# Patient Record
Sex: FEMALE | Race: WHITE | NOT HISPANIC OR LATINO | Employment: OTHER | URBAN - METROPOLITAN AREA
[De-identification: names, ages, dates, MRNs, and addresses within clinical notes are randomized per-mention and may not be internally consistent; named-entity substitution may affect disease eponyms.]

---

## 2017-01-04 ENCOUNTER — OFFICE VISIT (OUTPATIENT)
Dept: ALLERGY | Facility: OTHER | Age: 67
End: 2017-01-04
Payer: COMMERCIAL

## 2017-01-04 VITALS
OXYGEN SATURATION: 98 % | SYSTOLIC BLOOD PRESSURE: 110 MMHG | WEIGHT: 140 LBS | DIASTOLIC BLOOD PRESSURE: 66 MMHG | BODY MASS INDEX: 24.8 KG/M2 | HEART RATE: 75 BPM | HEIGHT: 63 IN

## 2017-01-04 DIAGNOSIS — J31.0 CHRONIC RHINITIS: ICD-10-CM

## 2017-01-04 DIAGNOSIS — J32.9 CHRONIC SINUSITIS, UNSPECIFIED LOCATION: ICD-10-CM

## 2017-01-04 DIAGNOSIS — J45.30 MILD PERSISTENT ASTHMA WITHOUT COMPLICATION: Primary | ICD-10-CM

## 2017-01-04 LAB
FEF 25/75: NORMAL
FEV-1: NORMAL
FEV1/FVC: NORMAL
FVC: NORMAL

## 2017-01-04 PROCEDURE — 86003 ALLG SPEC IGE CRUDE XTRC EA: CPT | Mod: 90 | Performed by: ALLERGY & IMMUNOLOGY

## 2017-01-04 PROCEDURE — 99000 SPECIMEN HANDLING OFFICE-LAB: CPT | Performed by: ALLERGY & IMMUNOLOGY

## 2017-01-04 PROCEDURE — 99214 OFFICE O/P EST MOD 30 MIN: CPT | Mod: 25 | Performed by: ALLERGY & IMMUNOLOGY

## 2017-01-04 PROCEDURE — 94010 BREATHING CAPACITY TEST: CPT | Performed by: ALLERGY & IMMUNOLOGY

## 2017-01-04 PROCEDURE — 36415 COLL VENOUS BLD VENIPUNCTURE: CPT | Performed by: ALLERGY & IMMUNOLOGY

## 2017-01-04 PROCEDURE — 95004 PERQ TESTS W/ALRGNC XTRCS: CPT | Performed by: ALLERGY & IMMUNOLOGY

## 2017-01-04 RX ORDER — MONTELUKAST SODIUM 10 MG/1
10 TABLET ORAL AT BEDTIME
Qty: 90 TABLET | Refills: 3 | Status: SHIPPED | OUTPATIENT
Start: 2017-01-04 | End: 2017-03-03

## 2017-01-04 NOTE — PROGRESS NOTES
Amee Crews is a 66 year old White female with previous medical history significant for chronic sinusitis with nasal polyposis, allergic rhinitis and asthma. Amee Crews is being seen today for evaluation of asthma and seasonal allergies.     The patient reports having allergy symptoms since 21 years of age. At some point during the 1970s she underwent skin testing which she recalls being positive for trees, grasses, weeds, cats, dust mite. She was on allergen immunotherapy for 2 years. She felt like this was helpful for her symptoms. Her symptoms include nasal itching, nasal congestion, postnasal drip, poor sense of smell, sinus headaches and decreased taste. She has perennial symptoms and her symptoms get better in the summer months. Increased symptoms around cutting grass and raking leaves. Symptoms are present day and night. Symptoms are present in door and outdoor. Symptoms are made worse by cats. She has tried Flonase 2 sprays per nostril daily. She thinks this is moderately effective. Cetirizine has been tried and it is somewhat helpful. She has tried pseudoephedrine and this has been helpful.  Patient has a history of chronic sinusitis with nasal polyposis. She has had sinus surgery twice once in 1997 and again in 1999. She follows with ENT Dr. Hinkle. As mentioned above she is on Flonase 2 sprays per nostril daily. Her sense of smell will wax and wane. Typically if she is treated with oral corticosteroid she will have improvement in nasal symptoms, but only for a few days.    Patient reports that she was diagnosed with asthma in her 40s. Asthma triggers include cold weather, Spring and fall. No problems with upper respiratory tract infections. She will have wheezing, chest tightness, coughing and shortness of breath approximately 15 days per month. She will use her albuterol for the symptoms and this is helpful. She denies nocturnal symptoms. Her asthma symptoms are worse when she lies flat at  night. Patient has previously been prescribed Flovent 110  g. She will use this 2 puffs daily intermittently. She reports over the last month she has used it 6 days. Over the last month she has used her up purulent inhaler 15 times. She has acid reflux symptoms 2-3 times per month. This is the same when she lays flat or if she is standing up. No increased chest symptoms around dogs or cats.    The patient has no history of asthma, eczema, food allergies, medications allergies or hives.     ENVIRONMENTAL HISTORY: [place] The home is heated with a forced air. They does have central air conditioning. The patient's bedroom is furnished with carpeting in bedroom and fabric window coverings.  Pets inside the house include 0 . There is history of cockroach or mice infestation. There is/are 0 smokers in the house.  The house does have a damp basement.     ACT Total Scores 1/4/2017   ACT TOTAL SCORE -   ASTHMA ER VISITS -   ASTHMA HOSPITALIZATIONS -   ACT TOTAL SCORE (Goal Greater than or Equal to 20) 19   In the past 12 months, how many times did you visit the emergency room for your asthma without being admitted to the hospital? 0   In the past 12 months, how many times were you hospitalized overnight because of your asthma? 0     Past Medical History   Diagnosis Date     Ocular migraine      Allergic rhinitis      HA (headache)      muscle contraction      asthma      mild intermittent     Hyperlipidemia      Hot flashes      Major depressive disorder, single episode, moderate (H)      Insomnia      Chronic back pain      neck and low back     Osteoarthritis of hand      Allergies      Intermittent asthma 9/16/2011     Restless leg 9/16/2011     History of blood transfusion      In childhood     Family History   Problem Relation Age of Onset     DIABETES Mother      Hypertension Mother      CANCER Father      stomach      CANCER Maternal Grandfather      lung      HEART  DISEASE Other      Past Surgical History   Procedure Laterality Date     Appendectomy       age 9      Laparoscopy procedure unlisted       polyps found     Biopsy       took tissue from my uterus at least 15 years ago     Colonoscopy  14 years     Sinus surgery       x2     Ent surgery       Tonsillectomy     Colonoscopy with co2 insufflation N/A 4/30/2015     Procedure: COLONOSCOPY WITH CO2 INSUFFLATION;  Surgeon: Doni Carey MD;  Location: [facility] OR     Colonoscopy N/A 4/30/2015     Procedure: COMBINED COLONOSCOPY, SINGLE OR MULTIPLE BIOPSY/POLYPECTOMY BY BIOPSY;  Surgeon: Doni Carey MD;  Location: [facility] OR     Mouth surgery  2016       REVIEW OF SYSTEMS:  General: negative for weight gain. negative for weight loss. positive  for changes in sleep.   Ears: negative for fullness. negative for hearing loss. negative for dizziness.   Nose: positive  for snoring.positive  for changes in smell. positive  for drainage.   Eyes: positive  for eye watering. negative for eye itching. negative for vision changes. negative for eye redness.  Throat: negative for hoarseness. negative for sore throat. negative for trouble swallowing.   Lungs: positive  for shortness of breath.positive  for wheezing. positive  for sputum production.   Cardiovascular: negative for chest pain. positive  for swelling of ankles. positive  for fast or irregular heartbeat.   Gastrointestinal: negative for nausea. positive  for heartburn. positive  for acid reflux.   Musculoskeletal: positive  for joint pain. positive  for joint stiffness. negative for joint swelling.   Neurologic: negative for seizures. negative for fainting. negative for weakness.   Psychiatric: negative for changes in mood. negative for anxiety.   Endocrine: negative for cold intolerance. negative for heat intolerance. negative for tremors.   Lymphatic: positive  for lower extremity swelling. negative for lymph node swelling.   Hematologic: positive  for easy  bruising. negative for easy bleeding.  Integumentary: negative for rash. negative for scaling. negative for nail changes.       Current outpatient prescriptions:      Fluticasone Furoate (ARNUITY ELLIPTA) 100 MCG/ACT AEPB, Inhale 1 puff into the lungs daily, Disp: 1 each, Rfl: 11     montelukast (SINGULAIR) 10 MG tablet, Take 1 tablet (10 mg) by mouth At Bedtime, Disp: 90 tablet, Rfl: 3     sertraline (ZOLOFT) 100 MG tablet, Take 0.5 tablets (50 mg) by mouth daily, Disp: 30 tablet, Rfl: 3     traZODone (DESYREL) 100 MG tablet, Take 1 tablet (100 mg) by mouth At Bedtime, Disp: 90 tablet, Rfl: 4     fluticasone (FLONASE) 50 MCG/ACT nasal spray, Spray 2 sprays into both nostrils daily, Disp: 1 Package, Rfl: 11     SUMAtriptan (IMITREX) 50 MG tablet, Take 1 tablet (50 mg) by mouth at onset of headache for migraine May repeat dose in 2 hours.  Do not exceed 200 mg in 24 hours, Disp: 18 tablet, Rfl: 4     albuterol (PROAIR HFA, PROVENTIL HFA, VENTOLIN HFA) 108 (90 BASE) MCG/ACT inhaler, Inhale 2 puffs into the lungs every 4 hours as needed for shortness of breath / dyspnea, Disp: 1 Inhaler, Rfl: 2     fluticasone (FLOVENT HFA) 110 MCG/ACT inhaler, Inhale 2 puffs into the lungs 2 times daily 2 puffs, Disp: 1 Inhaler, Rfl: 3     rOPINIRole (REQUIP) 0.25 MG tablet, Take 1 tablet (0.25 mg) by mouth At Bedtime, Disp: 90 tablet, Rfl: 4     fish oil-omega-3 fatty acids (FISH OIL) 1000 MG capsule, Take 2 g by mouth daily, Disp: , Rfl:      cetirizine HCl (ZYRTEC ALLERGY) 10 MG CAPS, Take 10 mg by mouth daily., Disp: , Rfl:      ALPRAZolam (XANAX) 0.25 MG tablet, Take 1 tablet (0.25 mg) by mouth 2 times daily as needed for anxiety, Disp: 30 tablet, Rfl: 0     CALCIUM /VITAMIN D TABS   OR, 1 TABLET DAILY, Disp: , Rfl:   Immunization History   Administered Date(s) Administered     Influenza (IIV3) 11/04/2009, 11/07/2013     Pneumococcal 23 valent 11/04/2009     TD (ADULT, 7+) 01/01/1995     TDAP (ADACEL AGES 11-64) 03/31/2006      Allergies   Allergen Reactions     Atorvastatin Cramps     Muscle pain     Morphine Sulfate          EXAM:   Constitutional:  Appears well-developed and well-nourished. No distress.   HEENT:   Head: Normocephalic.   Right Ear: External ear normal. TM normal  Left Ear: External ear normal. TM normal  Mouth/Throat: No oropharyngeal exudate present.   No cobblestoning of posterior oropharynx.   Nasal tissue erythematous. Green mucus noted on examination.   Eyes: Conjunctivae are non-erythematous   No maxillary or frontal sinus tenderness to palpation.   Cardiovascular: Normal rate, regular rhythm and normal heart sounds. Exam reveals no gallop and no friction rub.   No murmur heard.  Respiratory: Effort normal and breath sounds normal. No respiratory distress. No wheezes. No rales.   Musculoskeletal: Normal range of motion.   Lymphadenopathy:   No cervical adenopathy.   No lower extremity edema.   Neuro: Oriented to person, place, and time.  Skin: Skin is warm and dry. No rash noted.   Psychiatric: Normal mood and affect.     Nursing note and vitals reviewed.      WORKUP:   Skin testing  Negative for aeroallergens. Cat and dog borderline, but negative.     Spirometry-Pre  FVC % pred:114  FEV1 % pred:112  FEV1/FVC % act:75  FEF 25-75% pred:99  She had normal spirometry. However, she was actively complaining of chest tightness and therefore albuterol 2 puffs with a spacer was given.     Spirometry-Post  FVC % pred:109  FEV1 % pred:113  FEV1/FVC % act:80  FEF 25-75% pred:135  She continues to have normal spirometry. Her sensation of chest tightness resolved. She had significant improvement in [id number] post bronchodilator.     ASSESSMENT/PLAN:  Problem List Items Addressed This Visit        Respiratory    Mild persistent asthma without complication - Primary     Chest symptoms including wheezing, chest tightness, coughing and shortness of breath. Symptoms typically flare with cold weather, in the spring and in the  fall. Over the last few months she has had increased chest symptoms. In last 1 month she has used albuterol 15 times. No nocturnal chest symptoms. Albuterol when used as helpful. She previously was prescribed Flovent 110  g. She is using this 2 puffs daily. She has uses 6 days and lasts 30. Asthma symptoms are worse when she lays flat.    Spirometry done, reviewed and was normal. However, she complained of chest tightness. She was given 2 puffs of an albuterol inhaler with spacer device. She had resolution of chest tightness. She had no significant improvement in her FEV1. However, she did have significant improvement in her FEF 25-75.  ACT 19    - An asthma action plan was provided and discussed with patient and family.   - Albuterol 2-4 puffs inhaled (use a spacer unless using a Proair Respiclick device) every 4 hours as needed for chest tightness, wheezing, shortness of breath and/or coughing.   - She has Flovent 110mcg at home. She is going to finish current Flovent and then switch to Arnuity as noted below. In the interim will treat with Flovent 110mcg 2 puffs inhaled twice daily.   - Arnuity 100mcg 1 puff inhaled daily. Instructed how to use.   - Avoid asthma triggers.  - Concern for reflux exacerbating chest symptoms. Trial of OTC PPI for the next 1 month.   - Return to clinic in 3 months.              Relevant Medications    Fluticasone Furoate (ARNUITY ELLIPTA) 100 MCG/ACT AEPB    montelukast (SINGULAIR) 10 MG tablet    Other Relevant Orders    Spirometry, Breathing Capacity (Completed)    BRONCHODILATION RESPONSE, PRE/POST ADMIN    ALLERGY SKIN TESTS,ALLERGENS (Completed)    Allergen cat epithellium IgE (Completed)    Allergen dog epithelium IgE (Completed)    Allergen tabitha IgE (Completed)    Allergen D pteronyssinus IgE (Completed)    Allergen D farinae IgE (Completed)    Allergen alternaria alternata IgE (Completed)    Allergen aspergillus fumigatus IgE (Completed)    Allergen cladosporium herbarum  IgE (Completed)    Allergen Epicoccum purpurascens IgE (Completed)    Allergen penicillium notatum IgE (Completed)    Allergen oak white IgE (Completed)    Allergen Red [place] IgE (Completed)    Allergen silver  birch IgE (Completed)    Allergen [place] Tree (Completed)    Allergen white pine IgE (Completed)    Allergen ryan white IgE (Completed)    Allergen [place] IgE (Completed)    Allergen cottonwood IgE (Completed)    Allergen elm IgE (Completed)    Allergen maple box elder IgE (Completed)    Allergen [place] White (Completed)    Allergen English plantain IgE (Completed)    Allergen giant ragweed IgE (Completed)    Allergen lamb's quarter IgE (Completed)    Allergen Mugwort IgE (Completed)    Allergen ragweed short IgE (Completed)    Allergen Sagebrush Wormwood IgE (Completed)    Allergen Sheep Sorrel IgE (Completed)    Allergen thistle Russian IgE (Completed)    Allergen Weed Nettle IgE (Completed)    Allergen, Kochia/Firebush (Completed)    Chronic sinusitis, unspecified location     Nasal itching, congestion, postnasal drip, decreased sense of smell, sinus headaches and decreased taste. History of chronic sinusitis with nasal polyposis. She has had some green nasal discharge. Likely she continues to have chronic sinusitis and possibly has recurrent polyposis. I did not visualize any nasal polyps on examination today. Oral corticosteroids have been helpful in the past. She is on nasal steroid daily.    - Continue Flonase 2 sprays per nostril daily.  - Follow up with ENT. She follows with Dr. Hinkle.          Relevant Medications    Fluticasone Furoate (ARNUITY ELLIPTA) 100 MCG/ACT AEPB    montelukast (SINGULAIR) 10 MG tablet    Other Relevant Orders    ALLERGY SKIN TESTS,ALLERGENS (Completed)    Allergen cat epithellium IgE (Completed)    Allergen dog epithelium IgE (Completed)    Allergen tabitha IgE (Completed)    Allergen D pteronyssinus IgE (Completed)    Allergen D farinae IgE (Completed)    Allergen  alternaria alternata IgE (Completed)    Allergen aspergillus fumigatus IgE (Completed)    Allergen cladosporium herbarum IgE (Completed)    Allergen Epicoccum purpurascens IgE (Completed)    Allergen penicillium notatum IgE (Completed)    Allergen oak white IgE (Completed)    Allergen Red [place] IgE (Completed)    Allergen silver  birch IgE (Completed)    Allergen [place] Tree (Completed)    Allergen white pine IgE (Completed)    Allergen ryan white IgE (Completed)    Allergen [place] IgE (Completed)    Allergen cottonwood IgE (Completed)    Allergen elm IgE (Completed)    Allergen maple box elder IgE (Completed)    Allergen [place] White (Completed)    Allergen English plantain IgE (Completed)    Allergen giant ragweed IgE (Completed)    Allergen lamb's quarter IgE (Completed)    Allergen Mugwort IgE (Completed)    Allergen ragweed short IgE (Completed)    Allergen Sagebrush Wormwood IgE (Completed)    Allergen Sheep Sorrel IgE (Completed)    Allergen thistle Russian IgE (Completed)    Allergen Weed Nettle IgE (Completed)    Allergen, Kochia/Firebush (Completed)    Chronic rhinitis     Nasal itching, congestion, postnasal drip, decreased sense of smell and decreased taste. Likely symptoms are due to chronic sinusitis with nasal polyposis. History of allergic rhinoconjunctivitis. Flonase has been used and is somewhat helpful. Cetirizine is somewhat helpful. Sudafed has been helpful. Allergy skin testing today was negative. She had a very small histamine. She had borderline dog and cat testing.    - Serum specific IgE for environmental allergens.  - Continue Flonase 2 sprays per nostril daily.  - Starting montelukast 10 mg by mouth daily.  - Oral antihistamine as needed.  - ENT evaluation for chronic sinusitis as noted.         Relevant Medications    Fluticasone Furoate (ARNUITY ELLIPTA) 100 MCG/ACT AEPB    montelukast (SINGULAIR) 10 MG tablet          Chart documentation with Dragon Voice recognition Software.  Although reviewed after completion, some words and grammatical errors may remain.    Marcelino Woodward DO   Allergy/Immunology  [facility], [place] and [place], [place]

## 2017-01-04 NOTE — Clinical Note
My Asthma Action Plan  Name: Amee Crews   YOB: 1950  Date: 1/4/2017   My doctor: Patrica Kelly   My clinic: [facility]      My Control Medicine:   Arnuity 100mcg 1 puff inhaled daily    My Rescue Medicine:  Albuterol 2-4 puffs inhaled (use a spacer unless using a Proair Respiclick device) every 4 hours as needed for chest tightness, wheezing, shortness of breath and/or coughing.      My Asthma Severity: mild persistent  Avoid your asthma triggers: pollens and cold air        GREEN ZONE   Good Control    I feel good    No cough or wheeze    Can work, sleep and play without asthma symptoms       Take your asthma control medicine every day.     1. If exercise triggers your asthma, take your rescue medication    15 minutes before exercise or sports, and    During exercise if you have asthma symptoms  2. Spacer to use with inhaler: If you have a spacer, make sure to use it with your inhaler             YELLOW ZONE Getting Worse  I have ANY of these:    I do not feel good    Cough or wheeze    Chest feels tight    Wake up at night   1. Keep taking your Green Zone medications  2. Start taking your rescue medicine:    every 20 minutes for up to 1 hour. Then every 4 hours for 24-48 hours.  3. If you stay in the Yellow Zone for more than 12-24 hours, contact your doctor.  4. If you do not return to the Green Zone in 12-24 hours or you get worse, start taking your oral steroid medicine if prescribed by your provider.           RED ZONE Medical Alert - Get Help  I have ANY of these:    I feel awful    Medicine is not helping    Breathing getting harder    Trouble walking or talking    Nose opens wide to breathe       1. Take your rescue medicine NOW  2. If your provider has prescribed an oral steroid medicine, start taking it NOW  3. Call your doctor NOW  4. If you are still in the Red Zone after 20 minutes and you have not reached your doctor:    Take your rescue medicine again and    Call  911 or go to the emergency room right away    See your regular doctor within 2 weeks of an Emergency Room or Urgent Care visit for follow-up treatment.        The above medication may be given at school or day care?: N/A (Adult Patient)  Child can carry and use inhaler(s) at school with approval of school nurse?: N/A (Adult Patient)    Electronically signed by: Marcelino Woodward, January 4, 2017    Annual Reminders:  Meet with Asthma Educator,  Flu Shot in the Fall, consider Pneumonia Vaccination for patients with asthma (aged 19 and older).    Pharmacy: [id number] [facility] [address] [facility]                    Asthma Triggers  How To Control Things That Make Your Asthma Worse    Triggers are things that make your asthma worse.  Look at the list below to help you find your triggers and what you can do about them.  You can help prevent asthma flare-ups by staying away from your triggers.      Trigger                                                          What you can do   Cigarette Smoke  Tobacco smoke can make asthma worse. Do not allow smoking in your home, car or around you.  Be sure no one smokes at a child s day care or school.  If you smoke, ask your health care provider for ways to help you quit.  Ask family members to quit too.  Ask your health care provider for a referral to Quit Plan to help you quit smoking, or call 5-316-586-PLAN.     Colds, Flu, Bronchitis  These are common triggers of asthma. Wash your hands often.  Don t touch your eyes, nose or mouth.  Get a flu shot every year.     Dust Mites  These are tiny bugs that live in cloth or carpet. They are too small to see. Wash sheets and blankets in hot water every week.   Encase pillows and mattress in dust mite proof covers.  Avoid having carpet if you can. If you have carpet, vacuum weekly.   Use a dust mask and HEPA vacuum.   Pollen and Outdoor Mold  Some people are allergic to trees, grass, or weed pollen, or molds. Try to keep your  windows closed.  Limit time out doors when pollen count is high.   Ask you health care provider about taking medicine during allergy season.     Animal Dander  Some people are allergic to skin flakes, urine or saliva from pets with fur or feathers. Keep pets with fur or feathers out of your home.    If you can t keep the pet outdoors, then keep the pet out of your bedroom.  Keep the bedroom door closed.  Keep pets off cloth furniture and away from stuffed toys.     Mice, Rats, and Cockroaches  Some people are allergic to the waste from these pests.   Cover food and garbage.  Clean up spills and food crumbs.  Store grease in the refrigerator.   Keep food out of the bedroom.   Indoor Mold  This can be a trigger if your home has high moisture. Fix leaking faucets, pipes, or other sources of water.   Clean moldy surfaces.  Dehumidify basement if it is damp and smelly.   Smoke, Strong Odors, and Sprays  These can reduce air quality. Stay away from strong odors and sprays, such as perfume, powder, hair spray, paints, smoke incense, paint, cleaning products, candles and new carpet.   Exercise or Sports  Some people with asthma have this trigger. Be active!  Ask your doctor about taking medicine before sports or exercise to prevent symptoms.    Warm up for 5-10 minutes before and after sports or exercise.     Other Triggers of Asthma  Cold air:  Cover your nose and mouth with a scarf.  Sometimes laughing or crying can be a trigger.  Some medicines and food can trigger asthma.

## 2017-01-04 NOTE — NURSING NOTE
Per provider verbal order, RN placed Adult Environmental scratch testing panels.  Consent was obtained prior to procedure.  Once panels were placed, patient was monitored for 15 minutes in clinic.  RN read test after 15 minutes and provider was notified of results.  Pt tolerated procedure well.  All questions and concerns were addressed at office visit.     Writer demonstrated how to use an HFA inhaler with a spacer for patient.  Patient instructed to shake the inhaler, empty lungs, put the inhaler spacer in mouth, push down on the inhaler and breathe in at the same time and then hold breath for 10 seconds.  RN informed patient that if an audible whistle/hum is heard from spacer, they are to slow their breathing.  Patient advised to wait 30 seconds-1 minute between puffs.  Patient instructed on how to clean the MDI inhaler, take the medication canister out, wash it in warm soapy water, rinse it and then let it dry over night.  RN advised pt to refer to handout with spacer for cleaning instructions.  Patient informed the inhaler needs to be washed once a week or when he notices a powder buildup.  Patient verbalized understanding.     Gwen Boo RN

## 2017-01-04 NOTE — ASSESSMENT & PLAN NOTE
Nasal itching, congestion, postnasal drip, decreased sense of smell and decreased taste. Likely symptoms are due to chronic sinusitis with nasal polyposis. History of allergic rhinoconjunctivitis. Flonase has been used and is somewhat helpful. Cetirizine is somewhat helpful. Sudafed has been helpful. Allergy skin testing today was negative. She had a very small histamine. She had borderline dog and cat testing.    - Serum specific IgE for environmental allergens.  - Continue Flonase 2 sprays per nostril daily.  - Starting montelukast 10 mg by mouth daily.  - Oral antihistamine as needed.  - ENT evaluation for chronic sinusitis as noted.

## 2017-01-04 NOTE — PATIENT INSTRUCTIONS
If you have any questions regarding your allergies, asthma, or what we discussed during your visit today please call the allergy clinic or contact us via [email or web address].    [facility] ([place], [place]): [phone]  [facility]: [phone]  [facility]: [phone]  [facility]: [phone]  [facility] All[facility]: [phone]  [facility]: [phone]    1. See asthma action plan.   2. Continue Flonase 2 sprays/nostril daily.   3. Continue antihistamine as needed.   4. Follow up with ENT.   5. Start Singulair 10mg by mouth daily.   6. Allergy blood testing today.

## 2017-01-04 NOTE — MR AVS SNAPSHOT
After Visit Summary   1/4/2017    Amee Crews    MRN: [id number]           Patient Information     Date Of Birth          1950        Visit Information        Provider Department      1/4/2017 3:40 PM Marcelino Woodward,  [facility]        Today's Diagnoses     Mild persistent asthma without complication    -  1     Chronic sinusitis, unspecified location           Care Instructions    If you have any questions regarding your allergies, asthma, or what we discussed during your visit today please call the allergy clinic or contact us via [employer].    [facility] ([place], [place]): [phone]  [facility]: [phone]  [facility]: [phone]  [facility]: [phone]  [facility] All[facility]: [phone]  [facility]: [phone]    1. See asthma action plan.   2. Continue Flonase 2 sprays/nostril daily.   3. Continue antihistamine as needed.   4. Follow up with ENT.   5. Start Singulair 10mg by mouth daily.   6. Allergy blood testing today.         Follow-ups after your visit        Follow-up notes from your care team     Return in about 3 months (around 4/4/2017).      Future tests that were ordered for you today     Open Future Orders        Priority Expected Expires Ordered    BRONCHODILATION RESPONSE, PRE/POST ADMIN Routine  2/18/2017 1/4/2017            Who to contact     If you have questions or need follow up information about today's clinic visit or your schedule please contact [facility] directly at [phone].  Normal or non-critical lab and imaging results will be communicated to you by MyChart, letter or phone within 4 business days after the clinic has received the results. If you do not hear from us within 7 days, please contact the clinic through MyChart or phone. If you have a critical or abnormal lab result, we will notify you by phone as soon as possible.  Submit refill  "requests through "[employer] or call your pharmacy and they will forward the refill request to us. Please allow 3 business days for your refill to be completed.          Additional Information About Your Visit        [employer] Information     "[employer] gives you secure access to your electronic health record. If you see a primary care provider, you can also send messages to your care team and make appointments. If you have questions, please call your primary care clinic.  If you do not have a primary care provider, please call [phone] and they will assist you.        Care EveryWhere ID     This is your Care EveryWhere ID. This could be used by other organizations to access your [facility] medical records  [id number]        Your Vitals Were     Pulse Height BMI (Body Mass Index) Pulse Oximetry          75 5' 2.76\" (1.594 m) 24.99 kg/m2 98%         Blood Pressure from Last 3 Encounters:   01/04/17 110/66   04/30/15 108/61   03/31/15 141/83    Weight from Last 3 Encounters:   01/04/17 140 lb (63.504 kg)   04/24/15 143 lb (64.864 kg)   03/31/15 143 lb (64.864 kg)              We Performed the Following     Allergen alternaria alternata IgE     Allergen ryan white IgE     Allergen aspergillus fumigatus IgE     Allergen cat epithellium IgE     Allergen Cedar IgE     Allergen cladosporium herbarum IgE     Allergen cottonwood IgE     Allergen D farinae IgE     Allergen D pteronyssinus IgE     Allergen dog epithelium IgE     Allergen elm IgE     Allergen English plantain IgE     Allergen Epicoccum purpurascens IgE     Allergen giant ragweed IgE     Allergen lamb's quarter IgE     Allergen maple box elder IgE     Allergen Mugwort IgE     Allergen Gerry White     Allergen oak white IgE     Allergen penicillium notatum IgE     Allergen ragweed short IgE     Allergen Red Gerry IgE     Allergen Sagebrush Wormwood IgE     Allergen Sheep Sorrel IgE     Allergen silver  birch IgE     Allergen thistle Russian IgE     Allergen " tabitha IgE     Allergen Bedford Tree     Allergen Weed Nettle IgE     Allergen white pine IgE     Allergen, Kochia/Firebush     ALLERGY SKIN TESTS,ALLERGENS     Spirometry, Breathing Capacity          Today's Medication Changes          These changes are accurate as of: 1/4/17  5:09 PM.  If you have any questions, ask your nurse or doctor.               Start taking these medicines.        Dose/Directions    Fluticasone Furoate 100 MCG/ACT Aepb   Commonly known as:  ARNUITY ELLIPTA   Used for:  Mild persistent asthma without complication   Started by:  Marcelino Woodward DO        Dose:  1 puff   Inhale 1 puff into the lungs daily   Quantity:  1 each   Refills:  11       montelukast 10 MG tablet   Commonly known as:  SINGULAIR   Used for:  Chronic sinusitis, unspecified location, Mild persistent asthma without complication   Started by:  Marcelino Woodward DO        Dose:  10 mg   Take 1 tablet (10 mg) by mouth At Bedtime   Quantity:  90 tablet   Refills:  3            Where to get your medicines      These medications were sent to [id number] - [place] - [address]  [address] [address]     Phone:  [phone]    - Fluticasone Furoate 100 MCG/ACT Aepb  - montelukast 10 MG tablet             Primary Care Provider Office Phone # Fax #    Patrica Kelly [phone]       [address] [address]        Thank you!     Thank you for choosing [facility]  for your care. Our goal is always to provide you with excellent care. Hearing back from our patients is one way we can continue to improve our services. Please take a few minutes to complete the written survey that you may receive in the mail after your visit with us. Thank you!             Your Updated Medication List - Protect others around you: Learn how to safely use, store and throw away your medicines at www.disposemymeds.org.          This list is  accurate as of: 1/4/17  5:09 PM.  Always use your most recent med list.                   Brand Name Dispense Instructions for use    albuterol 108 (90 BASE) MCG/ACT Inhaler    PROAIR HFA/PROVENTIL HFA/VENTOLIN HFA    1 Inhaler    Inhale 2 puffs into the lungs every 4 hours as needed for shortness of breath / dyspnea       ALPRAZolam 0.25 MG tablet    XANAX    30 tablet    Take 1 tablet (0.25 mg) by mouth 2 times daily as needed for anxiety       CALCIUM /VITAMIN D TABS   OR      1 TABLET DAILY       fish oil-omega-3 fatty acids 1000 MG capsule      Take 2 g by mouth daily       fluticasone 110 MCG/ACT Inhaler    FLOVENT HFA    1 Inhaler    Inhale 2 puffs into the lungs 2 times daily 2 puffs       fluticasone 50 MCG/ACT spray    FLONASE    1 Package    Spray 2 sprays into both nostrils daily       Fluticasone Furoate 100 MCG/ACT Aepb    ARNUITY ELLIPTA    1 each    Inhale 1 puff into the lungs daily       montelukast 10 MG tablet    SINGULAIR    90 tablet    Take 1 tablet (10 mg) by mouth At Bedtime       rOPINIRole 0.25 MG tablet    REQUIP    90 tablet    Take 1 tablet (0.25 mg) by mouth At Bedtime       sertraline 100 MG tablet    ZOLOFT    30 tablet    Take 0.5 tablets (50 mg) by mouth daily       SUMAtriptan 50 MG tablet    IMITREX    18 tablet    Take 1 tablet (50 mg) by mouth at onset of headache for migraine May repeat dose in 2 hours.  Do not exceed 200 mg in 24 hours       traZODone 100 MG tablet    DESYREL    90 tablet    Take 1 tablet (100 mg) by mouth At Bedtime       ZYRTEC ALLERGY 10 MG Caps   Generic drug:  cetirizine HCl      Take 10 mg by mouth daily.

## 2017-01-04 NOTE — ASSESSMENT & PLAN NOTE
Nasal itching, congestion, postnasal drip, decreased sense of smell, sinus headaches and decreased taste. History of chronic sinusitis with nasal polyposis. She has had some green nasal discharge. Likely she continues to have chronic sinusitis and possibly has recurrent polyposis. I did not visualize any nasal polyps on examination today. Oral corticosteroids have been helpful in the past. She is on nasal steroid daily.    - Continue Flonase 2 sprays per nostril daily.  - Follow up with ENT. She follows with Dr. Hinkle.

## 2017-01-04 NOTE — NURSING NOTE
"Chief Complaint   Patient presents with     Consult       Initial /66 mmHg  Pulse 75  Ht 5' 2.76\" (1.594 m)  Wt 140 lb (63.504 kg)  BMI 24.99 kg/m2  SpO2 98% Estimated body mass index is 24.99 kg/(m^2) as calculated from the following:    Height as of this encounter: 5' 2.76\" (1.594 m).    Weight as of this encounter: 140 lb (63.504 kg).  BP completed using cuff size: roger Rodrigez MA      "

## 2017-01-04 NOTE — ASSESSMENT & PLAN NOTE
Chest symptoms including wheezing, chest tightness, coughing and shortness of breath. Symptoms typically flare with cold weather, in the spring and in the fall. Over the last few months she has had increased chest symptoms. In last 1 month she has used albuterol 15 times. No nocturnal chest symptoms. Albuterol when used as helpful. She previously was prescribed Flovent 110  g. She is using this 2 puffs daily. She has uses 6 days and lasts 30. Asthma symptoms are worse when she lays flat.    Spirometry done, reviewed and was normal. However, she complained of chest tightness. She was given 2 puffs of an albuterol inhaler with spacer device. She had resolution of chest tightness. She had no significant improvement in her FEV1. However, she did have significant improvement in her FEF 25-75.  ACT 19    - An asthma action plan was provided and discussed with patient and family.   - Albuterol 2-4 puffs inhaled (use a spacer unless using a Proair Respiclick device) every 4 hours as needed for chest tightness, wheezing, shortness of breath and/or coughing.   - She has Flovent 110mcg at home. She is going to finish current Flovent and then switch to Arnuity as noted below. In the interim will treat with Flovent 110mcg 2 puffs inhaled twice daily.   - Arnuity 100mcg 1 puff inhaled daily. Instructed how to use.   - Avoid asthma triggers.  - Concern for reflux exacerbating chest symptoms. Trial of OTC PPI for the next 1 month.   - Return to clinic in 3 months.

## 2017-01-05 ENCOUNTER — TELEPHONE (OUTPATIENT)
Dept: ALLERGY | Facility: OTHER | Age: 67
End: 2017-01-05

## 2017-01-05 ASSESSMENT — ASTHMA QUESTIONNAIRES: ACT_TOTALSCORE: 19

## 2017-01-05 NOTE — TELEPHONE ENCOUNTER
I do not recall giving her an inhaler. The only thing I would have given her would have been nasal sinus rinse bottle. If that was the case yes she can stop by and get but we are not in [place] today. You can tell her she can come to [place] on Friday to  from us when we are there or go to the pharmacy and buy. If she is referencing a coupon i may have given her she can go online and google arnuity coupon and print that off and take that with her to pharmacy. Otherwise she can  next Tuesday or Wednesday when we are in [place] again. Please discuss with her. Thanks.

## 2017-01-05 NOTE — TELEPHONE ENCOUNTER
Reason for call:  Pt had called and was seen in the clinic yesterday and  had given her an inhaler and she had left it here in the office. She was wondering if her  Alphonso can  today. Please give a call when its found and at the front dest. Thank you.,

## 2017-01-06 NOTE — TELEPHONE ENCOUNTER
Patient was thinking that the placebo inhaler used to demonstrate technique was the inhaler that she was supposed to take home. RN explained that her inhaler prescription was sent to [facility]'s pharmacy, and could be picked up there. Patient verbalized understanding. No further questions or concerns. Closing encounter.    Gwen Boo RN

## 2017-01-07 LAB
CALIF WALNUT IGE QN: NORMAL
DEPRECATED MISC ALLERGEN IGE RAST QL: NORMAL
WHITE MULBERRY IGE QN: NORMAL

## 2017-01-08 LAB
A ALTERNATA IGE QN: NORMAL KU(A)/L
A FUMIGATUS IGE QN: NORMAL KU(A)/L
C HERBARUM IGE QN: NORMAL KU(A)/L
CAT DANDER IGG QN: 2.23 KU(A)/L
CEDAR IGE QN: NORMAL KU(A)/L
COMMON RAGWEED IGE QN: NORMAL KU(A)/L
COTTONWOOD IGE QN: NORMAL KU(A)/L
D FARINAE IGE QN: NORMAL KU(A)/L
D PTERONYSS IGE QN: NORMAL KU(A)/L
DOG DANDER+EPITH IGE QN: 0.26 KU(A)/L
E PURPURASCENS IGE QN: NORMAL KU(A)/L
EAST WHITE PINE IGE QN: NORMAL KU(A)/L
ENGL PLANTAIN IGE QN: NORMAL KU(A)/L
FIREBUSH IGE QN: NORMAL KU(A)/L
GIANT RAGWEED IGE QN: NORMAL KU(A)/L
GOOSEFOOT IGE QN: NORMAL KU(A)/L
MAPLE IGE QN: NORMAL KU(A)/L
MUGWORT IGE QN: NORMAL KU(A)/L
NETTLE IGE QN: NORMAL KU(A)/L
P NOTATUM IGE QN: NORMAL KU(A)/L
RED MULBERRY IGE QN: NORMAL KU(A)/L
SALTWORT IGE QN: NORMAL KU(A)/L
SHEEP SORREL IGE QN: NORMAL KU(A)/L
SILVER BIRCH IGE QN: NORMAL KU(A)/L
TIMOTHY IGE QN: NORMAL KU(A)/L
WHITE ASH IGE QN: NORMAL KU(A)/L
WHITE ELM IGE QN: NORMAL KU(A)/L
WHITE OAK IGE QN: NORMAL KU(A)/L
WORMWOOD IGE QN: NORMAL KU(A)/L

## 2017-01-09 ENCOUNTER — MYC MEDICAL ADVICE (OUTPATIENT)
Dept: ALLERGY | Facility: OTHER | Age: 67
End: 2017-01-09

## 2017-01-25 ENCOUNTER — OFFICE VISIT (OUTPATIENT)
Dept: OTOLARYNGOLOGY | Facility: OTHER | Age: 67
End: 2017-01-25
Payer: COMMERCIAL

## 2017-01-25 VITALS — WEIGHT: 141 LBS | HEIGHT: 63 IN | TEMPERATURE: 98.2 F | BODY MASS INDEX: 24.98 KG/M2

## 2017-01-25 DIAGNOSIS — J33.9 NASAL POLYPS: Primary | ICD-10-CM

## 2017-01-25 PROCEDURE — 99213 OFFICE O/P EST LOW 20 MIN: CPT | Mod: 25 | Performed by: OTOLARYNGOLOGY

## 2017-01-25 PROCEDURE — 31231 NASAL ENDOSCOPY DX: CPT | Performed by: OTOLARYNGOLOGY

## 2017-01-25 RX ORDER — METHYLPREDNISOLONE 4 MG
TABLET, DOSE PACK ORAL
Qty: 21 TABLET | Refills: 0 | Status: SHIPPED | OUTPATIENT
Start: 2017-01-25 | End: 2017-03-03

## 2017-01-25 NOTE — PROGRESS NOTES
SUBJECTIVE:                                                    Amee Crews is a 66 year old female who presents to clinic today for the following health issues:  Still loss of smell which returned for a short period of time after oral steroids and now gone again.  She has had sinus surgery in the past and polyps but had allergy testing with Dr. Mcnamara recently that was overall negative.  The patient is most concerned about her smell and taste. 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  No other sinus symptoms.       Loss of taste and smell. Sx started about 1-2 years ago.       Problem list and histories reviewed & adjusted, as indicated.  Additional history: as documented    Patient Active Problem List   Diagnosis     Anxiety     Migraine headache     Hot flashes     Hyperlipidemia LDL goal <130     Chondromalacia patellae     Insomnia     Allergic state     Chronic back pain     Mild persistent asthma without complication     Restless leg     Vaginal atrophy     Advanced directives, counseling/discussion     Nasal polyposis     Fatty liver     Chronic sinusitis, unspecified location     Chronic rhinitis     Past Surgical History   Procedure Laterality Date     Appendectomy       age 9      Laparoscopy procedure unlisted       polyps found     Biopsy       took tissue from my uterus at least 15 years ago     Colonoscopy  12 years     Sinus surgery       x2     Ent surgery       Tonsillectomy     Colonoscopy with co2 insufflation N/A 4/30/2015     Procedure: COLONOSCOPY WITH CO2 INSUFFLATION;  Surgeon: Doni Carey MD;  Location: [facility] OR     Colonoscopy N/A 4/30/2015     Procedure: COMBINED COLONOSCOPY, SINGLE OR MULTIPLE BIOPSY/POLYPECTOMY BY BIOPSY;  Surgeon: Doni Carey MD;  Location: [facility] OR     Mouth surgery  2016       Social History   Substance Use Topics     Smoking status: Former Smoker -- 0.00 packs/day      Types: Cigarettes     Quit date: 01/04/1973     Smokeless tobacco: Never Used     Alcohol Use: 0.0 oz/week     0 Standard drinks or equivalent per week      Comment: ocass     Family History   Problem Relation Age of Onset     DIABETES Mother      Hypertension Mother      CANCER Father      stomach      CANCER Maternal Grandfather      lung      HEART DISEASE Other          Current Outpatient Prescriptions   Medication Sig Dispense Refill     methylPREDNISolone (MEDROL DOSEPAK) 4 MG tablet Follow package instructions 21 tablet 0     Fluticasone Furoate (ARNUITY ELLIPTA) 100 MCG/ACT AEPB Inhale 1 puff into the lungs daily 1 each 11     montelukast (SINGULAIR) 10 MG tablet Take 1 tablet (10 mg) by mouth At Bedtime 90 tablet 3     sertraline (ZOLOFT) 100 MG tablet Take 0.5 tablets (50 mg) by mouth daily 30 tablet 3     ALPRAZolam (XANAX) 0.25 MG tablet Take 1 tablet (0.25 mg) by mouth 2 times daily as needed for anxiety 30 tablet 0     traZODone (DESYREL) 100 MG tablet Take 1 tablet (100 mg) by mouth At Bedtime 90 tablet 4     fluticasone (FLONASE) 50 MCG/ACT nasal spray Spray 2 sprays into both nostrils daily 1 Package 11     SUMAtriptan (IMITREX) 50 MG tablet Take 1 tablet (50 mg) by mouth at onset of headache for migraine May repeat dose in 2 hours.  Do not exceed 200 mg in 24 hours 18 tablet 4     albuterol (PROAIR HFA, PROVENTIL HFA, VENTOLIN HFA) 108 (90 BASE) MCG/ACT inhaler Inhale 2 puffs into the lungs every 4 hours as needed for shortness of breath / dyspnea 1 Inhaler 2     fluticasone (FLOVENT HFA) 110 MCG/ACT inhaler Inhale 2 puffs into the lungs 2 times daily 2 puffs 1 Inhaler 3     rOPINIRole (REQUIP) 0.25 MG tablet Take 1 tablet (0.25 mg) by mouth At Bedtime 90 tablet 4     fish oil-omega-3 fatty acids (FISH OIL) 1000 MG capsule Take 2 g by mouth daily       cetirizine HCl (ZYRTEC ALLERGY) 10 MG CAPS Take 10 mg by mouth daily.       CALCIUM /VITAMIN D TABS   OR 1 TABLET DAILY       Allergies   Allergen  "Reactions     Atorvastatin Cramps     Muscle pain     Morphine Sulfate      Problem list, Medication list, Allergies, and Medical/Social/Surgical histories reviewed in [facility] and updated as appropriate.    ROS:  Constitutional, HEENT, cardiovascular, pulmonary, gi and gu systems are negative, except as otherwise noted.    OBJECTIVE:                                                    Temp(Src) 98.2  F (36.8  C) (Temporal)  Ht 1.6 m (5' 3\")  Wt 63.957 kg (141 lb)  BMI 24.98 kg/m2  Body mass index is 24.98 kg/(m^2).  GENERAL: healthy, alert and no distress  EYES: Eyes grossly normal to inspection, PERRL and conjunctivae and sclerae normal  HENT: ear canals and TM's normal, nose and mouth without ulcers or lesions  NECK: no adenopathy, no asymmetry, masses, or scars and thyroid normal to palpation  MS: no gross musculoskeletal defects noted, no edema    Diagnostic Test Results:  Nasal rigid endoscopy - open ant ethmoids right side as well as maxillary antrum with clear mucosa; left open ant ethmoids and maxillary antrum with mucosa more pale allergic.  Few nasal dry polyps noted off middle turbinate.     ASSESSMENT/PLAN:                                                    Discuss using nasal steroid sprays switching from Flonase to Nasacort or Rhinocort.  1 more medrol pack will be prescribed. Patient encouraged to wear mask or other protection from chemicals at work. Return in 6 months.        Freddie Hinkle MD, MD  [facility]    "

## 2017-01-25 NOTE — NURSING NOTE
"Chief Complaint   Patient presents with     Consult     RAMAKRISHNA pt last seen about 7 months ago.        Initial Temp(Src) 98.2  F (36.8  C) (Temporal)  Ht 1.6 m (5' 3\")  Wt 63.957 kg (141 lb)  BMI 24.98 kg/m2 Estimated body mass index is 24.98 kg/(m^2) as calculated from the following:    Height as of this encounter: 1.6 m (5' 3\").    Weight as of this encounter: 63.957 kg (141 lb).  BP completed using cuff size: NA (Not Taken)  "

## 2017-03-01 NOTE — PROGRESS NOTES
SUBJECTIVE:                                                    Amee Crews is a 66 year old female who presents to clinic today for the following health issues:      History of Present Illness   Hyperlipidemia:     Low fat/chol diet rating::  Fair    Taking Statins::  STOPPED    Side effects from hypolipidemia medication::  No muscle aches from Statin    Lipid Medications or Supplements::  Other and None  Diet::  Regular (no restrictions)  Frequency of exercise::  2-3 days/week  Duration of exercise::  15-30 minutes  Taking medications regularly::  Yes  Medication side effects::  None      ABDOMINAL PAIN     Onset: 2 weeks or so     Description:   Character: Fullness/pressure and pain   Location: right above belly button and below breast  Radiation: None    Intensity: moderate    Progression of Symptoms:  improving and same    Accompanying Signs & Symptoms:  Fever/Chills?: no   Gas/Bloating: YES  Nausea: no   Vomitting: no   Diarrhea?: no  But having green stools   Constipation:no   Dysuria or Hematuria: no    History:   Trauma: no   Previous similar pain: no    Previous tests done: none    Precipitating factors:   Does the pain change with:     Food: YES- worse with food      BM: no     Urination: no     Alleviating factors:  At first lemuel ramos helped but now nothing helps     Therapies Tried and outcome: otc heartburn and stomach meds     LMP:  not applicable       Problem list and histories reviewed & adjusted, as indicated.  Additional history: as documented      Patient Active Problem List   Diagnosis     Anxiety     Migraine headache     Hot flashes     Hyperlipidemia LDL goal <130     Chondromalacia patellae     Insomnia     Allergic state     Chronic back pain     Mild persistent asthma without complication     Restless leg     Vaginal atrophy     Advanced directives, counseling/discussion     Nasal polyposis     Fatty liver     Chronic sinusitis, unspecified location     Chronic rhinitis     Past  Surgical History   Procedure Laterality Date     Appendectomy       age 9      Laparoscopy procedure unlisted       polyps found     Biopsy       took tissue from my uterus at least 15 years ago     Colonoscopy  14 years     Sinus surgery       x2     Ent surgery       Tonsillectomy     Colonoscopy with co2 insufflation N/A 4/30/2015     Procedure: COLONOSCOPY WITH CO2 INSUFFLATION;  Surgeon: Doni Carye MD;  Location: [facility] OR     Colonoscopy N/A 4/30/2015     Procedure: COMBINED COLONOSCOPY, SINGLE OR MULTIPLE BIOPSY/POLYPECTOMY BY BIOPSY;  Surgeon: Doni Carey MD;  Location: [facility] OR     Mouth surgery  2016       Social History   Substance Use Topics     Smoking status: Former Smoker     Packs/day: 0.00     Types: Cigarettes     Quit date: 1/4/1973     Smokeless tobacco: Never Used     Alcohol use 0.0 oz/week     0 Standard drinks or equivalent per week      Comment: ocass     Family History   Problem Relation Age of Onset     DIABETES Mother      Hypertension Mother      CANCER Father      stomach      CANCER Maternal Grandfather      lung      HEART DISEASE Other          Current Outpatient Prescriptions   Medication Sig Dispense Refill     Coenzyme Q10 (CO Q 10) 10 MG CAPS        sertraline (ZOLOFT) 100 MG tablet Take 0.5 tablets (50 mg) by mouth daily 90 tablet 1     traZODone (DESYREL) 100 MG tablet Take 1 tablet (100 mg) by mouth At Bedtime 90 tablet 1     rOPINIRole (REQUIP) 0.25 MG tablet Take 1 tablet (0.25 mg) by mouth At Bedtime 90 tablet 1     omeprazole (PRILOSEC) 40 MG capsule Take 1 capsule (40 mg) by mouth daily Take 30-60 minutes before a meal. 30 capsule 2     SUMAtriptan (IMITREX) 50 MG tablet Take 1 tablet (50 mg) by mouth at onset of headache for migraine May repeat dose in 2 hours.  Do not exceed 200 mg in 24 hours 18 tablet 4     albuterol (PROAIR HFA, PROVENTIL HFA, VENTOLIN HFA) 108 (90 BASE) MCG/ACT inhaler Inhale 2 puffs into the lungs every 4 hours as needed for  "shortness of breath / dyspnea 1 Inhaler 2     fluticasone (FLOVENT HFA) 110 MCG/ACT inhaler Inhale 2 puffs into the lungs 2 times daily 2 puffs 1 Inhaler 3     Allergies   Allergen Reactions     Atorvastatin Cramps     Muscle pain     Morphine Sulfate      BP Readings from Last 3 Encounters:   03/03/17 116/64   01/04/17 110/66   04/30/15 108/61    Wt Readings from Last 3 Encounters:   03/03/17 139 lb 9.6 oz (63.3 kg)   01/25/17 141 lb (64 kg)   01/04/17 140 lb (63.5 kg)                  Labs reviewed in EPIC    ROS:  Constitutional, HEENT, cardiovascular, pulmonary, gi and gu systems are negative, except as otherwise noted.    OBJECTIVE:                                                    /64 (BP Location: Right arm, Patient Position: Chair, Cuff Size: Adult Regular)  Pulse 66  Temp 98.5  F (36.9  C) (Oral)  Resp 18  Ht 5' 3\" (1.6 m)  Wt 139 lb 9.6 oz (63.3 kg)  BMI 24.73 kg/m2  Body mass index is 24.73 kg/(m^2).  Physical Exam   Constitutional: She is oriented to person, place, and time. She appears well-developed and well-nourished.   HENT:   Head: Normocephalic and atraumatic.   Cardiovascular: Normal rate and regular rhythm.    Pulmonary/Chest: Effort normal and breath sounds normal.   Neurological: She is alert and oriented to person, place, and time.   Psychiatric: She has a normal mood and affect.     Diagnostic Test Results:  none      ASSESSMENT/PLAN:                                                        1. Restless leg  Stable symptoms   Continue requip at bedtime  Refill meds  - rOPINIRole (REQUIP) 0.25 MG tablet; Take 1 tablet (0.25 mg) by mouth At Bedtime  Dispense: 90 tablet; Refill: 1  - Comprehensive metabolic panel (BMP + Alb, Alk Phos, ALT, AST, Total. Bili, TP)  - Lipase    2. Abdominal pain, generalized  Gastritis vs Gall bladder pathology  Labs and Ultrasound abdomen did not show any acute abnormality  Trial PPI for the next 4-6 wks    follow up in 4 wks if symptoms persist or fail to " resolve  - US Abdomen Complete; Future  - omeprazole (PRILOSEC) 40 MG capsule; Take 1 capsule (40 mg) by mouth daily Take 30-60 minutes before a meal.  Dispense: 30 capsule; Refill: 2    3. Major depression in complete remission (H)  In remission  Reviewed PHq-9   Continue zoloft at 50mg dailty  - sertraline (ZOLOFT) 100 MG tablet; Take 0.5 tablets (50 mg) by mouth daily  Dispense: 90 tablet; Refill: 1    4. Insomnia, unspecified type  Well controlled on trazodone   Refill meds  - traZODone (DESYREL) 100 MG tablet; Take 1 tablet (100 mg) by mouth At Bedtime  Dispense: 90 tablet; Refill: 1      Anna Montanez MD  [facility]

## 2017-03-03 ENCOUNTER — OFFICE VISIT (OUTPATIENT)
Dept: FAMILY MEDICINE | Facility: OTHER | Age: 67
End: 2017-03-03
Payer: COMMERCIAL

## 2017-03-03 VITALS
RESPIRATION RATE: 18 BRPM | DIASTOLIC BLOOD PRESSURE: 64 MMHG | TEMPERATURE: 98.5 F | SYSTOLIC BLOOD PRESSURE: 116 MMHG | BODY MASS INDEX: 24.73 KG/M2 | HEART RATE: 66 BPM | HEIGHT: 63 IN | WEIGHT: 139.6 LBS

## 2017-03-03 DIAGNOSIS — F32.5 MAJOR DEPRESSION IN COMPLETE REMISSION (H): ICD-10-CM

## 2017-03-03 DIAGNOSIS — G47.00 INSOMNIA, UNSPECIFIED TYPE: ICD-10-CM

## 2017-03-03 DIAGNOSIS — R10.84 ABDOMINAL PAIN, GENERALIZED: Primary | ICD-10-CM

## 2017-03-03 DIAGNOSIS — G25.81 RESTLESS LEG: ICD-10-CM

## 2017-03-03 LAB
ALBUMIN SERPL-MCNC: 4 G/DL (ref 3.4–5)
ALP SERPL-CCNC: 95 U/L (ref 40–150)
ALT SERPL W P-5'-P-CCNC: 31 U/L (ref 0–50)
ANION GAP SERPL CALCULATED.3IONS-SCNC: 10 MMOL/L (ref 3–14)
AST SERPL W P-5'-P-CCNC: 17 U/L (ref 0–45)
BILIRUB SERPL-MCNC: 0.7 MG/DL (ref 0.2–1.3)
BUN SERPL-MCNC: 13 MG/DL (ref 7–30)
CALCIUM SERPL-MCNC: 8.6 MG/DL (ref 8.5–10.1)
CHLORIDE SERPL-SCNC: 103 MMOL/L (ref 94–109)
CO2 SERPL-SCNC: 28 MMOL/L (ref 20–32)
CREAT SERPL-MCNC: 0.74 MG/DL (ref 0.52–1.04)
GFR SERPL CREATININE-BSD FRML MDRD: 79 ML/MIN/1.7M2
GLUCOSE SERPL-MCNC: 125 MG/DL (ref 70–99)
LIPASE SERPL-CCNC: 113 U/L (ref 73–393)
POTASSIUM SERPL-SCNC: 4.1 MMOL/L (ref 3.4–5.3)
PROT SERPL-MCNC: 7.2 G/DL (ref 6.8–8.8)
SODIUM SERPL-SCNC: 141 MMOL/L (ref 133–144)

## 2017-03-03 PROCEDURE — 83690 ASSAY OF LIPASE: CPT | Performed by: FAMILY MEDICINE

## 2017-03-03 PROCEDURE — 80053 COMPREHEN METABOLIC PANEL: CPT | Performed by: FAMILY MEDICINE

## 2017-03-03 PROCEDURE — 36415 COLL VENOUS BLD VENIPUNCTURE: CPT | Performed by: FAMILY MEDICINE

## 2017-03-03 PROCEDURE — 99214 OFFICE O/P EST MOD 30 MIN: CPT | Performed by: FAMILY MEDICINE

## 2017-03-03 RX ORDER — ASCORBIC ACID 1000 MG
TABLET ORAL
COMMUNITY
End: 2017-08-17

## 2017-03-03 RX ORDER — OMEPRAZOLE 40 MG/1
40 CAPSULE, DELAYED RELEASE ORAL DAILY
Qty: 30 CAPSULE | Refills: 2 | Status: SHIPPED | OUTPATIENT
Start: 2017-03-03 | End: 2017-08-03

## 2017-03-03 RX ORDER — TRAZODONE HYDROCHLORIDE 100 MG/1
100 TABLET ORAL AT BEDTIME
Qty: 90 TABLET | Refills: 1 | Status: SHIPPED | OUTPATIENT
Start: 2017-03-03 | End: 2017-08-23

## 2017-03-03 RX ORDER — ROPINIROLE 0.25 MG/1
0.25 TABLET, FILM COATED ORAL AT BEDTIME
Qty: 90 TABLET | Refills: 1 | Status: SHIPPED | OUTPATIENT
Start: 2017-03-03 | End: 2017-08-03

## 2017-03-03 RX ORDER — SERTRALINE HYDROCHLORIDE 100 MG/1
50 TABLET, FILM COATED ORAL DAILY
Qty: 90 TABLET | Refills: 1 | Status: SHIPPED | OUTPATIENT
Start: 2017-03-03 | End: 2017-08-04

## 2017-03-03 ASSESSMENT — ANXIETY QUESTIONNAIRES
GAD7 TOTAL SCORE: 0
7. FEELING AFRAID AS IF SOMETHING AWFUL MIGHT HAPPEN: 0 = NOT AT ALL

## 2017-03-03 ASSESSMENT — PAIN SCALES - GENERAL: PAINLEVEL: NO PAIN (0)

## 2017-03-03 NOTE — NURSING NOTE
"Chief Complaint   Patient presents with     Abdominal Pain     possible gallstones     Panel Management     tdap, mammogram, honorning choices, hep c, lipids, phq9, AMADOR, ACT       Initial /64 (BP Location: Right arm, Patient Position: Chair, Cuff Size: Adult Regular)  Pulse 66  Temp 98.5  F (36.9  C) (Oral)  Resp 18  Ht 5' 3\" (1.6 m)  Wt 139 lb 9.6 oz (63.3 kg)  BMI 24.73 kg/m2 Estimated body mass index is 24.73 kg/(m^2) as calculated from the following:    Height as of this encounter: 5' 3\" (1.6 m).    Weight as of this encounter: 139 lb 9.6 oz (63.3 kg).  Medication Reconciliation: complete    "

## 2017-03-03 NOTE — MR AVS SNAPSHOT
After Visit Summary   3/3/2017    Amee Crwes    MRN: [id number]           Patient Information     Date Of Birth          1950        Visit Information        Provider Department      3/3/2017 11:00 AM Anna Montanez MD [facility]        Today's Diagnoses     Abdominal pain, generalized    -  1    Visit for screening mammogram        Need for hepatitis C screening test        Need for prophylactic vaccination with tetanus-diphtheria (TD)        Restless leg        Major depression in complete remission (H)        Insomnia, unspecified type           Follow-ups after your visit        Your next 10 appointments already scheduled     Mar 07, 2017 10:20 AM CST   US ABDOMEN COMPLETE with ERUS1   [facility] ([facility])    [address] [address] [phone]           Please bring a list of your medicines (including vitamins, minerals and over-the-counter drugs). Also, tell your doctor about any allergies you may have. Wear comfortable clothes and leave your valuables at home.  Adults: No eating or drinking for 8 hours before the exam. You may take medicine with a small sip of water.  Children: - Children 6+ years: No food or drink for 6 hours before exam. - Children 1-5 years: No food or drink for 4 hours before exam. - Infants, breast-fed: may have breast milk up to 2 hours before exam. - Infants, formula: may have bottle until 4 hours before exam.  Please call the Imaging Department at your exam site with any questions.              Future tests that were ordered for you today     Open Future Orders        Priority Expected Expires Ordered    US Abdomen Complete Routine 6/1/2017 3/3/2018 3/3/2017            Who to contact     If you have questions or need follow up information about today's clinic visit or your schedule please contact [facility] directly at [phone].  Normal or non-critical lab and imaging  "results will be communicated to you by MyChart, letter or phone within 4 business days after the clinic has received the results. If you do not hear from us within 7 days, please contact the clinic through [employer] or phone. If you have a critical or abnormal lab result, we will notify you by phone as soon as possible.  Submit refill requests through [employer] or call your pharmacy and they will forward the refill request to us. Please allow 3 business days for your refill to be completed.          Additional Information About Your Visit        [employer] Information     [employer] gives you secure access to your electronic health record. If you see a primary care provider, you can also send messages to your care team and make appointments. If you have questions, please call your primary care clinic.  If you do not have a primary care provider, please call [phone] and they will assist you.        Care EveryWhere ID     This is your Care EveryWhere ID. This could be used by other organizations to access your [place] medical records  [id number]        Your Vitals Were     Pulse Temperature Respirations Height BMI (Body Mass Index)       66 98.5  F (36.9  C) (Oral) 18 5' 3\" (1.6 m) 24.73 kg/m2        Blood Pressure from Last 3 Encounters:   03/03/17 116/64   01/04/17 110/66   04/30/15 108/61    Weight from Last 3 Encounters:   03/03/17 139 lb 9.6 oz (63.3 kg)   01/25/17 141 lb (64 kg)   01/04/17 140 lb (63.5 kg)              We Performed the Following     Comprehensive metabolic panel (BMP + Alb, Alk Phos, ALT, AST, Total. Bili, TP)     Lipase          Today's Medication Changes          These changes are accurate as of: 3/3/17 11:55 AM.  If you have any questions, ask your nurse or doctor.               Start taking these medicines.        Dose/Directions    omeprazole 40 MG capsule   Commonly known as:  priLOSEC   Used for:  Abdominal pain, generalized   Started by:  Anna Montanez MD        Dose:  40 mg   Take 1 " capsule (40 mg) by mouth daily Take 30-60 minutes before a meal.   Quantity:  30 capsule   Refills:  2            Where to get your medicines      These medications [place] [facility] [place] [address]  [address] [address]     Phone:  [phone]     omeprazole 40 MG capsule    rOPINIRole 0.25 MG tablet    sertraline 100 MG tablet    traZODone 100 MG tablet                Primary Care Provider Office Phone # Fax #    Patrica Kelly [phone]       [address] [address]        Thank you!     Thank you for choosing [facility]  for your care. Our goal is always to provide you with excellent care. Hearing back from our patients is one way we can continue to improve our services. Please take a few minutes to complete the written survey that you may receive in the mail after your visit with us. Thank you!             Your Updated Medication List - Protect others around you: Learn how to safely use, store and throw away your medicines at www.disposemymeds.org.          This list is accurate as of: 3/3/17 11:55 AM.  Always use your most recent med list.                   Brand Name Dispense Instructions for use    albuterol 108 (90 BASE) MCG/ACT Inhaler    PROAIR HFA/PROVENTIL HFA/VENTOLIN HFA    1 Inhaler    Inhale 2 puffs into the lungs every 4 hours as needed for shortness of breath / dyspnea       Co Q 10 10 MG Caps          fluticasone 110 MCG/ACT Inhaler    FLOVENT HFA    1 Inhaler    Inhale 2 puffs into the lungs 2 times daily 2 puffs       omeprazole 40 MG capsule    priLOSEC    30 capsule    Take 1 capsule (40 mg) by mouth daily Take 30-60 minutes before a meal.       rOPINIRole 0.25 MG tablet    REQUIP    90 tablet    Take 1 tablet (0.25 mg) by mouth At Bedtime       sertraline 100 MG tablet    ZOLOFT    90 tablet    Take 0.5 tablets (50 mg) by mouth daily       SUMAtriptan 50 MG  tablet    IMITREX    18 tablet    Take 1 tablet (50 mg) by mouth at onset of headache for migraine May repeat dose in 2 hours.  Do not exceed 200 mg in 24 hours       traZODone 100 MG tablet    DESYREL    90 tablet    Take 1 tablet (100 mg) by mouth At Bedtime

## 2017-03-04 ASSESSMENT — ASTHMA QUESTIONNAIRES: ACT_TOTALSCORE: 25

## 2017-03-09 ENCOUNTER — RADIANT APPOINTMENT (OUTPATIENT)
Dept: ULTRASOUND IMAGING | Facility: OTHER | Age: 67
End: 2017-03-09
Attending: FAMILY MEDICINE
Payer: COMMERCIAL

## 2017-03-09 DIAGNOSIS — R10.84 ABDOMINAL PAIN, GENERALIZED: ICD-10-CM

## 2017-03-09 PROCEDURE — 76700 US EXAM ABDOM COMPLETE: CPT

## 2017-03-10 ENCOUNTER — TELEPHONE (OUTPATIENT)
Dept: FAMILY MEDICINE | Facility: OTHER | Age: 67
End: 2017-03-10

## 2017-03-10 NOTE — TELEPHONE ENCOUNTER
----- Message from Anna Montanez MD sent at 3/9/2017  7:16 PM CST -----  Inform pt that the ultrasound of the abdomen did not show any abnormality. If she persists to have pain , will consider getting further testing done with endoscopy. Will place order if pt agrees to it

## 2017-03-10 NOTE — TELEPHONE ENCOUNTER
Patient received message and voiced understanding.  She states that she will see how the medication works for her first

## 2017-04-24 ENCOUNTER — TELEPHONE (OUTPATIENT)
Dept: FAMILY MEDICINE | Facility: OTHER | Age: 67
End: 2017-04-24

## 2017-04-24 DIAGNOSIS — E78.5 HYPERLIPIDEMIA LDL GOAL <130: Primary | ICD-10-CM

## 2017-04-24 DIAGNOSIS — Z12.39 BREAST CANCER SCREENING: ICD-10-CM

## 2017-04-24 PROBLEM — F32.5 MAJOR DEPRESSION IN COMPLETE REMISSION (H): Status: ACTIVE | Noted: 2017-04-24

## 2017-04-24 NOTE — TELEPHONE ENCOUNTER
Summary:    Patient is due/failing the following:   LDL and MAMMOGRAM    Action needed:   Patient needs fasting lab only appointment and schedule a mammogram     Type of outreach:    Phone, spoke to patient.  patient will wait to schedule a mammogram until August when her insurance changes  Patient will stop in sometime for a BP check   Questions for provider review:    None                                                                                                                                    Denise Chrisriccardo       Chart routed to Care Team .      Panel Management Review      Patient has the following on her problem list:     Depression / Dysthymia review  PHQ-9 SCORE 9/10/2010 9/16/2011 3/3/2017   Total Score 0 1 -   Total Score MyChart - - 1 (Minimal depression)      Patient is due for: none  Asthma review     ACT Total Scores 3/3/2017   ACT TOTAL SCORE -   ASTHMA ER VISITS -   ASTHMA HOSPITALIZATIONS -   ACT TOTAL SCORE (Goal Greater than or Equal to 20) 25   In the past 12 months, how many times did you visit the emergency room for your asthma without being admitted to the hospital? 0   In the past 12 months, how many times were you hospitalized overnight because of your asthma? 0      1. Is Asthma diagnosis on the Problem List? Yes    2. Is Asthma listed on Health Maintenance? Yes    3. Patient is due for:  none      Composite cancer screening  Chart review shows that this patient is due/due soon for the following Mammogram

## 2017-04-28 DIAGNOSIS — E78.5 HYPERLIPIDEMIA LDL GOAL <130: ICD-10-CM

## 2017-04-28 LAB
CHOLEST SERPL-MCNC: 304 MG/DL
HDLC SERPL-MCNC: 61 MG/DL
LDLC SERPL CALC-MCNC: 216 MG/DL
NONHDLC SERPL-MCNC: 243 MG/DL
TRIGL SERPL-MCNC: 133 MG/DL

## 2017-04-28 PROCEDURE — 36415 COLL VENOUS BLD VENIPUNCTURE: CPT | Performed by: FAMILY MEDICINE

## 2017-04-28 PROCEDURE — 80061 LIPID PANEL: CPT | Performed by: FAMILY MEDICINE

## 2017-05-01 ENCOUNTER — TELEPHONE (OUTPATIENT)
Dept: FAMILY MEDICINE | Facility: OTHER | Age: 67
End: 2017-05-01

## 2017-05-02 NOTE — TELEPHONE ENCOUNTER
There are injections that can be on biweekly basis that could help but very difficult some times for the insurance to approve them. I would advise a visit to discuss risks and benefits of this medication if interested

## 2017-05-02 NOTE — TELEPHONE ENCOUNTER
Spoke to pt, she states she is unable to take a statin due to muscle pain side effect 1 week after being on medication. Mailed handout on heart healthy diet to pt. Pt states that her  is on an prescription for LDL that is not a statin, wondering if there is something she can try that is not a statin.   Routing to RK for review. Please advise.

## 2017-05-02 NOTE — TELEPHONE ENCOUNTER
Spoke with pt, she would like to know what the medication is called so she can research it and talk with her insurance company.  Pt notified she would need an office visit to have this prescribed.  [facility]

## 2017-05-02 NOTE — TELEPHONE ENCOUNTER
----- Message from Anna Montanez MD sent at 5/1/2017 11:05 AM CDT -----  Mail hand out on heart healthy diet. She can go to [employer] to look up low fat diet meal plans but I really think she should be on a statin to see some improvement as most often such high levels of cholesterol could be genetic and not necessarily from dietary habits . I can discuss med at the next visit to the clinic

## 2017-08-03 ENCOUNTER — OFFICE VISIT (OUTPATIENT)
Dept: FAMILY MEDICINE | Facility: OTHER | Age: 67
End: 2017-08-03
Payer: COMMERCIAL

## 2017-08-03 VITALS
WEIGHT: 142 LBS | OXYGEN SATURATION: 99 % | SYSTOLIC BLOOD PRESSURE: 112 MMHG | TEMPERATURE: 98.2 F | HEART RATE: 74 BPM | BODY MASS INDEX: 25.15 KG/M2 | DIASTOLIC BLOOD PRESSURE: 70 MMHG | RESPIRATION RATE: 12 BRPM

## 2017-08-03 DIAGNOSIS — G25.81 RESTLESS LEG: ICD-10-CM

## 2017-08-03 DIAGNOSIS — R10.84 ABDOMINAL PAIN, GENERALIZED: ICD-10-CM

## 2017-08-03 DIAGNOSIS — K21.9 GASTROESOPHAGEAL REFLUX DISEASE, ESOPHAGITIS PRESENCE NOT SPECIFIED: Primary | ICD-10-CM

## 2017-08-03 PROCEDURE — 99214 OFFICE O/P EST MOD 30 MIN: CPT | Performed by: PHYSICIAN ASSISTANT

## 2017-08-03 RX ORDER — SUCRALFATE 1 G/1
1 TABLET ORAL 4 TIMES DAILY
Qty: 40 TABLET | Refills: 1 | Status: SHIPPED | OUTPATIENT
Start: 2017-08-03 | End: 2017-08-17

## 2017-08-03 RX ORDER — OMEPRAZOLE 40 MG/1
40 CAPSULE, DELAYED RELEASE ORAL DAILY
Qty: 30 CAPSULE | Refills: 2 | Status: SHIPPED | OUTPATIENT
Start: 2017-08-03 | End: 2017-08-23

## 2017-08-03 ASSESSMENT — PAIN SCALES - GENERAL: PAINLEVEL: MILD PAIN (2)

## 2017-08-03 NOTE — PROGRESS NOTES
SUBJECTIVE:                                                    Amee Crews is a 67 year old female who presents to clinic today for the following health issues:      HPI    ABDOMINAL   PAIN     Onset: Monday    Description:   Character: Dull ache and Stabbing  Location: upper abdomen  Radiation: None    Intensity: mild, moderate    Progression of Symptoms:  same    Accompanying Signs & Symptoms:  Fever/Chills?: no   Gas/Bloating: YES  Nausea: no   Vomitting: no   Diarrhea?: no   Constipation:no   Dysuria or Hematuria: no    History:   Trauma: no   Previous similar pain: Yes was seen in February for the same thing   Previous tests done: ultra sound    Precipitating factors:   Does the pain change with:     Food: YES     BM: no     Urination: no     Alleviating factors:      Therapies Tried and outcome: prevacid, lemuel seltzer, Pepto, no relief     LMP:  not applicable     Pt states also having burping, and the pain is worse at night when she lays down  - Prevacid (lansoprazole) - none , and Prilosec (Omeprazole) did help for awhile , started again this morning   - Normal US on 3/9/17  - Started back on u\onday   - Hurts more after she eats  - Normal BM and urination     Last visit was 3/3/17 with patient's PCP   - Gastritis vs Gall bladder pathology  Labs and Ultrasound abdomen did not show any acute abnormality  Trial PPI for the next 4-6 wks    follow up in 4 wks if symptoms persist or fail to resolve  - US Abdomen Complete; Future  - omeprazole (PRILOSEC) 40 MG capsule; Take 1 capsule (40 mg) by mouth daily Take 30-60 minutes before a meal.  Dispense: 30 capsule; Refill: 2       Problem list and histories reviewed & adjusted, as indicated.  Additional history: as documented    Labs reviewed in EPIC    ROS:  Constitutional, HEENT, cardiovascular, pulmonary, gi and gu systems are negative, except as otherwise noted.      OBJECTIVE:   /70 (BP Location: Left arm, Patient Position: Chair, Cuff Size: Adult  Regular)  Pulse 74  Temp 98.2  F (36.8  C) (Oral)  Resp 12  Wt 142 lb (64.4 kg)  SpO2 99%  BMI 25.15 kg/m2  Body mass index is 25.15 kg/(m^2).  GENERAL APPEARANCE: healthy, alert and no distress  EYES: Eyes grossly normal to inspection, PERRLA, conjunctivae and sclerae without injection or discharge, EOM intact   ABDOMEN: Soft, nontender, no hepatosplenomegaly, no masses and bowel sounds normal   MS: No musculoskeletal defects are noted and gait is age appropriate without ataxia   SKIN: No suspicious lesions or rashes, hydration status appears adeuqate with normal skin turgor    PSYCH: Alert and oriented x3; speech- coherent , normal rate and volume; able to articulate logical thoughts, able to abstract reason, no tangential thoughts, no hallucinations or delusions, mentation appears normal, Mood is euthymic. Affect is appropriate for this mood state and bright. Thought content is free of suicidal ideation, hallucinations, and delusions. Dress is adequate and upkept. Eye contact is good during conversation.       Diagnostic Test Results:  none     ASSESSMENT/PLAN:       ICD-10-CM    1. Gastroesophageal reflux disease, esophagitis presence not specified K21.9 GASTROENTEROLOGY ADULT REF CONSULT ONLY     sucralfate (CARAFATE) 1 GM tablet   2. Abdominal pain, generalized R10.84 omeprazole (PRILOSEC) 40 MG capsule     GASTROENTEROLOGY ADULT REF CONSULT ONLY     sucralfate (CARAFATE) 1 GM tablet     - Patient with continued GERD symptoms, did 12 weeks of Omeprazole with symptom relief for awhile after stopping, now returned   - No concern for acute/surgical abdominal pathology  - Patient already had normal US abd   - Recommend re-start Omeprazole 40 mg, reviewed use and side effects  - Due to severity, also recommend getting EGD   - Will give Sucralfate for severe pains during current episode, discussed use and side effects   - Also discussed lifestyle changes and hand out given     The patient indicates  understanding of these issues and agrees with the plan.    Follow up: pending EGD and 12 weeks omeprazole         Salma Yusuf PA-C  [facility]

## 2017-08-03 NOTE — NURSING NOTE
"Chief Complaint   Patient presents with     Abdominal Pain     Panel Management       Initial /70 (BP Location: Left arm, Patient Position: Chair, Cuff Size: Adult Regular)  Pulse 74  Temp 98.2  F (36.8  C) (Oral)  Resp 12  Wt 142 lb (64.4 kg)  SpO2 99%  BMI 25.15 kg/m2 Estimated body mass index is 25.15 kg/(m^2) as calculated from the following:    Height as of 3/3/17: 5' 3\" (1.6 m).    Weight as of this encounter: 142 lb (64.4 kg).  Medication Reconciliation: complete  "

## 2017-08-03 NOTE — PATIENT INSTRUCTIONS
- Omeprazole (Prilosec) for max 12 weeks      Could do trial of Ranitidine (Zantac) 75 - 150 mg over the counter  - Sucralfate - as needed     - Schedule EGD and await results                 Heartburn/GERD   What is heartburn?   Heartburn refers to the symptoms you feel when acids in your stomach flow back into the esophagus. (The esophagus is the tube that carries food from your throat to your stomach.) This backward movement of stomach acid is called reflux. The acid can burn and irritate the esophagus, throat, and vocal cords.   Heartburn is a common problem. Despite its name, it has nothing to do with the heart.   When you have heartburn often, you may have a condition called gastroesophageal reflux disease, or GERD.   How does it occur?   At the bottom of the esophagus there is a ring of muscle called a sphincter. It acts like a valve. When you swallow food, the sphincter opens to let the food pass into the stomach. The ring then closes to keep the stomach contents from going back into the esophagus. If the sphincter is weak or too relaxed, stomach acid and food flow backward into the esophagus. Because the esophagus does not have the protective lining that the stomach has, the acid causes pain.   The sphincter muscle sometimes does not work properly if:   You are overweight.   You are pregnant.   You have a hiatal hernia (a condition in which part of the stomach protrudes through the diaphragm into the chest).   You eat too much.   You lie down soon after eating.   You wear tight clothes that push on your stomach.   Foods that may make heartburn worse are:   foods high in fat   sugar   chocolate   peppermint   onions   citrus foods such as orange juice   tomato-based foods   spicy foods   coffee and other drinks with caffeine, such as tea and melva   alcohol.   Heartburn can also be made worse by:   taking certain medicines, such as aspirin   smoking cigarettes.   Anyone can have an attack of heartburn from  overeating or eating foods that are high in acid. Most of the time heartburn is mild and lasts for a short time. There is usually not a problem when heartburn occurs just once in a while. You should see your healthcare provider if:   You have heartburn nearly every day for 2 weeks.   The heartburn comes back when the antacid wears off.   Heartburn wakes you up at night.   What are the symptoms?   The main symptom of heartburn is a burning pain in the lower chest, usually close to the bottom of the breastbone. Other symptoms you may have are:   acid or sour taste in your mouth   belching   a feeling of bloating or fullness in the stomach.   These symptoms tend to happen after very large meals and especially with activity such as bending or lifting after meals. The symptoms may be made worse by lying down or by wearing tight clothing.   Heartburn is very common during the last few months of pregnancy. The weight of the baby pushes on the stomach and can cause the sphincter to relax and let acid to flow back into the esophagus.   How is it diagnosed?   Usually heartburn can be diagnosed from your medical history.   If there is any question about the diagnosis, you may have the following tests to check for ulcers or other problems that might cause your symptoms:   barium swallow X-ray study of the esophagus   complete upper GI (gastrointestinal) barium X-ray study of the esophagus, stomach, and upper intestine   endoscopy, a procedure in which a thin flexible tube with a tiny camera is placed in your mouth and down into your stomach so your provider can see your esophagus and stomach.   How is it treated?   To help reduce the symptoms of heartburn you can:   Try not to put a lot of pressure on the sphincter muscle. Eating light meals and wearing loose clothing will help.   Lose weight if you are overweight.   Take nonprescription antacids (tablets or liquid) after meals and at bedtime.   Raise the head of your bed or  use more than one pillow so your head is higher than your stomach. This may allow gravity to help keep food from backing up.   If you find that certain foods or drinks seem to cause your symptoms or make them worse, avoid those foods.   If the simple measures described above do not relieve the symptoms, your healthcare provider may prescribe medicine. The prescription medicines help reduce stomach acid. They also help stomach emptying. A very few people who are not helped with medicines may need surgery.   Get emergency care if the following symptoms occur with the heartburn and do not go away within 15 minutes of treatment for heartburn: shortness of breath; sweating; light-headedness, weakness; or jaw, arm, back, or chest pain.   How long will the effects last?   Heartburn symptoms are usually relieved by treatment in just a few hours or less. If you are having heartburn every day, starting treatment will usually relieve the symptoms in a few days. However, the symptoms may come back from time to time, especially if you gain weight.   Heartburn can sometimes make asthma worse. If you have asthma, preventing or controlling heartburn may help control your asthma symptoms.   How can I help prevent heartburn?   The best prevention is to:   Keep a healthy weight. Lose weight if you are overweight.   Sleep with your head elevated at least 4 to 6 inches. (It's usually most comfortable to put the head of your bed on blocks.)   It may also help if you:   Wait an hour or longer after eating before you lie down. If you have to lie down after a meal, lie on your left side. Keep your head and shoulders slightly higher than the rest of your body. It's best to not eat for 2 to 3 hours before you go to bed.   Eat smaller, more frequent meals.   Avoid wearing tight clothing or belts.   Don't smoke. Smoking relaxes the sphincter leading to your stomach.   Avoid foods and other things that seem to cause heartburn or make it worse.    Developed by [employer]   Published by [employer]   Last modified: 2009-01-14   Last reviewed: 2008-12-02

## 2017-08-03 NOTE — PROGRESS NOTES
"  SUBJECTIVE:                                                    Amee Crews is a 67 year old female who presents to clinic today for the following health issues:  {Provider please address medication reconciliation discrepancies--rooming staff please delete if no med/rec issues}    HPI    {additional problems for roomer to add, delete if none:386302}    Problem list and histories reviewed & adjusted, as indicated.  Additional history: {NONE - AS DOCUMENTED:911852::\"as documented\"}    {ACUTE Problem SUPERLIST - brief histories:624133}    {HIST REVIEW/ LINKS 2:848848}    {PROVIDER CHARTING PREFERENCE:991093}  "

## 2017-08-03 NOTE — MR AVS SNAPSHOT
After Visit Summary   8/3/2017    Amee Crews    MRN: [id number]           Patient Information     Date Of Birth          1950        Visit Information        Provider Department      8/3/2017 3:00 PM Salma Yusuf PA-C [facility]        Today's Diagnoses     Gastroesophageal reflux disease, esophagitis presence not specified    -  1    Abdominal pain, generalized          Care Instructions    - Omeprazole (Prilosec) for max 12 weeks      Could do trial of Ranitidine (Zantac) 75 - 150 mg over the counter  - Sucralfate - as needed     - Schedule EGD and await results                 Heartburn/GERD   What is heartburn?   Heartburn refers to the symptoms you feel when acids in your stomach flow back into the esophagus. (The esophagus is the tube that carries food from your throat to your stomach.) This backward movement of stomach acid is called reflux. The acid can burn and irritate the esophagus, throat, and vocal cords.   Heartburn is a common problem. Despite its name, it has nothing to do with the heart.   When you have heartburn often, you may have a condition called gastroesophageal reflux disease, or GERD.   How does it occur?   At the bottom of the esophagus there is a ring of muscle called a sphincter. It acts like a valve. When you swallow food, the sphincter opens to let the food pass into the stomach. The ring then closes to keep the stomach contents from going back into the esophagus. If the sphincter is weak or too relaxed, stomach acid and food flow backward into the esophagus. Because the esophagus does not have the protective lining that the stomach has, the acid causes pain.   The sphincter muscle sometimes does not work properly if:   You are overweight.   You are pregnant.   You have a hiatal hernia (a condition in which part of the stomach protrudes through the diaphragm into the chest).   You eat too much.   You lie down soon after eating.    You wear tight clothes that push on your stomach.   Foods that may make heartburn worse are:   foods high in fat   sugar   chocolate   peppermint   onions   citrus foods such as orange juice   tomato-based foods   spicy foods   coffee and other drinks with caffeine, such as tea and melva   alcohol.   Heartburn can also be made worse by:   taking certain medicines, such as aspirin   smoking cigarettes.   Anyone can have an attack of heartburn from overeating or eating foods that are high in acid. Most of the time heartburn is mild and lasts for a short time. There is usually not a problem when heartburn occurs just once in a while. You should see your healthcare provider if:   You have heartburn nearly every day for 2 weeks.   The heartburn comes back when the antacid wears off.   Heartburn wakes you up at night.   What are the symptoms?   The main symptom of heartburn is a burning pain in the lower chest, usually close to the bottom of the breastbone. Other symptoms you may have are:   acid or sour taste in your mouth   belching   a feeling of bloating or fullness in the stomach.   These symptoms tend to happen after very large meals and especially with activity such as bending or lifting after meals. The symptoms may be made worse by lying down or by wearing tight clothing.   Heartburn is very common during the last few months of pregnancy. The weight of the baby pushes on the stomach and can cause the sphincter to relax and let acid to flow back into the esophagus.   How is it diagnosed?   Usually heartburn can be diagnosed from your medical history.   If there is any question about the diagnosis, you may have the following tests to check for ulcers or other problems that might cause your symptoms:   barium swallow X-ray study of the esophagus   complete upper GI (gastrointestinal) barium X-ray study of the esophagus, stomach, and upper intestine   endoscopy, a procedure in which a thin flexible tube with a tiny  camera is placed in your mouth and down into your stomach so your provider can see your esophagus and stomach.   How is it treated?   To help reduce the symptoms of heartburn you can:   Try not to put a lot of pressure on the sphincter muscle. Eating light meals and wearing loose clothing will help.   Lose weight if you are overweight.   Take nonprescription antacids (tablets or liquid) after meals and at bedtime.   Raise the head of your bed or use more than one pillow so your head is higher than your stomach. This may allow gravity to help keep food from backing up.   If you find that certain foods or drinks seem to cause your symptoms or make them worse, avoid those foods.   If the simple measures described above do not relieve the symptoms, your healthcare provider may prescribe medicine. The prescription medicines help reduce stomach acid. They also help stomach emptying. A very few people who are not helped with medicines may need surgery.   Get emergency care if the following symptoms occur with the heartburn and do not go away within 15 minutes of treatment for heartburn: shortness of breath; sweating; light-headedness, weakness; or jaw, arm, back, or chest pain.   How long will the effects last?   Heartburn symptoms are usually relieved by treatment in just a few hours or less. If you are having heartburn every day, starting treatment will usually relieve the symptoms in a few days. However, the symptoms may come back from time to time, especially if you gain weight.   Heartburn can sometimes make asthma worse. If you have asthma, preventing or controlling heartburn may help control your asthma symptoms.   How can I help prevent heartburn?   The best prevention is to:   Keep a healthy weight. Lose weight if you are overweight.   Sleep with your head elevated at least 4 to 6 inches. (It's usually most comfortable to put the head of your bed on blocks.)   It may also help if you:   Wait an hour or longer  after eating before you lie down. If you have to lie down after a meal, lie on your left side. Keep your head and shoulders slightly higher than the rest of your body. It's best to not eat for 2 to 3 hours before you go to bed.   Eat smaller, more frequent meals.   Avoid wearing tight clothing or belts.   Don't smoke. Smoking relaxes the sphincter leading to your stomach.   Avoid foods and other things that seem to cause heartburn or make it worse.   Developed by [employer].   Published by [employer].   Last modified: 2009-01-14   Last reviewed: 2008-12-02             Follow-ups after your visit        Additional Services     GASTROENTEROLOGY ADULT REF CONSULT ONLY       Preferred Location: [facility] [place], [facility]: [phone]      Please be aware that coverage of these services is subject to the terms and limitations of your health insurance plan.  Call member services at your health plan with any benefit or coverage questions.  Any procedures must be performed at a [facility] facility OR coordinated by your clinic's referral office.    Please bring the following with you to your appointment:    (1) Any X-Rays, CTs or MRIs which have been performed.  Contact the facility where they were done to arrange for  prior to your scheduled appointment.    (2) List of current medications   (3) This referral request   (4) Any documents/labs given to you for this referral                  Your next 10 appointments already scheduled     Aug 23, 2017 11:00 AM CDT   Office Visit with Patrica Kelly MD   [facility]    [address] [address] [phone]           Bring a current list of meds and any records pertaining to this visit. For Physicals, please bring immunization records and any forms needing to be filled out. Please arrive 10 minutes early to complete paperwork.              Who to contact     If you have questions or need follow up  information about today's clinic visit or your schedule please contact [facility] [place] directly at [phone].  Normal or non-critical lab and imaging results will be communicated to you by [employer], letter or phone within 4 business days after the clinic has received the results. If you do not hear from us within 7 days, please contact the clinic through [employer] or phone. If you have a critical or abnormal lab result, we will notify you by phone as soon as possible.  Submit refill requests through [employer] or call your pharmacy and they will forward the refill request to us. Please allow 3 business days for your refill to be completed.          Additional Information About Your Visit        [employer] Information     [employer] gives you secure access to your electronic health record. If you see a primary care provider, you can also send messages to your care team and make appointments. If you have questions, please call your primary care clinic.  If you do not have a primary care provider, please call [phone] and they will assist you.        Care EveryWhere ID     This is your Care EveryWhere ID. This could be used by other organizations to access your [facility] medical records  [id number]        Your Vitals Were     Pulse Temperature Respirations Pulse Oximetry BMI (Body Mass Index)       74 98.2  F (36.8  C) (Oral) 12 99% 25.15 kg/m2        Blood Pressure from Last 3 Encounters:   08/03/17 112/70   03/03/17 116/64   01/04/17 110/66    Weight from Last 3 Encounters:   08/03/17 142 lb (64.4 kg)   03/03/17 139 lb 9.6 oz (63.3 kg)   01/25/17 141 lb (64 kg)              We Performed the Following     GASTROENTEROLOGY ADULT REF CONSULT ONLY          Today's Medication Changes          These changes are accurate as of: 8/3/17  3:50 PM.  If you have any questions, ask your nurse or doctor.               Start taking these medicines.        Dose/Directions    sucralfate 1 GM tablet   Commonly known as:   CARAFATE   Used for:  Gastroesophageal reflux disease, esophagitis presence not specified, Abdominal pain, generalized   Started by:  Salma Yusuf PA-C        Dose:  1 g   Take 1 tablet (1 g) by mouth 4 times daily   Quantity:  40 tablet   Refills:  1            Where to get your medicines      These medications were sent to [id number] - [place], [place] - [address]  [address] [place] [address]     Phone:  [phone]     omeprazole 40 MG capsule    sucralfate 1 GM tablet                Primary Care Provider Office Phone # Fax #    Anna Montanez [phone]       [facility] [address]    [facility] [address]        Equal Access to Services     [employer] : Aimee starkso Solindsay, waaxda luqadaha, qaybta kaalmada adeegyada, vicki erickson . [facility] [phone].    ATENCIÓN: Si habla español, tiene a alejo disposición servicios gratuitos de asistencia lingüística. [facility] [phone].    We comply with applicable federal civil rights laws and [place] laws. We do not discriminate on the basis of race, color, national origin, age, disability sex, sexual orientation or gender identity.            Thank you!     Thank you for choosing [facility]  for your care. Our goal is always to provide you with excellent care. Hearing back from our patients is one way we can continue to improve our services. Please take a few minutes to complete the written survey that you may receive in the mail after your visit with us. Thank you!             Your Updated Medication List - Protect others around you: Learn how to safely use, store and throw away your medicines at www.disposemymeds.org.          This list is accurate as of: 8/3/17  3:50 PM.  Always use your most recent med list.                   Brand Name Dispense Instructions for use Diagnosis    Co Q 10 10 MG Caps           omeprazole 40 MG  capsule    priLOSEC    30 capsule    Take 1 capsule (40 mg) by mouth daily Take 30-60 minutes before a meal.    Abdominal pain, generalized       sertraline 100 MG tablet    ZOLOFT    90 tablet    Take 0.5 tablets (50 mg) by mouth daily    Major depression in complete remission (H)       sucralfate 1 GM tablet    CARAFATE    40 tablet    Take 1 tablet (1 g) by mouth 4 times daily    Gastroesophageal reflux disease, esophagitis presence not specified, Abdominal pain, generalized       SUMAtriptan 50 MG tablet    IMITREX    18 tablet    Take 1 tablet (50 mg) by mouth at onset of headache for migraine May repeat dose in 2 hours.  Do not exceed 200 mg in 24 hours    Migraine headache       traZODone 100 MG tablet    DESYREL    90 tablet    Take 1 tablet (100 mg) by mouth At Bedtime    Insomnia, unspecified type

## 2017-08-03 NOTE — TELEPHONE ENCOUNTER
rOPINIRole (REQUIP) 0.25 MG tablet (Discontinued)      Last Written Prescription Date:  3/3/17  Last Fill Quantity: 90,   # refills: 1  Last Office Visit with G, P or [facility] prescribing provider: 8/3/17  Future Office visit:    Next 5 appointments (look out 90 days)     Aug 23, 2017 11:00 AM CDT   Office Visit with Patrica Kelly MD   [facility] [place] ([facility])    [address]  [place] [address] [phone]                   Routing refill request to provider for review/approval because:  Drug not active on patient's medication list

## 2017-08-04 DIAGNOSIS — F32.5 MAJOR DEPRESSION IN COMPLETE REMISSION (H): ICD-10-CM

## 2017-08-04 RX ORDER — SERTRALINE HYDROCHLORIDE 100 MG/1
50 TABLET, FILM COATED ORAL DAILY
Qty: 90 TABLET | Refills: 1 | Status: SHIPPED | OUTPATIENT
Start: 2017-08-04 | End: 2017-08-23

## 2017-08-04 NOTE — TELEPHONE ENCOUNTER
Reason for Call:  Other prescription    Detailed comments: Patient would like a refill of her Sertraline 100 mg as she is out. Patient states she has not seen Dr. Kelly due to insurance complications but does have an appointment on the 23rd. However needs refills beforehand.     Pharmacy: [facility]     Phone Number Patient can be reached at: Home number on file [phone] (home)    Best Time: any    Can we leave a detailed message on this number? YES    Call taken on 8/4/2017 at 10:10 AM by Flip Francis

## 2017-08-07 ENCOUNTER — TELEPHONE (OUTPATIENT)
Dept: FAMILY MEDICINE | Facility: OTHER | Age: 67
End: 2017-08-07

## 2017-08-07 NOTE — TELEPHONE ENCOUNTER
Will route to CDL to review/advise. Do you want patient to come back into the clinic to discuss a different medication?    MA - when responding to patient with CDL response, please give patient info:   [phone] for Linette Lorenz  [phone] for Diane.

## 2017-08-07 NOTE — TELEPHONE ENCOUNTER
Reason for call:  Symptom  Reason for call:  Patient reporting a symptom    Symptom or request: patient thought she was supposed to get a endoscopy scheduled but has not heard anything.   Also, she states the omeprazole and sucralfate is not helping.     Duration (how long have symptoms been present): ongoing    Have you been treated for this before? Yes    Additional comments: n/a    Phone Number patient can be reached at:  Cell number on file:    Telephone Information:   Mobile [phone]       Best Time:  any    Can we leave a detailed message on this number:  YES    Call taken on 8/7/2017 at 12:18 PM by Raquel Golden

## 2017-08-07 NOTE — TELEPHONE ENCOUNTER
She should continue with omeprazole and sucralfate and add in OTC ranitidine (Zantac) 150 mg once a day  EGD order was placed previously, she had said [facility] for scheduling.    Min Yusuf PA-C  [facility]

## 2017-08-08 NOTE — TELEPHONE ENCOUNTER
I spoke with pt and informed her of the message below.  She has scheduled now for her EGD.    Pt is now wondering if it's okay to take a probiotic with her medications as well.  Provider please review and advise.  Geronimo Falcon, CMA

## 2017-08-10 ENCOUNTER — TELEPHONE (OUTPATIENT)
Dept: FAMILY MEDICINE | Facility: OTHER | Age: 67
End: 2017-08-10

## 2017-08-10 DIAGNOSIS — K21.9 ESOPHAGEAL REFLUX: Primary | ICD-10-CM

## 2017-08-10 RX ORDER — ROPINIROLE 0.25 MG/1
0.25 TABLET, FILM COATED ORAL AT BEDTIME
Qty: 90 TABLET | Refills: 1 | Status: SHIPPED | OUTPATIENT
Start: 2017-08-10 | End: 2017-08-23

## 2017-08-10 RX ORDER — OMEPRAZOLE 40 MG/1
40 CAPSULE, DELAYED RELEASE ORAL 2 TIMES DAILY
Qty: 1 CAPSULE | Refills: 0
Start: 2017-08-10 | End: 2017-08-23

## 2017-08-10 RX ORDER — MAGNESIUM 200 MG
200 TABLET ORAL DAILY
Qty: 1 TABLET | Refills: 0 | COMMUNITY
Start: 2017-08-10 | End: 2017-08-23

## 2017-08-10 RX ORDER — OMEGA-3-ACID ETHYL ESTERS 1 G/1
1 CAPSULE, LIQUID FILLED ORAL DAILY
Qty: 1 CAPSULE | Refills: 0 | COMMUNITY
Start: 2017-08-10 | End: 2017-08-23

## 2017-08-10 NOTE — TELEPHONE ENCOUNTER
Please advise an additional plan of care if appropriate.  She has tried measures from last visit 8/2/17.     Patient is experiencing the same constant achy pain in her abdomen. Some nights that she cannot sleep. Watching food she eats. Last few days she has eaten fried eggs, bagels, homemade dumplings, and steak. She tried Carafate tried for 5 days, but caused headache stopped on Tuesday. Has also tried all home remedies noted on after visit summary for heartburn/GERD. She is going to call to see if she can get an EGD sooner. Shana Malin RN

## 2017-08-10 NOTE — TELEPHONE ENCOUNTER
Patient is already taking Ranitidine 150 mg once daily.   She is also taking Prilosec once daily and would like to know if she should or can increase that to twice daily - huddled with CDL, ok to do this.   Med list updated.    Gwen Centeno, RN, BSN

## 2017-08-10 NOTE — TELEPHONE ENCOUNTER
She should add in OTC ranitidine 150 mg once a day     Min Yusuf PA-C  [facility]

## 2017-08-10 NOTE — TELEPHONE ENCOUNTER
Reason for Call:  Other     Detailed comments: [facility] and also wants to discuss that she is unable to get into Endoscopy until August 24th. Pt also states unable to take stomach ulcer medication due to gets severe headaches from medication. Please contact pt in regards    Phone Number Patient can be reached at: Home number on file [phone] (home)    Best Time: ANY    Can we leave a detailed message on this number? YES    Call taken on 8/10/2017 at 3:01 PM by Christine Desir

## 2017-08-18 ENCOUNTER — TELEPHONE (OUTPATIENT)
Dept: FAMILY MEDICINE | Facility: OTHER | Age: 67
End: 2017-08-18

## 2017-08-18 NOTE — LETTER
[facility]  [address]   [place] [address]  Phone: [phone]  August 18, 2017      Amee Crews  [address] [address]      Dear Amee,    We care about your health and have reviewed your health plan including your medical conditions, medications, and lab results.  Based on this review, it is recommended that you follow up regarding the following health topic(s):  -Breast Cancer Screening    We recommend you take the following action(s):  -schedule a MAMMOGRAM which is due. Please disregard this reminder if you have had this exam elsewhere within the last 1-2 years please let us know so we can update your records.     Please call us at the [facility] - [phone] (or use [employer]) to address the above recommendations.     Thank you for trusting [facility] and we appreciate the opportunity to serve you.  We look forward to supporting your healthcare needs in the future.    Healthy Regards,    Your Health Care Team  [facility]

## 2017-08-18 NOTE — TELEPHONE ENCOUNTER
Summary:    Patient is due/failing the following:   MAMMOGRAM    Action needed:   Schedule a mammogram     Type of outreach:    Sent letter.    Questions for provider review:    None                                                                                                                                    Denise Mayo       Chart routed to Care Team .        Panel Management Review      Patient has the following on her problem list:     Depression / Dysthymia review  PHQ-9 SCORE 9/10/2010 9/16/2011 3/3/2017   Total Score 0 1 -   Total Score MyChart - - 1 (Minimal depression)      Patient is due for:  PHQ9    Asthma review     ACT Total Scores 3/3/2017   ACT TOTAL SCORE -   ASTHMA ER VISITS -   ASTHMA HOSPITALIZATIONS -   ACT TOTAL SCORE (Goal Greater than or Equal to 20) 25   In the past 12 months, how many times did you visit the emergency room for your asthma without being admitted to the hospital? 0   In the past 12 months, how many times were you hospitalized overnight because of your asthma? 0      1. Is Asthma diagnosis on the Problem List? Yes    2. Is Asthma listed on Health Maintenance? Yes    3. Patient is due for:  none          Composite cancer screening  Chart review shows that this patient is due/due soon for the following Pap Smear and Mammogram

## 2017-08-22 NOTE — PROGRESS NOTES
"INTERNAL MEDICINE  SUBJECTIVE:   Amee Crews is a 67 year old female who presents to clinic today for the following health issues:                        Problem list and histories reviewed & adjusted, as indicated.  Additional history: as documented    Labs reviewed in EPIC  Reviewed and updated as needed this visit by clinical staff  Reviewed and updated as needed this visit by Provider      ROS:  C: NEGATIVE for fever, chills, change in weight  I: NEGATIVE for worrisome rashes, moles or lesions  E: NEGATIVE for vision changes or irritation  E/M: NEGATIVE for ear, mouth and throat problems  R: NEGATIVE for significant cough or SOB  B: NEGATIVE for masses, tenderness or discharge  CV: NEGATIVE for chest pain, palpitations or peripheral edema  GI: NEGATIVE for nausea, abdominal pain, heartburn, or change in bowel habits  : NEGATIVE for frequency, dysuria, or hematuria  M: NEGATIVE for significant arthralgias or myalgia  N: NEGATIVE for weakness, dizziness or paresthesias  E: NEGATIVE for temperature intolerance, skin/hair changes  H: NEGATIVE for bleeding problems  P: NEGATIVE for changes in mood or affect      OBJECTIVE:     There were no vitals taken for this visit.  There is no height or weight on file to calculate BMI.  {Exam List:758109}    {Diagnostic Test Results:447414::\"Diagnostic Test Results:\",\"none \"}    ASSESSMENT/PLAN:   {Diag Picklist:204478}    {Follow-Up:104588}    I spent *** minutes of time with the patient and >50% of it was in education and counseling regarding ***.      Patrica Kelly MD  [facility]    Start  End  "

## 2017-08-23 ENCOUNTER — OFFICE VISIT (OUTPATIENT)
Dept: INTERNAL MEDICINE | Facility: CLINIC | Age: 67
End: 2017-08-23
Payer: COMMERCIAL

## 2017-08-23 VITALS
SYSTOLIC BLOOD PRESSURE: 128 MMHG | HEIGHT: 63 IN | WEIGHT: 138 LBS | DIASTOLIC BLOOD PRESSURE: 74 MMHG | TEMPERATURE: 97.6 F | HEART RATE: 98 BPM | OXYGEN SATURATION: 93 % | BODY MASS INDEX: 24.45 KG/M2

## 2017-08-23 DIAGNOSIS — M85.89 OSTEOPENIA OF MULTIPLE SITES: ICD-10-CM

## 2017-08-23 DIAGNOSIS — Z00.00 ROUTINE GENERAL MEDICAL EXAMINATION AT A HEALTH CARE FACILITY: Primary | ICD-10-CM

## 2017-08-23 DIAGNOSIS — E78.5 HYPERLIPIDEMIA LDL GOAL <130: ICD-10-CM

## 2017-08-23 DIAGNOSIS — M19.041 PRIMARY OSTEOARTHRITIS OF BOTH HANDS: ICD-10-CM

## 2017-08-23 DIAGNOSIS — Z23 NEED FOR PROPHYLACTIC VACCINATION AGAINST STREPTOCOCCUS PNEUMONIAE (PNEUMOCOCCUS): ICD-10-CM

## 2017-08-23 DIAGNOSIS — Z79.899 HIGH RISK MEDICATION USE: ICD-10-CM

## 2017-08-23 DIAGNOSIS — M19.042 PRIMARY OSTEOARTHRITIS OF BOTH HANDS: ICD-10-CM

## 2017-08-23 DIAGNOSIS — N95.2 ATROPHIC VAGINITIS: ICD-10-CM

## 2017-08-23 DIAGNOSIS — Z78.0 ASYMPTOMATIC POSTMENOPAUSAL STATUS: ICD-10-CM

## 2017-08-23 DIAGNOSIS — F32.5 MAJOR DEPRESSION IN COMPLETE REMISSION (H): ICD-10-CM

## 2017-08-23 DIAGNOSIS — G43.809 OTHER MIGRAINE WITHOUT STATUS MIGRAINOSUS, NOT INTRACTABLE: ICD-10-CM

## 2017-08-23 DIAGNOSIS — Z12.31 ENCOUNTER FOR SCREENING MAMMOGRAM FOR BREAST CANCER: ICD-10-CM

## 2017-08-23 DIAGNOSIS — G47.00 INSOMNIA, UNSPECIFIED TYPE: ICD-10-CM

## 2017-08-23 DIAGNOSIS — R92.30 DENSE BREAST TISSUE: ICD-10-CM

## 2017-08-23 DIAGNOSIS — K64.4 EXTERNAL HEMORRHOIDS: ICD-10-CM

## 2017-08-23 DIAGNOSIS — E53.8 VITAMIN B12 DEFICIENCY: ICD-10-CM

## 2017-08-23 DIAGNOSIS — G25.81 RESTLESS LEG: ICD-10-CM

## 2017-08-23 LAB
ANION GAP SERPL CALCULATED.3IONS-SCNC: 9 MMOL/L (ref 3–14)
BUN SERPL-MCNC: 13 MG/DL (ref 7–30)
CALCIUM SERPL-MCNC: 9.3 MG/DL (ref 8.5–10.1)
CHLORIDE SERPL-SCNC: 104 MMOL/L (ref 94–109)
CHOLEST SERPL-MCNC: 309 MG/DL
CO2 SERPL-SCNC: 26 MMOL/L (ref 20–32)
CREAT SERPL-MCNC: 0.7 MG/DL (ref 0.52–1.04)
GFR SERPL CREATININE-BSD FRML MDRD: 85 ML/MIN/1.7M2
GLUCOSE SERPL-MCNC: 93 MG/DL (ref 70–99)
HDLC SERPL-MCNC: 53 MG/DL
LDLC SERPL CALC-MCNC: 232 MG/DL
NONHDLC SERPL-MCNC: 256 MG/DL
POTASSIUM SERPL-SCNC: 4.5 MMOL/L (ref 3.4–5.3)
SODIUM SERPL-SCNC: 139 MMOL/L (ref 133–144)
TRIGL SERPL-MCNC: 122 MG/DL
VIT B12 SERPL-MCNC: 187 PG/ML (ref 193–986)

## 2017-08-23 PROCEDURE — G0402 INITIAL PREVENTIVE EXAM: HCPCS | Performed by: INTERNAL MEDICINE

## 2017-08-23 PROCEDURE — 82607 VITAMIN B-12: CPT | Performed by: INTERNAL MEDICINE

## 2017-08-23 PROCEDURE — 82306 VITAMIN D 25 HYDROXY: CPT | Performed by: INTERNAL MEDICINE

## 2017-08-23 PROCEDURE — 90670 PCV13 VACCINE IM: CPT | Performed by: INTERNAL MEDICINE

## 2017-08-23 PROCEDURE — G0009 ADMIN PNEUMOCOCCAL VACCINE: HCPCS | Performed by: INTERNAL MEDICINE

## 2017-08-23 PROCEDURE — 80048 BASIC METABOLIC PNL TOTAL CA: CPT | Performed by: INTERNAL MEDICINE

## 2017-08-23 PROCEDURE — 80061 LIPID PANEL: CPT | Performed by: INTERNAL MEDICINE

## 2017-08-23 PROCEDURE — 36415 COLL VENOUS BLD VENIPUNCTURE: CPT | Performed by: INTERNAL MEDICINE

## 2017-08-23 PROCEDURE — 99213 OFFICE O/P EST LOW 20 MIN: CPT | Mod: 25 | Performed by: INTERNAL MEDICINE

## 2017-08-23 RX ORDER — MAGNESIUM 200 MG
200 TABLET ORAL DAILY
Qty: 1 TABLET | Refills: 0 | COMMUNITY
Start: 2017-08-23 | End: 2021-09-20

## 2017-08-23 RX ORDER — SERTRALINE HYDROCHLORIDE 100 MG/1
50 TABLET, FILM COATED ORAL DAILY
Qty: 90 TABLET | Refills: 1 | Status: SHIPPED | OUTPATIENT
Start: 2017-08-23 | End: 2018-06-26

## 2017-08-23 RX ORDER — ROPINIROLE 0.25 MG/1
0.25 TABLET, FILM COATED ORAL AT BEDTIME
Qty: 90 TABLET | Refills: 3 | Status: SHIPPED | OUTPATIENT
Start: 2017-08-23 | End: 2018-11-14

## 2017-08-23 RX ORDER — OMEGA-3-ACID ETHYL ESTERS 1 G/1
2 CAPSULE, LIQUID FILLED ORAL DAILY
Qty: 120 CAPSULE | COMMUNITY
Start: 2017-08-23 | End: 2019-08-07

## 2017-08-23 RX ORDER — TRAZODONE HYDROCHLORIDE 100 MG/1
100 TABLET ORAL AT BEDTIME
Qty: 90 TABLET | Refills: 3 | Status: SHIPPED | OUTPATIENT
Start: 2017-08-23 | End: 2018-10-20

## 2017-08-23 RX ORDER — OMEGA-3-ACID ETHYL ESTERS 1 G/1
1 CAPSULE, LIQUID FILLED ORAL DAILY
Qty: 1 CAPSULE | Refills: 0 | Status: CANCELLED | OUTPATIENT
Start: 2017-08-23

## 2017-08-23 RX ORDER — OMEPRAZOLE 40 MG/1
40 CAPSULE, DELAYED RELEASE ORAL DAILY
Qty: 30 CAPSULE | Refills: 2 | COMMUNITY
Start: 2017-08-23 | End: 2018-11-01

## 2017-08-23 RX ORDER — ESTRADIOL 0.1 MG/G
2 CREAM VAGINAL
Qty: 42.5 G | Refills: 11 | Status: SHIPPED | OUTPATIENT
Start: 2017-08-24 | End: 2017-11-29

## 2017-08-23 RX ORDER — SUMATRIPTAN 50 MG/1
50 TABLET, FILM COATED ORAL
Qty: 18 TABLET | Refills: 4 | Status: SHIPPED | OUTPATIENT
Start: 2017-08-23 | End: 2018-09-28

## 2017-08-23 ASSESSMENT — PATIENT HEALTH QUESTIONNAIRE - PHQ9
SUM OF ALL RESPONSES TO PHQ QUESTIONS 1-9: 2
5. POOR APPETITE OR OVEREATING: NOT AT ALL

## 2017-08-23 ASSESSMENT — ANXIETY QUESTIONNAIRES
7. FEELING AFRAID AS IF SOMETHING AWFUL MIGHT HAPPEN: NOT AT ALL
3. WORRYING TOO MUCH ABOUT DIFFERENT THINGS: NOT AT ALL
GAD7 TOTAL SCORE: 0
5. BEING SO RESTLESS THAT IT IS HARD TO SIT STILL: NOT AT ALL
2. NOT BEING ABLE TO STOP OR CONTROL WORRYING: NOT AT ALL
6. BECOMING EASILY ANNOYED OR IRRITABLE: NOT AT ALL
1. FEELING NERVOUS, ANXIOUS, OR ON EDGE: NOT AT ALL

## 2017-08-23 ASSESSMENT — PAIN SCALES - GENERAL: PAINLEVEL: NO PAIN (0)

## 2017-08-23 NOTE — MR AVS SNAPSHOT
After Visit Summary   8/23/2017    Amee Crews    MRN: [id number]           Patient Information     Date Of Birth          1950        Visit Information        Provider Department      8/23/2017 11:00 AM Patrica Kelly MD [facility]        Today's Diagnoses     Routine general medical examination at a health care facility    -  1    Asymptomatic postmenopausal status        Need for prophylactic vaccination against Streptococcus pneumoniae (pneumococcus)        Need for prophylactic vaccination with tetanus-diphtheria (TD)        Restless leg        Major depression in complete remission (H)        Abdominal pain, generalized        Insomnia, unspecified type        Dense breast tissue        Osteopenia of multiple sites        Other migraine without status migrainosus, not intractable        Encounter for screening mammogram for breast cancer        Hyperlipidemia LDL goal <130        High risk medication use        Atrophic vaginitis        Primary osteoarthritis of both hands          Care Instructions    Schedule a mammogram- a 3D mammogram would be preferred but if medicare won't cover it or it's too expensive you can schedule a regular one.    In a month or so return to the pharmacy and see how much the Shingles Vaccine is with your insurance.     Try meditating at night to help you fall asleep.    Schedule a bone density scan at [phone].     MyChart me your records from the eye doctor. Then I can refer you to [place].    Try applying Voltaren Gel to painful areas of your hands for arthritis relief.     Start applying premarin vaginal cream twice a week at bedtime. It can take up to three months to improve dryness.  Follow up in 3 months for a recheck.      Preventive Health Recommendations    Female Ages 65 +    Yearly exam:     See your health care provider every year in order to  o Review health changes.   o Discuss preventive care.    o Review your medicines if your  doctor has prescribed any.      You no longer need a yearly Pap test unless you've had an abnormal Pap test in the past 10 years. If you have vaginal symptoms, such as bleeding or discharge, be sure to talk with your provider about a Pap test.      Every 1 to 2 years, have a mammogram.  If you are over 69, talk with your health care provider about whether or not you want to continue having screening mammograms.      Every 10 years, have a colonoscopy. Or, have a yearly FIT test (stool test). These exams will check for colon cancer.       Have a cholesterol test every 5 years, or more often if your doctor advises it.       Have a diabetes test (fasting glucose) every three years. If you are at risk for diabetes, you should have this test more often.       At age 65, have a bone density scan (DEXA) to check for osteoporosis (brittle bone disease).    Shots:    Get a flu shot each year.    Get a tetanus shot every 10 years.    Talk to your doctor about your pneumonia vaccines. There are now two you should receive - Pneumovax (PPSV 23) and Prevnar (PCV 13).    Talk to your doctor about the shingles vaccine.    Talk to your doctor about the hepatitis B vaccine.    Nutrition:     Eat at least 5 servings of fruits and vegetables each day.      Eat whole-grain bread, whole-wheat pasta and brown rice instead of white grains and rice.      Talk to your provider about Calcium and Vitamin D.     Lifestyle    Exercise at least 150 minutes a week (30 minutes a day, 5 days a week). This will help you control your weight and prevent disease.      Limit alcohol to one drink per day.      No smoking.       Wear sunscreen to prevent skin cancer.       See your dentist twice a year for an exam and cleaning.      See your eye doctor every 1 to 2 years to screen for conditions such as glaucoma, macular degeneration and cataracts.        [facility]    If you have any questions regarding to your visit please contact your care  team:     Team Pink:   Clinic Hours Telephone Number   Internal Medicine:  Dr. Patrica Kate, NP       7am-7pm  Monday - Thursday   7am-5pm  Fridays  [phone]  (Appointment scheduling available 24/7)    Questions about your visit?  Team Line  [phone]   Urgent Care - [place] - 11am-9pm Monday-Friday Saturday-Sunday- 9am-5pm   [place] - 5pm-9pm Monday-Friday Saturday-Sunday- 9am-5pm  [phone] - [facility]  [phone] - [place]       What options do I have for visits at the clinic other than the traditional office visit?  To expand how we care for you, many of our providers are utilizing electronic visits (e-visits) and telephone visits, when medically appropriate, for interactions with their patients rather than a visit in the clinic.   We also offer nurse visits for many medical concerns. Just like any other service, we will bill your insurance company for this type of visit based on time spent on the phone with your provider. Not all insurance companies cover these visits. Please check with your medical insurance if this type of visit is covered. You will be responsible for any charges that are not paid by your insurance.      E-visits via FreebeeharTuneGO:  generally incur a $35.00 fee.  Telephone visits:  Time spent on the phone: *charged based on time that is spent on the phone in increments of 10 minutes. Estimated cost:   5-10 mins $30.00   11-20 mins. $59.00   21-30 mins. $85.00   Use Acucelat (secure email communication and access to your chart) to send your primary care provider a message or make an appointment. Ask someone on your Team how to sign up for [place].    For a Price Quote for your services, please call our Consumer Price Line at [phone].    As always, Thank you for trusting us with your health care needs!    Jo-Ann RASCON CMA ([facility])            Follow-ups after your visit        Your next 10 appointments already  "scheduled     Aug 24, 2017   Procedure with William Charles Duane, MD   [facility] (--)    [address] [place] [address] [phone]              Future tests that were ordered for you today     Open Future Orders        Priority Expected Expires Ordered    DEXA HIP/PELVIS/SPINE - Future Routine  8/9/2018 8/23/2017    MA Screen Bilateral w/Bk Routine  8/23/2018 8/23/2017            Who to contact     If you have questions or need follow up information about today's clinic visit or your schedule please contact [facility] directly at [phone].  Normal or non-critical lab and imaging results will be communicated to you by MyChart, letter or phone within 4 business days after the clinic has received the results. If you do not hear from us within 7 days, please contact the clinic through Wedivitehart or phone. If you have a critical or abnormal lab result, we will notify you by phone as soon as possible.  Submit refill requests through [employer] or call your pharmacy and they will forward the refill request to us. Please allow 3 business days for your refill to be completed.          Additional Information About Your Visit        MyChart Information     [employer] gives you secure access to your electronic health record. If you see a primary care provider, you can also send messages to your care team and make appointments. If you have questions, please call your primary care clinic.  If you do not have a primary care provider, please call [phone] and they will assist you.        Care EveryWhere ID     This is your Care EveryWhere ID. This could be used by other organizations to access your [facility] medical records  [id number]        Your Vitals Were     Pulse Temperature Height Pulse Oximetry Breastfeeding? BMI (Body Mass Index)    98 97.6  F (36.4  C) (Oral) 5' 3\" (1.6 m) 93% No 24.45 kg/m2       Blood Pressure from Last 3 Encounters:   08/23/17 128/74   08/03/17 " 112/70   03/03/17 116/64    Weight from Last 3 Encounters:   08/23/17 138 lb (62.6 kg)   08/17/17 142 lb (64.4 kg)   08/03/17 142 lb (64.4 kg)              We Performed the Following     Basic metabolic panel     Lipid panel reflex to direct LDL     Pneumococcal vaccine 13 valent PCV13 IM (Prevnar) [25678]     Vitamin B12     Vitamin D Deficiency          Today's Medication Changes          These changes are accurate as of: 8/23/17 11:54 AM.  If you have any questions, ask your nurse or doctor.               Start taking these medicines.        Dose/Directions    diclofenac 1 % Gel topical gel   Commonly known as:  VOLTAREN   Used for:  Primary osteoarthritis of both hands   Started by:  Patrica Kelly MD        Apply 2 grams to hands four times daily using enclosed dosing card.   Quantity:  100 g   Refills:  1       estradiol 0.1 MG/GM cream   Commonly known as:  ESTRACE   Used for:  Atrophic vaginitis   Started by:  Patrica Kelly MD        Dose:  2 g   Start taking on:  8/24/2017   Place 2 g vaginally twice a week   Quantity:  42.5 g   Refills:  11         These medicines have changed or have updated prescriptions.        Dose/Directions    omega-3 acid ethyl esters 1 G capsule   Commonly known as:  Lovaza   This may have changed:  Another medication with the same name was removed. Continue taking this medication, and follow the directions you see here.   Changed by:  Patrica Kelly MD        Dose:  2 g   Take 2 capsules (2 g) by mouth daily   Quantity:  120 capsule   Refills:  0            Where to get your medicines      These medications were sent to [id number] - [place] - [address]  [address] [place] [address]     Phone:  [phone]     diclofenac 1 % Gel topical gel    estradiol 0.1 MG/GM cream    rOPINIRole 0.25 MG tablet    sertraline 100 MG tablet    SUMAtriptan 50 MG tablet    traZODone 100 MG tablet                Primary Care Provider Office Phone # Fax #     Anna Montanez [phone]       [address]  [employer] [address]        Equal Access to Services     [employer] : Hadii aad ku hadryandamir Shaw, ana wilson, janaebambi bolesjennanirudh contrerasyunieranirudh, vicki velasquez adonissarah andersonjulianocaleb rosales. [facility] [phone].    ATENCIÓN: Si habla español, tiene a alejo disposición servicios gratuitos de asistencia lingüística. Llame al [phone].    We comply with applicable federal civil rights laws and [place] laws. We do not discriminate on the basis of race, color, national origin, age, disability sex, sexual orientation or gender identity.            Thank you!     Thank you for choosing [facility]  for your [facility]. Our goal is always to provide you with excellent care. Hearing back from our patients is one way we can continue to improve our services. Please take a few minutes to complete the written survey that you may receive in the mail after your visit with us. Thank you!             Your Updated Medication List - Protect others around you: Learn how to safely use, store and throw away your medicines at www.disposemymeds.org.          This list is accurate as of: 8/23/17 11:54 AM.  Always use your most recent med list.                   Brand Name Dispense Instructions for use Diagnosis    APRODINE PO      Take by mouth as needed        diclofenac 1 % Gel topical gel    VOLTAREN    100 g    Apply 2 grams to hands four times daily using enclosed dosing card.    Primary osteoarthritis of both hands       estradiol 0.1 MG/GM cream   Start taking on:  8/24/2017    ESTRACE    42.5 g    Place 2 g vaginally twice a week    Atrophic vaginitis       magnesium 200 MG Tabs     1 tablet    Take 200 mg by mouth daily        omega-3 acid ethyl esters 1 G capsule    Lovaza    120 capsule    Take 2 capsules (2 g) by mouth daily        omeprazole 40 MG capsule    priLOSEC    30 capsule    Take 1 capsule (40 mg) by mouth daily Take 30-60 minutes  before a meal.    Abdominal pain, generalized       ranitidine 150 MG tablet    ZANTAC    1 tablet    Take 1 tablet (150 mg) by mouth daily        rOPINIRole 0.25 MG tablet    REQUIP    90 tablet    Take 1 tablet (0.25 mg) by mouth At Bedtime    Restless leg       sertraline 100 MG tablet    ZOLOFT    90 tablet    Take 0.5 tablets (50 mg) by mouth daily    Major depression in complete remission (H)       SUMAtriptan 50 MG tablet    IMITREX    18 tablet    Take 1 tablet (50 mg) by mouth at onset of headache for migraine May repeat dose in 2 hours.  Do not exceed 200 mg in 24 hours    Other migraine without status migrainosus, not intractable       traZODone 100 MG tablet    DESYREL    90 tablet    Take 1 tablet (100 mg) by mouth At Bedtime    Insomnia, unspecified type       ZYRTEC ALLERGY 10 MG Caps   Generic drug:  cetirizine HCl      Take by mouth as needed

## 2017-08-23 NOTE — NURSING NOTE
"Chief Complaint   Patient presents with     Back Pain     PHQ-9/ACT/AMADOR     Physical       Initial /74  Pulse 98  Temp 97.6  F (36.4  C) (Oral)  Ht 5' 3\" (1.6 m)  Wt 138 lb (62.6 kg)  SpO2 93%  Breastfeeding? No  BMI 24.45 kg/m2 Estimated body mass index is 24.45 kg/(m^2) as calculated from the following:    Height as of this encounter: 5' 3\" (1.6 m).    Weight as of this encounter: 138 lb (62.6 kg).  Medication Reconciliation: complete   HAYDEE/MA      "

## 2017-08-23 NOTE — PROGRESS NOTES
INTERNAL MEDICINE  SUBJECTIVE:   Amee Crews is a 67 year old female who presents for Preventive Visit.  Are you in the first 12 months of your Medicare Part B coverage?  Yes,      Visual Acuity:  Right Eye: 20/50   Left Eye: 20/100  Both Eyes: 20/40 Not wearing glasses today    Healthy Habits:    Do you get at least three servings of calcium containing foods daily (dairy, green leafy vegetables, etc.)? yes    Amount of exercise or daily activities, outside of work: 0 day(s) per week    Problems taking medications regularly No    Medication side effects: No    Have you had an eye exam in the past two years? yes    Do you see a dentist twice per year? yes    Do you have sleep apnea, excessive snoring or daytime drowsiness?no    COGNITIVE SCREEN  1) Repeat 3 items (Banana, Sunrise, Chair)    2) Clock draw: NORMAL  3) 3 item recall: Recalls 3 objects  Results: NORMAL clock, 1-2 items recalled: COGNITIVE IMPAIRMENT LESS LIKELY  Mini-CogTM Copyright S Eva. Licensed by the author for use in [facility]; reprinted with permission (cb@[facility]). All rights reserved.        Vaginal dryness - She had vaginal dryness and is bleeding with intercourse. Patient has tried over the counter lubricants but everything burns. In the past she had the premarin vaginal ring.    Acid reflux - Back in February she was having troubles with acid reflux, which was cleared with omeprazole.  A couple weeks ago pain and bloating in her epigastrium returned so she is going in for combined esophagoscopy, gastroscopy, and duodenoscopy tomorrow.    Mammogram - No family history of breast cancer. She had one abnormal mammogram and was called back for an ultrasound.    Macular cysts - She has cysts in the macular on both her eyes. The cyst is starting to affect her vision. She saw ophthalmology and they said there was nothing they could do about it. She would like to seek a second opinion at [facility].     Arthritis - The arthritis  in her hands is worsening. Patient has sharp pains in her wrists and pain in her fingers occasionally. 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 Denies numbness or tingling.     Hemorrhoids - Patient has external hemorrhoids that she has learned how to care for. They get inflamed during episodes of diarrhea. Ibuprofen helps relieve the swelling.    Medications  Magnesium works well to control her hot flashes.   She has troubles getting comfortable at night since starting menopause, despite taking Trazodone.   Zoloft is working well to stabilize her mood.    Additional Notes  Patient had a life screening done. She recalls that her bone density was low.   She is worried about her cholesterol since she cannot take statins.   Asthma is controlled.  Migraines are improving.   She has constant lower back pain that she sees a chiropractor for.      Wt Readings from Last 5 Encounters:   08/23/17 62.6 kg (138 lb)   08/17/17 64.4 kg (142 lb)   08/03/17 64.4 kg (142 lb)   03/03/17 63.3 kg (139 lb 9.6 oz)   01/25/17 64 kg (141 lb)     Reviewed and updated as needed this visit by clinical staff  Reviewed and updated as needed this visit by Provider     Social History   Substance Use Topics     Smoking status: Former Smoker     Packs/day: 0.00     Types: Cigarettes     Quit date: 1/4/1973     Smokeless tobacco: Never Used     Alcohol use 0.0 oz/week     0 Standard drinks or equivalent per week      Comment: ocass     The patient does not drink >3 drinks per day nor >7 drinks per week.    Today's PHQ-2 Score:   PHQ-2 ( 1999 Pfizer) 8/23/2017 3/31/2015   Q1: Little interest or pleasure in doing things 0 0   Q2: Feeling down, depressed or hopeless 0 0   PHQ-2 Score 0 0     Do you feel safe in your environment - Yes    Do you have a Health Care Directive?: Yes: Advance Directive has been received and scanned.    Current providers sharing in care for this patient include: Patient Care Team:  Patrica Kelly MD as  PCP - General (Internal Medicine)    Hearing impairment: No    Ability to successfully perform activities of daily living: Yes, no assistance needed     Fall risk:  Fallen 2 or more times in the past year?: No  Any fall with injury in the past year?: No    Home safety:  none identified  HAYDEE/MA    The following health maintenance items are reviewed in Epic and correct as of today:  Health Maintenance   Topic Date Due     PHQ-9 Q1YR  09/16/2012     MAMMO Q1 YR  09/11/2015     ASTHMA ACTION PLAN Q1 YR  02/24/2016     TETANUS IMMUNIZATION (SYSTEM ASSIGNED)  03/31/2016     AMADOR QUESTIONNAIRE 1 YEAR  04/01/2016     ADVANCE DIRECTIVE PLANNING Q5 YRS  11/17/2016     PNEUMOCOCCAL (1 of 2 - PCV13) 08/02/2017     FALL RISK ASSESSMENT  08/02/2017     DEXA SCAN SCREENING (SYSTEM ASSIGNED)  08/02/2017     ASTHMA CONTROL TEST Q6 MOS  09/03/2017     INFLUENZA VACCINE (SYSTEM ASSIGNED)  09/01/2017     LIPID MONITORING Q1 YEAR  04/28/2018     COLONOSCOPY Q5 YR  04/30/2020     MIGRAINE ACTION PLAN  Completed     HEPATITIS C SCREENING  Addressed     Labs reviewed in EPIC    Pneumonia Vaccine:Adults age 65+ who have not received previous Pneumovax (PPSV23) or PCV13 as an adult: Should first be given PCV13 AND then should be given PPSV23 6-12 months after PCV13  Mammogram Screening: Patient over age 50, mutual decision to screen reflected in health maintenance.  History of abnormal Pap smear:   NO - age 65 - see link Cervical Cytology Screening Guidelines Last 3 Pap Results:   PAP (no units)   Date Value   08/27/2014 NIL   09/16/2011 NIL   09/10/2010 NIL     ROS:   ROS: 10 point ROS neg other than the symptoms noted above in the HPI.    This document serves as a record of the services and decisions personally performed and made by Patrica Kelly MD. It was created on his/her behalf by Aziza Lay, trained medical scribe. The creation of this document is based the provider's statements to the medical scribes.    Violeta Ervin  "Alireza 11:21 AM, August 23, 2017  OBJECTIVE:   /74  Pulse 98  Temp 97.6  F (36.4  C) (Oral)  Ht 1.6 m (5' 3\")  Wt 62.6 kg (138 lb)  SpO2 93%  Breastfeeding? No  BMI 24.45 kg/m2 Estimated body mass index is 24.45 kg/(m^2) as calculated from the following:    Height as of this encounter: 1.6 m (5' 3\").    Weight as of this encounter: 62.6 kg (138 lb).  EXAM:   GENERAL APPEARANCE: healthy, alert and no distress  EYES: Eyes grossly normal to inspection, PERRL and conjunctivae and sclerae normal  HENT: ear canals and TM's normal, nose and mouth without ulcers or lesions, oropharynx clear and oral mucous membranes moist  NECK: no adenopathy, no asymmetry, masses, or scars and thyroid normal to palpation  RESP: lungs clear to auscultation - no rales, rhonchi or wheezes  BREAST: normal without masses, tenderness or nipple discharge and no palpable axillary masses or adenopathy  CV: regular rate and rhythm, normal S1 S2, no S3 or S4, no murmur, click or rub, no peripheral edema and peripheral pulses strong  ABDOMEN: soft, nontender, no hepatosplenomegaly, no masses and bowel sounds normal   (female): normal female external genitalia, normal urethral meatus, vaginal mucosal atrophy noted, normal atrophic changes  RECTAL: external hemorrhoids present  MS: no musculoskeletal defects are noted and gait is age appropriate without ataxia. Crepitus in base of bilateral thumbs and negative squeeze hand test bilaterally   SKIN: no suspicious lesions or rashes  NEURO: Normal strength and tone, sensory exam grossly normal, mentation intact and speech normal  PSYCH: mentation appears normal and affect normal/bright  ASSESSMENT / PLAN:   1. Routine general medical examination at a health care facility  She will schedule mammogram and DEXA scan. Health maintenance utd     2. Restless leg  Stable. The current medical regimen is effective;  continue present plan and medications.  - rOPINIRole (REQUIP) 0.25 MG tablet; Take 1 " tablet (0.25 mg) by mouth At Bedtime  Dispense: 90 tablet; Refill: 3    3. Major depression in complete remission (H)  Stable. The current medical regimen is effective;  continue present plan and medications.  - sertraline (ZOLOFT) 100 MG tablet; Take 0.5 tablets (50 mg) by mouth daily  Dispense: 90 tablet; Refill: 1    4. Abdominal pain, generalized  Worsening. She has a combined esophagoscopy, gastroscopy, and duodenoscopy scheduled tomorrow for further evaluation. Continues on omeprazole.   - omeprazole (PRILOSEC) 40 MG capsule; Take 1 capsule (40 mg) by mouth daily Take 30-60 minutes before a meal.  Dispense: 30 capsule; Refill: 2    5. Insomnia, unspecified type  Stable. She has a hard time getting comfortable at night. Recommended she try meditation to calm her before bed. Continues on Trazodone.   - traZODone (DESYREL) 100 MG tablet; Take 1 tablet (100 mg) by mouth At Bedtime  Dispense: 90 tablet; Refill: 3    6. Dense breast tissue    - MA Screen Bilateral w/Bk; Future    7. Osteopenia of multiple sites  Stable.   - Vitamin D Deficiency  - Basic metabolic panel    8. Other migraine without status migrainosus, not intractable  Stable. Migraines have improved post menopause. The current medical regimen is effective;  continue present plan and medications.  - SUMAtriptan (IMITREX) 50 MG tablet; Take 1 tablet (50 mg) by mouth at onset of headache for migraine May repeat dose in 2 hours.  Do not exceed 200 mg in 24 hours  Dispense: 18 tablet; Refill: 4    9. Hyperlipidemia LDL goal <130    - Lipid panel reflex to direct LDL    10. High risk medication use  Pt aware of med risks and benefits.   - Vitamin B12  - Vitamin D Deficiency    11. Atrophic vaginitis  She will start applying estradiol cream twice a week to help relief vaginal dryness.   - estradiol (ESTRACE) 0.1 MG/GM cream; Place 2 g vaginally twice a week  Dispense: 42.5 g; Refill: 11    12. Primary osteoarthritis of both hands  Worsening. She will try  "applying voltaren gel to painful areas of her hands.   - diclofenac (VOLTAREN) 1 % GEL topical gel; Apply 2 grams to hands four times daily using enclosed dosing card.  Dispense: 100 g; Refill: 1    13. Asymptomatic postmenopausal status    - DEXA HIP/PELVIS/SPINE - Future; Future    14. Encounter for screening mammogram for breast cancer    - MA Screen Bilateral w/Bk; Future    15. Need for prophylactic vaccination against Streptococcus pneumoniae (pneumococcus)    - Pneumococcal vaccine 13 valent PCV13 IM (Prevnar) [19001]    External hemorrhoids- consider seeing rectal surgery if continues to bother.    End of Life Planning:  Patient currently has an advanced directive: Yes.  Practitioner is supportive of decision.    COUNSELING:  Reviewed preventive health counseling, as reflected in patient instructions       Regular exercise       Healthy diet/nutrition       Vision screening       Immunizations    Vaccinated for: Pneumococcal         [id number] Prevention/Bone Health       Hepatitis C screening       Advanced Planning   BP Screening:   Last 3 BP Readings:    BP Readings from Last 3 Encounters:   08/23/17 128/74   08/03/17 112/70   03/03/17 116/64   The following was recommended to the patient:  Re-screen BP within a year and recommended lifestyle modifications  Estimated body mass index is 24.45 kg/(m^2) as calculated from the following:    Height as of this encounter: 1.6 m (5' 3\").    Weight as of this encounter: 62.6 kg (138 lb).   reports that she quit smoking about 44 years ago. Her smoking use included Cigarettes. She smoked 0.00 packs per day. She has never used smokeless tobacco.    Appropriate preventive services were discussed with this patient, including applicable screening as appropriate for cardiovascular disease, diabetes, osteopenia/osteoporosis, and glaucoma.  As appropriate for age/gender, discussed screening for colorectal cancer, prostate cancer, breast cancer, and cervical cancer. " Checklist reviewing preventive services available has been given to the patient.    Reviewed patients plan of care and provided an AVS. The Intermediate Care Plan ( asthma action plan, low back pain action plan, and migraine action plan) for Amee meets the Care Plan requirement. This Care Plan has been established and reviewed with the Patient.    Counseling Resources:  ATP IV Guidelines  Pooled Cohorts Equation Calculator  Breast Cancer Risk Calculator  FRAX Risk Assessment  ICSI Preventive Guidelines  Dietary Guidelines for Americans, 2010  USDA's MyPlate  ASA Prophylaxis  Lung CA Screening      Patient Instructions     Schedule a mammogram- a 3D mammogram would be preferred but if medicare won't cover it or it's too expensive you can schedule a regular one.    In a month or so return to the pharmacy and see how much the Shingles Vaccine is with your insurance.     Try meditating at night to help you fall asleep.    Schedule a bone density scan at [phone].     MyChart me your records from the eye doctor. Then I can refer you to [facility].    Try applying Voltaren Gel to painful areas of your hands for arthritis relief.     Start applying premarin vaginal cream twice a week at bedtime. It can take up to three months to improve dryness.  Follow up in 3 months for a recheck.      Preventive Health Recommendations    Female Ages 65 +    Yearly exam:     See your health care provider every year in order to  o Review health changes.   o Discuss preventive care.    o Review your medicines if your doctor has prescribed any.      You no longer need a yearly Pap test unless you've had an abnormal Pap test in the past 10 years. If you have vaginal symptoms, such as bleeding or discharge, be sure to talk with your provider about a Pap test.      Every 1 to 2 years, have a mammogram.  If you are over 69, talk with your health care provider about whether or not you want to continue having screening mammograms.      Every 10  years, have a colonoscopy. Or, have a yearly FIT test (stool test). These exams will check for colon cancer.       Have a cholesterol test every 5 years, or more often if your doctor advises it.       Have a diabetes test (fasting glucose) every three years. If you are at risk for diabetes, you should have this test more often.       At age 65, have a bone density scan (DEXA) to check for osteoporosis (brittle bone disease).    Shots:    Get a flu shot each year.    Get a tetanus shot every 10 years.    Talk to your doctor about your pneumonia vaccines. There are now two you should receive - Pneumovax (PPSV 23) and Prevnar (PCV 13).    Talk to your doctor about the shingles vaccine.    Talk to your doctor about the hepatitis B vaccine.    Nutrition:     Eat at least 5 servings of fruits and vegetables each day.      Eat whole-grain bread, whole-wheat pasta and brown rice instead of white grains and rice.      Talk to your provider about Calcium and Vitamin D.     Lifestyle    Exercise at least 150 minutes a week (30 minutes a day, 5 days a week). This will help you control your weight and prevent disease.      Limit alcohol to one drink per day.      No smoking.       Wear sunscreen to prevent skin cancer.       See your dentist twice a year for an exam and cleaning.      See your eye doctor every 1 to 2 years to screen for conditions such as glaucoma, macular degeneration and cataracts.        [facility]    If you have any questions regarding to your visit please contact your care team:     Team Pink:   Clinic Hours Telephone Number   Internal Medicine:  Dr. Patrica Kate NP       7am-7pm  Monday - Thursday   7am-5pm  Fridays  [phone]  (Appointment scheduling available 24/7)    Questions about your visit?  Team Line  [phone]   Urgent Care - [place] - 11am-9pm Monday-Friday Saturday-Sunday- 9am-5pm   [place] - 5pm-9pm  Monday-Friday Saturday-Sunday- 9am-5pm  [phone] - [place]   [phone] - [place]       What options do I have for visits at the clinic other than the traditional office visit?  To expand how we care for you, many of our providers are utilizing electronic visits (e-visits) and telephone visits, when medically appropriate, for interactions with their patients rather than a visit in the clinic.   We also offer nurse visits for many medical concerns. Just like any other service, we will bill your insurance company for this type of visit based on time spent on the phone with your provider. Not all insurance companies cover these visits. Please check with your medical insurance if this type of visit is covered. You will be responsible for any charges that are not paid by your insurance.      E-visits via [employer]:  generally incur a $35.00 fee.  Telephone visits:  Time spent on the phone: *charged based on time that is spent on the phone in increments of 10 minutes. Estimated cost:   5-10 mins $30.00   11-20 mins. $59.00   21-30 mins. $85.00   Use [employer] (secure email communication and access to your chart) to send your primary care provider a message or make an appointment. Ask someone on your Team how to sign up for [employer].    For a Price Quote for your services, please call our Consumer Price Line at [phone].    As always, Thank you for trusting us with your health care needs!    Jo-Ann RASCON CMA ([facility])        I spent 30 minutes of time with the patient and >50% of it was in education and counseling regarding preventive health.     The information in this document, created by the medical scribe for me, accurately reflects the services I personally performed and the decisions made by me. I have reviewed and approved this document for accuracy prior to leaving the patient care area.  Patrica Kelly MD  11:21 AM, 08/23/17    Patrica Kelly MD  [facility]    Start 11:21 AM  End 11:51 AM

## 2017-08-23 NOTE — PATIENT INSTRUCTIONS
Schedule a mammogram- a 3D mammogram would be preferred but if medicare won't cover it or it's too expensive you can schedule a regular one.    In a month or so return to the pharmacy and see how much the Shingles Vaccine is with your insurance.     Try meditating at night to help you fall asleep.    Schedule a bone density scan at [phone].     MyChart me your records from the eye doctor. Then I can refer you to [place].    Try applying Voltaren Gel to painful areas of your hands for arthritis relief.     Start applying premarin vaginal cream twice a week at bedtime. It can take up to three months to improve dryness.  Follow up in 3 months for a recheck.      Preventive Health Recommendations    Female Ages 65 +    Yearly exam:     See your health care provider every year in order to  o Review health changes.   o Discuss preventive care.    o Review your medicines if your doctor has prescribed any.      You no longer need a yearly Pap test unless you've had an abnormal Pap test in the past 10 years. If you have vaginal symptoms, such as bleeding or discharge, be sure to talk with your provider about a Pap test.      Every 1 to 2 years, have a mammogram.  If you are over 69, talk with your health care provider about whether or not you want to continue having screening mammograms.      Every 10 years, have a colonoscopy. Or, have a yearly FIT test (stool test). These exams will check for colon cancer.       Have a cholesterol test every 5 years, or more often if your doctor advises it.       Have a diabetes test (fasting glucose) every three years. If you are at risk for diabetes, you should have this test more often.       At age 65, have a bone density scan (DEXA) to check for osteoporosis (brittle bone disease).    Shots:    Get a flu shot each year.    Get a tetanus shot every 10 years.    Talk to your doctor about your pneumonia vaccines. There are now two you should receive - Pneumovax (PPSV 23) and Prevnar  (PCV 13).    Talk to your doctor about the shingles vaccine.    Talk to your doctor about the hepatitis B vaccine.    Nutrition:     Eat at least 5 servings of fruits and vegetables each day.      Eat whole-grain bread, whole-wheat pasta and brown rice instead of white grains and rice.      Talk to your provider about Calcium and Vitamin D.     Lifestyle    Exercise at least 150 minutes a week (30 minutes a day, 5 days a week). This will help you control your weight and prevent disease.      Limit alcohol to one drink per day.      No smoking.       Wear sunscreen to prevent skin cancer.       See your dentist twice a year for an exam and cleaning.      See your eye doctor every 1 to 2 years to screen for conditions such as glaucoma, macular degeneration and cataracts.        [facility]    If you have any questions regarding to your visit please contact your care team:     Team Pink:   Clinic Hours Telephone Number   Internal Medicine:  Dr. Patrica Kate, NP       7am-7pm  Monday - Thursday   7am-5pm  Fridays  [phone]  (Appointment scheduling available 24/7)    Questions about your visit?  Team Line  [phone]   Urgent Care - [place] - 11am-9pm Monday-Friday Saturday-Sunday- 9am-5pm   [place] - 5pm-9pm Monday-Friday Saturday-Sunday- 9am-5pm  [phone] - [place]   [phone] - [place]       What options do I have for visits at the clinic other than the traditional office visit?  To expand how we care for you, many of our providers are utilizing electronic visits (e-visits) and telephone visits, when medically appropriate, for interactions with their patients rather than a visit in the clinic.   We also offer nurse visits for many medical concerns. Just like any other service, we will bill your insurance company for this type of visit based on time spent on the phone with your provider. Not all insurance companies  cover these visits. Please check with your medical insurance if this type of visit is covered. You will be responsible for any charges that are not paid by your insurance.      E-visits via [employer]:  generally incur a $35.00 fee.  Telephone visits:  Time spent on the phone: *charged based on time that is spent on the phone in increments of 10 minutes. Estimated cost:   5-10 mins $30.00   11-20 mins. $59.00   21-30 mins. $85.00   Use [email or web address] (secure email communication and access to your chart) to send your primary care provider a message or make an appointment. Ask someone on your Team how to sign up for [email or web address].    For a Price Quote for your services, please call our Consumer Price Line at [phone].    As always, Thank you for trusting us with your health care needs!    Jo-Ann RASCON CMA ([facility])

## 2017-08-24 ENCOUNTER — SURGERY (OUTPATIENT)
Age: 67
End: 2017-08-24

## 2017-08-24 ENCOUNTER — HOSPITAL ENCOUNTER (OUTPATIENT)
Facility: AMBULATORY SURGERY CENTER | Age: 67
Discharge: HOME | End: 2017-08-24
Attending: INTERNAL MEDICINE | Admitting: INTERNAL MEDICINE
Payer: COMMERCIAL

## 2017-08-24 VITALS
TEMPERATURE: 98.8 F | DIASTOLIC BLOOD PRESSURE: 77 MMHG | WEIGHT: 142 LBS | OXYGEN SATURATION: 93 % | SYSTOLIC BLOOD PRESSURE: 120 MMHG | HEIGHT: 63 IN | BODY MASS INDEX: 25.16 KG/M2 | RESPIRATION RATE: 16 BRPM

## 2017-08-24 LAB
DEPRECATED CALCIDIOL+CALCIFEROL SERPL-MC: 47 UG/L (ref 20–75)
UPPER GI ENDOSCOPY: NORMAL

## 2017-08-24 PROCEDURE — 43239 EGD BIOPSY SINGLE/MULTIPLE: CPT

## 2017-08-24 PROCEDURE — G8907 PT DOC NO EVENTS ON DISCHARG: HCPCS

## 2017-08-24 PROCEDURE — G8918 PT W/O PREOP ORDER IV AB PRO: HCPCS

## 2017-08-24 PROCEDURE — 88305 TISSUE EXAM BY PATHOLOGIST: CPT | Performed by: INTERNAL MEDICINE

## 2017-08-24 RX ORDER — ONDANSETRON 2 MG/ML
4 INJECTION INTRAMUSCULAR; INTRAVENOUS
Status: DISCONTINUED | OUTPATIENT
Start: 2017-08-24 | End: 2017-08-25 | Stop reason: HOSPADM

## 2017-08-24 RX ORDER — FENTANYL CITRATE 50 UG/ML
INJECTION, SOLUTION INTRAMUSCULAR; INTRAVENOUS PRN
Status: DISCONTINUED | OUTPATIENT
Start: 2017-08-24 | End: 2017-08-24 | Stop reason: HOSPADM

## 2017-08-24 RX ORDER — LIDOCAINE 40 MG/G
CREAM TOPICAL
Status: DISCONTINUED | OUTPATIENT
Start: 2017-08-24 | End: 2017-08-25 | Stop reason: HOSPADM

## 2017-08-24 RX ADMIN — FENTANYL CITRATE 100 MCG: 50 INJECTION, SOLUTION INTRAMUSCULAR; INTRAVENOUS at 10:47

## 2017-08-24 ASSESSMENT — ASTHMA QUESTIONNAIRES: ACT_TOTALSCORE: 23

## 2017-08-24 ASSESSMENT — ANXIETY QUESTIONNAIRES: GAD7 TOTAL SCORE: 0

## 2017-08-24 NOTE — PROGRESS NOTES
Deamarcio Lubin,    Your recent test results are attached.      Normal Vitamin D.    If you have any questions please feel free to contact [phone] or myself via [employer].    Sincerely,  Gerda Kate, CNP

## 2017-08-26 ENCOUNTER — HEALTH MAINTENANCE LETTER (OUTPATIENT)
Age: 67
End: 2017-08-26

## 2017-08-28 ENCOUNTER — MYC MEDICAL ADVICE (OUTPATIENT)
Dept: INTERNAL MEDICINE | Facility: CLINIC | Age: 67
End: 2017-08-28

## 2017-08-28 DIAGNOSIS — N95.2 ATROPHIC VAGINITIS: Primary | ICD-10-CM

## 2017-08-28 NOTE — TELEPHONE ENCOUNTER
Spoke to pharmacy in regards to Premarin Cream or Vagifem being cheaper. They state they will have to run both prescriptions and they will call patient with cost of each.    Tejal Avilez, CMA

## 2017-08-28 NOTE — TELEPHONE ENCOUNTER
Routing to provider to please advise. See patient's [employer] message below      Medication Detail         Disp Refills Start End DAVID     estradiol (ESTRACE) 0.1 MG/GM cream 42.5 g 11 8/24/2017  --     Sig: Place 2 g vaginally twice a week     Class: E-Prescribe     Route: Vaginal     Order: [id number]     E-Prescribing Status: Receipt confirmed by pharmacy (8/23/2017 11:49 AM CDT)       Kalli Martin RN

## 2017-08-29 ENCOUNTER — TELEPHONE (OUTPATIENT)
Dept: INTERNAL MEDICINE | Facility: CLINIC | Age: 67
End: 2017-08-29

## 2017-08-29 ENCOUNTER — MYC MEDICAL ADVICE (OUTPATIENT)
Dept: FAMILY MEDICINE | Facility: OTHER | Age: 67
End: 2017-08-29

## 2017-08-29 DIAGNOSIS — K21.9 GASTROESOPHAGEAL REFLUX DISEASE WITHOUT ESOPHAGITIS: Primary | ICD-10-CM

## 2017-08-29 LAB — COPATH REPORT: NORMAL

## 2017-08-29 RX ORDER — ESTRADIOL 10 UG/1
10 INSERT VAGINAL
Qty: 24 TABLET | Refills: 3 | Status: SHIPPED | OUTPATIENT
Start: 2017-08-31 | End: 2017-11-29

## 2017-08-29 NOTE — TELEPHONE ENCOUNTER
Reason for Call:  Other call back    Detailed comments: Toni Walsh called to clarify the directions for the prescription Vagifem.  Please call [phone]    Phone Number Patient can be reached at: Home number on file [phone] (home)    Best Time: any    Can we leave a detailed message on this number? NO    Call taken on 8/29/2017 at 8:57 AM by Ramonita Valdovinos

## 2017-08-29 NOTE — TELEPHONE ENCOUNTER
Per pharmacy, Vagifem would be the cheaper alternative. About $12   They will need a prescription   Please send one in    Kalli Martin RN

## 2017-08-30 ENCOUNTER — DOCUMENTATION ONLY (OUTPATIENT)
Dept: INTERNAL MEDICINE | Facility: CLINIC | Age: 67
End: 2017-08-30

## 2017-08-31 PROBLEM — H35.343 MACULAR CYST, HOLE, OR PSEUDOHOLE, BILATERAL: Status: ACTIVE | Noted: 2017-08-31

## 2017-08-31 PROBLEM — H52.13 MYOPIA, BILATERAL: Status: ACTIVE | Noted: 2017-08-31

## 2017-08-31 PROBLEM — K21.9 GASTROESOPHAGEAL REFLUX DISEASE WITHOUT ESOPHAGITIS: Status: ACTIVE | Noted: 2017-08-31

## 2017-08-31 PROBLEM — H35.52: Status: ACTIVE | Noted: 2017-08-31

## 2017-08-31 PROBLEM — H35.30 MACULAR DEGENERATION: Status: ACTIVE | Noted: 2017-08-31

## 2017-08-31 PROBLEM — H11.153 PINGUECULA OF BOTH EYES: Status: ACTIVE | Noted: 2017-08-31

## 2017-08-31 NOTE — TELEPHONE ENCOUNTER
Please notify patient, should continue medications and follow up with GI doctor to discuss.    Min Yusuf PA-C  [facility]

## 2017-09-07 ENCOUNTER — RADIANT APPOINTMENT (OUTPATIENT)
Dept: MAMMOGRAPHY | Facility: CLINIC | Age: 67
End: 2017-09-07
Attending: INTERNAL MEDICINE
Payer: COMMERCIAL

## 2017-09-07 ENCOUNTER — RADIANT APPOINTMENT (OUTPATIENT)
Dept: BONE DENSITY | Facility: CLINIC | Age: 67
End: 2017-09-07
Attending: INTERNAL MEDICINE
Payer: COMMERCIAL

## 2017-09-07 DIAGNOSIS — Z12.31 ENCOUNTER FOR SCREENING MAMMOGRAM FOR BREAST CANCER: ICD-10-CM

## 2017-09-07 DIAGNOSIS — R92.30 DENSE BREAST TISSUE: ICD-10-CM

## 2017-09-07 DIAGNOSIS — Z78.0 ASYMPTOMATIC POSTMENOPAUSAL STATUS: ICD-10-CM

## 2017-09-07 PROCEDURE — 77080 DXA BONE DENSITY AXIAL: CPT | Performed by: RADIOLOGY

## 2017-09-07 PROCEDURE — G0202 SCR MAMMO BI INCL CAD: HCPCS | Performed by: RADIOLOGY

## 2017-09-07 PROCEDURE — 77063 BREAST TOMOSYNTHESIS BI: CPT | Performed by: RADIOLOGY

## 2017-09-07 NOTE — LETTER
[facility]  [address] [place] [address]    September 11, 2017    Amee Crews  [address] [facility] [id number]          Dear Amee,    This bone density shows osteoporosis.  We will need to discuss the results and treatment plans further in the office    Enclosed is a copy of your results.     Results for orders placed or performed in visit on 09/07/17   DEXA HIP/PELVIS/SPINE - Future    Narrative    HISTORY: Asymptomatic menopausal state    COMPARISON:   None    Age: 67  years.  Height: 63 inches  Weight: 138 pounds  Sex: Female  Ethnicity: White    Image quality: Adequate    Lumbar spine T-score in region of L1-L4, excluding L3 = -1.7     HIPS:  Mean total hip T-score: -2.2    Left femoral neck T-score = -2.2  Right femoral neck T-score= -2.9     Radius 33% T-score = NA    FRAX:  10 year probability of major osteoporotic fracture: 12.2%  10 year probability of hip fracture: 2.3%  The 10 year probability of fracture may be lower than reported if the  patient has received treatment. FRAX data should be disregarded in  patient's taking bisphosphonates.    World Health Organization definition of osteoporosis and osteopenia  for  women:   Normal: T-score at or above -1.0  Low Bone Mass (Osteopenia): T-score between -1.0 and -2.5.   Osteoporosis: T-score at or below -2.5   T-scores are reported for postmenopausal women and men over 50 years  of age.      Impression    IMPRESSION:  Osteopenia    MASSIMO CUEVAS MD       If you have any questions or concerns, please call myself or my nurse at [phone].      Sincerely,        Patrica Kelly MD /PADMINI

## 2017-09-08 NOTE — PROGRESS NOTES
This bone density shows osteoporosis.  We will need to discuss the results and treatment plans further in the office.   Dr. Kelly

## 2017-10-20 ENCOUNTER — TRANSFERRED RECORDS (OUTPATIENT)
Dept: HEALTH INFORMATION MANAGEMENT | Facility: CLINIC | Age: 67
End: 2017-10-20

## 2017-10-30 ENCOUNTER — TELEPHONE (OUTPATIENT)
Dept: INTERNAL MEDICINE | Facility: CLINIC | Age: 67
End: 2017-10-30

## 2017-10-30 NOTE — TELEPHONE ENCOUNTER
Received fax from [facility] stating that her Sumatriptan medication can only be covered if it is 18 tabs for 30 days, Dr. Kelly approved, Sending to Provider.     Jo-Ann RASCON CMA ([facility])

## 2017-10-31 NOTE — TELEPHONE ENCOUNTER
Spoke with pharmacy, order is written for 18 tablets, and this is covered.  Pharmacy states they do not need anything further.   Karina Oakley RN

## 2017-11-01 ENCOUNTER — TRANSFERRED RECORDS (OUTPATIENT)
Dept: HEALTH INFORMATION MANAGEMENT | Facility: CLINIC | Age: 67
End: 2017-11-01

## 2017-11-27 NOTE — PROGRESS NOTES
"    SUBJECTIVE:                                                    Amee Crews is a 67 year old female who presents to clinic today for the following health issues:  {Provider please address medication reconciliation discrepancies--rooming staff please delete if no med/rec issues}    {Superlists:676713}    {additional problems for provider to add:464512}    Problem list and histories reviewed & adjusted, as indicated.  Additional history: {NONE - AS DOCUMENTED:656498::\"as documented\"}    {HIST REVIEW/ LINKS 2:795126}    {PROVIDER CHARTING PREFERENCE:451315}      "

## 2017-11-27 NOTE — PROGRESS NOTES
INTERNAL MEDICINE  SUBJECTIVE:   Amee Crews is a 67 year old female who presents to clinic today for the following health issues:    Patient presents to clinic today for three month medication check.      Vision - They are doing genetic testing on her eyes to see what is causing her vision changes. They believe it is a mass of some kind suppressing her vision. All they can do is treat the bleeding with an injection.     Low back pain - Patient had an xray of her lower back at [facility] that showed degenerative changes. She did no like their treatment course and would like to seek other evaluation. Pain is in her right lower back radiating down into her buttocks. Occasionally she has swelling. Foam rolling helps alleviate the pain. Radiation does not extend past her knee. Denies saddle anesthesia.     Osteoporosis - Denies family history of osteoporosis. She is not taking a calcium supplement but eats dairy. Declines medication intervention at this time and would prefer to try supplements and getting adequate calcium in her diet. She is taking a Vitamin B12 supplement.     Left knee pain - The medial aspect of her right knee is tender swollen, and is painful when she walks. [place]. She avoids taking Ibuprofen and stretches when it bothers her.    Additional Notes  She had Lifestyle screening at work.  Estradiol cream is not working for her. She would like to consider other treatment options.      Problem list and histories reviewed & adjusted, as indicated.  Additional history: as documented    Labs reviewed in EPIC  Reviewed and updated as needed this visit by clinical staff  Tobacco  Allergies  Meds  Med Hx  Surg Hx  Fam Hx  Soc Hx      Reviewed and updated as needed this visit by Provider         ROS:  C: NEGATIVE for fever, chills, change in weight  E: NEGATIVE for vision changes or irritation  : NEGATIVE for frequency, dysuria, or  "hematuria  M: NEGATIVE for significant arthralgias or myalgia  N: NEGATIVE for weakness, dizziness or paresthesias  P: NEGATIVE for changes in mood or affect    This document serves as a record of the services and decisions personally performed and made by Patrica Kelly MD. It was created on his/her behalf by Aziza Lay, trained medical scribe. The creation of this document is based the provider's statements to the medical scribes.    Scribe Aziza Lay 11:12 AM, November 29, 2017  OBJECTIVE:     /62  Pulse 71  Temp 97.7  F (36.5  C) (Oral)  Ht 1.6 m (5' 3\")  Wt 65.2 kg (143 lb 12.8 oz)  SpO2 98%  BMI 25.47 kg/m2  Body mass index is 25.47 kg/(m^2).  GENERAL: alert and no distress  RESP: lungs clear to auscultation - no rales, rhonchi or wheezes  CV: regular rate and rhythm, normal S1 S2, no S3 or S4, no murmur, click or rub, no peripheral edema and peripheral pulses strong  LEFT KNEE: lump in medial aspect of the left knee  BACK: no CVA tenderness, no paralumbar tenderness   NEURO: Normal strength and tone, mentation intact and speech normal  PSYCH: mentation appears normal, affect normal/bright  FOOT EXAM: normal DP and PT pulses, no trophic changes or ulcerative lesions and normal sensory exam  No pain to palpation of lumbar spine.  No sacroiliac joint tenderness    Diagnostic Test Results:  Results for orders placed or performed in visit on 09/07/17   MA Screen Bilateral w/Deisy    Narrative    Examination:  MA SCREENING BILATERAL W/ DEISY, COMPUTER AIDED DETECTION 9/7/2017  10:10 AM    Comparison: 9/11/2014, 6/19/2012, 8/31/2010    History: Asymptomatic screening.    Breast Density: Heterogeneously dense    Findings:  No significant change.      Impression    Impression: BI-RADS CATEGORY: 1 -  Negative..    Recommended Followup: Annual Mammography.    Results to be sent to the patient.    I have personally reviewed the examination and initial interpretation  and I agree with the findings.    AN " MD SHALONDA      ASSESSMENT/PLAN:   1. Atrophic vaginitis  She has been using estradiol cream since 8/23/17 without relief. Pt will schedule with OB/GYN to discuss other treatment options.  - OB/GYN REFERRAL    2. Best vitelliform macular dystrophy  Progressively worsening. Followed by Opthalmology.     3. Gastroesophageal reflux disease without esophagitis  Stable. Continues on omeprazole 40 mg daily.     4. DDD (degenerative disc disease), lumbar  Despite seeing the chiropractor she continues to have pain in her right lower back that radiates down into her buttocks. [facility] and treatment.   - [facility] REFERRAL    5. Chronic pain of left knee  After fall this summer. She continues to have pain when walking. Discussed there are likely soft tissues changes.     6. Vitamin B12 deficiency  Stable. Continues on Vitamin B12 1000 mcg daily. Will recheck labs.  - Cyanocobalamin (B-12) 1000 MCG TBCR; Take 1,000 mcg by mouth daily  Dispense: 90 tablet; Refill: 3  - Vitamin B12    7. Osteopenia, unspecified location  Stable. Discussed starting medication and she declines at this time. Pt would like to try OTC supplements, exercise, and ensure she is getting adequate calcium in her diet. Will repeat DEXA in two years.      Patient Instructions     Try printing off a picture of your xray for us to have on file.     Schedule with [facility] in [place] (physical therapy & chiropractic) for your low back pain.     Schedule with Dr. Coyle OB[facility]GYN at the [facility].     There is a new Shingles Vaccine- Shingrex (2 shot series) I would recommend getting in the new year.    Follow up with me in in 6-12 months.       Calcium Supplements  Calcium is a mineral that helps make strong bones and teeth. Most of the calcium in your body is in your bones. It takes almost half of your life (30 to 35 years) for your bones to reach their  maximum size and strength (peak bone mass). When you are younger, taking in enough calcium helps you build strong bones. When you are older, getting enough calcium in your diet helps to limit bone loss. Your body can t make calcium, so you must get it from foods or supplements.    Suggested daily amount  The daily recommended amount of calcium depends on many factors. This includes your age and if you are a man or a woman. Your healthcare provider can help you choose the right amount of calcium for you.     Food sources of calcium    Milk, yogurt, and cheese    Certain green leafy vegetables, such as kale, bok darrel, and paz greens    Fish with bones, such as canned salmon, mackerel, and sardines    Tofu made with calcium carbonate (not the type of tofu called nagiri)    Drinks that have calcium added, such as some orange juice and rice and soy drinks   Date Last Reviewed: 6/1/2017 2000-2017 [employer]. [address] [place] [address]. All rights reserved. This information is not intended as a substitute for professional medical care. Always follow your healthcare professional's instructions.      [facility]    If you have any questions regarding to your visit please contact your care team:     Team Pink:   Clinic Hours Telephone Number   Internal Medicine:  Dr. Patrica Kate NP       7am-7pm  Monday - Thursday   7am-5pm  Fridays  [phone]  (Appointment scheduling available 24/7)    Questions about your visit?  Team Line  [phone]   Urgent Care - [place] - 11am-9pm Monday-Friday Saturday-Sunday- 9am-5pm   [place] - 5pm-9pm Monday-Friday Saturday-Sunday- 9am-5pm  [phone] - [place]   [phone] - [place]       What options do I have for visits at the clinic other than the traditional office visit?  To expand how we care for you, many of our providers are utilizing electronic visits  (e-visits) and telephone visits, when medically appropriate, for interactions with their patients rather than a visit in the clinic.   We also offer nurse visits for many medical concerns. Just like any other service, we will bill your insurance company for this type of visit based on time spent on the phone with your provider. Not all insurance companies cover these visits. Please check with your medical insurance if this type of visit is covered. You will be responsible for any charges that are not paid by your insurance.      E-visits via [employer]:  generally incur a $35.00 fee.  Telephone visits:  Time spent on the phone: *charged based on time that is spent on the phone in increments of 10 minutes. Estimated cost:   5-10 mins $30.00   11-20 mins. $59.00   21-30 mins. $85.00   Use Inforamat (secure email communication and access to your chart) to send your primary care provider a message or make an appointment. Ask someone on your Team how to sign up for Inforamat.    For a Price Quote for your services, please call our RadioRx Line at [phone].    As always, Thank you for trusting us with your health care needs!    Discharged by Jo-Ann RASCON CMA ([facility])      I spent 25 minutes of time with the patient and >50% of it was in education and counseling regarding 3 month med check.    The information in this document, created by the medical scribe for me, accurately reflects the services I personally performed and the decisions made by me. I have reviewed and approved this document for accuracy prior to leaving the patient care area.  Patrica Kelly MD  11:12 AM, 11/29/17    Patrica Kelly MD  [facility]    Start 11:12 AM  End 11:37 AM

## 2017-11-29 ENCOUNTER — TELEPHONE (OUTPATIENT)
Dept: FAMILY MEDICINE | Facility: CLINIC | Age: 67
End: 2017-11-29

## 2017-11-29 ENCOUNTER — OFFICE VISIT (OUTPATIENT)
Dept: INTERNAL MEDICINE | Facility: CLINIC | Age: 67
End: 2017-11-29
Payer: COMMERCIAL

## 2017-11-29 VITALS
DIASTOLIC BLOOD PRESSURE: 62 MMHG | HEART RATE: 71 BPM | OXYGEN SATURATION: 98 % | WEIGHT: 143.8 LBS | SYSTOLIC BLOOD PRESSURE: 104 MMHG | BODY MASS INDEX: 25.48 KG/M2 | TEMPERATURE: 97.7 F | HEIGHT: 63 IN

## 2017-11-29 DIAGNOSIS — E53.8 VITAMIN B12 DEFICIENCY: ICD-10-CM

## 2017-11-29 DIAGNOSIS — M85.80 OSTEOPENIA, UNSPECIFIED LOCATION: ICD-10-CM

## 2017-11-29 DIAGNOSIS — K21.9 GASTROESOPHAGEAL REFLUX DISEASE WITHOUT ESOPHAGITIS: ICD-10-CM

## 2017-11-29 DIAGNOSIS — N95.2 ATROPHIC VAGINITIS: Primary | ICD-10-CM

## 2017-11-29 DIAGNOSIS — M51.369 DDD (DEGENERATIVE DISC DISEASE), LUMBAR: ICD-10-CM

## 2017-11-29 DIAGNOSIS — G89.29 CHRONIC PAIN OF LEFT KNEE: ICD-10-CM

## 2017-11-29 DIAGNOSIS — E53.8 VITAMIN B 12 DEFICIENCY: Primary | ICD-10-CM

## 2017-11-29 DIAGNOSIS — H35.54 BEST VITELLIFORM MACULAR DYSTROPHY: ICD-10-CM

## 2017-11-29 DIAGNOSIS — M25.562 CHRONIC PAIN OF LEFT KNEE: ICD-10-CM

## 2017-11-29 LAB — VIT B12 SERPL-MCNC: 855 PG/ML (ref 193–986)

## 2017-11-29 PROCEDURE — 82607 VITAMIN B-12: CPT | Performed by: INTERNAL MEDICINE

## 2017-11-29 PROCEDURE — 36415 COLL VENOUS BLD VENIPUNCTURE: CPT | Performed by: INTERNAL MEDICINE

## 2017-11-29 PROCEDURE — 99214 OFFICE O/P EST MOD 30 MIN: CPT | Performed by: INTERNAL MEDICINE

## 2017-11-29 RX ORDER — LANOLIN ALCOHOL/MO/W.PET/CERES
1000 CREAM (GRAM) TOPICAL DAILY
Qty: 90 TABLET | Refills: 3 | COMMUNITY
Start: 2017-11-29 | End: 2019-12-27

## 2017-11-29 NOTE — LETTER
My Asthma Action Plan  Name: Amee Crews   YOB: 1950  Date: 11/29/2017   My doctor: Patrica Kelly MD   My clinic: [facility]        My Control Medicine: None  My Rescue Medicine: Albuterol (Proair/Ventolin/Proventil) inhaler     My Asthma Severity: intermittent  Avoid your asthma triggers: Patient is unaware of triggers               GREEN ZONE   Good Control    I feel good    No cough or wheeze    Can work, sleep and play without asthma symptoms       Take your asthma control medicine every day.     1. If exercise triggers your asthma, take your rescue medication    15 minutes before exercise or sports, and    During exercise if you have asthma symptoms  2. Spacer to use with inhaler: If you have a spacer, make sure to use it with your inhaler             YELLOW ZONE Getting Worse  I have ANY of these:    I do not feel good    Cough or wheeze    Chest feels tight    Wake up at night   1. Keep taking your Green Zone medications  2. Start taking your rescue medicine:    every 20 minutes for up to 1 hour. Then every 4 hours for 24-48 hours.  3. If you stay in the Yellow Zone for more than 12-24 hours, contact your doctor.  4. If you do not return to the Green Zone in 12-24 hours or you get worse, start taking your oral steroid medicine if prescribed by your provider.           RED ZONE Medical Alert - Get Help  I have ANY of these:    I feel awful    Medicine is not helping    Breathing getting harder    Trouble walking or talking    Nose opens wide to breathe       1. Take your rescue medicine NOW  2. If your provider has prescribed an oral steroid medicine, start taking it NOW  3. Call your doctor NOW  4. If you are still in the Red Zone after 20 minutes and you have not reached your doctor:    Take your rescue medicine again and    Call 911 or go to the emergency room right away    See your regular doctor within 2 weeks of an Emergency Room or Urgent Care visit for follow-up  treatment.        Electronically signed by: Patrica Kelly, November 29, 2017    Annual Reminders:  Meet with Asthma Educator,  Flu Shot in the Fall, consider Pneumonia Vaccination for patients with asthma (aged 19 and older).    Pharmacy: [id number] [place] - [address]                    Asthma Triggers  How To Control Things That Make Your Asthma Worse    Triggers are things that make your asthma worse.  Look at the list below to help you find your triggers and what you can do about them.  You can help prevent asthma flare-ups by staying away from your triggers.      Trigger                                                          What you can do   Cigarette Smoke  Tobacco smoke can make asthma worse. Do not allow smoking in your home, car or around you.  Be sure no one smokes at a child s day care or school.  If you smoke, ask your health care provider for ways to help you quit.  Ask family members to quit too.  Ask your health care provider for a referral to Quit Plan to help you quit smoking, or call 7-997-212-PLAN.     Colds, Flu, Bronchitis  These are common triggers of asthma. Wash your hands often.  Don t touch your eyes, nose or mouth.  Get a flu shot every year.     Dust Mites  These are tiny bugs that live in cloth or carpet. They are too small to see. Wash sheets and blankets in hot water every week.   Encase pillows and mattress in dust mite proof covers.  Avoid having carpet if you can. If you have carpet, vacuum weekly.   Use a dust mask and HEPA vacuum.   Pollen and Outdoor Mold  Some people are allergic to trees, grass, or weed pollen, or molds. Try to keep your windows closed.  Limit time out doors when pollen count is high.   Ask you health care provider about taking medicine during allergy season.     Animal Dander  Some people are allergic to skin flakes, urine or saliva from pets with fur or feathers. Keep pets with fur or feathers out of your home.    If you can t keep the  pet outdoors, then keep the pet out of your bedroom.  Keep the bedroom door closed.  Keep pets off cloth furniture and away from stuffed toys.     Mice, Rats, and Cockroaches  Some people are allergic to the waste from these pests.   Cover food and garbage.  Clean up spills and food crumbs.  Store grease in the refrigerator.   Keep food out of the bedroom.   Indoor Mold  This can be a trigger if your home has high moisture. Fix leaking faucets, pipes, or other sources of water.   Clean moldy surfaces.  Dehumidify basement if it is damp and smelly.   Smoke, Strong Odors, and Sprays  These can reduce air quality. Stay away from strong odors and sprays, such as perfume, powder, hair spray, paints, smoke incense, paint, cleaning products, candles and new carpet.   Exercise or Sports  Some people with asthma have this trigger. Be active!  Ask your doctor about taking medicine before sports or exercise to prevent symptoms.    Warm up for 5-10 minutes before and after sports or exercise.     Other Triggers of Asthma  Cold air:  Cover your nose and mouth with a scarf.  Sometimes laughing or crying can be a trigger.  Some medicines and food can trigger asthma.

## 2017-11-29 NOTE — TELEPHONE ENCOUNTER
Reason for call:  Med question  Patient called regarding (reason for call): They have been dispensing Vit B 12 1000 mcg tablet not extended release tabs. Make a change? Or continue as was?  Additional comments: Please call to discuss further      Phone number to reach patient: [phone]  Best Time: 8-8    Can we leave a detailed message on this number?  YES

## 2017-11-29 NOTE — TELEPHONE ENCOUNTER
Per Dr. Kelly; ok to keep it the same as they have been dispensing.    Called pharmacy and updated Berenice Garza on this.    Kalli Martin RN

## 2017-11-29 NOTE — MR AVS SNAPSHOT
After Visit Summary   11/29/2017    Amee Crews    MRN: [id number]           Patient Information     Date Of Birth          1950        Visit Information        Provider Department      11/29/2017 11:00 AM Patrica Kelly MD [facility]        Today's Diagnoses     Atrophic vaginitis    -  1    Best vitelliform macular dystrophy        Gastroesophageal reflux disease without esophagitis        DDD (degenerative disc disease), lumbar        Chronic pain of left knee        Vitamin B12 deficiency        Osteopenia, unspecified location          Care Instructions    Try printing off a picture of your xray for us to have on file.     Schedule with the [facility] in [place] (physical therapy & chiropractic) for your low back pain.     Schedule with Dr. Coyle OB[facility]N at the [facility].     There is a new Shingles Vaccine- Shingrex (2 shot series) I would recommend getting in the new year.    Follow up with me in in 6-12 months.       Calcium Supplements  Calcium is a mineral that helps make strong bones and teeth. Most of the calcium in your body is in your bones. It takes almost half of your life (30 to 35 years) for your bones to reach their maximum size and strength (peak bone mass). When you are younger, taking in enough calcium helps you build strong bones. When you are older, getting enough calcium in your diet helps to limit bone loss. Your body can t make calcium, so you must get it from foods or supplements.    Suggested daily amount  The daily recommended amount of calcium depends on many factors. This includes your age and if you are a man or a woman. Your healthcare provider can help you choose the right amount of calcium for you.     Food sources of calcium    Milk, yogurt, and cheese    Certain green leafy vegetables, such as kale, bok darrel, and paz greens    Fish with bones, such as canned salmon, mackerel, and sardines    Tofu made with calcium  carbonate (not the type of tofu called nagiri)    Drinks that have calcium added, such as some orange juice and rice and soy drinks   Date Last Reviewed: 6/1/2017 2000-2017 [employer]. [address] [place] [address], [place] [address]. All rights reserved. This information is not intended as a substitute for professional medical care. Always follow your healthcare professional's instructions.      [facility]    If you have any questions regarding to your visit please contact your care team:     Team Pink:   Clinic Hours Telephone Number   Internal Medicine:  Dr. Patrica Kate, NP       7am-7pm  Monday - Thursday   7am-5pm  Fridays  [phone]  (Appointment scheduling available 24/7)    Questions about your visit?  Team Line  [phone]   Urgent Care - [place] [facility] - 11am-9pm Monday-Friday Saturday-Sunday- 9am-5pm   [place] - 5pm-9pm Monday-Friday Saturday-Sunday- 9am-5pm  [phone] - [facility]  [phone] - [place]       What options do I have for visits at the clinic other than the traditional office visit?  To expand how we care for you, many of our providers are utilizing electronic visits (e-visits) and telephone visits, when medically appropriate, for interactions with their patients rather than a visit in the clinic.   We also offer nurse visits for many medical concerns. Just like any other service, we will bill your insurance company for this type of visit based on time spent on the phone with your provider. Not all insurance companies cover these visits. Please check with your medical insurance if this type of visit is covered. You will be responsible for any charges that are not paid by your insurance.      E-visits via [employer]:  generally incur a $35.00 fee.  Telephone visits:  Time spent on the phone: *charged based on time that is spent on the phone in increments of 10 minutes. Estimated cost:   5-10  mins $30.00   11-20 mins. $59.00   21-30 mins. $85.00   Use [employer] (secure email communication and access to your chart) to send your primary care provider a message or make an appointment. Ask someone on your Team how to sign up for [employer].    For a Price Quote for your services, please call our [employer] Price Line at [phone].    As always, Thank you for trusting us with your health care needs!    Discharged by Jo-Ann RASCON CMA ([facility])            Follow-ups after your visit        Additional Services     [facility] PT, HAND, AND CHIROPRACTIC REFERRAL       **This order will print in the [facility] Scheduling Office**    Physical Therapy, Hand Therapy and Chiropractic Care are available through:    *[facility]  *[facility]  *[facility]    Call one number to schedule at any of the above locations: [phone].    Your provider has referred you to: [facility] - PT and/or Chiropractic Care determined by clinical presentation at [facility] or [facility] Initial Visit    Indication/Reason for Referral: Low Back Pain  Onset of Illness:   Therapy Orders: Evaluate and Treat  Special Programs:   Special Request:     Ketty Hdez      Additional Comments for the Therapist or Chiropractor:     Please be aware that coverage of these services is subject to the terms and limitations of your health insurance plan.  Call member services at your health plan with any benefit or coverage questions.      Please bring the following to your appointment:    *Your personal calendar for scheduling future appointments  *Comfortable clothing            OB/GYN REFERRAL       Your provider has referred you to:  [facility]: [facility] [phone]   [email or web address] - Dr. Gerda Coyle    Please be aware that coverage of these services is subject to the terms and limitations of your health insurance plan.  Call member services at your health  "plan with any benefit or coverage questions.      Please bring the following with you to your appointment:    (1) Any X-Rays, CTs or MRIs which have been performed.  Contact the facility where they were done to arrange for  prior to your scheduled appointment.   (2) List of current medications   (3) This referral request   (4) Any documents/labs given to you for this referral                  Who to contact     If you have questions or need follow up information about today's clinic visit or your schedule please contact [facility] directly at [phone].  Normal or non-critical lab and imaging results will be communicated to you by Gig[employer]ro[employer]hart, letter or phone within 4 business days after the clinic has received the results. If you do not hear from us within 7 days, please contact the clinic through [employer] or phone. If you have a critical or abnormal lab result, we will notify you by phone as soon as possible.  Submit refill requests through [employer] or call your pharmacy and they will forward the refill request to us. Please allow 3 business days for your refill to be completed.          Additional Information About Your Visit        [employer] Information     [employer] gives you secure access to your electronic health record. If you see a primary care provider, you can also send messages to your care team and make appointments. If you have questions, please call your primary care clinic.  If you do not have a primary care provider, please call [phone] and they will assist you.        Care EveryWhere ID     This is your Care EveryWhere ID. This could be used by other organizations to access your [facility] medical records  [id number]        Your Vitals Were     Pulse Temperature Height Pulse Oximetry BMI (Body Mass Index)       71 97.7  F (36.5  C) (Oral) 5' 3\" (1.6 m) 98% 25.47 kg/m2        Blood Pressure from Last 3 Encounters:   11/29/17 104/62   08/24/17 120/77   08/23/17 128/74    Weight " from Last 3 Encounters:   11/29/17 143 lb 12.8 oz (65.2 kg)   08/17/17 142 lb (64.4 kg)   08/23/17 138 lb (62.6 kg)              We Performed the Following     [facility] PT, HAND, AND CHIROPRACTIC REFERRAL     OB/GYN REFERRAL     Vitamin B12          Today's Medication Changes          These changes are accurate as of: 11/29/17 11:41 AM.  If you have any questions, ask your nurse or doctor.               Stop taking these medicines if you haven't already. Please contact your care team if you have questions.     estradiol 10 MCG Tabs vaginal tablet   Commonly known as:  VAGIFEM   Stopped by:  Patrica Kelly MD                Where to get your medicines      These medications were sent to [id number] [place] - [address] [facility]  [address] [facility] [address]     Phone:  [phone]     [address] [facility]                Primary Care Provider Office Phone # Fax #    Patrica Kelly [phone] [address] [facility] [address]        Equal Access to Services     [facility]: Hadii aad ku hadasho Soomaali, waaxda luqadaha, qaybta kaalmada adedarianyaanirudh, vicki erickson . So [facility] [phone].    ATENCIÓN: Si habla español, tiene a alejo disposición servicios gratuitos de asistencia lingüística. [facility] [phone].    We comply with applicable federal civil rights laws and [place] laws. We do not discriminate on the basis of race, color, national origin, age, disability, sex, sexual orientation, or gender identity.            Thank you!     Thank you for choosing [facility]  for your care. Our goal is always to provide you with excellent care. Hearing back from our patients is one way we can continue to improve our services. Please take a few minutes to complete the written survey that you may receive in the mail after your visit with us. Thank you!             Your Updated Medication List - Protect others around you: Learn how to  safely use, store and throw away your medicines at www.disposemymeds.org.          This list is accurate as of: 11/29/17 11:41 AM.  Always use your most recent med list.                   Brand Name Dispense Instructions for use Diagnosis    APRODINE PO      Take by mouth as needed        B-12 1000 MCG Tbcr     90 tablet    Take 1,000 mcg by mouth daily    Vitamin B12 deficiency       diclofenac 1 % Gel topical gel    VOLTAREN    100 g    Apply 2 grams to hands four times daily using enclosed dosing card.    Primary osteoarthritis of both hands       magnesium 200 MG Tabs     1 tablet    Take 200 mg by mouth daily        omega-3 acid ethyl esters 1 G capsule    Lovaza    120 capsule    Take 2 capsules (2 g) by mouth daily        omeprazole 40 MG capsule    priLOSEC    30 capsule    Take 1 capsule (40 mg) by mouth daily Take 30-60 minutes before a meal.        ranitidine 150 MG tablet    ZANTAC    1 tablet    Take 1 tablet (150 mg) by mouth daily        rOPINIRole 0.25 MG tablet    REQUIP    90 tablet    Take 1 tablet (0.25 mg) by mouth At Bedtime    Restless leg       sertraline 100 MG tablet    ZOLOFT    90 tablet    Take 0.5 tablets (50 mg) by mouth daily    Major depression in complete remission (H)       SUMAtriptan 50 MG tablet    IMITREX    18 tablet    Take 1 tablet (50 mg) by mouth at onset of headache for migraine May repeat dose in 2 hours.  Do not exceed 200 mg in 24 hours    Other migraine without status migrainosus, not intractable       traZODone 100 MG tablet    DESYREL    90 tablet    Take 1 tablet (100 mg) by mouth At Bedtime    Insomnia, unspecified type       ZYRTEC ALLERGY 10 MG Caps   Generic drug:  cetirizine HCl      Take by mouth as needed

## 2017-11-29 NOTE — NURSING NOTE
"Chief Complaint   Patient presents with     Recheck Medication     recheck vaginal cream, not happy with results, recheck labs B12     Back Pain     lower back pain, [place]iro[place]     Swelling     knee swelling, Lt knee swelling x1 week       Initial /62  Pulse 71  Temp 97.7  F (36.5  C) (Oral)  Ht 5' 3\" (1.6 m)  Wt 143 lb 12.8 oz (65.2 kg)  SpO2 98%  BMI 25.47 kg/m2 Estimated body mass index is 25.47 kg/(m^2) as calculated from the following:    Height as of this encounter: 5' 3\" (1.6 m).    Weight as of this encounter: 143 lb 12.8 oz (65.2 kg).  Medication Reconciliation: complete   Jo-Ann RASCON CMA ([facility])      "

## 2017-11-29 NOTE — PATIENT INSTRUCTIONS
Try printing off a picture of your xray for us to have on file.     Schedule with the [facility] in [place] (physical therapy & chiropractic) for your low back pain.     Schedule with Dr. Coyle [facility] at the [facility].     There is a new Shingles Vaccine- Shingrex (2 shot series) I would recommend getting in the new year.    Follow up with me in in 6-12 months.       Calcium Supplements  Calcium is a mineral that helps make strong bones and teeth. Most of the calcium in your body is in your bones. It takes almost half of your life (30 to 35 years) for your bones to reach their maximum size and strength (peak bone mass). When you are younger, taking in enough calcium helps you build strong bones. When you are older, getting enough calcium in your diet helps to limit bone loss. Your body can t make calcium, so you must get it from foods or supplements.    Suggested daily amount  The daily recommended amount of calcium depends on many factors. This includes your age and if you are a man or a woman. Your healthcare provider can help you choose the right amount of calcium for you.     Food sources of calcium    Milk, yogurt, and cheese    Certain green leafy vegetables, such as kale, bok darrel, and paz greens    Fish with bones, such as canned salmon, mackerel, and sardines    Tofu made with calcium carbonate (not the type of tofu called nagiri)    Drinks that have calcium added, such as some orange juice and rice and soy drinks   Date Last Reviewed: 6/1/2017 2000-2017 [employer]. [address] [place] [address]. All rights reserved. This information is not intended as a substitute for professional medical care. Always follow your healthcare professional's instructions.      [facility]    If you have any questions regarding to your visit please contact your care team:     Team Pink:   Clinic Hours Telephone Number   Internal Medicine:  Dr. Patrica Kelly Dr.  Denis Kate NP       7am-7pm  Monday - Thursday   7am-5pm  Fridays  [phone]  (Appointment scheduling available 24/7)    Questions about your visit?  Team Line  [phone]   Urgent Care - [place] and [facility] - 11am-9pm Monday-Friday Saturday-Sunday- 9am-5pm   [place] - 5pm-9pm Monday-Friday Saturday-Sunday- 9am-5pm  [phone] - [place]   [phone] - [place]       What options do I have for visits at the clinic other than the traditional office visit?  To expand how we care for you, many of our providers are utilizing electronic visits (e-visits) and telephone visits, when medically appropriate, for interactions with their patients rather than a visit in the clinic.   We also offer nurse visits for many medical concerns. Just like any other service, we will bill your insurance company for this type of visit based on time spent on the phone with your provider. Not all insurance companies cover these visits. Please check with your medical insurance if this type of visit is covered. You will be responsible for any charges that are not paid by your insurance.      E-visits via [employer]:  generally incur a $35.00 fee.  Telephone visits:  Time spent on the phone: *charged based on time that is spent on the phone in increments of 10 minutes. Estimated cost:   5-10 mins $30.00   11-20 mins. $59.00   21-30 mins. $85.00   Use [employer] (secure email communication and access to your chart) to send your primary care provider a message or make an appointment. Ask someone on your Team how to sign up for [employer].    For a Price Quote for your services, please call our Consumer Price Line at [phone].    As always, Thank you for trusting us with your health care needs!    Discharged by Jo-Ann RASCON CMA ([facility])

## 2017-11-30 NOTE — PROGRESS NOTES
Deamarcio Lubin,    Your recent test results are attached.      Normal Vitamin B12.    If you have any questions please feel free to contact [phone] or myself via PHARMAJETt.    Sincerely,  Gerda Kate, CNP

## 2017-12-13 ENCOUNTER — THERAPY VISIT (OUTPATIENT)
Dept: CHIROPRACTIC MEDICINE | Facility: CLINIC | Age: 67
End: 2017-12-13
Payer: COMMERCIAL

## 2017-12-13 DIAGNOSIS — M54.50 LUMBAGO: ICD-10-CM

## 2017-12-13 DIAGNOSIS — M99.03 SEGMENTAL DYSFUNCTION OF LUMBAR REGION: ICD-10-CM

## 2017-12-13 DIAGNOSIS — M62.838 SPASM OF MUSCLE: ICD-10-CM

## 2017-12-13 DIAGNOSIS — M99.05 SEGMENTAL DYSFUNCTION OF PELVIC REGION: Primary | ICD-10-CM

## 2017-12-13 PROCEDURE — 97110 THERAPEUTIC EXERCISES: CPT | Mod: GA | Performed by: CHIROPRACTOR

## 2017-12-13 PROCEDURE — 98940 CHIROPRACT MANJ 1-2 REGIONS: CPT | Mod: AT | Performed by: CHIROPRACTOR

## 2017-12-13 PROCEDURE — 99203 OFFICE O/P NEW LOW 30 MIN: CPT | Mod: 25 | Performed by: CHIROPRACTOR

## 2017-12-13 NOTE — PROGRESS NOTES
Chiropractic Clinic Visit    PCP: Patrica Kelly    Amee Crews is a 67 year old female who is seen  in consultation at the request of  Patrica Kelly M.D. presenting with LBP and neck pain . Patient reports that the onset was 5 years ago. When asked, patient denies:, falling, slipping, bending and reaching or sleeping awkwardly. Prior to onset, the patient was able to work without pain. Patient notes that due to symptoms, they can only stand for 1 minute in flexion before painful. Amee Crews notes   Her pain at  4/10 and  prior to this onset it was 0/10.        Injury: Nothing acute    Location of Pain: right LBP and buttock at the following level(s) L2, L3 , L4 , L5  and Sacrum   Duration of Pain: 5 year(s)  Rating of Pain at worst: 8/10  Rating of Pain Currently: 4/10  Symptoms are better with: Extension, massage, foam roll.  Symptoms are worse with: flexion and standing  Additional Features: swelling     Other evaluation and/or treatments so far consists of: Chiro visit this summer.    Health History  as reported by the patient:    How does the patient rate their own health:   Good    Current or past medical history:   Asthma, Depression, Dizziness/Concussions, Menopause, Migraines/headaches, Numbness/tingling, Osteoporosis and Pain at night/rest    Medical allergies  Other: Morphine    Past Traumas/Surgeries  Other:  Sinus, tonsils, apendix, mouth    Family History  The family history includes CANCER in her father and maternal grandfather; DIABETES in her mother; HEART DISEASE in an other family member; Hypertension in her mother.    Medications:  Anti-depressants, Sleep and other:  B12 Magnesium, fish oil, otc sinus    Occupation:  Manicurist    Primary job tasks:   Prolonged sitting and Repetitive tasks    Barriers as home/work:   none    Additional health Issues:       Review of Systems  Musculoskeletal: as above  Remainder of review of systems is negative including constitutional, CV, pulmonary,  GI, Skin and Neurologic except as noted in HPI or medical history.    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     Allergic rhinitis      Allergies      asthma     mild intermittent     Chronic back pain     neck and low back     HA (headache)     muscle contraction      History of blood transfusion     In childhood     Hot flashes      Hyperlipidemia      Insomnia      Intermittent asthma 9/16/2011     Major depressive disorder, single episode, moderate (H)      Ocular migraine      Osteoarthritis of hand      Restless leg 9/16/2011     Past Surgical History:   Procedure Laterality Date     APPENDECTOMY      age 9      BIOPSY      took tissue from my uterus at least 15 years ago     COLONOSCOPY  14 years     COLONOSCOPY N/A 4/30/2015    Procedure: COMBINED COLONOSCOPY, SINGLE OR MULTIPLE BIOPSY/POLYPECTOMY BY BIOPSY;  Surgeon: Doin Carey MD;  Location: [facility] OR     COLONOSCOPY WITH CO2 INSUFFLATION N/A 4/30/2015    Procedure: COLONOSCOPY WITH CO2 INSUFFLATION;  Surgeon: Doni Carey MD;  Location: [facility] OR     COMBINED ESOPHAGOSCOPY, GASTROSCOPY, DUODENOSCOPY (EGD) WITH CO2 INSUFFLATION N/A 8/24/2017    Procedure: COMBINED ESOPHAGOSCOPY, GASTROSCOPY, DUODENOSCOPY (EGD) WITH CO2 INSUFFLATION;  EGD, Gastroesophageal reflux disease, esophagitis presence not specified Abdominal pain, generalized, Ref Dahlheimer-Schroeder, 24.78 BMI;  Surgeon: Duane, William Charles, MD;  Location:  OR     ENT SURGERY      Tonsillectomy     ESOPHAGOSCOPY, GASTROSCOPY, DUODENOSCOPY (EGD), COMBINED N/A 8/24/2017    Procedure: COMBINED ESOPHAGOSCOPY, GASTROSCOPY, DUODENOSCOPY (EGD), BIOPSY SINGLE OR MULTIPLE;;  Surgeon: Duane, William Charles, MD;  Location:  OR     LAPAROSCOPY PROCEDURE UNLISTED      polyps found     MOUTH SURGERY  2016     SINUS SURGERY      x2       Objective  There were no vitals taken for this visit.    GENERAL APPEARANCE: healthy, alert and no  "distress   GAIT: NORMAL  SKIN: no suspicious lesions or rashes  NEURO: Normal strength and tone, mentation intact and speech normal  PSYCH:  mentation appears normal and affect normal/bright      Amee was asked to complete  the Oswestry Low Back Disability Index and Ketty Start Back screening tool, today in the office. The Oswestry Disability score: 20%. Keel Start Total Score:5 Sub Score: 1     Lumbar exam:    Inspection:  \"     no visible deformity in the low back       normal skin\",    ROM:       limited flexion due to pain       limited extension due to pain    Tender:       paraspinal muscles    Non Tender:       remainder of lumbar spine    Strength:       hip flexion 5/5       knee extension 5/5       ankle dorsiflexion 5/5       ankle plantarflexion 5/5       dorsiflexion of the great toe 5/5    Reflexes:       patellar (L3, L4) symmetric normal    Sensation:      grossly intact throughout lower extremities    Special tests:  SLR - Right negative and Left negative, Fabere - Right negative and Left negative, Yeoman's - Right negative and Left negative, Aida - Right negative and Left negative and Ely's - Right negative and Left negative    Segmental spinal dysfunction/restrictions found at:  :  L4 Right rotation restricted  L5 Right rotation restricted  PSIS Right Extension restriction.    The following soft tissue hypotonicities were observed:Piriformis: right, referred pain: no    Trigger points were found in:Piriformis    Muscle spasm found in:Piriformis      Radiology:      Assessment:    1. Segmental dysfunction of pelvic region    2. Lumbago    3. Segmental dysfunction of lumbar region    4. Spasm of muscle        RX ordered/plan of care  Anticipated outcomes  Possible risks and side effects    After discussing the risk and benefits of care, patient consented to treatment    Prognosis: Good      Patient's condition:  Patient had restrictions pre-manipulation    Treatment effectiveness:  Post " manipulation there is better intersegmental movement and Patient claims to feel looser post manipulation      Plan:    Procedures:  Evaluation and Management  80604 Moderate level exam 30 min    CMT:  97143 Chiropractic manipulative treatment 1-2 regions performed   Lumbar: Activator, L4, L5, Prone  Pelvis: Drop Table, PSIS Right , Prone    Modalities:  77466: Heat:   For 5 min to Piriformis  35157: MSTM:  To Piriformis  for 5 min    Therapeutic procedures:  27459: Therapeutic Exercises  Direct one-on-one treatment to develop flexibilty, range of motion, strength and endurance.   The following were demonstrated and practiced:   Stretches -   Crossed leg piriformis stretch seated  Crossed lege piriformis stretch supine  Opposite knee to opposite shoulder     Response to Treatment  No Change in symptoms     Treatment plan and goals:  Goals:  ADAMA: To change ADAMA score from 20 to 5    Frequency of care  Duration of care is estimated to be 8 weeks, from the initial treatment.  It is estimated that the patient will need a total of 8 visits to resolve this episode.  For the initial therapeutic trial of care, the frequency is recommended at 1 X week, once daily.  A reevaluation would be clinically appropriate in 8 visits, to determine progress and further course of care.    In-Office Treatment  Evaluation  Spinal Chiropractic Manipulative Therapy:    Modalities:  Heat and mstm  Therapeutic exercises:  Stretches               Recommendations:    Instructions:ice 20 minutes every other hour as needed and stretch as instructed at visit    Follow-up:  Return to care in one week.       Disclaimer: This note consists of symbols derived from keyboarding, dictation and/or voice recognition software. As a result, there may be errors in the script that have gone undetected. Please consider this when interpreting information found in this chart.

## 2017-12-26 NOTE — PROGRESS NOTES
SUBJECTIVE:                                                    Amee Crews is a 67 year old female who presents to clinic today for the following health issues:    HPI    Acute Illness   Acute illness concerns: sinus/headaches  Onset: 1 month    Fever: no    Chills/Sweats: YES- hot flashes    Headache (location?): YES    Sinus Pressure:no    Conjunctivitis:  no    Ear Pain: no    Rhinorrhea: no    Congestion: no    Sore Throat: YES- last week     Cough: no    Wheeze: no    Decreased Appetite: YES    Nausea: YES    Vomiting: no    Diarrhea:  YES    Dysuria/Freq.: no     Fatigue/Achiness: YES    Sick/Strep Exposure: no      Therapies Tried and outcome: aprindine and Excedrin for headaches    - Seasonal type allergies   - Chronic issue for patient   - Has had surgeries in the past   - 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  Additional history: as documented      ROS:  Constitutional, HEENT, cardiovascular, pulmonary, gi and gu systems are negative, except as otherwise noted.      OBJECTIVE:   /78 (BP Location: Right arm, Patient Position: Chair, Cuff Size: Adult Regular)  Pulse 79  Temp 98.8  F (37.1  C) (Oral)  Resp 12  Wt 144 lb (65.3 kg)  SpO2 99%  BMI 25.51 kg/m2  There is no height or weight on file to calculate BMI.  GENERAL APPEARANCE: mildly ill appearing, alert and no distress  EYES: Eyes grossly normal to inspection, PERRLA, conjunctivae and sclerae without injection or discharge, EOM intact   HENT: Bilateral ear canals without erythema or cerumen, bilateral TM's pearly grey with normal light reflex, no effusion, injection, or bulging, nasal turbinates with severe swelling & erythema, yellow green nasal discharge, mouth without ulcers or lesions, oropharynx clear and oral mucous membranes moist, bilateral maxillary sinus tenderness, no bilateral frontal sinus  tenderness    NECK: Bilateral anterior cervical adenopathy, no adenopathy in posterior cervical or supraclavicular regions  RESP: Lungs clear to auscultation - no rales, rhonchi or wheezes   CV: Regular rates and rhythm, normal S1 S2, no S3 or S4, no murmur, click or rub  MS: No musculoskeletal defects are noted and gait is age appropriate without ataxia   SKIN: No suspicious lesions or rashes, hydration status appears adeuqate with normal skin turgor   PSYCH: Alert and oriented x3; speech- coherent , normal rate and volume; able to articulate logical thoughts, able to abstract reason, no tangential thoughts, no hallucinations or delusions, mentation appears normal, Mood is euthymic. Affect is appropriate for this mood state and bright. Thought content is free of suicidal ideation, hallucinations, and delusions. Dress is adequate and upkept. Eye contact is good during conversation.       Diagnostic Test Results:  none     ASSESSMENT/PLAN:       ICD-10-CM    1. Acute sinusitis with symptoms > 10 days J01.90 amoxicillin-clavulanate (AUGMENTIN) 875-125 MG per tablet     - Discussed antibiotic use, duration, and side effects  - Continue with nasal spray as prescribed   - Hand out given  - Recommend follow up with ent if not resolved     The patient indicates understanding of these issues and agrees with the plan.    Follow up: ROB Herron-DESIREE Schroeder  [facility]

## 2017-12-28 ENCOUNTER — OFFICE VISIT (OUTPATIENT)
Dept: FAMILY MEDICINE | Facility: OTHER | Age: 67
End: 2017-12-28
Payer: COMMERCIAL

## 2017-12-28 VITALS
OXYGEN SATURATION: 99 % | TEMPERATURE: 98.8 F | WEIGHT: 144 LBS | HEART RATE: 79 BPM | DIASTOLIC BLOOD PRESSURE: 78 MMHG | SYSTOLIC BLOOD PRESSURE: 124 MMHG | BODY MASS INDEX: 25.51 KG/M2 | RESPIRATION RATE: 12 BRPM

## 2017-12-28 DIAGNOSIS — J01.90 ACUTE SINUSITIS WITH SYMPTOMS > 10 DAYS: Primary | ICD-10-CM

## 2017-12-28 PROCEDURE — 99213 OFFICE O/P EST LOW 20 MIN: CPT | Performed by: PHYSICIAN ASSISTANT

## 2017-12-28 ASSESSMENT — ANXIETY QUESTIONNAIRES
7. FEELING AFRAID AS IF SOMETHING AWFUL MIGHT HAPPEN: NOT AT ALL
2. NOT BEING ABLE TO STOP OR CONTROL WORRYING: NOT AT ALL
GAD7 TOTAL SCORE: 0
4. TROUBLE RELAXING: NOT AT ALL
5. BEING SO RESTLESS THAT IT IS HARD TO SIT STILL: NOT AT ALL
7. FEELING AFRAID AS IF SOMETHING AWFUL MIGHT HAPPEN: NOT AT ALL
GAD7 TOTAL SCORE: 0
1. FEELING NERVOUS, ANXIOUS, OR ON EDGE: NOT AT ALL
3. WORRYING TOO MUCH ABOUT DIFFERENT THINGS: NOT AT ALL
GAD7 TOTAL SCORE: 0
6. BECOMING EASILY ANNOYED OR IRRITABLE: NOT AT ALL

## 2017-12-28 ASSESSMENT — PATIENT HEALTH QUESTIONNAIRE - PHQ9
SUM OF ALL RESPONSES TO PHQ QUESTIONS 1-9: 7
SUM OF ALL RESPONSES TO PHQ QUESTIONS 1-9: 7
10. IF YOU CHECKED OFF ANY PROBLEMS, HOW DIFFICULT HAVE THESE PROBLEMS MADE IT FOR YOU TO DO YOUR WORK, TAKE CARE OF THINGS AT HOME, OR GET ALONG WITH OTHER PEOPLE: SOMEWHAT DIFFICULT

## 2017-12-28 NOTE — MR AVS SNAPSHOT
After Visit Summary   12/28/2017    Amee Crews    MRN: [id number]           Patient Information     Date Of Birth          1950        Visit Information        Provider Department      12/28/2017 6:30 PM Salma Yusuf PA-C [facility]        Today's Diagnoses     Acute sinusitis with symptoms > 10 days    -  1      Care Instructions                  Sinusitis           What is sinusitis?   Sinusitis is swollen, infected linings of the sinuses. The sinuses are hollow spaces in the bones of your face and skull. They connect with the nose through small openings. Like the nose, their linings make mucus.   How does it occur?   Sinusitis occurs when the sinus linings become infected. The passageways from the sinuses to the nose are very narrow. Swelling and mucus may block the passageways. This leads to pressure changes in the sinuses that can be painful.   A number of things can cause swelling and sinusitis. Most often it's allergens (things that cause allergies, like pollen and mold) and viruses, such as viruses that cause the common cold. Whether the cause is allergies or a virus, the sinus linings can swell. When swelling causes the sinus passageway to swell shut, bacteria, viruses, and even fungus can be trapped in the sinuses and cause a sinus infection.   If your nasal bones have been injured or are deformed, causing partial blockage of the sinus openings, you are more likely to get sinusitis.   What are the symptoms?   Symptoms include:   feeling of fullness or pressure in your head   a headache that is most painful when you first wake up in the morning or when you bend your head down or forward   pain above or below your eyes   aching in the upper jaw and teeth   runny or stuffy nose   cough, especially at night   fluid draining down the back of your throat (postnasal drainage)   sore throat in the morning or evening.   How is it diagnosed?   Your healthcare  provider will ask about your symptoms and will examine you. You may have an X-ray to look for swelling, fluid, or small benign growths (polyps) in the sinuses.   How is it treated?   Decongestants may help. They may be nonprescription or prescription. They are available as liquids, pills, and nose sprays.   Your healthcare provider may prescribe an antibiotic. In some cases you may need to take decongestants and antibiotics for several weeks.   You may need nonprescription medicine for pain, such as acetaminophen or ibuprofen. Check with your healthcare provider before you give any medicine that contains aspirin or salicylates to a child or teen. This includes medicines like baby aspirin, some cold medicines, and Pepto Bismol. Children and teens who take aspirin are at risk for a serious illness called Reye's syndrome. Ibuprofen is an NSAID. Nonsteroidal anti-inflammatory medicines (NSAIDs) may cause stomach bleeding and other problems. These risks increase with age. Read the label and take as directed. Unless recommended by your healthcare provider, do not take NSAIDs for more than 10 days for any reason.   If you have chronic or repeated sinus infections, allergies may be the cause. Your healthcare provider may prescribe antihistamine tablets or prescription nasal sprays (steroids or cromolyn) to treat the allergies.   If you have chronic, severe sinusitis that does not respond to treatment with medicines, surgery may be done. The surgeon can create an extra or enlarged passageway in the wall of the sinus cavity. This allows the sinuses to drain more easily through the nasal passages. This should help them stay free of infection.   How long will the effects last?   Symptoms may get better gradually over 3 to 10 days. Depending on what caused the sinusitis and how severe it is, it may last for days or weeks. The symptoms may come back if you do not finish all of your antibiotic.   How can I take care of myself?    Follow your healthcare provider's instructions.   If you are taking an antibiotic, take all of it as directed by your provider. If you stop taking the medicine when your symptoms are gone but before you have taken all of the medicine, symptoms may come back.   Avoid tobacco smoke.   If you have allergies, take care to avoid the things you are allergic to, such as animal dander.   Add moisture to the air with a humidifier or a vaporizer, unless you have mold allergy (mold may grow in your vaporizer).   Inhale steam from a basin of hot water or shower to help open your sinuses and relieve pain.   Use saline nasal sprays to help wash out nasal passages and clear some mucus from the airways.   Use decongestants as directed on the label or by your provider.   If you are using a nonprescription nasal-spray decongestant, generally you should not use it for more than 3 days. After 3 days it may cause your symptoms to get worse. Ask your healthcare provider if it is OK for you to use a nasal spray decongestant longer than this.   Get plenty of rest.   Drink more fluids to keep the mucus as thin as possible so your sinuses can drain more easily.   Put warm compresses on painful areas.   Take antibiotics as prescribed. Use all of the medicine, even after you feel better. Some sinus infections require 2 to 4 weeks of antibiotic treatment.   See your healthcare provider if the pain lasts for several days or gets worse.   If the sinus areas above or below your eyes are swollen or bulging, see your healthcare provider right away. This symptom may mean that the infection is spreading. A spreading infection can affect other parts of your body--even the brain--and needs to be treated promptly.   How can I help prevent sinusitis?   Treat your colds and allergies promptly. Use decongestants as soon as you start having symptoms.   Do not smoke and stay away from secondhand smoke.   Drink lots of fluids to keep the mucus thin.    Humidify your home if the air is particularly dry.   If you have sinus infections often, consider having allergy tests.   If sinusitis continues to be a problem despite treatment, you might need an exam by an ear, nose, and throat doctor (called an ENT or otolaryngologist). The specialist will check for polyps or a deformed bone that may be blocking your sinuses.     Published by [employer].  This content is reviewed periodically and is subject to change as new health information becomes available. The information is intended to inform and educate and is not a replacement for medical evaluation, advice, diagnosis or treatment by a healthcare professional.   Developed by [employer].   ? 2010 [facility] and/or its affiliates. All Rights Reserved.   Copyright   Clinical Reference Systems 2011  Adult Health Advisor                Follow-ups after your visit        Your next 10 appointments already scheduled     Jan 02, 2018 11:30 AM CST   [facility]ractor with WILLIE [facility] [place] [facility] ([facility] [place])    [address]  [place] [address]   [phone]            Jan 11, 2018 11:00 AM CST   [facility] Chiropractor with Jorge Alberto Bansal DC   [facility] [place] [facility] ([facility] [place])    [address]  [place] [address]   [phone]            Jan 18, 2018 11:00 AM CST   [facility] Chiropractor with [facility] [place] [facility] ([facility] [place])    [address]  [place] [address]   [phone]              Who to contact     If you have questions or need follow up information about today's clinic visit or your schedule please contact [facility] directly at [phone].  Normal or non-critical lab and imaging results will be communicated to you by MyChart, letter or phone within 4 business days after the clinic has received the results. If you do not hear from us within 7 days, please contact the  clinic through [employer] or phone. If you have a critical or abnormal lab result, we will notify you by phone as soon as possible.  Submit refill requests through [employer] or call your pharmacy and they will forward the refill request to us. Please allow 3 business days for your refill to be completed.          Additional Information About Your Visit        [email or web address] Medical Information     [employer] gives you secure access to your electronic health record. If you see a primary care provider, you can also send messages to your care team and make appointments. If you have questions, please call your primary care clinic.  If you do not have a primary care provider, please call [phone] and they will assist you.        Care EveryWhere ID     This is your Care EveryWhere ID. This could be used by other organizations to access your [facility] medical records  [id number]        Your Vitals Were     Pulse Temperature Respirations Pulse Oximetry BMI (Body Mass Index)       79 98.8  F (37.1  C) (Oral) 12 99% 25.51 kg/m2        Blood Pressure from Last 3 Encounters:   12/28/17 124/78   11/29/17 104/62   08/24/17 120/77    Weight from Last 3 Encounters:   12/28/17 144 lb (65.3 kg)   11/29/17 143 lb 12.8 oz (65.2 kg)   08/17/17 142 lb (64.4 kg)              Today, you had the following     No orders found for display         Today's Medication Changes          These changes are accurate as of: 12/28/17  7:15 PM.  If you have any questions, ask your nurse or doctor.               Start taking these medicines.        Dose/Directions    amoxicillin-clavulanate 875-125 MG per tablet   Commonly known as:  AUGMENTIN   Used for:  Acute sinusitis with symptoms > 10 days   Started by:  Salma Yusuf PA-C        Dose:  1 tablet   Take 1 tablet by mouth 2 times daily   Quantity:  20 tablet   Refills:  0            Where to get your medicines      These medications were sent to [id number] [place] - [address]   [address] [place] [address]     Phone:  [phone]     amoxicillin-clavulanate 875-125 MG per tablet                Primary Care Provider Office Phone # Fax #    Patrica Kelly [phone]       [address]  [place] [address]        Equal Access to Services     [facility]: Hadii aad ku hadasho Soomaali, waaxda luqadaha, qaybta kaalmada adeegyada, waxay idiin hayaan adeeg kharash laLaaan . So [facility] [phone].    ATENCIÓN: Si habla español, tiene a alejo disposición servicios gratuitos de asistencia lingüística. Amandeep al [phone].    We comply with applicable federal civil rights laws and [place] laws. We do not discriminate on the basis of race, color, national origin, age, disability, sex, sexual orientation, or gender identity.            Thank you!     Thank you for choosing [facility]  for your care. Our goal is always to provide you with excellent care. Hearing back from our patients is one way we can continue to improve our services. Please take a few minutes to complete the written survey that you may receive in the mail after your visit with us. Thank you!             Your Updated Medication List - Protect others around you: Learn how to safely use, store and throw away your medicines at www.disposemymeds.org.          This list is accurate as of: 12/28/17  7:15 PM.  Always use your most recent med list.                   Brand Name Dispense Instructions for use Diagnosis    amoxicillin-clavulanate 875-125 MG per tablet    AUGMENTIN    20 tablet    Take 1 tablet by mouth 2 times daily    Acute sinusitis with symptoms > 10 days       APRODINE PO      Take by mouth as needed        CALCIUM PO           cyanocobalamin 1000 MCG tablet    vitamin  B-12    90 tablet    Take 1 tablet (1,000 mcg) by mouth daily    Vitamin B 12 deficiency       diclofenac 1 % Gel topical gel    VOLTAREN    100 g    Apply 2 grams to hands four times daily using enclosed  dosing card.    Primary osteoarthritis of both hands       magnesium 200 MG Tabs     1 tablet    Take 200 mg by mouth daily        omega-3 acid ethyl esters 1 G capsule    Lovaza    120 capsule    Take 2 capsules (2 g) by mouth daily        omeprazole 40 MG capsule    priLOSEC    30 capsule    Take 1 capsule (40 mg) by mouth daily Take 30-60 minutes before a meal.        ranitidine 150 MG tablet    ZANTAC    1 tablet    Take 1 tablet (150 mg) by mouth daily        rOPINIRole 0.25 MG tablet    REQUIP    90 tablet    Take 1 tablet (0.25 mg) by mouth At Bedtime    Restless leg       sertraline 100 MG tablet    ZOLOFT    90 tablet    Take 0.5 tablets (50 mg) by mouth daily    Major depression in complete remission (H)       SUMAtriptan 50 MG tablet    IMITREX    18 tablet    Take 1 tablet (50 mg) by mouth at onset of headache for migraine May repeat dose in 2 hours.  Do not exceed 200 mg in 24 hours    Other migraine without status migrainosus, not intractable       traZODone 100 MG tablet    DESYREL    90 tablet    Take 1 tablet (100 mg) by mouth At Bedtime    Insomnia, unspecified type       ZYRTEC ALLERGY 10 MG Caps   Generic drug:  cetirizine HCl      Take by mouth as needed

## 2017-12-29 ASSESSMENT — ANXIETY QUESTIONNAIRES: GAD7 TOTAL SCORE: 0

## 2017-12-29 ASSESSMENT — PATIENT HEALTH QUESTIONNAIRE - PHQ9: SUM OF ALL RESPONSES TO PHQ QUESTIONS 1-9: 7

## 2017-12-29 ASSESSMENT — ASTHMA QUESTIONNAIRES: ACT_TOTALSCORE: 21

## 2017-12-29 NOTE — NURSING NOTE
"Chief Complaint   Patient presents with     Sinus Problem     Health Maintenance     honoring choices, phq9, hector, aap       Initial /78 (BP Location: Right arm, Patient Position: Chair, Cuff Size: Adult Regular)  Pulse 79  Temp 98.8  F (37.1  C) (Oral)  Resp 12  Wt 144 lb (65.3 kg)  SpO2 99%  BMI 25.51 kg/m2 Estimated body mass index is 25.51 kg/(m^2) as calculated from the following:    Height as of 11/29/17: 5' 3\" (1.6 m).    Weight as of this encounter: 144 lb (65.3 kg).  Medication Reconciliation: complete  "

## 2017-12-29 NOTE — PATIENT INSTRUCTIONS
Sinusitis           What is sinusitis?   Sinusitis is swollen, infected linings of the sinuses. The sinuses are hollow spaces in the bones of your face and skull. They connect with the nose through small openings. Like the nose, their linings make mucus.   How does it occur?   Sinusitis occurs when the sinus linings become infected. The passageways from the sinuses to the nose are very narrow. Swelling and mucus may block the passageways. This leads to pressure changes in the sinuses that can be painful.   A number of things can cause swelling and sinusitis. Most often it's allergens (things that cause allergies, like pollen and mold) and viruses, such as viruses that cause the common cold. Whether the cause is allergies or a virus, the sinus linings can swell. When swelling causes the sinus passageway to swell shut, bacteria, viruses, and even fungus can be trapped in the sinuses and cause a sinus infection.   If your nasal bones have been injured or are deformed, causing partial blockage of the sinus openings, you are more likely to get sinusitis.   What are the symptoms?   Symptoms include:   feeling of fullness or pressure in your head   a headache that is most painful when you first wake up in the morning or when you bend your head down or forward   pain above or below your eyes   aching in the upper jaw and teeth   runny or stuffy nose   cough, especially at night   fluid draining down the back of your throat (postnasal drainage)   sore throat in the morning or evening.   How is it diagnosed?   Your healthcare provider will ask about your symptoms and will examine you. You may have an X-ray to look for swelling, fluid, or small benign growths (polyps) in the sinuses.   How is it treated?   Decongestants may help. They may be nonprescription or prescription. They are available as liquids, pills, and nose sprays.   Your healthcare provider may prescribe an antibiotic. In some cases you may need to  take decongestants and antibiotics for several weeks.   You may need nonprescription medicine for pain, such as acetaminophen or ibuprofen. Check with your healthcare provider before you give any medicine that contains aspirin or salicylates to a child or teen. This includes medicines like baby aspirin, some cold medicines, and Pepto Bismol. Children and teens who take aspirin are at risk for a serious illness called Reye's syndrome. Ibuprofen is an NSAID. Nonsteroidal anti-inflammatory medicines (NSAIDs) may cause stomach bleeding and other problems. These risks increase with age. Read the label and take as directed. Unless recommended by your healthcare provider, do not take NSAIDs for more than 10 days for any reason.   If you have chronic or repeated sinus infections, allergies may be the cause. Your healthcare provider may prescribe antihistamine tablets or prescription nasal sprays (steroids or cromolyn) to treat the allergies.   If you have chronic, severe sinusitis that does not respond to treatment with medicines, surgery may be done. The surgeon can create an extra or enlarged passageway in the wall of the sinus cavity. This allows the sinuses to drain more easily through the nasal passages. This should help them stay free of infection.   How long will the effects last?   Symptoms may get better gradually over 3 to 10 days. Depending on what caused the sinusitis and how severe it is, it may last for days or weeks. The symptoms may come back if you do not finish all of your antibiotic.   How can I take care of myself?   Follow your healthcare provider's instructions.   If you are taking an antibiotic, take all of it as directed by your provider. If you stop taking the medicine when your symptoms are gone but before you have taken all of the medicine, symptoms may come back.   Avoid tobacco smoke.   If you have allergies, take care to avoid the things you are allergic to, such as animal dander.   Add  moisture to the air with a humidifier or a vaporizer, unless you have mold allergy (mold may grow in your vaporizer).   Inhale steam from a basin of hot water or shower to help open your sinuses and relieve pain.   Use saline nasal sprays to help wash out nasal passages and clear some mucus from the airways.   Use decongestants as directed on the label or by your provider.   If you are using a nonprescription nasal-spray decongestant, generally you should not use it for more than 3 days. After 3 days it may cause your symptoms to get worse. Ask your healthcare provider if it is OK for you to use a nasal spray decongestant longer than this.   Get plenty of rest.   Drink more fluids to keep the mucus as thin as possible so your sinuses can drain more easily.   Put warm compresses on painful areas.   Take antibiotics as prescribed. Use all of the medicine, even after you feel better. Some sinus infections require 2 to 4 weeks of antibiotic treatment.   See your healthcare provider if the pain lasts for several days or gets worse.   If the sinus areas above or below your eyes are swollen or bulging, see your healthcare provider right away. This symptom may mean that the infection is spreading. A spreading infection can affect other parts of your body--even the brain--and needs to be treated promptly.   How can I help prevent sinusitis?   Treat your colds and allergies promptly. Use decongestants as soon as you start having symptoms.   Do not smoke and stay away from secondhand smoke.   Drink lots of fluids to keep the mucus thin.   Humidify your home if the air is particularly dry.   If you have sinus infections often, consider having allergy tests.   If sinusitis continues to be a problem despite treatment, you might need an exam by an ear, nose, and throat doctor (called an ENT or otolaryngologist). The specialist will check for polyps or a deformed bone that may be blocking your sinuses.     Published by  [employer].  This content is reviewed periodically and is subject to change as new health information becomes available. The information is intended to inform and educate and is not a replacement for medical evaluation, advice, diagnosis or treatment by a healthcare professional.   Developed by [employer].   ? 2010 [facility] and/or its affiliates. All Rights Reserved.   Copyright   Clinical Reference Systems 2011  Adult Health Advisor

## 2018-01-02 ENCOUNTER — THERAPY VISIT (OUTPATIENT)
Dept: CHIROPRACTIC MEDICINE | Facility: CLINIC | Age: 68
End: 2018-01-02
Payer: MEDICARE

## 2018-01-02 DIAGNOSIS — M62.838 SPASM OF MUSCLE: ICD-10-CM

## 2018-01-02 DIAGNOSIS — M99.05 SEGMENTAL DYSFUNCTION OF PELVIC REGION: Primary | ICD-10-CM

## 2018-01-02 DIAGNOSIS — M54.50 LUMBAGO: ICD-10-CM

## 2018-01-02 DIAGNOSIS — M99.03 SEGMENTAL DYSFUNCTION OF LUMBAR REGION: ICD-10-CM

## 2018-01-02 PROCEDURE — 98940 CHIROPRACT MANJ 1-2 REGIONS: CPT | Mod: AT | Performed by: CHIROPRACTOR

## 2018-01-02 NOTE — PROGRESS NOTES
Visit #:  2 of 8, based on treatment plan    Subjective:  Amee Crews is a 67 year old female who is seen in f/u up for:    1. Segmental dysfunction of pelvic region    2. Lumbago    3. Segmental dysfunction of lumbar region    4. Spasm of muscle      Since last visit on 12/13/2017,  Amee Crews reports the following changes: Pain immediately after last treatment: 1/10 and their pain level today 5/10.      Area of chief complaint:  Lumbar :  Symptoms are graded at 5/10. The quality is described as stiff, achey,  Motion has remained about the same, no improvement. Patient feels that they have not improved and feel the same.        Objective:  The following was observed:    P: palpatory tenderness, Piriformis R>>L    A: static palpation demonstrates intersegmental asymmetry , pelvis, lumbar    R: intersegmental range of motion restriction, :  L4 Right rotation restricted  L5 Right rotation restricted  PSIS Right Extension restriction    T: hypertonicity at: Piriformis R>>L      Assessment:    Segmental spinal dysfunction/restrictions found at:  L4  L5  PSIS Right    Diagnoses:      1. Segmental dysfunction of pelvic region    2. Lumbago    3. Segmental dysfunction of lumbar region    4. Spasm of muscle        Patient's condition:  Patient had restrictions pre-manipulation    Treatment effectiveness:  Post manipulation there is better intersegmental movement and Patient claims to feel looser post manipulation      Procedures:  CMT:  67994 Chiropractic manipulative treatment 1-2 regions performed   Lumbar: Activator, L4, L5, Prone  Pelvis: Drop Table, PSIS Right , Prone    Modalities:  79796: MSTM:  To Piriformis  for 5 min    Therapeutic procedures:  None      Prognosis: Good    Progress towards Goals: Patient is making progress towards the goal     Response to Treatment:   Reduction in symptoms as reported by patient      Recommendations:    Instructions:stretch as instructed at visit    Follow-up:  Return to care  in one week

## 2018-01-10 ENCOUNTER — MYC MEDICAL ADVICE (OUTPATIENT)
Dept: FAMILY MEDICINE | Facility: OTHER | Age: 68
End: 2018-01-10

## 2018-01-11 NOTE — PROGRESS NOTES
"  SUBJECTIVE:                                                    Amee Crews is a 67 year old female who presents to clinic today for the following health issues:      HPI    Acute Illness   Acute illness concerns: sinus  Onset: 8 weeks    Fever: no    Chills/Sweats: YES    Headache (location?): YES    Sinus Pressure:no    Conjunctivitis:  no    Ear Pain: no    Rhinorrhea: no    Congestion: no    Sore Throat: YES- dry     Cough: YES-non-productive    Wheeze: no    Decreased Appetite: no    Nausea: no    Vomiting: no    Diarrhea:  no    Dysuria/Freq.: no    Fatigue/Achiness: YES    Sick/Strep Exposure: no     Therapies Tried and outcome: augmentin finished about a week ago, not feeling any better  - Chest tightness, eyelid discoloration  - History of asthma     - Last seen 12/28/17   - Little bit of less drainage when on Augmentin       Problem list and histories reviewed & adjusted, as indicated.  Additional history: as documented    ROS:  Constitutional, HEENT, cardiovascular, pulmonary, gi and gu systems are negative, except as otherwise noted.      OBJECTIVE:   /70 (BP Location: Left arm, Patient Position: Chair, Cuff Size: Adult Regular)  Pulse 70  Temp 98  F (36.7  C) (Oral)  Resp 12  Ht 5' 2.48\" (1.587 m)  Wt 140 lb (63.5 kg)  SpO2 99%  BMI 25.21 kg/m2  Body mass index is 25.21 kg/(m^2).  GENERAL APPEARANCE: healthy, alert and no distress  EYES: Eyes grossly normal to inspection, PERRLA, conjunctivae and sclerae without injection or discharge, EOM intact   HENT: Bilateral ear canals without erythema or cerumen, bilateral TM's pearly grey with normal light reflex, no effusion, injection, or bulging, nasal turbinates with severe swelling & erythema, yellow nasal discharge, mouth without ulcers or lesions, oropharynx clear and oral mucous membranes moist, no sinus tenderness   NECK: Bilateral anterior cervical adenopathy, no adenopathy in posterior cervical or supraclavicular regions  RESP: Lungs " clear to auscultation - no rales, rhonchi or wheezes   CV: Regular rates and rhythm, normal S1 S2, no S3 or S4, no murmur, click or rub  MS: No musculoskeletal defects are noted and gait is age appropriate without ataxia   SKIN: No suspicious lesions or rashes, hydration status appears adeuqate with normal skin turgor   PSYCH: Alert and oriented x3; speech- coherent , normal rate and volume; able to articulate logical thoughts, able to abstract reason, no tangential thoughts, no hallucinations or delusions, mentation appears normal, Mood is euthymic. Affect is appropriate for this mood state and bright. Thought content is free of suicidal ideation, hallucinations, and delusions. Dress is adequate and upkept. Eye contact is good during conversation.       Diagnostic Test Results:  none     ASSESSMENT/PLAN:       ICD-10-CM    1. Mild persistent asthma without complication J45.30    2. Nasal polyposis J33.9 CT Sinus w/o Contrast     doxycycline (VIBRA-TABS) 100 MG tablet   3. Chronic sinusitis, unspecified location J32.9 CT Sinus w/o Contrast     doxycycline (VIBRA-TABS) 100 MG tablet     - Patient with known polyps and chronic sinusitis, now failed on Augmentin   - Recommend switch to Doxycycline and extend course to 14 days   - Discussed antibiotic use, duration, and side effects  - Continue with at home nasal sprays   - Also recommend CT sinus and further evaluation by ENT (patient already has scheduled for ~3 weeks)     The patient indicates understanding of these issues and agrees with the plan.    Follow up: with specialist       Salma Yusuf PA-C  [facility]

## 2018-01-11 NOTE — TELEPHONE ENCOUNTER
She would like to be seen by [facility] on Monday or Tuesday if she can. She finished medication and wants to address this sooner than next Thursday at her next opening.

## 2018-01-12 ENCOUNTER — MYC MEDICAL ADVICE (OUTPATIENT)
Dept: FAMILY MEDICINE | Facility: OTHER | Age: 68
End: 2018-01-12

## 2018-01-16 ENCOUNTER — OFFICE VISIT (OUTPATIENT)
Dept: FAMILY MEDICINE | Facility: OTHER | Age: 68
End: 2018-01-16
Payer: COMMERCIAL

## 2018-01-16 VITALS
HEIGHT: 62 IN | DIASTOLIC BLOOD PRESSURE: 70 MMHG | BODY MASS INDEX: 25.76 KG/M2 | OXYGEN SATURATION: 99 % | RESPIRATION RATE: 12 BRPM | SYSTOLIC BLOOD PRESSURE: 116 MMHG | HEART RATE: 70 BPM | WEIGHT: 140 LBS | TEMPERATURE: 98 F

## 2018-01-16 DIAGNOSIS — J33.9 NASAL POLYPOSIS: ICD-10-CM

## 2018-01-16 DIAGNOSIS — J45.30 MILD PERSISTENT ASTHMA WITHOUT COMPLICATION: Primary | ICD-10-CM

## 2018-01-16 DIAGNOSIS — J32.9 CHRONIC SINUSITIS, UNSPECIFIED LOCATION: ICD-10-CM

## 2018-01-16 PROCEDURE — 99213 OFFICE O/P EST LOW 20 MIN: CPT | Performed by: PHYSICIAN ASSISTANT

## 2018-01-16 RX ORDER — DOXYCYCLINE HYCLATE 100 MG
100 TABLET ORAL 2 TIMES DAILY
Qty: 28 TABLET | Refills: 0 | Status: SHIPPED | OUTPATIENT
Start: 2018-01-16 | End: 2018-01-30

## 2018-01-16 ASSESSMENT — PATIENT HEALTH QUESTIONNAIRE - PHQ9
SUM OF ALL RESPONSES TO PHQ QUESTIONS 1-9: 6
SUM OF ALL RESPONSES TO PHQ QUESTIONS 1-9: 6

## 2018-01-16 ASSESSMENT — ANXIETY QUESTIONNAIRES
4. TROUBLE RELAXING: NOT AT ALL
3. WORRYING TOO MUCH ABOUT DIFFERENT THINGS: NOT AT ALL
5. BEING SO RESTLESS THAT IT IS HARD TO SIT STILL: NOT AT ALL
7. FEELING AFRAID AS IF SOMETHING AWFUL MIGHT HAPPEN: NOT AT ALL
GAD7 TOTAL SCORE: 0
GAD7 TOTAL SCORE: 0
6. BECOMING EASILY ANNOYED OR IRRITABLE: NOT AT ALL
2. NOT BEING ABLE TO STOP OR CONTROL WORRYING: NOT AT ALL
GAD7 TOTAL SCORE: 0
7. FEELING AFRAID AS IF SOMETHING AWFUL MIGHT HAPPEN: NOT AT ALL
1. FEELING NERVOUS, ANXIOUS, OR ON EDGE: NOT AT ALL

## 2018-01-16 ASSESSMENT — PAIN SCALES - GENERAL: PAINLEVEL: NO PAIN (0)

## 2018-01-16 NOTE — MR AVS SNAPSHOT
After Visit Summary   1/16/2018    Amee Crews    MRN: [id number]           Patient Information     Date Of Birth          1950        Visit Information        Provider Department      1/16/2018 3:45 PM Salma Yusuf PA-C [facility]        Today's Diagnoses     Mild persistent asthma without complication    -  1    Nasal polyposis        Chronic sinusitis, unspecified location          Care Instructions    - Schedule CT of sinuses      You may call any office from below to schedule an appointment for radiology.      [place]  [phone]      [place]  [phone]     - Start Doxycycline twice a day for 14 days     - Follow up with Dr. Hinkle as scheduled               Follow-ups after your visit        Your next 10 appointments already scheduled     Feb 07, 2018  1:45 PM CST   Return Visit with Freddie Hinkle MD   [facility]    [address] [place] [address] [address]  [facility] [address]   [phone]              Future tests that were ordered for you today     Open Future Orders        Priority Expected Expires Ordered    CT Sinus w/o Contrast Routine  1/16/2019 1/16/2018            Who to contact     If you have questions or need follow up information about today's clinic visit or your schedule please contact [facility] directly at [phone].  Normal or non-critical lab and imaging results will be communicated to you by MyChart, letter or phone within 4 business days after the clinic has received the results. If you do not hear from us within 7 days, please contact the clinic through MyChart or phone. If you have a critical or abnormal lab result, we will notify you by phone as soon as possible.  Submit refill requests through [employer] or call your pharmacy and they will forward the refill request to us. Please allow 3 business days for your refill to be completed.          Additional  "Information About Your Visit        MyChart Information     [employer] gives you secure access to your electronic health record. If you see a primary care provider, you can also send messages to your care team and make appointments. If you have questions, please call your primary care clinic.  If you do not have a primary care provider, please call [phone] and they will assist you.        Care EveryWhere ID     This is your Care EveryWhere ID. This could be used by other organizations to access your [facility] medical records  [id number]        Your Vitals Were     Pulse Temperature Respirations Height Pulse Oximetry Breastfeeding?    70 98  F (36.7  C) (Oral) 12 5' 2.48\" (1.587 m) 99% No    BMI (Body Mass Index)                   25.21 kg/m2            Blood Pressure from Last 3 Encounters:   01/16/18 116/70   12/28/17 124/78   11/29/17 104/62    Weight from Last 3 Encounters:   01/16/18 140 lb (63.5 kg)   12/28/17 144 lb (65.3 kg)   11/29/17 143 lb 12.8 oz (65.2 kg)                 Today's Medication Changes          These changes are accurate as of: 1/16/18  4:16 PM.  If you have any questions, ask your nurse or doctor.               Start taking these medicines.        Dose/Directions    doxycycline 100 MG tablet   Commonly known as:  VIBRA-TABS   Used for:  Nasal polyposis, Chronic sinusitis, unspecified location   Started by:  Salma Yusuf PA-C        Dose:  100 mg   Take 1 tablet (100 mg) by mouth 2 times daily for 14 days   Quantity:  28 tablet   Refills:  0            Where to get your medicines      These medications were [facility] to [id number] [place] [facility] [address]  [address] [address]     Phone:  [phone]     doxycycline 100 MG tablet                Primary Care Provider Office Phone # Fax #    Patrica Kelly [phone] [address] [place] [address]        Equal Access to Services     [employer]: Hadii aad " samuel Shaw, waalidada luqadaha, qaybta kagudelia vang, vicki gloriain hayaasarah worthingtondarian justinannita laLabryson connie. So [facility] [phone].    ATENCIÓN: Si crescenciola jose, tiene a alejo disposición servicios gratuitos de asistencia lingüística. Amandeep al [phone].    We comply with applicable federal civil rights laws and [place] laws. We do not discriminate on the basis of race, color, national origin, age, disability, sex, sexual orientation, or gender identity.            Thank you!     Thank you for choosing [facility]  for your care. Our goal is always to provide you with excellent care. Hearing back from our patients is one way we can continue to improve our services. Please take a few minutes to complete the written survey that you may receive in the mail after your visit with us. Thank you!             Your Updated Medication List - Protect others around you: Learn how to safely use, store and throw away your medicines at www.disposemymeds.org.          This list is accurate as of: 1/16/18  4:16 PM.  Always use your most recent med list.                   Brand Name Dispense Instructions for use Diagnosis    APRODINE PO      Take by mouth as needed        CALCIUM PO           cyanocobalamin 1000 MCG tablet    vitamin  B-12    90 tablet    Take 1 tablet (1,000 mcg) by mouth daily    Vitamin B 12 deficiency       doxycycline 100 MG tablet    VIBRA-TABS    28 tablet    Take 1 tablet (100 mg) by mouth 2 times daily for 14 days    Nasal polyposis, Chronic sinusitis, unspecified location       magnesium 200 MG Tabs     1 tablet    Take 200 mg by mouth daily        omega-3 acid ethyl esters 1 G capsule    Lovaza    120 capsule    Take 2 capsules (2 g) by mouth daily        omeprazole 40 MG capsule    priLOSEC    30 capsule    Take 1 capsule (40 mg) by mouth daily Take 30-60 minutes before a meal.        ranitidine 150 MG tablet    ZANTAC    1 tablet    Take 1 tablet (150 mg) by mouth daily        rOPINIRole  0.25 MG tablet    REQUIP    90 tablet    Take 1 tablet (0.25 mg) by mouth At Bedtime    Restless leg       sertraline 100 MG tablet    ZOLOFT    90 tablet    Take 0.5 tablets (50 mg) by mouth daily    Major depression in complete remission (H)       SUMAtriptan 50 MG tablet    IMITREX    18 tablet    Take 1 tablet (50 mg) by mouth at onset of headache for migraine May repeat dose in 2 hours.  Do not exceed 200 mg in 24 hours    Other migraine without status migrainosus, not intractable       traZODone 100 MG tablet    DESYREL    90 tablet    Take 1 tablet (100 mg) by mouth At Bedtime    Insomnia, unspecified type       ZYRTEC ALLERGY 10 MG Caps   Generic drug:  cetirizine HCl      Take by mouth as needed

## 2018-01-16 NOTE — PATIENT INSTRUCTIONS
- Schedule CT of sinuses      You may call any office from below to schedule an appointment for radiology.      [place]  [phone]      [place]  [phone]     - Start Doxycycline twice a day for 14 days     - Follow up with Dr. Hinkle as scheduled

## 2018-01-16 NOTE — NURSING NOTE
"Chief Complaint   Patient presents with     Sinus Problem     Panel Management     honoring jose [facility] cisse        Initial /70 (BP Location: Left arm, Patient Position: Chair, Cuff Size: Adult Regular)  Pulse 70  Temp 98  F (36.7  C) (Oral)  Resp 12  Ht 5' 2.48\" (1.587 m)  Wt 140 lb (63.5 kg)  SpO2 99%  BMI 25.21 kg/m2 Estimated body mass index is 25.21 kg/(m^2) as calculated from the following:    Height as of this encounter: 5' 2.48\" (1.587 m).    Weight as of this encounter: 140 lb (63.5 kg).  Medication Reconciliation: complete  "

## 2018-01-17 ASSESSMENT — ANXIETY QUESTIONNAIRES: GAD7 TOTAL SCORE: 0

## 2018-01-17 ASSESSMENT — PATIENT HEALTH QUESTIONNAIRE - PHQ9: SUM OF ALL RESPONSES TO PHQ QUESTIONS 1-9: 6

## 2018-01-17 ASSESSMENT — ASTHMA QUESTIONNAIRES: ACT_TOTALSCORE: 15

## 2018-01-18 ENCOUNTER — RADIANT APPOINTMENT (OUTPATIENT)
Dept: CT IMAGING | Facility: CLINIC | Age: 68
End: 2018-01-18
Attending: PHYSICIAN ASSISTANT
Payer: COMMERCIAL

## 2018-01-18 DIAGNOSIS — J32.9 CHRONIC SINUSITIS, UNSPECIFIED LOCATION: ICD-10-CM

## 2018-01-18 DIAGNOSIS — J33.9 NASAL POLYPOSIS: ICD-10-CM

## 2018-01-18 PROCEDURE — 70486 CT MAXILLOFACIAL W/O DYE: CPT | Performed by: RADIOLOGY

## 2018-01-18 NOTE — PROGRESS NOTES
Ivonne Lubin    Your CT results mostly consistent with chronic sinusitis. Continue with our plan to follow up with ENT.     The results are attached for your review.       Min Yusuf PA-C

## 2018-01-29 ENCOUNTER — TELEPHONE (OUTPATIENT)
Dept: FAMILY MEDICINE | Facility: OTHER | Age: 68
End: 2018-01-29

## 2018-01-29 NOTE — TELEPHONE ENCOUNTER
Reason for Call:  Other appointment    Detailed comments: pt scheduled with Manan on 02/07 in [place]. Pt states wondering if can be worked in sooner in [place]. Please advise     Phone Number Patient can be reached at: Home number on file [phone] (home)    Best Time: ANY    Can we leave a detailed message on this number? YES    Call taken on 1/29/2018 at 11:02 AM by Christine Desir

## 2018-02-05 NOTE — PROGRESS NOTES
History of Present Illness - Amee Crews is a 67 year old female presenting in clinic today for a recheck on her sinus issues. I last saw patient on 01/25/17 and prescribed her a medrol pack. It was also urged that she wear a mask for protection from harsh chemicals at work.    Today patient reports she still feels post nasal drainage, nauseous and fatigued. Her sense of smell has returned to normal. Patient has used nasacort without significant relief of drainage.    Present Symptoms include: post nasal drainage and sore throat and they are   stable .       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     Allergic rhinitis      Allergies      asthma     mild intermittent     Chronic back pain     neck and low back     HA (headache)     muscle contraction      History of blood transfusion     In childhood     Hot flashes      Hyperlipidemia      Insomnia      Intermittent asthma 9/16/2011     Major depressive disorder, single episode, moderate (H)      Ocular migraine      Osteoarthritis of hand      Restless leg 9/16/2011       Current Medications -   Current Outpatient Prescriptions:      CALCIUM PO, , Disp: , Rfl:      cyanocobalamin (VITAMIN  B-12) 1000 MCG tablet, Take 1 tablet (1,000 mcg) by mouth daily, Disp: 90 tablet, Rfl: 3     Triprolidine-Pseudoephedrine (APRODINE PO), Take by mouth as needed, Disp: , Rfl:      cetirizine HCl (ZYRTEC ALLERGY) 10 MG CAPS, Take by mouth as needed, Disp: , Rfl:      rOPINIRole (REQUIP) 0.25 MG tablet, Take 1 tablet (0.25 mg) by mouth At Bedtime, Disp: 90 tablet, Rfl: 3     magnesium 200 MG TABS, Take 200 mg by mouth daily, Disp: 1 tablet, Rfl: 0     ranitidine (ZANTAC) 150 MG tablet, Take 1 tablet (150 mg) by mouth daily, Disp: 1 tablet, Rfl: 0     sertraline (ZOLOFT) 100 MG tablet, Take 0.5 tablets (50 mg) by mouth daily, Disp: 90 tablet, Rfl: 1     omeprazole (PRILOSEC) 40 MG capsule, Take 1 capsule  (40 mg) by mouth daily Take 30-60 minutes before a meal., Disp: 30 capsule, Rfl: 2     traZODone (DESYREL) 100 MG tablet, Take 1 tablet (100 mg) by mouth At Bedtime, Disp: 90 tablet, Rfl: 3     SUMAtriptan (IMITREX) 50 MG tablet, Take 1 tablet (50 mg) by mouth at onset of headache for migraine May repeat dose in 2 hours.  Do not exceed 200 mg in 24 hours, Disp: 18 tablet, Rfl: 4     omega-3 acid ethyl esters (LOVAZA) 1 G capsule, Take 2 capsules (2 g) by mouth daily, Disp: 120 capsule, Rfl:     Allergies -   Allergies   Allergen Reactions     Atorvastatin Cramps     Muscle pain     Morphine Sulfate Itching       Social History -   Social History     Social History     Marital status:      Spouse name: N/A     Number of children: 2     Years of education: N/A     Occupational History      Self     Social History Main Topics     Smoking status: Former Smoker     Packs/day: 0.00     Types: Cigarettes     Quit date: 1/4/1973     Smokeless tobacco: Never Used     Alcohol use 0.0 oz/week     0 Standard drinks or equivalent per week      Comment: ocass     Drug use: No     Sexual activity: Yes     Partners: Male     Other Topics Concern     Parent/Sibling W/ Cabg, Mi Or Angioplasty Before 65f 55m? No     Social History Narrative       Family History -   Family History   Problem Relation Age of Onset     DIABETES Mother      Hypertension Mother      CANCER Father      stomach      CANCER Maternal Grandfather      lung      HEART DISEASE Other        Review of Systems - As per HPI and PMHx, otherwise review of system review of the head and neck negative.    Physical Exam  Pulse 80  Wt 63.5 kg (140 lb)  SpO2 94%  BMI 25.21 kg/m2  BMI: Body mass index is 25.21 kg/(m^2).    General - The patient is well nourished and well developed, and appears to have good nutritional status.  Alert and oriented to person and place, answers questions and cooperates with examination appropriately.    SKIN - No suspicious lesions or  rashes.  Respiration - No respiratory distress.  Head and Face - Normocephalic and atraumatic, with no gross asymmetry noted of the contour of the facial features.  The facial nerve is intact, with strong symmetric movements.    Voice and Breathing - The patient was breathing comfortably without the use of accessory muscles.  The patients voice was clear and strong, and had appropriate pitch and quality.    Ears - Bilateral pinna and EACs with normal appearing overlying skin. Tympanic membrane intact with good mobility on pneumatic otoscopy bilaterally. Bony landmarks of the ossicular chain are normal. The tympanic membranes are normal in appearance. No retraction, perforation, or masses.  No fluid or purulence was seen in the external canal or the middle ear.     Eyes - Extraocular movements intact.  Sclera were not icteric or injected, conjunctiva were pink and moist.    Mouth - Examination of the oral cavity showed pink, healthy oral mucosa. No lesions or ulcerations noted.  The tongue was mobile and midline, and the dentition were in good condition.      Throat - The walls of the oropharynx were smooth, pink, moist, symmetric, and had no lesions or ulcerations.  The tonsillar pillars and soft palate were symmetric. The uvula was midline on elevation.    Neck - Normal midline excursion of the laryngotracheal complex during swallowing.  Full range of motion on passive movement.  Palpation of the occipital, submental, submandibular, internal jugular chain, and supraclavicular nodes did not demonstrate any abnormal lymph nodes or masses.  The carotid pulse was palpable bilaterally.  Palpation of the thyroid was soft and smooth, with no nodules or goiter appreciated.  The trachea was mobile and midline.    Nose - External contour is symmetric, no gross deflection or scars.  Nasal mucosa is pink and moist with no abnormal mucus.  The septum was midline and non-obstructive, turbinates of normal size and position.  No  polyps, masses, or purulence noted on examination.    Neuro - Nonfocal neuro exam is normal, CN 2 through 12 intact, normal gait and muscle tone.      Performed in clinic today:  To further evaluate the nasal cavity, I performed rigid nasal endoscopy.  I first sprayed the nasal cavity bilaterally with a mix of lidocaine and neosynephrine.  I then began on the left side using a 2.7mm, 30 degree rigid nasal endoscope.  The septum was straight and the nasal airway was open.  No abnormal secretions, purulence, or polyps were noted. The left middle turbinate and middle meatus were clearly visualized and normal in appearance.  Looking up, the olfactory cleft was unobstructed.  Going further back, the sphenoethmoid recess was normal in appearance, with healthy appearing mucosa on the face of the sphenoid.  The nasopharynx was unremarkable, and the eustachian tube opening on this side was unobstructed.    I then turned my attention to the right side.  Once again, the septum was straight, and the airway was open.  No abnormal secretions, purulence, polyps were noted.  The right middle turbinate and middle meatus were clearly visualized and normal in appearance.  Looking up, the olfactory cleft was unobstructed.  Going further back the right sphenoethmoid recess was normal in appearance, and eustachian tube opening was unobstructed.   [place] - [id number] [facility]      A/P - Amee Crews is a 67 year old female with nasal drainage. I urged patient to continue to use rhinocort and to use astelin for the next 2 months.    Amee should follow up as needed.      At Amee next appointment they will not need a hearing test.      This document serves as a record of the services and decisions personally performed and made by Dr. Freddie Hinkle MD. It was created on his behalf by Diamond Witt, a trained medical scribe. The creation of this document is based the provider's statements to the medical scribe.  Diamond Witt 2:27 PM  2/7/2018    Provider:   The information in this document, created by the medical scribe for me, accurately reflects the services I personally performed and the decisions made by me. I have reviewed and approved this document for accuracy prior to leaving the patient care area.  Dr. Freddie Hinkle MD 2:27 PM 2/7/2018    Freddie Hinkle MD

## 2018-02-07 ENCOUNTER — OFFICE VISIT (OUTPATIENT)
Dept: OTOLARYNGOLOGY | Facility: OTHER | Age: 68
End: 2018-02-07
Payer: COMMERCIAL

## 2018-02-07 VITALS — WEIGHT: 140 LBS | BODY MASS INDEX: 25.21 KG/M2 | HEART RATE: 80 BPM | OXYGEN SATURATION: 94 %

## 2018-02-07 DIAGNOSIS — J34.89 NASAL DRAINAGE: Primary | ICD-10-CM

## 2018-02-07 DIAGNOSIS — R09.81 NASAL CONGESTION: ICD-10-CM

## 2018-02-07 PROCEDURE — 99213 OFFICE O/P EST LOW 20 MIN: CPT | Mod: 25 | Performed by: OTOLARYNGOLOGY

## 2018-02-07 PROCEDURE — 31231 NASAL ENDOSCOPY DX: CPT | Performed by: OTOLARYNGOLOGY

## 2018-02-07 RX ORDER — AZELASTINE 1 MG/ML
2 SPRAY, METERED NASAL DAILY
Qty: 1 BOTTLE | Refills: 2 | Status: SHIPPED | OUTPATIENT
Start: 2018-02-07 | End: 2018-11-14

## 2018-02-07 NOTE — MR AVS SNAPSHOT
After Visit Summary   2/7/2018    Amee Crews    MRN: [id number]           Patient Information     Date Of Birth          1950        Visit Information        Provider Department      2/7/2018 1:45 PM Freddie Hinkle MD [facility]        Today's Diagnoses     Nasal drainage    -  1    Nasal congestion           Follow-ups after your visit        Who to contact     If you have questions or need follow up information about today's clinic visit or your schedule please contact [facility] directly at [phone].  Normal or non-critical lab and imaging results will be communicated to you by Primitive Make[employer]hart, letter or phone within 4 business days after the clinic has received the results. If you do not hear from us within 7 days, please contact the clinic through [employer] or phone. If you have a critical or abnormal lab result, we will notify you by phone as soon as possible.  Submit refill requests through [email or web address] or call your pharmacy and they will forward the refill request to us. Please allow 3 business days for your refill to be completed.          Additional Information About Your Visit        MyChart Information     [email or web address] gives you secure access to your electronic health record. If you see a primary care provider, you can also send messages to your care team and make appointments. If you have questions, please call your primary care clinic.  If you do not have a primary care provider, please call [phone] and they will assist you.        Care EveryWhere ID     This is your Care EveryWhere ID. This could be used by other organizations to access your [place] medical records  [id number]        Your Vitals Were     Pulse Pulse Oximetry BMI (Body Mass Index)             80 94% 25.21 kg/m2          Blood Pressure from Last 3 Encounters:   01/16/18 116/70   12/28/17 124/78   11/29/17 104/62    Weight from Last 3 Encounters:   02/07/18 63.5 kg (140 lb)   01/16/18 63.5  kg (140 lb)   12/28/17 65.3 kg (144 lb)              Today, you had the following     No orders found for display       Primary Care Provider Office Phone # Fax #    Patrica Kelly [phone] [address]  [place] [address]        Equal Access to Services     [facility] : Hadii aad ku hadasho Soomaali, waaxda luqadaha, qaybta kaalmada adeegyada, waxay idiin hayaan adeeg kharash la'aan . So [facility] [phone].    ATENCIÓN: Si habla español, tiene a alejo disposición servicios gratuitos de asistencia lingüística. Llame al [phone].    We comply with applicable federal civil rights laws and [place] laws. We do not discriminate on the basis of race, color, national origin, age, disability, sex, sexual orientation, or gender identity.            Thank you!     Thank you for choosing [facility]  for your care. Our goal is always to provide you with excellent care. Hearing back from our patients is one way we can continue to improve our services. Please take a few minutes to complete the written survey that you may receive in the mail after your visit with us. Thank you!             Your Updated Medication List - Protect others around you: Learn how to safely use, store and throw away your medicines at www.disposemymeds.org.          This list is accurate as of 2/7/18  2:10 PM.  Always use your most recent med list.                   Brand Name Dispense Instructions for use Diagnosis    APRODINE PO      Take by mouth as needed        CALCIUM PO           cyanocobalamin 1000 MCG tablet    vitamin  B-12    90 tablet    Take 1 tablet (1,000 mcg) by mouth daily    Vitamin B 12 deficiency       magnesium 200 MG Tabs     1 tablet    Take 200 mg by mouth daily        omega-3 acid ethyl esters 1 G capsule    Lovaza    120 capsule    Take 2 capsules (2 g) by mouth daily        omeprazole 40 MG capsule    priLOSEC    30 capsule    Take 1 capsule (40 mg) by mouth daily Take  30-60 minutes before a meal.        ranitidine 150 MG tablet    ZANTAC    1 tablet    Take 1 tablet (150 mg) by mouth daily        rOPINIRole 0.25 MG tablet    REQUIP    90 tablet    Take 1 tablet (0.25 mg) by mouth At Bedtime    Restless leg       sertraline 100 MG tablet    ZOLOFT    90 tablet    Take 0.5 tablets (50 mg) by mouth daily    Major depression in complete remission (H)       SUMAtriptan 50 MG tablet    IMITREX    18 tablet    Take 1 tablet (50 mg) by mouth at onset of headache for migraine May repeat dose in 2 hours.  Do not exceed 200 mg in 24 hours    Other migraine without status migrainosus, not intractable       traZODone 100 MG tablet    DESYREL    90 tablet    Take 1 tablet (100 mg) by mouth At Bedtime    Insomnia, unspecified type       ZYRTEC ALLERGY 10 MG Caps   Generic drug:  cetirizine HCl      Take by mouth as needed

## 2018-02-07 NOTE — LETTER
2/7/2018         RE: Amee Crews  [address] [place] [address]        Dear Colleague,    Thank you for referring your patient, Amee Crews, to the [facility]. Please see a copy of my visit note below.    History of Present Illness - Amee Crews is a 67 year old female presenting in clinic today for a recheck on her sinus issues. I last saw patient on 01/25/17 and prescribed her a medrol pack. It was also urged that she wear a mask for protection from harsh chemicals at work.    Today patient reports she still feels post nasal drainage, nauseous and fatigued. Her sense of smell has returned to normal. Patient has used nasacort without significant relief of drainage.    Polyps on the left side    Present Symptoms include: post nasal drainage and sore throat and they are   stable .       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     Allergic rhinitis      Allergies      asthma     mild intermittent     Chronic back pain     neck and low back     HA (headache)     muscle contraction      History of blood transfusion     In childhood     Hot flashes      Hyperlipidemia      Insomnia      Intermittent asthma 9/16/2011     Major depressive disorder, single episode, moderate (H)      Ocular migraine      Osteoarthritis of hand      Restless leg 9/16/2011       Current Medications -   Current Outpatient Prescriptions:      CALCIUM PO, , Disp: , Rfl:      cyanocobalamin (VITAMIN  B-12) 1000 MCG tablet, Take 1 tablet (1,000 mcg) by mouth daily, Disp: 90 tablet, Rfl: 3     Triprolidine-Pseudoephedrine (APRODINE PO), Take by mouth as needed, Disp: , Rfl:      cetirizine HCl (ZYRTEC ALLERGY) 10 MG CAPS, Take by mouth as needed, Disp: , Rfl:      rOPINIRole (REQUIP) 0.25 MG tablet, Take 1 tablet (0.25 mg) by mouth At Bedtime, Disp: 90 tablet, Rfl: 3     magnesium 200 MG TABS, Take 200 mg by mouth daily, Disp: 1 tablet,  Rfl: 0     ranitidine (ZANTAC) 150 MG tablet, Take 1 tablet (150 mg) by mouth daily, Disp: 1 tablet, Rfl: 0     sertraline (ZOLOFT) 100 MG tablet, Take 0.5 tablets (50 mg) by mouth daily, Disp: 90 tablet, Rfl: 1     omeprazole (PRILOSEC) 40 MG capsule, Take 1 capsule (40 mg) by mouth daily Take 30-60 minutes before a meal., Disp: 30 capsule, Rfl: 2     traZODone (DESYREL) 100 MG tablet, Take 1 tablet (100 mg) by mouth At Bedtime, Disp: 90 tablet, Rfl: 3     SUMAtriptan (IMITREX) 50 MG tablet, Take 1 tablet (50 mg) by mouth at onset of headache for migraine May repeat dose in 2 hours.  Do not exceed 200 mg in 24 hours, Disp: 18 tablet, Rfl: 4     omega-3 acid ethyl esters (LOVAZA) 1 G capsule, Take 2 capsules (2 g) by mouth daily, Disp: 120 capsule, Rfl:     Allergies -   Allergies   Allergen Reactions     Atorvastatin Cramps     Muscle pain     Morphine Sulfate Itching       Social History -   Social History     Social History     Marital status:      Spouse name: N/A     Number of children: 2     Years of education: N/A     Occupational History      Self     Social History Main Topics     Smoking status: Former Smoker     Packs/day: 0.00     Types: Cigarettes     Quit date: 1/4/1973     Smokeless tobacco: Never Used     Alcohol use 0.0 oz/week     0 Standard drinks or equivalent per week      Comment: ocass     Drug use: No     Sexual activity: Yes     Partners: Male     Other Topics Concern     Parent/Sibling W/ Cabg, Mi Or Angioplasty Before 65f 55m? No     Social History Narrative       Family History -   Family History   Problem Relation Age of Onset     DIABETES Mother      Hypertension Mother      CANCER Father      stomach      CANCER Maternal Grandfather      lung      HEART DISEASE Other        Review of Systems - As per HPI and PMHx, otherwise review of system review of the head and neck negative.    Physical Exam  Pulse 80  Wt 63.5 kg (140 lb)  SpO2 94%  BMI 25.21 kg/m2  BMI: Body mass index is  25.21 kg/(m^2).    General - The patient is well nourished and well developed, and appears to have good nutritional status.  Alert and oriented to person and place, answers questions and cooperates with examination appropriately.    SKIN - No suspicious lesions or rashes.  Respiration - No respiratory distress.  Head and Face - Normocephalic and atraumatic, with no gross asymmetry noted of the contour of the facial features.  The facial nerve is intact, with strong symmetric movements.    Voice and Breathing - The patient was breathing comfortably without the use of accessory muscles.  The patients voice was clear and strong, and had appropriate pitch and quality.    Ears - Bilateral pinna and EACs with normal appearing overlying skin. Tympanic membrane intact with good mobility on pneumatic otoscopy bilaterally. Bony landmarks of the ossicular chain are normal. The tympanic membranes are normal in appearance. No retraction, perforation, or masses.  No fluid or purulence was seen in the external canal or the middle ear.     Eyes - Extraocular movements intact.  Sclera were not icteric or injected, conjunctiva were pink and moist.    Mouth - Examination of the oral cavity showed pink, healthy oral mucosa. No lesions or ulcerations noted.  The tongue was mobile and midline, and the dentition were in good condition.      Throat - The walls of the oropharynx were smooth, pink, moist, symmetric, and had no lesions or ulcerations.  The tonsillar pillars and soft palate were symmetric. The uvula was midline on elevation.    Neck - Normal midline excursion of the laryngotracheal complex during swallowing.  Full range of motion on passive movement.  Palpation of the occipital, submental, submandibular, internal jugular chain, and supraclavicular nodes did not demonstrate any abnormal lymph nodes or masses.  The carotid pulse was palpable bilaterally.  Palpation of the thyroid was soft and smooth, with no nodules or goiter  appreciated.  The trachea was mobile and midline.    Nose - External contour is symmetric, no gross deflection or scars.  Nasal mucosa is pink and moist with no abnormal mucus.  The septum was midline and non-obstructive, turbinates of normal size and position.  No polyps, masses, or purulence noted on examination.    Neuro - Nonfocal neuro exam is normal, CN 2 through 12 intact, normal gait and muscle tone.      Performed in clinic today:  To further evaluate the nasal cavity, I performed rigid nasal endoscopy.  I first sprayed the nasal cavity bilaterally with a mix of lidocaine and neosynephrine.  I then began on the left side using a 2.7mm, 30 degree rigid nasal endoscope.  The septum was straight and the nasal airway was open.  No abnormal secretions, purulence, or polyps were noted. The left middle turbinate and middle meatus were clearly visualized and normal in appearance.  Looking up, the olfactory cleft was unobstructed.  Going further back, the sphenoethmoid recess was normal in appearance, with healthy appearing mucosa on the face of the sphenoid.  The nasopharynx was unremarkable, and the eustachian tube opening on this side was unobstructed.    I then turned my attention to the right side.  Once again, the septum was straight, and the airway was open.  No abnormal secretions, purulence, polyps were noted.  The right middle turbinate and middle meatus were clearly visualized and normal in appearance.  Looking up, the olfactory cleft was unobstructed.  Going further back the right sphenoethmoid recess was normal in appearance, and eustachian tube opening was unobstructed.   [place] [facility] [id number] [facility]      A/P - Amee JOSHUA Crews is a 67 year old female with nasal drainage. I urged patient to continue to use rhinocort and to use astelin for the next 2 months.    Amee should follow up as needed.      At Amee next appointment they will not need a hearing test.      This document serves as a record of the  services and decisions personally performed and made by Dr. Freddie Hinkle MD. It was created on his behalf by Diamond Witt, a trained medical scribe. The creation of this document is based the provider's statements to the medical scribe.  Diamond Witt 2:27 PM 2/7/2018    Provider:   The information in this document, created by the medical scribe for me, accurately reflects the services I personally performed and the decisions made by me. I have reviewed and approved this document for accuracy prior to leaving the patient care area.  Dr. Freddie Hinkle MD 2:27 PM 2/7/2018    Freddie Hinkle MD        Again, thank you for allowing me to participate in the care of your patient.        Sincerely,        Freddie Hinkle MD, MD

## 2018-02-07 NOTE — NURSING NOTE
"Chief Complaint   Patient presents with     RECHECK     Sinus Problem       Initial Pulse 80  Wt 63.5 kg (140 lb)  SpO2 94%  BMI 25.21 kg/m2 Estimated body mass index is 25.21 kg/(m^2) as calculated from the following:    Height as of 1/16/18: 1.587 m (5' 2.48\").    Weight as of this encounter: 63.5 kg (140 lb).  Medication Reconciliation: complete  "

## 2018-03-23 ENCOUNTER — OFFICE VISIT (OUTPATIENT)
Dept: FAMILY MEDICINE | Facility: OTHER | Age: 68
End: 2018-03-23
Payer: COMMERCIAL

## 2018-03-23 ENCOUNTER — TELEPHONE (OUTPATIENT)
Dept: OTOLARYNGOLOGY | Facility: OTHER | Age: 68
End: 2018-03-23

## 2018-03-23 VITALS
OXYGEN SATURATION: 97 % | DIASTOLIC BLOOD PRESSURE: 52 MMHG | SYSTOLIC BLOOD PRESSURE: 100 MMHG | HEART RATE: 62 BPM | BODY MASS INDEX: 25.09 KG/M2 | RESPIRATION RATE: 14 BRPM | TEMPERATURE: 97.9 F | HEIGHT: 63 IN | WEIGHT: 141.6 LBS

## 2018-03-23 DIAGNOSIS — J33.8 NASAL SINUS POLYP: Primary | ICD-10-CM

## 2018-03-23 DIAGNOSIS — K13.79 MASS OF ORAL CAVITY: Primary | ICD-10-CM

## 2018-03-23 PROCEDURE — 99213 OFFICE O/P EST LOW 20 MIN: CPT | Performed by: NURSE PRACTITIONER

## 2018-03-23 NOTE — PATIENT INSTRUCTIONS
- Follow up with ENT  - If you notice any increase in size, shape or tenderness please be evaluated sooner.    ROBYN Bojorquez CNP

## 2018-03-23 NOTE — NURSING NOTE
"Chief Complaint   Patient presents with     Mass       Initial /52 (BP Location: Left arm, Patient Position: Chair, Cuff Size: Adult Regular)  Pulse 62  Temp 97.9  F (36.6  C) (Temporal)  Resp 14  Ht 5' 2.5\" (1.588 m)  Wt 141 lb 9.6 oz (64.2 kg)  SpO2 97%  BMI 25.49 kg/m2 Estimated body mass index is 25.49 kg/(m^2) as calculated from the following:    Height as of this encounter: 5' 2.5\" (1.588 m).    Weight as of this encounter: 141 lb 9.6 oz (64.2 kg).  Medication Reconciliation: complete   Breonna Mario CMA      "

## 2018-03-23 NOTE — MR AVS SNAPSHOT
After Visit Summary   3/23/2018    Amee Crews    MRN: [id number]           Patient Information     Date Of Birth          1950        Visit Information        Provider Department      3/23/2018 10:40 AM Ary Adams APRN CNP [facility]        Today's Diagnoses     Mass of oral cavity    -  1      Care Instructions    - Follow up with ENT  - If you notice any increase in size, shape or tenderness please be evaluated sooner.    ROBYN Bojorquez CNP            Follow-ups after your visit        Additional Services     OTOLARYNGOLOGY REFERRAL       Your provider has referred you to: FM[facility]: [facility] [phone]   [email or web address]    Please be aware that coverage of these services is subject to the terms and limitations of your health insurance plan.  Call member services at your health plan with any benefit or coverage questions.      Please bring the following with you to your appointment:    (1) Any X-Rays, CTs or MRIs which have been performed.  Contact the facility where they were done to arrange for  prior to your scheduled appointment.   (2) List of current medications  (3) This referral request   (4) Any documents/labs given to you for this referral                  Follow-up notes from your care team     Return if symptoms worsen or fail to improve.      Who to contact     If you have questions or need follow up information about today's clinic visit or your schedule please contact [facility] directly at [phone].  Normal or non-critical lab and imaging results will be communicated to you by MyChart, letter or phone within 4 business days after the clinic has received the results. If you do not hear from us within 7 days, please contact the clinic through MyChart or phone. If you have a critical or abnormal lab result, we will notify you by phone as soon as possible.  Submit refill  "requests through Apps[employer]ep or call your pharmacy and they will forward the refill request to us. Please allow 3 business days for your refill to be completed.          Additional Information About Your Visit        [employer] Healinghart Information     App[employer]ep gives you secure access to your electronic health record. If you see a primary care provider, you can also send messages to your care team and make appointments. If you have questions, please call your primary care clinic.  If you do not have a primary care provider, please call [phone] and they will assist you.        Care EveryWhere ID     This is your Care EveryWhere ID. This could be used by other organizations to access your [place] medical records  [id number]        Your Vitals Were     Pulse Temperature Respirations Height Pulse Oximetry BMI (Body Mass Index)    62 97.9  F (36.6  C) (Temporal) 14 5' 2.5\" (1.588 m) 97% 25.49 kg/m2       Blood Pressure from Last 3 Encounters:   03/23/18 100/52   01/16/18 116/70   12/28/17 124/78    Weight from Last 3 Encounters:   03/23/18 141 lb 9.6 oz (64.2 kg)   02/07/18 140 lb (63.5 kg)   01/16/18 140 lb (63.5 kg)              We Performed the Following     OTOLARYNGOLOGY REFERRAL        Primary Care Provider Office Phone # Fax #    Patrica Kelly [phone] [address] [facility] [address]        Equal Access to Services     [facility] AH: Hadii aad ku hadasho Soomaali, waaxda luqadaha, qaybta kaalmada adeegyada, vicki sotelo adedarian erickson . So [facility] [phone].    ATENCIÓN: Si habla español, tiene a alejo disposición servicios gratuitos de asistencia lingüística. Llame al [phone].    We comply with applicable federal civil rights laws and [place] laws. We do not discriminate on the basis of race, color, national origin, age, disability, sex, sexual orientation, or gender identity.            Thank you!     Thank you for choosing [facility]  for your care. " Our goal is always to provide you with excellent care. Hearing back from our patients is one way we can continue to improve our services. Please take a few minutes to complete the written survey that you may receive in the mail after your visit with us. Thank you!             Your Updated Medication List - Protect others around you: Learn how to safely use, store and throw away your medicines at www.disposemymeds.org.          This list is accurate as of 3/23/18 11:11 AM.  Always use your most recent med list.                   Brand Name Dispense Instructions for use Diagnosis    APRODINE PO      Take by mouth as needed        azelastine 0.1 % spray    ASTELIN    1 Bottle    Spray 2 sprays into both nostrils daily    Nasal drainage       CALCIUM PO           cyanocobalamin 1000 MCG tablet    vitamin  B-12    90 tablet    Take 1 tablet (1,000 mcg) by mouth daily    Vitamin B 12 deficiency       magnesium 200 MG Tabs     1 tablet    Take 200 mg by mouth daily        omega-3 acid ethyl esters 1 G capsule    Lovaza    120 capsule    Take 2 capsules (2 g) by mouth daily        omeprazole 40 MG capsule    priLOSEC    30 capsule    Take 1 capsule (40 mg) by mouth daily Take 30-60 minutes before a meal.        ranitidine 150 MG tablet    ZANTAC    1 tablet    Take 1 tablet (150 mg) by mouth daily        rOPINIRole 0.25 MG tablet    REQUIP    90 tablet    Take 1 tablet (0.25 mg) by mouth At Bedtime    Restless leg       sertraline 100 MG tablet    ZOLOFT    90 tablet    Take 0.5 tablets (50 mg) by mouth daily    Major depression in complete remission (H)       SUMAtriptan 50 MG tablet    IMITREX    18 tablet    Take 1 tablet (50 mg) by mouth at onset of headache for migraine May repeat dose in 2 hours.  Do not exceed 200 mg in 24 hours    Other migraine without status migrainosus, not intractable       traZODone 100 MG tablet    DESYREL    90 tablet    Take 1 tablet (100 mg) by mouth At Bedtime    Insomnia, unspecified type        ZYRTEC ALLERGY 10 MG Caps   Generic drug:  cetirizine HCl      Take by mouth as needed

## 2018-03-23 NOTE — PROGRESS NOTES
SUBJECTIVE:   Amee Crews is a 67 year old female who presents to clinic today for the following health issues:      HPI  Concern - lump in lip  Onset: 2 weeks    Description:   Lump inside upper lip    Intensity: moderate    Progression of Symptoms:  waxing and waning    Accompanying Signs & Symptoms:  tenderness    Previous history of similar problem:   none    Precipitating factors:   Worsened by: none    Alleviating factors:  Improved by: none  Denies smoking history or chewing tobacco  Reports she had a couple pre-cancerous skin lesions removed otherwise no history of cancer  No recent dental work or injury to area.   Tenderness to touch.     Therapies Tried and outcome: none  Problem list and histories reviewed & adjusted, as indicated.  Additional history: as documented        Current Outpatient Prescriptions   Medication Sig Dispense Refill     azelastine (ASTELIN) 0.1 % spray Spray 2 sprays into both nostrils daily 1 Bottle 2     CALCIUM PO        cyanocobalamin (VITAMIN  B-12) 1000 MCG tablet Take 1 tablet (1,000 mcg) by mouth daily 90 tablet 3     Triprolidine-Pseudoephedrine (APRODINE PO) Take by mouth as needed       cetirizine HCl (ZYRTEC ALLERGY) 10 MG CAPS Take by mouth as needed       rOPINIRole (REQUIP) 0.25 MG tablet Take 1 tablet (0.25 mg) by mouth At Bedtime 90 tablet 3     magnesium 200 MG TABS Take 200 mg by mouth daily 1 tablet 0     ranitidine (ZANTAC) 150 MG tablet Take 1 tablet (150 mg) by mouth daily 1 tablet 0     sertraline (ZOLOFT) 100 MG tablet Take 0.5 tablets (50 mg) by mouth daily 90 tablet 1     traZODone (DESYREL) 100 MG tablet Take 1 tablet (100 mg) by mouth At Bedtime 90 tablet 3     SUMAtriptan (IMITREX) 50 MG tablet Take 1 tablet (50 mg) by mouth at onset of headache for migraine May repeat dose in 2 hours.  Do not exceed 200 mg in 24 hours 18 tablet 4     omega-3 acid ethyl esters (LOVAZA) 1 G capsule Take 2 capsules (2 g) by mouth daily 120 capsule      omeprazole  "(PRILOSEC) 40 MG capsule Take 1 capsule (40 mg) by mouth daily Take 30-60 minutes before a meal. 30 capsule 2     BP Readings from Last 3 Encounters:   03/23/18 100/52   01/16/18 116/70   12/28/17 124/78    Wt Readings from Last 3 Encounters:   03/23/18 141 lb 9.6 oz (64.2 kg)   02/07/18 140 lb (63.5 kg)   01/16/18 140 lb (63.5 kg)                    ROS:  CONSTITUTIONAL: NEGATIVE for fever, chills, change in weight  CV: NEGATIVE for chest pain, palpitations or peripheral edema    OBJECTIVE:     /52 (BP Location: Left arm, Patient Position: Chair, Cuff Size: Adult Regular)  Pulse 62  Temp 97.9  F (36.6  C) (Temporal)  Resp 14  Ht 5' 2.5\" (1.588 m)  Wt 141 lb 9.6 oz (64.2 kg)  SpO2 97%  BMI 25.49 kg/m2  Body mass index is 25.49 kg/(m^2).  GENERAL: healthy, alert and no distress  HENT: normal cephalic/atraumatic, nose and mouth without ulcers or lesions, oropharynx clear, oral mucous membranes moist, small pea sized lump along the upper right lip area felt with palpitation.   NECK: no adenopathy, no asymmetry, masses, or scars and thyroid normal to palpation  SKIN: See ENT for mouth lesion.   NEURO: Normal strength and tone, mentation intact and speech normal  PSYCH: mentation appears normal, affect normal/bright    Diagnostic Test Results:  none     ASSESSMENT/PLAN:         ICD-10-CM    1. Mass of oral cavity K13.70      1. Recommend follow up with ENT for further evaluation at this time. At this time I do not feel there is any need for infection work up or antibiotics.   2. Patient agrees with plan and will follow up.     The patient indicates understanding of these issues and agrees with the plan.    There are no Patient Instructions on file for this visit.    ROBYN Bojorquez [facility]  "

## 2018-03-25 NOTE — TELEPHONE ENCOUNTER
We can try Prednisone for 5 days. If she wants I can send it in. Otherwise she can visit with me and we can discuss this further.  Freddie Hinkle MD

## 2018-03-26 RX ORDER — PREDNISONE 20 MG/1
20 TABLET ORAL 2 TIMES DAILY
Qty: 10 TABLET | Refills: 0 | Status: SHIPPED | OUTPATIENT
Start: 2018-03-26 | End: 2018-06-26

## 2018-03-26 NOTE — TELEPHONE ENCOUNTER
Rx sent, patient informed.  Appt scheduled 4/12 for lip lesion  Rhinocort not covered by insurance per MD.

## 2018-04-12 ENCOUNTER — OFFICE VISIT (OUTPATIENT)
Dept: OTOLARYNGOLOGY | Facility: CLINIC | Age: 68
End: 2018-04-12
Payer: COMMERCIAL

## 2018-04-12 VITALS
HEART RATE: 89 BPM | RESPIRATION RATE: 12 BRPM | SYSTOLIC BLOOD PRESSURE: 147 MMHG | OXYGEN SATURATION: 99 % | DIASTOLIC BLOOD PRESSURE: 77 MMHG

## 2018-04-12 DIAGNOSIS — J32.9 CHRONIC SINUSITIS, UNSPECIFIED LOCATION: Primary | ICD-10-CM

## 2018-04-12 DIAGNOSIS — K13.79 OTHER LESIONS OF ORAL MUCOSA: ICD-10-CM

## 2018-04-12 PROCEDURE — 99213 OFFICE O/P EST LOW 20 MIN: CPT | Performed by: OTOLARYNGOLOGY

## 2018-04-12 RX ORDER — FLUTICASONE PROPIONATE 50 MCG
1-2 SPRAY, SUSPENSION (ML) NASAL DAILY
Qty: 1 BOTTLE | Refills: 3 | Status: SHIPPED | OUTPATIENT
Start: 2018-04-12 | End: 2018-11-15

## 2018-04-12 NOTE — PROGRESS NOTES
ENT Consultation    Amee Crews is a 67 year old female who is seen in consultation at the request of Ary Adams APRN CNP.      History of Present Illness - Amee Crews is a 67 year old female first noted lumps in the lips ~2 months ago. She has not had any drainage from the lips.     Patient had sinus surgery 18 years ago. She has recurring sinus problems currently. Has had recurring polyps in the nose.  Is taking Rhinocort, but wishes to switch to Flonase.     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     Allergic rhinitis      Allergies      asthma     mild intermittent     Chronic back pain     neck and low back     HA (headache)     muscle contraction      History of blood transfusion     In childhood     Hot flashes      Hyperlipidemia      Insomnia      Intermittent asthma 9/16/2011     Major depressive disorder, single episode, moderate (H)      Ocular migraine      Osteoarthritis of hand      Restless leg 9/16/2011       Current Medications -   Current Outpatient Prescriptions:      predniSONE (DELTASONE) 20 MG tablet, Take 1 tablet (20 mg) by mouth 2 times daily, Disp: 10 tablet, Rfl: 0     azelastine (ASTELIN) 0.1 % spray, Spray 2 sprays into both nostrils daily, Disp: 1 Bottle, Rfl: 2     CALCIUM PO, , Disp: , Rfl:      cyanocobalamin (VITAMIN  B-12) 1000 MCG tablet, Take 1 tablet (1,000 mcg) by mouth daily, Disp: 90 tablet, Rfl: 3     Triprolidine-Pseudoephedrine (APRODINE PO), Take by mouth as needed, Disp: , Rfl:      cetirizine HCl (ZYRTEC ALLERGY) 10 MG CAPS, Take by mouth as needed, Disp: , Rfl:      rOPINIRole (REQUIP) 0.25 MG tablet, Take 1 tablet (0.25 mg) by mouth At Bedtime, Disp: 90 tablet, Rfl: 3     magnesium 200 MG TABS, Take 200 mg by mouth daily, Disp: 1 tablet, Rfl: 0     ranitidine (ZANTAC) 150 MG tablet, Take 1 tablet (150 mg) by mouth daily, Disp: 1 tablet, Rfl: 0     sertraline (ZOLOFT) 100 MG  tablet, Take 0.5 tablets (50 mg) by mouth daily, Disp: 90 tablet, Rfl: 1     omeprazole (PRILOSEC) 40 MG capsule, Take 1 capsule (40 mg) by mouth daily Take 30-60 minutes before a meal., Disp: 30 capsule, Rfl: 2     traZODone (DESYREL) 100 MG tablet, Take 1 tablet (100 mg) by mouth At Bedtime, Disp: 90 tablet, Rfl: 3     SUMAtriptan (IMITREX) 50 MG tablet, Take 1 tablet (50 mg) by mouth at onset of headache for migraine May repeat dose in 2 hours.  Do not exceed 200 mg in 24 hours, Disp: 18 tablet, Rfl: 4     omega-3 acid ethyl esters (LOVAZA) 1 G capsule, Take 2 capsules (2 g) by mouth daily, Disp: 120 capsule, Rfl:     Allergies -   Allergies   Allergen Reactions     Atorvastatin Cramps     Muscle pain     Morphine Sulfate Itching       Social History -   Social History     Social History     Marital status:      Spouse name: N/A     Number of children: 2     Years of education: N/A     Occupational History      Self     Social History Main Topics     Smoking status: Former Smoker     Packs/day: 0.00     Types: Cigarettes     Quit date: 1/4/1973     Smokeless tobacco: Never Used     Alcohol use 0.0 oz/week     0 Standard drinks or equivalent per week      Comment: ocass     Drug use: No     Sexual activity: Yes     Partners: Male     Other Topics Concern     Parent/Sibling W/ Cabg, Mi Or Angioplasty Before 65f 55m? No     Social History Narrative       Family History -   Family History   Problem Relation Age of Onset     DIABETES Mother      Hypertension Mother      CANCER Father      stomach      CANCER Maternal Grandfather      lung      HEART DISEASE Other        Review of Systems - As per HPI and PMHx, otherwise review of system review of the head and neck negative.    This document serves as a record of the services and decisions personally performed and made by Freddie Hinkle MD. It was created on his behalf by Dong Dooley, a trained medical scribe. The creation of this document is based the  provider's statements to the medical scribe.  Dong Miah April 12, 2018 2:49 PM     Physical Exam  /77  Pulse 89  Resp 12  SpO2 99%  BMI: There is no height or weight on file to calculate BMI.    General - The patient is well nourished and well developed, and appears to have good nutritional status.  Alert and oriented to person and place, answers questions and cooperates with examination appropriately.    SKIN - No suspicious lesions or rashes.  Respiration - No respiratory distress.  Head and Face - Normocephalic and atraumatic, with no gross asymmetry noted of the contour of the facial features.  The facial nerve is intact, with strong symmetric movements.    Voice and Breathing - The patient was breathing comfortably without the use of accessory muscles. There was no wheezing, stridor, or stertor.  The patients voice was clear and strong, and had appropriate pitch and quality.    Ears - Bilateral pinna and EACs with normal appearing overlying skin. Tympanic membrane intact with good mobility on pneumatic otoscopy bilaterally. Bony landmarks of the ossicular chain are normal. The tympanic membranes are normal in appearance. No retraction, perforation, or masses.  No fluid or purulence was seen in the external canal or the middle ear.     Eyes - Extraocular movements intact.  Sclera were not icteric or injected, conjunctiva were pink and moist.    Mouth - Examination of the oral cavity showed pink, healthy oral mucosa. No lesions or ulcerations noted.  The tongue was mobile and midline, and the dentition were in good condition.  Buccal mucosa, small area lichen planus bilaterally, palpable. Palpable submucosal lumps. Multiple small areas of submucosal irregulaties in the left upper inner lip.     Throat - The walls of the oropharynx were smooth, pink, moist, symmetric, and had no lesions or ulcerations.  The tonsillar pillars and soft palate were symmetric.  The uvula was midline on  elevation.    Neck - Normal midline excursion of the laryngotracheal complex during swallowing.  Full range of motion on passive movement.  Palpation of the occipital, submental, submandibular, internal jugular chain, and supraclavicular nodes did not demonstrate any abnormal lymph nodes or masses.  The carotid pulse was palpable bilaterally.  Palpation of the thyroid was soft and smooth, with no nodules or goiter appreciated.  The trachea was mobile and midline.    Nose - External contour is symmetric, no gross deflection or scars.  Nasal mucosa is pink and moist with no abnormal mucus.  The septum was midline and non-obstructive, turbinates of normal size and position.  No polyps, masses, or purulence noted on examination. Evidence of sinus surgery.       Neuro - Nonfocal neuro exam is normal, CN 2 through 12 intact, normal gait and muscle tone.    Performed in clinic today:  No procedures preformed in clinic today          A/P - Amee Crews is a 67 year old female prominent minor salivary glands and chronic rhinitis/sinusitis.   Discussed increasing water intake.  Advised using lemon drops to increase flow of saliva.   Prescribed Flonase; stop Rhinocort.     Follow up 3 months, recheck nose and oral cavity problems.      Freddie Hinkle MD.    The information in this document, created by the medical scribe for me, accurately reflects the services I personally performed and the decisions made by me. I have reviewed and approved this document for accuracy prior to leaving the patient care area.  Freddie Hinkle MD  2:49 PM, 04/12/18

## 2018-04-12 NOTE — LETTER
4/12/2018         RE: Amee Crews  [id number] [address] [facility] [id number]        Dear Colleague,    Thank you for referring your patient, Amee Crews, to the [facility]. Please see a copy of my visit note below.    ENT Consultation    Amee Crews is a 67 year old female who is seen in consultation at the request of Ary Adams, ROBYN LOZANO.      History of Present Illness - Amee Crews is a 67 year old female first noted lumps in the lips ~2 months ago. She has not had any drainage from the lips.     Patient had sinus surgery 18 years ago. She has recurring sinus problems currently. Has had recurring polyps in the nose.  Is taking Rhinocort, but wishes to switch to Flonase.     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     Allergic rhinitis      Allergies      asthma     mild intermittent     Chronic back pain     neck and low back     HA (headache)     muscle contraction      History of blood transfusion     In childhood     Hot flashes      Hyperlipidemia      Insomnia      Intermittent asthma 9/16/2011     Major depressive disorder, single episode, moderate (H)      Ocular migraine      Osteoarthritis of hand      Restless leg 9/16/2011       Current Medications -   Current Outpatient Prescriptions:      predniSONE (DELTASONE) 20 MG tablet, Take 1 tablet (20 mg) by mouth 2 times daily, Disp: 10 tablet, Rfl: 0     azelastine (ASTELIN) 0.1 % spray, Spray 2 sprays into both nostrils daily, Disp: 1 Bottle, Rfl: 2     CALCIUM PO, , Disp: , Rfl:      cyanocobalamin (VITAMIN  B-12) 1000 MCG tablet, Take 1 tablet (1,000 mcg) by mouth daily, Disp: 90 tablet, Rfl: 3     Triprolidine-Pseudoephedrine (APRODINE PO), Take by mouth as needed, Disp: , Rfl:      cetirizine HCl (ZYRTEC ALLERGY) 10 MG CAPS, Take by mouth as needed, Disp: , Rfl:      rOPINIRole (REQUIP) 0.25 MG tablet, Take 1 tablet (0.25 mg) by mouth At  Bedtime, Disp: 90 tablet, Rfl: 3     magnesium 200 MG TABS, Take 200 mg by mouth daily, Disp: 1 tablet, Rfl: 0     ranitidine (ZANTAC) 150 MG tablet, Take 1 tablet (150 mg) by mouth daily, Disp: 1 tablet, Rfl: 0     sertraline (ZOLOFT) 100 MG tablet, Take 0.5 tablets (50 mg) by mouth daily, Disp: 90 tablet, Rfl: 1     omeprazole (PRILOSEC) 40 MG capsule, Take 1 capsule (40 mg) by mouth daily Take 30-60 minutes before a meal., Disp: 30 capsule, Rfl: 2     traZODone (DESYREL) 100 MG tablet, Take 1 tablet (100 mg) by mouth At Bedtime, Disp: 90 tablet, Rfl: 3     SUMAtriptan (IMITREX) 50 MG tablet, Take 1 tablet (50 mg) by mouth at onset of headache for migraine May repeat dose in 2 hours.  Do not exceed 200 mg in 24 hours, Disp: 18 tablet, Rfl: 4     omega-3 acid ethyl esters (LOVAZA) 1 G capsule, Take 2 capsules (2 g) by mouth daily, Disp: 120 capsule, Rfl:     Allergies -   Allergies   Allergen Reactions     Atorvastatin Cramps     Muscle pain     Morphine Sulfate Itching       Social History -   Social History     Social History     Marital status:      Spouse name: N/A     Number of children: 2     Years of education: N/A     Occupational History      Self     Social History Main Topics     Smoking status: Former Smoker     Packs/day: 0.00     Types: Cigarettes     Quit date: 1/4/1973     Smokeless tobacco: Never Used     Alcohol use 0.0 oz/week     0 Standard drinks or equivalent per week      Comment: ocass     Drug use: No     Sexual activity: Yes     Partners: Male     Other Topics Concern     Parent/Sibling W/ Cabg, Mi Or Angioplasty Before 65f 55m? No     Social History Narrative       Family History -   Family History   Problem Relation Age of Onset     DIABETES Mother      Hypertension Mother      CANCER Father      stomach      CANCER Maternal Grandfather      lung      HEART DISEASE Other        Review of Systems - As per HPI and PMHx, otherwise review of system review of the head and neck  negative.    This document serves as a record of the services and decisions personally performed and made by Freddie Hinkle MD. It was created on his behalf by Dong Dooley, a trained medical scribe. The creation of this document is based the provider's statements to the medical scribe.  Dong Dooley April 12, 2018 2:49 PM     Physical Exam  /77  Pulse 89  Resp 12  SpO2 99%  BMI: There is no height or weight on file to calculate BMI.    General - The patient is well nourished and well developed, and appears to have good nutritional status.  Alert and oriented to person and place, answers questions and cooperates with examination appropriately.    SKIN - No suspicious lesions or rashes.  Respiration - No respiratory distress.  Head and Face - Normocephalic and atraumatic, with no gross asymmetry noted of the contour of the facial features.  The facial nerve is intact, with strong symmetric movements.    Voice and Breathing - The patient was breathing comfortably without the use of accessory muscles. There was no wheezing, stridor, or stertor.  The patients voice was clear and strong, and had appropriate pitch and quality.    Ears - Bilateral pinna and EACs with normal appearing overlying skin. Tympanic membrane intact with good mobility on pneumatic otoscopy bilaterally. Bony landmarks of the ossicular chain are normal. The tympanic membranes are normal in appearance. No retraction, perforation, or masses.  No fluid or purulence was seen in the external canal or the middle ear.     Eyes - Extraocular movements intact.  Sclera were not icteric or injected, conjunctiva were pink and moist.    Mouth - Examination of the oral cavity showed pink, healthy oral mucosa. No lesions or ulcerations noted.  The tongue was mobile and midline, and the dentition were in good condition.  Buccal mucosa, small area lichen planus bilaterally, palpable. Palpable submucosal lumps. Multiple small areas of submucosal  irregulaties in the left upper inner lip.     Throat - The walls of the oropharynx were smooth, pink, moist, symmetric, and had no lesions or ulcerations.  The tonsillar pillars and soft palate were symmetric.  The uvula was midline on elevation.    Neck - Normal midline excursion of the laryngotracheal complex during swallowing.  Full range of motion on passive movement.  Palpation of the occipital, submental, submandibular, internal jugular chain, and supraclavicular nodes did not demonstrate any abnormal lymph nodes or masses.  The carotid pulse was palpable bilaterally.  Palpation of the thyroid was soft and smooth, with no nodules or goiter appreciated.  The trachea was mobile and midline.    Nose - External contour is symmetric, no gross deflection or scars.  Nasal mucosa is pink and moist with no abnormal mucus.  The septum was midline and non-obstructive, turbinates of normal size and position.  No polyps, masses, or purulence noted on examination. Evidence of sinus surgery.       Neuro - Nonfocal neuro exam is normal, CN 2 through 12 intact, normal gait and muscle tone.    Performed in clinic today:  No procedures preformed in clinic today          A/P - Amee Crews is a 67 year old female prominent minor salivary glands and chronic rhinitis/sinusitis.   Discussed increasing water intake.  Advised using lemon drops to increase flow of saliva.   Prescribed Flonase; stop Rhinocort.     Follow up 3 months, recheck nose and oral cavity problems.      Freddie Hinkle MD.    The information in this document, created by the medical scribe for me, accurately reflects the services I personally performed and the decisions made by me. I have reviewed and approved this document for accuracy prior to leaving the patient care area.  Freddie Hinkle MD  2:49 PM, 04/12/18      Again, thank you for allowing me to participate in the care of your patient.        Sincerely,        Freddie Hinkle MD, MD

## 2018-04-12 NOTE — MR AVS SNAPSHOT
After Visit Summary   4/12/2018    Amee Crews    MRN: [id number]           Patient Information     Date Of Birth          1950        Visit Information        Provider Department      4/12/2018 2:30 PM Freddie [facility]inkle MD [facility]        Today's Diagnoses     Chronic sinusitis, unspecified location    -  1    Other lesions of oral mucosa          Care Instructions    General Scheduling Information  To schedule your CT/MRI scan, please contact [facility] at [phone] OR [facility] at [phone]    To schedule your Surgery, please contact our Specialty Schedulers at [phone]      ENT Clinic Locations Clinic Hours Telephone Number     [place]  [address]  [place] [address]     2nd & 4th Thursday:           8:00am - 12:00pm   To schedule/reschedule an appointment with   Dr. Hinkle,   please contact our   Specialty Scheduling Department at:     [phone]       [facility]  [address] [place] [address] [place] [address]   Monday:             8:00am -- 4:30 pm      1st, 3rd & 5th Thursday:           8:00am - 12:00pm      [address] [address]   Wednesday:       9:00 -- 4:30 pm                    Follow-ups after your visit        Who to contact     If you have questions or need follow up information about today's clinic visit or your schedule please contact [facility] directly at [phone].  Normal or non-critical lab and imaging results will be communicated to you by MyChart, letter or phone within 4 business days after the clinic has received the results. If you do not hear from us within 7 days, please contact the clinic through MyChart or phone. If you have a critical or abnormal lab result, we will notify you by phone as soon as possible.  Submit refill requests through [employer] or call your pharmacy and they will forward the refill request to us. Please allow 3 business days for  your refill to be completed.          Additional Information About Your Visit        MyChart Information     [employer] gives you secure access to your electronic health record. If you see a primary care provider, you can also send messages to your care team and make appointments. If you have questions, please call your primary care clinic.  If you do not have a primary care provider, please call [phone] and they will assist you.        Care EveryWhere ID     This is your Care EveryWhere ID. This could be used by other organizations to access your [facility] medical records  [id number]        Your Vitals Were     Pulse Respirations Pulse Oximetry             89 12 99%          Blood Pressure from Last 3 Encounters:   04/12/18 147/77   03/23/18 100/52   01/16/18 116/70    Weight from Last 3 Encounters:   03/23/18 64.2 kg (141 lb 9.6 oz)   02/07/18 63.5 kg (140 lb)   01/16/18 63.5 kg (140 lb)              Today, you had the following     No orders found for display         Today's Medication Changes          These changes are accurate as of 4/12/18  5:40 PM.  If you have any questions, ask your nurse or doctor.               Start taking these medicines.        Dose/Directions    fluticasone 50 MCG/ACT spray   Commonly known as:  FLONASE   Used for:  Chronic sinusitis, unspecified location   Started by:  Freddie Hinkle MD        Dose:  1-2 spray   Spray 1-2 sprays into both nostrils daily   Quantity:  1 Bottle   Refills:  3            Where to get your medicines      These medications were sent to [id number] - [place] - [address]  [address] [address]     Phone:  [phone]     fluticasone 50 MCG/ACT spray                Primary Care Provider Office Phone # Fax #    Patrica Kelly [phone] [address] [facility] [place] [address]        Equal Access to Services     [employer]: Aimee Shaw, ana wilson, qarod rodriguez  vicki vangdarian herbertaan ah. So [facility] [phone].    ATENCIÓN: Si crescenciola jose, tiene a alejo disposición servicios gratuitos de asistencia lingüística. Amandeep al [phone].    We comply with applicable federal civil rights laws and [place] laws. We do not discriminate on the basis of race, color, national origin, age, disability, sex, sexual orientation, or gender identity.            Thank you!     Thank you for choosing [facility]  for your care. Our goal is always to provide you with excellent care. Hearing back from our patients is one way we can continue to improve our services. Please take a few minutes to complete the written survey that you may receive in the mail after your visit with us. Thank you!             Your Updated Medication List - Protect others around you: Learn how to safely use, store and throw away your medicines at www.disposemymeds.org.          This list is accurate as of 4/12/18  5:40 PM.  Always use your most recent med list.                   Brand Name Dispense Instructions for use Diagnosis    APRODINE PO      Take by mouth as needed        azelastine 0.1 % spray    ASTELIN    1 Bottle    Spray 2 sprays into both nostrils daily    Nasal drainage       CALCIUM PO           cyanocobalamin 1000 MCG tablet    vitamin  B-12    90 tablet    Take 1 tablet (1,000 mcg) by mouth daily    Vitamin B 12 deficiency       fluticasone 50 MCG/ACT spray    FLONASE    1 Bottle    Spray 1-2 sprays into both nostrils daily    Chronic sinusitis, unspecified location       magnesium 200 MG Tabs     1 tablet    Take 200 mg by mouth daily        omega-3 acid ethyl esters 1 g capsule    Lovaza    120 capsule    Take 2 capsules (2 g) by mouth daily        omeprazole 40 MG capsule    priLOSEC    30 capsule    Take 1 capsule (40 mg) by mouth daily Take 30-60 minutes before a meal.        predniSONE 20 MG tablet    DELTASONE    10 tablet    Take 1 tablet (20 mg) by mouth  2 times daily    Nasal sinus polyp       ranitidine 150 MG tablet    ZANTAC    1 tablet    Take 1 tablet (150 mg) by mouth daily        rOPINIRole 0.25 MG tablet    REQUIP    90 tablet    Take 1 tablet (0.25 mg) by mouth At Bedtime    Restless leg       sertraline 100 MG tablet    ZOLOFT    90 tablet    Take 0.5 tablets (50 mg) by mouth daily    Major depression in complete remission (H)       SUMAtriptan 50 MG tablet    IMITREX    18 tablet    Take 1 tablet (50 mg) by mouth at onset of headache for migraine May repeat dose in 2 hours.  Do not exceed 200 mg in 24 hours    Other migraine without status migrainosus, not intractable       traZODone 100 MG tablet    DESYREL    90 tablet    Take 1 tablet (100 mg) by mouth At Bedtime    Insomnia, unspecified type       ZYRTEC ALLERGY 10 MG Caps   Generic drug:  cetirizine HCl      Take by mouth as needed

## 2018-04-12 NOTE — PATIENT INSTRUCTIONS
General Scheduling Information  To schedule your CT/MRI scan, please contact [facility] at [phone] OR [facility] at [phone]    To schedule your Surgery, please contact our Specialty Schedulers at [phone]      ENT Clinic Locations Clinic Hours Telephone Number     [place] [place]  [address] [facility]  [place] [address]     2nd & 4th Thursday:           8:00am - 12:00pm   To schedule/reschedule an appointment with   Dr. Hinkle,   please contact our   Specialty Scheduling Department at:     [phone]       [facility] [place]  [address] [place] [address] [place] [address]   Monday:             8:00am -- 4:30 pm      1st, 3rd & 5th Thursday:           8:00am - 12:00pm      [place] [address] [address]   Wednesday:       9:00 -- 4:30 pm

## 2018-06-25 NOTE — PROGRESS NOTES
SUBJECTIVE:   Amee Crews is a 67 year old female who presents to clinic today for the following health issues:      Medication Followup of Zoloft    Taking Medication as prescribed: yes    Side Effects:  None    Medication Helping Symptoms:  yes       Depression Followup    Status since last visit: Stable     See PHQ-9 for current symptoms.  Other associated symptoms: None    Complicating factors:   Significant life event:  No   Current substance abuse:  None  Anxiety or Panic symptoms:  No    PHQ-9 8/23/2017 12/28/2017 1/16/2018   Total Score 2 7 6   Q9: Suicide Ideation Not at all Not at all Not at all     In the past two weeks have you had thoughts of suicide or self-harm?  No.    Do you have concerns about your personal safety or the safety of others?   No  PHQ-9  English  PHQ-9   Any Language  Suicide Assessment Five-step Evaluation and Treatment (SAFE-T)  Asthma Follow-Up    Was ACT completed today?    Yes    ACT Total Scores 1/16/2018   ACT TOTAL SCORE -   ASTHMA ER VISITS -   ASTHMA HOSPITALIZATIONS -   ACT TOTAL SCORE (Goal Greater than or Equal to 20) 15   In the past 12 months, how many times did you visit the emergency room for your asthma without being admitted to the hospital? 0   In the past 12 months, how many times were you hospitalized overnight because of your asthma? 0       Recent asthma triggers that patient is dealing with: pollens    Patient notes that she has been using her flovent recently due to increased asthma symptoms with heavy pollen counts.  She feels symptoms are currently stable.  She uses her rescue inhaler rarely.  Patient notes that she only uses flovent during allergy season.    Patient wonders about a lesion to her right upper arm.  She denies scaling or bleeding.    Problem list and histories reviewed & adjusted, as indicated.  Additional history: as documented    Patient Active Problem List   Diagnosis     Anxiety     Migraine headache     Hot flashes     Hyperlipidemia  LDL goal <130     Chondromalacia patellae     Insomnia     Allergic state     Chronic back pain     Mild persistent asthma without complication     Restless leg     Vaginal atrophy     Nasal polyposis     Fatty liver     Chronic sinusitis, unspecified location     Chronic rhinitis     Major depression in complete remission (H)     Dense breast tissue     Osteopenia of multiple sites     Atrophic vaginitis     Primary osteoarthritis of both hands     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, moderatly severe,slowly worsening     Pinguecula of both eyes     Nuclear cataract of both eyes     Myopia, bilateral     Macular cyst, hole, or pseudohole, bilateral, moderatly severe     Adult vitelliform macular degeneration     Gastroesophageal reflux disease without esophagitis     Best vitelliform macular dystrophy     DDD (degenerative disc disease), lumbar     Chronic pain of left knee     Vitamin B12 deficiency     Osteopenia, unspecified location     Past Surgical History:   Procedure Laterality Date     APPENDECTOMY      age 9      BIOPSY      took tissue from my uterus at least 15 years ago     COLONOSCOPY  14 years     COLONOSCOPY N/A 4/30/2015    Procedure: COMBINED COLONOSCOPY, SINGLE OR MULTIPLE BIOPSY/POLYPECTOMY BY BIOPSY;  Surgeon: Doni Carey MD;  Location: [facility] OR     COLONOSCOPY WITH CO2 INSUFFLATION N/A 4/30/2015    Procedure: COLONOSCOPY WITH CO2 INSUFFLATION;  Surgeon: Doni Carey MD;  Location: [facility] OR     COMBINED ESOPHAGOSCOPY, GASTROSCOPY, DUODENOSCOPY (EGD) WITH CO2 INSUFFLATION N/A 8/24/2017    Procedure: COMBINED ESOPHAGOSCOPY, GASTROSCOPY, DUODENOSCOPY (EGD) WITH CO2 INSUFFLATION;  EGD, Gastroesophageal reflux disease, esophagitis presence not specified Abdominal pain, generalized, Ref Germaine-Schroeder, 24.78 BMI;  Surgeon: Duane, William Charles, MD;  Location: [facility] OR     ENT SURGERY      Tonsillectomy     ESOPHAGOSCOPY, GASTROSCOPY, DUODENOSCOPY (EGD),  COMBINED N/A 8/24/2017    Procedure: COMBINED ESOPHAGOSCOPY, GASTROSCOPY, DUODENOSCOPY (EGD), BIOPSY SINGLE OR MULTIPLE;;  Surgeon: Duane, William Charles, MD;  Location: [facility] OR     LAPAROSCOPY PROCEDURE UNLISTED      polyps found     MOUTH SURGERY  2016     SINUS SURGERY      x2       Social History   Substance Use Topics     Smoking status: Former Smoker     Packs/day: 0.00     Types: Cigarettes     Quit date: 1/4/1973     Smokeless tobacco: Never Used     Alcohol use 0.0 oz/week     0 Standard drinks or equivalent per week      Comment: ocass     Family History   Problem Relation Age of Onset     Diabetes Mother      Hypertension Mother      Cancer Father      stomach      Cancer Maternal Grandfather      lung      HEART DISEASE Other          Current Outpatient Prescriptions   Medication Sig Dispense Refill     albuterol (PROAIR HFA/PROVENTIL HFA/VENTOLIN HFA) 108 (90 Base) MCG/ACT Inhaler Inhale 2 puffs into the lungs every 4 hours as needed for shortness of breath / dyspnea 1 Inhaler 2     azelastine (ASTELIN) 0.1 % spray Spray 2 sprays into both nostrils daily 1 Bottle 2     cetirizine HCl (ZYRTEC ALLERGY) 10 MG CAPS Take 10 mg by mouth as needed 90 capsule 3     cyanocobalamin (VITAMIN  B-12) 1000 MCG tablet Take 1 tablet (1,000 mcg) by mouth daily 90 tablet 3     diclofenac (VOLTAREN) 1 % GEL topical gel Place 2 g onto the skin 4 times daily       fluticasone (FLONASE) 50 MCG/ACT spray Spray 1-2 sprays into both nostrils daily 1 Bottle 3     fluticasone (FLOVENT HFA) 220 MCG/ACT Inhaler 1 puff twice daily, rinse mouth out after Medication 10.6 g 1     magnesium 200 MG TABS Take 200 mg by mouth daily 1 tablet 0     omega-3 acid ethyl esters (LOVAZA) 1 G capsule Take 2 capsules (2 g) by mouth daily 120 capsule      omeprazole (PRILOSEC) 40 MG capsule Take 1 capsule (40 mg) by mouth daily Take 30-60 minutes before a meal. 30 capsule 2     ranitidine (ZANTAC) 150 MG tablet Take 1 tablet (150 mg) by mouth daily 1  "tablet 0     rOPINIRole (REQUIP) 0.25 MG tablet Take 1 tablet (0.25 mg) by mouth At Bedtime 90 tablet 3     sertraline (ZOLOFT) 100 MG tablet Take 0.5 tablets (50 mg) by mouth daily 90 tablet 1     SUMAtriptan (IMITREX) 50 MG tablet Take 1 tablet (50 mg) by mouth at onset of headache for migraine May repeat dose in 2 hours.  Do not exceed 200 mg in 24 hours 18 tablet 4     traZODone (DESYREL) 100 MG tablet Take 1 tablet (100 mg) by mouth At Bedtime 90 tablet 3     Triprolidine-Pseudoephedrine (APRODINE PO) Take by mouth as needed       CALCIUM PO        sertraline (ZOLOFT) 100 MG tablet TAKE ONE-HALF TABLET BY MOUTH DAILY **NEEDS DR. VALDES (Patient not taking: Reported on 6/26/2018) 15 tablet 0     [DISCONTINUED] sertraline (ZOLOFT) 100 MG tablet Take 0.5 tablets (50 mg) by mouth daily 90 tablet 1     Allergies   Allergen Reactions     Atorvastatin Cramps     Muscle pain     Morphine Sulfate Itching     BP Readings from Last 3 Encounters:   06/26/18 120/72   04/12/18 147/77   03/23/18 100/52    Wt Readings from Last 3 Encounters:   06/26/18 141 lb 9.6 oz (64.2 kg)   03/23/18 141 lb 9.6 oz (64.2 kg)   02/07/18 140 lb (63.5 kg)                  Labs reviewed in EPIC    Reviewed and updated as needed this visit by clinical staff  Tobacco  Allergies  Meds  Problems  Med Hx  Surg Hx  Fam Hx  Soc Hx        Reviewed and updated as needed this visit by Provider  Allergies  Meds  Problems         ROS:  Constitutional, HEENT, cardiovascular, pulmonary, gi and gu systems are negative, except as otherwise noted.    OBJECTIVE:     /72  Pulse 76  Temp 98.2  F (36.8  C) (Oral)  Resp 16  Ht 5' 2.5\" (1.588 m)  Wt 141 lb 9.6 oz (64.2 kg)  SpO2 99%  BMI 25.49 kg/m2  Body mass index is 25.49 kg/(m^2).  GENERAL: healthy, alert and no distress  RESP: lungs clear to auscultation - no rales, rhonchi or wheezes  CV: regular rate and rhythm, normal S1 S2, no S3 or S4, no murmur, click or rub, no peripheral edema and " peripheral pulses strong  MS: no gross musculoskeletal defects noted, no edema  Skin: solar lentigo noted to right upper arm.  PSYCH: mentation appears normal, affect normal/bright    Diagnostic Test Results:  none     ASSESSMENT/PLAN:     1. Major depression in complete remission (H)  Stable.  Continue current treatment plan and medications.    - sertraline (ZOLOFT) 100 MG tablet; Take 0.5 tablets (50 mg) by mouth daily  Dispense: 90 tablet; Refill: 1    2. Mild intermittent asthma without complication  Stable.  Continue current treatment plan and medications.    - albuterol (PROAIR HFA/PROVENTIL HFA/VENTOLIN HFA) 108 (90 Base) MCG/ACT Inhaler; Inhale 2 puffs into the lungs every 4 hours as needed for shortness of breath / dyspnea  Dispense: 1 Inhaler; Refill: 2    3. Chronic seasonal allergic rhinitis due to pollen  Stable.  Continue current treatment plan and medications.    - fluticasone (FLOVENT HFA) 220 MCG/ACT Inhaler; 1 puff twice daily, rinse mouth out after Medication  Dispense: 10.6 g; Refill: 1  - cetirizine HCl (ZYRTEC ALLERGY) 10 MG CAPS; Take 10 mg by mouth as needed  Dispense: 90 capsule; Refill: 3    FUTURE APPOINTMENTS:       - Follow-up for annual visit or as needed    ROBYN Maloney [facility]

## 2018-06-26 ENCOUNTER — OFFICE VISIT (OUTPATIENT)
Dept: FAMILY MEDICINE | Facility: CLINIC | Age: 68
End: 2018-06-26
Payer: COMMERCIAL

## 2018-06-26 VITALS
OXYGEN SATURATION: 99 % | HEIGHT: 63 IN | TEMPERATURE: 98.2 F | HEART RATE: 76 BPM | BODY MASS INDEX: 25.09 KG/M2 | DIASTOLIC BLOOD PRESSURE: 72 MMHG | WEIGHT: 141.6 LBS | SYSTOLIC BLOOD PRESSURE: 120 MMHG | RESPIRATION RATE: 16 BRPM

## 2018-06-26 DIAGNOSIS — J45.20 MILD INTERMITTENT ASTHMA WITHOUT COMPLICATION: ICD-10-CM

## 2018-06-26 DIAGNOSIS — J30.1 CHRONIC SEASONAL ALLERGIC RHINITIS DUE TO POLLEN: ICD-10-CM

## 2018-06-26 DIAGNOSIS — F32.5 MAJOR DEPRESSION IN COMPLETE REMISSION (H): Primary | ICD-10-CM

## 2018-06-26 PROCEDURE — 99214 OFFICE O/P EST MOD 30 MIN: CPT | Performed by: NURSE PRACTITIONER

## 2018-06-26 RX ORDER — FLUTICASONE PROPIONATE 220 UG/1
AEROSOL, METERED RESPIRATORY (INHALATION)
Qty: 10.6 G | Refills: 1 | Status: SHIPPED | OUTPATIENT
Start: 2018-06-26 | End: 2018-11-14

## 2018-06-26 RX ORDER — SERTRALINE HYDROCHLORIDE 100 MG/1
50 TABLET, FILM COATED ORAL DAILY
Qty: 90 TABLET | Refills: 1 | Status: SHIPPED | OUTPATIENT
Start: 2018-06-26 | End: 2018-11-14

## 2018-06-26 RX ORDER — ALBUTEROL SULFATE 90 UG/1
2 AEROSOL, METERED RESPIRATORY (INHALATION) EVERY 4 HOURS PRN
Qty: 1 INHALER | Refills: 2 | Status: SHIPPED | OUTPATIENT
Start: 2018-06-26 | End: 2018-11-14

## 2018-06-26 ASSESSMENT — ANXIETY QUESTIONNAIRES
2. NOT BEING ABLE TO STOP OR CONTROL WORRYING: NOT AT ALL
1. FEELING NERVOUS, ANXIOUS, OR ON EDGE: NOT AT ALL
GAD7 TOTAL SCORE: 0
3. WORRYING TOO MUCH ABOUT DIFFERENT THINGS: NOT AT ALL
IF YOU CHECKED OFF ANY PROBLEMS ON THIS QUESTIONNAIRE, HOW DIFFICULT HAVE THESE PROBLEMS MADE IT FOR YOU TO DO YOUR WORK, TAKE CARE OF THINGS AT HOME, OR GET ALONG WITH OTHER PEOPLE: SOMEWHAT DIFFICULT
6. BECOMING EASILY ANNOYED OR IRRITABLE: NOT AT ALL
7. FEELING AFRAID AS IF SOMETHING AWFUL MIGHT HAPPEN: NOT AT ALL
5. BEING SO RESTLESS THAT IT IS HARD TO SIT STILL: NOT AT ALL

## 2018-06-26 ASSESSMENT — PAIN SCALES - GENERAL: PAINLEVEL: NO PAIN (0)

## 2018-06-26 ASSESSMENT — PATIENT HEALTH QUESTIONNAIRE - PHQ9: 5. POOR APPETITE OR OVEREATING: NOT AT ALL

## 2018-06-26 NOTE — MR AVS SNAPSHOT
After Visit Summary   6/26/2018    Amee Crews    MRN: [id number]           Patient Information     Date Of Birth          1950        Visit Information        Provider Department      6/26/2018 11:00 AM Gerda Kate APRN [facility]        Today's Diagnoses     Major depression in complete remission (H)    -  1    Mild intermittent asthma without complication        Chronic seasonal allergic rhinitis due to pollen          Care Instructions    Schedule Ob/Gyn follow-up.  [place] [phone]   [email or web address] - Dr. Gerda Coyle    [facility]    If you have any questions regarding to your visit please contact your care team:     Team Pink:   Clinic Hours Telephone Number   Internal Medicine:  Dr. Patrica Kate, NP       7am-7pm  Monday - Thursday   7am-5pm  Fridays  [phone]  (Appointment scheduling available 24/7)    Questions about your recent visit?  Team Line  [phone]   Urgent Care - [place] - 11am-9pm Monday-Friday Saturday-Sunday- 9am-5pm   [place] - 5pm-9pm Monday-Friday Saturday-Sunday- 9am-5pm  [phone] - Makenna Fernandez  [phone] - [place]       What options do I have for a visit other than an office visit? We offer electronic visits (e-visits) and telephone visits, when medically appropriate.  Please check with your medical insurance to see if these types of visits are covered, as you will be responsible for any charges that are not paid by your insurance.      You can use Drywave (secure electronic communication) to access to your chart, send your primary care provider a message, or make an appointment. Ask a team member how to get started.     For a price quote for your services, please call our Consumer Price Line at [phone] or our Imaging Cost estimation line at [phone] (for imaging tests).  Esperanza Burr  "CMA             Follow-ups after your visit        Who to contact     If you have questions or need follow up information about today's clinic visit or your schedule please contact [facility] directly at [phone].  Normal or non-critical lab and imaging results will be communicated to you by MyChart, letter or phone within 4 business days after the clinic has received the results. If you do not hear from us within 7 days, please contact the clinic through Sebaciahart or phone. If you have a critical or abnormal lab result, we will notify you by phone as soon as possible.  Submit refill requests through Avidia or call your pharmacy and they will forward the refill request to us. Please allow 3 business days for your refill to be completed.          Additional Information About Your Visit        Sebaciahart Information     Avidia gives you secure access to your electronic health record. If you see a primary care provider, you can also send messages to your care team and make appointments. If you have questions, please call your primary care clinic.  If you do not have a primary care provider, please call [phone] and they will assist you.        Care EveryWhere ID     This is your Care EveryWhere ID. This could be used by other organizations to access your [place] medical records  [id number]        Your Vitals Were     Pulse Temperature Respirations Height Pulse Oximetry BMI (Body Mass Index)    76 98.2  F (36.8  C) (Oral) 16 5' 2.5\" (1.588 m) 99% 25.49 kg/m2       Blood Pressure from Last 3 Encounters:   06/26/18 120/72   04/12/18 147/77   03/23/18 100/52    Weight from Last 3 Encounters:   06/26/18 141 lb 9.6 oz (64.2 kg)   03/23/18 141 lb 9.6 oz (64.2 kg)   02/07/18 140 lb (63.5 kg)              Today, you had the following     No orders found for display         Today's Medication Changes          These changes are accurate as of 6/26/18 11:54 AM.  If you have any questions, ask your nurse or " doctor.               Start taking these medicines.        Dose/Directions    albuterol 108 (90 Base) MCG/ACT Inhaler   Commonly known as:  PROAIR HFA/PROVENTIL HFA/VENTOLIN HFA   Used for:  Mild intermittent asthma without complication   Started by:  Gerda Kate APRN CNP        Dose:  2 puff   Inhale 2 puffs into the lungs every 4 hours as needed for shortness of breath / dyspnea   Quantity:  1 Inhaler   Refills:  2       fluticasone 220 MCG/ACT Inhaler   Commonly known as:  FLOVENT HFA   Used for:  Chronic seasonal allergic rhinitis due to pollen   Started by:  Gerda Kate APRN CNP        1 puff twice daily, rinse mouth out after Medication   Quantity:  10.6 g   Refills:  1         These medicines have changed or have updated prescriptions.        Dose/Directions    cetirizine HCl 10 MG Caps   Commonly known as:  ZYRTEC ALLERGY   This may have changed:  how much to take   Used for:  Chronic seasonal allergic rhinitis due to pollen   Changed by:  Gerda Kate APRN CNP        Dose:  10 mg   Take 10 mg by mouth as needed   Quantity:  90 capsule   Refills:  3            Where to get your medicines      These medications were sent to [id number] - [place] - [address]  [address] [facility] [address]     Phone:  [phone]     albuterol 108 (90 Base) MCG/ACT Inhaler    cetirizine HCl 10 MG Caps    fluticasone 220 MCG/ACT Inhaler    sertraline 100 MG tablet                Primary Care Provider Office Phone # Fax #    Patrica Kelly [phone] [address] [address]        Equal Access to Services     [facility]: Hadii aad ku hadasho Soomaali, waaxda luqadaha, qaybta kaalmada adeegyaanirudh, vicki rosales. So [facility] [phone].    ATENCIÓN: Si habla español, tiene a alejo disposición servicios gratuitos de asistencia lingüística. Llame al [phone].    We comply with applicable federal  civil rights laws and [place] laws. We do not discriminate on the basis of race, color, national origin, age, disability, sex, sexual orientation, or gender identity.            Thank you!     Thank you for choosing [facility]  for your care. Our goal is always to provide you with excellent care. Hearing back from our patients is one way we can continue to improve our services. Please take a few minutes to complete the written survey that you may receive in the mail after your visit with us. Thank you!             Your Updated Medication List - Protect others around you: Learn how to safely use, store and throw away your medicines at www.disposemymeds.org.          This list is accurate as of 6/26/18 11:54 AM.  Always use your most recent med list.                   Brand Name Dispense Instructions for use Diagnosis    albuterol 108 (90 Base) MCG/ACT Inhaler    PROAIR HFA/PROVENTIL HFA/VENTOLIN HFA    1 Inhaler    Inhale 2 puffs into the lungs every 4 hours as needed for shortness of breath / dyspnea    Mild intermittent asthma without complication       APRODINE PO      Take by mouth as needed        azelastine 0.1 % spray    ASTELIN    1 Bottle    Spray 2 sprays into both nostrils daily    Nasal drainage       CALCIUM PO           cetirizine HCl 10 MG Caps    ZYRTEC ALLERGY    90 capsule    Take 10 mg by mouth as needed    Chronic seasonal allergic rhinitis due to pollen       cyanocobalamin 1000 MCG tablet    vitamin  B-12    90 tablet    Take 1 tablet (1,000 mcg) by mouth daily    Vitamin B 12 deficiency       diclofenac 1 % Gel topical gel    VOLTAREN     Place 2 g onto the skin 4 times daily        fluticasone 220 MCG/ACT Inhaler    FLOVENT HFA    10.6 g    1 puff twice daily, rinse mouth out after Medication    Chronic seasonal allergic rhinitis due to pollen       fluticasone 50 MCG/ACT spray    FLONASE    1 Bottle    Spray 1-2 sprays into both nostrils daily    Chronic sinusitis, unspecified  location       magnesium 200 MG Tabs     1 tablet    Take 200 mg by mouth daily        omega-3 acid ethyl esters 1 g capsule    Lovaza    120 capsule    Take 2 capsules (2 g) by mouth daily        omeprazole 40 MG capsule    priLOSEC    30 capsule    Take 1 capsule (40 mg) by mouth daily Take 30-60 minutes before a meal.        ranitidine 150 MG tablet    ZANTAC    1 tablet    Take 1 tablet (150 mg) by mouth daily        rOPINIRole 0.25 MG tablet    REQUIP    90 tablet    Take 1 tablet (0.25 mg) by mouth At Bedtime    Restless leg       * sertraline 100 MG tablet    ZOLOFT    15 tablet    TAKE ONE-HALF TABLET BY MOUTH DAILY **NEEDS DR. DRAKE.    Major depression in complete remission (H)       * sertraline 100 MG tablet    ZOLOFT    90 tablet    Take 0.5 tablets (50 mg) by mouth daily    Major depression in complete remission (H)       SUMAtriptan 50 MG tablet    IMITREX    18 tablet    Take 1 tablet (50 mg) by mouth at onset of headache for migraine May repeat dose in 2 hours.  Do not exceed 200 mg in 24 hours    Other migraine without status migrainosus, not intractable       traZODone 100 MG tablet    DESYREL    90 tablet    Take 1 tablet (100 mg) by mouth At Bedtime    Insomnia, unspecified type       * Notice:  This list has 2 medication(s) that are the same as other medications prescribed for you. Read the directions carefully, and ask your doctor or other care provider to review them with you.

## 2018-06-26 NOTE — PATIENT INSTRUCTIONS
Schedule Ob/Gyn follow-up.  [place] [phone]   [email or web address] - Dr. Gerda Coyle    [facility]    If you have any questions regarding to your visit please contact your care team:     Team Pink:   Clinic Hours Telephone Number   Internal Medicine:  Dr. Patrica Kate, NP       7am-7pm  Monday - Thursday   7am-5pm  Fridays  [phone]  (Appointment scheduling available 24/7)    Questions about your recent visit?  Team Line  [phone]   Urgent Care - [place] [facility] - 11am-9pm Monday-Friday Saturday-Sunday- 9am-5pm   [place] - 5pm-9pm Monday-Friday Saturday-Sunday- 9am-5pm  [phone] - [place]  [phone] - [place]       What options do I have for a visit other than an office visit? We offer electronic visits (e-visits) and telephone visits, when medically appropriate.  Please check with your medical insurance to see if these types of visits are covered, as you will be responsible for any charges that are not paid by your insurance.      You can use Effdon (secure electronic communication) to access to your chart, send your primary care provider a message, or make an appointment. Ask a team member how to get started.     For a price quote for your services, please call our Consumer Price Line at [phone] or our Imaging Cost estimation line at [phone] (for imaging tests).  Esperanza Burr CMA

## 2018-06-26 NOTE — LETTER
My Asthma Action Plan  Name: Amee Crews   YOB: 1950  Date: 6/26/2018   My doctor: ROBYN Maloney CNP   My clinic: [facility]        My Control Medicine: Fluticasone propionate (Flovent) -   mcg 1 puff bid  My Rescue Medicine: Albuterol (Proair/Ventolin/Proventil) inhaler 2 puffs every 4 hours as needed   My Asthma Severity: mild persistent  Avoid your asthma triggers: pollens               GREEN ZONE   Good Control    I feel good    No cough or wheeze    Can work, sleep and play without asthma symptoms       Take your asthma control medicine every day.     1. If exercise triggers your asthma, take your rescue medication    15 minutes before exercise or sports, and    During exercise if you have asthma symptoms  2. Spacer to use with inhaler: If you have a spacer, make sure to use it with your inhaler             YELLOW ZONE Getting Worse  I have ANY of these:    I do not feel good    Cough or wheeze    Chest feels tight    Wake up at night   1. Keep taking your Green Zone medications  2. Start taking your rescue medicine:    every 20 minutes for up to 1 hour. Then every 4 hours for 24-48 hours.  3. If you stay in the Yellow Zone for more than 12-24 hours, contact your doctor.  4. If you do not return to the Green Zone in 12-24 hours or you get worse, start taking your oral steroid medicine if prescribed by your provider.           RED ZONE Medical Alert - Get Help  I have ANY of these:    I feel awful    Medicine is not helping    Breathing getting harder    Trouble walking or talking    Nose opens wide to breathe       1. Take your rescue medicine NOW  2. If your provider has prescribed an oral steroid medicine, start taking it NOW  3. Call your doctor NOW  4. If you are still in the Red Zone after 20 minutes and you have not reached your doctor:    Take your rescue medicine again and    Call 911 or go to the emergency room right away    See your regular doctor within  2 weeks of an Emergency Room or Urgent Care visit for follow-up treatment.          Annual Reminders:  Meet with Asthma Educator,  Flu Shot in the Fall, consider Pneumonia Vaccination for patients with asthma (aged 19 and older).    Pharmacy: [id number] [place] - [address]                      Asthma Triggers  How To Control Things That Make Your Asthma Worse    Triggers are things that make your asthma worse.  Look at the list below to help you find your triggers and what you can do about them.  You can help prevent asthma flare-ups by staying away from your triggers.      Trigger                                                          What you can do   Cigarette Smoke  Tobacco smoke can make asthma worse. Do not allow smoking in your home, car or around you.  Be sure no one smokes at a child s day care or school.  If you smoke, ask your health care provider for ways to help you quit.  Ask family members to quit too.  Ask your health care provider for a referral to Quit Plan to help you quit smoking, or call 2-259-118-PLAN.     Colds, Flu, Bronchitis  These are common triggers of asthma. Wash your hands often.  Don t touch your eyes, nose or mouth.  Get a flu shot every year.     Dust Mites  These are tiny bugs that live in cloth or carpet. They are too small to see. Wash sheets and blankets in hot water every week.   Encase pillows and mattress in dust mite proof covers.  Avoid having carpet if you can. If you have carpet, vacuum weekly.   Use a dust mask and HEPA vacuum.   Pollen and Outdoor Mold  Some people are allergic to trees, grass, or weed pollen, or molds. Try to keep your windows closed.  Limit time out doors when pollen count is high.   Ask you health care provider about taking medicine during allergy season.     Animal Dander  Some people are allergic to skin flakes, urine or saliva from pets with fur or feathers. Keep pets with fur or feathers out of your home.    If you can t  keep the pet outdoors, then keep the pet out of your bedroom.  Keep the bedroom door closed.  Keep pets off cloth furniture and away from stuffed toys.     Mice, Rats, and Cockroaches  Some people are allergic to the waste from these pests.   Cover food and garbage.  Clean up spills and food crumbs.  Store grease in the refrigerator.   Keep food out of the bedroom.   Indoor Mold  This can be a trigger if your home has high moisture. Fix leaking faucets, pipes, or other sources of water.   Clean moldy surfaces.  Dehumidify basement if it is damp and smelly.   Smoke, Strong Odors, and Sprays  These can reduce air quality. Stay away from strong odors and sprays, such as perfume, powder, hair spray, paints, smoke incense, paint, cleaning products, candles and new carpet.   Exercise or Sports  Some people with asthma have this trigger. Be active!  Ask your doctor about taking medicine before sports or exercise to prevent symptoms.    Warm up for 5-10 minutes before and after sports or exercise.     Other Triggers of Asthma  Cold air:  Cover your nose and mouth with a scarf.  Sometimes laughing or crying can be a trigger.  Some medicines and food can trigger asthma.

## 2018-06-26 NOTE — LETTER
My Depression Action Plan  Name: Amee Crews   Date of Birth 1950  Date: 6/26/2018    My doctor: Patrica Kelly   My clinic: [address]  [place] [address]  [phone]          GREEN    ZONE   Good Control    What it looks like:     Things are going generally well. You have normal up s and down s. You may even feel depressed from time to time, but bad moods usually last less than a day.   What you need to do:  1. Continue to care for yourself (see self care plan)  2. Check your depression survival kit and update it as needed  3. Follow your physician s recommendations including any medication.  4. Do not stop taking medication unless you consult with your physician first.           YELLOW         ZONE Getting Worse    What it looks like:     Depression is starting to interfere with your life.     It may be hard to get out of bed; you may be starting to isolate yourself from others.    Symptoms of depression are starting to last most all day and this has happened for several days.     You may have suicidal thoughts but they are not constant.   What you need to do:     1. Call your care team, your response to treatment will improve if you keep your care team informed of your progress. Yellow periods are signs an adjustment may need to be made.     2. Continue your self-care, even if you have to fake it!    3. Talk to someone in your support network    4. Open up your depression survival kit           RED    ZONE Medical Alert - Get Help    What it looks like:     Depression is seriously interfering with your life.     You may experience these or other symptoms: You can t get out of bed most days, can t work or engage in other necessary activities, you have trouble taking care of basic hygiene, or basic responsibilities, thoughts of suicide or death that will not go away, self-injurious behavior.     What you need to do:  1. Call your care team and request  a same-day appointment. If they are not available (weekends or after hours) call your local crisis line, emergency room or 911.            Depression Self Care Plan / Survival Kit    Self-Care for Depression  Here s the deal. Your body and mind are really not as separate as most people think.  What you do and think affects how you feel and how you feel influences what you do and think. This means if you do things that people who feel good do, it will help you feel better.  Sometimes this is all it takes.  There is also a place for medication and therapy depending on how severe your depression is, so be sure to consult with your medical provider and/ or Behavioral Health Consultant if your symptoms are worsening or not improving.     In order to better manage my stress, I will:    Exercise  Get some form of exercise, every day. This will help reduce pain and release endorphins, the  feel good  chemicals in your brain. This is almost as good as taking antidepressants!  This is not the same as joining a gym and then never going! (they count on that by the way ) It can be as simple as just going for a walk or doing some gardening, anything that will get you moving.      Hygiene   Maintain good hygiene (Get out of bed in the morning, Make your bed, Brush your teeth, Take a shower, and Get dressed like you were going to work, even if you are unemployed).  If your clothes don't fit try to get ones that do.    Diet  I will strive to eat foods that are good for me, drink plenty of water, and avoid excessive sugar, caffeine, alcohol, and other mood-altering substances.  Some foods that are helpful in depression are: complex carbohydrates, B vitamins, flaxseed, fish or fish oil, fresh fruits and vegetables.    Psychotherapy  I agree to participate in Individual Therapy (if recommended).    Medication  If prescribed medications, I agree to take them.  Missing doses can result in serious side effects.  I understand that drinking  alcohol, or other illicit drug use, may cause potential side effects.  I will not stop my medication abruptly without first discussing it with my provider.    Staying Connected With Others  I will stay in touch with my friends, family members, and my primary care provider/team.    Use your imagination  Be creative.  We all have a creative side; it doesn t matter if it s oil painting, sand castles, or mud pies! This will also kick up the endorphins.    Witness Beauty  (AKA stop and smell the roses) Take a look outside, even in mid-winter. Notice colors, textures. Watch the squirrels and birds.     Service to others  Be of service to others.  There is always someone else in need.  By helping others we can  get out of ourselves  and remember the really important things.  This also provides opportunities for practicing all the other parts of the program.    Humor  Laugh and be silly!  Adjust your TV habits for less news and crime-drama and more comedy.    Control your stress  Try breathing deep, massage therapy, biofeedback, and meditation. Find time to relax each day.     My support system    Clinic Contact:  Phone number:    Contact 1:  Phone number:    Contact 2:  Phone number:    [facility]/:  Phone number:    Therapist:  Phone number:    Local crisis center:    Phone number:    Other community support:  Phone number:

## 2018-06-27 ASSESSMENT — PATIENT HEALTH QUESTIONNAIRE - PHQ9: SUM OF ALL RESPONSES TO PHQ QUESTIONS 1-9: 1

## 2018-06-27 ASSESSMENT — ASTHMA QUESTIONNAIRES: ACT_TOTALSCORE: 22

## 2018-06-27 ASSESSMENT — ANXIETY QUESTIONNAIRES: GAD7 TOTAL SCORE: 0

## 2018-07-20 NOTE — PROGRESS NOTES
History of Present Illness - Amee Crews is a 67 year old female presenting in clinic today for a recheck on Patient presents with:  RECHECK: 3 month follow up    Patient reports that the drainage from her nose is still constant. She has tried Rhinocort and Astelin without relief of symptoms.     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     Allergic rhinitis      Allergies      asthma     mild intermittent     Chronic back pain     neck and low back     HA (headache)     muscle contraction      History of blood transfusion     In childhood     Hot flashes      Hyperlipidemia      Insomnia      Intermittent asthma 9/16/2011     Major depressive disorder, single episode, moderate (H)      Ocular migraine      Osteoarthritis of hand      Restless leg 9/16/2011       Current Medications -   Current Outpatient Prescriptions:      albuterol (PROAIR HFA/PROVENTIL HFA/VENTOLIN HFA) 108 (90 Base) MCG/ACT Inhaler, Inhale 2 puffs into the lungs every 4 hours as needed for shortness of breath / dyspnea, Disp: 1 Inhaler, Rfl: 2     azelastine (ASTELIN) 0.1 % spray, Spray 2 sprays into both nostrils daily, Disp: 1 Bottle, Rfl: 2     CALCIUM PO, , Disp: , Rfl:      cetirizine HCl (ZYRTEC ALLERGY) 10 MG CAPS, Take 10 mg by mouth as needed, Disp: 90 capsule, Rfl: 3     cyanocobalamin (VITAMIN  B-12) 1000 MCG tablet, Take 1 tablet (1,000 mcg) by mouth daily, Disp: 90 tablet, Rfl: 3     diclofenac (VOLTAREN) 1 % GEL topical gel, Place 2 g onto the skin 4 times daily, Disp: , Rfl:      fluticasone (FLONASE) 50 MCG/ACT spray, Spray 1-2 sprays into both nostrils daily, Disp: 1 Bottle, Rfl: 3     fluticasone (FLOVENT HFA) 220 MCG/ACT Inhaler, 1 puff twice daily, rinse mouth out after Medication, Disp: 10.6 g, Rfl: 1     magnesium 200 MG TABS, Take 200 mg by mouth daily, Disp: 1 tablet, Rfl: 0     omega-3 acid ethyl esters (LOVAZA) 1 G capsule, Take 2 capsules (2  g) by mouth daily, Disp: 120 capsule, Rfl:      omeprazole (PRILOSEC) 40 MG capsule, Take 1 capsule (40 mg) by mouth daily Take 30-60 minutes before a meal., Disp: 30 capsule, Rfl: 2     ranitidine (ZANTAC) 150 MG tablet, Take 1 tablet (150 mg) by mouth daily, Disp: 1 tablet, Rfl: 0     rOPINIRole (REQUIP) 0.25 MG tablet, Take 1 tablet (0.25 mg) by mouth At Bedtime, Disp: 90 tablet, Rfl: 3     sertraline (ZOLOFT) 100 MG tablet, Take 0.5 tablets (50 mg) by mouth daily, Disp: 90 tablet, Rfl: 1     sertraline (ZOLOFT) 100 MG tablet, TAKE ONE-HALF TABLET BY MOUTH DAILY **NEEDS DR. VALDES, Disp: 15 tablet, Rfl: 0     SUMAtriptan (IMITREX) 50 MG tablet, Take 1 tablet (50 mg) by mouth at onset of headache for migraine May repeat dose in 2 hours.  Do not exceed 200 mg in 24 hours, Disp: 18 tablet, Rfl: 4     traZODone (DESYREL) 100 MG tablet, Take 1 tablet (100 mg) by mouth At Bedtime, Disp: 90 tablet, Rfl: 3     Triprolidine-Pseudoephedrine (APRODINE PO), Take by mouth as needed, Disp: , Rfl:     Allergies -   Allergies   Allergen Reactions     Atorvastatin Cramps     Muscle pain     Morphine Sulfate Itching       Social History -   Social History     Social History     Marital status:      Spouse name: N/A     Number of children: 2     Years of education: N/A     Occupational History      Self     Social History Main Topics     Smoking status: Former Smoker     Packs/day: 0.00     Types: Cigarettes     Quit date: 1/4/1973     Smokeless tobacco: Never Used     Alcohol use 0.0 oz/week     0 Standard drinks or equivalent per week      Comment: ocass     Drug use: No     Sexual activity: Yes     Partners: Male     Other Topics Concern     Parent/Sibling W/ Cabg, Mi Or Angioplasty Before 65f 55m? No     Social History Narrative       Family History -   Family History   Problem Relation Age of Onset     Diabetes Mother      Hypertension Mother      Cancer Father      stomach      Cancer Maternal Grandfather      lung       HEART DISEASE Other        Review of Systems - As per HPI and PMHx, otherwise review of system review of the head and neck negative.    Physical Exam  /78  Pulse 78  Wt 64 kg (141 lb)  SpO2 98%  BMI 25.38 kg/m2  BMI: Body mass index is 25.38 kg/(m^2).    General - The patient is well nourished and well developed, and appears to have good nutritional status.  Alert and oriented to person and place, answers questions and cooperates with examination appropriately.    SKIN - No suspicious lesions or rashes.  Respiration - No respiratory distress.  Head and Face - Normocephalic and atraumatic, with no gross asymmetry noted of the contour of the facial features.  The facial nerve is intact, with strong symmetric movements.    Voice and Breathing - The patient was breathing comfortably without the use of accessory muscles. The patients voice was clear and strong, and had appropriate pitch and quality.    Ears - Bilateral pinna and EACs with normal appearing overlying skin. Tympanic membrane intact with good mobility on pneumatic otoscopy bilaterally. Bony landmarks of the ossicular chain are normal. The tympanic membranes are normal in appearance. No retraction, perforation, or masses.  No fluid or purulence was seen in the external canal or the middle ear.     Eyes - Extraocular movements intact.  Sclera were not icteric or injected, conjunctiva were pink and moist.    Mouth - Examination of the oral cavity showed pink, healthy oral mucosa. No lesions or ulcerations noted.  The tongue was mobile and midline, and the dentition were in good condition.      Throat - The walls of the oropharynx were smooth, pink, moist, symmetric, and had no lesions or ulcerations.  The tonsillar pillars and soft palate were symmetric. Tonsils are 1+. The uvula was midline on elevation.    Neck - Normal midline excursion of the laryngotracheal complex during swallowing.  Full range of motion on passive movement.  Palpation of the  occipital, submental, submandibular, internal jugular chain, and supraclavicular nodes did not demonstrate any abnormal lymph nodes or masses.  The carotid pulse was palpable bilaterally.  Palpation of the thyroid was soft and smooth, with no nodules or goiter appreciated.  The trachea was mobile and midline.    Nose - External contour is symmetric, no gross deflection or scars.  Nasal mucosa is pink and moist with no abnormal mucus.  The septum was midline and non-obstructive, turbinates of normal size and position.  No polyps, masses, or purulence noted on examination.    Neuro - Nonfocal neuro exam is normal, CN 2 through 12 intact, normal gait and muscle tone.      Performed in clinic today:  No procedures preformed in clinic today      A/P - Amee Crews is a 67 year old female Patient presents with:  RECHECK: 3 month follow up    Discussed small possibility of sinusitis, so patient should obtain a CT of her sinuses. If CT is negative, patient should begin using Atrovent. If she does not respond to the nasal spray in the next couple weeks, cryoablation of the nasal nerves in the office setting was discussed. Patient will reach out in the next few weeks if she is not responding to the nasal spray treatment to schedule cryoablation with clarifix device.  I discussed the nature of the procedure for chronic/vasomotor rhinitis risks and benefits patient will contact us if she is not improved with the Atrovent spray.      This document serves as a record of the services and decisions personally performed and made by Dr. Freddie Hinkle MD. It was created on his behalf by Diamond Witt, a trained medical scribe. The creation of this document is based the provider's statements to the medical scribe.  Diamond Witt 7/23/2018    Provider:   The information in this document, created by the medical scribe for me, accurately reflects the services I personally performed and the decisions made by me. I have reviewed and  approved this document for accuracy prior to leaving the patient care area.  Dr. Ferddie Hinkle MD 7/23/2018    Freddie Hinkle MD

## 2018-07-23 ENCOUNTER — OFFICE VISIT (OUTPATIENT)
Dept: OTOLARYNGOLOGY | Facility: CLINIC | Age: 68
End: 2018-07-23
Payer: COMMERCIAL

## 2018-07-23 VITALS
BODY MASS INDEX: 25.38 KG/M2 | OXYGEN SATURATION: 98 % | HEART RATE: 78 BPM | SYSTOLIC BLOOD PRESSURE: 120 MMHG | DIASTOLIC BLOOD PRESSURE: 78 MMHG | WEIGHT: 141 LBS

## 2018-07-23 DIAGNOSIS — J30.0 CHRONIC VASOMOTOR RHINITIS: Primary | ICD-10-CM

## 2018-07-23 DIAGNOSIS — J32.9 CHRONIC SINUSITIS, UNSPECIFIED LOCATION: ICD-10-CM

## 2018-07-23 PROCEDURE — 99214 OFFICE O/P EST MOD 30 MIN: CPT | Performed by: OTOLARYNGOLOGY

## 2018-07-23 RX ORDER — IPRATROPIUM BROMIDE 42 UG/1
2 SPRAY, METERED NASAL 4 TIMES DAILY PRN
Qty: 1 BOX | Refills: 1 | Status: SHIPPED | OUTPATIENT
Start: 2018-07-23 | End: 2018-11-14

## 2018-07-23 NOTE — MR AVS SNAPSHOT
After Visit Summary   7/23/2018    Amee Crews    MRN: [id number]           Patient Information     Date Of Birth          1950        Visit Information        Provider Department      7/23/2018 9:15 AM Freddie Hinkle MD [facility]        Today's Diagnoses     Chronic vasomotor rhinitis    -  1    Chronic sinusitis, unspecified location           Follow-ups after your visit        Future tests that were ordered for you today     Open Future Orders        Priority Expected Expires Ordered    CT Sinus w/o Contrast Routine  7/23/2019 7/23/2018            Who to contact     If you have questions or need follow up information about today's clinic visit or your schedule please contact [facility] directly at [phone].  Normal or non-critical lab and imaging results will be communicated to you by [employer], letter or phone within 4 business days after the clinic has received the results. If you do not hear from us within 7 days, please contact the clinic through [employer] or phone. If you have a critical or abnormal lab result, we will notify you by phone as soon as possible.  Submit refill requests through [employer] or call your pharmacy and they will forward the refill request to us. Please allow 3 business days for your refill to be completed.          Additional Information About Your Visit        MyChart Information     [employer] gives you secure access to your electronic health record. If you see a primary care provider, you can also send messages to your care team and make appointments. If you have questions, please call your primary care clinic.  If you do not have a primary care provider, please call [phone] and they will assist you.        Care EveryWhere ID     This is your Care EveryWhere ID. This could be used by other organizations to access your [facility] medical records  [id number]        Your Vitals Were     Pulse Pulse Oximetry BMI (Body Mass Index)              78 98% 25.38 kg/m2          Blood Pressure from Last 3 Encounters:   07/23/18 120/78   06/26/18 120/72   04/12/18 147/77    Weight from Last 3 Encounters:   07/23/18 64 kg (141 lb)   06/26/18 64.2 kg (141 lb 9.6 oz)   03/23/18 64.2 kg (141 lb 9.6 oz)                 Today's Medication Changes          These changes are accurate as of 7/23/18 10:33 AM.  If you have any questions, ask your nurse or doctor.               Start taking these medicines.        Dose/Directions    ipratropium 0.06 % spray   Commonly known as:  ATROVENT   Used for:  Chronic vasomotor rhinitis   Started by:  Freddie Hinkle MD        Dose:  2 spray   Spray 2 sprays into both nostrils 4 times daily as needed for rhinitis   Quantity:  1 Box   Refills:  1            Where to get your medicines      These medications were sent to [id number] - [place], [place] - [address]  [address] [facility] [address]     Phone:  [phone]     ipratropium 0.06 % spray                Primary Care Provider Office Phone # Fax #    Patrica Sharon Kelly [phone] [address] [facility] [address]        Equal Access to Services     [employer]: Hadii aad ku hadasho Soomaali, waaxda luqadaha, qaybta kaalmada adeegyada, waxay idiin hayaan ben khannita erickson . So [facility] [phone].    ATENCIÓN: Si habla español, tiene a alejo disposición servicios gratuitos de asistencia lingüística. [facility] [phone].    We comply with applicable federal civil rights laws and [place] laws. We do not discriminate on the basis of race, color, national origin, age, disability, sex, sexual orientation, or gender identity.            Thank you!     Thank you for choosing [facility]  for your care. Our goal is always to provide you with excellent care. Hearing back from our patients is one way we can continue to improve our services. Please take a few minutes to complete the written survey that you may  receive in the mail after your visit with us. Thank you!             Your Updated Medication List - Protect others around you: Learn how to safely use, store and throw away your medicines at www.disposemymeds.org.          This list is accurate as of 7/23/18 10:33 AM.  Always use your most recent med list.                   Brand Name Dispense Instructions for use Diagnosis    albuterol 108 (90 Base) MCG/ACT Inhaler    PROAIR HFA/PROVENTIL HFA/VENTOLIN HFA    1 Inhaler    Inhale 2 puffs into the lungs every 4 hours as needed for shortness of breath / dyspnea    Mild intermittent asthma without complication       APRODINE PO      Take by mouth as needed        azelastine 0.1 % spray    ASTELIN    1 Bottle    Spray 2 sprays into both nostrils daily    Nasal drainage       CALCIUM PO           cetirizine HCl 10 MG Caps    ZYRTEC ALLERGY    90 capsule    Take 10 mg by mouth as needed    Chronic seasonal allergic rhinitis due to pollen       cyanocobalamin 1000 MCG tablet    vitamin  B-12    90 tablet    Take 1 tablet (1,000 mcg) by mouth daily    Vitamin B 12 deficiency       diclofenac 1 % Gel topical gel    VOLTAREN     Place 2 g onto the skin 4 times daily        fluticasone 220 MCG/ACT Inhaler    FLOVENT HFA    10.6 g    1 puff twice daily, rinse mouth out after Medication    Chronic seasonal allergic rhinitis due to pollen       fluticasone 50 MCG/ACT spray    FLONASE    1 Bottle    Spray 1-2 sprays into both nostrils daily    Chronic sinusitis, unspecified location       ipratropium 0.06 % spray    ATROVENT    1 Box    Spray 2 sprays into both nostrils 4 times daily as needed for rhinitis    Chronic vasomotor rhinitis       magnesium 200 MG Tabs     1 tablet    Take 200 mg by mouth daily        omega-3 acid ethyl esters 1 g capsule    Lovaza    120 capsule    Take 2 capsules (2 g) by mouth daily        omeprazole 40 MG capsule    priLOSEC    30 capsule    Take 1 capsule (40 mg) by mouth daily Take 30-60 minutes  before a meal.        ranitidine 150 MG tablet    ZANTAC    1 tablet    Take 1 tablet (150 mg) by mouth daily        rOPINIRole 0.25 MG tablet    REQUIP    90 tablet    Take 1 tablet (0.25 mg) by mouth At Bedtime    Restless leg       * sertraline 100 MG tablet    ZOLOFT    15 tablet    TAKE ONE-HALF TABLET BY MOUTH DAILY **NEEDS DR. DRAKE.    Major depression in complete remission (H)       * sertraline 100 MG tablet    ZOLOFT    90 tablet    Take 0.5 tablets (50 mg) by mouth daily    Major depression in complete remission (H)       SUMAtriptan 50 MG tablet    IMITREX    18 tablet    Take 1 tablet (50 mg) by mouth at onset of headache for migraine May repeat dose in 2 hours.  Do not exceed 200 mg in 24 hours    Other migraine without status migrainosus, not intractable       traZODone 100 MG tablet    DESYREL    90 tablet    Take 1 tablet (100 mg) by mouth At Bedtime    Insomnia, unspecified type       * Notice:  This list has 2 medication(s) that are the same as other medications prescribed for you. Read the directions carefully, and ask your doctor or other care provider to review them with you.

## 2018-07-23 NOTE — LETTER
7/23/2018         RE: Amee Crews  [address] [address]        Dear Colleague,    Thank you for referring your patient, Amee Crews, to the [facility]. Please see a copy of my visit note below.    History of Present Illness - Amee Crews is a 67 year old female presenting in clinic today for a recheck on Patient presents with:  RECHECK: 3 month follow up    Patient reports that the drainage from her nose is still constant. She has tried Rhinocort and Astelin without relief of symptoms.     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     Allergic rhinitis      Allergies      asthma     mild intermittent     Chronic back pain     neck and low back     HA (headache)     muscle contraction      History of blood transfusion     In childhood     Hot flashes      Hyperlipidemia      Insomnia      Intermittent asthma 9/16/2011     Major depressive disorder, single episode, moderate (H)      Ocular migraine      Osteoarthritis of hand      Restless leg 9/16/2011       Current Medications -   Current Outpatient Prescriptions:      albuterol (PROAIR HFA/PROVENTIL HFA/VENTOLIN HFA) 108 (90 Base) MCG/ACT Inhaler, Inhale 2 puffs into the lungs every 4 hours as needed for shortness of breath / dyspnea, Disp: 1 Inhaler, Rfl: 2     azelastine (ASTELIN) 0.1 % spray, Spray 2 sprays into both nostrils daily, Disp: 1 Bottle, Rfl: 2     CALCIUM PO, , Disp: , Rfl:      cetirizine HCl (ZYRTEC ALLERGY) 10 MG CAPS, Take 10 mg by mouth as needed, Disp: 90 capsule, Rfl: 3     cyanocobalamin (VITAMIN  B-12) 1000 MCG tablet, Take 1 tablet (1,000 mcg) by mouth daily, Disp: 90 tablet, Rfl: 3     diclofenac (VOLTAREN) 1 % GEL topical gel, Place 2 g onto the skin 4 times daily, Disp: , Rfl:      fluticasone (FLONASE) 50 MCG/ACT spray, Spray 1-2 sprays into both nostrils daily, Disp: 1 Bottle, Rfl: 3     fluticasone (FLOVENT HFA) 220  MCG/ACT Inhaler, 1 puff twice daily, rinse mouth out after Medication, Disp: 10.6 g, Rfl: 1     magnesium 200 MG TABS, Take 200 mg by mouth daily, Disp: 1 tablet, Rfl: 0     omega-3 acid ethyl esters (LOVAZA) 1 G capsule, Take 2 capsules (2 g) by mouth daily, Disp: 120 capsule, Rfl:      omeprazole (PRILOSEC) 40 MG capsule, Take 1 capsule (40 mg) by mouth daily Take 30-60 minutes before a meal., Disp: 30 capsule, Rfl: 2     ranitidine (ZANTAC) 150 MG tablet, Take 1 tablet (150 mg) by mouth daily, Disp: 1 tablet, Rfl: 0     rOPINIRole (REQUIP) 0.25 MG tablet, Take 1 tablet (0.25 mg) by mouth At Bedtime, Disp: 90 tablet, Rfl: 3     sertraline (ZOLOFT) 100 MG tablet, Take 0.5 tablets (50 mg) by mouth daily, Disp: 90 tablet, Rfl: 1     sertraline (ZOLOFT) 100 MG tablet, TAKE ONE-HALF TABLET BY MOUTH DAILY **NEEDS DR. VALDES, Disp: 15 tablet, Rfl: 0     SUMAtriptan (IMITREX) 50 MG tablet, Take 1 tablet (50 mg) by mouth at onset of headache for migraine May repeat dose in 2 hours.  Do not exceed 200 mg in 24 hours, Disp: 18 tablet, Rfl: 4     traZODone (DESYREL) 100 MG tablet, Take 1 tablet (100 mg) by mouth At Bedtime, Disp: 90 tablet, Rfl: 3     Triprolidine-Pseudoephedrine (APRODINE PO), Take by mouth as needed, Disp: , Rfl:     Allergies -   Allergies   Allergen Reactions     Atorvastatin Cramps     Muscle pain     Morphine Sulfate Itching       Social History -   Social History     Social History     Marital status:      Spouse name: N/A     Number of children: 2     Years of education: N/A     Occupational History      Self     Social History Main Topics     Smoking status: Former Smoker     Packs/day: 0.00     Types: Cigarettes     Quit date: 1/4/1973     Smokeless tobacco: Never Used     Alcohol use 0.0 oz/week     0 Standard drinks or equivalent per week      Comment: ocass     Drug use: No     Sexual activity: Yes     Partners: Male     Other Topics Concern     Parent/Sibling W/ Cabg, Mi Or Angioplasty  Before 65f 55m? No     Social History Narrative       Family History -   Family History   Problem Relation Age of Onset     Diabetes Mother      Hypertension Mother      Cancer Father      stomach      Cancer Maternal Grandfather      lung      HEART DISEASE Other        Review of Systems - As per HPI and PMHx, otherwise review of system review of the head and neck negative.    Physical Exam  /78  Pulse 78  Wt 64 kg (141 lb)  SpO2 98%  BMI 25.38 kg/m2  BMI: Body mass index is 25.38 kg/(m^2).    General - The patient is well nourished and well developed, and appears to have good nutritional status.  Alert and oriented to person and place, answers questions and cooperates with examination appropriately.    SKIN - No suspicious lesions or rashes.  Respiration - No respiratory distress.  Head and Face - Normocephalic and atraumatic, with no gross asymmetry noted of the contour of the facial features.  The facial nerve is intact, with strong symmetric movements.    Voice and Breathing - The patient was breathing comfortably without the use of accessory muscles. The patients voice was clear and strong, and had appropriate pitch and quality.    Ears - Bilateral pinna and EACs with normal appearing overlying skin. Tympanic membrane intact with good mobility on pneumatic otoscopy bilaterally. Bony landmarks of the ossicular chain are normal. The tympanic membranes are normal in appearance. No retraction, perforation, or masses.  No fluid or purulence was seen in the external canal or the middle ear.     Eyes - Extraocular movements intact.  Sclera were not icteric or injected, conjunctiva were pink and moist.    Mouth - Examination of the oral cavity showed pink, healthy oral mucosa. No lesions or ulcerations noted.  The tongue was mobile and midline, and the dentition were in good condition.      Throat - The walls of the oropharynx were smooth, pink, moist, symmetric, and had no lesions or ulcerations.  The  tonsillar pillars and soft palate were symmetric. Tonsils are 1+. The uvula was midline on elevation.    Neck - Normal midline excursion of the laryngotracheal complex during swallowing.  Full range of motion on passive movement.  Palpation of the occipital, submental, submandibular, internal jugular chain, and supraclavicular nodes did not demonstrate any abnormal lymph nodes or masses.  The carotid pulse was palpable bilaterally.  Palpation of the thyroid was soft and smooth, with no nodules or goiter appreciated.  The trachea was mobile and midline.    Nose - External contour is symmetric, no gross deflection or scars.  Nasal mucosa is pink and moist with no abnormal mucus.  The septum was midline and non-obstructive, turbinates of normal size and position.  No polyps, masses, or purulence noted on examination.    Neuro - Nonfocal neuro exam is normal, CN 2 through 12 intact, normal gait and muscle tone.      Performed in clinic today:  No procedures preformed in clinic today      A/P - Amee Crews is a 67 year old female Patient presents with:  RECHECK: 3 month follow up    Discussed small possibility of sinusitis, so patient should obtain a CT of her sinuses. If CT is negative, patient should begin using Atrovent. If she does not respond to the nasal spray in the next couple weeks, cryoablation of the nasal nerves in the office setting was discussed. Patient will reach out in the next few weeks if she is not responding to the nasal spray treatment to schedule cryoablation with clarifix device.  I discussed the nature of the procedure for chronic/vasomotor rhinitis risks and benefits patient will contact us if she is not improved with the Atrovent spray.      This document serves as a record of the services and decisions personally performed and made by Dr. Freddie Hinkle MD. It was created on his behalf by Diamond Witt, a trained medical scribe. The creation of this document is based the provider's  statements to the medical scribe.  Diamond Witt 7/23/2018    Provider:   The information in this document, created by the medical scribe for me, accurately reflects the services I personally performed and the decisions made by me. I have reviewed and approved this document for accuracy prior to leaving the patient care area.  Dr. Freddie Hinkle MD 7/23/2018    Freddie Hinkle MD          Again, thank you for allowing me to participate in the care of your patient.        Sincerely,        Freddie Hinkle MD, MD

## 2018-07-24 ENCOUNTER — RADIANT APPOINTMENT (OUTPATIENT)
Dept: CT IMAGING | Facility: CLINIC | Age: 68
End: 2018-07-24
Attending: OTOLARYNGOLOGY
Payer: COMMERCIAL

## 2018-07-24 DIAGNOSIS — J32.9 CHRONIC SINUSITIS, UNSPECIFIED LOCATION: ICD-10-CM

## 2018-07-24 PROCEDURE — 70486 CT MAXILLOFACIAL W/O DYE: CPT | Performed by: RADIOLOGY

## 2018-08-13 ENCOUNTER — TELEPHONE (OUTPATIENT)
Dept: OTOLARYNGOLOGY | Facility: OTHER | Age: 68
End: 2018-08-13

## 2018-08-13 NOTE — TELEPHONE ENCOUNTER
Per Dr. Hinkle, patients CT was normal, and unsure he can address her loss of taste.   We are unable to work in this week. He will discuss next step at next week appt.    Left message for return call to inform of above message.

## 2018-08-13 NOTE — TELEPHONE ENCOUNTER
Reason for Call:  Other call back    Detailed comments: pt was started on nasal spray and was told to call back after 3 weeks to inform Dr. North of the changes. She still has no taste, very little smell, and her left nostril still drains a lot. She made an appointment for next Monday in [place] but is tired of not tasting and was wondering if she could be worked in on Wednesday in [place]. Please advise.     Phone Number Patient can be reached at: Home number on file [phone] (home)    Best Time: any    Can we leave a detailed message on this number? YES    Call taken on 8/13/2018 at 8:50 AM by Amber Murphy

## 2018-08-20 ENCOUNTER — OFFICE VISIT (OUTPATIENT)
Dept: OTOLARYNGOLOGY | Facility: CLINIC | Age: 68
End: 2018-08-20
Payer: COMMERCIAL

## 2018-08-20 VITALS
BODY MASS INDEX: 25.38 KG/M2 | WEIGHT: 141 LBS | SYSTOLIC BLOOD PRESSURE: 120 MMHG | OXYGEN SATURATION: 95 % | DIASTOLIC BLOOD PRESSURE: 78 MMHG | HEART RATE: 93 BPM

## 2018-08-20 DIAGNOSIS — J30.0 CHRONIC VASOMOTOR RHINITIS: Primary | ICD-10-CM

## 2018-08-20 PROCEDURE — 99213 OFFICE O/P EST LOW 20 MIN: CPT | Performed by: OTOLARYNGOLOGY

## 2018-08-20 NOTE — LETTER
"    8/20/2018         RE: Amee Crews  [id number] [facility] [id number]        Dear Colleague,    Thank you for referring your patient, Amee Crews, to the [facility]. Please see a copy of my visit note below.    History of Present Illness - Amee Crews is a 68 year old female presenting in clinic today for a recheck on sinuses.    Patient reports that Atrovent nasal spray does help with her symptoms, but as soon as she stops using it, her symptoms come back. She explains that her sense of smell and taste goes \"in and out\" depending on congestion.     Present Symptoms include: runny nose, post nasal drainage and nasal obstructions and they are   stable .  Amee denies otolgia, facial pain/pressure and sore throat.    Her updated CT scan shows that her sinuses are open with minimal thickening in the maxillary and sphenoid sinuses. No signs of active infection or sinus disease.      Body mass index is 25.38 kg/(m^2).    BP Readings from Last 1 Encounters:   08/20/18 120/78     BP noted to be well controlled today in office.     Amee IS NOT a smoker/uses chewing tobacco.       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     Allergic rhinitis      Allergies      asthma     mild intermittent     Chronic back pain     neck and low back     HA (headache)     muscle contraction      History of blood transfusion     In childhood     Hot flashes      Hyperlipidemia      Insomnia      Intermittent asthma 9/16/2011     Major depressive disorder, single episode, moderate (H)      Ocular migraine      Osteoarthritis of hand      Restless leg 9/16/2011       Current Medications -   Current Outpatient Prescriptions:      albuterol (PROAIR HFA/PROVENTIL HFA/VENTOLIN HFA) 108 (90 Base) MCG/ACT Inhaler, Inhale 2 puffs into the lungs every 4 hours as needed for shortness of breath / dyspnea, Disp: 1 Inhaler, Rfl: 2     azelastine " (ASTELIN) 0.1 % spray, Spray 2 sprays into both nostrils daily, Disp: 1 Bottle, Rfl: 2     CALCIUM PO, , Disp: , Rfl:      cetirizine HCl (ZYRTEC ALLERGY) 10 MG CAPS, Take 10 mg by mouth as needed, Disp: 90 capsule, Rfl: 3     cyanocobalamin (VITAMIN  B-12) 1000 MCG tablet, Take 1 tablet (1,000 mcg) by mouth daily, Disp: 90 tablet, Rfl: 3     diclofenac (VOLTAREN) 1 % GEL topical gel, Place 2 g onto the skin 4 times daily, Disp: , Rfl:      fluticasone (FLONASE) 50 MCG/ACT spray, Spray 1-2 sprays into both nostrils daily, Disp: 1 Bottle, Rfl: 3     fluticasone (FLOVENT HFA) 220 MCG/ACT Inhaler, 1 puff twice daily, rinse mouth out after Medication, Disp: 10.6 g, Rfl: 1     ipratropium (ATROVENT) 0.06 % spray, Spray 2 sprays into both nostrils 4 times daily as needed for rhinitis, Disp: 1 Box, Rfl: 1     magnesium 200 MG TABS, Take 200 mg by mouth daily, Disp: 1 tablet, Rfl: 0     omega-3 acid ethyl esters (LOVAZA) 1 G capsule, Take 2 capsules (2 g) by mouth daily, Disp: 120 capsule, Rfl:      omeprazole (PRILOSEC) 40 MG capsule, Take 1 capsule (40 mg) by mouth daily Take 30-60 minutes before a meal., Disp: 30 capsule, Rfl: 2     ranitidine (ZANTAC) 150 MG tablet, Take 1 tablet (150 mg) by mouth daily, Disp: 1 tablet, Rfl: 0     rOPINIRole (REQUIP) 0.25 MG tablet, Take 1 tablet (0.25 mg) by mouth At Bedtime, Disp: 90 tablet, Rfl: 3     sertraline (ZOLOFT) 100 MG tablet, Take 0.5 tablets (50 mg) by mouth daily, Disp: 90 tablet, Rfl: 1     sertraline (ZOLOFT) 100 MG tablet, TAKE ONE-HALF TABLET BY MOUTH DAILY **NEEDS DR. VALDES, Disp: 15 tablet, Rfl: 0     SUMAtriptan (IMITREX) 50 MG tablet, Take 1 tablet (50 mg) by mouth at onset of headache for migraine May repeat dose in 2 hours.  Do not exceed 200 mg in 24 hours, Disp: 18 tablet, Rfl: 4     traZODone (DESYREL) 100 MG tablet, Take 1 tablet (100 mg) by mouth At Bedtime, Disp: 90 tablet, Rfl: 3     Triprolidine-Pseudoephedrine (APRODINE PO), Take by mouth as needed,  Disp: , Rfl:     Allergies -   Allergies   Allergen Reactions     Atorvastatin Cramps     Muscle pain     Morphine Sulfate Itching       Social History -   Social History     Social History     Marital status:      Spouse name: N/A     Number of children: 2     Years of education: N/A     Occupational History      Self     Social History Main Topics     Smoking status: Former Smoker     Packs/day: 0.00     Types: Cigarettes     Quit date: 1/4/1973     Smokeless tobacco: Never Used     Alcohol use 0.0 oz/week     0 Standard drinks or equivalent per week      Comment: ocass     Drug use: No     Sexual activity: Yes     Partners: Male     Other Topics Concern     Parent/Sibling W/ Cabg, Mi Or Angioplasty Before 65f 55m? No     Social History Narrative       Family History -   Family History   Problem Relation Age of Onset     Diabetes Mother      Hypertension Mother      Cancer Father      stomach      Cancer Maternal Grandfather      lung      HEART DISEASE Other        Review of Systems - As per HPI and PMHx, otherwise review of system review of the head and neck negative.    Physical Exam  /78  Pulse 93  Wt 64 kg (141 lb)  SpO2 95%  BMI 25.38 kg/m2  BMI: Body mass index is 25.38 kg/(m^2).    General - The patient is well nourished and well developed, and appears to have good nutritional status.  Alert and oriented to person and place, answers questions and cooperates with examination appropriately.    SKIN - No suspicious lesions or rashes.  Respiration - No respiratory distress.  Head and Face - Normocephalic and atraumatic, with no gross asymmetry noted of the contour of the facial features.  The facial nerve is intact, with strong symmetric movements.    Voice and Breathing - The patient was breathing comfortably without the use of accessory muscles. The patients voice was clear and strong, and had appropriate pitch and quality.    Ears - Bilateral pinna and EACs with normal appearing overlying  skin. Tympanic membrane intact with good mobility on pneumatic otoscopy bilaterally. Bony landmarks of the ossicular chain are normal. The tympanic membranes are normal in appearance. No retraction, perforation, or masses.  No fluid or purulence was seen in the external canal or the middle ear.     Eyes - Extraocular movements intact.  Sclera were not icteric or injected, conjunctiva were pink and moist.    Mouth - Examination of the oral cavity showed pink, healthy oral mucosa. No lesions or ulcerations noted.  The tongue was mobile and midline, and the dentition were in good condition.      Throat - The walls of the oropharynx were smooth, pink, moist, symmetric, and had no lesions or ulcerations.  The tonsillar pillars and soft palate were symmetric. The uvula was midline on elevation.    Neck - Normal midline excursion of the laryngotracheal complex during swallowing.  Full range of motion on passive movement.  Palpation of the occipital, submental, submandibular, internal jugular chain, and supraclavicular nodes did not demonstrate any abnormal lymph nodes or masses.  The carotid pulse was palpable bilaterally.  Palpation of the thyroid was soft and smooth, with no nodules or goiter appreciated.  The trachea was mobile and midline.    Nose - External contour is symmetric, no gross deflection or scars.  Nasal mucosa is pink and moist with no abnormal mucus.  The septum was midline and non-obstructive, turbinates of normal size and position.  No polyps, masses, or purulence noted on examination.    Neuro - Nonfocal neuro exam is normal, CN 2 through 12 intact, normal gait and muscle tone.      Performed in clinic today:  No procedures preformed in clinic today      A/P - Amee Crews is a 68 year old female Patient presents with:  Sinus Problem  Unfortunately patient responds in the limited fashion to Atrovent nasal spray that holds her symptomatology only as long as she constantly uses.  In the event so it does  not address all of it.  Therefore patient is interested in other forms of therapy.  Cryoablation was discussed with the patient. She expresses that she is very interested in this procedure, will continue to update patient on the approval from the [employer] system when we will start doing this procedure. I will continue to prescribe patient Atrovent nasal spray.      Amee should follow up as needed.      At Amee next appointment they will not need a hearing test.      This document serves as a record of the services and decisions personally performed and made by Dr. Freddie Hinkle MD. It was created on his behalf by Diamond Witt, a trained medical scribe. The creation of this document is based the provider's statements to the medical scribe.  Diamond Witt 8/20/2018    Provider:   The information in this document, created by the medical scribe for me, accurately reflects the services I personally performed and the decisions made by me. I have reviewed and approved this document for accuracy prior to leaving the patient care area.  Dr. Freddie Hinkle MD 8/20/2018    Freddie Hinkle MD.          Again, thank you for allowing me to participate in the care of your patient.        Sincerely,        Freddie Hinkle MD, MD

## 2018-08-20 NOTE — PROGRESS NOTES
"History of Present Illness - Amee Crews is a 68 year old female presenting in clinic today for a recheck on sinuses.    Patient reports that Atrovent nasal spray does help with her symptoms, but as soon as she stops using it, her symptoms come back. She explains that her sense of smell and taste goes \"in and out\" depending on congestion.     Present Symptoms include: runny nose, post nasal drainage and nasal obstructions and they are   stable .  Amee denies otolgia, facial pain/pressure and sore throat.    Her updated CT scan shows that her sinuses are open with minimal thickening in the maxillary and sphenoid sinuses. No signs of active infection or sinus disease.      Body mass index is 25.38 kg/(m^2).    BP Readings from Last 1 Encounters:   08/20/18 120/78     BP noted to be well controlled today in office.     Amee IS NOT a smoker/uses chewing tobacco.       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     Allergic rhinitis      Allergies      asthma     mild intermittent     Chronic back pain     neck and low back     HA (headache)     muscle contraction      History of blood transfusion     In childhood     Hot flashes      Hyperlipidemia      Insomnia      Intermittent asthma 9/16/2011     Major depressive disorder, single episode, moderate (H)      Ocular migraine      Osteoarthritis of hand      Restless leg 9/16/2011       Current Medications -   Current Outpatient Prescriptions:      albuterol (PROAIR HFA/PROVENTIL HFA/VENTOLIN HFA) 108 (90 Base) MCG/ACT Inhaler, Inhale 2 puffs into the lungs every 4 hours as needed for shortness of breath / dyspnea, Disp: 1 Inhaler, Rfl: 2     azelastine (ASTELIN) 0.1 % spray, Spray 2 sprays into both nostrils daily, Disp: 1 Bottle, Rfl: 2     CALCIUM PO, , Disp: , Rfl:      cetirizine HCl (ZYRTEC ALLERGY) 10 MG CAPS, Take 10 mg by mouth as needed, Disp: 90 capsule, Rfl: 3     " cyanocobalamin (VITAMIN  B-12) 1000 MCG tablet, Take 1 tablet (1,000 mcg) by mouth daily, Disp: 90 tablet, Rfl: 3     diclofenac (VOLTAREN) 1 % GEL topical gel, Place 2 g onto the skin 4 times daily, Disp: , Rfl:      fluticasone (FLONASE) 50 MCG/ACT spray, Spray 1-2 sprays into both nostrils daily, Disp: 1 Bottle, Rfl: 3     fluticasone (FLOVENT HFA) 220 MCG/ACT Inhaler, 1 puff twice daily, rinse mouth out after Medication, Disp: 10.6 g, Rfl: 1     ipratropium (ATROVENT) 0.06 % spray, Spray 2 sprays into both nostrils 4 times daily as needed for rhinitis, Disp: 1 Box, Rfl: 1     magnesium 200 MG TABS, Take 200 mg by mouth daily, Disp: 1 tablet, Rfl: 0     omega-3 acid ethyl esters (LOVAZA) 1 G capsule, Take 2 capsules (2 g) by mouth daily, Disp: 120 capsule, Rfl:      omeprazole (PRILOSEC) 40 MG capsule, Take 1 capsule (40 mg) by mouth daily Take 30-60 minutes before a meal., Disp: 30 capsule, Rfl: 2     ranitidine (ZANTAC) 150 MG tablet, Take 1 tablet (150 mg) by mouth daily, Disp: 1 tablet, Rfl: 0     rOPINIRole (REQUIP) 0.25 MG tablet, Take 1 tablet (0.25 mg) by mouth At Bedtime, Disp: 90 tablet, Rfl: 3     sertraline (ZOLOFT) 100 MG tablet, Take 0.5 tablets (50 mg) by mouth daily, Disp: 90 tablet, Rfl: 1     sertraline (ZOLOFT) 100 MG tablet, TAKE ONE-HALF TABLET BY MOUTH DAILY **NEEDS DR. VALDES, Disp: 15 tablet, Rfl: 0     SUMAtriptan (IMITREX) 50 MG tablet, Take 1 tablet (50 mg) by mouth at onset of headache for migraine May repeat dose in 2 hours.  Do not exceed 200 mg in 24 hours, Disp: 18 tablet, Rfl: 4     traZODone (DESYREL) 100 MG tablet, Take 1 tablet (100 mg) by mouth At Bedtime, Disp: 90 tablet, Rfl: 3     Triprolidine-Pseudoephedrine (APRODINE PO), Take by mouth as needed, Disp: , Rfl:     Allergies -   Allergies   Allergen Reactions     Atorvastatin Cramps     Muscle pain     Morphine Sulfate Itching       Social History -   Social History     Social History     Marital status:      Spouse  name: N/A     Number of children: 2     Years of education: N/A     Occupational History      Self     Social History Main Topics     Smoking status: Former Smoker     Packs/day: 0.00     Types: Cigarettes     Quit date: 1/4/1973     Smokeless tobacco: Never Used     Alcohol use 0.0 oz/week     0 Standard drinks or equivalent per week      Comment: ocass     Drug use: No     Sexual activity: Yes     Partners: Male     Other Topics Concern     Parent/Sibling W/ Cabg, Mi Or Angioplasty Before 65f 55m? No     Social History Narrative       Family History -   Family History   Problem Relation Age of Onset     Diabetes Mother      Hypertension Mother      Cancer Father      stomach      Cancer Maternal Grandfather      lung      HEART DISEASE Other        Review of Systems - As per HPI and PMHx, otherwise review of system review of the head and neck negative.    Physical Exam  /78  Pulse 93  Wt 64 kg (141 lb)  SpO2 95%  BMI 25.38 kg/m2  BMI: Body mass index is 25.38 kg/(m^2).    General - The patient is well nourished and well developed, and appears to have good nutritional status.  Alert and oriented to person and place, answers questions and cooperates with examination appropriately.    SKIN - No suspicious lesions or rashes.  Respiration - No respiratory distress.  Head and Face - Normocephalic and atraumatic, with no gross asymmetry noted of the contour of the facial features.  The facial nerve is intact, with strong symmetric movements.    Voice and Breathing - The patient was breathing comfortably without the use of accessory muscles. The patients voice was clear and strong, and had appropriate pitch and quality.    Ears - Bilateral pinna and EACs with normal appearing overlying skin. Tympanic membrane intact with good mobility on pneumatic otoscopy bilaterally. Bony landmarks of the ossicular chain are normal. The tympanic membranes are normal in appearance. No retraction, perforation, or masses.  No  fluid or purulence was seen in the external canal or the middle ear.     Eyes - Extraocular movements intact.  Sclera were not icteric or injected, conjunctiva were pink and moist.    Mouth - Examination of the oral cavity showed pink, healthy oral mucosa. No lesions or ulcerations noted.  The tongue was mobile and midline, and the dentition were in good condition.      Throat - The walls of the oropharynx were smooth, pink, moist, symmetric, and had no lesions or ulcerations.  The tonsillar pillars and soft palate were symmetric. The uvula was midline on elevation.    Neck - Normal midline excursion of the laryngotracheal complex during swallowing.  Full range of motion on passive movement.  Palpation of the occipital, submental, submandibular, internal jugular chain, and supraclavicular nodes did not demonstrate any abnormal lymph nodes or masses.  The carotid pulse was palpable bilaterally.  Palpation of the thyroid was soft and smooth, with no nodules or goiter appreciated.  The trachea was mobile and midline.    Nose - External contour is symmetric, no gross deflection or scars.  Nasal mucosa is pink and moist with no abnormal mucus.  The septum was midline and non-obstructive, turbinates of normal size and position.  No polyps, masses, or purulence noted on examination.    Neuro - Nonfocal neuro exam is normal, CN 2 through 12 intact, normal gait and muscle tone.      Performed in clinic today:  No procedures preformed in clinic today      A/P - Amee Crews is a 68 year old female Patient presents with:  Sinus Problem  Unfortunately patient responds in the limited fashion to Atrovent nasal spray that holds her symptomatology only as long as she constantly uses.  In the event so it does not address all of it.  Therefore patient is interested in other forms of therapy.  Cryoablation was discussed with the patient. She expresses that she is very interested in this procedure, will continue to update patient on  the approval from the [employer] system when we will start doing this procedure. I will continue to prescribe patient Atrovent nasal spray.      Amee should follow up as needed.      At Amee next appointment they will not need a hearing test.      This document serves as a record of the services and decisions personally performed and made by Dr. Freddie Hinkle MD. It was created on his behalf by Diamond Witt, a trained medical scribe. The creation of this document is based the provider's statements to the medical scribe.  Diamond Witt 8/20/2018    Provider:   The information in this document, created by the medical scribe for me, accurately reflects the services I personally performed and the decisions made by me. I have reviewed and approved this document for accuracy prior to leaving the patient care area.  Dr. Freddie Hinkle MD 8/20/2018    Freddie Hinkle MD.

## 2018-08-20 NOTE — MR AVS SNAPSHOT
After Visit Summary   8/20/2018    Amee Crews    MRN: [id number]           Patient Information     Date Of Birth          1950        Visit Information        Provider Department      8/20/2018 8:00 AM Freddie Hinkle MD [facility]        Today's Diagnoses     Chronic vasomotor rhinitis    -  1       Follow-ups after your visit        Your next 10 appointments already scheduled     Aug 23, 2018 10:45 AM CDT   [facility]    [address] [place] [id number] [phone]              Who to contact     If you have questions or need follow up information about today's clinic visit or your schedule please contact [facility] directly at [phone].  Normal or non-critical lab and imaging results will be communicated to you by MyChart, letter or phone within 4 business days after the clinic has received the results. If you do not hear from us within 7 days, please contact the clinic through MyChart or phone. If you have a critical or abnormal lab result, we will notify you by phone as soon as possible.  Submit refill requests through Digiting or call your pharmacy and they will forward the refill request to us. Please allow 3 business days for your refill to be completed.          Additional Information About Your Visit        MyChart Information     Digiting gives you secure access to your electronic health record. If you see a primary care provider, you can also send messages to your care team and make appointments. If you have questions, please call your primary care clinic.  If you do not have a primary care provider, please call [phone] and they will assist you.        Care EveryWhere ID     This is your Care EveryWhere ID. This could be used by other organizations to access your [place] medical records  [id number]        Your Vitals Were     Pulse  Pulse Oximetry BMI (Body Mass Index)             93 95% 25.38 kg/m2          Blood Pressure from Last 3 Encounters:   08/20/18 120/78   07/23/18 120/78   06/26/18 120/72    Weight from Last 3 Encounters:   08/20/18 64 kg (141 lb)   07/23/18 64 kg (141 lb)   06/26/18 64.2 kg (141 lb 9.6 oz)              Today, you had the following     No orders found for display       Primary Care Provider Office Phone # Fax #    Patrica Kelly [phone] [address] [facility]  [place] [address]        Equal Access to Services     [facility]: Hadii siri baker hadramon Shaw, waaxda lujessicaadaha, qaybta kaalmada ciera, vicki erickson . So [facility] [phone].    ATENCIÓN: Si habla español, tiene a alejo disposición servicios gratuitos de asistencia lingüística. Llame al [phone].    We comply with applicable federal civil rights laws and [place] laws. We do not discriminate on the basis of race, color, national origin, age, disability, sex, sexual orientation, or gender identity.            Thank you!     Thank you for choosing [facility]  for your care. Our goal is always to provide you with excellent care. Hearing back from our patients is one way we can continue to improve our services. Please take a few minutes to complete the written survey that you may receive in the mail after your visit with us. Thank you!             Your Updated Medication List - Protect others around you: Learn how to safely use, store and throw away your medicines at www.disposemymeds.org.          This list is accurate as of 8/20/18  9:57 AM.  Always use your most recent med list.                   Brand Name Dispense Instructions for use Diagnosis    albuterol 108 (90 Base) MCG/ACT inhaler    PROAIR HFA/PROVENTIL HFA/VENTOLIN HFA    1 Inhaler    Inhale 2 puffs into the lungs every 4 hours as needed for shortness of breath / dyspnea    Mild intermittent asthma without complication        APRODINE PO      Take by mouth as needed        azelastine 0.1 % nasal spray    ASTELIN    1 Bottle    Spray 2 sprays into both nostrils daily    Nasal drainage       CALCIUM PO           cetirizine HCl 10 MG Caps    ZYRTEC ALLERGY    90 capsule    Take 10 mg by mouth as needed    Chronic seasonal allergic rhinitis due to pollen       cyanocobalamin 1000 MCG tablet    vitamin  B-12    90 tablet    Take 1 tablet (1,000 mcg) by mouth daily    Vitamin B 12 deficiency       diclofenac 1 % Gel topical gel    VOLTAREN     Place 2 g onto the skin 4 times daily        fluticasone 220 MCG/ACT Inhaler    FLOVENT HFA    10.6 g    1 puff twice daily, rinse mouth out after Medication    Chronic seasonal allergic rhinitis due to pollen       fluticasone 50 MCG/ACT spray    FLONASE    1 Bottle    Spray 1-2 sprays into both nostrils daily    Chronic sinusitis, unspecified location       ipratropium 0.06 % spray    ATROVENT    1 Box    Spray 2 sprays into both nostrils 4 times daily as needed for rhinitis    Chronic vasomotor rhinitis       magnesium 200 MG Tabs     1 tablet    Take 200 mg by mouth daily        omega-3 acid ethyl esters 1 g capsule    Lovaza    120 capsule    Take 2 capsules (2 g) by mouth daily        omeprazole 40 MG capsule    priLOSEC    30 capsule    Take 1 capsule (40 mg) by mouth daily Take 30-60 minutes before a meal.        ranitidine 150 MG tablet    ZANTAC    1 tablet    Take 1 tablet (150 mg) by mouth daily        rOPINIRole 0.25 MG tablet    REQUIP    90 tablet    Take 1 tablet (0.25 mg) by mouth At Bedtime    Restless leg       * sertraline 100 MG tablet    ZOLOFT    15 tablet    TAKE ONE-HALF TABLET BY MOUTH DAILY **NEEDS  APPT.    Major depression in complete remission (H)       * sertraline 100 MG tablet    ZOLOFT    90 tablet    Take 0.5 tablets (50 mg) by mouth daily    Major depression in complete remission (H)       SUMAtriptan 50 MG tablet    IMITREX    18 tablet    Take 1 tablet (50 mg)  by mouth at onset of headache for migraine May repeat dose in 2 hours.  Do not exceed 200 mg in 24 hours    Other migraine without status migrainosus, not intractable       traZODone 100 MG tablet    DESYREL    90 tablet    Take 1 tablet (100 mg) by mouth At Bedtime    Insomnia, unspecified type       * Notice:  This list has 2 medication(s) that are the same as other medications prescribed for you. Read the directions carefully, and ask your doctor or other care provider to review them with you.

## 2018-09-05 ENCOUNTER — OFFICE VISIT (OUTPATIENT)
Dept: OBGYN | Facility: CLINIC | Age: 68
End: 2018-09-05
Payer: COMMERCIAL

## 2018-09-05 VITALS
DIASTOLIC BLOOD PRESSURE: 76 MMHG | SYSTOLIC BLOOD PRESSURE: 126 MMHG | BODY MASS INDEX: 25.38 KG/M2 | WEIGHT: 141 LBS | TEMPERATURE: 97.9 F | HEART RATE: 94 BPM

## 2018-09-05 DIAGNOSIS — N95.2 VAGINAL ATROPHY: Primary | ICD-10-CM

## 2018-09-05 PROCEDURE — 99203 OFFICE O/P NEW LOW 30 MIN: CPT | Performed by: OBSTETRICS & GYNECOLOGY

## 2018-09-05 NOTE — MR AVS SNAPSHOT
After Visit Summary   9/5/2018    Amee Crews    MRN: [id number]           Patient Information     Date Of Birth          1950        Visit Information        Provider Department      9/5/2018 11:45 Gerda Swartz MD [facility]        Today's Diagnoses     Vaginal atrophy    -  1       Follow-ups after your visit        Who to contact     If you have questions or need follow up information about today's clinic visit or your schedule please contact [facility] directly at [phone].  Normal or non-critical lab and imaging results will be communicated to you by LaunchCytehart, letter or phone within 4 business days after the clinic has received the results. If you do not hear from us within 7 days, please contact the clinic through Sunfun Infot or phone. If you have a critical or abnormal lab result, we will notify you by phone as soon as possible.  Submit refill requests through SemiLev or call your pharmacy and they will forward the refill request to us. Please allow 3 business days for your refill to be completed.          Additional Information About Your Visit        MyChart Information     SemiLev gives you secure access to your electronic health record. If you see a primary care provider, you can also send messages to your care team and make appointments. If you have questions, please call your primary care clinic.  If you do not have a primary care provider, please call [phone] and they will assist you.        Care EveryWhere ID     This is your Care EveryWhere ID. This could be used by other organizations to access your [place] medical records  [id number]        Your Vitals Were     Pulse Temperature BMI (Body Mass Index)             94 97.9  F (36.6  C) (Oral) 25.38 kg/m2          Blood Pressure from Last 3 Encounters:   09/05/18 126/76   08/20/18 120/78   07/23/18 120/78    Weight from Last 3 Encounters:   09/05/18 141 lb (64 kg)    08/20/18 141 lb (64 kg)   07/23/18 141 lb (64 kg)              Today, you had the following     No orders found for display         Today's Medication Changes          These changes are accurate as of 9/5/18 11:59 PM.  If you have any questions, ask your nurse or doctor.               Start taking these medicines.        Dose/Directions    estradiol 2 MG vaginal ring   Commonly known as:  ESTRING   Used for:  Vaginal atrophy   Started by:  Gerda Coyle MD        Dose:  1 each   Place 1 each vaginally every 3 months   Quantity:  1 each   Refills:  0            Where to get your medicines      These medications were sent to [id number] - [place], [place] - [address]  [address] [place] [address]     Phone:  [phone]     estradiol 2 MG vaginal ring                Primary Care Provider Office Phone # Fax #    Patrica Sharon Kelly [phone] [address] [facility]  [facility] [address]        Equal Access to Services     [facility]: Hadii aad ku hadasho Soomaali, waaxda luqadaha, qaybta kaalmada adeegyada, waxay idiin hayaan elineg kharash dre . So [facility] [phone].    ATENCIÓN: Si habla español, tiene a alejo disposición servicios gratuitos de asistencia lingüística. Llame al [phone].    We comply with applicable federal civil rights laws and [place] laws. We do not discriminate on the basis of race, color, national origin, age, disability, sex, sexual orientation, or gender identity.            Thank you!     Thank you for choosing [facility]  for your care. Our goal is always to provide you with excellent care. Hearing back from our patients is one way we can continue to improve our services. Please take a few minutes to complete the written survey that you may receive in the mail after your visit with us. Thank you!             Your Updated Medication List - Protect others around you: Learn how to safely use, store and throw away your  medicines at www.disposemymeds.org.          This list is accurate as of 9/5/18 11:59 PM.  Always use your most recent med list.                   Brand Name Dispense Instructions for use Diagnosis    albuterol 108 (90 Base) MCG/ACT inhaler    PROAIR HFA/PROVENTIL HFA/VENTOLIN HFA    1 Inhaler    Inhale 2 puffs into the lungs every 4 hours as needed for shortness of breath / dyspnea    Mild intermittent asthma without complication       APRODINE PO      Take by mouth as needed        azelastine 0.1 % nasal spray    ASTELIN    1 Bottle    Spray 2 sprays into both nostrils daily    Nasal drainage       CALCIUM PO           cetirizine HCl 10 MG Caps    ZYRTEC ALLERGY    90 capsule    Take 10 mg by mouth as needed    Chronic seasonal allergic rhinitis due to pollen       cyanocobalamin 1000 MCG tablet    vitamin  B-12    90 tablet    Take 1 tablet (1,000 mcg) by mouth daily    Vitamin B 12 deficiency       diclofenac 1 % Gel topical gel    VOLTAREN     Place 2 g onto the skin 4 times daily        estradiol 2 MG vaginal ring    ESTRING    1 each    Place 1 each vaginally every 3 months    Vaginal atrophy       fluticasone 220 MCG/ACT Inhaler    FLOVENT HFA    10.6 g    1 puff twice daily, rinse mouth out after Medication    Chronic seasonal allergic rhinitis due to pollen       fluticasone 50 MCG/ACT spray    FLONASE    1 Bottle    Spray 1-2 sprays into both nostrils daily    Chronic sinusitis, unspecified location       ipratropium 0.06 % spray    ATROVENT    1 Box    Spray 2 sprays into both nostrils 4 times daily as needed for rhinitis    Chronic vasomotor rhinitis       magnesium 200 MG Tabs     1 tablet    Take 200 mg by mouth daily        omega-3 acid ethyl esters 1 g capsule    Lovaza    120 capsule    Take 2 capsules (2 g) by mouth daily        omeprazole 40 MG capsule    priLOSEC    30 capsule    Take 1 capsule (40 mg) by mouth daily Take 30-60 minutes before a meal.        ranitidine 150 MG tablet    ZANTAC     1 tablet    Take 1 tablet (150 mg) by mouth daily        rOPINIRole 0.25 MG tablet    REQUIP    90 tablet    Take 1 tablet (0.25 mg) by mouth At Bedtime    Restless leg       * sertraline 100 MG tablet    ZOLOFT    15 tablet    TAKE ONE-HALF TABLET BY MOUTH DAILY **NEEDS DR. DRAKE.    Major depression in complete remission (H)       * sertraline 100 MG tablet    ZOLOFT    90 tablet    Take 0.5 tablets (50 mg) by mouth daily    Major depression in complete remission (H)       SUMAtriptan 50 MG tablet    IMITREX    18 tablet    Take 1 tablet (50 mg) by mouth at onset of headache for migraine May repeat dose in 2 hours.  Do not exceed 200 mg in 24 hours    Other migraine without status migrainosus, not intractable       traZODone 100 MG tablet    DESYREL    90 tablet    Take 1 tablet (100 mg) by mouth At Bedtime    Insomnia, unspecified type       * Notice:  This list has 2 medication(s) that are the same as other medications prescribed for you. Read the directions carefully, and ask your doctor or other care provider to review them with you.

## 2018-09-05 NOTE — NURSING NOTE
"Chief Complaint   Patient presents with     Vaginal Problem       Initial /76  Pulse 94  Temp 97.9  F (36.6  C) (Oral)  Wt 141 lb (64 kg)  BMI 25.38 kg/m2 Estimated body mass index is 25.38 kg/(m^2) as calculated from the following:    Height as of 18: 5' 2.5\" (1.588 m).    Weight as of this encounter: 141 lb (64 kg).  BP completed using cuff size: regular        The following HM Due: NONE      The following patient reported/Care Every where data was sent to:  P ABSTRACT QUALITY INITIATIVES [16101]  na      n/a              "

## 2018-09-05 NOTE — Clinical Note
Facundo Burciaga, Thanks for sending Amee to talk about vaginal atrophy.  We restarted her on the ring.  She can continue this as long as she wants to, it doesn't give systemic HRT.  Gerda

## 2018-09-09 NOTE — PROGRESS NOTES
CC/HPI:  68 year old  presents for discussion of pain with intercourse and vaginal dryness.  Has used estrace cream in the past for atrophy, but didn't like how messy it was.   Used a vaginal ring for 3 months, which worked well, but was hesitant to continue it.   Does not have significant vasomotor symptoms of menopause, does not want systemic estrogen replacement.   Menopausal x several years.   She is wondering about laser procedures.     Past Medical History:   Diagnosis Date     Adult vitelliform macular degeneration 2017     Allergic rhinitis      Allergies      asthma     mild intermittent     Chronic back pain     neck and low back     HA (headache)     muscle contraction      History of blood transfusion     In childhood     Hot flashes      Hyperlipidemia      Insomnia      Intermittent asthma 2011     Major depressive disorder, single episode, moderate (H)      Ocular migraine      Osteoarthritis of hand      Restless leg 2011       Past Surgical History:   Procedure Laterality Date     APPENDECTOMY      age 9      BIOPSY      took tissue from my uterus at least 15 years ago     COLONOSCOPY  14 years     COLONOSCOPY N/A 2015    Procedure: COMBINED COLONOSCOPY, SINGLE OR MULTIPLE BIOPSY/POLYPECTOMY BY BIOPSY;  Surgeon: Doni Carey MD;  Location: [facility] OR     COLONOSCOPY WITH CO2 INSUFFLATION N/A 2015    Procedure: COLONOSCOPY WITH CO2 INSUFFLATION;  Surgeon: Doni Carey MD;  Location: [facility] OR     COMBINED ESOPHAGOSCOPY, GASTROSCOPY, DUODENOSCOPY (EGD) WITH CO2 INSUFFLATION N/A 2017    Procedure: COMBINED ESOPHAGOSCOPY, GASTROSCOPY, DUODENOSCOPY (EGD) WITH CO2 INSUFFLATION;  EGD, Gastroesophageal reflux disease, esophagitis presence not specified Abdominal pain, generalized, Ref Dahlheimer-Schroeder, 24.78 BMI;  Surgeon: Duane, William Charles, MD;  Location: [facility] OR     ENT SURGERY      Tonsillectomy     ESOPHAGOSCOPY, GASTROSCOPY, DUODENOSCOPY (EGD),  COMBINED N/A 8/24/2017    Procedure: COMBINED ESOPHAGOSCOPY, GASTROSCOPY, DUODENOSCOPY (EGD), BIOPSY SINGLE OR MULTIPLE;;  Surgeon: Duane, William Charles, MD;  Location: [facility] OR     LAPAROSCOPY PROCEDURE UNLISTED      polyps found     MOUTH SURGERY  2016     SINUS SURGERY      x2       Family History   Problem Relation Age of Onset     Diabetes Mother      Hypertension Mother      Cancer Father      stomach      Cancer Maternal Grandfather      lung      HEART DISEASE Other        Social History     Social History     Marital status:      Spouse name: N/A     Number of children: 2     Years of education: N/A     Occupational History      Self     Social History Main Topics     Smoking status: Former Smoker     Packs/day: 0.00     Types: Cigarettes     Quit date: 1/4/1973     Smokeless tobacco: Never Used     Alcohol use 0.0 oz/week     0 Standard drinks or equivalent per week      Comment: ocass     Drug use: No     Sexual activity: Yes     Partners: Male     Other Topics Concern     Parent/Sibling W/ Cabg, Mi Or Angioplasty Before 65f 55m? No     Social History Narrative         Current Outpatient Prescriptions:      albuterol (PROAIR HFA/PROVENTIL HFA/VENTOLIN HFA) 108 (90 Base) MCG/ACT Inhaler, Inhale 2 puffs into the lungs every 4 hours as needed for shortness of breath / dyspnea, Disp: 1 Inhaler, Rfl: 2     azelastine (ASTELIN) 0.1 % spray, Spray 2 sprays into both nostrils daily, Disp: 1 Bottle, Rfl: 2     CALCIUM PO, , Disp: , Rfl:      cetirizine HCl (ZYRTEC ALLERGY) 10 MG CAPS, Take 10 mg by mouth as needed, Disp: 90 capsule, Rfl: 3     cyanocobalamin (VITAMIN  B-12) 1000 MCG tablet, Take 1 tablet (1,000 mcg) by mouth daily, Disp: 90 tablet, Rfl: 3     diclofenac (VOLTAREN) 1 % GEL topical gel, Place 2 g onto the skin 4 times daily, Disp: , Rfl:      estradiol (ESTRING) 2 MG vaginal ring, Place 1 each vaginally every 3 months, Disp: 1 each, Rfl: 0     fluticasone (FLONASE) 50 MCG/ACT spray, Spray 1-2  sprays into both nostrils daily, Disp: 1 Bottle, Rfl: 3     fluticasone (FLOVENT HFA) 220 MCG/ACT Inhaler, 1 puff twice daily, rinse mouth out after Medication, Disp: 10.6 g, Rfl: 1     ipratropium (ATROVENT) 0.06 % spray, Spray 2 sprays into both nostrils 4 times daily as needed for rhinitis, Disp: 1 Box, Rfl: 1     magnesium 200 MG TABS, Take 200 mg by mouth daily, Disp: 1 tablet, Rfl: 0     omega-3 acid ethyl esters (LOVAZA) 1 G capsule, Take 2 capsules (2 g) by mouth daily, Disp: 120 capsule, Rfl:      omeprazole (PRILOSEC) 40 MG capsule, Take 1 capsule (40 mg) by mouth daily Take 30-60 minutes before a meal., Disp: 30 capsule, Rfl: 2     ranitidine (ZANTAC) 150 MG tablet, Take 1 tablet (150 mg) by mouth daily, Disp: 1 tablet, Rfl: 0     rOPINIRole (REQUIP) 0.25 MG tablet, Take 1 tablet (0.25 mg) by mouth At Bedtime, Disp: 90 tablet, Rfl: 3     sertraline (ZOLOFT) 100 MG tablet, Take 0.5 tablets (50 mg) by mouth daily, Disp: 90 tablet, Rfl: 1     sertraline (ZOLOFT) 100 MG tablet, TAKE ONE-HALF TABLET BY MOUTH DAILY **NEEDS DR. VALDES, Disp: 15 tablet, Rfl: 0     SUMAtriptan (IMITREX) 50 MG tablet, Take 1 tablet (50 mg) by mouth at onset of headache for migraine May repeat dose in 2 hours.  Do not exceed 200 mg in 24 hours, Disp: 18 tablet, Rfl: 4     traZODone (DESYREL) 100 MG tablet, Take 1 tablet (100 mg) by mouth At Bedtime, Disp: 90 tablet, Rfl: 3     Triprolidine-Pseudoephedrine (APRODINE PO), Take by mouth as needed, Disp: , Rfl:     Allergies   Allergen Reactions     Atorvastatin Cramps     Muscle pain     Morphine Sulfate Itching       Exam:  Vitals:    09/05/18 1146   BP: 126/76   Pulse: 94   Temp: 97.9  F (36.6  C)   TempSrc: Oral   Weight: 141 lb (64 kg)     Body mass index is 25.38 kg/(m^2).     Exam:  Constitutional: healthy, alert and no distress  : Normal external genitalia without lesions. Introitus is narrowed slightly. No urethral caruncle. Vestibule is pale, tissue is delicate.   Skin: no  suspicious lesions or rashes  Psychiatric: mentation appears normal and affect normal/bright    A/P:  68 year old  with vulvovaginal atrophy from menopause  - Discussed topical estrogen replacement.  - Discussed at intended doses there is minimal change in serum estrogen levels and endometrial protection with progestins is not necessary.   - Discussed risks of systemic HRT are recognized to be increased with the addition of progestins.  - Discussed options for topical estrogen replacement: creams, tablets, ring. She would like to try the ring.  - Recommend RTC in 3 months. If doing well she may just return to PCP for annual exam. Ring (estring) can be continued indefinitely. Femring provides systemic estrogen and requires concomitant progestin.    Greater than 50% of this 30 minute appointment was spent in counseling on vaginal atrophy.

## 2018-09-11 ENCOUNTER — MYC MEDICAL ADVICE (OUTPATIENT)
Dept: OTOLARYNGOLOGY | Facility: CLINIC | Age: 68
End: 2018-09-11

## 2018-09-11 DIAGNOSIS — R43.0 LOSS OF SMELL: ICD-10-CM

## 2018-09-11 DIAGNOSIS — G44.201 INTRACTABLE TENSION-TYPE HEADACHE, UNSPECIFIED CHRONICITY PATTERN: Primary | ICD-10-CM

## 2018-09-12 NOTE — TELEPHONE ENCOUNTER
Just to be thorough I ordered MRI of the brain for this patient to make sure there is no meningioma or any other pathology causing both headaches as well as loss of smell of the sinuses were clear.  Freddie Hinkle MD  Patient is to schedule her MRI since he is already in the orders and I should talk to her afterwards.

## 2018-09-12 NOTE — TELEPHONE ENCOUNTER
I spoke with pt. She will call to schedule MRI then will call to get appt with MD. ESTEBAN Snyder

## 2018-09-13 ENCOUNTER — HOSPITAL ENCOUNTER (OUTPATIENT)
Dept: MRI IMAGING | Facility: CLINIC | Age: 68
Discharge: HOME | End: 2018-09-13
Attending: OTOLARYNGOLOGY | Admitting: OTOLARYNGOLOGY
Payer: MEDICARE

## 2018-09-13 DIAGNOSIS — G44.201 INTRACTABLE TENSION-TYPE HEADACHE, UNSPECIFIED CHRONICITY PATTERN: ICD-10-CM

## 2018-09-13 DIAGNOSIS — R43.0 LOSS OF SMELL: ICD-10-CM

## 2018-09-13 PROCEDURE — 25500064 ZZH RX 255 OP 636: Performed by: RADIOLOGY

## 2018-09-13 PROCEDURE — 70553 MRI BRAIN STEM W/O & W/DYE: CPT

## 2018-09-13 PROCEDURE — A9585 GADOBUTROL INJECTION: HCPCS | Performed by: RADIOLOGY

## 2018-09-13 RX ORDER — GADOBUTROL 604.72 MG/ML
7.5 INJECTION INTRAVENOUS ONCE
Status: COMPLETED | OUTPATIENT
Start: 2018-09-13 | End: 2018-09-13

## 2018-09-13 RX ADMIN — GADOBUTROL 6.5 ML: 604.72 INJECTION INTRAVENOUS at 13:01

## 2018-09-14 ENCOUNTER — TELEPHONE (OUTPATIENT)
Dept: OBGYN | Facility: CLINIC | Age: 68
End: 2018-09-14

## 2018-09-14 DIAGNOSIS — N95.2 VAGINAL ATROPHY: Primary | ICD-10-CM

## 2018-09-14 RX ORDER — ESTRADIOL 10 UG/1
INSERT VAGINAL
Qty: 18 TABLET | Refills: 0 | Status: SHIPPED | OUTPATIENT
Start: 2018-09-14 | End: 2018-11-14

## 2018-09-14 NOTE — TELEPHONE ENCOUNTER
Estring is not on patient's formulary.  It looks like possibly estrace or estradiol cream may be covered as a 4th tier medication without the need for a PA.  Do you want to try either of those instead?  Estradiol tablets are a 4th tier med but will need a PA.  Mami Elkins RN

## 2018-09-14 NOTE — TELEPHONE ENCOUNTER
Discussed with pt. She is aware that she needs to call back to let us know how it's going so new ongoing rx can be sent in.  Mami Elkins RN

## 2018-09-14 NOTE — TELEPHONE ENCOUNTER
Sent vagifem in. She previously didn't like the cream.  Ask patient if that's her preference.   She should let me know when the one month prescription is almost done so I can send the ongoing rx (it's different).

## 2018-09-17 ENCOUNTER — TELEPHONE (OUTPATIENT)
Dept: OTOLARYNGOLOGY | Facility: OTHER | Age: 68
End: 2018-09-17

## 2018-09-17 NOTE — TELEPHONE ENCOUNTER
Pt informed of the MRI results.   Pt is asking if there is anything else she can do to help the sinus pressure?  She will check with her insurance on doing the cryoablation in the OR and let us know. ESTEBAN Snyder

## 2018-09-17 NOTE — TELEPHONE ENCOUNTER
Reason for call:  Pt is calling to see if she can get in this week with  and it doesn't matter which location. She had an MRI that was order done on Thursday and she would like to go over results. Please advise.

## 2018-09-18 ENCOUNTER — TELEPHONE (OUTPATIENT)
Dept: OTOLARYNGOLOGY | Facility: CLINIC | Age: 68
End: 2018-09-18

## 2018-09-18 NOTE — TELEPHONE ENCOUNTER
Reason for Call:  Fyi    Detailed comments: Patient states that her insurance will cover cryo ablation and to get in touch with providers services with her insurance, call if any questions    Phone Number Patient can be reached at: Home number on file [phone] (home)    Best Time: any    Can we leave a detailed message on this number? YES    Call taken on 9/18/2018 at 3:19 PM by Sophy Leon

## 2018-09-28 DIAGNOSIS — G43.809 OTHER MIGRAINE WITHOUT STATUS MIGRAINOSUS, NOT INTRACTABLE: ICD-10-CM

## 2018-09-28 NOTE — TELEPHONE ENCOUNTER
Reason for Call:  Medication or medication refill:    Do you use a [facility]?  Name of the pharmacy and phone number for the current request:    [id number] - [place] - [address]  [address] [facility] [address] [facility] [address]  Phone: [phone] Fax: [phone]      Name of the medication requested: SUMAtriptan (IMITREX) 50 MG tablet       Other request: none     Can we leave a detailed message on this number? YES    Phone number patient can be reached at: Home number on file [phone] (home)    Best Time: any     Call taken on 9/28/2018 at 4:13 PM by Khai Tucker

## 2018-10-01 RX ORDER — SUMATRIPTAN 50 MG/1
TABLET, FILM COATED ORAL
Qty: 18 TABLET | Refills: 4 | Status: SHIPPED | OUTPATIENT
Start: 2018-10-01 | End: 2019-10-16

## 2018-10-20 DIAGNOSIS — G47.00 INSOMNIA, UNSPECIFIED TYPE: ICD-10-CM

## 2018-10-22 RX ORDER — TRAZODONE HYDROCHLORIDE 100 MG/1
TABLET ORAL
Qty: 90 TABLET | Refills: 2 | Status: SHIPPED | OUTPATIENT
Start: 2018-10-22 | End: 2019-08-07

## 2018-10-23 ENCOUNTER — OFFICE VISIT (OUTPATIENT)
Dept: FAMILY MEDICINE | Facility: OTHER | Age: 68
End: 2018-10-23
Payer: COMMERCIAL

## 2018-10-23 VITALS
BODY MASS INDEX: 25.2 KG/M2 | WEIGHT: 140 LBS | DIASTOLIC BLOOD PRESSURE: 68 MMHG | OXYGEN SATURATION: 100 % | TEMPERATURE: 98.6 F | RESPIRATION RATE: 16 BRPM | HEART RATE: 72 BPM | SYSTOLIC BLOOD PRESSURE: 112 MMHG

## 2018-10-23 DIAGNOSIS — J01.90 ACUTE SINUSITIS WITH SYMPTOMS > 10 DAYS: Primary | ICD-10-CM

## 2018-10-23 PROCEDURE — 99213 OFFICE O/P EST LOW 20 MIN: CPT | Performed by: PHYSICIAN ASSISTANT

## 2018-10-23 RX ORDER — DOXYCYCLINE HYCLATE 100 MG
100 TABLET ORAL 2 TIMES DAILY
Qty: 20 TABLET | Refills: 0 | Status: SHIPPED | OUTPATIENT
Start: 2018-10-23 | End: 2018-11-14

## 2018-10-23 ASSESSMENT — PAIN SCALES - GENERAL: PAINLEVEL: MILD PAIN (3)

## 2018-10-23 NOTE — PROGRESS NOTES
SUBJECTIVE:   Amee Crews is a 68 year old female who presents to clinic today for the following health issues:    HPI  Acute Illness   Acute illness concerns: URI  Onset: x 8 days    Fever: YES    Chills/Sweats: YES    Headache (location?): YES    Sinus Pressure:YES    Conjunctivitis:  YES: left- Itchy    Ear Pain: no    Rhinorrhea: no    Congestion: YES    Sore Throat: YES     Cough: YES-productive of clear sputum, with shortness of breath    Wheeze: no    Decreased Appetite: YES    Nausea: no    Vomiting: no    Diarrhea:  no    Dysuria/Freq.: no    Fatigue/Achiness: YES    Sick/Strep Exposure: no     Therapies Tried and outcome: Dayquil, Tylenol- help with HA, antihistamine- help with congestion    -Symptoms started with a sore throat 8 days ago  -Worsening since onset  -Does report history of asthma in which she uses Flovent and Albuterol  -Also has history of seasonal allergies  -History of sinus infections  -Plans on having sinus surgery in the future to stop drainage     Problem list and histories reviewed & adjusted, as indicated.  Additional history: as documented    ROS:  Constitutional, HEENT, cardiovascular, pulmonary, gi and gu systems are negative, except as otherwise noted.    OBJECTIVE:   /68 (BP Location: Right arm, Patient Position: Chair, Cuff Size: Adult Regular)  Pulse 72  Temp 98.6  F (37  C) (Temporal)  Resp 16  Wt 140 lb (63.5 kg)  SpO2 100%  BMI 25.2 kg/m2  Body mass index is 25.2 kg/(m^2).  GENERAL APPEARANCE: ill appearing, alert and no distress  EYES: Eyes grossly normal to inspection, PERRLA, conjunctivae and sclerae without injection or discharge, EOM intact   HENT: Bilateral ear canals with erythema and no cerumen or discharge, bilateral TM's pearly grey with normal light reflex, no effusion, injection, or bulging, nasal turbinates reveal mild swelling, erythema and discharge, mouth without ulcers or lesions, oropharynx reveals post-nasal drainage and irritation,oral  mucous membranes moist, no sinus tenderness   NECK: Anterior cervical adenopathy, no adenopathy in posterior cervical cervical or supraclavicular regions  RESP: Lungs clear to auscultation - no rales, rhonchi or wheezes   CV: Regular rates and rhythm, normal S1 S2, no S3 or S4, no murmur, click or rub  MS: No musculoskeletal defects are noted and gait is age appropriate without ataxia   SKIN: No suspicious lesions or rashes, hydration status appears adeuqate with normal skin turgor   PSYCH: Alert and oriented x3; speech- coherent , normal rate and volume; able to articulate logical thoughts, able to abstract reason, no tangential thoughts, no hallucinations or delusions, mentation appears normal, Mood is euthymic. Affect is appropriate for this mood state and bright. Thought content is free of suicidal ideation, hallucinations, and delusions. Dress is adequate and upkept. Eye contact is good during conversation.       Diagnostic Test Results:  none     ASSESSMENT/PLAN:       ICD-10-CM    1. Acute sinusitis with symptoms > 10 days J01.90 doxycycline (VIBRA-TABS) 100 MG tablet     -Amee's clinical presentation consistent with acute sinusitis based on physical examination, longevity of symptoms and history of recurrent sinus infections  -Recommend antibiotic treatment  -Doxycycline has worked well for treating her sinus infections     As such, doxycycline prescription sent to her pharmacy today    1 tablet twice per day for 10 days    Medication use and side effects discussed  -Rest and fluids also recommended  -Sudafed may be taken for congestion  -Follow-up if symptoms worsen or do not improve  - Hand out given     The patient indicates understanding of these issues and agrees with the plan.       Follow up: PRN     The patient was seen with student BLAIR Albarran.         Salma Herron-DESIREE Schroeder  [facility]

## 2018-10-23 NOTE — PATIENT INSTRUCTIONS
Sinusitis           What is sinusitis?   Sinusitis is swollen, infected linings of the sinuses. The sinuses are hollow spaces in the bones of your face and skull. They connect with the nose through small openings. Like the nose, their linings make mucus.   How does it occur?   Sinusitis occurs when the sinus linings become infected. The passageways from the sinuses to the nose are very narrow. Swelling and mucus may block the passageways. This leads to pressure changes in the sinuses that can be painful.   A number of things can cause swelling and sinusitis. Most often it's allergens (things that cause allergies, like pollen and mold) and viruses, such as viruses that cause the common cold. Whether the cause is allergies or a virus, the sinus linings can swell. When swelling causes the sinus passageway to swell shut, bacteria, viruses, and even fungus can be trapped in the sinuses and cause a sinus infection.   If your nasal bones have been injured or are deformed, causing partial blockage of the sinus openings, you are more likely to get sinusitis.   What are the symptoms?   Symptoms include:   feeling of fullness or pressure in your head   a headache that is most painful when you first wake up in the morning or when you bend your head down or forward   pain above or below your eyes   aching in the upper jaw and teeth   runny or stuffy nose   cough, especially at night   fluid draining down the back of your throat (postnasal drainage)   sore throat in the morning or evening.   How is it diagnosed?   Your healthcare provider will ask about your symptoms and will examine you. You may have an X-ray to look for swelling, fluid, or small benign growths (polyps) in the sinuses.   How is it treated?   Decongestants may help. They may be nonprescription or prescription. They are available as liquids, pills, and nose sprays.   Your healthcare provider may prescribe an antibiotic. In some cases you may need to  take decongestants and antibiotics for several weeks.   You may need nonprescription medicine for pain, such as acetaminophen or ibuprofen. Check with your healthcare provider before you give any medicine that contains aspirin or salicylates to a child or teen. This includes medicines like baby aspirin, some cold medicines, and Pepto Bismol. Children and teens who take aspirin are at risk for a serious illness called Reye's syndrome. Ibuprofen is an NSAID. Nonsteroidal anti-inflammatory medicines (NSAIDs) may cause stomach bleeding and other problems. These risks increase with age. Read the label and take as directed. Unless recommended by your healthcare provider, do not take NSAIDs for more than 10 days for any reason.   If you have chronic or repeated sinus infections, allergies may be the cause. Your healthcare provider may prescribe antihistamine tablets or prescription nasal sprays (steroids or cromolyn) to treat the allergies.   If you have chronic, severe sinusitis that does not respond to treatment with medicines, surgery may be done. The surgeon can create an extra or enlarged passageway in the wall of the sinus cavity. This allows the sinuses to drain more easily through the nasal passages. This should help them stay free of infection.   How long will the effects last?   Symptoms may get better gradually over 3 to 10 days. Depending on what caused the sinusitis and how severe it is, it may last for days or weeks. The symptoms may come back if you do not finish all of your antibiotic.   How can I take care of myself?   Follow your healthcare provider's instructions.   If you are taking an antibiotic, take all of it as directed by your provider. If you stop taking the medicine when your symptoms are gone but before you have taken all of the medicine, symptoms may come back.   Avoid tobacco smoke.   If you have allergies, take care to avoid the things you are allergic to, such as animal dander.   Add  moisture to the air with a humidifier or a vaporizer, unless you have mold allergy (mold may grow in your vaporizer).   Inhale steam from a basin of hot water or shower to help open your sinuses and relieve pain.   Use saline nasal sprays to help wash out nasal passages and clear some mucus from the airways.   Use decongestants as directed on the label or by your provider.   If you are using a nonprescription nasal-spray decongestant, generally you should not use it for more than 3 days. After 3 days it may cause your symptoms to get worse. Ask your healthcare provider if it is OK for you to use a nasal spray decongestant longer than this.   Get plenty of rest.   Drink more fluids to keep the mucus as thin as possible so your sinuses can drain more easily.   Put warm compresses on painful areas.   Take antibiotics as prescribed. Use all of the medicine, even after you feel better. Some sinus infections require 2 to 4 weeks of antibiotic treatment.   See your healthcare provider if the pain lasts for several days or gets worse.   If the sinus areas above or below your eyes are swollen or bulging, see your healthcare provider right away. This symptom may mean that the infection is spreading. A spreading infection can affect other parts of your body--even the brain--and needs to be treated promptly.   How can I help prevent sinusitis?   Treat your colds and allergies promptly. Use decongestants as soon as you start having symptoms.   Do not smoke and stay away from secondhand smoke.   Drink lots of fluids to keep the mucus thin.   Humidify your home if the air is particularly dry.   If you have sinus infections often, consider having allergy tests.   If sinusitis continues to be a problem despite treatment, you might need an exam by an ear, nose, and throat doctor (called an ENT or otolaryngologist). The specialist will check for polyps or a deformed bone that may be blocking your sinuses.     Published by  [employer].  This content is reviewed periodically and is subject to change as new health information becomes available. The information is intended to inform and educate and is not a replacement for medical evaluation, advice, diagnosis or treatment by a healthcare professional.   Developed by [employer].   ? 2010 [facility] and/or its affiliates. All Rights Reserved.   Copyright   [employer] Reference Systems 2011  Adult Health Advisor

## 2018-10-23 NOTE — MR AVS SNAPSHOT
After Visit Summary   10/23/2018    Amee Crews    MRN: [id number]           Patient Information     Date Of Birth          1950        Visit Information        Provider Department      10/23/2018 11:30 AM Salma Yusuf PA-C [facility]        Today's Diagnoses     Acute sinusitis with symptoms > 10 days    -  1      Care Instructions                  Sinusitis           What is sinusitis?   Sinusitis is swollen, infected linings of the sinuses. The sinuses are hollow spaces in the bones of your face and skull. They connect with the nose through small openings. Like the nose, their linings make mucus.   How does it occur?   Sinusitis occurs when the sinus linings become infected. The passageways from the sinuses to the nose are very narrow. Swelling and mucus may block the passageways. This leads to pressure changes in the sinuses that can be painful.   A number of things can cause swelling and sinusitis. Most often it's allergens (things that cause allergies, like pollen and mold) and viruses, such as viruses that cause the common cold. Whether the cause is allergies or a virus, the sinus linings can swell. When swelling causes the sinus passageway to swell shut, bacteria, viruses, and even fungus can be trapped in the sinuses and cause a sinus infection.   If your nasal bones have been injured or are deformed, causing partial blockage of the sinus openings, you are more likely to get sinusitis.   What are the symptoms?   Symptoms include:   feeling of fullness or pressure in your head   a headache that is most painful when you first wake up in the morning or when you bend your head down or forward   pain above or below your eyes   aching in the upper jaw and teeth   runny or stuffy nose   cough, especially at night   fluid draining down the back of your throat (postnasal drainage)   sore throat in the morning or evening.   How is it diagnosed?   Your healthcare  provider will ask about your symptoms and will examine you. You may have an X-ray to look for swelling, fluid, or small benign growths (polyps) in the sinuses.   How is it treated?   Decongestants may help. They may be nonprescription or prescription. They are available as liquids, pills, and nose sprays.   Your healthcare provider may prescribe an antibiotic. In some cases you may need to take decongestants and antibiotics for several weeks.   You may need nonprescription medicine for pain, such as acetaminophen or ibuprofen. Check with your healthcare provider before you give any medicine that contains aspirin or salicylates to a child or teen. This includes medicines like baby aspirin, some cold medicines, and Pepto Bismol. Children and teens who take aspirin are at risk for a serious illness called Reye's syndrome. Ibuprofen is an NSAID. Nonsteroidal anti-inflammatory medicines (NSAIDs) may cause stomach bleeding and other problems. These risks increase with age. Read the label and take as directed. Unless recommended by your healthcare provider, do not take NSAIDs for more than 10 days for any reason.   If you have chronic or repeated sinus infections, allergies may be the cause. Your healthcare provider may prescribe antihistamine tablets or prescription nasal sprays (steroids or cromolyn) to treat the allergies.   If you have chronic, severe sinusitis that does not respond to treatment with medicines, surgery may be done. The surgeon can create an extra or enlarged passageway in the wall of the sinus cavity. This allows the sinuses to drain more easily through the nasal passages. This should help them stay free of infection.   How long will the effects last?   Symptoms may get better gradually over 3 to 10 days. Depending on what caused the sinusitis and how severe it is, it may last for days or weeks. The symptoms may come back if you do not finish all of your antibiotic.   How can I take care of myself?    Follow your healthcare provider's instructions.   If you are taking an antibiotic, take all of it as directed by your provider. If you stop taking the medicine when your symptoms are gone but before you have taken all of the medicine, symptoms may come back.   Avoid tobacco smoke.   If you have allergies, take care to avoid the things you are allergic to, such as animal dander.   Add moisture to the air with a humidifier or a vaporizer, unless you have mold allergy (mold may grow in your vaporizer).   Inhale steam from a basin of hot water or shower to help open your sinuses and relieve pain.   Use saline nasal sprays to help wash out nasal passages and clear some mucus from the airways.   Use decongestants as directed on the label or by your provider.   If you are using a nonprescription nasal-spray decongestant, generally you should not use it for more than 3 days. After 3 days it may cause your symptoms to get worse. Ask your healthcare provider if it is OK for you to use a nasal spray decongestant longer than this.   Get plenty of rest.   Drink more fluids to keep the mucus as thin as possible so your sinuses can drain more easily.   Put warm compresses on painful areas.   Take antibiotics as prescribed. Use all of the medicine, even after you feel better. Some sinus infections require 2 to 4 weeks of antibiotic treatment.   See your healthcare provider if the pain lasts for several days or gets worse.   If the sinus areas above or below your eyes are swollen or bulging, see your healthcare provider right away. This symptom may mean that the infection is spreading. A spreading infection can affect other parts of your body--even the brain--and needs to be treated promptly.   How can I help prevent sinusitis?   Treat your colds and allergies promptly. Use decongestants as soon as you start having symptoms.   Do not smoke and stay away from secondhand smoke.   Drink lots of fluids to keep the mucus thin.    Humidify your home if the air is particularly dry.   If you have sinus infections often, consider having allergy tests.   If sinusitis continues to be a problem despite treatment, you might need an exam by an ear, nose, and throat doctor (called an ENT or otolaryngologist). The specialist will check for polyps or a deformed bone that may be blocking your sinuses.     Published by [employer].  This content is reviewed periodically and is subject to change as new health information becomes available. The information is intended to inform and educate and is not a replacement for medical evaluation, advice, diagnosis or treatment by a healthcare professional.   Developed by [employer].   ? 2010 [employer] and/or its affiliates. All Rights Reserved.   Copyright   Clinical Reference Systems 2011  Adult Health Advisor                Follow-ups after your visit        Who to contact     If you have questions or need follow up information about today's clinic visit or your schedule please contact [facility] directly at [phone].  Normal or non-critical lab and imaging results will be communicated to you by [employer], letter or phone within 4 business days after the clinic has received the results. If you do not hear from us within 7 days, please contact the clinic through [employer] or phone. If you have a critical or abnormal lab result, we will notify you by phone as soon as possible.  Submit refill requests through [employer] or call your pharmacy and they will forward the refill request to us. Please allow 3 business days for your refill to be completed.          Additional Information About Your Visit        [employer] Information     [employer] gives you secure access to your electronic health record. If you see a primary care provider, you can also send messages to your care team and make appointments. If you have questions, please call your primary care clinic.  If you do not have a primary care provider,  please call [phone] and they will assist you.        Care EveryWhere ID     This is your Care EveryWhere ID. This could be used by other organizations to access your [place] medical records  [id number]        Your Vitals Were     Pulse Temperature Respirations Pulse Oximetry BMI (Body Mass Index)       72 98.6  F (37  C) (Temporal) 16 100% 25.2 kg/m2        Blood Pressure from Last 3 Encounters:   10/23/18 112/68   09/05/18 126/76   08/20/18 120/78    Weight from Last 3 Encounters:   10/23/18 140 lb (63.5 kg)   09/05/18 141 lb (64 kg)   08/20/18 141 lb (64 kg)              Today, you had the following     No orders found for display         Today's Medication Changes          These changes are accurate as of 10/23/18 12:02 PM.  If you have any questions, ask your nurse or doctor.               Start taking these medicines.        Dose/Directions    doxycycline 100 MG tablet   Commonly known as:  VIBRA-TABS   Used for:  Acute sinusitis with symptoms > 10 days   Started by:  Salma Yusuf PA-C        Dose:  100 mg   Take 1 tablet (100 mg) by mouth 2 times daily   Quantity:  20 tablet   Refills:  0            Where to get your medicines      These medications were sent to [id number] - [place] - [address]  [address] [address]     Phone:  [phone]     doxycycline 100 MG tablet                Primary Care Provider Office Phone # Fax #    Patrica Kelly [phone]       [address] [address]        Equal Access to Services     [facility] AH: Hadii siri baker hadasho Soomaali, waaxda luqadaha, qaybta kaalmavicki barkley. So [facility] [phone].    ATENCIÓN: Si habla español, tiene a alejo disposición servicios gratuitos de asistencia lingüística. Llame al [phone].    We comply with applicable federal civil rights laws and [place] laws. We do not discriminate on the basis of race,  color, national origin, age, disability, sex, sexual orientation, or gender identity.            Thank you!     Thank you for choosing [facility]  for your care. Our goal is always to provide you with excellent care. Hearing back from our patients is one way we can continue to improve our services. Please take a few minutes to complete the written survey that you may receive in the mail after your visit with us. Thank you!             Your Updated Medication List - Protect others around you: Learn how to safely use, store and throw away your medicines at www.disposemymeds.org.          This list is accurate as of 10/23/18 12:02 PM.  Always use your most recent med list.                   Brand Name Dispense Instructions for use Diagnosis    albuterol 108 (90 Base) MCG/ACT inhaler    PROAIR HFA/PROVENTIL HFA/VENTOLIN HFA    1 Inhaler    Inhale 2 puffs into the lungs every 4 hours as needed for shortness of breath / dyspnea    Mild intermittent asthma without complication       APRODINE PO      Take by mouth as needed        azelastine 0.1 % nasal spray    ASTELIN    1 Bottle    Spray 2 sprays into both nostrils daily    Nasal drainage       CALCIUM PO           cetirizine HCl 10 MG Caps    ZYRTEC ALLERGY    90 capsule    Take 10 mg by mouth as needed    Chronic seasonal allergic rhinitis due to pollen       cyanocobalamin 1000 MCG tablet    vitamin  B-12    90 tablet    Take 1 tablet (1,000 mcg) by mouth daily    Vitamin B 12 deficiency       diclofenac 1 % Gel topical gel    VOLTAREN     Place 2 g onto the skin 4 times daily        doxycycline 100 MG tablet    VIBRA-TABS    20 tablet    Take 1 tablet (100 mg) by mouth 2 times daily    Acute sinusitis with symptoms > 10 days       * estradiol 2 MG vaginal ring    ESTRING    1 each    Place 1 each vaginally every 3 months    Vaginal atrophy       * estradiol 10 MCG Tabs vaginal tablet    VAGIFEM    18 tablet    Place tablet vaginally for two weeks then  twice weekly    Vaginal atrophy       fluticasone 220 MCG/ACT Inhaler    FLOVENT HFA    10.6 g    1 puff twice daily, rinse mouth out after Medication    Chronic seasonal allergic rhinitis due to pollen       fluticasone 50 MCG/ACT spray    FLONASE    1 Bottle    Spray 1-2 sprays into both nostrils daily    Chronic sinusitis, unspecified location       ipratropium 0.06 % spray    ATROVENT    1 Box    Spray 2 sprays into both nostrils 4 times daily as needed for rhinitis    Chronic vasomotor rhinitis       magnesium 200 MG Tabs     1 tablet    Take 200 mg by mouth daily        omega-3 acid ethyl esters 1 g capsule    Lovaza    120 capsule    Take 2 capsules (2 g) by mouth daily        omeprazole 40 MG capsule    priLOSEC    30 capsule    Take 1 capsule (40 mg) by mouth daily Take 30-60 minutes before a meal.        ranitidine 150 MG tablet    ZANTAC    1 tablet    Take 1 tablet (150 mg) by mouth daily        rOPINIRole 0.25 MG tablet    REQUIP    90 tablet    Take 1 tablet (0.25 mg) by mouth At Bedtime    Restless leg       * sertraline 100 MG tablet    ZOLOFT    15 tablet    TAKE ONE-HALF TABLET BY MOUTH DAILY **NEEDS DR. DRAKE.    Major depression in complete remission (H)       * sertraline 100 MG tablet    ZOLOFT    90 tablet    Take 0.5 tablets (50 mg) by mouth daily    Major depression in complete remission (H)       SUMAtriptan 50 MG tablet    IMITREX    18 tablet    TAKE ONE TABLET AT ONSET OF HEADACHE. MAY REPEAT AFTER 2 HOURS. DO NOT EXCEED 200 MG IN 24 HOURS    Other migraine without status migrainosus, not intractable       traZODone 100 MG tablet    DESYREL    90 tablet    TAKE ONE TABLET BY MOUTH AT BEDTIME    Insomnia, unspecified type       * Notice:  This list has 4 medication(s) that are the same as other medications prescribed for you. Read the directions carefully, and ask your doctor or other care provider to review them with you.

## 2018-10-24 ENCOUNTER — TELEPHONE (OUTPATIENT)
Dept: OTOLARYNGOLOGY | Facility: CLINIC | Age: 68
End: 2018-10-24

## 2018-10-24 DIAGNOSIS — F41.9 ANXIETY: Primary | ICD-10-CM

## 2018-10-26 ENCOUNTER — MYC MEDICAL ADVICE (OUTPATIENT)
Dept: FAMILY MEDICINE | Facility: OTHER | Age: 68
End: 2018-10-26

## 2018-11-01 ENCOUNTER — MYC MEDICAL ADVICE (OUTPATIENT)
Dept: INTERNAL MEDICINE | Facility: CLINIC | Age: 68
End: 2018-11-01

## 2018-11-01 ENCOUNTER — MYC MEDICAL ADVICE (OUTPATIENT)
Dept: OTOLARYNGOLOGY | Facility: CLINIC | Age: 68
End: 2018-11-01

## 2018-11-01 DIAGNOSIS — E53.8 VITAMIN B12 DEFICIENCY: ICD-10-CM

## 2018-11-01 DIAGNOSIS — Z79.899 HIGH RISK MEDICATION USE: ICD-10-CM

## 2018-11-01 DIAGNOSIS — E78.5 HYPERLIPIDEMIA LDL GOAL <130: Primary | ICD-10-CM

## 2018-11-01 DIAGNOSIS — G43.809 OTHER MIGRAINE WITHOUT STATUS MIGRAINOSUS, NOT INTRACTABLE: Primary | ICD-10-CM

## 2018-11-01 NOTE — TELEPHONE ENCOUNTER
I think that the patient would benefit from seeing neurology regarding possible migraines me.  Dr. Herron can put in the orders and she is to start treating patient for headaches and nausea.  He does not appear to be sinus related.  Freddie Hinkle MD

## 2018-11-01 NOTE — TELEPHONE ENCOUNTER
Called and spoke with patient regarding message below.  Patient verbalized understanding.  Jaycee San CMA ([facility])

## 2018-11-01 NOTE — TELEPHONE ENCOUNTER
Agree with Dr. Hinkle. Doesn't seem to be sinus related. We should seek neurology consult    Referral placed  Consult at PREFERRED PROVIDERS:  [facility]: [facility] [phone]   [email or web address]     Min Herron-DESIREE Schroeder  [facility]

## 2018-11-01 NOTE — TELEPHONE ENCOUNTER
Please send Rx for Ativan to [facility] and notify pt when done. Is aware she needs a . ESTEBAN Snyder

## 2018-11-01 NOTE — TELEPHONE ENCOUNTER
I called pt. I gave her the message from Dr. Hinkle. I said I would forward message to her primary.   Pt is not feeling that she is well. She still has pressure in her head. ESTEBAN Snyder

## 2018-11-07 RX ORDER — LORAZEPAM 0.5 MG/1
0.5 TABLET ORAL EVERY 8 HOURS PRN
Qty: 2 TABLET | Refills: 0 | Status: SHIPPED | OUTPATIENT
Start: 2018-11-07 | End: 2018-11-14

## 2018-11-12 ENCOUNTER — HEALTH MAINTENANCE LETTER (OUTPATIENT)
Age: 68
End: 2018-11-12

## 2018-11-12 NOTE — PATIENT INSTRUCTIONS
Use over the counter hydrocortisone cream for occasional itching in the vaginal area.  Wear the night splints on your hands.  If the tingling and numbness continues then you will need an EMG.      Preventive Health Recommendations    See your health care provider every year to    Review health changes.     Discuss preventive care.      Review your medicines if your doctor has prescribed any.      You no longer need a yearly Pap test unless you've had an abnormal Pap test in the past 10 years. If you have vaginal symptoms, such as bleeding or discharge, be sure to talk with your provider about a Pap test.      Every 1 to 2 years, have a mammogram.  If you are over 69, talk with your health care provider about whether or not you want to continue having screening mammograms.      Every 10 years, have a colonoscopy. Or, have a yearly FIT test (stool test). These exams will check for colon cancer.       Have a cholesterol test every 5 years, or more often if your doctor advises it.       Have a diabetes test (fasting glucose) every three years. If you are at risk for diabetes, you should have this test more often.       At age 65, have a bone density scan (DEXA) to check for osteoporosis (brittle bone disease).    Shots:    Get a flu shot each year.    Get a tetanus shot every 10 years.    Talk to your doctor about your pneumonia vaccines. There are now two you should receive - Pneumovax (PPSV 23) and Prevnar (PCV 13).    Talk to your pharmacist about the shingles vaccine.    Talk to your doctor about the hepatitis B vaccine.    Nutrition:     Eat at least 5 servings of fruits and vegetables each day.      Eat whole-grain bread, whole-wheat pasta and brown rice instead of white grains and rice.      Get adequate Calcium and Vitamin D.     Lifestyle    Exercise at least 150 minutes a week (30 minutes a day, 5 days a week). This will help you control your weight and prevent disease.      Limit alcohol to one drink per  day.      No smoking.       Wear sunscreen to prevent skin cancer.       See your dentist twice a year for an exam and cleaning.      See your eye doctor every 1 to 2 years to screen for conditions such as glaucoma, macular degeneration and cataracts.    Personalized Prevention Plan  You are due for the preventive services outlined below.  Your care team is available to assist you in scheduling these services.  If you have already completed any of these items, please share that information with your care team to update in your medical record.  Health Maintenance Due   Topic Date Due     FALL RISK ASSESSMENT  08/23/2018     Cholesterol Lab - yearly  08/23/2018     Pneumococcal Vaccine (2 of 2 - PPSV23) 08/23/2018     Flu Vaccine (1) 09/01/2018     Mammogram - yearly  09/07/2018   [facility]    If you have any questions regarding to your visit please contact your care team:     Team Pink:   Clinic Hours Telephone Number   Internal Medicine:  Dr. Patrica Kate, NP 7am-7pm  Monday - Thursday   7am-5pm  Fridays  [phone]  (Appointment scheduling available 24/7)   Urgent [facility] - [place] - 11am-9pm Monday-Friday Saturday-Sunday- 9am-5pm   [place] - 5pm-9pm Monday-Friday Saturday-Sunday- 9am-5pm  [phone] - [place]  [phone] - [place]       What options do I have for a visit other than an office visit? We offer electronic visits (e-visits) and telephone visits, when medically appropriate.  Please check with your medical insurance to see if these types of visits are covered, as you will be responsible for any charges that are not paid by your insurance.      You can use GoGuide (secure electronic communication) to access to your chart, send your primary care provider a message, or make an appointment. Ask a team member how to get started.     For a price quote for your services, please call our Consumer Price Line at  [phone] or our Imaging Cost estimation line at [phone] (for imaging tests).  Mira Singleton MA

## 2018-11-14 ENCOUNTER — OFFICE VISIT (OUTPATIENT)
Dept: INTERNAL MEDICINE | Facility: CLINIC | Age: 68
End: 2018-11-14
Payer: COMMERCIAL

## 2018-11-14 VITALS
WEIGHT: 139.6 LBS | DIASTOLIC BLOOD PRESSURE: 66 MMHG | RESPIRATION RATE: 16 BRPM | TEMPERATURE: 97.9 F | HEIGHT: 63 IN | HEART RATE: 99 BPM | SYSTOLIC BLOOD PRESSURE: 108 MMHG | OXYGEN SATURATION: 99 % | BODY MASS INDEX: 24.73 KG/M2

## 2018-11-14 DIAGNOSIS — Z23 NEED FOR PROPHYLACTIC VACCINATION AGAINST STREPTOCOCCUS PNEUMONIAE (PNEUMOCOCCUS): ICD-10-CM

## 2018-11-14 DIAGNOSIS — E53.8 VITAMIN B12 DEFICIENCY: ICD-10-CM

## 2018-11-14 DIAGNOSIS — J30.1 CHRONIC SEASONAL ALLERGIC RHINITIS DUE TO POLLEN: ICD-10-CM

## 2018-11-14 DIAGNOSIS — F41.9 ANXIETY: ICD-10-CM

## 2018-11-14 DIAGNOSIS — J45.20 MILD INTERMITTENT ASTHMA WITHOUT COMPLICATION: ICD-10-CM

## 2018-11-14 DIAGNOSIS — Z00.00 ROUTINE GENERAL MEDICAL EXAMINATION AT A HEALTH CARE FACILITY: Primary | ICD-10-CM

## 2018-11-14 DIAGNOSIS — G25.81 RESTLESS LEG: ICD-10-CM

## 2018-11-14 DIAGNOSIS — M85.80 OSTEOPENIA, UNSPECIFIED LOCATION: ICD-10-CM

## 2018-11-14 DIAGNOSIS — R53.82 CHRONIC FATIGUE: ICD-10-CM

## 2018-11-14 DIAGNOSIS — Z12.31 VISIT FOR SCREENING MAMMOGRAM: ICD-10-CM

## 2018-11-14 DIAGNOSIS — F32.5 MAJOR DEPRESSION IN COMPLETE REMISSION (H): ICD-10-CM

## 2018-11-14 DIAGNOSIS — N95.2 VAGINAL ATROPHY: ICD-10-CM

## 2018-11-14 DIAGNOSIS — G44.209 TENSION HEADACHE: ICD-10-CM

## 2018-11-14 DIAGNOSIS — Z23 NEED FOR PROPHYLACTIC VACCINATION AND INOCULATION AGAINST INFLUENZA: ICD-10-CM

## 2018-11-14 DIAGNOSIS — E78.5 HYPERLIPIDEMIA LDL GOAL <130: ICD-10-CM

## 2018-11-14 DIAGNOSIS — R20.2 PARESTHESIA: ICD-10-CM

## 2018-11-14 LAB
ALBUMIN SERPL-MCNC: 4 G/DL (ref 3.4–5)
ALP SERPL-CCNC: 79 U/L (ref 40–150)
ALT SERPL W P-5'-P-CCNC: 25 U/L (ref 0–50)
ANION GAP SERPL CALCULATED.3IONS-SCNC: 8 MMOL/L (ref 3–14)
AST SERPL W P-5'-P-CCNC: 20 U/L (ref 0–45)
BASOPHILS # BLD AUTO: 0.1 10E9/L (ref 0–0.2)
BASOPHILS NFR BLD AUTO: 0.8 %
BILIRUB SERPL-MCNC: 0.6 MG/DL (ref 0.2–1.3)
BUN SERPL-MCNC: 15 MG/DL (ref 7–30)
CALCIUM SERPL-MCNC: 8.9 MG/DL (ref 8.5–10.1)
CHLORIDE SERPL-SCNC: 105 MMOL/L (ref 94–109)
CHOLEST SERPL-MCNC: 304 MG/DL
CO2 SERPL-SCNC: 27 MMOL/L (ref 20–32)
CREAT SERPL-MCNC: 0.87 MG/DL (ref 0.52–1.04)
DIFFERENTIAL METHOD BLD: ABNORMAL
EOSINOPHIL # BLD AUTO: 0.3 10E9/L (ref 0–0.7)
EOSINOPHIL NFR BLD AUTO: 4.3 %
ERYTHROCYTE [DISTWIDTH] IN BLOOD BY AUTOMATED COUNT: 12.1 % (ref 10–15)
GFR SERPL CREATININE-BSD FRML MDRD: 65 ML/MIN/1.7M2
GLUCOSE SERPL-MCNC: 95 MG/DL (ref 70–99)
HCT VFR BLD AUTO: 39 % (ref 35–47)
HDLC SERPL-MCNC: 57 MG/DL
HGB BLD-MCNC: 13.4 G/DL (ref 11.7–15.7)
LDLC SERPL CALC-MCNC: 204 MG/DL
LYMPHOCYTES # BLD AUTO: 2 10E9/L (ref 0.8–5.3)
LYMPHOCYTES NFR BLD AUTO: 30.2 %
MCH RBC QN AUTO: 33.3 PG (ref 26.5–33)
MCHC RBC AUTO-ENTMCNC: 34.4 G/DL (ref 31.5–36.5)
MCV RBC AUTO: 97 FL (ref 78–100)
MONOCYTES # BLD AUTO: 0.6 10E9/L (ref 0–1.3)
MONOCYTES NFR BLD AUTO: 9.6 %
NEUTROPHILS # BLD AUTO: 3.6 10E9/L (ref 1.6–8.3)
NEUTROPHILS NFR BLD AUTO: 55.1 %
NONHDLC SERPL-MCNC: 247 MG/DL
PLATELET # BLD AUTO: 273 10E9/L (ref 150–450)
POTASSIUM SERPL-SCNC: 4.4 MMOL/L (ref 3.4–5.3)
PROT SERPL-MCNC: 7.3 G/DL (ref 6.8–8.8)
RBC # BLD AUTO: 4.02 10E12/L (ref 3.8–5.2)
SODIUM SERPL-SCNC: 140 MMOL/L (ref 133–144)
TRIGL SERPL-MCNC: 214 MG/DL
TSH SERPL DL<=0.005 MIU/L-ACNC: 1.27 MU/L (ref 0.4–4)
VIT B12 SERPL-MCNC: 967 PG/ML (ref 193–986)
WBC # BLD AUTO: 6.5 10E9/L (ref 4–11)

## 2018-11-14 PROCEDURE — 36415 COLL VENOUS BLD VENIPUNCTURE: CPT | Performed by: INTERNAL MEDICINE

## 2018-11-14 PROCEDURE — 84443 ASSAY THYROID STIM HORMONE: CPT | Performed by: INTERNAL MEDICINE

## 2018-11-14 PROCEDURE — G0009 ADMIN PNEUMOCOCCAL VACCINE: HCPCS | Performed by: INTERNAL MEDICINE

## 2018-11-14 PROCEDURE — 85025 COMPLETE CBC W/AUTO DIFF WBC: CPT | Performed by: INTERNAL MEDICINE

## 2018-11-14 PROCEDURE — G0438 PPPS, INITIAL VISIT: HCPCS | Performed by: INTERNAL MEDICINE

## 2018-11-14 PROCEDURE — G0008 ADMIN INFLUENZA VIRUS VAC: HCPCS | Performed by: INTERNAL MEDICINE

## 2018-11-14 PROCEDURE — 99213 OFFICE O/P EST LOW 20 MIN: CPT | Mod: 25 | Performed by: INTERNAL MEDICINE

## 2018-11-14 PROCEDURE — 80061 LIPID PANEL: CPT | Performed by: INTERNAL MEDICINE

## 2018-11-14 PROCEDURE — 90732 PPSV23 VACC 2 YRS+ SUBQ/IM: CPT | Performed by: INTERNAL MEDICINE

## 2018-11-14 PROCEDURE — 82607 VITAMIN B-12: CPT | Performed by: INTERNAL MEDICINE

## 2018-11-14 PROCEDURE — 90662 IIV NO PRSV INCREASED AG IM: CPT | Performed by: INTERNAL MEDICINE

## 2018-11-14 PROCEDURE — 80053 COMPREHEN METABOLIC PANEL: CPT | Performed by: INTERNAL MEDICINE

## 2018-11-14 RX ORDER — ESTRADIOL 10 UG/1
INSERT VAGINAL
Qty: 18 TABLET | Refills: 11 | Status: SHIPPED | OUTPATIENT
Start: 2018-11-14 | End: 2019-04-19

## 2018-11-14 RX ORDER — FLUTICASONE PROPIONATE 220 UG/1
AEROSOL, METERED RESPIRATORY (INHALATION)
Qty: 10.6 G | Refills: 1 | Status: SHIPPED | OUTPATIENT
Start: 2018-11-14 | End: 2019-04-30

## 2018-11-14 RX ORDER — ROPINIROLE 0.25 MG/1
0.25 TABLET, FILM COATED ORAL AT BEDTIME
Qty: 90 TABLET | Refills: 3 | Status: SHIPPED | OUTPATIENT
Start: 2018-11-14 | End: 2019-12-27

## 2018-11-14 RX ORDER — ALBUTEROL SULFATE 90 UG/1
2 AEROSOL, METERED RESPIRATORY (INHALATION) EVERY 4 HOURS PRN
Qty: 1 INHALER | Refills: 2 | Status: SHIPPED | OUTPATIENT
Start: 2018-11-14 | End: 2020-01-21

## 2018-11-14 NOTE — PROGRESS NOTES
SUBJECTIVE:   Amee Crews is a 68 year old female who presents for Preventive Visit.    Are you in the first 12 months of your Medicare Part B coverage?  No    She has sinus pressure and fatigue.  She has had sinus infections and ws treated just 3 weeks ago.  She feels fatigue and short of breath.  She will take her inhaler but doesn't seem to help.  Doesn't feel like herself.  Is going to do sinus surgery in December. No vision changes recently and sees [facility] for this.  No change.     Occasional vaginal itch.  No discharge.  No new partners.    Hands are falling asleep bilateral while at night.  Off and on for the last ange but worse over the last few months.  She is not wearing braces.      At night she will have a feeling of water sloshing in her ankles.  No swelling other than a dent in her sock.    Dizzy when she bends over lately.  Blood pressure at home is between 157-90 systolic blood pressure and diastolic is in the 60-70's.  Pulses are not checked.     She is going to see a neurologist next week.  Headache is worse when she sits up.  She has had imaging of her sinuses and brain.  She has chronic microvascular ischemic disease.  She had scattered mucosal thickening as well.    Physical Health:    In general, how would you rate your overall physical health? good    Outside of work, how many days during the week do you exercise? none    Outside of work, approximately how many minutes a day do you exercise?not applicable  If you drink alcohol do you typically have >3 drinks per day or >7 drinks per week? Yes - AUDIT SCORE:  7  AUDIT - Alcohol Use Disorders Identification Test - Reproduced from the World Health Organization Audit 2001 (Second Edition) 11/14/2018   1.  How often do you have a drink containing alcohol? 2 to 3 times a week   2.  How many drinks containing alcohol do you have on a typical day when you are drinking? 1 or 2   3.  How often do you have five or more drinks on one occasion?  "Monthly   4.  How often during the last year have you found that you were not able to stop drinking once you had started? Never   5.  How often during the last year have you failed to do what was normally expected of you because of drinking? Never   6.  How often during the last year have you needed a first drink in the morning to get yourself going after a heavy drinking session? Never   7.  How often during the last year have you had a feeling of guilt or remorse after drinking? Monthly   8.  How often during the last year have you been unable to remember what happened the night before because of your drinking? Never   9.  Have you or someone else been injured because of your drinking? No   10. Has a relative, friend, doctor or other health care worker been concerned about your drinking or suggested you cut down? No   TOTAL SCORE 7       Do you usually eat at least 4 servings of fruit and vegetables a day, include whole grains & fiber and avoid regularly eating high fat or \"junk\" foods? Yes    Do you have any problems taking medications regularly?  No    Do you have any side effects from medications? none    Needs assistance for the following daily activities: no assistance needed    Which of the following safety concerns are present in your home?:  none identified     Hearing impairment: No    In the past 6 months, have you been bothered by leaking of urine? no    Mental Health:    In general, how would you rate your overall mental or emotional health? good  PHQ-2 Score:      Additional concerns to address?  YES    Fall risk:      Fallen 2 or more times in the past year?: No  Any fall with injury in the past year?: No    COGNITIVE SCREEN  1) Repeat 3 items (Leader, Season, Table)    2) Clock draw: NORMAL  3) 3 item recall: Recalls 3 objects  Results: 3 items recalled: COGNITIVE IMPAIRMENT LESS LIKELY    Mini-CogTM Copyright S Eva. Licensed by the author for use in [facility]; reprinted with " permission (cb@[facility]). All rights reserved.                Reviewed and updated as needed this visit by clinical staff         Reviewed and updated as needed this visit by Provider        Social History   Substance Use Topics     Smoking status: Former Smoker     Packs/day: 0.00     Types: Cigarettes     Quit date: 1/4/1973     Smokeless tobacco: Never Used     Alcohol use 0.0 oz/week     0 Standard drinks or equivalent per week      Comment: ocass                             Do you feel safe in your environment - Yes    Do you have a Health Care Directive?: Yes: Advance Directive has been received and scanned.    Current providers sharing in care for this patient include:   Patient Care Team:  Patrica Kelly MD as PCP - General (Internal Medicine)    The following health maintenance items are reviewed in Epic and correct as of today:  Health Maintenance   Topic Date Due     FALL RISK ASSESSMENT  08/23/2018     LIPID MONITORING Q1 YEAR  08/23/2018     PNEUMOCOCCAL (2 of 2 - PPSV23) 08/23/2018     INFLUENZA VACCINE (1) 09/01/2018     MAMMO Q1 YR  09/07/2018     ASTHMA CONTROL TEST Q6 MOS  12/26/2018     PHQ-9 Q6 MONTHS  12/26/2018     ASTHMA ACTION PLAN Q1 YR  06/26/2019     AMADOR QUESTIONNAIRE 1 YEAR  06/26/2019     DEPRESSION ACTION PLAN Q1 YR  06/26/2019     COLONOSCOPY Q5 YR  04/30/2020     ADVANCE DIRECTIVE PLANNING Q5 YRS  01/16/2023     TETANUS IMMUNIZATION (SYSTEM ASSIGNED)  05/24/2026     MIGRAINE ACTION PLAN  Completed     DEXA SCAN SCREENING (SYSTEM ASSIGNED)  Completed     HEPATITIS C SCREENING  Addressed     Labs reviewed in EPIC  BP Readings from Last 3 Encounters:   11/14/18 108/66   10/23/18 112/68   09/05/18 126/76    Wt Readings from Last 3 Encounters:   11/14/18 139 lb 9.6 oz (63.3 kg)   10/23/18 140 lb (63.5 kg)   09/05/18 141 lb (64 kg)                  Patient Active Problem List   Diagnosis     Anxiety     Migraine headache     Hot flashes     Hyperlipidemia LDL goal <130     Chondromalacia  patellae     Insomnia     Allergic state     Chronic back pain     Mild persistent asthma without complication     Restless leg     Vaginal atrophy     Nasal polyposis     Fatty liver     Chronic sinusitis, unspecified location     Chronic rhinitis     Major depression in complete remission (H)     Dense breast tissue     Osteopenia of multiple sites     Atrophic vaginitis     Primary osteoarthritis of both hands     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, moderatly severe,slowly worsening     Pinguecula of both eyes     Nuclear cataract of both eyes     Myopia, bilateral     Macular cyst, hole, or pseudohole, bilateral, moderatly severe     Adult vitelliform macular degeneration     Gastroesophageal reflux disease without esophagitis     Best vitelliform macular dystrophy     DDD (degenerative disc disease), lumbar     Chronic pain of left knee     Vitamin B12 deficiency     Osteopenia, unspecified location     Past Surgical History:   Procedure Laterality Date     APPENDECTOMY      age 9      BIOPSY      took tissue from my uterus at least 15 years ago     COLONOSCOPY  14 years     COLONOSCOPY N/A 4/30/2015    Procedure: COMBINED COLONOSCOPY, SINGLE OR MULTIPLE BIOPSY/POLYPECTOMY BY BIOPSY;  Surgeon: Doni Carey MD;  Location: [facility] OR     COLONOSCOPY WITH CO2 INSUFFLATION N/A 4/30/2015    Procedure: COLONOSCOPY WITH CO2 INSUFFLATION;  Surgeon: Doni Carey MD;  Location: [facility] OR     COMBINED ESOPHAGOSCOPY, GASTROSCOPY, DUODENOSCOPY (EGD) WITH CO2 INSUFFLATION N/A 8/24/2017    Procedure: COMBINED ESOPHAGOSCOPY, GASTROSCOPY, DUODENOSCOPY (EGD) WITH CO2 INSUFFLATION;  EGD, Gastroesophageal reflux disease, esophagitis presence not specified Abdominal pain, generalized, Ref Germaine-Alexandre, 24.78 BMI;  Surgeon: Duane, William Charles, MD;  Location: [facility] OR     ENT SURGERY      Tonsillectomy     ESOPHAGOSCOPY, GASTROSCOPY, DUODENOSCOPY (EGD), COMBINED N/A 8/24/2017     Procedure: COMBINED ESOPHAGOSCOPY, GASTROSCOPY, DUODENOSCOPY (EGD), BIOPSY SINGLE OR MULTIPLE;;  Surgeon: Duane, William Charles, MD;  Location: [facility] OR     LAPAROSCOPY PROCEDURE UNLISTED      polyps found     MOUTH SURGERY  2016     SINUS SURGERY      x2       Social History   Substance Use Topics     Smoking status: Former Smoker     Packs/day: 0.00     Types: Cigarettes     Quit date: 1/4/1973     Smokeless tobacco: Never Used     Alcohol use 0.0 oz/week     0 Standard drinks or equivalent per week      Comment: ocass     Family History   Problem Relation Age of Onset     Diabetes Mother      Hypertension Mother      Cancer Father      stomach      Cancer Maternal Grandfather      lung      HEART DISEASE Other          Current Outpatient Prescriptions   Medication Sig Dispense Refill     albuterol (PROAIR HFA/PROVENTIL HFA/VENTOLIN HFA) 108 (90 Base) MCG/ACT inhaler Inhale 2 puffs into the lungs every 4 hours as needed for shortness of breath / dyspnea 1 Inhaler 2     CALCIUM PO        cetirizine HCl (ZYRTEC ALLERGY) 10 MG CAPS Take 10 mg by mouth as needed 90 capsule 3     cyanocobalamin (VITAMIN  B-12) 1000 MCG tablet Take 1 tablet (1,000 mcg) by mouth daily 90 tablet 3     estradiol (ESTRING) 2 MG vaginal ring Place 1 each vaginally every 3 months 1 each 11     estradiol (VAGIFEM) 10 MCG TABS vaginal tablet Place tablet vaginally for two weeks then twice weekly 18 tablet 11     fluticasone (FLOVENT HFA) 220 MCG/ACT Inhaler 1 puff twice daily, rinse mouth out after Medication 10.6 g 1     magnesium 200 MG TABS Take 200 mg by mouth daily 1 tablet 0     omega-3 acid ethyl esters (LOVAZA) 1 G capsule Take 2 capsules (2 g) by mouth daily 120 capsule      omeprazole (PRILOSEC) 40 MG capsule Take 1 capsule (40 mg) by mouth daily as needed Take 30-60 minutes before a meal.       Pseudoephedrine HCl (SUDAFED PO)        rOPINIRole (REQUIP) 0.25 MG tablet Take 1 tablet (0.25 mg) by mouth At Bedtime 90 tablet 3      "sertraline (ZOLOFT) 50 MG tablet Take 1 tablet (50 mg) by mouth daily 90 tablet 3     SUMAtriptan (IMITREX) 50 MG tablet TAKE ONE TABLET AT ONSET OF HEADACHE. MAY REPEAT AFTER 2 HOURS. DO NOT EXCEED 200 MG IN 24 HOURS 18 tablet 4     traZODone (DESYREL) 100 MG tablet TAKE ONE TABLET BY MOUTH AT BEDTIME 90 tablet 2     Triprolidine-Pseudoephedrine (APRODINE PO) Take by mouth as needed       fluticasone (FLONASE) 50 MCG/ACT spray Spray 1-2 sprays into both nostrils daily 1 Bottle 3     Allergies   Allergen Reactions     Atorvastatin Cramps     Muscle pain     Morphine Sulfate Itching     Recent Labs   Lab Test  11/14/18   1252  08/23/17   1202  08/23/17   1201  04/28/17   0948  03/03/17   1201  01/09/15   0959   08/27/14   1246   LDL  204*   --   232*  216*   --   152*   --   207*   HDL  57   --   53  61   --   67   --   74   TRIG  214*   --   122  133   --   65   --   169*   ALT  25   --    --    --   31  32   --   17   CR  0.87  0.70   --    --   0.74   --    < >   --    GFRESTIMATED  65  85   --    --   79   --    < >   --    GFRESTBLACK  79  >90   --    --   >90   GFR Calc     --    < >   --    POTASSIUM  4.4  4.5   --    --   4.1   --    < >   --    TSH  1.27   --    --    --    --    --    --   0.97    < > = values in this interval not displayed.            ROS:   ROS: 10 point ROS neg other than the symptoms noted above in the HPI.     OBJECTIVE:   There were no vitals taken for this visit. Estimated body mass index is 25.2 kg/(m^2) as calculated from the following:    Height as of 6/26/18: 5' 2.5\" (1.588 m).    Weight as of 10/23/18: 140 lb (63.5 kg).  EXAM:   GENERAL APPEARANCE: healthy, alert and no distress  EYES: Eyes grossly normal to inspection, PERRL and conjunctivae and sclerae normal  HENT: ear canals and TM's normal and nose and mouth without ulcers or lesions  NECK: no adenopathy, no asymmetry, masses, or scars and thyroid normal to palpation  RESP: lungs clear to auscultation - no " rales, rhonchi or wheezes  BREAST: normal without masses, tenderness or nipple discharge and no palpable axillary masses or adenopathy  CV: regular rates and rhythm and normal S1 S2, no S3 or S4  LYMPHATICS: normal ant/post cervical and supraclavicular nodes  ABDOMEN: soft, nontender, without hepatosplenomegaly or masses and bowel sounds normal   (female): atrophic vulva without lesions  MS: extremities normal- no gross deformities noted  SKIN: no suspicious lesions or rashes  NEURO: Normal strength and tone, mentation intact and speech normal  PSYCH: mentation appears normal and affect normal/bright  No edema   1+ posterior tibial pulses bilateral          Diagnostic Test Results:  Results for orders placed or performed in visit on 11/14/18   Lipid panel reflex to direct LDL Non-fasting   Result Value Ref Range    Cholesterol 304 (H) <200 mg/dL    Triglycerides 214 (H) <150 mg/dL    HDL Cholesterol 57 >49 mg/dL    LDL Cholesterol Calculated 204 (H) <100 mg/dL    Non HDL Cholesterol 247 (H) <130 mg/dL   Comprehensive metabolic panel   Result Value Ref Range    Sodium 140 133 - 144 mmol/L    Potassium 4.4 3.4 - 5.3 mmol/L    Chloride 105 94 - 109 mmol/L    Carbon Dioxide 27 20 - 32 mmol/L    Anion Gap 8 3 - 14 mmol/L    Glucose 95 70 - 99 mg/dL    Urea Nitrogen 15 7 - 30 mg/dL    Creatinine 0.87 0.52 - 1.04 mg/dL    GFR Estimate 65 >60 mL/min/1.7m2    GFR Estimate If Black 79 >60 mL/min/1.7m2    Calcium 8.9 8.5 - 10.1 mg/dL    Bilirubin Total 0.6 0.2 - 1.3 mg/dL    Albumin 4.0 3.4 - 5.0 g/dL    Protein Total 7.3 6.8 - 8.8 g/dL    Alkaline Phosphatase 79 40 - 150 U/L    ALT 25 0 - 50 U/L    AST 20 0 - 45 U/L   CBC with platelets differential   Result Value Ref Range    WBC 6.5 4.0 - 11.0 10e9/L    RBC Count 4.02 3.8 - 5.2 10e12/L    Hemoglobin 13.4 11.7 - 15.7 g/dL    Hematocrit 39.0 35.0 - 47.0 %    MCV 97 78 - 100 fl    MCH 33.3 (H) 26.5 - 33.0 pg    MCHC 34.4 31.5 - 36.5 g/dL    RDW 12.1 10.0 - 15.0 %    Platelet  Count 273 150 - 450 10e9/L    Diff Method Automated Method     % Neutrophils 55.1 %    % Lymphocytes 30.2 %    % Monocytes 9.6 %    % Eosinophils 4.3 %    % Basophils 0.8 %    Absolute Neutrophil 3.6 1.6 - 8.3 10e9/L    Absolute Lymphocytes 2.0 0.8 - 5.3 10e9/L    Absolute Monocytes 0.6 0.0 - 1.3 10e9/L    Absolute Eosinophils 0.3 0.0 - 0.7 10e9/L    Absolute Basophils 0.1 0.0 - 0.2 10e9/L   Vitamin B12   Result Value Ref Range    Vitamin B12 967 193 - 986 pg/mL   TSH with free T4 reflex   Result Value Ref Range    TSH 1.27 0.40 - 4.00 mU/L       ASSESSMENT / PLAN:   1. Routine general medical examination at a health care facility      2. Major depression in complete remission (H)    - sertraline (ZOLOFT) 50 MG tablet; Take 1 tablet (50 mg) by mouth daily  Dispense: 90 tablet; Refill: 3    3. Anxiety  Well controlled with medications without side effects.       5. Hyperlipidemia LDL goal <130    - Lipid panel reflex to direct LDL Non-fasting  - TSH with free T4 reflex    6. Vitamin B12 deficiency    - CBC with platelets differential  - Vitamin B12  - TSH with free T4 reflex    7. Osteopenia, unspecified location    - Comprehensive metabolic panel  - CBC with platelets differential  - TSH with free T4 reflex    8. Tension headache  Per patient instructions. Unclear etiology   - Comprehensive metabolic panel  - CBC with platelets differential  - TSH with free T4 reflex    9. Chronic fatigue  Unclear etiology   - Comprehensive metabolic panel  - CBC with platelets differential  - TSH with free T4 reflex    10. Chronic seasonal allergic rhinitis due to pollen    - fluticasone (FLOVENT HFA) 220 MCG/ACT Inhaler; 1 puff twice daily, rinse mouth out after Medication  Dispense: 10.6 g; Refill: 1    11. Mild intermittent asthma without complication    - albuterol (PROAIR HFA/PROVENTIL HFA/VENTOLIN HFA) 108 (90 Base) MCG/ACT inhaler; Inhale 2 puffs into the lungs every 4 hours as needed for shortness of breath / dyspnea   "Dispense: 1 Inhaler; Refill: 2    12. Need for prophylactic vaccination and inoculation against influenza    - FLU VACCINE, INCREASED ANTIGEN, PRESV FREE, AGE 65+ [88472]    13. Restless leg    - rOPINIRole (REQUIP) 0.25 MG tablet; Take 1 tablet (0.25 mg) by mouth At Bedtime  Dispense: 90 tablet; Refill: 3    14. Paresthesia  Likely CTS.  Per patient instructions.     15. Vaginal atrophy    - estradiol (ESTRING) 2 MG vaginal ring; Place 1 each vaginally every 3 months  Dispense: 1 each; Refill: 11  - estradiol (VAGIFEM) 10 MCG TABS vaginal tablet; Place tablet vaginally for two weeks then twice weekly  Dispense: 18 tablet; Refill: 11    16. Need for prophylactic vaccination against Streptococcus pneumoniae (pneumococcus)    - Pneumococcal vaccine 23 valent PPSV23  (Pneumovax) [78719]      End of Life Planning:  Patient currently has an advanced directive: Yes.  Practitioner is supportive of decision.    COUNSELING:  Reviewed preventive health counseling, as reflected in patient instructions       Regular exercise       Healthy diet/nutrition    BP Readings from Last 1 Encounters:   10/23/18 112/68     Estimated body mass index is 25.2 kg/(m^2) as calculated from the following:    Height as of 6/26/18: 5' 2.5\" (1.588 m).    Weight as of 10/23/18: 140 lb (63.5 kg).           reports that she quit smoking about 45 years ago. Her smoking use included Cigarettes. She smoked 0.00 packs per day. She has never used smokeless tobacco.      Appropriate preventive services were discussed with this patient, including applicable screening as appropriate for cardiovascular disease, diabetes, osteopenia/osteoporosis, and glaucoma.  As appropriate for age/gender, discussed screening for colorectal cancer, prostate cancer, breast cancer, and cervical cancer. Checklist reviewing preventive services available has been given to the patient.    Reviewed patients plan of care and provided an AVS. The Basic Care Plan (routine screening as " documented in Health Maintenance) for Amee meets the Care Plan requirement. This Care Plan has been established and reviewed with the Patient.    Counseling Resources:  ATP IV Guidelines  Pooled Cohorts Equation Calculator  Breast Cancer Risk Calculator  FRAX Risk Assessment  ICSI Preventive Guidelines  Dietary Guidelines for Americans, 2010  USDA's MyPlate  ASA Prophylaxis  Lung CA Screening    Patrica Kelly MD  [facility] [place]    Patient Instructions     Use over the counter hydrocortisone cream for occasional itching in the vaginal area.  Wear the night splints on your hands.  If the tingling and numbness continues then you will need an EMG.      Preventive Health Recommendations    See your health care provider every year to    Review health changes.     Discuss preventive care.      Review your medicines if your doctor has prescribed any.      You no longer need a yearly Pap test unless you've had an abnormal Pap test in the past 10 years. If you have vaginal symptoms, such as bleeding or discharge, be sure to talk with your provider about a Pap test.      Every 1 to 2 years, have a mammogram.  If you are over 69, talk with your health care provider about whether or not you want to continue having screening mammograms.      Every 10 years, have a colonoscopy. Or, have a yearly FIT test (stool test). These exams will check for colon cancer.       Have a cholesterol test every 5 years, or more often if your doctor advises it.       Have a diabetes test (fasting glucose) every three years. If you are at risk for diabetes, you should have this test more often.       At age 65, have a bone density scan (DEXA) to check for osteoporosis (brittle bone disease).    Shots:    Get a flu shot each year.    Get a tetanus shot every 10 years.    Talk to your doctor about your pneumonia vaccines. There are now two you should receive - Pneumovax (PPSV 23) and Prevnar (PCV 13).    Talk to your pharmacist about the  shingles vaccine.    Talk to your doctor about the hepatitis B vaccine.    Nutrition:     Eat at least 5 servings of fruits and vegetables each day.      Eat whole-grain bread, whole-wheat pasta and brown rice instead of white grains and rice.      Get adequate Calcium and Vitamin D.     Lifestyle    Exercise at least 150 minutes a week (30 minutes a day, 5 days a week). This will help you control your weight and prevent disease.      Limit alcohol to one drink per day.      No smoking.       Wear sunscreen to prevent skin cancer.       See your dentist twice a year for an exam and cleaning.      See your eye doctor every 1 to 2 years to screen for conditions such as glaucoma, macular degeneration and cataracts.    Personalized Prevention Plan  You are due for the preventive services outlined below.  Your care team is available to assist you in scheduling these services.  If you have already completed any of these items, please share that information with your care team to update in your medical record.  Health Maintenance Due   Topic Date Due     FALL RISK ASSESSMENT  08/23/2018     Cholesterol Lab - yearly  08/23/2018     Pneumococcal Vaccine (2 of 2 - PPSV23) 08/23/2018     Flu Vaccine (1) 09/01/2018     Mammogram - yearly  09/07/2018   [facility]    If you have any questions regarding to your visit please contact your care team:     Team Pink:   Clinic Hours Telephone Number   Internal Medicine:  Dr. Patrica Kate, NP 7am-7pm  Monday - Thursday   7am-5pm  Fridays  [phone]  (Appointment scheduling available 24/7)   Urgent [facility] - [place] - 11am-9pm Monday-Friday Saturday-Sunday- 9am-5pm   [place] - 5pm-9pm Monday-Friday Saturday-Sunday- 9am-5pm  [phone] - Makenna Fernandez  [phone] - [place]       What options do I have for a visit other than an office visit? We offer electronic visits (e-visits) and telephone visits,  when medically appropriate.  Please check with your medical insurance to see if these types of visits are covered, as you will be responsible for any charges that are not paid by your insurance.      You can use [employer] (secure electronic communication) to access to your chart, send your primary care provider a message, or make an appointment. Ask a team member how to get started.     For a price quote for your services, please call our Consumer Price Line at [phone] or our Imaging Cost estimation line at [phone] (for imaging tests).  Mira Singleton MA

## 2018-11-14 NOTE — PROGRESS NOTES

## 2018-11-14 NOTE — MR AVS SNAPSHOT
After Visit Summary   11/14/2018    Amee Crews    MRN: [id number]           Patient Information     Date Of Birth          1950        Visit Information        Provider Department      11/14/2018 12:00 PM Patrica Kelly MD [facility]        Today's Diagnoses     Routine general medical examination at a health care facility    -  1    Visit for screening mammogram        Need for prophylactic vaccination and inoculation against influenza        Need for prophylactic vaccination against Streptococcus pneumoniae (pneumococcus)        Anxiety        Hot flashes        Hyperlipidemia LDL goal <130        Vitamin B12 deficiency        Osteopenia, unspecified location        Major depression in complete remission (H)        Tension headache        Chronic fatigue        Chronic seasonal allergic rhinitis due to pollen        Mild intermittent asthma without complication        Restless leg        Paresthesia        Vaginal atrophy          Care Instructions    Use over the counter hydrocortisone cream for occasional itching in the vaginal area.  Wear the night splints on your hands.  If the tingling and numbness continues then you will need an EMG.      Preventive Health Recommendations    See your health care provider every year to    Review health changes.     Discuss preventive care.      Review your medicines if your doctor has prescribed any.      You no longer need a yearly Pap test unless you've had an abnormal Pap test in the past 10 years. If you have vaginal symptoms, such as bleeding or discharge, be sure to talk with your provider about a Pap test.      Every 1 to 2 years, have a mammogram.  If you are over 69, talk with your health care provider about whether or not you want to continue having screening mammograms.      Every 10 years, have a colonoscopy. Or, have a yearly FIT test (stool test). These exams will check for colon cancer.       Have a cholesterol test every 5  years, or more often if your doctor advises it.       Have a diabetes test (fasting glucose) every three years. If you are at risk for diabetes, you should have this test more often.       At age 65, have a bone density scan (DEXA) to check for osteoporosis (brittle bone disease).    Shots:    Get a flu shot each year.    Get a tetanus shot every 10 years.    Talk to your doctor about your pneumonia vaccines. There are now two you should receive - Pneumovax (PPSV 23) and Prevnar (PCV 13).    Talk to your pharmacist about the shingles vaccine.    Talk to your doctor about the hepatitis B vaccine.    Nutrition:     Eat at least 5 servings of fruits and vegetables each day.      Eat whole-grain bread, whole-wheat pasta and brown rice instead of white grains and rice.      Get adequate Calcium and Vitamin D.     Lifestyle    Exercise at least 150 minutes a week (30 minutes a day, 5 days a week). This will help you control your weight and prevent disease.      Limit alcohol to one drink per day.      No smoking.       Wear sunscreen to prevent skin cancer.       See your dentist twice a year for an exam and cleaning.      See your eye doctor every 1 to 2 years to screen for conditions such as glaucoma, macular degeneration and cataracts.    Personalized Prevention Plan  You are due for the preventive services outlined below.  Your care team is available to assist you in scheduling these services.  If you have already completed any of these items, please share that information with your care team to update in your medical record.  Health Maintenance Due   Topic Date Due     FALL RISK ASSESSMENT  08/23/2018     Cholesterol Lab - yearly  08/23/2018     Pneumococcal Vaccine (2 of 2 - PPSV23) 08/23/2018     Flu Vaccine (1) 09/01/2018     Mammogram - yearly  09/07/2018   [facility]    If you have any questions regarding to your visit please contact your care team:     Team Pink:   Clinic Hours Telephone Number    Internal Medicine:  Dr. Patrica Kate NP 7am-7pm  Monday - Thursday   7am-5pm  Fridays  [phone]  (Appointment scheduling available 24/7)   Urgent Care - [place] - 11am-9pm Monday-Friday Saturday-Sunday- 9am-5pm   [place] - 5pm-9pm Monday-Friday Saturday-Sunday- 9am-5pm  [phone] - Makenna Fernandez  [phone] - [place]       What options do I have for a visit other than an office visit? We offer electronic visits (e-visits) and telephone visits, when medically appropriate.  Please check with your medical insurance to see if these types of visits are covered, as you will be responsible for any charges that are not paid by your insurance.      You can use [employer] (secure electronic communication) to access to your chart, send your primary care provider a message, or make an appointment. Ask a team member how to get started.     For a price quote for your services, please call our Consumer Price Line at [phone] or our Imaging Cost estimation line at [phone] (for imaging tests).  Mira Singleton MA            Follow-ups after your visit        Follow-up notes from your care team     Return in about 1 year (around 11/14/2019) for Annual Wellness Visit.      Your next 10 appointments already scheduled     Dec 03, 2018  1:45 PM CST   [facility]inkle MD   [facility]    [address] [place] [id number] [phone]            Dec 03, 2018  1:45 PM CST   PROCEDURE with NL PROC ROOM SPECIALTY [facility] ([address] [address] [phone]              Who to contact     If you have questions or need follow up information about today's clinic visit or your schedule please contact [facility] directly at [phone].  Normal or non-critical lab and imaging results will be  "communicated to you by [email or web address], letter or phone within 4 business days after the clinic has received the results. If you do not hear from us within 7 days, please contact the clinic through [employer] or phone. If you have a critical or abnormal lab result, we will notify you by phone as soon as possible.  Submit refill requests through [employer] or call your pharmacy and they will forward the refill request to us. Please allow 3 business days for your refill to be completed.          Additional Information About Your Visit        [employer] Information     [employer] gives you secure access to your electronic health record. If you see a primary care provider, you can also send messages to your care team and make appointments. If you have questions, please call your primary care clinic.  If you do not have a primary care provider, please call [phone] and they will assist you.        Care EveryWhere ID     This is your Care EveryWhere ID. This could be used by other organizations to access your [facility] medical records  [id number]        Your Vitals Were     Pulse Temperature Respirations Height Pulse Oximetry BMI (Body Mass Index)    99 97.9  F (36.6  C) (Oral) 16 5' 2.8\" (1.595 m) 99% 24.89 kg/m2       Blood Pressure from Last 3 Encounters:   11/14/18 108/66   10/23/18 112/68   09/05/18 126/76    Weight from Last 3 Encounters:   11/14/18 139 lb 9.6 oz (63.3 kg)   10/23/18 140 lb (63.5 kg)   09/05/18 141 lb (64 kg)              We Performed the Following     CBC with platelets differential     Comprehensive metabolic panel     Lipid panel reflex to direct LDL Non-fasting     Pneumococcal vaccine 23 valent PPSV23  (Pneumovax) [31951]     TSH with free T4 reflex     Vitamin B12          Today's Medication Changes          These changes are accurate as of 11/14/18 12:42 PM.  If you have any questions, ask your nurse or doctor.               These medicines have changed or have updated prescriptions.        Dose/Directions "    sertraline 50 MG tablet   Commonly known as:  ZOLOFT   This may have changed:  medication strength   Used for:  Major depression in complete remission (H)   Changed by:  Patrica Kelly MD        Dose:  50 mg   Take 1 tablet (50 mg) by mouth daily   Quantity:  90 tablet   Refills:  3         Stop taking these medicines if you haven't already. Please contact your care team if you have questions.     diclofenac 1 % Gel topical gel   Commonly known as:  VOLTAREN   Stopped by:  Patrica Kelly MD                Where to get your medicines      These medications were sent to [id number] - [place] - [address] [facility]  [address] [facility] [address]     Phone:  [phone]     albuterol 108 (90 Base) MCG/ACT inhaler    fluticasone 220 MCG/ACT Inhaler    rOPINIRole 0.25 MG tablet    sertraline 50 MG tablet         Some of these will need a paper prescription and others can be bought over the counter.  Ask your nurse if you have questions.     Bring a paper prescription for each of these medications     estradiol 10 MCG Tabs vaginal tablet    estradiol 2 MG vaginal ring                Primary Care Provider Office Phone # Fax #    Patrica Kelly [phone] [address] [facility] [address]        Equal Access to Services     [employer]: Hadii siri ku hadasho Soomaali, waaxda luqadaha, qaybta kaalmada adeegyada, waxay ron rosales. So [facility] [place] [phone].    ATENCIÓN: Si habla español, tiene a alejo disposición servicios gratuitos de asistencia lingüística. Llame al [phone].    We comply with applicable federal civil rights laws and [place] laws. We do not discriminate on the basis of race, color, national origin, age, disability, sex, sexual orientation, or gender identity.            Thank you!     Thank you for choosing [facility]  for your care. Our goal is always to provide you with excellent care. Hearing back from our  patients is one way we can continue to improve our services. Please take a few minutes to complete the written survey that you may receive in the mail after your visit with us. Thank you!             Your Updated Medication List - Protect others around you: Learn how to safely use, store and throw away your medicines at www.disposemymeds.org.          This list is accurate as of 11/14/18 12:42 PM.  Always use your most recent med list.                   Brand Name Dispense Instructions for use Diagnosis    albuterol 108 (90 Base) MCG/ACT inhaler    PROAIR HFA/PROVENTIL HFA/VENTOLIN HFA    1 Inhaler    Inhale 2 puffs into the lungs every 4 hours as needed for shortness of breath / dyspnea    Mild intermittent asthma without complication       APRODINE PO      Take by mouth as needed        CALCIUM PO           cetirizine HCl 10 MG Caps    ZYRTEC ALLERGY    90 capsule    Take 10 mg by mouth as needed    Chronic seasonal allergic rhinitis due to pollen       cyanocobalamin 1000 MCG tablet    vitamin  B-12    90 tablet    Take 1 tablet (1,000 mcg) by mouth daily    Vitamin B 12 deficiency       * estradiol 2 MG vaginal ring    ESTRING    1 each    Place 1 each vaginally every 3 months    Vaginal atrophy       * estradiol 10 MCG Tabs vaginal tablet    VAGIFEM    18 tablet    Place tablet vaginally for two weeks then twice weekly    Vaginal atrophy       fluticasone 220 MCG/ACT Inhaler    FLOVENT HFA    10.6 g    1 puff twice daily, rinse mouth out after Medication    Chronic seasonal allergic rhinitis due to pollen       fluticasone 50 MCG/ACT spray    FLONASE    1 Bottle    Spray 1-2 sprays into both nostrils daily    Chronic sinusitis, unspecified location       magnesium 200 MG Tabs     1 tablet    Take 200 mg by mouth daily        omega-3 acid ethyl esters 1 g capsule    Lovaza    120 capsule    Take 2 capsules (2 g) by mouth daily        omeprazole 40 MG capsule    priLOSEC     Take 1 capsule (40 mg) by mouth daily  as needed Take 30-60 minutes before a meal.        rOPINIRole 0.25 MG tablet    REQUIP    90 tablet    Take 1 tablet (0.25 mg) by mouth At Bedtime    Restless leg       sertraline 50 MG tablet    ZOLOFT    90 tablet    Take 1 tablet (50 mg) by mouth daily    Major depression in complete remission (H)       SUDAFED PO           SUMAtriptan 50 MG tablet    IMITREX    18 tablet    TAKE ONE TABLET AT ONSET OF HEADACHE. MAY REPEAT AFTER 2 HOURS. DO NOT EXCEED 200 MG IN 24 HOURS    Other migraine without status migrainosus, not intractable       traZODone 100 MG tablet    DESYREL    90 tablet    TAKE ONE TABLET BY MOUTH AT BEDTIME    Insomnia, unspecified type       * Notice:  This list has 2 medication(s) that are the same as other medications prescribed for you. Read the directions carefully, and ask your doctor or other care provider to review them with you.

## 2018-11-15 NOTE — PROGRESS NOTES
Normal vitamin B12 level. Cholesterol remains very elevated.  Normal electrolytes. Normal kidney function. Normal liver blood test. Normal thyroid.

## 2018-11-16 ENCOUNTER — TRANSFERRED RECORDS (OUTPATIENT)
Dept: HEALTH INFORMATION MANAGEMENT | Facility: CLINIC | Age: 68
End: 2018-11-16

## 2018-12-03 ENCOUNTER — OFFICE VISIT (OUTPATIENT)
Dept: OTOLARYNGOLOGY | Facility: CLINIC | Age: 68
End: 2018-12-03
Payer: COMMERCIAL

## 2018-12-03 ENCOUNTER — APPOINTMENT (OUTPATIENT)
Dept: FAMILY MEDICINE | Facility: CLINIC | Age: 68
End: 2018-12-03
Payer: COMMERCIAL

## 2018-12-03 VITALS — HEIGHT: 63 IN | BODY MASS INDEX: 24.63 KG/M2 | OXYGEN SATURATION: 99 % | HEART RATE: 88 BPM | WEIGHT: 139 LBS

## 2018-12-03 DIAGNOSIS — G89.18 POSTOPERATIVE PAIN: Primary | ICD-10-CM

## 2018-12-03 DIAGNOSIS — J30.0 VASOMOTOR RHINITIS: ICD-10-CM

## 2018-12-03 PROCEDURE — 31231 NASAL ENDOSCOPY DX: CPT | Performed by: OTOLARYNGOLOGY

## 2018-12-03 PROCEDURE — 99207 ZZC DROP WITH A PROCEDURE: CPT | Performed by: OTOLARYNGOLOGY

## 2018-12-03 PROCEDURE — 30999 UNLISTED PROCEDURE NOSE: CPT | Mod: 50 | Performed by: OTOLARYNGOLOGY

## 2018-12-03 RX ORDER — KETOROLAC TROMETHAMINE 10 MG/1
10 TABLET, FILM COATED ORAL EVERY 6 HOURS PRN
Qty: 12 TABLET | Refills: 0 | Status: SHIPPED | OUTPATIENT
Start: 2018-12-03 | End: 2019-04-30

## 2018-12-03 NOTE — PROGRESS NOTES
History of Present Illness - Amee Crews is a 68 year old female presenting in clinic today for cryoablation for resistant vasomotor rhinitis.      Performed in clinic today:  DESCRIPTION  OF  OPERATION: After  obtaining  informed  consent  from  the  patient, she was  brought  to  the  procedure  room  and  seated  on  the  procedure  chair  in  the semi-reclining position.  Two  methods  were  used  to  verify  patient  identity.  A  time-out  was  taken  as  per  protocol.  Topical  decongestant  was  applied  bilaterally  Intranasally. A 0 degree 4 mm  rigid  endoscope  was  used  to  examine  the  left  and  right  nasal  cavities.  The  nasal  valve  areas  were  examined  for  abnormalities.  The  inferior  and  middle  turbinates  were  evaluated.  The  middle  and  superior  meati  and  the  sphenoethmoid  recesses  were examined. Starting  with  the right side,  I placed topical tetracaine that was left  in  for 15 minutes  and  then  removed  without  difficulty  and  disposed  Appropriately. Then 1:100,000 epinephrine and 1% lidocaine was used to continue numbing the area. The right middle  turbinate  was  medialized and  the  treatment  area  at  the  point  of  attachment  of  the  middle  turbinate  to  the  lateral  wall  was  visualized.  Under  nasal  endoscopy  the  cryoablation  device  previously  prepped  was  advanced  and  placed  on  the  mucosal  surface  overlying  the  Sphenopalatine  Foramen  located  in  the  posterior  aspect  of  the  middle  meatus,  on  the  lateral  nasal  wall.  The  device  was  activated  for  30  seconds  to  apply  cryotherapy  for  destruction  of  the  nasal  nerves.  Once  the  freezing  stopped  the  patient  was  instructed  to  breathe  through her both sides of the nose  to  facilitate  thawing  of  the  frozen  tissue.  Once  the  device  was  determined  to  not  be  adherent  to  the  mucosal  surface,  the  probe  was  removed.  After  removing   the  cryoprobe  thetreated  area  was  endoscopically  inspected  and  a  ~20  mm  diameter  blanched  lesion  indicating  the  destruction  of  the  tissue  was observed. The process was then completed on the left side.       A/P - Amee Crews is a 68 year old female No chief complaint on file.    Patient tolerated procedure very well.  It was somewhat challenging because of previous sinus surgery and a very narrow spot to place cryoablation.    Amee should follow up in 4 weeks.      At Amee next appointment they will not need a hearing test.      Freddie Hinkle MD

## 2018-12-03 NOTE — LETTER
12/3/2018         RE: Amee Crews  [address] [facility] [address]        Dear Colleague,    Thank you for referring your patient, Amee Crews, to the [facility]. Please see a copy of my visit note below.    History of Present Illness - Amee Crews is a 68 year old female presenting in clinic today for cryoablation for resistant vasomotor rhinitis.      Performed in clinic today:  DESCRIPTION  OF  OPERATION: After  obtaining  informed  consent  from  the  patient, she was  brought  to  the  procedure  room  and  seated  on  the  procedure  chair  in  the semi-reclining position.  Two  methods  were  used  to  verify  patient  identity.  A  time-out  was  taken  as  per  protocol.  Topical  decongestant  was  applied  bilaterally  Intranasally. A 0 degree 4 mm  rigid  endoscope  was  used  to  examine  the  left  and  right  nasal  cavities.  The  nasal  valve  areas  were  examined  for  abnormalities.  The  inferior  and  middle  turbinates  were  evaluated.  The  middle  and  superior  meati  and  the  sphenoethmoid  recesses  were examined. Starting  with  the right side,  I placed topical tetracaine that was left  in  for 15 minutes  and  then  removed  without  difficulty  and  disposed  Appropriately. Then 1:100,000 epinephrine and 1% lidocaine was used to continue numbing the area. The right middle  turbinate  was  medialized and  the  treatment  area  at  the  point  of  attachment  of  the  middle  turbinate  to  the  lateral  wall  was  visualized.  Under  nasal  endoscopy  the  cryoablation  device  previously  prepped  was  advanced  and  placed  on  the  mucosal  surface  overlying  the  Sphenopalatine  Foramen  located  in  the  posterior  aspect  of  the  middle  meatus,  on  the  lateral  nasal  wall.  The  device  was  activated  for  30  seconds  to  apply  cryotherapy  for  destruction  of  the  nasal  nerves.  Once  the  freezing  stopped  the  patient  was   instructed  to  breathe  through her both sides of the nose  to  facilitate  thawing  of  the  frozen  tissue.  Once  the  device  was  determined  to  not  be  adherent  to  the  mucosal  surface,  the  probe  was  removed.  After  removing  the  cryoprobe  thetreated  area  was  endoscopically  inspected  and  a  ~20  mm  diameter  blanched  lesion  indicating  the  destruction  of  the  tissue  was observed. The process was then completed on the left side.       A/P - Amee Crews is a 68 year old female No chief complaint on file.    Patient tolerated procedure very well.  It was somewhat challenging because of previous sinus surgery and a very narrow spot to place cryoablation.    Amee should follow up in 4 weeks.      At Amee next appointment they will not need a hearing test.      Freddie Hinkle MD          Again, thank you for allowing me to participate in the care of your patient.        Sincerely,        Freddie Hinkle MD, MD

## 2018-12-03 NOTE — MR AVS SNAPSHOT
"              After Visit Summary   12/3/2018    Amee Crews    MRN: [id number]           Patient Information     Date Of Birth          1950        Visit Information        Provider Department      12/3/2018 1:45 PM Freddie Hinkle MD [facility]         Follow-ups after your visit        Who to contact     If you have questions or need follow up information about today's clinic visit or your schedule please contact [facility] directly at [phone].  Normal or non-critical lab and imaging results will be communicated to you by MyChart, letter or phone within 4 business days after the clinic has received the results. If you do not hear from us within 7 days, please contact the clinic through Multiplyhart or phone. If you have a critical or abnormal lab result, we will notify you by phone as soon as possible.  Submit refill requests through VirtualQube or call your pharmacy and they will forward the refill request to us. Please allow 3 business days for your refill to be completed.          Additional Information About Your Visit        MyChart Information     VirtualQube gives you secure access to your electronic health record. If you see a primary care provider, you can also send messages to your care team and make appointments. If you have questions, please call your primary care clinic.  If you do not have a primary care provider, please call [phone] and they will assist you.        Care EveryWhere ID     This is your Care EveryWhere ID. This could be used by other organizations to access your [place] medical records  [id number]        Your Vitals Were     Pulse Height Pulse Oximetry BMI (Body Mass Index)          88 1.595 m (5' 2.8\") 99% 24.78 kg/m2         Blood Pressure from Last 3 Encounters:   11/14/18 108/66   10/23/18 112/68   09/05/18 126/76    Weight from Last 3 Encounters:   12/03/18 63 kg (139 lb)   11/14/18 63.3 kg (139 lb 9.6 oz)   10/23/18 63.5 kg (140 lb)    "           Today, you had the following     No orders found for display       Primary Care Provider Office Phone # Fax #    Patrica Kelly [phone]       [address]  [place] [address]        Equal Access to Services     MANUELANAM HERMILA : Hadii aad ku hadryano Soomaali, waaxda luqadaha, qaybta kaalmada adeegyada, vicki idiin haywallacen adedarian pérez lalysarah . So [facility] [phone].    ATENCIÓN: Si habla español, tiene a alejo disposición servicios gratuitos de asistencia lingüística. Llame al [phone].    We comply with applicable federal civil rights laws and [place] laws. We do not discriminate on the basis of race, color, national origin, age, disability, sex, sexual orientation, or gender identity.            Thank you!     Thank you for choosing [facility]  for your care. Our goal is always to provide you with excellent care. Hearing back from our patients is one way we can continue to improve our services. Please take a few minutes to complete the written survey that you may receive in the mail after your visit with us. Thank you!             Your Updated Medication List - Protect others around you: Learn how to safely use, store and throw away your medicines at www.disposemymeds.org.          This list is accurate as of 12/3/18  3:09 PM.  Always use your most recent med list.                   Brand Name Dispense Instructions for use Diagnosis    albuterol 108 (90 Base) MCG/ACT inhaler    PROAIR HFA/PROVENTIL HFA/VENTOLIN HFA    1 Inhaler    Inhale 2 puffs into the lungs every 4 hours as needed for shortness of breath / dyspnea    Mild intermittent asthma without complication       APRODINE PO      Take by mouth as needed        CALCIUM PO           cetirizine HCl 10 MG Caps    ZYRTEC ALLERGY    90 capsule    Take 10 mg by mouth as needed    Chronic seasonal allergic rhinitis due to pollen       * estradiol 2 MG vaginal ring    ESTRING    1 each    Place 1 each  vaginally every 3 months    Vaginal atrophy       * estradiol 10 MCG Tabs vaginal tablet    VAGIFEM    18 tablet    Place tablet vaginally for two weeks then twice weekly    Vaginal atrophy       fluticasone 220 MCG/ACT inhaler    FLOVENT HFA    10.6 g    1 puff twice daily, rinse mouth out after Medication    Chronic seasonal allergic rhinitis due to pollen       fluticasone 50 MCG/ACT nasal spray    FLONASE    1 Bottle    Spray 1-2 sprays into both nostrils daily    Chronic sinusitis, unspecified location       magnesium 200 MG Tabs     1 tablet    Take 200 mg by mouth daily        omega-3 acid ethyl esters 1 g capsule    LOVAZA    120 capsule    Take 2 capsules (2 g) by mouth daily        omeprazole 40 MG DR capsule    priLOSEC     Take 1 capsule (40 mg) by mouth daily as needed Take 30-60 minutes before a meal.        rOPINIRole 0.25 MG tablet    REQUIP    90 tablet    Take 1 tablet (0.25 mg) by mouth At Bedtime    Restless leg       sertraline 50 MG tablet    ZOLOFT    90 tablet    Take 1 tablet (50 mg) by mouth daily    Major depression in complete remission (H)       SUDAFED PO           SUMAtriptan 50 MG tablet    IMITREX    18 tablet    TAKE ONE TABLET AT ONSET OF HEADACHE. MAY REPEAT AFTER 2 HOURS. DO NOT EXCEED 200 MG IN 24 HOURS    Other migraine without status migrainosus, not intractable       traZODone 100 MG tablet    DESYREL    90 tablet    TAKE ONE TABLET BY MOUTH AT BEDTIME    Insomnia, unspecified type       vitamin B-12 1000 MCG tablet    CYANOCOBALAMIN    90 tablet    Take 1 tablet (1,000 mcg) by mouth daily    Vitamin B 12 deficiency       * Notice:  This list has 2 medication(s) that are the same as other medications prescribed for you. Read the directions carefully, and ask your doctor or other care provider to review them with you.

## 2018-12-04 ENCOUNTER — TELEPHONE (OUTPATIENT)
Dept: OTOLARYNGOLOGY | Facility: CLINIC | Age: 68
End: 2018-12-04

## 2018-12-04 NOTE — TELEPHONE ENCOUNTER
These are her specific questions.     Can she use nasal irrigation more then 2 times a day?  Can she use Flonase?  Sinus meds ok?

## 2018-12-04 NOTE — TELEPHONE ENCOUNTER
Reason for Call:  Other call back    Detailed comments: Post procedure yesterday, patient has questions on medications and what she can and cannot do.  Please call    Phone Number Patient can be reached at: Home number on file [phone] (home)    Best Time: any    Can we leave a detailed message on this number? YES    Call taken on 12/4/2018 at 9:59 AM by Ruchi Connors

## 2018-12-04 NOTE — TELEPHONE ENCOUNTER
"Yes the more saline the better. I would not use flonase for the next 2 weeks. Other \"sinus\" meds OK.  Freddie Hinkle MD    "

## 2018-12-04 NOTE — TELEPHONE ENCOUNTER
She has no restrictions no changes except using some saline nasal irrigations for 2 weeks.  Freddie Hinkle MD

## 2018-12-14 ENCOUNTER — TELEPHONE (OUTPATIENT)
Dept: OTOLARYNGOLOGY | Facility: CLINIC | Age: 68
End: 2018-12-14

## 2018-12-14 NOTE — TELEPHONE ENCOUNTER
Left message for patient letting her know that we can reschedule appointment for 12/17 as follow up is recommended 4-6 weeks out after procedure to fully evaluate effectiveness of procedure. Please dennis in 2-4 weeks unless pt would like to be seen 12/17.     Larissa West MA-ENT

## 2018-12-17 ENCOUNTER — OFFICE VISIT (OUTPATIENT)
Dept: OTOLARYNGOLOGY | Facility: CLINIC | Age: 68
End: 2018-12-17
Payer: COMMERCIAL

## 2018-12-17 VITALS — WEIGHT: 138 LBS | OXYGEN SATURATION: 98 % | BODY MASS INDEX: 24.6 KG/M2 | HEART RATE: 86 BPM

## 2018-12-17 DIAGNOSIS — Z98.890 POSTOPERATIVE STATE: ICD-10-CM

## 2018-12-17 DIAGNOSIS — R11.15 EMESIS, PERSISTENT: Primary | ICD-10-CM

## 2018-12-17 PROCEDURE — 99024 POSTOP FOLLOW-UP VISIT: CPT | Performed by: OTOLARYNGOLOGY

## 2018-12-17 RX ORDER — ONDANSETRON 8 MG/1
4 TABLET, FILM COATED ORAL EVERY 8 HOURS PRN
Qty: 12 TABLET | Refills: 0 | Status: SHIPPED | OUTPATIENT
Start: 2018-12-17 | End: 2019-04-30

## 2018-12-17 NOTE — LETTER
12/17/2018         RE: Amee Crews  [address] [address]        Dear Colleague,    Thank you for referring your patient, Amee Crews, to the [facility]. Please see a copy of my visit note below.    [facility] Otolaryngology Post-Op / Progress Note    Body mass index is 24.6 kg/m .    BP Readings from Last 1 Encounters:   11/14/18 108/66     Patient declined BP in clinic today    Amee IS NOT a smoker/uses chewing tobacco.           Assessment and Plan:    Assessment:   Post-operative day #14  Cryoblasion of the nasal tissue     Doing well.      Plan:   She is to continue nasal saline irrigations and see us back in 3-4 weeks.           Interval History:   Doing well.  Continues to improve.  Pain is well-controlled.  No fevers.            Significant Problems:      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, moderatly severe,slowly worsening     Pinguecula of both eyes     Nuclear cataract of both eyes     Myopia, bilateral     Macular cyst, hole, or pseudohole, bilateral, moderatly severe     Adult vitelliform macular degeneration     Gastroesophageal reflux disease without esophagitis     Best vitelliform macular dystrophy     DDD (degenerative disc disease), lumbar     Chronic pain of left knee     Vitamin B12 deficiency     Osteopenia, unspecified location             Review of Systems:     The patient denies any chest pain, shortness of breath, excessive pain, fever, chills, purulent drainage from the wound, nausea or vomiting.          Medications:     Current Outpatient Medications   Medication Sig     albuterol (PROAIR HFA/PROVENTIL HFA/VENTOLIN HFA) 108 (90 Base) MCG/ACT inhaler Inhale 2 puffs into the lungs every 4 hours as needed for shortness of breath / dyspnea     CALCIUM PO      cetirizine HCl (ZYRTEC ALLERGY) 10 MG CAPS Take 10 mg by mouth as needed     cyanocobalamin (VITAMIN  B-12) 1000 MCG tablet Take 1 tablet (1,000 mcg) by mouth daily     estradiol (ESTRING) 2 MG vaginal ring Place 1 each vaginally every 3 months     estradiol (VAGIFEM) 10 MCG TABS vaginal tablet Place tablet vaginally for two weeks then twice weekly     fluticasone (FLONASE) 50 MCG/ACT spray Spray 1-2 sprays into both nostrils daily     fluticasone (FLOVENT HFA) 220 MCG/ACT Inhaler 1 puff twice daily, rinse mouth out after Medication     magnesium 200 MG TABS Take 200 mg by mouth daily     omega-3 acid ethyl esters (LOVAZA) 1 G capsule Take 2 capsules (2 g) by mouth daily     omeprazole (PRILOSEC) 40 MG capsule Take 1 capsule (40 mg) by mouth daily as needed Take 30-60 minutes before a meal.     Pseudoephedrine HCl (SUDAFED PO)      rOPINIRole (REQUIP) 0.25 MG tablet Take 1 tablet (0.25 mg) by mouth At Bedtime     sertraline (ZOLOFT) 50 MG tablet Take 1 tablet (50 mg) by mouth daily     SUMAtriptan (IMITREX) 50 MG tablet TAKE ONE TABLET AT ONSET OF HEADACHE. MAY REPEAT AFTER 2 HOURS. DO NOT EXCEED 200 MG IN 24 HOURS     traZODone (DESYREL) 100 MG tablet TAKE ONE TABLET BY MOUTH AT BEDTIME     Triprolidine-Pseudoephedrine (APRODINE PO) Take by mouth as needed     No current facility-administered medications for this visit.              Physical Exam:   All vitals have been reviewed  Patient Vitals for the past 24 hrs:   Pulse SpO2 Weight   12/17/18 1419 86 98 % 62.6 kg (138 lb)       Wound is clean with minimal or  no drainage.          Data:   All laboratory data related to this surgery reviewed  Today I performed a nasal endoscopy at which time I appreciated a scab on the left side with careful dissection and removed and patient immediately felt better.  She does complain of frequent nausea emesis as a result of postnasal secretions Zofran will be prescribed just to address that.         This document serves as a record of the services and decisions personally performed and made by Dr. Freddie Hinkle MD. It was created on his behalf by Diamond Witt, a trained medical scribe. The creation of this document is based the provider's statements to the medical scribe.  Diamond Witt 12/17/2018    Provider:   The information in this document, created by the medical scribe for me, accurately reflects the services I personally performed and the decisions made by me. I have reviewed and approved this document for accuracy prior to leaving the patient care area.  Dr. Freddie Hinkle MD 12/17/2018    Freddie Hinkle MD          Again, thank you for allowing me to participate in the care of your patient.        Sincerely,        Freddie Hinkle MD, MD

## 2018-12-17 NOTE — PROGRESS NOTES
[facility] Otolaryngology Post-Op / Progress Note    Body mass index is 24.6 kg/m .    BP Readings from Last 1 Encounters:   11/14/18 108/66     Patient declined BP in clinic today    Amee IS NOT a smoker/uses chewing tobacco.           Assessment and Plan:    Assessment:   Post-operative day #14  Cryoblasion of the nasal tissue     Doing well.      Plan:   She is to continue nasal saline irrigations and see us back in 3-4 weeks.           Interval History:   Doing well.  Continues to improve.  Pain is well-controlled.  No fevers.            Significant Problems:      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, moderatly severe,slowly worsening     Pinguecula of both eyes     Nuclear cataract of both eyes     Myopia, bilateral     Macular cyst, hole, or pseudohole, bilateral, moderatly severe     Adult vitelliform macular degeneration     Gastroesophageal reflux disease without esophagitis     Best vitelliform macular dystrophy     DDD (degenerative disc disease), lumbar     Chronic pain of left knee     Vitamin B12 deficiency     Osteopenia, unspecified location             Review of Systems:    The patient denies any chest pain, shortness of breath, excessive pain, fever, chills, purulent drainage from the wound, nausea or vomiting.          Medications:     Current Outpatient Medications   Medication Sig     albuterol (PROAIR  HFA/PROVENTIL HFA/VENTOLIN HFA) 108 (90 Base) MCG/ACT inhaler Inhale 2 puffs into the lungs every 4 hours as needed for shortness of breath / dyspnea     CALCIUM PO      cetirizine HCl (ZYRTEC ALLERGY) 10 MG CAPS Take 10 mg by mouth as needed     cyanocobalamin (VITAMIN  B-12) 1000 MCG tablet Take 1 tablet (1,000 mcg) by mouth daily     estradiol (ESTRING) 2 MG vaginal ring Place 1 each vaginally every 3 months     estradiol (VAGIFEM) 10 MCG TABS vaginal tablet Place tablet vaginally for two weeks then twice weekly     fluticasone (FLONASE) 50 MCG/ACT spray Spray 1-2 sprays into both nostrils daily     fluticasone (FLOVENT HFA) 220 MCG/ACT Inhaler 1 puff twice daily, rinse mouth out after Medication     magnesium 200 MG TABS Take 200 mg by mouth daily     omega-3 acid ethyl esters (LOVAZA) 1 G capsule Take 2 capsules (2 g) by mouth daily     omeprazole (PRILOSEC) 40 MG capsule Take 1 capsule (40 mg) by mouth daily as needed Take 30-60 minutes before a meal.     Pseudoephedrine HCl (SUDAFED PO)      rOPINIRole (REQUIP) 0.25 MG tablet Take 1 tablet (0.25 mg) by mouth At Bedtime     sertraline (ZOLOFT) 50 MG tablet Take 1 tablet (50 mg) by mouth daily     SUMAtriptan (IMITREX) 50 MG tablet TAKE ONE TABLET AT ONSET OF HEADACHE. MAY REPEAT AFTER 2 HOURS. DO NOT EXCEED 200 MG IN 24 HOURS     traZODone (DESYREL) 100 MG tablet TAKE ONE TABLET BY MOUTH AT BEDTIME     Triprolidine-Pseudoephedrine (APRODINE PO) Take by mouth as needed     No current facility-administered medications for this visit.              Physical Exam:   All vitals have been reviewed  Patient Vitals for the past 24 hrs:   Pulse SpO2 Weight   12/17/18 1419 86 98 % 62.6 kg (138 lb)       Wound is clean with minimal or no drainage.          Data:   All laboratory data related to this surgery reviewed  Today I performed a nasal endoscopy at which time I appreciated a scab on the left side with careful dissection and removed and patient immediately felt  better.  She does complain of frequent nausea emesis as a result of postnasal secretions Zofran will be prescribed just to address that.         This document serves as a record of the services and decisions personally performed and made by Dr. Freddie Hinkle MD. It was created on his behalf by Diamond Witt, a trained medical scribe. The creation of this document is based the provider's statements to the medical scribe.  Diamond Witt 12/17/2018    Provider:   The information in this document, created by the medical scribe for me, accurately reflects the services I personally performed and the decisions made by me. I have reviewed and approved this document for accuracy prior to leaving the patient care area.  Dr. Freddie Hinkle MD 12/17/2018    Freddie Hinkle MD

## 2018-12-20 ENCOUNTER — TELEPHONE (OUTPATIENT)
Dept: ORTHOPEDICS | Facility: CLINIC | Age: 68
End: 2018-12-20

## 2018-12-20 NOTE — TELEPHONE ENCOUNTER
Prior Authorization Retail Medication Request    Medication/Dose: ondansetron  ICD code (if different than what is on RX):  R11.10  Previously Tried and Failed:  none  Rationale:    Post-operative day #14  Cryoblasion of the nasal tissue  She does complain of frequent nausea emesis as a result of postnasal secretions Zofran will be prescribed just to address that.    Insurance Name:    Coverage Information    Coverage information:     Subscriber: [id number] LIANET DURAND     Rel to sub: 01 - Self     Member ID: [id number]     Payor: [employer] Ph: [phone]     Benefit plan: [id number] [facility]     Group number: [id number]     Member effective dates: from 08/01/17          Pharmacy Information (if different than what is on RX)  Name:    Phone:    Yanna Small RN on 12/20/2018 at 10:26 AM

## 2018-12-21 ENCOUNTER — TELEPHONE (OUTPATIENT)
Dept: FAMILY MEDICINE | Facility: CLINIC | Age: 68
End: 2018-12-21

## 2018-12-21 NOTE — TELEPHONE ENCOUNTER
Reason for Call:  Form, our goal is to have forms completed with 72 hours, however, some forms may require a visit or additional information.    Type of letter, form or note:  [employer]    Who is the form from?: same as above  (if other please explain)    Where did the form come from: form was mailed in    What clinic location was the form placed at?: [facility] - [phone]    Where the form was placed: 's Box    What number is listed as a contact on the form?: [phone]       Additional comments: fax to [phone] - faxed over to [phone]    Call taken on 12/21/2018 at 11:25 AM by Amber Murphy

## 2018-12-21 NOTE — TELEPHONE ENCOUNTER
Central Prior Authorization Team   Phone: [phone]    PA Initiation    Medication: ondansetron  Insurance Company: [employer] - Phone [phone] Fax [phone]  Pharmacy Filling the Rx: [id number] - [place] - [address]  Filling Pharmacy Phone: [phone]  Filling Pharmacy Fax:    Start Date: 12/21/2018

## 2018-12-21 NOTE — TELEPHONE ENCOUNTER
PRIOR AUTHORIZATION DENIED    Medication: ondansetron-DENIED    Denial Date: 12/21/2018    Denial Rational: DX IS NOT AN FDA APPROVED INDICATION.        Appeal Information:  IF YOU WOULD LIKE TO APPEAL PLEASE SUPPLY PA TEAM WITH A LETTER OF MEDICAL NECESSITY WITH CLINICAL REASON.

## 2018-12-24 NOTE — TELEPHONE ENCOUNTER
infromed patient of PA denial.     Eating normal foods. As of yesterday nausea not as bad. Drainage is better. No nneed for any other med to treat nausea. Instructed patient to call pharmacy about reimbursement for toradol. No prior auth was submitted.  Yanna Small RN on 12/24/2018 at 12:55 PM

## 2019-01-04 ENCOUNTER — TELEPHONE (OUTPATIENT)
Dept: OTOLARYNGOLOGY | Facility: CLINIC | Age: 69
End: 2019-01-04

## 2019-01-04 NOTE — TELEPHONE ENCOUNTER
Prior Authorization Retail Medication Request    Medication/Dose: ketorolac (TORADOL) 10 MG tablet  ICD code (if different than what is on RX):    Previously Tried and Failed:    Rationale:      Insurance Name:    Insurance ID:        Pharmacy Information (if different than what is on RX)  Name: [facility]  Phone:  [phone]    Slime Haynes CMA

## 2019-01-08 DIAGNOSIS — E53.8 VITAMIN B12 DEFICIENCY: ICD-10-CM

## 2019-01-09 NOTE — TELEPHONE ENCOUNTER
Central Prior Authorization Team   Phone: [phone]      PA Initiation    Medication: ketorolac (TORADOL) 10 MG tablet  Insurance Company: [employer] [place] - Phone [phone] Fax [phone]  Pharmacy Filling the Rx: [id number] - [place] - [address]  Filling Pharmacy Phone: [phone]  Filling Pharmacy Fax:    Start Date: 1/9/2019

## 2019-01-09 NOTE — TELEPHONE ENCOUNTER
PRIOR AUTHORIZATION DENIED    Medication: ketorolac (TORADOL) 10 MG tablet    Denial Date: 1/9/2019    Denial Rational:  Must try/fail covered alternatives     Appeal Information:    If you would like to appeal, please supply P/A team with a letter of medical necessity with clinical reason.

## 2019-01-09 NOTE — TELEPHONE ENCOUNTER
Cryoablation was done 12/3/18. This medication is no longer needed. This medication was intended right after the procedure.    Yanna Small RN on 1/9/2019 at 4:45 PM

## 2019-01-10 RX ORDER — LANOLIN ALCOHOL/MO/W.PET/CERES
CREAM (GRAM) TOPICAL
Qty: 90 TABLET | Refills: 3 | Status: SHIPPED | OUTPATIENT
Start: 2019-01-10 | End: 2019-04-19

## 2019-01-14 ENCOUNTER — OFFICE VISIT (OUTPATIENT)
Dept: OTOLARYNGOLOGY | Facility: CLINIC | Age: 69
End: 2019-01-14
Payer: COMMERCIAL

## 2019-01-14 ENCOUNTER — MEDICAL CORRESPONDENCE (OUTPATIENT)
Dept: HEALTH INFORMATION MANAGEMENT | Facility: CLINIC | Age: 69
End: 2019-01-14

## 2019-01-14 VITALS — BODY MASS INDEX: 24.6 KG/M2 | HEART RATE: 88 BPM | WEIGHT: 138 LBS | OXYGEN SATURATION: 100 %

## 2019-01-14 DIAGNOSIS — J30.0 CHRONIC VASOMOTOR RHINITIS: Primary | ICD-10-CM

## 2019-01-14 PROCEDURE — 99024 POSTOP FOLLOW-UP VISIT: CPT | Performed by: OTOLARYNGOLOGY

## 2019-01-14 PROCEDURE — 31231 NASAL ENDOSCOPY DX: CPT | Performed by: OTOLARYNGOLOGY

## 2019-01-14 RX ORDER — IPRATROPIUM BROMIDE 42 UG/1
2 SPRAY, METERED NASAL 3 TIMES DAILY PRN
Qty: 42 ML | Refills: 3 | Status: SHIPPED | OUTPATIENT
Start: 2019-01-14 | End: 2019-04-30

## 2019-01-14 NOTE — LETTER
1/14/2019         RE: Amee Crews  [address] [facility] [id number]        Dear Colleague,    Thank you for referring your patient, Amee Crews, to the [facility]. Please see a copy of my visit note below.    History of Present Illness - Amee Crews is a 68 year old female presenting in clinic today for a recheck on Patient presents with:  RECHECK: 6 week follow up cryoblasion     The patient is about 6 weeks status post cryoablation bilaterally of sphenopalatine areas for vasomotor rhinitis is presenting back.  She also has element of migraine sinus migraine that may be contributing to a lot of her symptomatology.  Overall she is improved probably about 50% improved according to the patient.  Certainly her SNOT 20 score is much improved.  The been no discharge and no pain.  She still has some postnasal drainage still more excessive than normal but is improving definitely headaches are gone.    Present Symptoms include: nasal obstructions and they are   stable .  Amee denies runny nose, post nasal drainage and facial pain/pressure.      Body mass index is 24.6 kg/m .        BP Readings from Last 1 Encounters:   11/14/18 108/66       Patient declined BP in clinic today    Amee IS NOT a smoker/uses chewing tobacco.         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     Allergic rhinitis      Allergies      asthma     mild intermittent     Chronic back pain     neck and low back     HA (headache)     muscle contraction      History of blood transfusion     In childhood     Hot flashes      Hyperlipidemia      Insomnia      Intermittent asthma 9/16/2011     Major depressive disorder, single episode, moderate (H)      Ocular migraine      Osteoarthritis of hand      Restless leg 9/16/2011       Current Medications -   Current Outpatient Medications:      albuterol (PROAIR HFA/PROVENTIL HFA/VENTOLIN HFA) 108  (90 Base) MCG/ACT inhaler, Inhale 2 puffs into the lungs every 4 hours as needed for shortness of breath / dyspnea, Disp: 1 Inhaler, Rfl: 2     CALCIUM PO, , Disp: , Rfl:      cetirizine HCl (ZYRTEC ALLERGY) 10 MG CAPS, Take 10 mg by mouth as needed, Disp: 90 capsule, Rfl: 3     cyanocobalamin (VITAMIN  B-12) 1000 MCG tablet, Take 1 tablet (1,000 mcg) by mouth daily, Disp: 90 tablet, Rfl: 3     estradiol (ESTRING) 2 MG vaginal ring, Place 1 each vaginally every 3 months, Disp: 1 each, Rfl: 11     estradiol (VAGIFEM) 10 MCG TABS vaginal tablet, Place tablet vaginally for two weeks then twice weekly, Disp: 18 tablet, Rfl: 11     fluticasone (FLONASE) 50 MCG/ACT spray, Spray 1-2 sprays into both nostrils daily, Disp: 1 Bottle, Rfl: 3     fluticasone (FLOVENT HFA) 220 MCG/ACT Inhaler, 1 puff twice daily, rinse mouth out after Medication, Disp: 10.6 g, Rfl: 1     magnesium 200 MG TABS, Take 200 mg by mouth daily, Disp: 1 tablet, Rfl: 0     omega-3 acid ethyl esters (LOVAZA) 1 G capsule, Take 2 capsules (2 g) by mouth daily, Disp: 120 capsule, Rfl:      omeprazole (PRILOSEC) 40 MG capsule, Take 1 capsule (40 mg) by mouth daily as needed Take 30-60 minutes before a meal., Disp: , Rfl:      Pseudoephedrine HCl (SUDAFED PO), , Disp: , Rfl:      rOPINIRole (REQUIP) 0.25 MG tablet, Take 1 tablet (0.25 mg) by mouth At Bedtime, Disp: 90 tablet, Rfl: 3     sertraline (ZOLOFT) 50 MG tablet, Take 1 tablet (50 mg) by mouth daily, Disp: 90 tablet, Rfl: 3     SUMAtriptan (IMITREX) 50 MG tablet, TAKE ONE TABLET AT ONSET OF HEADACHE. MAY REPEAT AFTER 2 HOURS. DO NOT EXCEED 200 MG IN 24 HOURS, Disp: 18 tablet, Rfl: 4     traZODone (DESYREL) 100 MG tablet, TAKE ONE TABLET BY MOUTH AT BEDTIME, Disp: 90 tablet, Rfl: 2     Triprolidine-Pseudoephedrine (APRODINE PO), Take by mouth as needed, Disp: , Rfl:      vitamin B-12 (CYANOCOBALAMIN) 1000 MCG tablet, TAKE ONE TABLET BY MOUTH ONCE DAILY, Disp: 90 tablet, Rfl: 3    Allergies -   Allergies    Allergen Reactions     Atorvastatin Cramps     Muscle pain     Morphine Sulfate Itching       Social History -   Social History     Socioeconomic History     Marital status:      Spouse name: Not on file     Number of children: 2     Years of education: Not on file     Highest education level: Not on file   Social Needs     Financial resource strain: Not on file     Food insecurity - worry: Not on file     Food insecurity - inability: Not on file     Transportation needs - medical: Not on file     Transportation needs - non-medical: Not on file   Occupational History     Employer: SELF   Tobacco Use     Smoking status: Former Smoker     Packs/day: 0.00     Types: Cigarettes     Last attempt to quit: 1973     Years since quittin.0     Smokeless tobacco: Never Used   Substance and Sexual Activity     Alcohol use: Yes     Alcohol/week: 0.0 oz     Comment: ocass     Drug use: No     Sexual activity: Yes     Partners: Male   Other Topics Concern     Parent/sibling w/ CABG, MI or angioplasty before 65F 55M? No   Social History Narrative     Not on file       Family History -   Family History   Problem Relation Age of Onset     Diabetes Mother      Hypertension Mother      Cancer Father         stomach      Cancer Maternal Grandfather         lung      Heart Disease Other        Review of Systems - As per HPI and PMHx, otherwise review of system review of the head and neck negative. Otherwise 10+ review of system is negative    Physical Exam  There were no vitals taken for this visit.  BMI: There is no height or weight on file to calculate BMI.    General - The patient is well nourished and well developed, and appears to have good nutritional status.  Alert and oriented to person and place, answers questions and cooperates with examination appropriately.    SKIN - No suspicious lesions or rashes.  Respiration - No respiratory distress.  Head and Face - Normocephalic and atraumatic, with no gross asymmetry  noted of the contour of the facial features.  The facial nerve is intact, with strong symmetric movements.    Voice and Breathing - The patient was breathing comfortably without the use of accessory muscles. The patients voice was clear and strong, and had appropriate pitch and quality.    Ears - Bilateral pinna and EACs with normal appearing overlying skin. Tympanic membrane intact with good mobility on pneumatic otoscopy bilaterally. Bony landmarks of the ossicular chain are normal. The tympanic membranes are normal in appearance. No retraction, perforation, or masses.  No fluid or purulence was seen in the external canal or the middle ear.     Eyes - Extraocular movements intact.  Sclera were not icteric or injected, conjunctiva were pink and moist.    Mouth - Examination of the oral cavity showed pink, healthy oral mucosa. No lesions or ulcerations noted.  The tongue was mobile and midline, and the dentition were in good condition.      Throat - The walls of the oropharynx were smooth, pink, moist, symmetric, and had no lesions or ulcerations.  The tonsillar pillars and soft palate were symmetric. Tonsils are symmetric. The uvula was midline on elevation.    Neck - Normal midline excursion of the laryngotracheal complex during swallowing.  Full range of motion on passive movement.  Palpation of the occipital, submental, submandibular, internal jugular chain, and supraclavicular nodes did not demonstrate any abnormal lymph nodes or masses.  The carotid pulse was palpable bilaterally.  Palpation of the thyroid was soft and smooth, with no nodules or goiter appreciated.  The trachea was mobile and midline.    Nose - External contour is symmetric, no gross deflection or scars.  Nasal mucosa is pink and moist with no abnormal mucus.  The septum was midline and non-obstructive, turbinates of normal size and position.  No polyps, masses, or purulence noted on examination.    Neuro - Nonfocal neuro exam is normal, CN 2  through 12 intact, normal gait and muscle tone.      Performed in clinic today:  To further evaluate the nasal cavity, I performed rigid nasal endoscopy.  I first sprayed the nasal cavity bilaterally with a mix of lidocaine and neosynephrine.  I then began on the left side using a 2.7mm, 30 degree rigid nasal endoscope.  The septum was straight and the nasal airway was open.  No abnormal secretions, purulence, or polyps were noted. The left middle turbinate and middle meatus were clearly visualized and normal in appearance.  Looking up, the olfactory cleft was unobstructed.  Going further back, the sphenoethmoid recess was normal in appearance, with healthy appearing mucosa on the face of the sphenoid.  The nasopharynx was unremarkable, and the eustachian tube opening on this side was unobstructed.  As middle middle meatus is still open to examine the sphenopalatine area that appears to be very clear no evidence of scabbing anymore insertion of the middle turbinate posteriorly is very very nice and clear and open.    I then turned my attention to the right side.  Once again, the septum was straight, and the airway was open.  No abnormal secretions, purulence, polyps were noted.  The right middle turbinate and middle meatus were clearly visualized and normal in appearance.  Looking up, the olfactory cleft was unobstructed.  Going further back the right sphenoethmoid recess was normal in appearance, and eustachian tube opening was unobstructed.As middle middle meatus is still open to examine the sphenopalatine area that appears to be very clear no evidence of scabbing anymore insertion of the middle turbinate posteriorly is very very nice and clear and open.     Black - [id number] [facility]      A/P - Amee Crews is a 68 year old female Patient presents with:  RECHECK: 6 week follow up cryoblasion     Patient is status post bilateral cryoablation with Johanna fix over a month ago.  Overall is improving.  For the residual  vasomotor rhinitis symptoms Atrovent 0.6% spray will be prescribed.  We will see patient back again in about 3 months.    Amee should follow up in 3 months.      At Amee next appointment they will not need a hearing test.      Freddie Hinkle MD        Again, thank you for allowing me to participate in the care of your patient.        Sincerely,        Freddie Hinkle MD, MD

## 2019-01-14 NOTE — PROGRESS NOTES
History of Present Illness - Amee Crews is a 68 year old female presenting in clinic today for a recheck on Patient presents with:  RECHECK: 6 week follow up cryoblasion     The patient is about 6 weeks status post cryoablation bilaterally of sphenopalatine areas for vasomotor rhinitis is presenting back.  She also has element of migraine sinus migraine that may be contributing to a lot of her symptomatology.  Overall she is improved probably about 50% improved according to the patient.  Certainly her SNOT 20 score is much improved.  The been no discharge and no pain.  She still has some postnasal drainage still more excessive than normal but is improving definitely headaches are gone.    Present Symptoms include: nasal obstructions and they are   stable .  Amee denies runny nose, post nasal drainage and facial pain/pressure.      Body mass index is 24.6 kg/m .        BP Readings from Last 1 Encounters:   11/14/18 108/66       Patient declined BP in clinic today    Amee IS NOT a smoker/uses chewing tobacco.         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     Allergic rhinitis      Allergies      asthma     mild intermittent     Chronic back pain     neck and low back     HA (headache)     muscle contraction      History of blood transfusion     In childhood     Hot flashes      Hyperlipidemia      Insomnia      Intermittent asthma 9/16/2011     Major depressive disorder, single episode, moderate (H)      Ocular migraine      Osteoarthritis of hand      Restless leg 9/16/2011       Current Medications -   Current Outpatient Medications:      albuterol (PROAIR HFA/PROVENTIL HFA/VENTOLIN HFA) 108 (90 Base) MCG/ACT inhaler, Inhale 2 puffs into the lungs every 4 hours as needed for shortness of breath / dyspnea, Disp: 1 Inhaler, Rfl: 2     CALCIUM PO, , Disp: , Rfl:      cetirizine HCl (ZYRTEC ALLERGY) 10 MG CAPS, Take 10 mg by mouth as  needed, Disp: 90 capsule, Rfl: 3     cyanocobalamin (VITAMIN  B-12) 1000 MCG tablet, Take 1 tablet (1,000 mcg) by mouth daily, Disp: 90 tablet, Rfl: 3     estradiol (ESTRING) 2 MG vaginal ring, Place 1 each vaginally every 3 months, Disp: 1 each, Rfl: 11     estradiol (VAGIFEM) 10 MCG TABS vaginal tablet, Place tablet vaginally for two weeks then twice weekly, Disp: 18 tablet, Rfl: 11     fluticasone (FLONASE) 50 MCG/ACT spray, Spray 1-2 sprays into both nostrils daily, Disp: 1 Bottle, Rfl: 3     fluticasone (FLOVENT HFA) 220 MCG/ACT Inhaler, 1 puff twice daily, rinse mouth out after Medication, Disp: 10.6 g, Rfl: 1     magnesium 200 MG TABS, Take 200 mg by mouth daily, Disp: 1 tablet, Rfl: 0     omega-3 acid ethyl esters (LOVAZA) 1 G capsule, Take 2 capsules (2 g) by mouth daily, Disp: 120 capsule, Rfl:      omeprazole (PRILOSEC) 40 MG capsule, Take 1 capsule (40 mg) by mouth daily as needed Take 30-60 minutes before a meal., Disp: , Rfl:      Pseudoephedrine HCl (SUDAFED PO), , Disp: , Rfl:      rOPINIRole (REQUIP) 0.25 MG tablet, Take 1 tablet (0.25 mg) by mouth At Bedtime, Disp: 90 tablet, Rfl: 3     sertraline (ZOLOFT) 50 MG tablet, Take 1 tablet (50 mg) by mouth daily, Disp: 90 tablet, Rfl: 3     SUMAtriptan (IMITREX) 50 MG tablet, TAKE ONE TABLET AT ONSET OF HEADACHE. MAY REPEAT AFTER 2 HOURS. DO NOT EXCEED 200 MG IN 24 HOURS, Disp: 18 tablet, Rfl: 4     traZODone (DESYREL) 100 MG tablet, TAKE ONE TABLET BY MOUTH AT BEDTIME, Disp: 90 tablet, Rfl: 2     Triprolidine-Pseudoephedrine (APRODINE PO), Take by mouth as needed, Disp: , Rfl:      vitamin B-12 (CYANOCOBALAMIN) 1000 MCG tablet, TAKE ONE TABLET BY MOUTH ONCE DAILY, Disp: 90 tablet, Rfl: 3    Allergies -   Allergies   Allergen Reactions     Atorvastatin Cramps     Muscle pain     Morphine Sulfate Itching       Social History -   Social History     Socioeconomic History     Marital status:      Spouse name: Not on file     Number of children: 2      Years of education: Not on file     Highest education level: Not on file   Social Needs     Financial resource strain: Not on file     Food insecurity - worry: Not on file     Food insecurity - inability: Not on file     Transportation needs - medical: Not on file     Transportation needs - non-medical: Not on file   Occupational History     Employer: SELF   Tobacco Use     Smoking status: Former Smoker     Packs/day: 0.00     Types: Cigarettes     Last attempt to quit: 1973     Years since quittin.0     Smokeless tobacco: Never Used   Substance and Sexual Activity     Alcohol use: Yes     Alcohol/week: 0.0 oz     Comment: ocass     Drug use: No     Sexual activity: Yes     Partners: Male   Other Topics Concern     Parent/sibling w/ CABG, MI or angioplasty before 65F 55M? No   Social History Narrative     Not on file       Family History -   Family History   Problem Relation Age of Onset     Diabetes Mother      Hypertension Mother      Cancer Father         stomach      Cancer Maternal Grandfather         lung      Heart Disease Other        Review of Systems - As per HPI and PMHx, otherwise review of system review of the head and neck negative. Otherwise 10+ review of system is negative    Physical Exam  There were no vitals taken for this visit.  BMI: There is no height or weight on file to calculate BMI.    General - The patient is well nourished and well developed, and appears to have good nutritional status.  Alert and oriented to person and place, answers questions and cooperates with examination appropriately.    SKIN - No suspicious lesions or rashes.  Respiration - No respiratory distress.  Head and Face - Normocephalic and atraumatic, with no gross asymmetry noted of the contour of the facial features.  The facial nerve is intact, with strong symmetric movements.    Voice and Breathing - The patient was breathing comfortably without the use of accessory muscles. The patients voice was clear and  strong, and had appropriate pitch and quality.    Ears - Bilateral pinna and EACs with normal appearing overlying skin. Tympanic membrane intact with good mobility on pneumatic otoscopy bilaterally. Bony landmarks of the ossicular chain are normal. The tympanic membranes are normal in appearance. No retraction, perforation, or masses.  No fluid or purulence was seen in the external canal or the middle ear.     Eyes - Extraocular movements intact.  Sclera were not icteric or injected, conjunctiva were pink and moist.    Mouth - Examination of the oral cavity showed pink, healthy oral mucosa. No lesions or ulcerations noted.  The tongue was mobile and midline, and the dentition were in good condition.      Throat - The walls of the oropharynx were smooth, pink, moist, symmetric, and had no lesions or ulcerations.  The tonsillar pillars and soft palate were symmetric. Tonsils are symmetric. The uvula was midline on elevation.    Neck - Normal midline excursion of the laryngotracheal complex during swallowing.  Full range of motion on passive movement.  Palpation of the occipital, submental, submandibular, internal jugular chain, and supraclavicular nodes did not demonstrate any abnormal lymph nodes or masses.  The carotid pulse was palpable bilaterally.  Palpation of the thyroid was soft and smooth, with no nodules or goiter appreciated.  The trachea was mobile and midline.    Nose - External contour is symmetric, no gross deflection or scars.  Nasal mucosa is pink and moist with no abnormal mucus.  The septum was midline and non-obstructive, turbinates of normal size and position.  No polyps, masses, or purulence noted on examination.    Neuro - Nonfocal neuro exam is normal, CN 2 through 12 intact, normal gait and muscle tone.      Performed in clinic today:  To further evaluate the nasal cavity, I performed rigid nasal endoscopy.  I first sprayed the nasal cavity bilaterally with a mix of lidocaine and  neosynephrine.  I then began on the left side using a 2.7mm, 30 degree rigid nasal endoscope.  The septum was straight and the nasal airway was open.  No abnormal secretions, purulence, or polyps were noted. The left middle turbinate and middle meatus were clearly visualized and normal in appearance.  Looking up, the olfactory cleft was unobstructed.  Going further back, the sphenoethmoid recess was normal in appearance, with healthy appearing mucosa on the face of the sphenoid.  The nasopharynx was unremarkable, and the eustachian tube opening on this side was unobstructed.  As middle middle meatus is still open to examine the sphenopalatine area that appears to be very clear no evidence of scabbing anymore insertion of the middle turbinate posteriorly is very very nice and clear and open.    I then turned my attention to the right side.  Once again, the septum was straight, and the airway was open.  No abnormal secretions, purulence, polyps were noted.  The right middle turbinate and middle meatus were clearly visualized and normal in appearance.  Looking up, the olfactory cleft was unobstructed.  Going further back the right sphenoethmoid recess was normal in appearance, and eustachian tube opening was unobstructed.As middle middle meatus is still open to examine the sphenopalatine area that appears to be very clear no evidence of scabbing anymore insertion of the middle turbinate posteriorly is very very nice and clear and open.     Black - [id number] [facility]      A/P - Amee LEWIS Mars is a 68 year old female Patient presents with:  RECHECK: 6 week follow up cryoblasion     Patient is status post bilateral cryoablation with Johanna fix over a month ago.  Overall is improving.  For the residual vasomotor rhinitis symptoms Atrovent 0.6% spray will be prescribed.  We will see patient back again in about 3 months.    Amee should follow up in 3 months.      At Amee next appointment they will not need a hearing  test.      Freddie Hinkle MD

## 2019-01-18 ENCOUNTER — MYC MEDICAL ADVICE (OUTPATIENT)
Dept: OTOLARYNGOLOGY | Facility: OTHER | Age: 69
End: 2019-01-18

## 2019-01-21 NOTE — TELEPHONE ENCOUNTER
I callled patient to gather more information:    Migraines: gone after taking a migraine pill (last week).  Mabel pot cleared sinuses and decreased facial pressure quite a bit. Developed a cold so unsure about nasal drips.Off ipratropium over the weekend. Taste decreased secondary to cold. Overall, feels symptoms are minimal. Patient will try ipratropium again. I encouraged a saline nasal spray to keep sinuses clean while cold takes course. Patient will call or mychart back if further concerns.    Yanna Small RN on 1/21/2019 at 1:00 PM

## 2019-01-24 ENCOUNTER — TELEPHONE (OUTPATIENT)
Dept: FAMILY MEDICINE | Facility: OTHER | Age: 69
End: 2019-01-24

## 2019-01-24 ENCOUNTER — TELEPHONE (OUTPATIENT)
Dept: OTOLARYNGOLOGY | Facility: OTHER | Age: 69
End: 2019-01-24

## 2019-01-24 NOTE — TELEPHONE ENCOUNTER
Spoke to patient.  Offered appointment for tomorrow, however, patient stated that she does not need it.  She was able to go to urgent care at [facility] in [place] and was given an antibiotic. Federica Bartlett,

## 2019-01-24 NOTE — TELEPHONE ENCOUNTER
"Reason for Disposition    [1] Sinus pain (not just congestion) AND [2] fever    Additional Information    Negative: Severe difficulty breathing (e.g., struggling for each breath, speaks in single words)    Negative: Sounds like a life-threatening emergency to the triager    Negative: [1] Sinus infection AND [2] taking an antibiotic AND [3] symptoms continue    Negative: [1] Difficulty breathing AND [2] not from stuffy nose (e.g., not relieved by cleaning out the nose)    Negative: [1] SEVERE headache AND [2] fever    Negative: [1] Redness or swelling on the cheek, forehead or around the eye AND [2] fever    Negative: Fever > 104 F (40 C)    Negative: Patient sounds very sick or weak to the triager    Negative: [1] SEVERE pain AND [2] not improved 2 hours after pain medicine    Negative: [1] Redness or swelling on the cheek, forehead or around the eye AND [2] no fever    Negative: [1] Fever > 101 F (38.3 C) AND [2] age > 60    Negative: [1] Fever > 101 F (38.3 C) AND [2] bedridden (e.g., nursing home patient, CVA, chronic illness, recovering from surgery)    Negative: [1] Fever > 100.5 F (38.1 C) AND [2] diabetes mellitus or weak immune system (e.g., HIV positive, cancer chemo, splenectomy, organ transplant, chronic steroids)    Answer Assessment - Initial Assessment Questions  1. LOCATION: \"Where does it hurt?\"       Forehead and nasal bridge  2. ONSET: \"When did the sinus pain start?\"  (e.g., hours, days)       Couple days now  3. SEVERITY: \"How bad is the pain?\"   (Scale 1-10; mild, moderate or severe)    - MILD (1-3): doesn't interfere with normal activities     - MODERATE (4-7): interferes with normal activities (e.g., work or school) or awakens from sleep    - SEVERE (8-10): excruciating pain and patient unable to do any normal activities         mild  4. RECURRENT SYMPTOM: \"Have you ever had sinus problems before?\" If so, ask: \"When was the last time?\" and \"What happened that time?\"       Yes,  Has had recent " "nasal ablation and hx of sinus infections  5. NASAL CONGESTION: \"Is the nose blocked?\" If so, ask, \"Can you open it or must you breathe through the mouth?\"      Can breath a little  6. NASAL DISCHARGE: \"Do you have discharge from your nose?\" If so ask, \"What color?\"      Clear to green drainage  7. FEVER: \"Do you have a fever?\" If so, ask: \"What is it, how was it measured, and when did it start?\"       No fever  8. OTHER SYMPTOMS: \"Do you have any other symptoms?\" (e.g., sore throat, cough, earache, difficulty breathing)      Sore throat, coughing  9. PREGNANCY: \"Is there any chance you are pregnant?\" \"When was your last menstrual period?\"      NA    Protocols used: SINUS PAIN OR CONGESTION-ADULT-AH  "

## 2019-01-24 NOTE — TELEPHONE ENCOUNTER
Reason for call:  Patient reporting a symptom    Symptom or request: Severe congestion, soreness in throat muscles    Duration (how long have symptoms been present): ongoing    Have you been treated for this before? No    Additional comments: Patient is asking to speak with a nurse on Dr. Hinkle's team about her severe congestion that is causing soreness in her throat muscles from trying to pull up the phlegm. Please call patient.     Phone Number patient can be reached at:  Home number on file [phone] (home)    Best Time:  any    Can we leave a detailed message on this number:  YES    Call taken on 1/24/2019 at 8:24 AM by Dragan Haas

## 2019-01-24 NOTE — TELEPHONE ENCOUNTER
Spoke with patient, reviewed calls from last week.  She feels that her symptoms are worsening and that she needs antibiotics.  She feels very run down and feels that the muscle in the back of her throat/top of her mouth is getting sore and swollen.  She denies any fevers. Feels that she has congestion that is not being relieved by justin pots and OTC medications. She will call the [facility] clinic to see if she can be seen today. (Dr. Hurst is out til Monday) She requested that this message be forwarded to Dr. Kelly in case they are willing to prescribe/work patient in.     Ansley Farooq RN. . .  1/24/2019, 10:50 AM

## 2019-01-25 ENCOUNTER — MYC MEDICAL ADVICE (OUTPATIENT)
Dept: OTOLARYNGOLOGY | Facility: OTHER | Age: 69
End: 2019-01-25

## 2019-01-28 ENCOUNTER — MYC MEDICAL ADVICE (OUTPATIENT)
Dept: OTOLARYNGOLOGY | Facility: OTHER | Age: 69
End: 2019-01-28

## 2019-01-29 NOTE — TELEPHONE ENCOUNTER
Dr. Hinkle, please advise. She called last Thursday and went to  and was given an antibiotic but does not feel that it is doing much.  Her next appt with you is not until April 17th.     Ansley Farooq RN. . .  1/29/2019, 4:10 PM

## 2019-01-30 ENCOUNTER — TELEPHONE (OUTPATIENT)
Dept: OTOLARYNGOLOGY | Facility: CLINIC | Age: 69
End: 2019-01-30

## 2019-01-30 DIAGNOSIS — J31.0 CHRONIC RHINITIS: Primary | ICD-10-CM

## 2019-01-30 DIAGNOSIS — G43.009 MIGRAINE WITHOUT AURA AND WITHOUT STATUS MIGRAINOSUS, NOT INTRACTABLE: Primary | ICD-10-CM

## 2019-01-30 DIAGNOSIS — J31.0 CHRONIC RHINITIS: ICD-10-CM

## 2019-01-30 RX ORDER — NORTRIPTYLINE HCL 10 MG
10 CAPSULE ORAL AT BEDTIME
Qty: 30 CAPSULE | Refills: 3 | Status: SHIPPED | OUTPATIENT
Start: 2019-01-30 | End: 2019-04-19

## 2019-01-30 RX ORDER — FLUTICASONE PROPIONATE 50 MCG
1 SPRAY, SUSPENSION (ML) NASAL DAILY
Qty: 1 BOTTLE | Refills: 3 | Status: SHIPPED | OUTPATIENT
Start: 2019-01-30 | End: 2019-01-31

## 2019-01-30 NOTE — TELEPHONE ENCOUNTER
Patient wondering what the status is of her medications. Please call into pharmacy. Says she's been waiting since Monday.

## 2019-01-30 NOTE — TELEPHONE ENCOUNTER
Drainage is clear, thin, and a little mucousy.    Please send both scripts to [facility] in [place].    Also, patient has 4 days left of amoxicillin. She's wondering if she should finish?    Yanna Small RN on 1/30/2019 at 12:43 PM

## 2019-01-30 NOTE — TELEPHONE ENCOUNTER
If the phlegm is clear would suggest Medrol dose pack and consider Nortriptyline 10 mg at bedtime for Migraine.  Freddie Hinkle MD  Please let me know about phlegm so I can send meds.

## 2019-01-30 NOTE — TELEPHONE ENCOUNTER
Clinic Action Needed:Yes-Please call pharmacy  Reason for Call: [id number] - [place], [place] - [address] [facility] Pharmacist calling with concerns of possible interactions with new Rx nortriptyline and current med trazodone. Pharmacist will call clinic in the morning when clinic open.   Routed to: Freddie Hinkle care team    Diana Asif RN, [facility]

## 2019-01-31 RX ORDER — METHYLPREDNISOLONE 4 MG
TABLET, DOSE PACK ORAL
Qty: 21 TABLET | Refills: 0 | Status: SHIPPED | OUTPATIENT
Start: 2019-01-31 | End: 2019-04-19

## 2019-01-31 NOTE — TELEPHONE ENCOUNTER
Called pharmacy, they state that the possible interaction between nortriptyline and trazadone is a prolonged QT interval.  The pharmacy felt that with the lower doses that she is on it would likely not be an issue but has to have this approved by the MD.  Reviewed patient's cardiac history, last ECG was 1/2011 for heart palpitations which was normal.  No cardiac issues on current problem list.     Please advise.     Ansley Farooq RN. . .  1/31/2019, 8:30 AM

## 2019-01-31 NOTE — TELEPHONE ENCOUNTER
I reviewed with Dr. Hinkle who instructed patient to do the dose pack and follow instructions. Flonase d/c'd.    Patient instructed to  nortripaline, take for 3-4 days, call ENT clinic Monday with update on symptoms, STOP TRAZADONE, take tylenol PM as needed to help with sleep.    Patient verbalizes understanding and will call back if needed.    Yanna Small RN on 1/31/2019 at 4:18 PM

## 2019-01-31 NOTE — TELEPHONE ENCOUNTER
Attempted to call Manan, but goes straight to voicemail. Will try again later.  Yanna Small RN on 1/31/2019 at 1:55 PM

## 2019-02-04 NOTE — TELEPHONE ENCOUNTER
FYI  Patient didn't try the nortriptyline. Headaches have resolved. Feels better, less coughing and congestion with the medrol dose pack.    I instructed her to finish the dose pack as instructed and DO NOT start the nortryptaline since she no longer has headaches. Patient f/u scheduled in early April. Instructed patient to call us with additional updates.    Patient verbalizes understanding and will call back if needed.    Yanna Small RN on 2/4/2019 at 8:42 AM

## 2019-02-12 ENCOUNTER — TELEPHONE (OUTPATIENT)
Dept: OTOLARYNGOLOGY | Facility: OTHER | Age: 69
End: 2019-02-12

## 2019-02-12 RX ORDER — AZELASTINE 1 MG/ML
1 SPRAY, METERED NASAL 2 TIMES DAILY
Qty: 1 BOTTLE | Refills: 2 | Status: SHIPPED | OUTPATIENT
Start: 2019-02-12 | End: 2020-10-22

## 2019-02-12 NOTE — TELEPHONE ENCOUNTER
Patient wanted Dr Hinkle to know that she feels like she has another cold starting.   He recently put her on Medrol. Please advise

## 2019-02-12 NOTE — TELEPHONE ENCOUNTER
"Persistent, productive cough, clear runny nasal drainage, doesn't use spray prescribed because it \"causes build up in her throat\". Denies fevers or other symptoms. Mild stomach ache.     Recently completed Medrol Dose Pack    She is leaving on vacation tomorrow    Uses coborns madeline    Has been using Sudafed and ibuprofen.     Please advise.     Has appt with you in April, Last seen 1/14/19.    Ansley CRUZ RN. . .  2/12/2019, 1:47 PM    "

## 2019-03-14 ENCOUNTER — TELEPHONE (OUTPATIENT)
Dept: OTOLARYNGOLOGY | Facility: OTHER | Age: 69
End: 2019-03-14

## 2019-03-14 NOTE — TELEPHONE ENCOUNTER
Reason for Call:  Same Day Appointment, Requested Provider:  Freddie Hinkle MD     PCP: Patrica Kelly    Reason for visit: 3 mon follow-up sinus    Duration of symptoms:     Have you been treated for this in the past? Yes    Additional comments: is supposed to follow up in march. could she be worked in?     Can we leave a detailed message on this number? YES    Phone number patient can be reached at: Home number on file [phone] (home)    Best Time: any    Call taken on 3/14/2019 at 9:33 AM by Lydia Terry

## 2019-04-04 ENCOUNTER — OFFICE VISIT (OUTPATIENT)
Dept: OTOLARYNGOLOGY | Facility: CLINIC | Age: 69
End: 2019-04-04
Payer: COMMERCIAL

## 2019-04-04 VITALS
TEMPERATURE: 99.1 F | SYSTOLIC BLOOD PRESSURE: 114 MMHG | HEIGHT: 63 IN | WEIGHT: 143.1 LBS | DIASTOLIC BLOOD PRESSURE: 72 MMHG | BODY MASS INDEX: 25.36 KG/M2

## 2019-04-04 DIAGNOSIS — J33.9 SINUSITIS WITH NASAL POLYPS: ICD-10-CM

## 2019-04-04 DIAGNOSIS — R43.2 LOSS OF TASTE: ICD-10-CM

## 2019-04-04 DIAGNOSIS — J30.9 ALLERGIC RHINITIS, UNSPECIFIED SEASONALITY, UNSPECIFIED TRIGGER: ICD-10-CM

## 2019-04-04 DIAGNOSIS — J32.9 SINUSITIS WITH NASAL POLYPS: ICD-10-CM

## 2019-04-04 DIAGNOSIS — R43.0 LOSS OF SMELL: Primary | ICD-10-CM

## 2019-04-04 PROCEDURE — 31231 NASAL ENDOSCOPY DX: CPT | Performed by: OTOLARYNGOLOGY

## 2019-04-04 PROCEDURE — 99213 OFFICE O/P EST LOW 20 MIN: CPT | Mod: 25 | Performed by: OTOLARYNGOLOGY

## 2019-04-04 RX ORDER — PREDNISONE 20 MG/1
20 TABLET ORAL 2 TIMES DAILY
Qty: 10 TABLET | Refills: 0 | Status: SHIPPED | OUTPATIENT
Start: 2019-04-04 | End: 2019-04-30

## 2019-04-04 ASSESSMENT — MIFFLIN-ST. JEOR: SCORE: 1155.05

## 2019-04-04 NOTE — Clinical Note
2019         RE: Amee Crews  [address] [facility] [address]        Dear Colleague,    Thank you for referring your patient, Amee Crews, to the [facility]. Please see a copy of my visit note below.    History of Present Illness - Amee Crews is a 68 year old female presenting in clinic today for a recheck on Patient presents with:  RECHECK  Sinus Problem    ***    Present Symptoms include: {ENT ASSOCIATED SX:604676} and they are   {ENT SEVERITY STANDIN} .  Amee denies {ENT ASSOCIATED SX:289874}.      Body mass index is 25.51 kg/m .        BP Readings from Last 1 Encounters:   19 114/72       BP noted to be well controlled today in office.     Amee IS NOT a smoker/uses chewing tobacco.       Past Medical History -   Past Medical History:   Diagnosis Date     Adult vitelliform macular degeneration 2017     Allergic rhinitis      Allergies      asthma     mild intermittent     Chronic back pain     neck and low back     HA (headache)     muscle contraction      History of blood transfusion     In childhood     Hot flashes      Hyperlipidemia      Insomnia      Intermittent asthma 2011     Major depressive disorder, single episode, moderate (H)      Ocular migraine      Osteoarthritis of hand      Restless leg 2011       Current Medications -   Current Outpatient Medications:      albuterol (PROAIR HFA/PROVENTIL HFA/VENTOLIN HFA) 108 (90 Base) MCG/ACT inhaler, Inhale 2 puffs into the lungs every 4 hours as needed for shortness of breath / dyspnea, Disp: 1 Inhaler, Rfl: 2     azelastine (ASTELIN) 0.1 % nasal spray, Spray 1 spray into both nostrils 2 times daily, Disp: 1 Bottle, Rfl: 2     CALCIUM PO, , Disp: , Rfl:      cetirizine HCl (ZYRTEC ALLERGY) 10 MG CAPS, Take 10 mg by mouth as needed, Disp: 90 capsule, Rfl: 3     cyanocobalamin (VITAMIN  B-12) 1000 MCG tablet, Take 1 tablet (1,000 mcg) by mouth daily, Disp: 90 tablet, Rfl: 3      estradiol (ESTRING) 2 MG vaginal ring, Place 1 each vaginally every 3 months, Disp: 1 each, Rfl: 11     estradiol (VAGIFEM) 10 MCG TABS vaginal tablet, Place tablet vaginally for two weeks then twice weekly, Disp: 18 tablet, Rfl: 11     fluticasone (FLONASE) 50 MCG/ACT spray, Spray 1-2 sprays into both nostrils daily, Disp: 1 Bottle, Rfl: 3     fluticasone (FLOVENT HFA) 220 MCG/ACT Inhaler, 1 puff twice daily, rinse mouth out after Medication, Disp: 10.6 g, Rfl: 1     magnesium 200 MG TABS, Take 200 mg by mouth daily, Disp: 1 tablet, Rfl: 0     methylPREDNISolone (MEDROL DOSEPAK) 4 MG tablet therapy pack, Follow Package Directions, Disp: 21 tablet, Rfl: 0     nortriptyline (PAMELOR) 10 MG capsule, Take 1 capsule (10 mg) by mouth At Bedtime, Disp: 30 capsule, Rfl: 3     omega-3 acid ethyl esters (LOVAZA) 1 G capsule, Take 2 capsules (2 g) by mouth daily, Disp: 120 capsule, Rfl:      omeprazole (PRILOSEC) 40 MG capsule, Take 1 capsule (40 mg) by mouth daily as needed Take 30-60 minutes before a meal., Disp: , Rfl:      Pseudoephedrine HCl (SUDAFED PO), , Disp: , Rfl:      rOPINIRole (REQUIP) 0.25 MG tablet, Take 1 tablet (0.25 mg) by mouth At Bedtime, Disp: 90 tablet, Rfl: 3     sertraline (ZOLOFT) 50 MG tablet, Take 1 tablet (50 mg) by mouth daily (Patient taking differently: Take 100 mg by mouth daily ), Disp: 90 tablet, Rfl: 3     SUMAtriptan (IMITREX) 50 MG tablet, TAKE ONE TABLET AT ONSET OF HEADACHE. MAY REPEAT AFTER 2 HOURS. DO NOT EXCEED 200 MG IN 24 HOURS, Disp: 18 tablet, Rfl: 4     traZODone (DESYREL) 100 MG tablet, TAKE ONE TABLET BY MOUTH AT BEDTIME, Disp: 90 tablet, Rfl: 2     Triprolidine-Pseudoephedrine (APRODINE PO), Take by mouth as needed, Disp: , Rfl:      vitamin B-12 (CYANOCOBALAMIN) 1000 MCG tablet, TAKE ONE TABLET BY MOUTH ONCE DAILY, Disp: 90 tablet, Rfl: 3    Allergies -   Allergies   Allergen Reactions     Atorvastatin Cramps     Muscle pain     Morphine Sulfate Itching       Social History  -   Social History     Socioeconomic History     Marital status:      Spouse name: Not on file     Number of children: 2     Years of education: Not on file     Highest education level: Not on file   Occupational History     Employer: SELF   Social Needs     Financial resource strain: Not on file     Food insecurity:     Worry: Not on file     Inability: Not on file     Transportation needs:     Medical: Not on file     Non-medical: Not on file   Tobacco Use     Smoking status: Former Smoker     Packs/day: 0.00     Types: Cigarettes     Last attempt to quit: 1973     Years since quittin.2     Smokeless tobacco: Never Used   Substance and Sexual Activity     Alcohol use: Yes     Alcohol/week: 0.0 oz     Comment: ocass     Drug use: No     Sexual activity: Yes     Partners: Male   Lifestyle     Physical activity:     Days per week: Not on file     Minutes per session: Not on file     Stress: Not on file   Relationships     Social connections:     Talks on phone: Not on file     Gets together: Not on file     Attends [employer] service: Not on file     Active member of club or organization: Not on file     Attends meetings of clubs or organizations: Not on file     Relationship status: Not on file     Intimate partner violence:     Fear of current or ex partner: Not on file     Emotionally abused: Not on file     Physically abused: Not on file     Forced sexual activity: Not on file   Other Topics Concern     Parent/sibling w/ CABG, MI or angioplasty before 65F 55M? No   Social History Narrative     Not on file       Family History -   Family History   Problem Relation Age of Onset     Diabetes Mother      Hypertension Mother      Cancer Father         stomach      Cancer Maternal Grandfather         lung      Heart Disease Other        Review of Systems - As per HPI and PMHx, otherwise review of system review of the head and neck negative. Otherwise 10+ review of system is negative    Physical Exam  BP  "114/72   Temp 99.1  F (37.3  C) (Temporal)   Ht 1.595 m (5' 2.8\")   Wt 64.9 kg (143 lb 1.6 oz)   BMI 25.51 kg/m     BMI: Body mass index is 25.51 kg/m .    General - The patient is well nourished and well developed, and appears to have good nutritional status.  Alert and oriented to person and place, answers questions and cooperates with examination appropriately.    SKIN - No suspicious lesions or rashes.  Respiration - No respiratory distress.  Head and Face - Normocephalic and atraumatic, with no gross asymmetry noted of the contour of the facial features.  The facial nerve is intact, with strong symmetric movements.    Voice and Breathing - The patient was breathing comfortably without the use of accessory muscles. The patients voice was clear and strong, and had appropriate pitch and quality.    Ears - Bilateral pinna and EACs with normal appearing overlying skin. Tympanic membrane intact with good mobility on pneumatic otoscopy bilaterally. Bony landmarks of the ossicular chain are normal. The tympanic membranes are normal in appearance. No retraction, perforation, or masses.  No fluid or purulence was seen in the external canal or the middle ear.     Eyes - Extraocular movements intact.  Sclera were not icteric or injected, conjunctiva were pink and moist.    Mouth - Examination of the oral cavity showed pink, healthy oral mucosa. No lesions or ulcerations noted.  The tongue was mobile and midline, and the dentition were in good condition.      Throat - The walls of the oropharynx were smooth, pink, moist, symmetric, and had no lesions or ulcerations.  The tonsillar pillars and soft palate were symmetric. Tonsils are {ENT TONSILS:528885}. The uvula was midline on elevation.    Neck - Normal midline excursion of the laryngotracheal complex during swallowing.  Full range of motion on passive movement.  Palpation of the occipital, submental, submandibular, internal jugular chain, and supraclavicular nodes " "did not demonstrate any abnormal lymph nodes or masses.  The carotid pulse was palpable bilaterally.  Palpation of the thyroid was soft and smooth, with no nodules or goiter appreciated.  The trachea was mobile and midline.    Nose - External contour is symmetric, no gross deflection or scars.  Nasal mucosa is pink and moist with no abnormal mucus.  The septum was midline and non-obstructive, turbinates of normal size and position.  No polyps, masses, or purulence noted on examination.    Neuro - Nonfocal neuro exam is normal, CN 2 through 12 intact, normal gait and muscle tone.      Performed in clinic today:  [facility]      A/P - Amee Crews is a 68 year old female Patient presents with:  RECHECK  Sinus Problem    ***    Amee should follow up {FOLLOW UP:050101}.      At Amee next appointment they {WILL:791800::\"will\"} need a hearing test.      Freddie Hinkle MD        Again, thank you for allowing me to participate in the care of your patient.        Sincerely,        rFeddie Hinkle MD, MD  "

## 2019-04-04 NOTE — PROGRESS NOTES
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  The patient had a sinus surgery in the past but now says she still has the drainage sense of smell and taste still diminished.  Even though initial SNOT 20 score greatly improved she feels that some of the symptoms are coming back.      Body mass index is 25.51 kg/m .        BP Readings from Last 1 Encounters:   04/04/19 114/72       BP noted to be well controlled today in office.     Amee IS NOT a smoker/uses chewing tobacco.       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     Allergic rhinitis      Allergies      asthma     mild intermittent     Chronic back pain     neck and low back     HA (headache)     muscle contraction      History of blood transfusion     In childhood     Hot flashes      Hyperlipidemia      Insomnia      Intermittent asthma 9/16/2011     Major depressive disorder, single episode, moderate (H)      Ocular migraine      Osteoarthritis of hand      Restless leg 9/16/2011       Current Medications -   Current Outpatient Medications:      albuterol (PROAIR HFA/PROVENTIL HFA/VENTOLIN HFA) 108 (90 Base) MCG/ACT inhaler, Inhale 2 puffs into the lungs every 4 hours as needed for shortness of breath / dyspnea, Disp: 1 Inhaler, Rfl: 2     azelastine (ASTELIN) 0.1 % nasal spray, Spray 1 spray into both nostrils 2 times daily, Disp: 1 Bottle, Rfl: 2     CALCIUM PO, , Disp: , Rfl:      cetirizine HCl (ZYRTEC ALLERGY) 10 MG CAPS, Take 10 mg by mouth as needed, Disp: 90 capsule, Rfl: 3     cyanocobalamin (VITAMIN  B-12) 1000 MCG tablet, Take 1 tablet (1,000 mcg) by mouth daily, Disp: 90 tablet, Rfl: 3     estradiol (ESTRING) 2 MG vaginal ring, Place 1 each vaginally  every 3 months, Disp: 1 each, Rfl: 11     estradiol (VAGIFEM) 10 MCG TABS vaginal tablet, Place tablet vaginally for two weeks then twice weekly, Disp: 18 tablet, Rfl: 11     fluticasone (FLONASE) 50 MCG/ACT spray, Spray 1-2 sprays into both nostrils daily, Disp: 1 Bottle, Rfl: 3     fluticasone (FLOVENT HFA) 220 MCG/ACT Inhaler, 1 puff twice daily, rinse mouth out after Medication, Disp: 10.6 g, Rfl: 1     magnesium 200 MG TABS, Take 200 mg by mouth daily, Disp: 1 tablet, Rfl: 0     methylPREDNISolone (MEDROL DOSEPAK) 4 MG tablet therapy pack, Follow Package Directions, Disp: 21 tablet, Rfl: 0     nortriptyline (PAMELOR) 10 MG capsule, Take 1 capsule (10 mg) by mouth At Bedtime, Disp: 30 capsule, Rfl: 3     omega-3 acid ethyl esters (LOVAZA) 1 G capsule, Take 2 capsules (2 g) by mouth daily, Disp: 120 capsule, Rfl:      omeprazole (PRILOSEC) 40 MG capsule, Take 1 capsule (40 mg) by mouth daily as needed Take 30-60 minutes before a meal., Disp: , Rfl:      Pseudoephedrine HCl (SUDAFED PO), , Disp: , Rfl:      rOPINIRole (REQUIP) 0.25 MG tablet, Take 1 tablet (0.25 mg) by mouth At Bedtime, Disp: 90 tablet, Rfl: 3     sertraline (ZOLOFT) 50 MG tablet, Take 1 tablet (50 mg) by mouth daily (Patient taking differently: Take 100 mg by mouth daily ), Disp: 90 tablet, Rfl: 3     SUMAtriptan (IMITREX) 50 MG tablet, TAKE ONE TABLET AT ONSET OF HEADACHE. MAY REPEAT AFTER 2 HOURS. DO NOT EXCEED 200 MG IN 24 HOURS, Disp: 18 tablet, Rfl: 4     traZODone (DESYREL) 100 MG tablet, TAKE ONE TABLET BY MOUTH AT BEDTIME, Disp: 90 tablet, Rfl: 2     Triprolidine-Pseudoephedrine (APRODINE PO), Take by mouth as needed, Disp: , Rfl:      vitamin B-12 (CYANOCOBALAMIN) 1000 MCG tablet, TAKE ONE TABLET BY MOUTH ONCE DAILY, Disp: 90 tablet, Rfl: 3    Allergies -   Allergies   Allergen Reactions     Atorvastatin Cramps     Muscle pain     Morphine Sulfate Itching       Social History -   Social History     Socioeconomic History     Marital  status:      Spouse name: Not on file     Number of children: 2     Years of education: Not on file     Highest education level: Not on file   Occupational History     Employer: SELF   Social Needs     Financial resource strain: Not on file     Food insecurity:     Worry: Not on file     Inability: Not on file     Transportation needs:     Medical: Not on file     Non-medical: Not on file   Tobacco Use     Smoking status: Former Smoker     Packs/day: 0.00     Types: Cigarettes     Last attempt to quit: 1973     Years since quittin.2     Smokeless tobacco: Never Used   Substance and Sexual Activity     Alcohol use: Yes     Alcohol/week: 0.0 oz     Comment: ocass     Drug use: No     Sexual activity: Yes     Partners: Male   Lifestyle     Physical activity:     Days per week: Not on file     Minutes per session: Not on file     Stress: Not on file   Relationships     Social connections:     Talks on phone: Not on file     Gets together: Not on file     Attends Confucianist service: Not on file     Active member of club or organization: Not on file     Attends meetings of clubs or organizations: Not on file     Relationship status: Not on file     Intimate partner violence:     Fear of current or ex partner: Not on file     Emotionally abused: Not on file     Physically abused: Not on file     Forced sexual activity: Not on file   Other Topics Concern     Parent/sibling w/ CABG, MI or angioplasty before 65F 55M? No   Social History Narrative     Not on file       Family History -   Family History   Problem Relation Age of Onset     Diabetes Mother      Hypertension Mother      Cancer Father         stomach      Cancer Maternal Grandfather         lung      Heart Disease Other        Review of Systems - As per HPI and PMHx, otherwise review of system review of the head and neck negative. Otherwise 10+ review of system is negative    Physical Exam  /72   Temp 99.1  F (37.3  C) (Temporal)   Ht 1.595 m  "(5' 2.8\")   Wt 64.9 kg (143 lb 1.6 oz)   BMI 25.51 kg/m    BMI: Body mass index is 25.51 kg/m .    General - The patient is well nourished and well developed, and appears to have good nutritional status.  Alert and oriented to person and place, answers questions and cooperates with examination appropriately.    SKIN - No suspicious lesions or rashes.  Respiration - No respiratory distress.  Head and Face - Normocephalic and atraumatic, with no gross asymmetry noted of the contour of the facial features.  The facial nerve is intact, with strong symmetric movements.    Voice and Breathing - The patient was breathing comfortably without the use of accessory muscles. The patients voice was clear and strong, and had appropriate pitch and quality.    Ears - Bilateral pinna and EACs with normal appearing overlying skin. Tympanic membrane intact with good mobility on pneumatic otoscopy bilaterally. Bony landmarks of the ossicular chain are normal. The tympanic membranes are normal in appearance. No retraction, perforation, or masses.  No fluid or purulence was seen in the external canal or the middle ear.     Eyes - Extraocular movements intact.  Sclera were not icteric or injected, conjunctiva were pink and moist.    Mouth - Examination of the oral cavity showed pink, healthy oral mucosa. No lesions or ulcerations noted.  The tongue was mobile and midline, and the dentition were in good condition.      Throat - The walls of the oropharynx were smooth, pink, moist, symmetric, and had no lesions or ulcerations.  The tonsillar pillars and soft palate were symmetric. Tonsils are symmetric. The uvula was midline on elevation.    Neck - Normal midline excursion of the laryngotracheal complex during swallowing.  Full range of motion on passive movement.  Palpation of the occipital, submental, submandibular, internal jugular chain, and supraclavicular nodes did not demonstrate any abnormal lymph nodes or masses.  The carotid " pulse was palpable bilaterally.  Palpation of the thyroid was soft and smooth, with no nodules or goiter appreciated.  The trachea was mobile and midline.    Nose - External contour is symmetric, no gross deflection or scars.  Nasal mucosa is pink and moist with no abnormal mucus.  The septum was midline and non-obstructive, turbinates of normal size and position.  No polyps, masses, or purulence noted on examination.    Neuro - Nonfocal neuro exam is normal, CN 2 through 12 intact, normal gait and muscle tone.      Performed in clinic today:  To further evaluate the nasal cavity, I performed rigid nasal endoscopy.  I first sprayed the nasal cavity bilaterally with a mix of lidocaine and neosynephrine.  I then began on the left side using a 2.7mm, 30 degree rigid nasal endoscope.  The septum was straight and the nasal airway was open.  No abnormal secretions, purulence, or polyps were noted. The left middle turbinate and middle meatus were clearly visualized and normal in appearance.  Looking up, the olfactory cleft was unobstructed.  Going further back, the sphenoethmoid recess was normal in appearance, with healthy appearing mucosa on the face of the sphenoid.  The nasopharynx was unremarkable, and the eustachian tube opening on this side was unobstructed.  Middle meatus completely open did not see any excess secretions ethmoid air cells maxillary ostium is open.    I then turned my attention to the right side.  Once again, the septum was straight, and the airway was open.  No abnormal secretions, purulence, polyps were noted.  The right middle turbinate and middle meatus were clearly visualized and normal in appearance.  Looking up, the olfactory cleft was unobstructed.  Going further back the right sphenoethmoid recess was normal in appearance, and eustachian tube opening was unobstructed.    Blue - [id number] MytamedMiddle meatus completely open did not see any excess secretions ethmoid air cells maxillary ostium  is open.        A/P - Amee JOSHUA Crews is a 68 year old female Patient presents with:  RECHECK  Sinus Problem    Patient with nonspecific continued symptomatology of intermittent discharge and drainage of intermittent obstruction persistent loss of smell and taste.  She does have active migraine symptomatology as well and is currently on sumatriptan.  Certainly feel this could be a major contributing factor to some of his persisting symptoms that she is experiencing in the nose.  From my standpoint she should continue the use of Astelin to dry secretions since Atrovent was not as helpful previously.  Amee should follow up in 3 months.      At Amee next appointment they will not need a hearing test.      Freddie Hinkle MD

## 2019-04-09 ENCOUNTER — TELEPHONE (OUTPATIENT)
Dept: OTOLARYNGOLOGY | Facility: CLINIC | Age: 69
End: 2019-04-09

## 2019-04-09 NOTE — TELEPHONE ENCOUNTER
"Prior Authorization Retail Medication Request    Medication/Dose:   ICD code (if different than what is on RX):    Previously Tried and Failed:   Rationale:      Insurance Name:  [facility] OUT OF STATE MEDICARE REPLACEMENT  Insurance ID:  [id number]      Pharmacy Information (if different than what is on RX)  Name:    Phone:     Jillian PINON please go to [facility] \"[facility]\"       KEY:  [id number]  LAST NAME:  LADARIUS  :  1950       "

## 2019-04-16 ENCOUNTER — MYC MEDICAL ADVICE (OUTPATIENT)
Dept: OTOLARYNGOLOGY | Facility: CLINIC | Age: 69
End: 2019-04-16

## 2019-04-16 NOTE — TELEPHONE ENCOUNTER
Called Jillian and they stated the PA request was from December but patient paid out of pocket for the medication. They did not show a PA request or refill request from the patient.

## 2019-04-17 ENCOUNTER — MYC MEDICAL ADVICE (OUTPATIENT)
Dept: OTOLARYNGOLOGY | Facility: OTHER | Age: 69
End: 2019-04-17

## 2019-04-18 NOTE — PROGRESS NOTES
SUBJECTIVE:   Amee Crews is a 68 year old female who presents to clinic today for the following health issues:    HPI  Acute Illness   Acute illness concerns: sinus pain  Onset: 8 days    Fever: no     Chills/Sweats: YES    Headache (location?): YES    Sinus Pressure:YES    Conjunctivitis:  no    Ear Pain: no    Rhinorrhea: yes    Congestion: no     Sore Throat: no      Cough: YES    Wheeze: no    Decreased Appetite: no     Nausea: no    Vomiting: no    Diarrhea:  no    Dysuria/Freq.: no    Fatigue/Achiness: YES    Sick/Strep Exposure: no     Therapies Tried and outcome:otc products    Patient presents today with 8 days of worsening sinus pain, congestion and pressure. She reports headaches that are pressure like behind her eyes. She has been having yellow/green nasal discharge. Mild cough but no wheezing, chest tightness or shortness of breath. No fevers. No ill contact. She culp shave history of recurrent sinus infections.     Answers for HPI/ROS submitted by the patient on 4/19/2019   If you checked off any problems, how difficult have these problems made it for you to do your work, take care of things at home, or get along with other people?: Not difficult at all  PHQ9 TOTAL SCORE: 1  AMADOR 7 TOTAL SCORE: 0    Additional history: as documented    Reviewed and updated as needed this visit by clinical staff         Reviewed and updated as needed this visit by Provider       Patient Active Problem List   Diagnosis     Anxiety     Migraine headache     Hot flashes     Hyperlipidemia LDL goal <130     Chondromalacia patellae     Insomnia     Allergic state     Chronic back pain     Mild persistent asthma without complication     Restless leg     Vaginal atrophy     Nasal polyposis     Fatty liver     Chronic sinusitis, unspecified location     Chronic rhinitis     Major depression in complete remission (H)     Dense breast tissue     Osteopenia of multiple sites     Atrophic vaginitis     Primary osteoarthritis of  both hands     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, moderatly severe,slowly worsening     Pinguecula of both eyes     Nuclear cataract of both eyes     Myopia, bilateral     Macular cyst, hole, or pseudohole, bilateral, moderatly severe     Adult vitelliform macular degeneration     Gastroesophageal reflux disease without esophagitis     Best vitelliform macular dystrophy     DDD (degenerative disc disease), lumbar     Chronic pain of left knee     Vitamin B12 deficiency     Osteopenia, unspecified location     Past Surgical History:   Procedure Laterality Date     APPENDECTOMY      age 9      BIOPSY      took tissue from my uterus at least 15 years ago     COLONOSCOPY  14 years     COLONOSCOPY N/A 4/30/2015    Procedure: COMBINED COLONOSCOPY, SINGLE OR MULTIPLE BIOPSY/POLYPECTOMY BY BIOPSY;  Surgeon: Doni Carey MD;  Location: [facility] OR     COLONOSCOPY WITH CO2 INSUFFLATION N/A 4/30/2015    Procedure: COLONOSCOPY WITH CO2 INSUFFLATION;  Surgeon: Doni Carey MD;  Location: [facility] OR     COMBINED ESOPHAGOSCOPY, GASTROSCOPY, DUODENOSCOPY (EGD) WITH CO2 INSUFFLATION N/A 8/24/2017    Procedure: COMBINED ESOPHAGOSCOPY, GASTROSCOPY, DUODENOSCOPY (EGD) WITH CO2 INSUFFLATION;  EGD, Gastroesophageal reflux disease, esophagitis presence not specified Abdominal pain, generalized, Ref Dahlheimer-Schroeder, 24.78 BMI;  Surgeon: Duane, William Charles, MD;  Location: [facility] OR     ENT SURGERY      Tonsillectomy     ESOPHAGOSCOPY, GASTROSCOPY, DUODENOSCOPY (EGD), COMBINED N/A 8/24/2017    Procedure: COMBINED ESOPHAGOSCOPY, GASTROSCOPY, DUODENOSCOPY (EGD), BIOPSY SINGLE OR MULTIPLE;;  Surgeon: Duane, William Charles, MD;  Location: [facility] OR     LAPAROSCOPY PROCEDURE UNLISTED      polyps found     MOUTH SURGERY  2016     SINUS SURGERY      x2       Social History     Tobacco Use     Smoking status: Former Smoker     Packs/day: 0.00     Types: Cigarettes     Last attempt to quit: 1/4/1973     Years  since quittin.3     Smokeless tobacco: Never Used   Substance Use Topics     Alcohol use: Yes     Alcohol/week: 0.0 oz     Comment: ocass     Family History   Problem Relation Age of Onset     Diabetes Mother      Hypertension Mother      Cancer Father         stomach      Cancer Maternal Grandfather         lung      Heart Disease Other          Current Outpatient Medications   Medication Sig Dispense Refill     albuterol (PROAIR HFA/PROVENTIL HFA/VENTOLIN HFA) 108 (90 Base) MCG/ACT inhaler Inhale 2 puffs into the lungs every 4 hours as needed for shortness of breath / dyspnea 1 Inhaler 2     azelastine (ASTELIN) 0.1 % nasal spray Spray 1 spray into both nostrils 2 times daily 1 Bottle 2     cefdinir (OMNICEF) 300 MG capsule Take 1 capsule (300 mg) by mouth 2 times daily for 10 days 20 capsule 0     cetirizine HCl (ZYRTEC ALLERGY) 10 MG CAPS Take 10 mg by mouth as needed 90 capsule 3     cyanocobalamin (VITAMIN  B-12) 1000 MCG tablet Take 1 tablet (1,000 mcg) by mouth daily 90 tablet 3     fluticasone (FLONASE) 50 MCG/ACT spray Spray 1-2 sprays into both nostrils daily 1 Bottle 3     fluticasone (FLOVENT HFA) 220 MCG/ACT Inhaler 1 puff twice daily, rinse mouth out after Medication 10.6 g 1     magnesium 200 MG TABS Take 200 mg by mouth daily 1 tablet 0     mometasone (NASONEX) 50 MCG/ACT nasal spray Spray 2 sprays into both nostrils daily       omega-3 acid ethyl esters (LOVAZA) 1 G capsule Take 2 capsules (2 g) by mouth daily 120 capsule      omeprazole (PRILOSEC) 40 MG capsule Take 1 capsule (40 mg) by mouth daily as needed Take 30-60 minutes before a meal.       Pseudoephedrine HCl (SUDAFED PO)        rOPINIRole (REQUIP) 0.25 MG tablet Take 1 tablet (0.25 mg) by mouth At Bedtime 90 tablet 3     sertraline (ZOLOFT) 50 MG tablet Take 1 tablet (50 mg) by mouth daily (Patient taking differently: Take 100 mg by mouth daily ) 90 tablet 3     SUMAtriptan (IMITREX) 50 MG tablet TAKE ONE TABLET AT ONSET OF HEADACHE.  "MAY REPEAT AFTER 2 HOURS. DO NOT EXCEED 200 MG IN 24 HOURS 18 tablet 4     traZODone (DESYREL) 100 MG tablet TAKE ONE TABLET BY MOUTH AT BEDTIME 90 tablet 2     Triprolidine-Pseudoephedrine (APRODINE PO) Take by mouth as needed       CALCIUM PO        estradiol (ESTRING) 2 MG vaginal ring Place 1 each vaginally every 3 months (Patient not taking: Reported on 4/19/2019) 1 each 11     estradiol (VAGIFEM) 10 MCG TABS vaginal tablet Place tablet vaginally for two weeks then twice weekly (Patient not taking: Reported on 4/19/2019) 18 tablet 11     methylPREDNISolone (MEDROL DOSEPAK) 4 MG tablet therapy pack Follow Package Directions (Patient not taking: Reported on 4/19/2019) 21 tablet 0     nortriptyline (PAMELOR) 10 MG capsule Take 1 capsule (10 mg) by mouth At Bedtime (Patient not taking: Reported on 4/19/2019) 30 capsule 3     vitamin B-12 (CYANOCOBALAMIN) 1000 MCG tablet TAKE ONE TABLET BY MOUTH ONCE DAILY (Patient not taking: Reported on 4/19/2019) 90 tablet 3     Allergies   Allergen Reactions     Atorvastatin Cramps     Muscle pain     Morphine Sulfate Itching     BP Readings from Last 3 Encounters:   04/19/19 104/62   04/04/19 114/72   11/14/18 108/66    Wt Readings from Last 3 Encounters:   04/19/19 63 kg (139 lb)   04/04/19 64.9 kg (143 lb 1.6 oz)   01/14/19 62.6 kg (138 lb)           ROS:  Constitutional, HEENT, cardiovascular, pulmonary, gi and gu systems are negative, except as otherwise noted.    OBJECTIVE:     /62   Pulse 85   Temp 99.2  F (37.3  C) (Temporal)   Resp 16   Ht 1.588 m (5' 2.5\")   Wt 63 kg (139 lb)   SpO2 99%   BMI 25.02 kg/m    Body mass index is 25.02 kg/m .  GENERAL: healthy, alert and no distress  EYES: Eyes grossly normal to inspection, PERRL and conjunctivae and sclerae normal  HENT: normal cephalic/atraumatic, ear canals and TM's normal, nose and mouth without ulcers or lesions, oropharynx clear, oral mucous membranes moist and sinuses: maxillary tenderness on both sides, " maxillary swelling on both sides  NECK: no adenopathy, no asymmetry, masses, or scars and thyroid normal to palpation  RESP: lungs clear to auscultation - no rales, rhonchi or wheezes  CV: regular rate and rhythm, normal S1 S2, no S3 or S4, no murmur, click or rub, no peripheral edema and peripheral pulses strong  SKIN: no suspicious lesions or rashes  PSYCH: mentation appears normal, affect normal/bright    Diagnostic Test Results:  none     ASSESSMENT/PLAN:     1. Acute sinusitis with symptoms > 10 days  Antibiotics started as below. Encouraged continuation of supportive cares with rest, fluids and tylenol/ibuprofen as needed. Patient will follow-up in clinic if new symptoms develop or current symptoms fail to improve.  - cefdinir (OMNICEF) 300 MG capsule; Take 1 capsule (300 mg) by mouth 2 times daily for 10 days  Dispense: 20 capsule; Refill: 0    The patient indicates understanding of these issues and agrees with the plan.    Berenice Garcia PA-C  [facility]

## 2019-04-19 ENCOUNTER — OFFICE VISIT (OUTPATIENT)
Dept: FAMILY MEDICINE | Facility: OTHER | Age: 69
End: 2019-04-19
Payer: COMMERCIAL

## 2019-04-19 VITALS
BODY MASS INDEX: 24.63 KG/M2 | TEMPERATURE: 99.2 F | HEART RATE: 85 BPM | HEIGHT: 63 IN | SYSTOLIC BLOOD PRESSURE: 104 MMHG | RESPIRATION RATE: 16 BRPM | WEIGHT: 139 LBS | OXYGEN SATURATION: 99 % | DIASTOLIC BLOOD PRESSURE: 62 MMHG

## 2019-04-19 DIAGNOSIS — J01.90 ACUTE SINUSITIS WITH SYMPTOMS > 10 DAYS: Primary | ICD-10-CM

## 2019-04-19 PROCEDURE — 99213 OFFICE O/P EST LOW 20 MIN: CPT | Performed by: PHYSICIAN ASSISTANT

## 2019-04-19 RX ORDER — CEFDINIR 300 MG/1
300 CAPSULE ORAL 2 TIMES DAILY
Qty: 20 CAPSULE | Refills: 0 | Status: SHIPPED | OUTPATIENT
Start: 2019-04-19 | End: 2019-04-30

## 2019-04-19 RX ORDER — MOMETASONE FUROATE MONOHYDRATE 50 UG/1
2 SPRAY, METERED NASAL DAILY
COMMUNITY
End: 2019-08-07

## 2019-04-19 ASSESSMENT — ANXIETY QUESTIONNAIRES
5. BEING SO RESTLESS THAT IT IS HARD TO SIT STILL: NOT AT ALL
7. FEELING AFRAID AS IF SOMETHING AWFUL MIGHT HAPPEN: NOT AT ALL
GAD7 TOTAL SCORE: 0
3. WORRYING TOO MUCH ABOUT DIFFERENT THINGS: NOT AT ALL
1. FEELING NERVOUS, ANXIOUS, OR ON EDGE: NOT AT ALL
2. NOT BEING ABLE TO STOP OR CONTROL WORRYING: NOT AT ALL
GAD7 TOTAL SCORE: 0
GAD7 TOTAL SCORE: 0
6. BECOMING EASILY ANNOYED OR IRRITABLE: NOT AT ALL
4. TROUBLE RELAXING: NOT AT ALL
7. FEELING AFRAID AS IF SOMETHING AWFUL MIGHT HAPPEN: NOT AT ALL

## 2019-04-19 ASSESSMENT — ASTHMA QUESTIONNAIRES
ACUTE_EXACERBATION_TODAY: NO
QUESTION_1 LAST FOUR WEEKS HOW MUCH OF THE TIME DID YOUR ASTHMA KEEP YOU FROM GETTING AS MUCH DONE AT WORK, SCHOOL OR AT HOME: NONE OF THE TIME
QUESTION_2 LAST FOUR WEEKS HOW OFTEN HAVE YOU HAD SHORTNESS OF BREATH: ONCE OR TWICE A WEEK
QUESTION_4 LAST FOUR WEEKS HOW OFTEN HAVE YOU USED YOUR RESCUE INHALER OR NEBULIZER MEDICATION (SUCH AS ALBUTEROL): ONCE A WEEK OR LESS
ACT_TOTALSCORE: 22
QUESTION_5 LAST FOUR WEEKS HOW WOULD YOU RATE YOUR ASTHMA CONTROL: COMPLETELY CONTROLLED
QUESTION_3 LAST FOUR WEEKS HOW OFTEN DID YOUR ASTHMA SYMPTOMS (WHEEZING, COUGHING, SHORTNESS OF BREATH, CHEST TIGHTNESS OR PAIN) WAKE YOU UP AT NIGHT OR EARLIER THAN USUAL IN THE MORNING: ONCE OR TWICE

## 2019-04-19 ASSESSMENT — MIFFLIN-ST. JEOR: SCORE: 1131.69

## 2019-04-19 ASSESSMENT — PATIENT HEALTH QUESTIONNAIRE - PHQ9
SUM OF ALL RESPONSES TO PHQ QUESTIONS 1-9: 1
SUM OF ALL RESPONSES TO PHQ QUESTIONS 1-9: 1
10. IF YOU CHECKED OFF ANY PROBLEMS, HOW DIFFICULT HAVE THESE PROBLEMS MADE IT FOR YOU TO DO YOUR WORK, TAKE CARE OF THINGS AT HOME, OR GET ALONG WITH OTHER PEOPLE: NOT DIFFICULT AT ALL

## 2019-04-19 ASSESSMENT — PAIN SCALES - GENERAL: PAINLEVEL: MILD PAIN (2)

## 2019-04-19 NOTE — LETTER
My Depression Action Plan  Name: Amee Crews   Date of Birth 1950  Date: 4/18/2019    My doctor: Patrica Kelly   My clinic: [facility]  [id number] [place] [id number] [phone]          GREEN    ZONE   Good Control    What it looks like:     Things are going generally well. You have normal up s and down s. You may even feel depressed from time to time, but bad moods usually last less than a day.   What you need to do:  1. Continue to care for yourself (see self care plan)  2. Check your depression survival kit and update it as needed  3. Follow your physician s recommendations including any medication.  4. Do not stop taking medication unless you consult with your physician first.           YELLOW         ZONE Getting Worse    What it looks like:     Depression is starting to interfere with your life.     It may be hard to get out of bed; you may be starting to isolate yourself from others.    Symptoms of depression are starting to last most all day and this has happened for several days.     You may have suicidal thoughts but they are not constant.   What you need to do:     1. Call your care team, your response to treatment will improve if you keep your care team informed of your progress. Yellow periods are signs an adjustment may need to be made.     2. Continue your self-care, even if you have to fake it!    3. Talk to someone in your support network    4. Open up your depression survival kit           RED    ZONE Medical Alert - Get Help    What it looks like:     Depression is seriously interfering with your life.     You may experience these or other symptoms: You can t get out of bed most days, can t work or engage in other necessary activities, you have trouble taking care of basic hygiene, or basic responsibilities, thoughts of suicide or death that will not go away, self-injurious behavior.     What you need to do:  1. Call your care team and  request a same-day appointment. If they are not available (weekends or after hours) call your local crisis line, emergency room or 911.            Depression Self Care Plan / Survival Kit    Self-Care for Depression  Here s the deal. Your body and mind are really not as separate as most people think.  What you do and think affects how you feel and how you feel influences what you do and think. This means if you do things that people who feel good do, it will help you feel better.  Sometimes this is all it takes.  There is also a place for medication and therapy depending on how severe your depression is, so be sure to consult with your medical provider and/ or Behavioral Health Consultant if your symptoms are worsening or not improving.     In order to better manage my stress, I will:    Exercise  Get some form of exercise, every day. This will help reduce pain and release endorphins, the  feel good  chemicals in your brain. This is almost as good as taking antidepressants!  This is not the same as joining a gym and then never going! (they count on that by the way ) It can be as simple as just going for a walk or doing some gardening, anything that will get you moving.      Hygiene   Maintain good hygiene (Get out of bed in the morning, Make your bed, Brush your teeth, Take a shower, and Get dressed like you were going to work, even if you are unemployed).  If your clothes don't fit try to get ones that do.    Diet  I will strive to eat foods that are good for me, drink plenty of water, and avoid excessive sugar, caffeine, alcohol, and other mood-altering substances.  Some foods that are helpful in depression are: complex carbohydrates, B vitamins, flaxseed, fish or fish oil, fresh fruits and vegetables.    Psychotherapy  I agree to participate in Individual Therapy (if recommended).    Medication  If prescribed medications, I agree to take them.  Missing doses can result in serious side effects.  I understand that  drinking alcohol, or other illicit drug use, may cause potential side effects.  I will not stop my medication abruptly without first discussing it with my provider.    Staying Connected With Others  I will stay in touch with my friends, family members, and my primary care provider/team.    Use your imagination  Be creative.  We all have a creative side; it doesn t matter if it s oil painting, sand castles, or mud pies! This will also kick up the endorphins.    Witness Beauty  (AKA stop and smell the roses) Take a look outside, even in mid-winter. Notice colors, textures. Watch the squirrels and birds.     Service to others  Be of service to others.  There is always someone else in need.  By helping others we can  get out of ourselves  and remember the really important things.  This also provides opportunities for practicing all the other parts of the program.    Humor  Laugh and be silly!  Adjust your TV habits for less news and crime-drama and more comedy.    Control your stress  Try breathing deep, massage therapy, biofeedback, and meditation. Find time to relax each day.     My support system    Clinic Contact:  Phone number:    Contact 1:  Phone number:    Contact 2:  Phone number:    [facility]/:  Phone number:    Therapist:  Phone number:    Local crisis center:    Phone number:    Other community support:  Phone number:

## 2019-04-20 ASSESSMENT — ANXIETY QUESTIONNAIRES: GAD7 TOTAL SCORE: 0

## 2019-04-20 ASSESSMENT — PATIENT HEALTH QUESTIONNAIRE - PHQ9: SUM OF ALL RESPONSES TO PHQ QUESTIONS 1-9: 1

## 2019-04-29 NOTE — PROGRESS NOTES
SUBJECTIVE:   Amee Crews is a 68 year old female who presents to clinic today for the following health issues:      HPI  Acute Illness   Acute illness concerns: fatigue    Onset: 6 weeks    Fever: no     Chills/Sweats: yes     Headache (location?): no     Sinus Pressure:no    Conjunctivitis:  no    Ear Pain: YES- right     Rhinorrhea: YES    Congestion: YES    Sore Throat: no      Cough: no    Wheeze: no     Decreased Appetite: no     Nausea: no     Vomiting: no     Diarrhea:  no     Dysuria/Freq.: no     Fatigue/Achiness: YES    Sick/Strep Exposure: no      Therapies Tried and outcome: nasal spray, claritin    Patient presents today for recheck of sinus symptoms. Patient was initially seen on 4/19/2019. She was treated with Omnicef. She reports symptoms have been improving but still has a lot of pressure in her ears. Right ear worse than left. She also reports nasal congestion and drainage persisting but it is now clear. She reports some throat irritation due to the drainage. No fevers. Still a bit fatigued. She is still using her nasal spray and Claritin. Has recurrent sinus issues.     Patient is eligible for use of low-dose aspirin for primary prevention of heart attack and stroke.  Provider has discussed aspirin with patient and our decision was:     Intentionally Not Prescribe:  Daily low-dose aspirin recommended for primary prevention, patient declines plan.    The 10-year ASCVD risk score (Boise DC Jr., et al., 2013) is: 5.8%    Values used to calculate the score:      Age: 68 years      Sex: Female      Is Non- : No      Diabetic: No      Tobacco smoker: No      Systolic Blood Pressure: 112 mmHg      Is BP treated: No      HDL Cholesterol: 57 mg/dL      Total Cholesterol: 304 mg/dL    Additional history: as documented    Reviewed and updated as needed this visit by clinical staff       Reviewed and updated as needed this visit by Provider       Patient Active Problem List    Diagnosis     Anxiety     Migraine headache     Hot flashes     Hyperlipidemia LDL goal <130     Chondromalacia patellae     Insomnia     Allergic state     Chronic back pain     Mild persistent asthma without complication     Restless leg     Vaginal atrophy     Nasal polyposis     Fatty liver     Chronic sinusitis, unspecified location     Chronic rhinitis     Major depression in complete remission (H)     Dense breast tissue     Osteopenia of multiple sites     Atrophic vaginitis     Primary osteoarthritis of both hands     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, moderatly severe,slowly worsening     Pinguecula of both eyes     Nuclear cataract of both eyes     Myopia, bilateral     Macular cyst, hole, or pseudohole, bilateral, moderatly severe     Adult vitelliform macular degeneration     Gastroesophageal reflux disease without esophagitis     Best vitelliform macular dystrophy     DDD (degenerative disc disease), lumbar     Chronic pain of left knee     Vitamin B12 deficiency     Osteopenia, unspecified location     Past Surgical History:   Procedure Laterality Date     APPENDECTOMY      age 9      BIOPSY      took tissue from my uterus at least 15 years ago     COLONOSCOPY  14 years     COLONOSCOPY N/A 4/30/2015    Procedure: COMBINED COLONOSCOPY, SINGLE OR MULTIPLE BIOPSY/POLYPECTOMY BY BIOPSY;  Surgeon: Doni Carey MD;  Location: [facility] OR     COLONOSCOPY WITH CO2 INSUFFLATION N/A 4/30/2015    Procedure: COLONOSCOPY WITH CO2 INSUFFLATION;  Surgeon: Doni Carey MD;  Location: [facility] OR     COMBINED ESOPHAGOSCOPY, GASTROSCOPY, DUODENOSCOPY (EGD) WITH CO2 INSUFFLATION N/A 8/24/2017    Procedure: COMBINED ESOPHAGOSCOPY, GASTROSCOPY, DUODENOSCOPY (EGD) WITH CO2 INSUFFLATION;  EGD, Gastroesophageal reflux disease, esophagitis presence not specified Abdominal pain, generalized, Ref Damark-Schroeder, 24.78 BMI;  Surgeon: Duane, William Charles, MD;  Location: [facility] OR      ENT SURGERY      Tonsillectomy     ESOPHAGOSCOPY, GASTROSCOPY, DUODENOSCOPY (EGD), COMBINED N/A 2017    Procedure: COMBINED ESOPHAGOSCOPY, GASTROSCOPY, DUODENOSCOPY (EGD), BIOPSY SINGLE OR MULTIPLE;;  Surgeon: Duane, William Charles, MD;  Location: [facility] OR     LAPAROSCOPY PROCEDURE UNLISTED      polyps found     MOUTH SURGERY  2016     SINUS SURGERY      x2       Social History     Tobacco Use     Smoking status: Former Smoker     Packs/day: 0.00     Types: Cigarettes     Last attempt to quit: 1973     Years since quittin.3     Smokeless tobacco: Never Used   Substance Use Topics     Alcohol use: Yes     Alcohol/week: 0.0 oz     Comment: ocass     Family History   Problem Relation Age of Onset     Diabetes Mother      Hypertension Mother      Cancer Father         stomach      Cancer Maternal Grandfather         lung      Heart Disease Other          Current Outpatient Medications   Medication Sig Dispense Refill     albuterol (PROAIR HFA/PROVENTIL HFA/VENTOLIN HFA) 108 (90 Base) MCG/ACT inhaler Inhale 2 puffs into the lungs every 4 hours as needed for shortness of breath / dyspnea 1 Inhaler 2     azelastine (ASTELIN) 0.1 % nasal spray Spray 1 spray into both nostrils 2 times daily 1 Bottle 2     CALCIUM PO        cyanocobalamin (VITAMIN  B-12) 1000 MCG tablet Take 1 tablet (1,000 mcg) by mouth daily 90 tablet 3     fluticasone (FLONASE) 50 MCG/ACT spray Spray 1-2 sprays into both nostrils daily 1 Bottle 3     loratadine (CLARITIN) 10 MG tablet Take 10 mg by mouth daily       magnesium 200 MG TABS Take 200 mg by mouth daily 1 tablet 0     mometasone (NASONEX) 50 MCG/ACT nasal spray Spray 2 sprays into both nostrils daily       omega-3 acid ethyl esters (LOVAZA) 1 G capsule Take 2 capsules (2 g) by mouth daily 120 capsule      omeprazole (PRILOSEC) 40 MG capsule Take 1 capsule (40 mg) by mouth daily as needed Take 30-60 minutes before a meal.       predniSONE (DELTASONE) 20 MG tablet Take 40 mg by mouth  "daily for 5 days. 10 tablet 0     rOPINIRole (REQUIP) 0.25 MG tablet Take 1 tablet (0.25 mg) by mouth At Bedtime 90 tablet 3     SUMAtriptan (IMITREX) 50 MG tablet TAKE ONE TABLET AT ONSET OF HEADACHE. MAY REPEAT AFTER 2 HOURS. DO NOT EXCEED 200 MG IN 24 HOURS 18 tablet 4     traZODone (DESYREL) 100 MG tablet TAKE ONE TABLET BY MOUTH AT BEDTIME 90 tablet 2     Triprolidine-Pseudoephedrine (APRODINE PO) Take by mouth as needed       Allergies   Allergen Reactions     Atorvastatin Cramps     Muscle pain     Morphine Sulfate Itching     BP Readings from Last 3 Encounters:   04/30/19 112/68   04/19/19 104/62   04/04/19 114/72    Wt Readings from Last 3 Encounters:   04/30/19 64.4 kg (142 lb)   04/19/19 63 kg (139 lb)   04/04/19 64.9 kg (143 lb 1.6 oz)        ROS:  Constitutional, HEENT, cardiovascular, pulmonary, gi and gu systems are negative, except as otherwise noted.    OBJECTIVE:     /68   Pulse 80   Temp 98.2  F (36.8  C) (Temporal)   Resp 16   Ht 1.588 m (5' 2.5\")   Wt 64.4 kg (142 lb)   SpO2 99%   BMI 25.56 kg/m    Body mass index is 25.56 kg/m .  GENERAL: healthy, alert and no distress  EYES: Eyes grossly normal to inspection, PERRL and conjunctivae and sclerae normal  HENT: normal cephalic/atraumatic, both ears: clear effusion, nose and mouth without ulcers or lesions, oropharynx clear and oral mucous membranes moist  NECK: no adenopathy, no asymmetry, masses, or scars and thyroid normal to palpation  RESP: lungs clear to auscultation - no rales, rhonchi or wheezes  CV: regular rate and rhythm, normal S1 S2, no S3 or S4, no murmur, click or rub, no peripheral edema and peripheral pulses strong  SKIN: no suspicious lesions or rashes  PSYCH: mentation appears normal, affect normal/bright    Diagnostic Test Results:  none     ASSESSMENT/PLAN:     1. Dysfunction of both eustachian tubes  Patient will start a short course of Prednisone. Encouraged she continue daily use of nasal spray and allergy " medication. Patient will call if symptoms fail to improve over the next week or so. She will also consider seeing ENT again if symptoms persisting longer.   - predniSONE (DELTASONE) 20 MG tablet; Take 40 mg by mouth daily for 5 days.  Dispense: 10 tablet; Refill: 0    2. Major depression in complete remission (H)  Stable - no medication needed.     3. Encounter for screening mammogram for breast cancer  - *MA Screening Digital Bilateral; Future    The patient indicates understanding of these issues and agrees with the plan.    Berenice Garcia PA-C  [facility]

## 2019-04-30 ENCOUNTER — OFFICE VISIT (OUTPATIENT)
Dept: FAMILY MEDICINE | Facility: OTHER | Age: 69
End: 2019-04-30
Payer: COMMERCIAL

## 2019-04-30 VITALS
OXYGEN SATURATION: 99 % | SYSTOLIC BLOOD PRESSURE: 112 MMHG | RESPIRATION RATE: 16 BRPM | TEMPERATURE: 98.2 F | WEIGHT: 142 LBS | DIASTOLIC BLOOD PRESSURE: 68 MMHG | HEART RATE: 80 BPM | HEIGHT: 63 IN | BODY MASS INDEX: 25.16 KG/M2

## 2019-04-30 DIAGNOSIS — F32.5 MAJOR DEPRESSION IN COMPLETE REMISSION (H): ICD-10-CM

## 2019-04-30 DIAGNOSIS — H69.93 DYSFUNCTION OF BOTH EUSTACHIAN TUBES: Primary | ICD-10-CM

## 2019-04-30 DIAGNOSIS — Z12.31 ENCOUNTER FOR SCREENING MAMMOGRAM FOR BREAST CANCER: ICD-10-CM

## 2019-04-30 PROCEDURE — 99213 OFFICE O/P EST LOW 20 MIN: CPT | Performed by: PHYSICIAN ASSISTANT

## 2019-04-30 RX ORDER — LORATADINE 10 MG/1
10 TABLET ORAL DAILY
COMMUNITY
End: 2020-10-22

## 2019-04-30 RX ORDER — PREDNISONE 20 MG/1
40 TABLET ORAL DAILY
Qty: 10 TABLET | Refills: 0 | Status: SHIPPED | OUTPATIENT
Start: 2019-04-30 | End: 2019-05-05

## 2019-04-30 ASSESSMENT — ASTHMA QUESTIONNAIRES
QUESTION_1 LAST FOUR WEEKS HOW MUCH OF THE TIME DID YOUR ASTHMA KEEP YOU FROM GETTING AS MUCH DONE AT WORK, SCHOOL OR AT HOME: NONE OF THE TIME
QUESTION_3 LAST FOUR WEEKS HOW OFTEN DID YOUR ASTHMA SYMPTOMS (WHEEZING, COUGHING, SHORTNESS OF BREATH, CHEST TIGHTNESS OR PAIN) WAKE YOU UP AT NIGHT OR EARLIER THAN USUAL IN THE MORNING: NOT AT ALL
ACT_TOTALSCORE: 23
QUESTION_4 LAST FOUR WEEKS HOW OFTEN HAVE YOU USED YOUR RESCUE INHALER OR NEBULIZER MEDICATION (SUCH AS ALBUTEROL): ONCE A WEEK OR LESS
QUESTION_5 LAST FOUR WEEKS HOW WOULD YOU RATE YOUR ASTHMA CONTROL: COMPLETELY CONTROLLED
QUESTION_2 LAST FOUR WEEKS HOW OFTEN HAVE YOU HAD SHORTNESS OF BREATH: ONCE OR TWICE A WEEK

## 2019-04-30 ASSESSMENT — MIFFLIN-ST. JEOR: SCORE: 1145.3

## 2019-04-30 ASSESSMENT — PAIN SCALES - GENERAL: PAINLEVEL: NO PAIN (0)

## 2019-05-01 ASSESSMENT — ASTHMA QUESTIONNAIRES: ACT_TOTALSCORE: 23

## 2019-05-13 ENCOUNTER — MYC MEDICAL ADVICE (OUTPATIENT)
Dept: OTOLARYNGOLOGY | Facility: OTHER | Age: 69
End: 2019-05-13

## 2019-05-16 ENCOUNTER — OFFICE VISIT (OUTPATIENT)
Dept: AUDIOLOGY | Facility: CLINIC | Age: 69
End: 2019-05-16
Payer: COMMERCIAL

## 2019-05-16 ENCOUNTER — OFFICE VISIT (OUTPATIENT)
Dept: OTOLARYNGOLOGY | Facility: CLINIC | Age: 69
End: 2019-05-16
Payer: COMMERCIAL

## 2019-05-16 VITALS — WEIGHT: 142 LBS | BODY MASS INDEX: 25.16 KG/M2 | HEIGHT: 63 IN

## 2019-05-16 DIAGNOSIS — H90.3 SENSORINEURAL HEARING LOSS, BILATERAL: Primary | ICD-10-CM

## 2019-05-16 DIAGNOSIS — H93.13 TINNITUS, BILATERAL: Primary | ICD-10-CM

## 2019-05-16 DIAGNOSIS — H93.13 TINNITUS, BILATERAL: ICD-10-CM

## 2019-05-16 PROCEDURE — 92557 COMPREHENSIVE HEARING TEST: CPT | Performed by: AUDIOLOGIST

## 2019-05-16 PROCEDURE — 99213 OFFICE O/P EST LOW 20 MIN: CPT | Performed by: OTOLARYNGOLOGY

## 2019-05-16 PROCEDURE — 92550 TYMPANOMETRY & REFLEX THRESH: CPT | Performed by: AUDIOLOGIST

## 2019-05-16 PROCEDURE — 99207 ZZC NO CHARGE LOS: CPT | Performed by: AUDIOLOGIST

## 2019-05-16 ASSESSMENT — MIFFLIN-ST. JEOR: SCORE: 1145.3

## 2019-05-16 NOTE — PATIENT INSTRUCTIONS
Scheduling Information  To schedule your CT/MRI scan, please contact [facility] at [phone] OR [facility] at [phone]    To schedule your Surgery, please contact our Specialty Schedulers at [phone]    ** If a CT scan or biopsy were ordered/done, Dr. Goodwin will need to see you back in clinic to go over your biopsy results or CT/MRI scan. He will go over the images from your scan with you and discuss treatment based on your results.     ENT Clinic Locations Clinic Hours Telephone Number     [facility]  [address]  [place] [address]   E/O Thursday:      7:30am -- 4:00pm   To schedule/reschedule an appointment with   Dr. Goodwin,   please contact our   Specialty Scheduling Department at:     [phone]       [facility]  [address] [place] [address]     Monday:              12:00pm -- 4:00pm    Tuesday:               8:30am -- 4:30pm    Wednesday:        12:00pm -- 4:00pm    E/O Thursday:        8:00am - 4:30pm           Urgent Care Locations Clinic Hours Telephone Numbers     [facility]  [address]  [place] [address]     Monday-Friday:     11:00am - 9:00pm    Saturday-Sunday:  9:00am - 5:00pm   [phone]     [facility]  [address] [place] [address]     Monday-Friday:      5:00pm - 9:00pm     Saturday-Sunday:  9:00am - 5:00pm   [phone]

## 2019-05-16 NOTE — LETTER
5/16/2019         RE: Amee Crews  [address] [facility] [address]        Dear Colleague,    Thank you for referring your patient, Amee Crews, to the [facility]. Please see a copy of my visit note below.        History of Present Illness - Amee Crews is a 68 year old female who presents with bilateral nonpulsatile buzzing in both ears that began with a URI about 3 weeks ago. She denies ear pain. She denies associated hearing changes. She had no vertigo. She feels she has recovered from the URI at this point. The buzzing is worse in quiet environments.    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, moderatly severe,slowly worsening     Pinguecula of both eyes     Nuclear cataract of both eyes     Myopia, bilateral     Macular cyst, hole, or pseudohole, bilateral, moderatly severe     Adult vitelliform macular degeneration     Gastroesophageal reflux disease without esophagitis     Best vitelliform macular dystrophy     DDD (degenerative disc disease), lumbar     Chronic pain of left knee     Vitamin B12 deficiency     Osteopenia, unspecified location       Current Medications -   Current Outpatient Medications:      albuterol (PROAIR HFA/PROVENTIL HFA/VENTOLIN HFA) 108 (90 Base) MCG/ACT inhaler, Inhale 2 puffs into the lungs every 4 hours as needed for shortness of breath / dyspnea,  Disp: 1 Inhaler, Rfl: 2     azelastine (ASTELIN) 0.1 % nasal spray, Spray 1 spray into both nostrils 2 times daily, Disp: 1 Bottle, Rfl: 2     CALCIUM PO, , Disp: , Rfl:      cyanocobalamin (VITAMIN  B-12) 1000 MCG tablet, Take 1 tablet (1,000 mcg) by mouth daily, Disp: 90 tablet, Rfl: 3     fluticasone (FLONASE) 50 MCG/ACT spray, Spray 1-2 sprays into both nostrils daily, Disp: 1 Bottle, Rfl: 3     loratadine (CLARITIN) 10 MG tablet, Take 10 mg by mouth daily, Disp: , Rfl:      magnesium 200 MG TABS, Take 200 mg by mouth daily, Disp: 1 tablet, Rfl: 0     mometasone (NASONEX) 50 MCG/ACT nasal spray, Spray 2 sprays into both nostrils daily, Disp: , Rfl:      omega-3 acid ethyl esters (LOVAZA) 1 G capsule, Take 2 capsules (2 g) by mouth daily, Disp: 120 capsule, Rfl:      omeprazole (PRILOSEC) 40 MG capsule, Take 1 capsule (40 mg) by mouth daily as needed Take 30-60 minutes before a meal., Disp: , Rfl:      rOPINIRole (REQUIP) 0.25 MG tablet, Take 1 tablet (0.25 mg) by mouth At Bedtime, Disp: 90 tablet, Rfl: 3     SUMAtriptan (IMITREX) 50 MG tablet, TAKE ONE TABLET AT ONSET OF HEADACHE. MAY REPEAT AFTER 2 HOURS. DO NOT EXCEED 200 MG IN 24 HOURS, Disp: 18 tablet, Rfl: 4     traZODone (DESYREL) 100 MG tablet, TAKE ONE TABLET BY MOUTH AT BEDTIME, Disp: 90 tablet, Rfl: 2     Triprolidine-Pseudoephedrine (APRODINE PO), Take by mouth as needed, Disp: , Rfl:     Allergies -   Allergies   Allergen Reactions     Atorvastatin Cramps     Muscle pain     Morphine Sulfate Itching       Social History -   Social History     Socioeconomic History     Marital status:      Spouse name: None     Number of children: 2     Years of education: None     Highest education level: None   Occupational History     Employer: SELF   Social Needs     Financial resource strain: None     Food insecurity:     Worry: None     Inability: None     Transportation needs:     Medical: None     Non-medical: None   Tobacco Use     Smoking status:  "Former Smoker     Packs/day: 0.00     Types: Cigarettes     Last attempt to quit: 1973     Years since quittin.3     Smokeless tobacco: Never Used   Substance and Sexual Activity     Alcohol use: Yes     Alcohol/week: 0.0 oz     Comment: ocass     Drug use: No     Sexual activity: Yes     Partners: Male   Lifestyle     Physical activity:     Days per week: None     Minutes per session: None     Stress: None   Relationships     Social connections:     Talks on phone: None     Gets together: None     Attends Mandaeism service: None     Active member of club or organization: None     Attends meetings of clubs or organizations: None     Relationship status: None     Intimate partner violence:     Fear of current or ex partner: None     Emotionally abused: None     Physically abused: None     Forced sexual activity: None   Other Topics Concern     Parent/sibling w/ CABG, MI or angioplasty before 65F 55M? No   Social History Narrative     None       Family History -   Family History   Problem Relation Age of Onset     Diabetes Mother      Hypertension Mother      Cancer Father         stomach      Cancer Maternal Grandfather         lung      Heart Disease Other        Review of Systems - As per HPI and PMHx, otherwise 7 system review of the head and neck negative. 10+ system review negative.    Physical Exam  Ht 1.588 m (5' 2.5\")   Wt 64.4 kg (142 lb)   BMI 25.56 kg/m     General - The patient is well nourished and well developed, and appears to have good nutritional status.  Alert and oriented to person and place, answers questions and cooperates with examination appropriately.   Head and Face - Normocephalic and atraumatic, with no gross asymmetry noted of the contour of the facial features.  The facial nerve is intact, with strong symmetric movements.  Voice and Breathing - The patient was breathing comfortably without the use of accessory muscles. There was no wheezing, stridor, or stertor.  The patients " voice was clear and strong, and had appropriate pitch and quality.  Ears - Bilateral pinna and EACs with normal appearing overlying skin. Tympanic membrane intact with good mobility on pneumatic otoscopy bilaterally. Bony landmarks of the ossicular chain are normal. The tympanic membranes are normal in appearance. No retraction, perforation, or masses.  No fluid or purulence was seen in the external canal or the middle ear.   Eyes - Extraocular movements intact.  Sclera were not icteric or injected, conjunctiva were pink and moist.  Mouth - Examination of the oral cavity showed pink, healthy oral mucosa. No lesions or ulcerations noted.  The tongue was mobile and midline, and the dentition were in good condition.    Throat - The walls of the oropharynx were smooth, pink, moist, symmetric, and had no lesions or ulcerations.  The tonsillar pillars and soft palate were symmetric.  The uvula was midline on elevation.  Neck - Normal midline excursion of the laryngotracheal complex during swallowing.  Full range of motion on passive movement.  Palpation of the occipital, submental, submandibular, internal jugular chain, and supraclavicular nodes did not demonstrate any abnormal lymph nodes or masses.  The carotid pulse was palpable bilaterally.  Palpation of the thyroid was soft and smooth, with no nodules or goiter appreciated.  The trachea was mobile and midline.  Nose - External contour is symmetric, no gross deflection or scars.  Nasal mucosa is pink and moist with no abnormal mucus.  The septum was midline and non-obstructive, turbinates of normal size and position.  No polyps, masses, or purulence noted on examination.    Audiogram 05/16/19 demonstrates mild high frequency SNHL, symmetric. Type A tympanograms.        Assessment - Amee Crews is a 68 year old female with benign tinnitus that came on in the setting of likely Eustachian tube dysfunction due to URI. No evidence of asymmetric hearing loss. We spent the  remainder of today's visit discussing the current leading theory on tinnitus, in that it originates from the central nervous system itself, similar to Phantom Limb Syndrome.  Also discussed were steps that can be taken to mask the noise, such as a low volume de-tuned radio, a fan in the background, and hearing aids.  Correlation with stress, anxiety, depression, and high blood pressure were also discussed.  The patient was also cautioned on the numerous expensive non-pharmaceutical options that are advertised, and have no proven benefit.    The patient will follow up as necessary for worsening symptoms, changes in symptoms, or signs of infection.  I have also recommended yearly audiograms, and consideration of a hearing aid evaluation.         Dr. Lisbet Goodwin MD  Otolaryngology  [facility]        Again, thank you for allowing me to participate in the care of your patient.        Sincerely,        Lisbet Goodwin MD

## 2019-05-16 NOTE — PROGRESS NOTES
History of Present Illness - Amee Crews is a 68 year old female who presents with bilateral nonpulsatile buzzing in both ears that began with a URI about 3 weeks ago. She denies ear pain. She denies associated hearing changes. She had no vertigo. She feels she has recovered from the URI at this point. The buzzing is worse in quiet environments.    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, moderatly severe,slowly worsening     Pinguecula of both eyes     Nuclear cataract of both eyes     Myopia, bilateral     Macular cyst, hole, or pseudohole, bilateral, moderatly severe     Adult vitelliform macular degeneration     Gastroesophageal reflux disease without esophagitis     Best vitelliform macular dystrophy     DDD (degenerative disc disease), lumbar     Chronic pain of left knee     Vitamin B12 deficiency     Osteopenia, unspecified location       Current Medications -   Current Outpatient Medications:      albuterol (PROAIR HFA/PROVENTIL HFA/VENTOLIN HFA) 108 (90 Base) MCG/ACT inhaler, Inhale 2 puffs into the lungs every 4 hours as needed for shortness of breath / dyspnea, Disp: 1 Inhaler, Rfl: 2     azelastine (ASTELIN) 0.1 % nasal spray, Spray 1 spray into both nostrils 2 times daily, Disp: 1 Bottle, Rfl: 2     CALCIUM PO, , Disp: , Rfl:      cyanocobalamin (VITAMIN  B-12) 1000 MCG tablet, Take 1 tablet  (1,000 mcg) by mouth daily, Disp: 90 tablet, Rfl: 3     fluticasone (FLONASE) 50 MCG/ACT spray, Spray 1-2 sprays into both nostrils daily, Disp: 1 Bottle, Rfl: 3     loratadine (CLARITIN) 10 MG tablet, Take 10 mg by mouth daily, Disp: , Rfl:      magnesium 200 MG TABS, Take 200 mg by mouth daily, Disp: 1 tablet, Rfl: 0     mometasone (NASONEX) 50 MCG/ACT nasal spray, Spray 2 sprays into both nostrils daily, Disp: , Rfl:      omega-3 acid ethyl esters (LOVAZA) 1 G capsule, Take 2 capsules (2 g) by mouth daily, Disp: 120 capsule, Rfl:      omeprazole (PRILOSEC) 40 MG capsule, Take 1 capsule (40 mg) by mouth daily as needed Take 30-60 minutes before a meal., Disp: , Rfl:      rOPINIRole (REQUIP) 0.25 MG tablet, Take 1 tablet (0.25 mg) by mouth At Bedtime, Disp: 90 tablet, Rfl: 3     SUMAtriptan (IMITREX) 50 MG tablet, TAKE ONE TABLET AT ONSET OF HEADACHE. MAY REPEAT AFTER 2 HOURS. DO NOT EXCEED 200 MG IN 24 HOURS, Disp: 18 tablet, Rfl: 4     traZODone (DESYREL) 100 MG tablet, TAKE ONE TABLET BY MOUTH AT BEDTIME, Disp: 90 tablet, Rfl: 2     Triprolidine-Pseudoephedrine (APRODINE PO), Take by mouth as needed, Disp: , Rfl:     Allergies -   Allergies   Allergen Reactions     Atorvastatin Cramps     Muscle pain     Morphine Sulfate Itching       Social History -   Social History     Socioeconomic History     Marital status:      Spouse name: None     Number of children: 2     Years of education: None     Highest education level: None   Occupational History     Employer: SELF   Social Needs     Financial resource strain: None     Food insecurity:     Worry: None     Inability: None     Transportation needs:     Medical: None     Non-medical: None   Tobacco Use     Smoking status: Former Smoker     Packs/day: 0.00     Types: Cigarettes     Last attempt to quit: 1973     Years since quittin.3     Smokeless tobacco: Never Used   Substance and Sexual Activity     Alcohol use: Yes     Alcohol/week: 0.0 oz      "Comment: ocass     Drug use: No     Sexual activity: Yes     Partners: Male   Lifestyle     Physical activity:     Days per week: None     Minutes per session: None     Stress: None   Relationships     Social connections:     Talks on phone: None     Gets together: None     Attends Sabianist service: None     Active member of club or organization: None     Attends meetings of clubs or organizations: None     Relationship status: None     Intimate partner violence:     Fear of current or ex partner: None     Emotionally abused: None     Physically abused: None     Forced sexual activity: None   Other Topics Concern     Parent/sibling w/ CABG, MI or angioplasty before 65F 55M? No   Social History Narrative     None       Family History -   Family History   Problem Relation Age of Onset     Diabetes Mother      Hypertension Mother      Cancer Father         stomach      Cancer Maternal Grandfather         lung      Heart Disease Other        Review of Systems - As per HPI and PMHx, otherwise 7 system review of the head and neck negative. 10+ system review negative.    Physical Exam  Ht 1.588 m (5' 2.5\")   Wt 64.4 kg (142 lb)   BMI 25.56 kg/m    General - The patient is well nourished and well developed, and appears to have good nutritional status.  Alert and oriented to person and place, answers questions and cooperates with examination appropriately.   Head and Face - Normocephalic and atraumatic, with no gross asymmetry noted of the contour of the facial features.  The facial nerve is intact, with strong symmetric movements.  Voice and Breathing - The patient was breathing comfortably without the use of accessory muscles. There was no wheezing, stridor, or stertor.  The patients voice was clear and strong, and had appropriate pitch and quality.  Ears - Bilateral pinna and EACs with normal appearing overlying skin. Tympanic membrane intact with good mobility on pneumatic otoscopy bilaterally. Bony landmarks of the " ossicular chain are normal. The tympanic membranes are normal in appearance. No retraction, perforation, or masses.  No fluid or purulence was seen in the external canal or the middle ear.   Eyes - Extraocular movements intact.  Sclera were not icteric or injected, conjunctiva were pink and moist.  Mouth - Examination of the oral cavity showed pink, healthy oral mucosa. No lesions or ulcerations noted.  The tongue was mobile and midline, and the dentition were in good condition.    Throat - The walls of the oropharynx were smooth, pink, moist, symmetric, and had no lesions or ulcerations.  The tonsillar pillars and soft palate were symmetric.  The uvula was midline on elevation.  Neck - Normal midline excursion of the laryngotracheal complex during swallowing.  Full range of motion on passive movement.  Palpation of the occipital, submental, submandibular, internal jugular chain, and supraclavicular nodes did not demonstrate any abnormal lymph nodes or masses.  The carotid pulse was palpable bilaterally.  Palpation of the thyroid was soft and smooth, with no nodules or goiter appreciated.  The trachea was mobile and midline.  Nose - External contour is symmetric, no gross deflection or scars.  Nasal mucosa is pink and moist with no abnormal mucus.  The septum was midline and non-obstructive, turbinates of normal size and position.  No polyps, masses, or purulence noted on examination.    Audiogram 05/16/19 demonstrates mild high frequency SNHL, symmetric. Type A tympanograms.        Assessment - Amee Crews is a 68 year old female with benign tinnitus that came on in the setting of likely Eustachian tube dysfunction due to URI. No evidence of asymmetric hearing loss. We spent the remainder of today's visit discussing the current leading theory on tinnitus, in that it originates from the central nervous system itself, similar to Phantom Limb Syndrome.  Also discussed were steps that can be taken to mask the noise,  such as a low volume de-tuned radio, a fan in the background, and hearing aids.  Correlation with stress, anxiety, depression, and high blood pressure were also discussed.  The patient was also cautioned on the numerous expensive non-pharmaceutical options that are advertised, and have no proven benefit.    The patient will follow up as necessary for worsening symptoms, changes in symptoms, or signs of infection.  I have also recommended yearly audiograms, and consideration of a hearing aid evaluation.         Dr. Lisbet Goodwin MD  Otolaryngology  [facility]

## 2019-05-16 NOTE — PROGRESS NOTES
"AUDIOLOGY REPORT:    Patient was referred from ENT by Dr. Goodwin for audiology evaluation. Patient reports that she had a sinus infection 3-4 weeks ago. She was also diagnosed with a middle ear effusion at that time. Patient was prescribed antibiotics and prednisone, and feels her hearing has improved, but she continues to experience a \"buzzing\" in both ears. She denies otalgia and otorrhea.     Testing:    Otoscopy:   Otoscopic exam indicates ears are clear of cerumen bilaterally     Tympanograms:    RIGHT: normal eardrum mobility     LEFT:   normal eardrum mobility    Reflexes (reported by stimulus ear):  RIGHT: Ipsilateral is present at normal levels  RIGHT: Contralateral is present at normal levels  LEFT:   Ipsilateral is present at normal levels  LEFT:   Contralateral is present at normal levels    Thresholds:   Pure Tone Thresholds assessed using conventional audiometry with good reliability from 250-8000 Hz bilaterally using insert earphones and circumaural headphones      RIGHT:  normal sloping to mild-moderate sensorineural hearing loss    LEFT:    normal sloping to mild, likely sensorineural hearing loss at 8000 Hz    Speech Reception Threshold:    RIGHT: 10 dB HL    LEFT:   10 dB HL  Results are in agreement with pure tone average.     Word Recognition Score:     RIGHT: 96% at 50 dB HL using NU-6 recorded word list.    LEFT:   100% at 50 dB HL using NU-6 recorded word list.    Discussed results with the patient.     Patient was returned to ENT for follow up.     Lew Sloan, CCC-A  Licensed Audiologist #[id number]  5/16/2019        "

## 2019-05-28 ENCOUNTER — TELEPHONE (OUTPATIENT)
Dept: FAMILY MEDICINE | Facility: OTHER | Age: 69
End: 2019-05-28

## 2019-05-28 NOTE — TELEPHONE ENCOUNTER
Reason for Call:  Same Day Appointment, Requested Provider:  ZI Steve     PCP: Patrica Kelly    Reason for visit: possible UTI    Duration of symptoms: 2 days    Have you been treated for this in the past? No    Additional comments: patient would like to be seen today by any provider    Can we leave a detailed message on this number? YES    Phone number patient can be reached at: Home number on file [phone] (home) or Cell number on file:    Telephone Information:   Mobile [phone]       Best Time: Anytime    Call taken on 5/28/2019 at 12:47 PM by Shiela Lopez

## 2019-05-28 NOTE — TELEPHONE ENCOUNTER
Attempted to reach the patient with the following information.  Left message for patient to return call to clinic.     Annabella Haas MA

## 2019-06-05 ENCOUNTER — TELEPHONE (OUTPATIENT)
Dept: INTERNAL MEDICINE | Facility: CLINIC | Age: 69
End: 2019-06-05

## 2019-06-05 ENCOUNTER — TELEPHONE (OUTPATIENT)
Dept: FAMILY MEDICINE | Facility: CLINIC | Age: 69
End: 2019-06-05

## 2019-06-05 DIAGNOSIS — F32.5 MAJOR DEPRESSION IN COMPLETE REMISSION (H): Primary | ICD-10-CM

## 2019-06-05 RX ORDER — SERTRALINE HYDROCHLORIDE 100 MG/1
100 TABLET, FILM COATED ORAL DAILY
Qty: 90 TABLET | Refills: 0 | Status: SHIPPED | OUTPATIENT
Start: 2019-06-05 | End: 2019-08-07

## 2019-06-05 NOTE — TELEPHONE ENCOUNTER
She can do that as long as she has been on the 50 mg sertraline for 1 month, then ok to increase to 100 mg every day.  3 month supply with no refills.     E visit or office visit in 6 weeks with phq9

## 2019-06-05 NOTE — TELEPHONE ENCOUNTER
Please call patient    Per Dr. Kelly: 3D mammogram would be recommended for dense breast tissues which may or may not be covered by her insurance. It would be up to patient if she wants to pursue the 3D mammo, otherwise she can just get the regular mammo    Kalli Martin RN

## 2019-06-05 NOTE — TELEPHONE ENCOUNTER
Per patient, she has been on the 50mg dose for over 2 months now  She will increase her dose to 100mg daily and she will call to schedule on her own or do an Evisit  She wanted to check her insurance first to see if they cover Evisits or how much of it they will cover  Prescription sent- patient did not need dispensed yet    Kalli Martin RN

## 2019-06-05 NOTE — TELEPHONE ENCOUNTER
Patient asking if her prescription for sertraline (ZOLOFT) 50 MG tablet; Take 1 table t (50 mg) by mouth daily can be increased to 100mg.    She said she recently saw psychiatry and got a prescription for the 50mg., however, she feels that the 100mg., works much better.  She asked that a message be sent to Dr. Kelly regarding this increase.  Doesn't want to or plan to go back to the psychiatrist.    I don't see this medication as active on her med list.  Federica Bartlett,

## 2019-06-05 NOTE — TELEPHONE ENCOUNTER
Patient had 3D mammogram done on 9/7/17 which was negative.  Has regular 2D mammogram scheduled for 6/10/19    Kalli Martin RN

## 2019-06-05 NOTE — TELEPHONE ENCOUNTER
Reason for Call:  Other appointment    Detailed comments: Patient had a special  mammogram last year due to her having dense breasts. Should she do that same mammogram or just a regular one? Please advise    Phone Number Patient can be reached at: Home number on file [phone] (home)    Best Time: Any    Can we leave a detailed message on this number? YES    Call taken on 6/5/2019 at 12:38 PM by Kate Quintanilla

## 2019-06-10 ENCOUNTER — ANCILLARY PROCEDURE (OUTPATIENT)
Dept: MAMMOGRAPHY | Facility: CLINIC | Age: 69
End: 2019-06-10
Attending: PHYSICIAN ASSISTANT
Payer: COMMERCIAL

## 2019-06-10 DIAGNOSIS — Z12.31 ENCOUNTER FOR SCREENING MAMMOGRAM FOR BREAST CANCER: ICD-10-CM

## 2019-06-10 PROCEDURE — 77067 SCR MAMMO BI INCL CAD: CPT | Performed by: STUDENT IN AN ORGANIZED HEALTH CARE EDUCATION/TRAINING PROGRAM

## 2019-06-10 PROCEDURE — 77063 BREAST TOMOSYNTHESIS BI: CPT | Performed by: STUDENT IN AN ORGANIZED HEALTH CARE EDUCATION/TRAINING PROGRAM

## 2019-08-07 ENCOUNTER — OFFICE VISIT (OUTPATIENT)
Dept: INTERNAL MEDICINE | Facility: CLINIC | Age: 69
End: 2019-08-07
Payer: COMMERCIAL

## 2019-08-07 VITALS
RESPIRATION RATE: 13 BRPM | HEART RATE: 83 BPM | BODY MASS INDEX: 25.23 KG/M2 | OXYGEN SATURATION: 98 % | HEIGHT: 63 IN | TEMPERATURE: 97.4 F | WEIGHT: 142.4 LBS | DIASTOLIC BLOOD PRESSURE: 74 MMHG | SYSTOLIC BLOOD PRESSURE: 122 MMHG

## 2019-08-07 DIAGNOSIS — F32.5 MAJOR DEPRESSION IN COMPLETE REMISSION (H): Primary | ICD-10-CM

## 2019-08-07 DIAGNOSIS — G47.00 INSOMNIA, UNSPECIFIED TYPE: ICD-10-CM

## 2019-08-07 DIAGNOSIS — N95.2 VAGINAL ATROPHY: ICD-10-CM

## 2019-08-07 PROCEDURE — 99214 OFFICE O/P EST MOD 30 MIN: CPT | Performed by: INTERNAL MEDICINE

## 2019-08-07 RX ORDER — TRAZODONE HYDROCHLORIDE 100 MG/1
TABLET ORAL
Qty: 90 TABLET | Refills: 3 | Status: SHIPPED | OUTPATIENT
Start: 2019-08-07 | End: 2020-10-22

## 2019-08-07 RX ORDER — SERTRALINE HYDROCHLORIDE 100 MG/1
100 TABLET, FILM COATED ORAL DAILY
Qty: 90 TABLET | Refills: 3 | Status: SHIPPED | OUTPATIENT
Start: 2019-08-07 | End: 2020-10-22

## 2019-08-07 ASSESSMENT — PAIN SCALES - GENERAL: PAINLEVEL: NO PAIN (0)

## 2019-08-07 ASSESSMENT — MIFFLIN-ST. JEOR: SCORE: 1142.11

## 2019-08-07 ASSESSMENT — PATIENT HEALTH QUESTIONNAIRE - PHQ9: SUM OF ALL RESPONSES TO PHQ QUESTIONS 1-9: 0

## 2019-08-07 NOTE — PROGRESS NOTES
Subjective     Amee Crews is a 69 year old female who presents to clinic today for the following health issues:    Acupuncture is being done for taste and smell and this helps her significantly.     She would like the estring again to help with her atrophy and discomfort with intercourse.      History of Present Illness        Mental Health Follow-up:  Patient presents to follow-up on Depression & Anxiety.Patient's depression since last visit has been:  Good  The patient is not having other symptoms associated with depression.  Patient's anxiety since last visit has been:  Good  The patient is not having other symptoms associated with anxiety.  Any significant life events: No  Patient is not feeling anxious or having panic attacks.  Patient has no concerns about alcohol or drug use.     Social History  Tobacco Use    Smoking status: Former Smoker      Packs/day: 0.00      Types: Cigarettes      Quit date: 1973      Years since quittin.6    Smokeless tobacco: Never Used  Alcohol use: Yes    Alcohol/week: 0.0 oz    Comment: ocass  Drug use: No      Today's PHQ-9         PHQ-9 Total Score:         PHQ-9 Q9 Thoughts of better off dead/self-harm past 2 weeks :       Thoughts of suicide or self harm:      Self-harm Plan:        Self-harm Action:          Safety concerns for self or others:           Hyperlipidemia:  She presents for follow up of hyperlipidemia.  She is not taking medication to lower cholesterol. She is not having myalgia or other side effects to statin medications.She is not reporting shortness of breath, increased sweating or nausea with activity, left-sided neck or arm pain, chest pain or pressure, or pain in calves when walking 1-2 blocks.    She eats 2-3 servings of fruits and vegetables daily.She consumes 1 (Patient states she has one can of coke a day ) sweetened beverage(s) daily.               Reviewed and updated as needed this visit by Provider  Tobacco  Allergies  Meds  Problems  " Med Hx  Surg Hx  Fam Hx         Review of Systems    ROS: 10 point ROS neg other than the symptoms noted above in the HPI.       Objective    /74 (BP Location: Right arm, Cuff Size: Adult Regular)   Pulse 83   Temp 97.4  F (36.3  C) (Oral)   Resp 13   Ht 1.588 m (5' 2.5\")   Wt 64.6 kg (142 lb 6.4 oz)   SpO2 98%   BMI 25.63 kg/m    Body mass index is 25.63 kg/m .  Physical Exam   GENERAL APPEARANCE: healthy, alert and no distress  NECK: no adenopathy, no asymmetry, masses, or scars and thyroid normal to palpation  RESP: lungs clear to auscultation - no rales, rhonchi or wheezes  CV: regular rates and rhythm and normal S1 S2, no S3 or S4  ABDOMEN:  soft, nontender, no HSM or masses and bowel sounds normal  SKIN: no suspicious lesions or rashes  PSYCH: mentation appears normal. and affect normal/bright  No edema     Diagnostic Test Results:  Labs reviewed in Epic  Results for orders placed or performed in visit on 06/10/19   MA Screen Bilateral w/Bk    Narrative    Examination: Bilateral digital screening mammography and bilateral  digital tomosynthesis with computer aided detection.    Comparison: 9/7/2017, 9/11/2014 and 6/19/2012    History/family history: No symptoms, routine screening.     TECHNIQUE:  Tomosynthesis    BREAST DENSITY: Heterogeneously dense.    COMMENTS: There has been no significant change.       Impression    IMPRESSION: BI-RADS CATEGORY: 1 -  NEGATIVE.    RECOMMENDED FOLLOW-UP: Annual Mammography    Results to be sent to the patient.      I have personally reviewed the examination and initial interpretation  and I agree with the findings.    ASHISH ANGELA MD           Assessment & Plan     1. Major depression in complete remission (H)  Well controlled with medications without side effects.   - sertraline (ZOLOFT) 100 MG tablet; Take 1 tablet (100 mg) by mouth daily  Dispense: 90 tablet; Refill: 3    2. Insomnia, unspecified type  Well controlled with medications without side " "effects.   - traZODone (DESYREL) 100 MG tablet; TAKE ONE TABLET BY MOUTH AT BEDTIME  Dispense: 90 tablet; Refill: 3    3. Vaginal atrophy  Well controlled with medications without side effects.   - estradiol (ESTRING) 2 MG vaginal ring; Place 1 each vaginally every 3 months  Dispense: 3 each; Refill: 11     BMI:   Estimated body mass index is 25.63 kg/m  as calculated from the following:    Height as of this encounter: 1.588 m (5' 2.5\").    Weight as of this encounter: 64.6 kg (142 lb 6.4 oz).           There are no Patient Instructions on file for this visit.     Return in about 1 year (around 8/7/2020) for Annual Wellness Visit.    Patrica Kelly MD  [facility]    "

## 2019-08-07 NOTE — LETTER
My Asthma Action Plan  Name: Amee Crews   YOB: 1950  Date: 8/2/2019   My doctor: Patrica Kelly MD   My clinic: [facility]        My Control Medicine: { :966209}  My Rescue Medicine: { :487363}  {AAP include Oral Steroid:281700} My Asthma Severity: { :250405}  Avoid your asthma triggers: { :329518}  pollens     {Is patient a child or adult?:758741}       GREEN ZONE   Good Control    I feel good    No cough or wheeze    Can work, sleep and play without asthma symptoms       Take your asthma control medicine every day.     1. If exercise triggers your asthma, take your rescue medication    15 minutes before exercise or sports, and    During exercise if you have asthma symptoms  2. Spacer to use with inhaler: If you have a spacer, make sure to use it with your inhaler             YELLOW ZONE Getting Worse  I have ANY of these:    I do not feel good    Cough or wheeze    Chest feels tight    Wake up at night   1. Keep taking your Green Zone medications  2. Start taking your rescue medicine:    every 20 minutes for up to 1 hour. Then every 4 hours for 24-48 hours.  3. If you stay in the Yellow Zone for more than 12-24 hours, contact your doctor.  4. If you do not return to the Green Zone in 12-24 hours or you get worse, start taking your oral steroid medicine if prescribed by your provider.           RED ZONE Medical Alert - Get Help  I have ANY of these:    I feel awful    Medicine is not helping    Breathing getting harder    Trouble walking or talking    Nose opens wide to breathe       1. Take your rescue medicine NOW  2. If your provider has prescribed an oral steroid medicine, start taking it NOW  3. Call your doctor NOW  4. If you are still in the Red Zone after 20 minutes and you have not reached your doctor:    Take your rescue medicine again and    Call 911 or go to the emergency room right away    See your regular doctor within 2 weeks of an Emergency Room or Urgent Care visit  for follow-up treatment.          Annual Reminders:  Meet with Asthma Educator,  Flu Shot in the Fall, consider Pneumonia Vaccination for patients with asthma (aged 19 and older).    Pharmacy:    [id number] - [place], [place] - [address]  [id number] - [place] - [address]                      Asthma Triggers  How To Control Things That Make Your Asthma Worse    Triggers are things that make your asthma worse.  Look at the list below to help you find your triggers and what you can do about them.  You can help prevent asthma flare-ups by staying away from your triggers.      Trigger                                                          What you can do   Cigarette Smoke  Tobacco smoke can make asthma worse. Do not allow smoking in your home, car or around you.  Be sure no one smokes at a child s day care or school.  If you smoke, ask your health care provider for ways to help you quit.  Ask family members to quit too.  Ask your health care provider for a referral to Quit Plan to help you quit smoking, or call 0-018-064-PLAN.     Colds, Flu, Bronchitis  These are common triggers of asthma. Wash your hands often.  Don t touch your eyes, nose or mouth.  Get a flu shot every year.     Dust Mites  These are tiny bugs that live in cloth or carpet. They are too small to see. Wash sheets and blankets in hot water every week.   Encase pillows and mattress in dust mite proof covers.  Avoid having carpet if you can. If you have carpet, vacuum weekly.   Use a dust mask and HEPA vacuum.   Pollen and Outdoor Mold  Some people are allergic to trees, grass, or weed pollen, or molds. Try to keep your windows closed.  Limit time out doors when pollen count is high.   Ask you health care provider about taking medicine during allergy season.     Animal Dander  Some people are allergic to skin flakes, urine or saliva from pets with fur or feathers. Keep pets with fur or feathers out of your home.    If you  can t keep the pet outdoors, then keep the pet out of your bedroom.  Keep the bedroom door closed.  Keep pets off cloth furniture and away from stuffed toys.     Mice, Rats, and Cockroaches  Some people are allergic to the waste from these pests.   Cover food and garbage.  Clean up spills and food crumbs.  Store grease in the refrigerator.   Keep food out of the bedroom.   Indoor Mold  This can be a trigger if your home has high moisture. Fix leaking faucets, pipes, or other sources of water.   Clean moldy surfaces.  Dehumidify basement if it is damp and smelly.   Smoke, Strong Odors, and Sprays  These can reduce air quality. Stay away from strong odors and sprays, such as perfume, powder, hair spray, paints, smoke incense, paint, cleaning products, candles and new carpet.   Exercise or Sports  Some people with asthma have this trigger. Be active!  Ask your doctor about taking medicine before sports or exercise to prevent symptoms.    Warm up for 5-10 minutes before and after sports or exercise.     Other Triggers of Asthma  Cold air:  Cover your nose and mouth with a scarf.  Sometimes laughing or crying can be a trigger.  Some medicines and food can trigger asthma.

## 2019-08-26 ENCOUNTER — MYC MEDICAL ADVICE (OUTPATIENT)
Dept: OTOLARYNGOLOGY | Facility: OTHER | Age: 69
End: 2019-08-26

## 2019-08-27 NOTE — TELEPHONE ENCOUNTER
Acupuncture is very safe.  It may also address some of the migraine component issues that may be contributing to some of the symptoms.  I would recommend continuing acupuncture for another couple months.  Freddie Hinkle MD

## 2019-10-01 ENCOUNTER — TRANSFERRED RECORDS (OUTPATIENT)
Dept: HEALTH INFORMATION MANAGEMENT | Facility: CLINIC | Age: 69
End: 2019-10-01

## 2019-10-01 LAB — HEMOCCULT STL QL IA: NORMAL

## 2019-10-16 DIAGNOSIS — G43.809 OTHER MIGRAINE WITHOUT STATUS MIGRAINOSUS, NOT INTRACTABLE: ICD-10-CM

## 2019-10-16 NOTE — TELEPHONE ENCOUNTER
Patient calling regarding status, please call to advise. Stating that she is leaving @ 6am tomorrow. She states its an emergency. Told patient Arlene do have up to 72 hours.

## 2019-10-16 NOTE — TELEPHONE ENCOUNTER
Reason for Call:  Medication or medication refill:    Do you use a [facility]?  Name of the pharmacy and phone number for the current request:      [facility] -[phone]    Name of the medication requested: Sumatriptan 50 MG     Other request: Patient stating the pharmacy doesn't have all the prescriptions in their records, if that could be fixed. Please call to advise.     Can we leave a detailed message on this number? YES    Phone number patient can be reached at: Home number on file [phone] (home)    Best Time: Any     Call taken on 10/16/2019 at 1:16 PM by Agnes Brown

## 2019-10-17 RX ORDER — SUMATRIPTAN 50 MG/1
TABLET, FILM COATED ORAL
Qty: 18 TABLET | Refills: 4 | OUTPATIENT
Start: 2019-10-17

## 2019-10-17 RX ORDER — SUMATRIPTAN 50 MG/1
TABLET, FILM COATED ORAL
Qty: 18 TABLET | Refills: 4 | Status: SHIPPED | OUTPATIENT
Start: 2019-10-17 | End: 2020-10-22

## 2019-10-17 NOTE — TELEPHONE ENCOUNTER
"Routing refill request to provider for review/approval because:  Needs review    Requested Prescriptions   Pending Prescriptions Disp Refills     SUMAtriptan (IMITREX) 50 MG tablet 18 tablet 4       Serotonin Agonists Failed - 10/17/2019  8:45 AM        Failed - Serotonin Agonist request needs review.     Please review patient's record. If patient has had 8 or more treatments in the past month, please forward to provider.          Passed - Blood pressure under 140/90 in past 12 months     BP Readings from Last 3 Encounters:   08/07/19 122/74   04/30/19 112/68   04/19/19 104/62                 Passed - Recent (12 mo) or future (30 days) visit within the authorizing provider's specialty     Patient has had an office visit with the authorizing provider or a provider within the authorizing providers department within the previous 12 mos or has a future within next 30 days. See \"Patient Info\" tab in inbasket, or \"Choose Columns\" in Meds & Orders section of the refill encounter.              Passed - Medication is active on med list        Passed - Patient is age 18 or older        Passed - No active pregnancy on record        Passed - No positive pregnancy test in past 12 months        Mirtha Dickerson RN  "

## 2019-11-05 DIAGNOSIS — J32.9 CHRONIC SINUSITIS, UNSPECIFIED LOCATION: ICD-10-CM

## 2019-11-05 NOTE — TELEPHONE ENCOUNTER
Chief Complaint   Patient presents with     Refill Request      Refill request received via fax by pharmacy      [facility] [place] [place] [facility] [address]  [id number] - [place] - [address] [address] tel: [phone]    Amee requests prescriptions be mailed via certified mail to    * [facility] [place] [place] - [address]  [id number] - [place] - [address] [address]               Last Written Prescription Date:    Last Fill Quantity: 11/15/2018,   # refills: 3  Last Office Visit with prescribing provider: 4/4/19   Future Office visit: N/A

## 2019-11-06 RX ORDER — FLUTICASONE PROPIONATE 50 MCG
2 SPRAY, SUSPENSION (ML) NASAL DAILY
Qty: 16 G | Refills: 3 | Status: SHIPPED | OUTPATIENT
Start: 2019-11-06 | End: 2020-04-22

## 2019-11-07 ENCOUNTER — HEALTH MAINTENANCE LETTER (OUTPATIENT)
Age: 69
End: 2019-11-07

## 2019-12-05 ENCOUNTER — MYC MEDICAL ADVICE (OUTPATIENT)
Dept: OTOLARYNGOLOGY | Facility: OTHER | Age: 69
End: 2019-12-05

## 2019-12-05 NOTE — LETTER
her             [facility]  [address]  [address]  [place] [address]  Phone: [phone]    12/18/19    Amee Crews  [address] [address] [address]      To whom it may concern:     Ms. Crews has been under my care for very complex set of nasal symptoms largely affected by her migraines.  She has had multiple treatments that have made only small improvements.  However since she started acupuncture therapy her symptoms apparently improved dramatically.  Previously discomfort that she experienced as part of her myofascial pain as well as secretions were significantly impacting her life quality.  Currently she appears to be dramatically improved with acupuncture therapy.  It would be prudent for this therapy to be approved as important treatment modality for this patient.    Sincerely,      Freddie Hinkle MD

## 2019-12-16 ENCOUNTER — TELEPHONE (OUTPATIENT)
Dept: FAMILY MEDICINE | Facility: CLINIC | Age: 69
End: 2019-12-16

## 2019-12-16 NOTE — TELEPHONE ENCOUNTER
Reason for Call: Request for an order or referral:    Order or referral being requested: Neurology per [facility] and patient    Date needed: as soon as possible    Has the patient been seen by the PCP for this problem? Not Applicable    Additional comments: patient states [facility] wants her to see a Neurologist for her visual problems.    Patient sees pinwheels spinning in both eyes.    Patient did not have symptoms today.    Please confirm.    Phone number Patient can be reached at:  Cell number on file:    Telephone Information:   Mobile [phone]       Best Time:  asap    Can we leave a detailed message on this number?  YES    Call taken on 12/16/2019 at 10:49 AM by Kenia Majnao

## 2019-12-16 NOTE — TELEPHONE ENCOUNTER
Called [facility] ophthalmology ([phone]). They are going to take a message for Dr. Rodriguez's  to call us back.       Huddled with PCP. Need to call [place] to clarify if neurology was recommended and if so, do they just want pt to see general neurology. What reason/diagnosis is the referral for? If they do want pt to see general neurology, referral to [facility].      Copied from OV notes 11/22/19 with ophthalmology Charo Bermudez O.D.  #1 Adult vitelliform dystrophy OU  - First noted in 2013  -Vitelliform lesions OU, size concerning for Best's. Unsure how long they have been present, routine eye exams started in 2013. No known family hx (Father had AMD. Mother had macular hole). No eveidence of NV on exam.   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   -Recommend see Dr Child, discussed enrollment in biotrust protocol. Seen on 5/18 by     #2 Nevus left eye   - No concerning features    #3 Presbyopia    Plan 5/24/2019   No features of CNV. Patient may prefer to monitor locally with a retina specialist near her home. We will still put in orders for 8 months with [facility] OCT Softing/retina and she will cancel if needed.

## 2019-12-19 ENCOUNTER — MYC MEDICAL ADVICE (OUTPATIENT)
Dept: INTERNAL MEDICINE | Facility: CLINIC | Age: 69
End: 2019-12-19

## 2019-12-19 DIAGNOSIS — G25.81 RESTLESS LEG: ICD-10-CM

## 2019-12-19 DIAGNOSIS — E53.8 VITAMIN B 12 DEFICIENCY: ICD-10-CM

## 2019-12-20 NOTE — TELEPHONE ENCOUNTER
Called [facility] ophth again and spoke with Ansley Panchal does not see anything in their notes regarding the need for Neuro or ocular Migraines as mentioned in patient's mychart message to us from 12/19/19  She will put in a message for provider to clarify    Mychart message sent to patient to see if she can provide more info (see 12/19/19 mychart)    Kalli Martin RN

## 2019-12-26 DIAGNOSIS — E53.8 VITAMIN B 12 DEFICIENCY: ICD-10-CM

## 2019-12-26 DIAGNOSIS — G25.81 RESTLESS LEG: ICD-10-CM

## 2019-12-26 NOTE — TELEPHONE ENCOUNTER
We have not heard back from [facility]    Dr. Kelly, please review mychart encounter 12/19/19 for info from patient  Not sure how to proceed with this request    Kalli Martin RN

## 2019-12-27 RX ORDER — CYANOCOBALAMIN (VITAMIN B-12) 1000 MCG
TABLET ORAL
Qty: 90 TABLET | Refills: 3 | Status: SHIPPED | OUTPATIENT
Start: 2019-12-27 | End: 2021-01-08

## 2019-12-27 RX ORDER — ROPINIROLE 0.25 MG/1
TABLET, FILM COATED ORAL
Qty: 90 TABLET | Refills: 3 | Status: SHIPPED | OUTPATIENT
Start: 2019-12-27 | End: 2020-10-22

## 2019-12-27 NOTE — TELEPHONE ENCOUNTER
Ok to notify patient that you clarified with [place] and no referral is needed.  Maybe she meant something else in her message?

## 2019-12-27 NOTE — TELEPHONE ENCOUNTER
"Requested Prescriptions   Pending Prescriptions Disp Refills     Cyanocobalamin (B-12) 1000 MCG TABS [Pharmacy Med Name: B-12 1000MCG TABS] 90 tablet 3     Sig: TAKE ONE TABLET BY MOUTH ONCE DAILY       Vitamin Supplements (Adult) Protocol Passed - 12/27/2019  7:46 AM        Passed - High dose Vitamin D not ordered        Passed - Recent (12 mo) or future (30 days) visit within the authorizing provider's specialty     Patient has had an office visit with the authorizing provider or a provider within the authorizing providers department within the previous 12 mos or has a future within next 30 days. See \"Patient Info\" tab in inbasket, or \"Choose Columns\" in Meds & Orders section of the refill encounter.              Passed - Medication is active on med list        rOPINIRole (REQUIP) 0.25 MG tablet [Pharmacy Med Name: ROPINIROLE HCL 0.25MG TABS] 90 tablet 3     Sig: TAKE ONE TABLET BY MOUTH AT BEDTIME       Antiparkinson's Agents Protocol Failed - 12/27/2019  7:46 AM        Failed - CBC on record in past 12 months     Recent Labs   Lab Test 11/14/18  1252   WBC 6.5   RBC 4.02   HGB 13.4   HCT 39.0                    Failed - ALT on record in past 12 months         Recent Labs   Lab Test 11/14/18  1252   ALT 25             Failed - Serum Creatinine on file in past 12 months     Recent Labs   Lab Test 11/14/18  1252   CR 0.87             Passed - Blood pressure under 140/90 in past 12 months     BP Readings from Last 3 Encounters:   08/07/19 122/74   04/30/19 112/68   04/19/19 104/62                 Passed - Medication is active on med list        Passed - Patient is age 18 or older        Passed - No active pregnancy on record        Passed - No positive pregnancy test in the past 12 months        Passed - Recent (6 mo) or future (30 days) visit within the authorizing provider's specialty     Patient had office visit in the last 6 months or has a visit in the next 30 days with authorizing provider or within the " "authorizing provider's specialty.  See \"Patient Info\" tab in inbasket, or \"Choose Columns\" in Meds & Orders section of the refill encounter.            Kalli Martin RN    "

## 2020-01-17 NOTE — PROGRESS NOTES
Sports Medicine Clinic Visit    PCP: Patrica Kelly    CC: Patient presents with:  Right Wrist - Pain  Left Wrist - Pain      HPI:  Amee Crews is a 69 year old female who is seen in consultation at the request of Dr. Kelly.   She notes bilateral wrist pain that began 20 years ago with insidious onset. She notes that work aggravated the issue. She notes pain over the wrist into the thumb, right is worse than the left. She rates the pain at a  5/10 at its worst and a 2/10 currently.  Symptoms are relieved with essential oil creme. Symptoms are worsened by lifting and use of the hands. She endorses swelling in the mornings and weakness with pain.   She denies popping, grinding, numbness and tingling.  Other treatment has included corticosteroid injections (15 years ago). 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      Review of Systems:  Musculoskeletal: as above  Remainder of review of systems is negative including constitutional, eyes, ENT, CV, pulmonary, GI, , endocrine, skin, hematologic, and neurologic except as noted in HPI or medical history.    History reviewed. No pertinent past surgical/medical/family/social history other than as mentioned in HPI.    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, moderatly severe,slowly worsening     Pinguecula of both eyes     Nuclear cataract of both eyes     Myopia, bilateral      Macular cyst, hole, or pseudohole, bilateral, moderatly severe     Adult vitelliform macular degeneration     Gastroesophageal reflux disease without esophagitis     Best vitelliform macular dystrophy     DDD (degenerative disc disease), lumbar     Chronic pain of left knee     Vitamin B12 deficiency     Osteopenia, unspecified location     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     Allergic rhinitis      Allergies      asthma     mild intermittent     Chronic back pain     neck and low back     HA (headache)     muscle contraction      History of blood transfusion     In childhood     Hot flashes      Hyperlipidemia      Insomnia      Intermittent asthma 9/16/2011     Major depressive disorder, single episode, moderate (H)      Ocular migraine      Osteoarthritis of hand      Restless leg 9/16/2011     Past Surgical History:   Procedure Laterality Date     APPENDECTOMY      age 9      BIOPSY      took tissue from my uterus at least 15 years ago     COLONOSCOPY  14 years     COLONOSCOPY N/A 4/30/2015    Procedure: COMBINED COLONOSCOPY, SINGLE OR MULTIPLE BIOPSY/POLYPECTOMY BY BIOPSY;  Surgeon: Doni Carey MD;  Location: [facility] OR     COLONOSCOPY WITH CO2 INSUFFLATION N/A 4/30/2015    Procedure: COLONOSCOPY WITH CO2 INSUFFLATION;  Surgeon: Doni Carey MD;  Location: [facility] OR     COMBINED ESOPHAGOSCOPY, GASTROSCOPY, DUODENOSCOPY (EGD) WITH CO2 INSUFFLATION N/A 8/24/2017    Procedure: COMBINED ESOPHAGOSCOPY, GASTROSCOPY, DUODENOSCOPY (EGD) WITH CO2 INSUFFLATION;  EGD, Gastroesophageal reflux disease, esophagitis presence not specified Abdominal pain, generalized, Ref Dahlheimer-Schroeder, 24.78 BMI;  Surgeon: Duane, William Charles, MD;  Location: [facility] OR     ENT SURGERY      Tonsillectomy     ESOPHAGOSCOPY, GASTROSCOPY, DUODENOSCOPY (EGD), COMBINED N/A 8/24/2017    Procedure: COMBINED ESOPHAGOSCOPY, GASTROSCOPY, DUODENOSCOPY (EGD), BIOPSY SINGLE OR  MULTIPLE;;  Surgeon: Duane, William Charles, MD;  Location: [facility] OR     LAPAROSCOPY PROCEDURE UNLISTED      polyps found     MOUTH SURGERY  2016     SINUS SURGERY      x2     Family History   Problem Relation Age of Onset     Diabetes Mother      Hypertension Mother      Cancer Father         stomach      Cancer Maternal Grandfather         lung      Heart Disease Other      Social History     Socioeconomic History     Marital status:      Spouse name: Not on file     Number of children: 2     Years of education: Not on file     Highest education level: Not on file   Occupational History     Employer: SELF   Social Needs     Financial resource strain: Not on file     Food insecurity:     Worry: Not on file     Inability: Not on file     Transportation needs:     Medical: Not on file     Non-medical: Not on file   Tobacco Use     Smoking status: Former Smoker     Packs/day: 0.00     Types: Cigarettes     Last attempt to quit: 1973     Years since quittin.0     Smokeless tobacco: Never Used   Substance and Sexual Activity     Alcohol use: Yes     Alcohol/week: 0.0 standard drinks     Comment: ocass     Drug use: No     Sexual activity: Yes     Partners: Male   Lifestyle     Physical activity:     Days per week: Not on file     Minutes per session: Not on file     Stress: Not on file   Relationships     Social connections:     Talks on phone: Not on file     Gets together: Not on file     Attends Taoist service: Not on file     Active member of club or organization: Not on file     Attends meetings of clubs or organizations: Not on file     Relationship status: Not on file     Intimate partner violence:     Fear of current or ex partner: Not on file     Emotionally abused: Not on file     Physically abused: Not on file     Forced sexual activity: Not on file   Other Topics Concern     Parent/sibling w/ CABG, MI or angioplasty before 65F 55M? No   Social History Narrative     Not on file       She works  "as a manicurist part time.    Current Outpatient Medications   Medication     Cyanocobalamin (B-12) 1000 MCG TABS     fluticasone (FLONASE) 50 MCG/ACT nasal spray     magnesium 200 MG TABS     rOPINIRole (REQUIP) 0.25 MG tablet     sertraline (ZOLOFT) 100 MG tablet     traZODone (DESYREL) 100 MG tablet     Triprolidine-Pseudoephedrine (APRODINE PO)     albuterol (PROAIR HFA/PROVENTIL HFA/VENTOLIN HFA) 108 (90 Base) MCG/ACT inhaler     azelastine (ASTELIN) 0.1 % nasal spray     estradiol (ESTRING) 2 MG vaginal ring     loratadine (CLARITIN) 10 MG tablet     omeprazole (PRILOSEC) 40 MG capsule     SUMAtriptan (IMITREX) 50 MG tablet     No current facility-administered medications for this visit.      Allergies   Allergen Reactions     Atorvastatin Cramps     Muscle pain     Morphine Itching     Morphine Sulfate Itching         Objective:  /70   Ht 1.598 m (5' 2.91\")   Wt 62.6 kg (138 lb)   BMI 24.51 kg/m      General: Alert and in no distress    Head: Normocephalic, atraumatic  Eyes: no scleral icterus or conjunctival erythema   Oropharynx:  Mucous membranes moist  Skin: no erythema, petechiae, or jaundice  CV: regular rhythm by palpation, 2+ distal pulses  Resp: normal respiratory effort without conversational dyspnea   Psych: normal mood and affect    Gait: Non-antalgic, appropriate coordination and balance   Neuro: Motor strength and sensation as noted below    Musculoskeletal:    Bilateral Wrist and Hand exam    Inspection:       No swelling, bruising or deformity bilaterally    Palpation:  -Tenderness over the CMC joints bilaterally    ROM:       Full and symmetric active range of motion of the forearm, wrist and digits bilaterally    Strength:  Forearm supination 5/5 bilaterally  Forearm pronation 5/5 bilaterally  Wrist extension 5/5 bilaterally  Wrist flexion 5/5 bilaterally   strength 5/5 bilaterally  Finger abduction 5/5 bilaterally    Special Tests:  -Pain with right basal grind test and " Finkelstein's    Neurovascular:       2+ radial pulses bilaterally and normal sensation to light touch in the radial, median and ulnar nerve distributions      Radiology:  X-rays ordered and independent visualization of images performed and reviewed with Amee.    Recent Results (from the past 744 hour(s))   XR Finger Bilateral G/E 2 vw    Narrative    FINGER BILATERAL TWO OR MORE VIEWS 1/23/2020 10:02 AM     HISTORY: Chronic thumb pain, bilateral.       Impression    IMPRESSION: Bilateral first carpometacarpal joint degenerative  arthrosis. Left scaphotrapezium trapezoid joint degenerative changes  are also noted. Left first metacarpophalangeal joint space  radial-sided narrowing or degenerative changes are also noted.       Hand / Upper Extremity Injection/Arthrocentesis: bilateral thumb CMC  Date/Time: 1/23/2020 10:18 AM  Performed by: Yasemin Simental MD  Authorized by: Yasemin Simental MD     Indications:  Pain  Needle Size:  25 G  Guidance: landmark    Approach:  Radial  Condition: osteoarthritis    Laterality:  Bilateral  Location:  Thumb  Site:  Bilateral thumb CMC    Medications (Right):  20 mg triamcinolone 40 MG/ML  Medications (Right) comment:  0.5ml 0.5% bupivicaine  NDC:12849-784-93  Lot: [id number]  3/31/20      Medications (Left):  20 mg triamcinolone 40 MG/ML  Medications (Left) comment:  0.5ml 0.5% bupivicaine  NDC:41685-071-99  Lot: [id number]  3/31/20          Assessment:  1. Chronic thumb pain, bilateral    2. Osteoarthritis of carpometacarpal (CMC) joint of both thumbs        Plan:  Discussed the assessment with the patient and developed a plan together:  -Steroid injections performed today.  Take it easy over the next few days. Keep in mind that the steroid may take up to 3 days to start working and up to 2 weeks to reach maximal effect.  Ice 15-20 minutes as needed for soreness.  Patient's preferred over the counter medication as needed for pain as directed on  packaging.  -Avoid repetitive aggravating activities  -Brace as needed for comfort and support      Follow Up: As needed if symptoms fail to improve or worsen. Please call with any questions or concerns.     Sujata Simental MD, CAQ Sports Medicine  [facility]

## 2020-01-21 DIAGNOSIS — J45.20 MILD INTERMITTENT ASTHMA WITHOUT COMPLICATION: ICD-10-CM

## 2020-01-21 RX ORDER — ALBUTEROL SULFATE 90 UG/1
2 AEROSOL, METERED RESPIRATORY (INHALATION) EVERY 4 HOURS PRN
Qty: 1 INHALER | Refills: 2 | Status: SHIPPED | OUTPATIENT
Start: 2020-01-21 | End: 2020-03-19

## 2020-01-23 ENCOUNTER — ANCILLARY PROCEDURE (OUTPATIENT)
Dept: GENERAL RADIOLOGY | Facility: OTHER | Age: 70
End: 2020-01-23
Attending: PHYSICAL MEDICINE & REHABILITATION
Payer: COMMERCIAL

## 2020-01-23 ENCOUNTER — OFFICE VISIT (OUTPATIENT)
Dept: ORTHOPEDICS | Facility: OTHER | Age: 70
End: 2020-01-23
Payer: COMMERCIAL

## 2020-01-23 VITALS
HEIGHT: 63 IN | BODY MASS INDEX: 24.45 KG/M2 | WEIGHT: 138 LBS | DIASTOLIC BLOOD PRESSURE: 70 MMHG | SYSTOLIC BLOOD PRESSURE: 106 MMHG

## 2020-01-23 DIAGNOSIS — M79.645 CHRONIC THUMB PAIN, BILATERAL: Primary | ICD-10-CM

## 2020-01-23 DIAGNOSIS — G89.29 CHRONIC THUMB PAIN, BILATERAL: Primary | ICD-10-CM

## 2020-01-23 DIAGNOSIS — M79.645 CHRONIC THUMB PAIN, BILATERAL: ICD-10-CM

## 2020-01-23 DIAGNOSIS — M18.0 OSTEOARTHRITIS OF CARPOMETACARPAL (CMC) JOINT OF BOTH THUMBS: ICD-10-CM

## 2020-01-23 DIAGNOSIS — M79.644 CHRONIC THUMB PAIN, BILATERAL: Primary | ICD-10-CM

## 2020-01-23 DIAGNOSIS — G89.29 CHRONIC THUMB PAIN, BILATERAL: ICD-10-CM

## 2020-01-23 DIAGNOSIS — M79.644 CHRONIC THUMB PAIN, BILATERAL: ICD-10-CM

## 2020-01-23 PROCEDURE — 99204 OFFICE O/P NEW MOD 45 MIN: CPT | Mod: 25 | Performed by: PHYSICAL MEDICINE & REHABILITATION

## 2020-01-23 PROCEDURE — 73140 X-RAY EXAM OF FINGER(S): CPT | Mod: LT

## 2020-01-23 PROCEDURE — 20600 DRAIN/INJ JOINT/BURSA W/O US: CPT | Mod: 50 | Performed by: PHYSICAL MEDICINE & REHABILITATION

## 2020-01-23 RX ORDER — TRIAMCINOLONE ACETONIDE 40 MG/ML
20 INJECTION, SUSPENSION INTRA-ARTICULAR; INTRAMUSCULAR
Status: DISCONTINUED | OUTPATIENT
Start: 2020-01-23 | End: 2020-12-01

## 2020-01-23 RX ADMIN — TRIAMCINOLONE ACETONIDE 20 MG: 40 INJECTION, SUSPENSION INTRA-ARTICULAR; INTRAMUSCULAR at 10:18

## 2020-01-23 ASSESSMENT — MIFFLIN-ST. JEOR: SCORE: 1128.7

## 2020-01-23 NOTE — PATIENT INSTRUCTIONS
Today's Plan of Care:  -Steroid injections performed today.  Take it easy over the next few days. Keep in mind that the steroid may take up to 3 days to start working and up to 2 weeks to reach maximal effect.  Ice 15-20 minutes as needed for soreness.  Patient's preferred over the counter medication as needed for pain as directed on packaging.  -Ice or ice massage 15-20 minutes for pain relief or swelling as needed  -Patient's preferred over the counter medication as directed on packaging as needed for pain or soreness.  -Avoid repetitive aggravating activities  -Brace as needed for comfort and support      Follow Up: As needed if symptoms fail to improve or worsen. Please call with any questions or concerns.

## 2020-01-23 NOTE — LETTER
1/23/2020         RE: Amee Crews  [address] [facility] [address]        Dear Colleague,    Thank you for referring your patient, Amee Crews, to the [facility]. Please see a copy of my visit note below.    Sports Medicine Clinic Visit    PCP: Patrica Kelly    CC: Patient presents with:  Right Wrist - Pain  Left Wrist - Pain      HPI:  Amee Crews is a 69 year old female who is seen in consultation at the request of Dr. Kelly.   She notes bilateral wrist pain that began 20 years ago with insidious onset. She notes that work aggravated the issue. She notes pain over the wrist into the thumb, right is worse than the left. She rates the pain at a  5/10 at its worst and a 2/10 currently.  Symptoms are relieved with essential oil creme. Symptoms are worsened by lifting and use of the hands. She endorses swelling in the mornings and weakness with pain.   She denies popping, grinding, numbness and tingling.  Other treatment has included corticosteroid injections (15 years ago). 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      Review of Systems:  Musculoskeletal: as above  Remainder of review of systems is negative including constitutional, eyes, ENT, CV, pulmonary, GI, , endocrine, skin, hematologic, and neurologic except as noted in HPI or medical history.    History reviewed. No pertinent past surgical/medical/family/social history other than as mentioned in HPI.    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, moderatly severe,slowly worsening     Pinguecula of both eyes     Nuclear cataract of both eyes     Myopia, bilateral     Macular cyst, hole, or pseudohole, bilateral, moderatly severe     Adult vitelliform macular degeneration     Gastroesophageal reflux disease without esophagitis     Best vitelliform macular dystrophy     DDD (degenerative disc disease), lumbar     Chronic pain of left knee     Vitamin B12 deficiency     Osteopenia, unspecified location     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     Allergic rhinitis      Allergies      asthma     mild intermittent     Chronic back pain     neck and low back     HA (headache)     muscle contraction      History of blood transfusion     In childhood     Hot flashes      Hyperlipidemia      Insomnia      Intermittent asthma 9/16/2011     Major depressive disorder, single episode, moderate (H)      Ocular migraine      Osteoarthritis of hand      Restless leg 9/16/2011     Past Surgical History:   Procedure Laterality Date     APPENDECTOMY      age 9      BIOPSY      took tissue from my uterus at least 15 years ago     COLONOSCOPY  14 years     COLONOSCOPY N/A 4/30/2015    Procedure: COMBINED COLONOSCOPY, SINGLE OR MULTIPLE BIOPSY/POLYPECTOMY BY BIOPSY;  Surgeon: Doni Carey MD;  Location: [facility]     COLONOSCOPY WITH CO2 INSUFFLATION N/A 4/30/2015    Procedure: COLONOSCOPY WITH CO2 INSUFFLATION;  Surgeon: Doni Carey MD;  Location: [facility]     COMBINED ESOPHAGOSCOPY, GASTROSCOPY, DUODENOSCOPY (EGD) WITH CO2 INSUFFLATION N/A 8/24/2017    Procedure: COMBINED ESOPHAGOSCOPY, GASTROSCOPY, DUODENOSCOPY (EGD) WITH CO2 INSUFFLATION;  EGD, Gastroesophageal reflux disease, esophagitis presence not specified Abdominal pain, generalized, Ref Germaine-Alexandre, 24.78 BMI;  Surgeon:  Duane, William Charles, MD;  Location: [facility] OR     ENT SURGERY      Tonsillectomy     ESOPHAGOSCOPY, GASTROSCOPY, DUODENOSCOPY (EGD), COMBINED N/A 2017    Procedure: COMBINED ESOPHAGOSCOPY, GASTROSCOPY, DUODENOSCOPY (EGD), BIOPSY SINGLE OR MULTIPLE;;  Surgeon: Duane, William Charles, MD;  Location: [facility]     LAPAROSCOPY PROCEDURE UNLISTED      polyps found     MOUTH SURGERY  2016     SINUS SURGERY      x2     Family History   Problem Relation Age of Onset     Diabetes Mother      Hypertension Mother      Cancer Father         stomach      Cancer Maternal Grandfather         lung      Heart Disease Other      Social History     Socioeconomic History     Marital status:      Spouse name: Not on file     Number of children: 2     Years of education: Not on file     Highest education level: Not on file   Occupational History     Employer: SELF   Social Needs     Financial resource strain: Not on file     Food insecurity:     Worry: Not on file     Inability: Not on file     Transportation needs:     Medical: Not on file     Non-medical: Not on file   Tobacco Use     Smoking status: Former Smoker     Packs/day: 0.00     Types: Cigarettes     Last attempt to quit: 1973     Years since quittin.0     Smokeless tobacco: Never Used   Substance and Sexual Activity     Alcohol use: Yes     Alcohol/week: 0.0 standard drinks     Comment: ocass     Drug use: No     Sexual activity: Yes     Partners: Male   Lifestyle     Physical activity:     Days per week: Not on file     Minutes per session: Not on file     Stress: Not on file   Relationships     Social connections:     Talks on phone: Not on file     Gets together: Not on file     Attends Druze service: Not on file     Active member of club or organization: Not on file     Attends meetings of clubs or organizations: Not on file     Relationship status: Not on file     Intimate partner violence:     Fear of current or ex partner: Not on file      "Emotionally abused: Not on file     Physically abused: Not on file     Forced sexual activity: Not on file   Other Topics Concern     Parent/sibling w/ CABG, MI or angioplasty before 65F 55M? No   Social History Narrative     Not on file       She works as a manicurist part time.    Current Outpatient Medications   Medication     Cyanocobalamin (B-12) 1000 MCG TABS     fluticasone (FLONASE) 50 MCG/ACT nasal spray     magnesium 200 MG TABS     rOPINIRole (REQUIP) 0.25 MG tablet     sertraline (ZOLOFT) 100 MG tablet     traZODone (DESYREL) 100 MG tablet     Triprolidine-Pseudoephedrine (APRODINE PO)     albuterol (PROAIR HFA/PROVENTIL HFA/VENTOLIN HFA) 108 (90 Base) MCG/ACT inhaler     azelastine (ASTELIN) 0.1 % nasal spray     estradiol (ESTRING) 2 MG vaginal ring     loratadine (CLARITIN) 10 MG tablet     omeprazole (PRILOSEC) 40 MG capsule     SUMAtriptan (IMITREX) 50 MG tablet     No current facility-administered medications for this visit.      Allergies   Allergen Reactions     Atorvastatin Cramps     Muscle pain     Morphine Itching     Morphine Sulfate Itching         Objective:  /70   Ht 1.598 m (5' 2.91\")   Wt 62.6 kg (138 lb)   BMI 24.51 kg/m       General: Alert and in no distress    Head: Normocephalic, atraumatic  Eyes: no scleral icterus or conjunctival erythema   Oropharynx:  Mucous membranes moist  Skin: no erythema, petechiae, or jaundice  CV: regular rhythm by palpation, 2+ distal pulses  Resp: normal respiratory effort without conversational dyspnea   Psych: normal mood and affect    Gait: Non-antalgic, appropriate coordination and balance   Neuro: Motor strength and sensation as noted below    Musculoskeletal:    Bilateral Wrist and Hand exam    Inspection:       No swelling, bruising or deformity bilaterally    Palpation:  -Tenderness over the CMC joints bilaterally    ROM:       Full and symmetric active range of motion of the forearm, wrist and digits bilaterally    Strength:  Forearm " supination 5/5 bilaterally  Forearm pronation 5/5 bilaterally  Wrist extension 5/5 bilaterally  Wrist flexion 5/5 bilaterally   strength 5/5 bilaterally  Finger abduction 5/5 bilaterally    Special Tests:  -Pain with right basal grind test and Finkelstein's    Neurovascular:       2+ radial pulses bilaterally and normal sensation to light touch in the radial, median and ulnar nerve distributions      Radiology:  X-rays ordered and independent visualization of images performed and reviewed with Amee.    Recent Results (from the past 744 hour(s))   XR Finger Bilateral G/E 2 vw    Narrative    FINGER BILATERAL TWO OR MORE VIEWS 1/23/2020 10:02 AM     HISTORY: Chronic thumb pain, bilateral.       Impression    IMPRESSION: Bilateral first carpometacarpal joint degenerative  arthrosis. Left scaphotrapezium trapezoid joint degenerative changes  are also noted. Left first metacarpophalangeal joint space  radial-sided narrowing or degenerative changes are also noted.       Hand / Upper Extremity Injection/Arthrocentesis: bilateral thumb CMC  Date/Time: 1/23/2020 10:18 AM  Performed by: Yasemin Simental MD  Authorized by: Yasemin Simental MD     Indications:  Pain  Needle Size:  25 G  Guidance: landmark    Approach:  Radial  Condition: osteoarthritis    Laterality:  Bilateral  Location:  Thumb  Site:  Bilateral thumb CMC    Medications (Right):  20 mg triamcinolone 40 MG/ML  Medications (Right) comment:  0.5ml 0.5% bupivicaine  NDC:46408-114-32  Lot: [id number]  3/31/20      Medications (Left):  20 mg triamcinolone 40 MG/ML  Medications (Left) comment:  0.5ml 0.5% bupivicaine  NDC:27891-388-66  Lot: [id number]  3/31/20          Assessment:  1. Chronic thumb pain, bilateral    2. Osteoarthritis of carpometacarpal (CMC) joint of both thumbs        Plan:  Discussed the assessment with the patient and developed a plan together:  -Steroid injections performed today.  Take it easy over the next few days.  Keep in mind that the steroid may take up to 3 days to start working and up to 2 weeks to reach maximal effect.  Ice 15-20 minutes as needed for soreness.  Patient's preferred over the counter medication as needed for pain as directed on packaging.  -Avoid repetitive aggravating activities  -Brace as needed for comfort and support      Follow Up: As needed if symptoms fail to improve or worsen. Please call with any questions or concerns.     Sujata Simental MD, [facility] Sports Medicine  [facility]    Again, thank you for allowing me to participate in the care of your patient.        Sincerely,        Yasemin Simental MD

## 2020-01-28 DIAGNOSIS — J30.1 CHRONIC SEASONAL ALLERGIC RHINITIS DUE TO POLLEN: ICD-10-CM

## 2020-01-30 RX ORDER — FLUTICASONE PROPIONATE 220 UG/1
AEROSOL, METERED RESPIRATORY (INHALATION)
Qty: 10.6 G | Refills: 0 | Status: SHIPPED | OUTPATIENT
Start: 2020-01-30 | End: 2020-03-19

## 2020-01-30 NOTE — TELEPHONE ENCOUNTER
flovent  Last Written Prescription Date:  18  Last Fill Quantity: 1,  # refills: 1   Last office visit: 19 (allergic rhinintis listed in the OV note dx)  Future Office Visit:      Requested Prescriptions   Pending Prescriptions Disp Refills     fluticasone (FLOVENT HFA) 220 MCG/ACT inhaler 10.6 g 1     Si puff twice daily, rinse mouth out after Medication       There is no refill protocol information for this order          Aris: Patient was supposed to f/u in 3 months but hasn't. And I don't see that [facility] ordered this in the past.    Yanna Small RN on 2020 at 9:15 AM

## 2020-02-17 ENCOUNTER — HEALTH MAINTENANCE LETTER (OUTPATIENT)
Age: 70
End: 2020-02-17

## 2020-02-26 ENCOUNTER — MYC MEDICAL ADVICE (OUTPATIENT)
Dept: OTOLARYNGOLOGY | Facility: OTHER | Age: 70
End: 2020-02-26

## 2020-02-26 NOTE — TELEPHONE ENCOUNTER
Patient calling on the status of the letter to the insurance for the acupuncture. Please my chart her.

## 2020-02-27 NOTE — TELEPHONE ENCOUNTER
Messages sent to patient via [employer] by Yanna ORELLANA were read by patient. Response was sent in different encounter, closing encounter.     Ansley CRUZ, Lead RN, BSN. . .  2/27/2020, 1:24 PM

## 2020-03-17 ENCOUNTER — MYC MEDICAL ADVICE (OUTPATIENT)
Dept: INTERNAL MEDICINE | Facility: CLINIC | Age: 70
End: 2020-03-17

## 2020-03-17 DIAGNOSIS — K14.8 TONGUE LESION: Primary | ICD-10-CM

## 2020-03-18 ENCOUNTER — TELEPHONE (OUTPATIENT)
Dept: FAMILY MEDICINE | Facility: CLINIC | Age: 70
End: 2020-03-18

## 2020-03-18 DIAGNOSIS — J45.20 MILD INTERMITTENT ASTHMA WITHOUT COMPLICATION: ICD-10-CM

## 2020-03-18 NOTE — TELEPHONE ENCOUNTER
"Patient came to [place] to  RX. Informed patient that the RX was not ready to be picked up. Patient states that the RX is too expensive and is wondering if she can get a sample of the RX for \"inbetween.\" Patient wanting to  samples and written RX at the  in [place]. Please call when ready.   "

## 2020-03-18 NOTE — TELEPHONE ENCOUNTER
Reason for Call:  Other Copy of Prescription    Detailed comments: patient wants you to send a copy of her prescription to:  [facility]    fluticasone (FLOVENT HFA) 220 MCG/ACT inhaler    Please let patient know when it is ready for  at [facility].    Phone Number Patient can be reached at:   Home number on file [phone] (home)    Best Time: asap    Can we leave a detailed message on this number? YES    Call taken on 3/18/2020 at 11:07 AM by Kenia Majano

## 2020-03-19 NOTE — TELEPHONE ENCOUNTER
Is this something you have samples of? (flovent)    Ansley CRUZ, Lead RN, BSN. . .  3/19/2020, 8:28 AM

## 2020-03-19 NOTE — TELEPHONE ENCOUNTER
Spoke with patient, she would like a hand signed, hard copy of the RX because she is able to get this medication much cheaper from [facility].  She would like the RX emailed to her via secure fax to: [email or web address].  She is aware Dr. Hinkle may not get to this until early next week when he returns to clinic. Pended RX to print.     No samples available.     Ansley CRUZ, Lead RN, BSN. . .  3/19/2020, 10:55 AM

## 2020-03-23 RX ORDER — ALBUTEROL SULFATE 90 UG/1
2 AEROSOL, METERED RESPIRATORY (INHALATION) EVERY 4 HOURS PRN
Qty: 1 INHALER | Refills: 2 | Status: SHIPPED | OUTPATIENT
Start: 2020-03-23 | End: 2020-10-22

## 2020-03-23 NOTE — TELEPHONE ENCOUNTER
Script sent to patient's e-mail and called to let patient know. PT voiced in understanding.     Slime Haynes, ANA

## 2020-03-27 ENCOUNTER — MYC MEDICAL ADVICE (OUTPATIENT)
Dept: OTOLARYNGOLOGY | Facility: OTHER | Age: 70
End: 2020-03-27

## 2020-03-30 NOTE — TELEPHONE ENCOUNTER
Yes patient should keep the lip moist with little Vaseline on the outside not inside the lip.  Otherwise if she is dissatisfied with cosmetic appearance as it heals she may see us to discuss potential later cosmetic revision of scar.  Freddie Hinkle MD

## 2020-03-31 NOTE — TELEPHONE ENCOUNTER
Call back to the patient, patient states the cut is in the lower inside of the lip. Patient states the lip has been feeling much better, patient states she will call if she should have any other questions or concerns., but has no concerns at this time..........................     ..................Perlita Pike CMA  ([facility])

## 2020-04-17 ENCOUNTER — TELEPHONE (OUTPATIENT)
Dept: FAMILY MEDICINE | Facility: CLINIC | Age: 70
End: 2020-04-17

## 2020-04-17 NOTE — TELEPHONE ENCOUNTER
Reason for call:  Medication  Reason for Call:  Medication or medication refill:    Do you use a [facility]?  Name of the pharmacy and phone number for the current request:  [facility]    Name of the medication requested: fluticasone (FLONASE) 50 MCG/ACT nasal spray     Other request: pt would like to get a refill    Can we leave a detailed message on this number? YES    Phone number patient can be reached at: Cell number on file:    Telephone Information:   Mobile [phone]       Best Time: anytime    Call taken on 4/17/2020 at 10:21 AM by Suze Benson

## 2020-04-20 DIAGNOSIS — J32.9 CHRONIC SINUSITIS, UNSPECIFIED LOCATION: ICD-10-CM

## 2020-04-20 NOTE — TELEPHONE ENCOUNTER
Last Written Prescription Date:  11/06/19/  /Last Fill Quantity: 16 g,  # refills: 3   Last office visit: No previous visit found with prescribing provider: 4/4/19  Future Office Visit:  no    Requested Prescriptions   Pending Prescriptions Disp Refills     fluticasone (FLONASE) 50 MCG/ACT nasal spray 16 g 3     Sig: Spray 2 sprays into both nostrils daily       There is no refill protocol information for this order            Alana Perez on 4/20/2020 at 8:25 AM

## 2020-04-22 RX ORDER — FLUTICASONE PROPIONATE 50 MCG
2 SPRAY, SUSPENSION (ML) NASAL DAILY
Qty: 16 G | Refills: 3 | Status: SHIPPED | OUTPATIENT
Start: 2020-04-22 | End: 2020-09-24

## 2020-06-19 ENCOUNTER — OFFICE VISIT (OUTPATIENT)
Dept: FAMILY MEDICINE | Facility: OTHER | Age: 70
End: 2020-06-19
Payer: COMMERCIAL

## 2020-06-19 VITALS
HEART RATE: 75 BPM | OXYGEN SATURATION: 99 % | WEIGHT: 138 LBS | RESPIRATION RATE: 14 BRPM | TEMPERATURE: 98.3 F | HEIGHT: 63 IN | BODY MASS INDEX: 24.45 KG/M2 | DIASTOLIC BLOOD PRESSURE: 62 MMHG | SYSTOLIC BLOOD PRESSURE: 100 MMHG

## 2020-06-19 DIAGNOSIS — T14.8XXA HEMATOMA OF SKIN: Primary | ICD-10-CM

## 2020-06-19 PROCEDURE — 99213 OFFICE O/P EST LOW 20 MIN: CPT | Performed by: FAMILY MEDICINE

## 2020-06-19 ASSESSMENT — MIFFLIN-ST. JEOR: SCORE: 1130.09

## 2020-06-19 NOTE — PATIENT INSTRUCTIONS
Patient Education     Treatment for Bone Bruise (Bone Contusion)  A bone bruise is an injury to a bone that is less severe than a bone fracture. Bone bruises are fairly common. They can happen to people of all ages. Any type of bone in your body can get a bone bruise. Other injuries often happen along with a bone bruise, such as damage to nearby ligaments.  Types of treatment  Treatment for a bone bruise may include:    Resting the bone or joint    Putting an ice pack on the area several times a day    Raising the injury above the level of your heart to reduce swelling    Taking medicine to reduce pain and swelling    Wearing a brace or other device to limit movement  Your healthcare provider may give you advice about your diet. This is because eating a diet that is rich in calcium, vitamin D, and protein can help you heal. Your healthcare provider may ask you not to use certain over-the-counter medicines for pain. Some of these may delay normal bone healing. If you smoke, your healthcare provider will advise you to stop smoking. Smoking can also delay bone healing.  Your healthcare provider will tell you how long you should not put weight on your bone. Most bone bruises slowly heal over 2 to 4 months. A larger bone bruise may take longer to heal. You may not be able to return to sports activities for weeks or months. If your symptoms don t go away, your healthcare provider may order an MRI.  Possible complications of a bone bruise  Most bone bruises heal without any problems. If your bone bruise is very large, your body may have trouble getting blood flow back to the area. This can cause avascular necrosis of the bone. This leads to death of that part of the bone.  When to call the healthcare provider  Call your healthcare provider if your symptoms don t start to get better in a few days. Call him or her right away if you have any severe symptoms, such as a high fever.  Date Last Reviewed: 5/1/2018 2000-2019  [email or web address] [address] [place] [address], [place] [address]. All rights reserved. This information is not intended as a substitute for professional medical care. Always follow your healthcare professional's instructions.

## 2020-06-19 NOTE — PROGRESS NOTES
Subjective     Amee Crews is a 69 year old female who presents to clinic today for the following health issues:    HPI   Concern - possible hematoma   Onset: 1 week    Description:   Large painful lump on inner right calf. Patient slipped between a boat and a dock    Intensity: moderate    Progression of Symptoms:  Same/worsening    Accompanying Signs & Symptoms:  Tender, bruised, firm    Previous history of similar problem:   none    Precipitating factors:   Worsened by: being on her feet a lot    Alleviating factors:  Improved by: ice, heat    Therapies Tried and outcome: ice, heat     Patient fell 1 week ago between a boat and a dock. Hit lower extremities on underwater cables. Had significant pain. Pain is worse today on the left shin and wanted to have it checked out. No fever, chills, SOB, rash, etc.    Not on blood thinners.    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, moderatly severe,slowly worsening     Pinguecula of both eyes     Nuclear cataract of both eyes     Myopia, bilateral     Macular cyst, hole, or pseudohole, bilateral, moderatly severe     Adult vitelliform macular degeneration     Gastroesophageal reflux disease without esophagitis     Best vitelliform macular dystrophy     DDD (degenerative disc disease), lumbar     Chronic pain of left knee     Vitamin B12 deficiency     Osteopenia, unspecified location     Past  Surgical History:   Procedure Laterality Date     APPENDECTOMY      age 9      BIOPSY      took tissue from my uterus at least 15 years ago     COLONOSCOPY  14 years     COLONOSCOPY N/A 2015    Procedure: COMBINED COLONOSCOPY, SINGLE OR MULTIPLE BIOPSY/POLYPECTOMY BY BIOPSY;  Surgeon: Doin Carey MD;  Location: [facility]     COLONOSCOPY WITH CO2 INSUFFLATION N/A 2015    Procedure: COLONOSCOPY WITH CO2 INSUFFLATION;  Surgeon: Doni Carey MD;  Location: [facility]     COMBINED ESOPHAGOSCOPY, GASTROSCOPY, DUODENOSCOPY (EGD) WITH CO2 INSUFFLATION N/A 2017    Procedure: COMBINED ESOPHAGOSCOPY, GASTROSCOPY, DUODENOSCOPY (EGD) WITH CO2 INSUFFLATION;  EGD, Gastroesophageal reflux disease, esophagitis presence not specified Abdominal pain, generalized, Ref Paramjit, 24.78 BMI;  Surgeon: Duane, William Charles, MD;  Location: [facility] OR     ENT SURGERY      Tonsillectomy     ESOPHAGOSCOPY, GASTROSCOPY, DUODENOSCOPY (EGD), COMBINED N/A 2017    Procedure: COMBINED ESOPHAGOSCOPY, GASTROSCOPY, DUODENOSCOPY (EGD), BIOPSY SINGLE OR MULTIPLE;;  Surgeon: Duane, William Charles, MD;  Location: [facility] OR     LAPAROSCOPY PROCEDURE UNLISTED      polyps found     MOUTH SURGERY  2016     SINUS SURGERY      x2       Social History     Tobacco Use     Smoking status: Former Smoker     Packs/day: 0.00     Types: Cigarettes     Last attempt to quit: 1973     Years since quittin.4     Smokeless tobacco: Never Used   Substance Use Topics     Alcohol use: Yes     Alcohol/week: 0.0 standard drinks     Comment: ocass     Family History   Problem Relation Age of Onset     Diabetes Mother      Hypertension Mother      Cancer Father         stomach      Cancer Maternal Grandfather         lung      Heart Disease Other          Current Outpatient Medications   Medication Sig Dispense Refill     albuterol (PROAIR HFA/PROVENTIL HFA/VENTOLIN HFA) 108 (90 Base) MCG/ACT inhaler Inhale 2 puffs into the lungs every  "4 hours as needed for shortness of breath / dyspnea 1 Inhaler 2     Cyanocobalamin (B-12) 1000 MCG TABS TAKE ONE TABLET BY MOUTH ONCE DAILY 90 tablet 3     fluticasone (FLONASE) 50 MCG/ACT nasal spray Spray 2 sprays into both nostrils daily 16 g 3     fluticasone (FLOVENT HFA) 220 MCG/ACT inhaler 1 puff twice daily, rinse mouth out after Medication 12 g 11     magnesium 200 MG TABS Take 200 mg by mouth daily 1 tablet 0     omeprazole (PRILOSEC) 40 MG capsule Take 1 capsule (40 mg) by mouth daily as needed Take 30-60 minutes before a meal.       rOPINIRole (REQUIP) 0.25 MG tablet TAKE ONE TABLET BY MOUTH AT BEDTIME 90 tablet 3     sertraline (ZOLOFT) 100 MG tablet Take 1 tablet (100 mg) by mouth daily 90 tablet 3     SUMAtriptan (IMITREX) 50 MG tablet TAKE ONE TABLET AT ONSET OF HEADACHE. MAY REPEAT AFTER 2 HOURS. DO NOT EXCEED 200 MG IN 24 HOURS 18 tablet 4     traZODone (DESYREL) 100 MG tablet TAKE ONE TABLET BY MOUTH AT BEDTIME 90 tablet 3     Triprolidine-Pseudoephedrine (APRODINE PO) Take by mouth as needed       estradiol (ESTRING) 2 MG vaginal ring Place 1 each vaginally every 3 months (Patient not taking: Reported on 6/19/2020) 3 each 11     loratadine (CLARITIN) 10 MG tablet Take 10 mg by mouth daily       Allergies   Allergen Reactions     Atorvastatin Cramps     Muscle pain     Morphine Itching     Morphine Sulfate Itching     Reviewed and updated as needed this visit by Provider  Tobacco  Allergies  Meds  Problems  Med Hx  Surg Hx  Fam Hx       Review of Systems   Constitutional, HEENT, cardiovascular, pulmonary, gi and gu systems are negative, except as otherwise noted.      Objective    /62   Pulse 75   Temp 98.3  F (36.8  C) (Temporal)   Resp 14   Ht 1.6 m (5' 3\")   Wt 62.6 kg (138 lb)   SpO2 99%   BMI 24.45 kg/m    Body mass index is 24.45 kg/m .  Physical Exam   General: Appears well and in no acute distress.  Cardiovascular: Regular rate and rhythm, normal S1 and S2 without " murmur. No extra heartsounds or friction rub. DP and TP pulses normal.  Respiratory: Lungs clear to auscultation bilaterally. No wheezing or crackles. No prolonged expiration. Symmetrical chest rise.  Musculoskeletal: Pain to palpation over medial shin of left lower extremity. Trace edema of the shin down to the ankle. Some overlying ecchymosis in a gravity dependant fashion. Mobile ~3 in area of fluctuance.    Diagnostic Test Results:  none         Assessment & Plan :   1. Hematoma of skin: No evidence of neurovascular compromise. Tender to area of prior trauma with likely hematoma. No evidence of infection. Treat conservatively (ice, NSAIDs, etc) and reassured. Patient agreeable with plan.    Return in about 2 weeks (around 7/3/2020), or if symptoms worsen or fail to improve.    Miquel Montgomery MD  [facility]    This chart is completed utilizing dictation software; typos and/or incorrect word substitutions may unintentionally occur.

## 2020-06-23 ENCOUNTER — TRANSFERRED RECORDS (OUTPATIENT)
Dept: HEALTH INFORMATION MANAGEMENT | Facility: CLINIC | Age: 70
End: 2020-06-23

## 2020-07-06 ENCOUNTER — MYC MEDICAL ADVICE (OUTPATIENT)
Dept: FAMILY MEDICINE | Facility: OTHER | Age: 70
End: 2020-07-06

## 2020-08-03 ENCOUNTER — TELEPHONE (OUTPATIENT)
Dept: INTERNAL MEDICINE | Facility: CLINIC | Age: 70
End: 2020-08-03

## 2020-08-03 NOTE — TELEPHONE ENCOUNTER
Patient called and informed.  Medicare Wellness exam scheduled with Dr. Kelly for Thursday, October 22nd at 11:30 a.m. Federica Bartlett,

## 2020-08-03 NOTE — TELEPHONE ENCOUNTER
Please have patient schedule medicare wellness physical and fasting labs with Patrica Kelly MD to repeat labs.  I think they are likely high due to eating before.    Gerda Kate, CNP

## 2020-08-03 NOTE — TELEPHONE ENCOUNTER
"Patient dropped off [facility] Screening results that were done at the [facility] on 6-, with note from patient \"should I schedule an appointment\"?    Results given to Gerda Kate CNP to review and advise.  Federica Bartlett,     "

## 2020-08-26 NOTE — TELEPHONE ENCOUNTER
Flonase  Last Written Prescription Date:    Last Fill Quantity: ,  # refills:    Last office visit: No previous visit found with prescribing provider:     Future Office Visit:      Requested Prescriptions   Pending Prescriptions Disp Refills     fluticasone (FLONASE) 50 MCG/ACT nasal spray 16 g 3     Sig: Spray 2 sprays into both nostrils daily       Nasal Allergy Protocol Passed - 4/20/2020  8:28 AM        Passed - Patient is age 12 or older        Passed - Medication is active on med list             See below, approved by protocol.     Ansley CRUZ, Lead RN, BSN. . .  4/22/2020, 10:09 AM     [de-identified] : L5-S1 lumbar fusion 7/2018

## 2020-10-22 ENCOUNTER — OFFICE VISIT (OUTPATIENT)
Dept: INTERNAL MEDICINE | Facility: CLINIC | Age: 70
End: 2020-10-22
Payer: COMMERCIAL

## 2020-10-22 ENCOUNTER — ANCILLARY PROCEDURE (OUTPATIENT)
Dept: MAMMOGRAPHY | Facility: CLINIC | Age: 70
End: 2020-10-22
Payer: COMMERCIAL

## 2020-10-22 VITALS
RESPIRATION RATE: 17 BRPM | TEMPERATURE: 98.1 F | HEART RATE: 87 BPM | WEIGHT: 137.8 LBS | BODY MASS INDEX: 24.41 KG/M2 | HEIGHT: 63 IN | DIASTOLIC BLOOD PRESSURE: 78 MMHG | SYSTOLIC BLOOD PRESSURE: 112 MMHG | OXYGEN SATURATION: 98 %

## 2020-10-22 DIAGNOSIS — E78.5 HYPERLIPIDEMIA LDL GOAL <130: ICD-10-CM

## 2020-10-22 DIAGNOSIS — Z12.31 VISIT FOR SCREENING MAMMOGRAM: ICD-10-CM

## 2020-10-22 DIAGNOSIS — G47.00 INSOMNIA, UNSPECIFIED TYPE: ICD-10-CM

## 2020-10-22 DIAGNOSIS — J30.1 CHRONIC SEASONAL ALLERGIC RHINITIS DUE TO POLLEN: ICD-10-CM

## 2020-10-22 DIAGNOSIS — R73.9 HYPERGLYCEMIA: ICD-10-CM

## 2020-10-22 DIAGNOSIS — Z51.81 ENCOUNTER FOR THERAPEUTIC DRUG MONITORING: ICD-10-CM

## 2020-10-22 DIAGNOSIS — J32.9 CHRONIC SINUSITIS, UNSPECIFIED LOCATION: ICD-10-CM

## 2020-10-22 DIAGNOSIS — Z00.00 MEDICARE ANNUAL WELLNESS VISIT, SUBSEQUENT: Primary | ICD-10-CM

## 2020-10-22 DIAGNOSIS — Z23 NEED FOR PROPHYLACTIC VACCINATION AND INOCULATION AGAINST INFLUENZA: ICD-10-CM

## 2020-10-22 DIAGNOSIS — J45.20 MILD INTERMITTENT ASTHMA WITHOUT COMPLICATION: ICD-10-CM

## 2020-10-22 DIAGNOSIS — N95.2 VAGINAL ATROPHY: ICD-10-CM

## 2020-10-22 DIAGNOSIS — M81.0 OSTEOPOROSIS WITHOUT CURRENT PATHOLOGICAL FRACTURE, UNSPECIFIED OSTEOPOROSIS TYPE: ICD-10-CM

## 2020-10-22 DIAGNOSIS — F32.5 MAJOR DEPRESSION IN COMPLETE REMISSION (H): ICD-10-CM

## 2020-10-22 DIAGNOSIS — G25.81 RESTLESS LEG: ICD-10-CM

## 2020-10-22 DIAGNOSIS — G43.809 OTHER MIGRAINE WITHOUT STATUS MIGRAINOSUS, NOT INTRACTABLE: ICD-10-CM

## 2020-10-22 LAB
ANION GAP SERPL CALCULATED.3IONS-SCNC: 5 MMOL/L (ref 3–14)
BUN SERPL-MCNC: 10 MG/DL (ref 7–30)
CALCIUM SERPL-MCNC: 8.9 MG/DL (ref 8.5–10.1)
CHLORIDE SERPL-SCNC: 104 MMOL/L (ref 94–109)
CHOLEST SERPL-MCNC: 317 MG/DL
CO2 SERPL-SCNC: 27 MMOL/L (ref 20–32)
CREAT SERPL-MCNC: 0.82 MG/DL (ref 0.52–1.04)
GFR SERPL CREATININE-BSD FRML MDRD: 74 ML/MIN/{1.73_M2}
GLUCOSE SERPL-MCNC: 90 MG/DL (ref 70–99)
HBA1C MFR BLD: 5 % (ref 0–5.6)
HDLC SERPL-MCNC: 62 MG/DL
LDLC SERPL CALC-MCNC: 230 MG/DL
NONHDLC SERPL-MCNC: 255 MG/DL
POTASSIUM SERPL-SCNC: 4.4 MMOL/L (ref 3.4–5.3)
SODIUM SERPL-SCNC: 136 MMOL/L (ref 133–144)
TRIGL SERPL-MCNC: 127 MG/DL

## 2020-10-22 PROCEDURE — 99213 OFFICE O/P EST LOW 20 MIN: CPT | Mod: 25 | Performed by: INTERNAL MEDICINE

## 2020-10-22 PROCEDURE — 90662 IIV NO PRSV INCREASED AG IM: CPT | Performed by: INTERNAL MEDICINE

## 2020-10-22 PROCEDURE — G0008 ADMIN INFLUENZA VIRUS VAC: HCPCS | Performed by: INTERNAL MEDICINE

## 2020-10-22 PROCEDURE — 82306 VITAMIN D 25 HYDROXY: CPT | Performed by: INTERNAL MEDICINE

## 2020-10-22 PROCEDURE — 80061 LIPID PANEL: CPT | Performed by: INTERNAL MEDICINE

## 2020-10-22 PROCEDURE — 80048 BASIC METABOLIC PNL TOTAL CA: CPT | Performed by: INTERNAL MEDICINE

## 2020-10-22 PROCEDURE — 99397 PER PM REEVAL EST PAT 65+ YR: CPT | Mod: 25 | Performed by: INTERNAL MEDICINE

## 2020-10-22 PROCEDURE — 36415 COLL VENOUS BLD VENIPUNCTURE: CPT | Performed by: INTERNAL MEDICINE

## 2020-10-22 PROCEDURE — 83036 HEMOGLOBIN GLYCOSYLATED A1C: CPT | Performed by: INTERNAL MEDICINE

## 2020-10-22 PROCEDURE — 77067 SCR MAMMO BI INCL CAD: CPT | Performed by: RADIOLOGY

## 2020-10-22 RX ORDER — AZELASTINE 1 MG/ML
1 SPRAY, METERED NASAL 2 TIMES DAILY
Qty: 1 BOTTLE | Refills: 2 | Status: SHIPPED | OUTPATIENT
Start: 2020-10-22

## 2020-10-22 RX ORDER — FLUTICASONE PROPIONATE 220 UG/1
AEROSOL, METERED RESPIRATORY (INHALATION)
Qty: 12 G | Refills: 11 | Status: SHIPPED | OUTPATIENT
Start: 2020-10-22 | End: 2021-09-20

## 2020-10-22 RX ORDER — ROPINIROLE 0.5 MG/1
TABLET, FILM COATED ORAL
Qty: 90 TABLET | Refills: 1 | Status: SHIPPED | OUTPATIENT
Start: 2020-10-22 | End: 2021-05-03

## 2020-10-22 RX ORDER — FLUTICASONE PROPIONATE 50 MCG
2 SPRAY, SUSPENSION (ML) NASAL DAILY
Qty: 16 G | Refills: 4 | Status: SHIPPED | OUTPATIENT
Start: 2020-10-22 | End: 2021-04-21

## 2020-10-22 RX ORDER — SUMATRIPTAN 50 MG/1
TABLET, FILM COATED ORAL
Qty: 18 TABLET | Refills: 4 | Status: SHIPPED | OUTPATIENT
Start: 2020-10-22 | End: 2021-09-20

## 2020-10-22 RX ORDER — CHOLECALCIFEROL (VITAMIN D3) 50 MCG
1 TABLET ORAL EVERY OTHER DAY
COMMUNITY
Start: 2020-10-22 | End: 2021-09-20

## 2020-10-22 RX ORDER — ALBUTEROL SULFATE 90 UG/1
2 AEROSOL, METERED RESPIRATORY (INHALATION) EVERY 4 HOURS PRN
Qty: 1 INHALER | Refills: 2 | Status: SHIPPED | OUTPATIENT
Start: 2020-10-22 | End: 2021-09-20

## 2020-10-22 RX ORDER — SERTRALINE HYDROCHLORIDE 100 MG/1
100 TABLET, FILM COATED ORAL DAILY
Qty: 90 TABLET | Refills: 3 | Status: SHIPPED | OUTPATIENT
Start: 2020-10-22 | End: 2021-09-20

## 2020-10-22 RX ORDER — TRAZODONE HYDROCHLORIDE 100 MG/1
TABLET ORAL
Qty: 90 TABLET | Refills: 3 | Status: SHIPPED | OUTPATIENT
Start: 2020-10-22 | End: 2021-09-20

## 2020-10-22 ASSESSMENT — ENCOUNTER SYMPTOMS
HEMATURIA: 0
DIARRHEA: 0
COUGH: 0
CHILLS: 0
ABDOMINAL PAIN: 0
CONSTIPATION: 0
HEMATOCHEZIA: 0

## 2020-10-22 ASSESSMENT — PATIENT HEALTH QUESTIONNAIRE - PHQ9: SUM OF ALL RESPONSES TO PHQ QUESTIONS 1-9: 1

## 2020-10-22 ASSESSMENT — MIFFLIN-ST. JEOR: SCORE: 1120.93

## 2020-10-22 ASSESSMENT — PAIN SCALES - GENERAL: PAINLEVEL: NO PAIN (0)

## 2020-10-22 ASSESSMENT — ACTIVITIES OF DAILY LIVING (ADL): CURRENT_FUNCTION: NO ASSISTANCE NEEDED

## 2020-10-22 NOTE — PROGRESS NOTES
"SUBJECTIVE:   Amee Crews is a 70 year old female who presents for Preventive Visit.  Patient has been advised of split billing requirements and indicates understanding: Yes   Are you in the first 12 months of your Medicare coverage?  No  Healthy Habits:     In general, how would you rate your overall health?  Good    Frequency of exercise:  1 day/week    Duration of exercise:  Less than 15 minutes    Do you usually eat at least 4 servings of fruit and vegetables a day, include whole grains    & fiber and avoid regularly eating high fat or \"junk\" foods?  No    Taking medications regularly:  Yes    Medication side effects:  None    Ability to successfully perform activities of daily living:  No assistance needed    Home Safety:  Lack of grab bars in the bathroom and lighting is poor    Hearing Impairment:  Difficulty understanding soft or whispered speech    In the past 6 months, have you been bothered by leaking of urine?  No    In general, how would you rate your overall mental or emotional health?  Good      PHQ-2 Total Score: 0    Do you feel safe in your environment? Yes    Have you ever done Advance Care Planning? (For example, a Health Directive, POLST, or a discussion with a medical provider or your loved ones about your wishes): Yes, patient states has an Advance Care Planning document and will bring a copy to the clinic.    Fall risk  Fallen 2 or more times in the past year?: Yes  Any fall with injury in the past year?: No    Cognitive Screening   1) Repeat 3 items (Leader, Season, Table)    2) Clock draw: NORMAL  3) 3 item recall: Recalls 3 objects  Results: 3 items recalled: COGNITIVE IMPAIRMENT LESS LIKELY    Mini-CogTM Copyright FRANCIS Velasquez. Licensed by the author for use in [facility]; reprinted with permission (cb@.[facility]). All rights reserved.      Do you have sleep apnea, excessive snoring or daytime drowsiness?: no    Reviewed and updated as needed this visit by clinical " staff  Tobacco  Allergies  Meds  Problems  Med Hx  Surg Hx  Fam Hx  Soc Hx        Imitrex at 0-2 per month.  She has great control of migraines.    rls is worse.  She will take the requip and that helps some.  She would like a change.    Her hand is bruising a lot and she notes it is over the arthritis.  She fell a couple times this summer and hurt her leg.      She will get ocular migraines.  She has an eye disease and sees [facility] for this.  The ocular migraines last 1 minute    She has hemorrhoids but they don't bother her other than for cleaning it is harder.    She has seen a yellow film that is seem around her clitoral area.     Her asthma and depression are under good control.  See ACT and PHQ-9.    Reviewed and updated as needed this visit by Provider  Tobacco  Allergies  Meds  Problems  Med Hx  Surg Hx  Fam Hx         Social History     Tobacco Use     Smoking status: Former Smoker     Packs/day: 0.00     Types: Cigarettes     Quit date: 1973     Years since quittin.8     Smokeless tobacco: Never Used   Substance Use Topics     Alcohol use: Yes     Alcohol/week: 0.0 standard drinks     Comment: ocass   If you drink alcohol do you typically have >3 drinks per day or >7 drinks per week? no  Alcohol Use 10/22/2020   Prescreen: >3 drinks/day or >7 drinks/week? No   Prescreen: >3 drinks/day or >7 drinks/week? -   AUDIT SCORE  -     AUDIT - Alcohol Use Disorders Identification Test - Reproduced from the World Health Organization Audit 2001 (Second Edition) 2018   1.  How often do you have a drink containing alcohol? 2 to 3 times a week   2.  How many drinks containing alcohol do you have on a typical day when you are drinking? 1 or 2   3.  How often do you have five or more drinks on one occasion? Monthly   4.  How often during the last year have you found that you were not able to stop drinking once you had started? Never   5.  How often during the last year have you failed to do  what was normally expected of you because of drinking? Never   6.  How often during the last year have you needed a first drink in the morning to get yourself going after a heavy drinking session? Never   7.  How often during the last year have you had a feeling of guilt or remorse after drinking? Monthly   8.  How often during the last year have you been unable to remember what happened the night before because of your drinking? Never   9.  Have you or someone else been injured because of your drinking? No   10. Has a relative, friend, doctor or other health care worker been concerned about your drinking or suggested you cut down? No   TOTAL SCORE 7           Current providers sharing in care for this patient include:   Patient Care Team:  Patrica Kelly MD as PCP - General (Internal Medicine)  Miquel Montgomery MD as Assigned PCP    The following health maintenance items are reviewed in Epic and correct as of today:  Health Maintenance   Topic Date Due     ZOSTER IMMUNIZATION (1 of 2) 08/02/2002     ASTHMA ACTION PLAN  06/26/2019     ASTHMA CONTROL TEST  10/30/2019     LIPID  11/14/2019     FALL RISK ASSESSMENT  11/14/2019     PHQ-9  02/07/2020     URINE DRUG SCREEN  04/30/2020     COLORECTAL CANCER SCREENING  04/30/2020     MAMMO SCREENING  06/10/2020     INFLUENZA VACCINE (1) 09/01/2020     MEDICARE ANNUAL WELLNESS VISIT  10/22/2021     ADVANCE CARE PLANNING  01/16/2023     DTAP/TDAP/TD IMMUNIZATION (3 - Td) 05/24/2026     DEXA  Completed     HEPATITIS C SCREENING  Completed     DEPRESSION ACTION PLAN  Completed     MIGRAINE ACTION PLAN  Completed     Pneumococcal Vaccine: 65+ Years  Completed     Pneumococcal Vaccine: Pediatrics (0 to 5 Years) and At-Risk Patients (6 to 64 Years)  Aged Out     IPV IMMUNIZATION  Aged Out     MENINGITIS IMMUNIZATION  Aged Out     HEPATITIS B IMMUNIZATION  Aged Out     Lab work is in process      Review of Systems   Constitutional: Negative for chills.   HENT: Negative  "for congestion.    Respiratory: Negative for cough.    Cardiovascular: Negative for chest pain.   Gastrointestinal: Negative for abdominal pain, constipation, diarrhea and hematochezia.   Genitourinary: Negative for hematuria.         OBJECTIVE:   There were no vitals taken for this visit. Estimated body mass index is 24.45 kg/m  as calculated from the following:    Height as of 6/19/20: 1.6 m (5' 3\").    Weight as of 6/19/20: 62.6 kg (138 lb).  Physical Exam  GENERAL APPEARANCE: healthy, alert and no distress  EYES: Eyes grossly normal to inspection, PERRL and conjunctivae and sclerae normal  HENT: ear canals and TM's normal and nose and mouth without ulcers or lesions  NECK: no adenopathy, no asymmetry, masses, or scars and thyroid normal to palpation  RESP: lungs clear to auscultation - no rales, rhonchi or wheezes  BREAST: normal without masses, tenderness or nipple discharge and no palpable axillary masses or adenopathy  CV: regular rates and rhythm and normal S1 S2, no S3 or S4  LYMPHATICS: normal ant/post cervical and supraclavicular nodes  ABDOMEN: soft, nontender, without hepatosplenomegaly or masses and bowel sounds normal   (female): normal vulva  MS: extremities normal- no gross deformities noted  SKIN: no suspicious lesions or rashes  NEURO: Normal strength and tone, mentation intact and speech normal  PSYCH: mentation appears normal and affect normal/bright  No edema   1+ posterior tibial pulses bilateral    Rectal- anus with external hemorrhoids, non bleeding.       Diagnostic Test Results:  Labs reviewed in Epic    ASSESSMENT / PLAN:   1. Medicare annual wellness visit, subsequent      2. Mild intermittent asthma without complication  Well controlled with medications without side effects.   - albuterol (PROAIR HFA/PROVENTIL HFA/VENTOLIN HFA) 108 (90 Base) MCG/ACT inhaler; Inhale 2 puffs into the lungs every 4 hours as needed for shortness of breath / dyspnea  Dispense: 1 Inhaler; Refill: 2    3. " Major depression in complete remission (H)  Well controlled with medications without side effects.   - sertraline (ZOLOFT) 100 MG tablet; Take 1 tablet (100 mg) by mouth daily  Dispense: 90 tablet; Refill: 3    4. Restless leg  She does not have good control of her restless legs.  I will increase her ropinirole.  - rOPINIRole (REQUIP) 0.5 MG tablet; TAKE ONE TABLET BY MOUTH AT BEDTIME  Dispense: 90 tablet; Refill: 1    5. Vaginal atrophy  I do not see any discharge on today's exam.  I suspect that this is a normal variant.  She is not using her Estring    6. Other migraine without status migrainosus, not intractable  She continues to have improved control.  - SUMAtriptan (IMITREX) 50 MG tablet; TAKE ONE TABLET AT ONSET OF HEADACHE. MAY REPEAT AFTER 2 HOURS. DO NOT EXCEED 200 MG IN 24 HOURS  Dispense: 18 tablet; Refill: 4    7. Insomnia, unspecified type    - traZODone (DESYREL) 100 MG tablet; TAKE ONE TABLET BY MOUTH AT BEDTIME  Dispense: 90 tablet; Refill: 3    8. Chronic seasonal allergic rhinitis due to pollen  Well controlled with medications without side effects.   - fluticasone (FLOVENT HFA) 220 MCG/ACT inhaler; 1 puff twice daily, rinse mouth out after Medication  Dispense: 12 g; Refill: 11  - azelastine (ASTELIN) 0.1 % nasal spray; Spray 1 spray into both nostrils 2 times daily  Dispense: 1 Bottle; Refill: 2    9. Chronic sinusitis, unspecified location    - fluticasone (FLONASE) 50 MCG/ACT nasal spray; Spray 2 sprays into both nostrils daily  Dispense: 16 g; Refill: 4    10. Osteoporosis without current pathological fracture, unspecified osteoporosis type    - DX Hip/Pelvis/Spine; Future  - Vitamin D Deficiency    11. Encounter for therapeutic drug monitoring    - Basic metabolic panel    12. Hyperglycemia    - Basic metabolic panel  - Hemoglobin A1c    13. Hyperlipidemia LDL goal <130    - Lipid panel reflex to direct LDL Fasting    14. Need for prophylactic vaccination and inoculation against  "influenza    - FLUZONE HIGH DOSE 65+  [10754]  - Vaccine Administration, Initial [59578]    Patient has been advised of split billing requirements and indicates understanding: Yes  COUNSELING:  Reviewed preventive health counseling, as reflected in patient instructions    Estimated body mass index is 24.45 kg/m  as calculated from the following:    Height as of 6/19/20: 1.6 m (5' 3\").    Weight as of 6/19/20: 62.6 kg (138 lb).        She reports that she quit smoking about 47 years ago. Her smoking use included cigarettes. She smoked 0.00 packs per day. She has never used smokeless tobacco.      Appropriate preventive services were discussed with this patient, including applicable screening as appropriate for cardiovascular disease, diabetes, osteopenia/osteoporosis, and glaucoma.  As appropriate for age/gender, discussed screening for colorectal cancer, prostate cancer, breast cancer, and cervical cancer. Checklist reviewing preventive services available has been given to the patient.    Reviewed patients plan of care and provided an AVS. The Basic Care Plan (routine screening as documented in Health Maintenance) for Amee meets the Care Plan requirement. This Care Plan has been established and reviewed with the Patient.    Counseling Resources:  ATP IV Guidelines  Pooled Cohorts Equation Calculator  Breast Cancer Risk Calculator  Breast Cancer: Medication to Reduce Risk  FRAX Risk Assessment  ICSI Preventive Guidelines  Dietary Guidelines for Americans, 2010  [employer] international's MyPlate  ASA Prophylaxis  Lung CA Screening    Patrica Kelly MD  [facility]    Identified Health Risks:    Patient Instructions   See if the shingles shot is covered.     "

## 2020-10-23 ENCOUNTER — MYC MEDICAL ADVICE (OUTPATIENT)
Dept: INTERNAL MEDICINE | Facility: CLINIC | Age: 70
End: 2020-10-23

## 2020-10-23 DIAGNOSIS — E78.5 HYPERLIPIDEMIA LDL GOAL <130: Primary | ICD-10-CM

## 2020-10-23 LAB — DEPRECATED CALCIDIOL+CALCIFEROL SERPL-MC: 50 UG/L (ref 20–75)

## 2020-10-23 RX ORDER — ROSUVASTATIN CALCIUM 5 MG/1
5 TABLET, COATED ORAL DAILY
Qty: 90 TABLET | Refills: 1 | Status: SHIPPED | OUTPATIENT
Start: 2020-10-23 | End: 2021-04-01

## 2020-10-23 ASSESSMENT — ASTHMA QUESTIONNAIRES: ACT_TOTALSCORE: 22

## 2020-10-23 NOTE — RESULT ENCOUNTER NOTE
Amee Loredo.   Normal Vitamin D. Normal electrolytes. Normal kidney function. Continued dramatic elevation of cholesterol. Cholesterol medication is recommended in order to lower this.  Let me know if you would like to start it.  Dr. Kelly

## 2020-10-23 NOTE — TELEPHONE ENCOUNTER
Patient responding to result note, would like to try Crestor for cholesterol.   Karina Oakley RN

## 2020-10-27 ENCOUNTER — MYC MEDICAL ADVICE (OUTPATIENT)
Dept: INTERNAL MEDICINE | Facility: CLINIC | Age: 70
End: 2020-10-27

## 2020-10-27 DIAGNOSIS — Z12.11 SCREEN FOR COLON CANCER: Primary | ICD-10-CM

## 2020-10-27 NOTE — TELEPHONE ENCOUNTER
Patient last had colonoscopy on 4/30/15, unclear on when patient needs to follow up.  See [facility] message.   Karina Oakley RN

## 2020-11-02 ENCOUNTER — ANCILLARY PROCEDURE (OUTPATIENT)
Dept: BONE DENSITY | Facility: CLINIC | Age: 70
End: 2020-11-02
Attending: INTERNAL MEDICINE
Payer: COMMERCIAL

## 2020-11-02 DIAGNOSIS — M81.0 OSTEOPOROSIS WITHOUT CURRENT PATHOLOGICAL FRACTURE, UNSPECIFIED OSTEOPOROSIS TYPE: ICD-10-CM

## 2020-11-02 PROCEDURE — 77080 DXA BONE DENSITY AXIAL: CPT | Performed by: RADIOLOGY

## 2020-11-09 ENCOUNTER — HOSPITAL ENCOUNTER (OUTPATIENT)
Facility: AMBULATORY SURGERY CENTER | Age: 70
End: 2020-11-09
Attending: SURGERY | Admitting: SURGERY
Payer: COMMERCIAL

## 2020-11-10 DIAGNOSIS — Z11.59 ENCOUNTER FOR SCREENING FOR OTHER VIRAL DISEASES: Primary | ICD-10-CM

## 2020-11-15 DIAGNOSIS — Z11.59 ENCOUNTER FOR SCREENING FOR OTHER VIRAL DISEASES: Primary | ICD-10-CM

## 2020-12-01 RX ORDER — POLYETHYLENE GLYCOL 3350 17 G/17G
238 POWDER, FOR SOLUTION ORAL SEE ADMIN INSTRUCTIONS
Qty: 238 G | Refills: 0 | COMMUNITY
Start: 2020-12-01 | End: 2021-02-03

## 2020-12-01 RX ORDER — BISACODYL 5 MG/1
5 TABLET, DELAYED RELEASE ORAL SEE ADMIN INSTRUCTIONS
Qty: 2 TABLET | Refills: 0 | COMMUNITY
Start: 2020-12-01 | End: 2021-02-03

## 2020-12-05 DIAGNOSIS — Z11.59 ENCOUNTER FOR SCREENING FOR OTHER VIRAL DISEASES: ICD-10-CM

## 2020-12-05 PROCEDURE — U0003 INFECTIOUS AGENT DETECTION BY NUCLEIC ACID (DNA OR RNA); SEVERE ACUTE RESPIRATORY SYNDROME CORONAVIRUS 2 (SARS-COV-2) (CORONAVIRUS DISEASE [COVID-19]), AMPLIFIED PROBE TECHNIQUE, MAKING USE OF HIGH THROUGHPUT TECHNOLOGIES AS DESCRIBED BY CMS-2020-01-R: HCPCS | Performed by: SURGERY

## 2020-12-06 LAB
LABORATORY COMMENT REPORT: NORMAL
SARS-COV-2 RNA SPEC QL NAA+PROBE: NEGATIVE
SARS-COV-2 RNA SPEC QL NAA+PROBE: NORMAL
SPECIMEN SOURCE: NORMAL
SPECIMEN SOURCE: NORMAL

## 2020-12-08 ENCOUNTER — HOSPITAL ENCOUNTER (OUTPATIENT)
Facility: AMBULATORY SURGERY CENTER | Age: 70
Discharge: HOME | End: 2020-12-08
Attending: SURGERY | Admitting: SURGERY
Payer: COMMERCIAL

## 2020-12-08 VITALS
DIASTOLIC BLOOD PRESSURE: 62 MMHG | OXYGEN SATURATION: 98 % | HEART RATE: 66 BPM | TEMPERATURE: 97.6 F | SYSTOLIC BLOOD PRESSURE: 101 MMHG | RESPIRATION RATE: 16 BRPM

## 2020-12-08 DIAGNOSIS — Z12.11 SCREEN FOR COLON CANCER: Primary | ICD-10-CM

## 2020-12-08 LAB — COLONOSCOPY: NORMAL

## 2020-12-08 PROCEDURE — 45378 DIAGNOSTIC COLONOSCOPY: CPT

## 2020-12-08 PROCEDURE — G0105 COLORECTAL SCRN; HI RISK IND: HCPCS | Performed by: SURGERY

## 2020-12-08 PROCEDURE — G8918 PT W/O PREOP ORDER IV AB PRO: HCPCS

## 2020-12-08 PROCEDURE — G8907 PT DOC NO EVENTS ON DISCHARG: HCPCS

## 2020-12-08 PROCEDURE — G0500 MOD SEDAT ENDO SERVICE >5YRS: HCPCS | Performed by: SURGERY

## 2020-12-08 RX ORDER — NALOXONE HYDROCHLORIDE 0.4 MG/ML
0.2 INJECTION, SOLUTION INTRAMUSCULAR; INTRAVENOUS; SUBCUTANEOUS
Status: DISCONTINUED | OUTPATIENT
Start: 2020-12-08 | End: 2020-12-09 | Stop reason: HOSPADM

## 2020-12-08 RX ORDER — ONDANSETRON 2 MG/ML
4 INJECTION INTRAMUSCULAR; INTRAVENOUS
Status: COMPLETED | OUTPATIENT
Start: 2020-12-08 | End: 2020-12-08

## 2020-12-08 RX ORDER — ONDANSETRON 2 MG/ML
4 INJECTION INTRAMUSCULAR; INTRAVENOUS EVERY 6 HOURS PRN
Status: DISCONTINUED | OUTPATIENT
Start: 2020-12-08 | End: 2020-12-09 | Stop reason: HOSPADM

## 2020-12-08 RX ORDER — ONDANSETRON 4 MG/1
4 TABLET, ORALLY DISINTEGRATING ORAL EVERY 6 HOURS PRN
Status: DISCONTINUED | OUTPATIENT
Start: 2020-12-08 | End: 2020-12-09 | Stop reason: HOSPADM

## 2020-12-08 RX ORDER — SODIUM CHLORIDE, SODIUM LACTATE, POTASSIUM CHLORIDE, CALCIUM CHLORIDE 600; 310; 30; 20 MG/100ML; MG/100ML; MG/100ML; MG/100ML
INJECTION, SOLUTION INTRAVENOUS CONTINUOUS PRN
Status: COMPLETED | OUTPATIENT
Start: 2020-12-08 | End: 2020-12-08

## 2020-12-08 RX ORDER — NALOXONE HYDROCHLORIDE 0.4 MG/ML
0.4 INJECTION, SOLUTION INTRAMUSCULAR; INTRAVENOUS; SUBCUTANEOUS
Status: DISCONTINUED | OUTPATIENT
Start: 2020-12-08 | End: 2020-12-09 | Stop reason: HOSPADM

## 2020-12-08 RX ORDER — LIDOCAINE 40 MG/G
CREAM TOPICAL
Status: DISCONTINUED | OUTPATIENT
Start: 2020-12-08 | End: 2020-12-09 | Stop reason: HOSPADM

## 2020-12-08 RX ORDER — FENTANYL CITRATE 50 UG/ML
INJECTION, SOLUTION INTRAMUSCULAR; INTRAVENOUS PRN
Status: DISCONTINUED | OUTPATIENT
Start: 2020-12-08 | End: 2020-12-08 | Stop reason: HOSPADM

## 2020-12-08 RX ORDER — PROCHLORPERAZINE MALEATE 5 MG
5 TABLET ORAL EVERY 6 HOURS PRN
Status: DISCONTINUED | OUTPATIENT
Start: 2020-12-08 | End: 2020-12-09 | Stop reason: HOSPADM

## 2020-12-08 RX ORDER — FLUMAZENIL 0.1 MG/ML
0.2 INJECTION, SOLUTION INTRAVENOUS
Status: SHIPPED | OUTPATIENT
Start: 2020-12-08 | End: 2020-12-08

## 2020-12-08 RX ADMIN — ONDANSETRON 4 MG: 2 INJECTION INTRAMUSCULAR; INTRAVENOUS at 09:14

## 2021-01-05 DIAGNOSIS — E53.8 VITAMIN B 12 DEFICIENCY: ICD-10-CM

## 2021-01-08 RX ORDER — LANOLIN ALCOHOL/MO/W.PET/CERES
CREAM (GRAM) TOPICAL
Qty: 90 TABLET | Refills: 2 | Status: SHIPPED | OUTPATIENT
Start: 2021-01-08 | End: 2021-01-14

## 2021-01-14 ENCOUNTER — MYC MEDICAL ADVICE (OUTPATIENT)
Dept: INTERNAL MEDICINE | Facility: CLINIC | Age: 71
End: 2021-01-14

## 2021-01-14 DIAGNOSIS — E53.8 VITAMIN B 12 DEFICIENCY: ICD-10-CM

## 2021-01-14 RX ORDER — LANOLIN ALCOHOL/MO/W.PET/CERES
1000 CREAM (GRAM) TOPICAL DAILY
Qty: 90 TABLET | Refills: 2 | Status: SHIPPED | OUTPATIENT
Start: 2021-01-14 | End: 2021-09-20

## 2021-02-02 NOTE — PROGRESS NOTES
ENT Consultation    Amee Crews who is a 70 year old female seen in consultation at the request of self.      History of Present Illness - Amee Crews is a 70 year old female well-known to us status post functional endoscopic sinus surgery in the past as well as cryoablation for severe vasomotor rhinitis.  Residual symptoms however have to do more with chronic myofascial pain.  She also is experiencing partial loss of sense of smell and some loss of sense of taste.  Neither is complete.  In spite of trying different therapies the only thing that seems to be helping both her myofascial pains as well as actually helping her little bit with a sense of taste is acupuncture.  Certainly in regard to her myofascial pains which have been disabling to her acupuncture seems to be significantly effective.      Body mass index is 25.11 kg/m .    Weight management plan: Patient was referred to their PCP to discuss a diet and exercise plan.    BP Readings from Last 1 Encounters:   02/03/21 122/62       BP noted to be well controlled today in office.     Amee IS NOT a smoker/uses chewing tobacco.      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     Allergic rhinitis      Allergies      asthma     mild intermittent     Chronic back pain     neck and low back     HA (headache)     muscle contraction      History of blood transfusion     In childhood     Hot flashes      Hyperlipidemia      Insomnia      Intermittent asthma 9/16/2011     Major depressive disorder, single episode, moderate (H)      Ocular migraine      Osteoarthritis of hand      Restless leg 9/16/2011       Current Medications -   Current Outpatient Medications:      albuterol (PROAIR HFA/PROVENTIL HFA/VENTOLIN HFA) 108 (90 Base) MCG/ACT inhaler, Inhale 2 puffs into the lungs every 4 hours as needed for shortness of breath / dyspnea, Disp: 1 Inhaler, Rfl: 2     azelastine (ASTELIN) 0.1 %  nasal spray, Spray 1 spray into both nostrils 2 times daily, Disp: 1 Bottle, Rfl: 2     cyanocobalamin (VITAMIN B-12) 1000 MCG tablet, Take 1 tablet (1,000 mcg) by mouth daily, Disp: 90 tablet, Rfl: 2     fluticasone (FLONASE) 50 MCG/ACT nasal spray, Spray 2 sprays into both nostrils daily, Disp: 16 g, Rfl: 4     fluticasone (FLOVENT HFA) 220 MCG/ACT inhaler, 1 puff twice daily, rinse mouth out after Medication, Disp: 12 g, Rfl: 11     magnesium 200 MG TABS, Take 200 mg by mouth daily, Disp: 1 tablet, Rfl: 0     rOPINIRole (REQUIP) 0.5 MG tablet, TAKE ONE TABLET BY MOUTH AT BEDTIME, Disp: 90 tablet, Rfl: 1     rosuvastatin (CRESTOR) 5 MG tablet, Take 1 tablet (5 mg) by mouth daily, Disp: 90 tablet, Rfl: 1     sertraline (ZOLOFT) 100 MG tablet, Take 1 tablet (100 mg) by mouth daily, Disp: 90 tablet, Rfl: 3     SUMAtriptan (IMITREX) 50 MG tablet, TAKE ONE TABLET AT ONSET OF HEADACHE. MAY REPEAT AFTER 2 HOURS. DO NOT EXCEED 200 MG IN 24 HOURS, Disp: 18 tablet, Rfl: 4     traZODone (DESYREL) 100 MG tablet, TAKE ONE TABLET BY MOUTH AT BEDTIME, Disp: 90 tablet, Rfl: 3     Triprolidine-Pseudoephedrine (APRODINE PO), Take by mouth as needed, Disp: , Rfl:      vitamin D3 (CHOLECALCIFEROL) 50 mcg (2000 units) tablet, Take 1 tablet (50 mcg) by mouth every other day 2000IU every other day, Disp: , Rfl:     Allergies -   Allergies   Allergen Reactions     Atorvastatin Cramps     Muscle pain     Morphine Itching     Morphine Sulfate Itching       Social History -   Social History     Socioeconomic History     Marital status:      Spouse name: Not on file     Number of children: 2     Years of education: Not on file     Highest education level: Not on file   Occupational History     Employer: SELF   Social Needs     Financial resource strain: Not on file     Food insecurity     Worry: Not on file     Inability: Not on file     Transportation needs     Medical: Not on file     Non-medical: Not on file   Tobacco Use      "Smoking status: Former Smoker     Packs/day: 0.00     Types: Cigarettes     Quit date: 1973     Years since quittin.1     Smokeless tobacco: Never Used   Substance and Sexual Activity     Alcohol use: Yes     Alcohol/week: 0.0 standard drinks     Comment: one or two a day     Drug use: No     Sexual activity: Yes     Partners: Male   Lifestyle     Physical activity     Days per week: Not on file     Minutes per session: Not on file     Stress: Not on file   Relationships     Social connections     Talks on phone: Not on file     Gets together: Not on file     Attends [employer] service: Not on file     Active member of club or organization: Not on file     Attends meetings of clubs or organizations: Not on file     Relationship status: Not on file     Intimate partner violence     Fear of current or ex partner: Not on file     Emotionally abused: Not on file     Physically abused: Not on file     Forced sexual activity: Not on file   Other Topics Concern     Parent/sibling w/ CABG, MI or angioplasty before 65F 55M? No   Social History Narrative     Not on file       Family History -   Family History   Problem Relation Age of Onset     Diabetes Mother      Hypertension Mother      Cancer Father         stomach      Cancer Maternal Grandfather         lung      Heart Disease Other        Review of Systems - As per HPI and PMHx, otherwise review of system review of the head and neck negative. Otherwise 10+ review of system is negative    Physical Exam  /62   Temp 98.5  F (36.9  C) (Temporal)   Ht 1.6 m (5' 3\")   Wt 64.3 kg (141 lb 12 oz)   BMI 25.11 kg/m    BMI: Body mass index is 25.11 kg/m .    General - The patient is well nourished and well developed, and appears to have good nutritional status.  Alert and oriented to person and place, answers questions and cooperates with examination appropriately.    SKIN - No suspicious lesions or rashes.  Respiration - No respiratory distress.  Head and Face - " Normocephalic and atraumatic, with no gross asymmetry noted of the contour of the facial features.  The facial nerve is intact, with strong symmetric movements.    Voice and Breathing - The patient was breathing comfortably without the use of accessory muscles. The patients voice was clear and strong, and had appropriate pitch and quality.    Ears - Bilateral pinna and EACs with normal appearing overlying skin. Tympanic membrane intact with good mobility on pneumatic otoscopy bilaterally. Bony landmarks of the ossicular chain are normal. The tympanic membranes are normal in appearance. No retraction, perforation, or masses.  No fluid or purulence was seen in the external canal or the middle ear.     Eyes - Extraocular movements intact.  Sclera were not icteric or injected, conjunctiva were pink and moist.    Mouth - Examination of the oral cavity showed pink, healthy oral mucosa. No lesions or ulcerations noted.  The tongue was mobile and midline, and the dentition were in good condition.      Throat - The walls of the oropharynx were smooth, pink, moist, symmetric, and had no lesions or ulcerations.  The tonsillar pillars and soft palate were symmetric.  The uvula was midline on elevation.    Neck - Normal midline excursion of the laryngotracheal complex during swallowing.  Full range of motion on passive movement.  Palpation of the occipital, submental, submandibular, internal jugular chain, and supraclavicular nodes did not demonstrate any abnormal lymph nodes or masses.  The carotid pulse was palpable bilaterally.  Palpation of the thyroid was soft and smooth, with no nodules or goiter appreciated.  The trachea was mobile and midline.    Nose - External contour is symmetric, no gross deflection or scars.  Nasal mucosa is pink and moist with no abnormal mucus.  The septum was midline and non-obstructive, turbinates of normal size and position.  No polyps, masses, or purulence noted on examination.    Neuro -  Nonfocal neuro exam is normal, CN 2 through 12 intact, normal gait and muscle tone.      Performed in clinic today:  To further evaluate the nasal cavity, I performed rigid nasal endoscopy.  I first sprayed the nasal cavity bilaterally with a mix of lidocaine and neosynephrine.  I then began on the left side using a 2.7mm, 30 degree rigid nasal endoscope.  The septum was straight and the nasal airway was open.  No abnormal secretions, purulence, or polyps were noted.  Middle meatus quite patent mucosa is well-healed anterior ethmoid air cells maxillary ostia appear to be clear.  The left middle turbinate and middle meatus were clearly visualized and normal in appearance.  Looking up, the olfactory cleft was unobstructed.  Going further back, the sphenoethmoid recess was normal in appearance, with healthy appearing mucosa on the face of the sphenoid.  The nasopharynx was unremarkable, and the eustachian tube opening on this side was unobstructed.    I then turned my attention to the right side.  Once again, the septum was straight, and the airway was open.  No abnormal secretions, purulence, polyps were noted.Middle meatus quite patent mucosa is well-healed anterior ethmoid air cells maxillary ostia appear to be clear.  The right middle turbinate and middle meatus were clearly visualized and normal in appearance.  Looking up, the olfactory cleft was unobstructed.  Going further back the right sphenoethmoid recess was normal in appearance, and eustachian tube opening was unobstructed.   Blue - [phone] [facility]      A/P - Ameevee Crews is a 70 year old female with chronic myofascial pain some loss of smell and taste appears to be responding to acupuncture subjectively very well.  She is concerned that her insurance at this point does not wish to cover any more acupuncture therapy sessions.  It certainly the least invasive appears to be most productive therapy for the patient right now.  Therefore would suggest monthly  acupuncture to continue for about a year.  Would like to see patient back in 1 year.      Fredide Hinkle MD

## 2021-02-03 ENCOUNTER — OFFICE VISIT (OUTPATIENT)
Dept: OTOLARYNGOLOGY | Facility: OTHER | Age: 71
End: 2021-02-03
Payer: COMMERCIAL

## 2021-02-03 VITALS
SYSTOLIC BLOOD PRESSURE: 122 MMHG | DIASTOLIC BLOOD PRESSURE: 62 MMHG | HEIGHT: 63 IN | WEIGHT: 141.75 LBS | BODY MASS INDEX: 25.12 KG/M2 | TEMPERATURE: 98.5 F

## 2021-02-03 DIAGNOSIS — M79.18 MYOFASCIAL PAIN SYNDROME: ICD-10-CM

## 2021-02-03 DIAGNOSIS — J32.8 OTHER CHRONIC SINUSITIS: Primary | ICD-10-CM

## 2021-02-03 DIAGNOSIS — R43.2 LOSS OF TASTE: ICD-10-CM

## 2021-02-03 PROCEDURE — 31231 NASAL ENDOSCOPY DX: CPT | Performed by: OTOLARYNGOLOGY

## 2021-02-03 PROCEDURE — 99213 OFFICE O/P EST LOW 20 MIN: CPT | Mod: 25 | Performed by: OTOLARYNGOLOGY

## 2021-02-03 ASSESSMENT — MIFFLIN-ST. JEOR: SCORE: 1142.1

## 2021-02-03 NOTE — LETTER
2/3/2021         RE: Amee Crews  [address] [facility] [facility] [address]        Dear Colleague,    Thank you for referring your patient, Amee Crews, to the [facility]. Please see a copy of my visit note below.    ENT Consultation    Amee Crews who is a 70 year old female seen in consultation at the request of self.      History of Present Illness - Amee Crews is a 70 year old female well-known to us status post functional endoscopic sinus surgery in the past as well as cryoablation for severe vasomotor rhinitis.  Residual symptoms however have to do more with chronic myofascial pain.  She also is experiencing partial loss of sense of smell and some loss of sense of taste.  Neither is complete.  In spite of trying different therapies the only thing that seems to be helping both her myofascial pains as well as actually helping her little bit with a sense of taste is acupuncture.  Certainly in regard to her myofascial pains which have been disabling to her acupuncture seems to be significantly effective.      Body mass index is 25.11 kg/m .    Weight management plan: Patient was referred to their PCP to discuss a diet and exercise plan.    BP Readings from Last 1 Encounters:   02/03/21 122/62       BP noted to be well controlled today in office.     Amee IS NOT a smoker/uses chewing tobacco.      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     Allergic rhinitis      Allergies      asthma     mild intermittent     Chronic back pain     neck and low back     HA (headache)     muscle contraction      History of blood transfusion     In childhood     Hot flashes      Hyperlipidemia      Insomnia      Intermittent asthma 9/16/2011     Major depressive disorder, single episode, moderate (H)      Ocular migraine      Osteoarthritis of hand      Restless leg 9/16/2011       Current Medications -   Current  Outpatient Medications:      albuterol (PROAIR HFA/PROVENTIL HFA/VENTOLIN HFA) 108 (90 Base) MCG/ACT inhaler, Inhale 2 puffs into the lungs every 4 hours as needed for shortness of breath / dyspnea, Disp: 1 Inhaler, Rfl: 2     azelastine (ASTELIN) 0.1 % nasal spray, Spray 1 spray into both nostrils 2 times daily, Disp: 1 Bottle, Rfl: 2     cyanocobalamin (VITAMIN B-12) 1000 MCG tablet, Take 1 tablet (1,000 mcg) by mouth daily, Disp: 90 tablet, Rfl: 2     fluticasone (FLONASE) 50 MCG/ACT nasal spray, Spray 2 sprays into both nostrils daily, Disp: 16 g, Rfl: 4     fluticasone (FLOVENT HFA) 220 MCG/ACT inhaler, 1 puff twice daily, rinse mouth out after Medication, Disp: 12 g, Rfl: 11     magnesium 200 MG TABS, Take 200 mg by mouth daily, Disp: 1 tablet, Rfl: 0     rOPINIRole (REQUIP) 0.5 MG tablet, TAKE ONE TABLET BY MOUTH AT BEDTIME, Disp: 90 tablet, Rfl: 1     rosuvastatin (CRESTOR) 5 MG tablet, Take 1 tablet (5 mg) by mouth daily, Disp: 90 tablet, Rfl: 1     sertraline (ZOLOFT) 100 MG tablet, Take 1 tablet (100 mg) by mouth daily, Disp: 90 tablet, Rfl: 3     SUMAtriptan (IMITREX) 50 MG tablet, TAKE ONE TABLET AT ONSET OF HEADACHE. MAY REPEAT AFTER 2 HOURS. DO NOT EXCEED 200 MG IN 24 HOURS, Disp: 18 tablet, Rfl: 4     traZODone (DESYREL) 100 MG tablet, TAKE ONE TABLET BY MOUTH AT BEDTIME, Disp: 90 tablet, Rfl: 3     Triprolidine-Pseudoephedrine (APRODINE PO), Take by mouth as needed, Disp: , Rfl:      vitamin D3 (CHOLECALCIFEROL) 50 mcg (2000 units) tablet, Take 1 tablet (50 mcg) by mouth every other day 2000IU every other day, Disp: , Rfl:     Allergies -   Allergies   Allergen Reactions     Atorvastatin Cramps     Muscle pain     Morphine Itching     Morphine Sulfate Itching       Social History -   Social History     Socioeconomic History     Marital status:      Spouse name: Not on file     Number of children: 2     Years of education: Not on file     Highest education level: Not on file   Occupational  "History     Employer: SELF   Social Needs     Financial resource strain: Not on file     Food insecurity     Worry: Not on file     Inability: Not on file     Transportation needs     Medical: Not on file     Non-medical: Not on file   Tobacco Use     Smoking status: Former Smoker     Packs/day: 0.00     Types: Cigarettes     Quit date: 1973     Years since quittin.1     Smokeless tobacco: Never Used   Substance and Sexual Activity     Alcohol use: Yes     Alcohol/week: 0.0 standard drinks     Comment: one or two a day     Drug use: No     Sexual activity: Yes     Partners: Male   Lifestyle     Physical activity     Days per week: Not on file     Minutes per session: Not on file     Stress: Not on file   Relationships     Social connections     Talks on phone: Not on file     Gets together: Not on file     Attends Scientologist service: Not on file     Active member of club or organization: Not on file     Attends meetings of clubs or organizations: Not on file     Relationship status: Not on file     Intimate partner violence     Fear of current or ex partner: Not on file     Emotionally abused: Not on file     Physically abused: Not on file     Forced sexual activity: Not on file   Other Topics Concern     Parent/sibling w/ CABG, MI or angioplasty before 65F 55M? No   Social History Narrative     Not on file       Family History -   Family History   Problem Relation Age of Onset     Diabetes Mother      Hypertension Mother      Cancer Father         stomach      Cancer Maternal Grandfather         lung      Heart Disease Other        Review of Systems - As per HPI and PMHx, otherwise review of system review of the head and neck negative. Otherwise 10+ review of system is negative    Physical Exam  /62   Temp 98.5  F (36.9  C) (Temporal)   Ht 1.6 m (5' 3\")   Wt 64.3 kg (141 lb 12 oz)   BMI 25.11 kg/m    BMI: Body mass index is 25.11 kg/m .    General - The patient is well nourished and well " developed, and appears to have good nutritional status.  Alert and oriented to person and place, answers questions and cooperates with examination appropriately.    SKIN - No suspicious lesions or rashes.  Respiration - No respiratory distress.  Head and Face - Normocephalic and atraumatic, with no gross asymmetry noted of the contour of the facial features.  The facial nerve is intact, with strong symmetric movements.    Voice and Breathing - The patient was breathing comfortably without the use of accessory muscles. The patients voice was clear and strong, and had appropriate pitch and quality.    Ears - Bilateral pinna and EACs with normal appearing overlying skin. Tympanic membrane intact with good mobility on pneumatic otoscopy bilaterally. Bony landmarks of the ossicular chain are normal. The tympanic membranes are normal in appearance. No retraction, perforation, or masses.  No fluid or purulence was seen in the external canal or the middle ear.     Eyes - Extraocular movements intact.  Sclera were not icteric or injected, conjunctiva were pink and moist.    Mouth - Examination of the oral cavity showed pink, healthy oral mucosa. No lesions or ulcerations noted.  The tongue was mobile and midline, and the dentition were in good condition.      Throat - The walls of the oropharynx were smooth, pink, moist, symmetric, and had no lesions or ulcerations.  The tonsillar pillars and soft palate were symmetric.  The uvula was midline on elevation.    Neck - Normal midline excursion of the laryngotracheal complex during swallowing.  Full range of motion on passive movement.  Palpation of the occipital, submental, submandibular, internal jugular chain, and supraclavicular nodes did not demonstrate any abnormal lymph nodes or masses.  The carotid pulse was palpable bilaterally.  Palpation of the thyroid was soft and smooth, with no nodules or goiter appreciated.  The trachea was mobile and midline.    Nose - External  contour is symmetric, no gross deflection or scars.  Nasal mucosa is pink and moist with no abnormal mucus.  The septum was midline and non-obstructive, turbinates of normal size and position.  No polyps, masses, or purulence noted on examination.    Neuro - Nonfocal neuro exam is normal, CN 2 through 12 intact, normal gait and muscle tone.      Performed in clinic today:  To further evaluate the nasal cavity, I performed rigid nasal endoscopy.  I first sprayed the nasal cavity bilaterally with a mix of lidocaine and neosynephrine.  I then began on the left side using a 2.7mm, 30 degree rigid nasal endoscope.  The septum was straight and the nasal airway was open.  No abnormal secretions, purulence, or polyps were noted.  Middle meatus quite patent mucosa is well-healed anterior ethmoid air cells maxillary ostia appear to be clear.  The left middle turbinate and middle meatus were clearly visualized and normal in appearance.  Looking up, the olfactory cleft was unobstructed.  Going further back, the sphenoethmoid recess was normal in appearance, with healthy appearing mucosa on the face of the sphenoid.  The nasopharynx was unremarkable, and the eustachian tube opening on this side was unobstructed.    I then turned my attention to the right side.  Once again, the septum was straight, and the airway was open.  No abnormal secretions, purulence, polyps were noted.Middle meatus quite patent mucosa is well-healed anterior ethmoid air cells maxillary ostia appear to be clear.  The right middle turbinate and middle meatus were clearly visualized and normal in appearance.  Looking up, the olfactory cleft was unobstructed.  Going further back the right sphenoethmoid recess was normal in appearance, and eustachian tube opening was unobstructed.   Blue - [id number] Mytamed      A/P - Ameevee Crews is a 70 year old female with chronic myofascial pain some loss of smell and taste appears to be responding to acupuncture  subjectively very well.  She is concerned that her insurance at this point does not wish to cover any more acupuncture therapy sessions.  It certainly the least invasive appears to be most productive therapy for the patient right now.  Therefore would suggest monthly acupuncture to continue for about a year.  Would like to see patient back in 1 year.      Freddie Hinkle MD      Again, thank you for allowing me to participate in the care of your patient.        Sincerely,        Freddie Hinkle MD, MD

## 2021-02-04 ENCOUNTER — TELEPHONE (OUTPATIENT)
Dept: OTOLARYNGOLOGY | Facility: OTHER | Age: 71
End: 2021-02-04

## 2021-02-04 NOTE — LETTER
[address] [address]  [phone]          2021    RE: Amee Crews  : 1950      To whom it may concern:      Ms. Crews has been under my care for myofacial pain. Since she started acupuncture therapy her symptoms apparently improved dramatically.  Previously discomfort that she experienced as part of her myofascial pain were significantly impacting her life quality.  Currently she appears to be dramatically improved with acupuncture therapy.  It would be prudent for this therapy to be approved as important treatment modality for this patient.     Sincerely,              Freddie Hinkle MD

## 2021-02-04 NOTE — TELEPHONE ENCOUNTER
"After talking with the patient, she reports medicare not willing to approve acupuncture unless it's done on the back. Patient gets acupuncture on her face. She needs a letter of medical necessity written by Dr. Hinkle in effort to achieve insurance coverage for her treatments. Once acupuncture is approved, she will proceed with treatments at [facility].   Below is a proposed letter of medical necessity. Please review, edit, and revise. Upon completion, please have staff fax to [facility].     \"To whom it may concern:      Ms. Crews has been under my care for myofacial pain. Since she started acupuncture therapy her symptoms apparently improved dramatically.  Previously discomfort that she experienced as part of her myofascial pain were significantly impacting her life quality.  Currently she appears to be dramatically improved with acupuncture therapy.  It would be prudent for this therapy to be approved as important treatment modality for this patient.     Sincerely,        Freddie Hinkle MD\"      Side note: patient has been going to [facility] in [place] but they don't accept insurance.    Yanna Small RN on 2/4/2021 at 3:07 PM    "

## 2021-02-04 NOTE — TELEPHONE ENCOUNTER
Reason for Call:  Other Patient said Dr Hinkle wanted her to start acupuncture, she checked with her insurance company and he needs to call [employer] at [phone] for a organization determination form    Detailed comments:     Phone Number Patient can be reached at: Home number on file [phone] (home)    Best Time:     Can we leave a detailed message on this number? NO    Call taken on 2/4/2021 at 11:31 AM by Kim Luu

## 2021-02-08 NOTE — TELEPHONE ENCOUNTER
Per Dr. Hinkle he is okay with submitting the proposed letter below and last OV notes.  Will need to call insurance to get fax number.     Ansley CRUZ, Lead RN, BSN. . .  2/8/2021, 10:16 AM

## 2021-02-11 NOTE — TELEPHONE ENCOUNTER
[employer] called and states that this needs to be done through [employer] - I will call the number they gave me [phone] to discuss this process with a representative.  Patient is aware.     Ansley CRUZ, Lead RN, BSN. . .  2/11/2021, 1:03 PM

## 2021-02-11 NOTE — TELEPHONE ENCOUNTER
Called patient, informed letter is written and signed. She will call her insurance company and get the fax number and call us back with that.     The letter and records will be kept in Dr. Manan lazar on upper level until we get the fax number.     Ansley CRUZ, Lead RN, BSN. . .  2/11/2021, 11:52 AM

## 2021-02-11 NOTE — TELEPHONE ENCOUNTER
Printed letter and records, will call to get fax number in a little while.     Ansley CRUZ, Lead RN, BSN. . .  2/11/2021, 8:49 AM

## 2021-03-15 NOTE — TELEPHONE ENCOUNTER
I am unable to subscribe to [employer] which I was told is the only way to complete this process. I have management looking into how we can get this done.     Ansley CRUZ, Lead RN, BSN. . .  3/15/2021, 2:46 PM

## 2021-04-01 DIAGNOSIS — E78.5 HYPERLIPIDEMIA LDL GOAL <130: ICD-10-CM

## 2021-04-01 RX ORDER — ROSUVASTATIN CALCIUM 5 MG/1
TABLET, COATED ORAL
Qty: 90 TABLET | Refills: 1 | Status: SHIPPED | OUTPATIENT
Start: 2021-04-01 | End: 2021-09-20

## 2021-04-02 NOTE — TELEPHONE ENCOUNTER
Had to leave a  again.     When she calls back please find out if she has heard anything from her insurance company about the acupuncture approval?    Ansley CRUZ, Lead RN, BSN. . .  4/2/2021, 1:29 PM

## 2021-04-02 NOTE — TELEPHONE ENCOUNTER
Left generic message on machine per initial message saying not okay to leave detailed message.     Has she heard any updates on this?     We are not being granted access to the [facility] program and per her insurance, there is no other way to complete this...    Ansley CRUZ, Lead RN, BSN. . .  4/2/2021, 11:21 AM

## 2021-04-07 NOTE — TELEPHONE ENCOUNTER
Spoke with patient who has not had a chance to speak to her insurance company.  Patient will call the clinic when she has been able to get more information.  Effie Roman RN on 4/7/2021 at 1:48 PM

## 2021-04-16 ENCOUNTER — TELEPHONE (OUTPATIENT)
Dept: OTOLARYNGOLOGY | Facility: CLINIC | Age: 71
End: 2021-04-16

## 2021-04-16 NOTE — TELEPHONE ENCOUNTER
Reason for Call:  Other     Detailed comments: Suzanne Mercedes was called by patient to reach out to Dr Hinkle about a form. Patient did not tell mercedes what form she was talking about.    Phone Number Patient can be reached at: Other phone number:  [phone] Suzanne mercedes    Best Time: any    Can we leave a detailed message on this number? YES    Call taken on 4/16/2021 at 12:59 PM by Lydia Garcia

## 2021-04-19 NOTE — TELEPHONE ENCOUNTER
"Left a message for Suzanne from [facility] to call us back.  We have been trying to appeal this patient's denial for acupuncture (dr. Hinkle did write an appeal for this). However, we have been unable to submit this appeal because [facility] said we have to do this through availity which I cannot get access to.      Back on 2/4/2021 this was the initial request:     \"Patient said Dr Hinkle wanted her to start acupuncture, she checked with her insurance company and he needs to call bc/bs at [phone] for a organization determination form\"    I will check with the PA team to see if this is something they are able to assist with.     Ansley CRUZ, Lead RN, BSN. . .  4/19/2021, 9:04 AM    "

## 2021-04-20 ENCOUNTER — MYC MEDICAL ADVICE (OUTPATIENT)
Dept: OTOLARYNGOLOGY | Facility: OTHER | Age: 71
End: 2021-04-20

## 2021-04-20 NOTE — TELEPHONE ENCOUNTER
Informed patient, however, I believe she has a closed practice. Did also recommend Dr. Dawkins in [place] or really any of our providers are great.  She will schedule her establish care appt,     Ansley CRUZ, Lead RN, BSN. . .  4/20/2021, 1:41 PM

## 2021-04-20 NOTE — TELEPHONE ENCOUNTER
Is there a pa denial on this? Usually, the [facility] PA Team will initiate the appeal if we did the pa and it got denied. If the pa is not being initiated by the pa team, then we are unable to submit this appeal.

## 2021-04-21 DIAGNOSIS — J32.9 CHRONIC SINUSITIS, UNSPECIFIED LOCATION: ICD-10-CM

## 2021-04-21 RX ORDER — FLUTICASONE PROPIONATE 50 MCG
2 SPRAY, SUSPENSION (ML) NASAL DAILY
Qty: 16 G | Refills: 4 | Status: SHIPPED | OUTPATIENT
Start: 2021-04-21 | End: 2021-09-20

## 2021-04-21 NOTE — TELEPHONE ENCOUNTER
The encounter from 2/4/2021 (vonda lengthy) makes it sound as if a prior authorization is needed for her acupuncture.  I have never seen this so I was not sure how to do this. We cannot get access to [employer] and that is how [facility] is saying that this request needs to be made.  If there is a team who does PAs for acupuncture could you please forward it to them?     Thank you!!    Ansley CRUZ, Lead RN, BSN. . .  4/21/2021, 8:39 AM

## 2021-04-21 NOTE — TELEPHONE ENCOUNTER
"    Requested Prescriptions   Pending Prescriptions Disp Refills     fluticasone (FLONASE) 50 MCG/ACT nasal spray 16 g 4     Sig: Spray 2 sprays into both nostrils daily       Nasal Allergy Protocol Passed - 4/21/2021  9:08 AM        Passed - Patient is age 12 or older        Passed - Recent (12 mo) or future (30 days) visit within the authorizing provider's specialty     Patient has had an office visit with the authorizing provider or a provider within the authorizing providers department within the previous 12 mos or has a future within next 30 days. See \"Patient Info\" tab in inbasket, or \"Choose Columns\" in Meds & Orders section of the refill encounter.              Passed - Medication is active on med list             Approved per protocol.     Ansley CRUZ, Lead RN, BSN. . .  4/21/2021, 9:10 AM    "

## 2021-04-21 NOTE — TELEPHONE ENCOUNTER
Pending Prescriptions:                       Disp   Refills    fluticasone (FLONASE) 50 MCG/ACT nasal sp*16 g   4            Sig: Spray 2 sprays into both nostrils daily      Last fill date: 8/25/2020

## 2021-04-22 NOTE — TELEPHONE ENCOUNTER
"Spoke with Suzanne from [facility]   She states that this patient has two types of coverage for acupuncture.  \"Medicare Covered\" and \"medicare non-covered\".  The only condition that falls under the covered section is low back pain.      She still has coverage for medicare non-covered for up to 20 visits per year with a $20 copay.  It will come down to how the chiropractor office bills for them.     There is no PA or appeal required for acupuncture. They do not need anything from Dr. Hinkle's office as the medical necessity of the acupuncture is irrelevant to them at this point.     I did inform the patient and Suzanne also said that she will call the patient in a few weeks once her acupuncture claim from 4/12/2021 is processed.     Ansley CRUZ, Lead RN, BSN. . .  4/22/2021, 12:59 PM    "

## 2021-05-03 DIAGNOSIS — G25.81 RESTLESS LEG: ICD-10-CM

## 2021-05-03 RX ORDER — ROPINIROLE 0.5 MG/1
TABLET, FILM COATED ORAL
Qty: 90 TABLET | Refills: 0 | Status: SHIPPED | OUTPATIENT
Start: 2021-05-03 | End: 2021-07-21

## 2021-05-03 NOTE — TELEPHONE ENCOUNTER
Routing refill request to provider for review/approval because:  CBC, ALT, and appointment needed          Pending Prescriptions:                       Disp   Refills    rOPINIRole (REQUIP) 0.5 MG tablet [Pharmac*90 tab*1        Sig: TAKE ONE TABLET BY MOUTH AT BEDTIME        Tonny Thompson RN

## 2021-05-24 ENCOUNTER — TELEPHONE (OUTPATIENT)
Dept: OTOLARYNGOLOGY | Facility: CLINIC | Age: 71
End: 2021-05-24

## 2021-05-24 DIAGNOSIS — J32.9 CHRONIC SINUSITIS, UNSPECIFIED LOCATION: ICD-10-CM

## 2021-06-10 ENCOUNTER — MYC MEDICAL ADVICE (OUTPATIENT)
Dept: OTOLARYNGOLOGY | Facility: OTHER | Age: 71
End: 2021-06-10

## 2021-06-10 DIAGNOSIS — R43.0 LOSS OF SMELL: ICD-10-CM

## 2021-06-10 DIAGNOSIS — J30.9 ALLERGIC RHINITIS, UNSPECIFIED SEASONALITY, UNSPECIFIED TRIGGER: Primary | ICD-10-CM

## 2021-06-10 RX ORDER — METHYLPREDNISOLONE 4 MG
TABLET, DOSE PACK ORAL
Qty: 21 TABLET | Refills: 0 | Status: SHIPPED | OUTPATIENT
Start: 2021-06-10 | End: 2021-07-21

## 2021-06-21 NOTE — PROGRESS NOTES
History of Present Illness - Amee Crews is a 70 year old female presenting in clinic today for a recheck on Patient presents with:  Follow Up    Patient well-known to us with a previous history of functional endoscopic sinus surgery also history of nonspecific migraines and myofascial pain.  In regard to pain its doing much better with the acupuncture therapy.  She recently had stigmata of sinusitis with yellowish drainage facial pressure and was given appropriate medications via virtual platform.  Currently her sinuses do feel better she is breathing better no longer has any discharge      BP Readings from Last 1 Encounters:   02/03/21 122/62       BP noted to be well controlled today in office.     Amee IS NOT a smoker/uses chewing tobacco.  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     Allergic rhinitis      Allergies      asthma     mild intermittent     Chronic back pain     neck and low back     HA (headache)     muscle contraction      History of blood transfusion     In childhood     Hot flashes      Hyperlipidemia      Insomnia      Intermittent asthma 9/16/2011     Major depressive disorder, single episode, moderate (H)      Ocular migraine      Osteoarthritis of hand      Restless leg 9/16/2011       Current Medications -   Current Outpatient Medications:      albuterol (PROAIR HFA/PROVENTIL HFA/VENTOLIN HFA) 108 (90 Base) MCG/ACT inhaler, Inhale 2 puffs into the lungs every 4 hours as needed for shortness of breath / dyspnea, Disp: 1 Inhaler, Rfl: 2     azelastine (ASTELIN) 0.1 % nasal spray, Spray 1 spray into both nostrils 2 times daily, Disp: 1 Bottle, Rfl: 2     cyanocobalamin (VITAMIN B-12) 1000 MCG tablet, Take 1 tablet (1,000 mcg) by mouth daily, Disp: 90 tablet, Rfl: 2     fluticasone (FLONASE) 50 MCG/ACT nasal spray, Spray 2 sprays into both nostrils daily, Disp: 16 g, Rfl: 4      fluticasone (FLOVENT HFA) 220 MCG/ACT inhaler, 1 puff twice daily, rinse mouth out after Medication, Disp: 12 g, Rfl: 11     magnesium 200 MG TABS, Take 200 mg by mouth daily, Disp: 1 tablet, Rfl: 0     methylPREDNISolone (MEDROL DOSEPAK) 4 MG tablet therapy pack, Follow Package Directions, Disp: 21 tablet, Rfl: 0     rOPINIRole (REQUIP) 0.5 MG tablet, TAKE ONE TABLET BY MOUTH AT BEDTIME. No further refills until follow-up appointment, Disp: 90 tablet, Rfl: 0     rosuvastatin (CRESTOR) 5 MG tablet, TAKE ONE TABLET BY MOUTH ONCE DAILY, Disp: 90 tablet, Rfl: 1     sertraline (ZOLOFT) 100 MG tablet, Take 1 tablet (100 mg) by mouth daily, Disp: 90 tablet, Rfl: 3     SUMAtriptan (IMITREX) 50 MG tablet, TAKE ONE TABLET AT ONSET OF HEADACHE. MAY REPEAT AFTER 2 HOURS. DO NOT EXCEED 200 MG IN 24 HOURS, Disp: 18 tablet, Rfl: 4     traZODone (DESYREL) 100 MG tablet, TAKE ONE TABLET BY MOUTH AT BEDTIME, Disp: 90 tablet, Rfl: 3     Triprolidine-Pseudoephedrine (APRODINE PO), Take by mouth as needed, Disp: , Rfl:      vitamin D3 (CHOLECALCIFEROL) 50 mcg (2000 units) tablet, Take 1 tablet (50 mcg) by mouth every other day 2000IU every other day, Disp: , Rfl:     Allergies -   Allergies   Allergen Reactions     Atorvastatin Cramps     Muscle pain     Morphine Itching     Morphine Sulfate Itching       Social History -   Social History     Socioeconomic History     Marital status:      Spouse name: Not on file     Number of children: 2     Years of education: Not on file     Highest education level: Not on file   Occupational History     Employer: SELF   Social Needs     Financial resource strain: Not on file     Food insecurity     Worry: Not on file     Inability: Not on file     Transportation needs     Medical: Not on file     Non-medical: Not on file   Tobacco Use     Smoking status: Former Smoker     Packs/day: 0.00     Types: Cigarettes     Quit date: 1973     Years since quittin.4     Smokeless tobacco: Never  Used   Substance and Sexual Activity     Alcohol use: Yes     Alcohol/week: 0.0 standard drinks     Comment: one or two a day     Drug use: No     Sexual activity: Yes     Partners: Male   Lifestyle     Physical activity     Days per week: Not on file     Minutes per session: Not on file     Stress: Not on file   Relationships     Social connections     Talks on phone: Not on file     Gets together: Not on file     Attends [employer] service: Not on file     Active member of club or organization: Not on file     Attends meetings of clubs or organizations: Not on file     Relationship status: Not on file     Intimate partner violence     Fear of current or ex partner: Not on file     Emotionally abused: Not on file     Physically abused: Not on file     Forced sexual activity: Not on file   Other Topics Concern     Parent/sibling w/ CABG, MI or angioplasty before 65F 55M? No   Social History Narrative     Not on file       Family History -   Family History   Problem Relation Age of Onset     Diabetes Mother      Hypertension Mother      Cancer Father         stomach      Cancer Maternal Grandfather         lung      Heart Disease Other        Review of Systems - As per HPI and PMHx, otherwise review of system review of the head and neck negative. Otherwise 10+ review of system is negative    Physical Exam  There were no vitals taken for this visit.  BMI: There is no height or weight on file to calculate BMI.    General - The patient is well nourished and well developed, and appears to have good nutritional status.  Alert and oriented to person and place, answers questions and cooperates with examination appropriately.    SKIN - No suspicious lesions or rashes.  Respiration - No respiratory distress.  Head and Face - Normocephalic and atraumatic, with no gross asymmetry noted of the contour of the facial features.  The facial nerve is intact, with strong symmetric movements.    Voice and Breathing - The patient was  breathing comfortably without the use of accessory muscles. The patients voice was clear and strong, and had appropriate pitch and quality.    Ears - Bilateral pinna and EACs with normal appearing overlying skin. Tympanic membrane intact with good mobility on pneumatic otoscopy bilaterally. Bony landmarks of the ossicular chain are normal. The tympanic membranes are normal in appearance. No retraction, perforation, or masses.  No fluid or purulence was seen in the external canal or the middle ear.     Eyes - Extraocular movements intact.  Sclera were not icteric or injected, conjunctiva were pink and moist.    Mouth - Examination of the oral cavity showed pink, healthy oral mucosa. No lesions or ulcerations noted.  The tongue was mobile and midline, and the dentition were in good condition.      Throat - The walls of the oropharynx were smooth, pink, moist, symmetric, and had no lesions or ulcerations.  The tonsillar pillars and soft palate were symmetric. Tonsils are symmetric. The uvula was midline on elevation.    Neck - Normal midline excursion of the laryngotracheal complex during swallowing.  Full range of motion on passive movement.  Palpation of the occipital, submental, submandibular, internal jugular chain, and supraclavicular nodes did not demonstrate any abnormal lymph nodes or masses.  The carotid pulse was palpable bilaterally.  Palpation of the thyroid was soft and smooth, with no nodules or goiter appreciated.  The trachea was mobile and midline.    Nose - External contour is symmetric, no gross deflection or scars.  Nasal mucosa is pink and moist with no abnormal mucus.  The septum was midline and non-obstructive, turbinates of normal size and position.  No polyps, masses, or purulence noted on examination.    Neuro - Nonfocal neuro exam is normal, CN 2 through 12 intact, normal gait and muscle tone.      Performed in clinic today:  To further evaluate the nasal cavity, I performed rigid nasal  endoscopy.  I first sprayed the nasal cavity bilaterally with a mix of lidocaine and neosynephrine.  I then began on the left side using a 2.7mm, 30 degree rigid nasal endoscope.  The septum was straight and the nasal airway was open.  No abnormal secretions, purulence, or polyps were noted. The left middle turbinate and middle meatus were clearly visualized and normal in appearance.  Middle meatus is completely open anterior posterior ethmoid air cells nasofrontal recess well visualized.  Maxillary ostium was quite patent with antral mucosa clear no evidence of disease.  Looking up, the olfactory cleft was unobstructed.  Going further back, the sphenoethmoid recess was normal in appearance, with healthy appearing mucosa on the face of the sphenoid.  The nasopharynx was unremarkable, and the eustachian tube opening on this side was unobstructed.    I then turned my attention to the right side.  Once again, the septum was straight, and the airway was open.  No abnormal secretions, purulence, polyps were noted.  The right middle turbinate and middle meatus were clearly visualized and normal in appearance.  Middle meatus is completely open anterior posterior ethmoid air cells nasofrontal recess well visualized.  Maxillary ostium was quite patent with antral mucosa clear no evidence of disease. Looking up, the olfactory cleft was unobstructed.  Going further back the right sphenoethmoid recess was normal in appearance, and eustachian tube opening was unobstructed.   [place] - [id number] [facility]      ASHLY/P - Amee LEWIS Mars is a 70 year old female Patient presents with:  Follow Up    Patient with a history of chronic sinusitis with acute recurrence responded well to medical therapy.  At this point will continue current regimen will continue undergoing acupuncture that helps a myofascial pain and her secretions also under much better control intranasally.    Amee should follow up as needed.            Freddie Hinkle MD

## 2021-06-23 ENCOUNTER — OFFICE VISIT (OUTPATIENT)
Dept: OTOLARYNGOLOGY | Facility: OTHER | Age: 71
End: 2021-06-23
Payer: COMMERCIAL

## 2021-06-23 VITALS — TEMPERATURE: 98.7 F | BODY MASS INDEX: 24.45 KG/M2 | HEIGHT: 63 IN | WEIGHT: 138 LBS

## 2021-06-23 DIAGNOSIS — R44.8 FACIAL PRESSURE: ICD-10-CM

## 2021-06-23 DIAGNOSIS — J32.4 CHRONIC PANSINUSITIS: Primary | ICD-10-CM

## 2021-06-23 PROCEDURE — 31231 NASAL ENDOSCOPY DX: CPT | Performed by: OTOLARYNGOLOGY

## 2021-06-23 PROCEDURE — 99213 OFFICE O/P EST LOW 20 MIN: CPT | Mod: 25 | Performed by: OTOLARYNGOLOGY

## 2021-06-23 ASSESSMENT — MIFFLIN-ST. JEOR: SCORE: 1121.91

## 2021-06-23 NOTE — LETTER
6/23/2021         RE: Amee Crews  [address] [facility] [id number]        Dear Colleague,    Thank you for referring your patient, Amee Crews, to the [facility]. Please see a copy of my visit note below.    History of Present Illness - Amee Crews is a 70 year old female presenting in clinic today for a recheck on Patient presents with:  Follow Up    Patient well-known to us with a previous history of functional endoscopic sinus surgery also history of nonspecific migraines and myofascial pain.  In regard to pain its doing much better with the acupuncture therapy.  She recently had stigmata of sinusitis with yellowish drainage facial pressure and was given appropriate medications via virtual platform.  Currently her sinuses do feel better she is breathing better no longer has any discharge      BP Readings from Last 1 Encounters:   02/03/21 122/62       BP noted to be well controlled today in office.     Amee IS NOT a smoker/uses chewing tobacco.  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     Allergic rhinitis      Allergies      asthma     mild intermittent     Chronic back pain     neck and low back     HA (headache)     muscle contraction      History of blood transfusion     In childhood     Hot flashes      Hyperlipidemia      Insomnia      Intermittent asthma 9/16/2011     Major depressive disorder, single episode, moderate (H)      Ocular migraine      Osteoarthritis of hand      Restless leg 9/16/2011       Current Medications -   Current Outpatient Medications:      albuterol (PROAIR HFA/PROVENTIL HFA/VENTOLIN HFA) 108 (90 Base) MCG/ACT inhaler, Inhale 2 puffs into the lungs every 4 hours as needed for shortness of breath / dyspnea, Disp: 1 Inhaler, Rfl: 2     azelastine (ASTELIN) 0.1 % nasal spray, Spray 1 spray into both nostrils 2 times daily, Disp: 1 Bottle,  Rfl: 2     cyanocobalamin (VITAMIN B-12) 1000 MCG tablet, Take 1 tablet (1,000 mcg) by mouth daily, Disp: 90 tablet, Rfl: 2     fluticasone (FLONASE) 50 MCG/ACT nasal spray, Spray 2 sprays into both nostrils daily, Disp: 16 g, Rfl: 4     fluticasone (FLOVENT HFA) 220 MCG/ACT inhaler, 1 puff twice daily, rinse mouth out after Medication, Disp: 12 g, Rfl: 11     magnesium 200 MG TABS, Take 200 mg by mouth daily, Disp: 1 tablet, Rfl: 0     methylPREDNISolone (MEDROL DOSEPAK) 4 MG tablet therapy pack, Follow Package Directions, Disp: 21 tablet, Rfl: 0     rOPINIRole (REQUIP) 0.5 MG tablet, TAKE ONE TABLET BY MOUTH AT BEDTIME. No further refills until follow-up appointment, Disp: 90 tablet, Rfl: 0     rosuvastatin (CRESTOR) 5 MG tablet, TAKE ONE TABLET BY MOUTH ONCE DAILY, Disp: 90 tablet, Rfl: 1     sertraline (ZOLOFT) 100 MG tablet, Take 1 tablet (100 mg) by mouth daily, Disp: 90 tablet, Rfl: 3     SUMAtriptan (IMITREX) 50 MG tablet, TAKE ONE TABLET AT ONSET OF HEADACHE. MAY REPEAT AFTER 2 HOURS. DO NOT EXCEED 200 MG IN 24 HOURS, Disp: 18 tablet, Rfl: 4     traZODone (DESYREL) 100 MG tablet, TAKE ONE TABLET BY MOUTH AT BEDTIME, Disp: 90 tablet, Rfl: 3     Triprolidine-Pseudoephedrine (APRODINE PO), Take by mouth as needed, Disp: , Rfl:      vitamin D3 (CHOLECALCIFEROL) 50 mcg (2000 units) tablet, Take 1 tablet (50 mcg) by mouth every other day 2000IU every other day, Disp: , Rfl:     Allergies -   Allergies   Allergen Reactions     Atorvastatin Cramps     Muscle pain     Morphine Itching     Morphine Sulfate Itching       Social History -   Social History     Socioeconomic History     Marital status:      Spouse name: Not on file     Number of children: 2     Years of education: Not on file     Highest education level: Not on file   Occupational History     Employer: SELF   Social Needs     Financial resource strain: Not on file     Food insecurity     Worry: Not on file     Inability: Not on file      Transportation needs     Medical: Not on file     Non-medical: Not on file   Tobacco Use     Smoking status: Former Smoker     Packs/day: 0.00     Types: Cigarettes     Quit date: 1973     Years since quittin.4     Smokeless tobacco: Never Used   Substance and Sexual Activity     Alcohol use: Yes     Alcohol/week: 0.0 standard drinks     Comment: one or two a day     Drug use: No     Sexual activity: Yes     Partners: Male   Lifestyle     Physical activity     Days per week: Not on file     Minutes per session: Not on file     Stress: Not on file   Relationships     Social connections     Talks on phone: Not on file     Gets together: Not on file     Attends Yazidi service: Not on file     Active member of club or organization: Not on file     Attends meetings of clubs or organizations: Not on file     Relationship status: Not on file     Intimate partner violence     Fear of current or ex partner: Not on file     Emotionally abused: Not on file     Physically abused: Not on file     Forced sexual activity: Not on file   Other Topics Concern     Parent/sibling w/ CABG, MI or angioplasty before 65F 55M? No   Social History Narrative     Not on file       Family History -   Family History   Problem Relation Age of Onset     Diabetes Mother      Hypertension Mother      Cancer Father         stomach      Cancer Maternal Grandfather         lung      Heart Disease Other        Review of Systems - As per HPI and PMHx, otherwise review of system review of the head and neck negative. Otherwise 10+ review of system is negative    Physical Exam  There were no vitals taken for this visit.  BMI: There is no height or weight on file to calculate BMI.    General - The patient is well nourished and well developed, and appears to have good nutritional status.  Alert and oriented to person and place, answers questions and cooperates with examination appropriately.    SKIN - No suspicious lesions or rashes.  Respiration  - No respiratory distress.  Head and Face - Normocephalic and atraumatic, with no gross asymmetry noted of the contour of the facial features.  The facial nerve is intact, with strong symmetric movements.    Voice and Breathing - The patient was breathing comfortably without the use of accessory muscles. The patients voice was clear and strong, and had appropriate pitch and quality.    Ears - Bilateral pinna and EACs with normal appearing overlying skin. Tympanic membrane intact with good mobility on pneumatic otoscopy bilaterally. Bony landmarks of the ossicular chain are normal. The tympanic membranes are normal in appearance. No retraction, perforation, or masses.  No fluid or purulence was seen in the external canal or the middle ear.     Eyes - Extraocular movements intact.  Sclera were not icteric or injected, conjunctiva were pink and moist.    Mouth - Examination of the oral cavity showed pink, healthy oral mucosa. No lesions or ulcerations noted.  The tongue was mobile and midline, and the dentition were in good condition.      Throat - The walls of the oropharynx were smooth, pink, moist, symmetric, and had no lesions or ulcerations.  The tonsillar pillars and soft palate were symmetric. Tonsils are symmetric. The uvula was midline on elevation.    Neck - Normal midline excursion of the laryngotracheal complex during swallowing.  Full range of motion on passive movement.  Palpation of the occipital, submental, submandibular, internal jugular chain, and supraclavicular nodes did not demonstrate any abnormal lymph nodes or masses.  The carotid pulse was palpable bilaterally.  Palpation of the thyroid was soft and smooth, with no nodules or goiter appreciated.  The trachea was mobile and midline.    Nose - External contour is symmetric, no gross deflection or scars.  Nasal mucosa is pink and moist with no abnormal mucus.  The septum was midline and non-obstructive, turbinates of normal size and position.  No  polyps, masses, or purulence noted on examination.    Neuro - Nonfocal neuro exam is normal, CN 2 through 12 intact, normal gait and muscle tone.      Performed in clinic today:  To further evaluate the nasal cavity, I performed rigid nasal endoscopy.  I first sprayed the nasal cavity bilaterally with a mix of lidocaine and neosynephrine.  I then began on the left side using a 2.7mm, 30 degree rigid nasal endoscope.  The septum was straight and the nasal airway was open.  No abnormal secretions, purulence, or polyps were noted. The left middle turbinate and middle meatus were clearly visualized and normal in appearance.  Middle meatus is completely open anterior posterior ethmoid air cells nasofrontal recess well visualized.  Maxillary ostium was quite patent with antral mucosa clear no evidence of disease.  Looking up, the olfactory cleft was unobstructed.  Going further back, the sphenoethmoid recess was normal in appearance, with healthy appearing mucosa on the face of the sphenoid.  The nasopharynx was unremarkable, and the eustachian tube opening on this side was unobstructed.    I then turned my attention to the right side.  Once again, the septum was straight, and the airway was open.  No abnormal secretions, purulence, polyps were noted.  The right middle turbinate and middle meatus were clearly visualized and normal in appearance.  Middle meatus is completely open anterior posterior ethmoid air cells nasofrontal recess well visualized.  Maxillary ostium was quite patent with antral mucosa clear no evidence of disease. Looking up, the olfactory cleft was unobstructed.  Going further back the right sphenoethmoid recess was normal in appearance, and eustachian tube opening was unobstructed.   [place] - [id number] [facility]      A/P - Amee Crews is a 70 year old female Patient presents with:  Follow Up    Patient with a history of chronic sinusitis with acute recurrence responded well to medical therapy.  At this  point will continue current regimen will continue undergoing acupuncture that helps a myofascial pain and her secretions also under much better control intranasally.    Amee should follow up as needed.            Freddie Hinkle MD          Again, thank you for allowing me to participate in the care of your patient.        Sincerely,        Freddie Hinkle MD, MD

## 2021-07-20 ENCOUNTER — MYC MEDICAL ADVICE (OUTPATIENT)
Dept: INTERNAL MEDICINE | Facility: CLINIC | Age: 71
End: 2021-07-20

## 2021-07-21 DIAGNOSIS — G25.81 RESTLESS LEG: ICD-10-CM

## 2021-07-21 RX ORDER — ROPINIROLE 0.5 MG/1
TABLET, FILM COATED ORAL
Qty: 30 TABLET | Refills: 0 | Status: SHIPPED | OUTPATIENT
Start: 2021-07-21 | End: 2021-08-23

## 2021-08-09 ENCOUNTER — MYC MEDICAL ADVICE (OUTPATIENT)
Dept: OTOLARYNGOLOGY | Facility: OTHER | Age: 71
End: 2021-08-09

## 2021-08-09 NOTE — TELEPHONE ENCOUNTER
Last seen by you on 6/23/2021 told to follow up PRN.     Do you want to treat her and have her follow up?     Have her see PCP?       Ansley CRUZ, Lead RN, BSN. . .  8/9/2021, 1:23 PM

## 2021-08-21 DIAGNOSIS — G25.81 RESTLESS LEG: ICD-10-CM

## 2021-08-23 RX ORDER — ROPINIROLE 0.5 MG/1
TABLET, FILM COATED ORAL
Qty: 90 TABLET | Refills: 0 | Status: SHIPPED | OUTPATIENT
Start: 2021-08-23 | End: 2021-09-20

## 2021-08-23 NOTE — TELEPHONE ENCOUNTER
"Routing refill request to provider for review/approval because:  Yazmin given x1 and patient did not follow up, please advise  Labs not current:  See below  Patient needs to be seen because:  Needs new PCP  Requested Prescriptions   Pending Prescriptions Disp Refills     rOPINIRole (REQUIP) 0.5 MG tablet [Pharmacy Med Name: ROPINIROLE HCL 0.5MG TABS] 30 tablet 0     Sig: TAKE ONE TABLET BY MOUTH AT BEDTIME **NEEDS FOLLOW UP APPT. FOR MORE REFILLS       Antiparkinson's Agents Protocol Failed - 8/21/2021  1:17 AM        Failed - CBC on record in past 12 months     Recent Labs   Lab Test 11/14/18  1252   WBC 6.5   RBC 4.02   HGB 13.4   HCT 39.0                    Failed - ALT on record in past 12 months         Recent Labs   Lab Test 11/14/18  1252   ALT 25             Failed - Recent (6 mo) or future (30 days) visit within the authorizing provider's specialty     Patient had office visit in the last 6 months or has a visit in the next 30 days with authorizing provider or within the authorizing provider's specialty.  See \"Patient Info\" tab in inbasket, or \"Choose Columns\" in Meds & Orders section of the refill encounter.            Passed - Blood pressure under 140/90 in past 12 months     BP Readings from Last 3 Encounters:   02/03/21 122/62   12/08/20 101/62   10/22/20 112/78                 Passed - Serum Creatinine on file in past 12 months     Recent Labs   Lab Test 10/22/20  1229   CR 0.82       Ok to refill medication if creatinine is low          Passed - Medication is active on med list        Passed - Patient is age 18 or older        Passed - No active pregnancy on record        Passed - No positive pregnancy test in the past 12 months           Karina Steele RN          "

## 2021-09-16 DIAGNOSIS — F32.5 MAJOR DEPRESSION IN COMPLETE REMISSION (H): ICD-10-CM

## 2021-09-17 NOTE — PROGRESS NOTES
Assessment & Plan     Mild intermittent asthma without complication  - Stable on current inhalers  - albuterol (PROAIR HFA/PROVENTIL HFA/VENTOLIN HFA) 108 (90 Base) MCG/ACT inhaler; Inhale 2 puffs into the lungs every 4 hours as needed for shortness of breath / dyspnea  - fluticasone (FLOVENT HFA) 220 MCG/ACT inhaler; 1 puff twice daily, rinse mouth out after Medication      Chronic seasonal allergic rhinitis due to pollen  - Stable with current regimen   - Recommend adding in decongestant and zyrtec with ear fluid.  - Continue Flonase   - Has previously seen ENT     Vitamin B 12 deficiency  - Recheck today   - cyanocobalamin (VITAMIN B-12) 1000 MCG tablet; Take 1 tablet (1,000 mcg) by mouth daily  - Vitamin B12; Future  - Vitamin B12    Major depression in complete remission (H)  - Stable on current regimen  - Labs and refill today  - Follow up in 6-9 months.   - magnesium 200 MG TABS; Take 200 mg by mouth daily  - sertraline (ZOLOFT) 100 MG tablet; Take 1 tablet (100 mg) by mouth daily  - Comprehensive metabolic panel (BMP + Alb, Alk Phos, ALT, AST, Total. Bili, TP); Future  - Comprehensive metabolic panel (BMP + Alb, Alk Phos, ALT, AST, Total. Bili, TP)    Restless leg  - Stable on requip   - rOPINIRole (REQUIP) 0.5 MG tablet; TAKE ONE TABLET BY MOUTH AT BEDTIME **NEEDS FOLLOW UP APPT. FOR MORE REFILLS    Hyperlipidemia LDL goal <130  - Had labs checked at outside provider, Cholesterol much improved with Crestor compared to previous labs. Recommend close monitoring with recheck in 6 months.   - rosuvastatin (CRESTOR) 5 MG tablet; Take 1 tablet (5 mg) by mouth daily  - Comprehensive metabolic panel (BMP + Alb, Alk Phos, ALT, AST, Total. Bili, TP); Future  - Comprehensive metabolic panel (BMP + Alb, Alk Phos, ALT, AST, Total. Bili, TP)    Other migraine without status migrainosus, not intractable  - Stable on imitrex 1-2 migraines a month.   - SUMAtriptan (IMITREX) 50 MG tablet; TAKE ONE TABLET AT ONSET OF  HEADACHE. MAY REPEAT AFTER 2 HOURS. DO NOT EXCEED 200 MG IN 24 HOURS  - Comprehensive metabolic panel (BMP + Alb, Alk Phos, ALT, AST, Total. Bili, TP); Future  - Comprehensive metabolic panel (BMP + Alb, Alk Phos, ALT, AST, Total. Bili, TP)    Insomnia, unspecified type  - Stable on current medication   - traZODone (DESYREL) 100 MG tablet; TAKE ONE TABLET BY MOUTH AT BEDTIME    Chronic sinusitis, unspecified location    - fluticasone (FLONASE) 50 MCG/ACT nasal spray; Spray 2 sprays into both nostrils daily    Vitamin D deficiency  - Recheck Vitamin D   - vitamin D3 (CHOLECALCIFEROL) 50 mcg (2000 units) tablet; Take 1 tablet (50 mcg) by mouth every other day 2000IU every other day  - Vitamin D Deficiency; Future  - Vitamin D Deficiency    Need for prophylactic vaccination and inoculation against influenza    - INFLUENZA, QUAD, HIGH DOSE, PF, 65YR + (FLUZONE HD)      :[id number]}  The patient indicates understanding of these issues and agrees with the plan.    There are no Patient Instructions on file for this visit.    No follow-ups on file.    ROBYN Bojorquez [facility] BRI Lubin is a 71 year old who presents for the following health issues {\    History of Present Illness     Asthma:  She presents for follow up of asthma.  She has no cough, no wheezing, and no shortness of breath. She is using a relief medication a few times a week. She does not have a controller medication. Patient is aware of the following triggers: humidity, cold air and exercise or sports. The patient has not had a visit to the Emergency Room, Urgent Care or Hospital due to asthma since the last clinic visit. She has been to the Emergency Room or Urgent Care 0 times.She has had a Hospitalization 0 times.    Mental Health Follow-up:  Patient presents to follow-up on Depression & Anxiety.Patient's depression since last visit has been:  No change  The patient is not having other symptoms associated with  "depression.  Patient's anxiety since last visit has been:  No change  The patient is not having other symptoms associated with anxiety.  Any significant life events: No  Patient is not feeling anxious or having panic attacks.  Patient has no concerns about alcohol or drug use.     Social History  Tobacco Use    Smoking status: Former Smoker      Packs/day: 0.00      Types: Cigarettes      Quit date: 1973      Years since quittin.7    Smokeless tobacco: Never Used  Alcohol use: Yes    Alcohol/week: 0.0 standard drinks    Comment: one or two a day  Drug use: No      Today's PHQ-9         PHQ-9 Total Score:         PHQ-9 Q9 Thoughts of better off dead/self-harm past 2 weeks :       Thoughts of suicide or self harm:      Self-harm Plan:        Self-harm Action:          Safety concerns for self or others:           Hyperlipidemia:  She presents for follow up of hyperlipidemia.  She is taking medication to lower cholesterol. She is not having myalgia or other side effects to statin medications.    Migraines:   Since the patient's last clinic visit, headaches are: No change    She is able to do normal daily activities when she has a migraine.  The patient is taking the following rescue/relief medications:  Sumatriptan (Imitrex) and Excedrin   Patient states \"I get total relief\" from the rescue/relief medications.   The patient is taking the following medications to prevent migraines:  No medications to prevent migraines  In the past 4 weeks, the patient has gone to an Urgent Care or Emergency Room 0 times times due to headaches.    She eats 2-3 servings of fruits and vegetables daily.She consumes 1 sweetened beverage(s) daily.She exercises with enough effort to increase her heart rate 9 or less minutes per day.  She exercises with enough effort to increase her heart rate 3 or less days per week.   She is taking medications regularly.         Review of Systems   Constitutional: Negative.    HENT: Positive for ear " "pain (left ear ).    Respiratory: Negative.    Cardiovascular: Negative.    Neurological: Positive for headaches.            Objective    /60   Pulse 55   Temp 98.8  F (37.1  C) (Temporal)   Resp 18   Ht 1.595 m (5' 2.8\")   Wt 65.3 kg (144 lb)   SpO2 100%   BMI 25.68 kg/m    Body mass index is 25.68 kg/m .  Physical Exam   GENERAL: healthy, alert and no distress  EYES: Eyes grossly normal to inspection, PERRL and conjunctivae and sclerae normal  HENT: ear canals and TM's normal, nose and mouth without ulcers or lesions- Left ear with mild clear effusion.   NECK: no adenopathy, no asymmetry, masses, or scars and thyroid normal to palpation  RESP: lungs clear to auscultation - no rales, rhonchi or wheezes  CV: regular rate and rhythm, normal S1 S2, no S3 or S4, no murmur, click or rub, no peripheral edema and peripheral pulses strong  SKIN: no suspicious lesions or rashes  PSYCH: mentation appears normal, affect normal/bright    No results found for this or any previous visit (from the past 24 hour(s)).            "

## 2021-09-20 ENCOUNTER — OFFICE VISIT (OUTPATIENT)
Dept: FAMILY MEDICINE | Facility: OTHER | Age: 71
End: 2021-09-20
Payer: COMMERCIAL

## 2021-09-20 VITALS
SYSTOLIC BLOOD PRESSURE: 118 MMHG | TEMPERATURE: 98.8 F | HEART RATE: 55 BPM | RESPIRATION RATE: 18 BRPM | WEIGHT: 144 LBS | OXYGEN SATURATION: 100 % | HEIGHT: 63 IN | BODY MASS INDEX: 25.52 KG/M2 | DIASTOLIC BLOOD PRESSURE: 60 MMHG

## 2021-09-20 DIAGNOSIS — J45.20 MILD INTERMITTENT ASTHMA WITHOUT COMPLICATION: Primary | ICD-10-CM

## 2021-09-20 DIAGNOSIS — G43.809 OTHER MIGRAINE WITHOUT STATUS MIGRAINOSUS, NOT INTRACTABLE: ICD-10-CM

## 2021-09-20 DIAGNOSIS — G25.81 RESTLESS LEG: ICD-10-CM

## 2021-09-20 DIAGNOSIS — E55.9 VITAMIN D DEFICIENCY: ICD-10-CM

## 2021-09-20 DIAGNOSIS — J30.1 CHRONIC SEASONAL ALLERGIC RHINITIS DUE TO POLLEN: ICD-10-CM

## 2021-09-20 DIAGNOSIS — G47.00 INSOMNIA, UNSPECIFIED TYPE: ICD-10-CM

## 2021-09-20 DIAGNOSIS — E78.5 HYPERLIPIDEMIA LDL GOAL <130: ICD-10-CM

## 2021-09-20 DIAGNOSIS — Z23 NEED FOR PROPHYLACTIC VACCINATION AND INOCULATION AGAINST INFLUENZA: ICD-10-CM

## 2021-09-20 DIAGNOSIS — E53.8 VITAMIN B 12 DEFICIENCY: ICD-10-CM

## 2021-09-20 DIAGNOSIS — J32.9 CHRONIC SINUSITIS, UNSPECIFIED LOCATION: ICD-10-CM

## 2021-09-20 DIAGNOSIS — F32.5 MAJOR DEPRESSION IN COMPLETE REMISSION (H): ICD-10-CM

## 2021-09-20 PROBLEM — H35.54 VITELLIFORM MACULAR DYSTROPHY: Status: ACTIVE | Noted: 2017-11-29

## 2021-09-20 LAB
ALBUMIN SERPL-MCNC: 4.2 G/DL (ref 3.4–5)
ALP SERPL-CCNC: 100 U/L (ref 40–150)
ALT SERPL W P-5'-P-CCNC: 27 U/L (ref 0–50)
ANION GAP SERPL CALCULATED.3IONS-SCNC: 3 MMOL/L (ref 3–14)
AST SERPL W P-5'-P-CCNC: 22 U/L (ref 0–45)
BILIRUB SERPL-MCNC: 0.6 MG/DL (ref 0.2–1.3)
BUN SERPL-MCNC: 16 MG/DL (ref 7–30)
CALCIUM SERPL-MCNC: 8.8 MG/DL (ref 8.5–10.1)
CHLORIDE BLD-SCNC: 106 MMOL/L (ref 94–109)
CO2 SERPL-SCNC: 27 MMOL/L (ref 20–32)
CREAT SERPL-MCNC: 0.89 MG/DL (ref 0.52–1.04)
GFR SERPL CREATININE-BSD FRML MDRD: 66 ML/MIN/1.73M2
GLUCOSE BLD-MCNC: 96 MG/DL (ref 70–99)
POTASSIUM BLD-SCNC: 4 MMOL/L (ref 3.4–5.3)
PROT SERPL-MCNC: 7.3 G/DL (ref 6.8–8.8)
SODIUM SERPL-SCNC: 136 MMOL/L (ref 133–144)
VIT B12 SERPL-MCNC: 705 PG/ML (ref 193–986)

## 2021-09-20 PROCEDURE — 82306 VITAMIN D 25 HYDROXY: CPT | Performed by: NURSE PRACTITIONER

## 2021-09-20 PROCEDURE — G0008 ADMIN INFLUENZA VIRUS VAC: HCPCS | Performed by: NURSE PRACTITIONER

## 2021-09-20 PROCEDURE — 36415 COLL VENOUS BLD VENIPUNCTURE: CPT | Performed by: NURSE PRACTITIONER

## 2021-09-20 PROCEDURE — 90662 IIV NO PRSV INCREASED AG IM: CPT | Performed by: NURSE PRACTITIONER

## 2021-09-20 PROCEDURE — 82607 VITAMIN B-12: CPT | Performed by: NURSE PRACTITIONER

## 2021-09-20 PROCEDURE — 99214 OFFICE O/P EST MOD 30 MIN: CPT | Mod: 25 | Performed by: NURSE PRACTITIONER

## 2021-09-20 PROCEDURE — 80053 COMPREHEN METABOLIC PANEL: CPT | Performed by: NURSE PRACTITIONER

## 2021-09-20 RX ORDER — SERTRALINE HYDROCHLORIDE 100 MG/1
100 TABLET, FILM COATED ORAL DAILY
Qty: 90 TABLET | Refills: 3 | Status: SHIPPED | OUTPATIENT
Start: 2021-09-20 | End: 2022-09-15

## 2021-09-20 RX ORDER — LANOLIN ALCOHOL/MO/W.PET/CERES
1000 CREAM (GRAM) TOPICAL DAILY
Qty: 90 TABLET | Refills: 2 | Status: SHIPPED | OUTPATIENT
Start: 2021-09-20 | End: 2022-06-07

## 2021-09-20 RX ORDER — FLUTICASONE PROPIONATE 50 MCG
2 SPRAY, SUSPENSION (ML) NASAL DAILY
Qty: 16 G | Refills: 6 | Status: SHIPPED | OUTPATIENT
Start: 2021-09-20 | End: 2022-04-22

## 2021-09-20 RX ORDER — TRAZODONE HYDROCHLORIDE 100 MG/1
TABLET ORAL
Qty: 90 TABLET | Refills: 3 | Status: SHIPPED | OUTPATIENT
Start: 2021-09-20 | End: 2022-10-03

## 2021-09-20 RX ORDER — SERTRALINE HYDROCHLORIDE 100 MG/1
100 TABLET, FILM COATED ORAL DAILY
Qty: 90 TABLET | Refills: 0 | Status: CANCELLED | OUTPATIENT
Start: 2021-09-20

## 2021-09-20 RX ORDER — ROPINIROLE 0.5 MG/1
TABLET, FILM COATED ORAL
Qty: 90 TABLET | Refills: 0 | Status: SHIPPED | OUTPATIENT
Start: 2021-09-20 | End: 2021-12-03

## 2021-09-20 RX ORDER — CHOLECALCIFEROL (VITAMIN D3) 50 MCG
1 TABLET ORAL EVERY OTHER DAY
Qty: 90 TABLET | Refills: 3 | Status: SHIPPED | OUTPATIENT
Start: 2021-09-20 | End: 2022-11-16

## 2021-09-20 RX ORDER — MAGNESIUM 200 MG
200 TABLET ORAL DAILY
Qty: 1 TABLET | Refills: 0 | Status: SHIPPED | OUTPATIENT
Start: 2021-09-20

## 2021-09-20 RX ORDER — SUMATRIPTAN 50 MG/1
TABLET, FILM COATED ORAL
Qty: 18 TABLET | Refills: 4 | Status: SHIPPED | OUTPATIENT
Start: 2021-09-20 | End: 2022-04-25

## 2021-09-20 RX ORDER — ALBUTEROL SULFATE 90 UG/1
2 AEROSOL, METERED RESPIRATORY (INHALATION) EVERY 4 HOURS PRN
Qty: 18 G | Refills: 5 | Status: SHIPPED | OUTPATIENT
Start: 2021-09-20 | End: 2022-04-25

## 2021-09-20 RX ORDER — ROSUVASTATIN CALCIUM 5 MG/1
5 TABLET, COATED ORAL DAILY
Qty: 90 TABLET | Refills: 3 | Status: SHIPPED | OUTPATIENT
Start: 2021-09-20 | End: 2022-04-25

## 2021-09-20 RX ORDER — FLUTICASONE PROPIONATE 220 UG/1
AEROSOL, METERED RESPIRATORY (INHALATION)
Qty: 12 G | Refills: 11 | Status: SHIPPED | OUTPATIENT
Start: 2021-09-20 | End: 2024-04-02

## 2021-09-20 ASSESSMENT — ENCOUNTER SYMPTOMS
CONSTITUTIONAL NEGATIVE: 1
RESPIRATORY NEGATIVE: 1
HEADACHES: 1
CARDIOVASCULAR NEGATIVE: 1

## 2021-09-20 ASSESSMENT — ASTHMA QUESTIONNAIRES
QUESTION_3 LAST FOUR WEEKS HOW OFTEN DID YOUR ASTHMA SYMPTOMS (WHEEZING, COUGHING, SHORTNESS OF BREATH, CHEST TIGHTNESS OR PAIN) WAKE YOU UP AT NIGHT OR EARLIER THAN USUAL IN THE MORNING: ONCE OR TWICE
QUESTION_5 LAST FOUR WEEKS HOW WOULD YOU RATE YOUR ASTHMA CONTROL: SOMEWHAT CONTROLLED
QUESTION_1 LAST FOUR WEEKS HOW MUCH OF THE TIME DID YOUR ASTHMA KEEP YOU FROM GETTING AS MUCH DONE AT WORK, SCHOOL OR AT HOME: NONE OF THE TIME
QUESTION_2 LAST FOUR WEEKS HOW OFTEN HAVE YOU HAD SHORTNESS OF BREATH: THREE TO SIX TIMES A WEEK
ACT_TOTALSCORE: 18
QUESTION_4 LAST FOUR WEEKS HOW OFTEN HAVE YOU USED YOUR RESCUE INHALER OR NEBULIZER MEDICATION (SUCH AS ALBUTEROL): TWO OR THREE TIMES PER WEEK

## 2021-09-20 ASSESSMENT — MIFFLIN-ST. JEOR: SCORE: 1144.05

## 2021-09-20 ASSESSMENT — PAIN SCALES - GENERAL: PAINLEVEL: MILD PAIN (2)

## 2021-09-21 LAB — DEPRECATED CALCIDIOL+CALCIFEROL SERPL-MC: 40 UG/L (ref 20–75)

## 2021-09-21 ASSESSMENT — ASTHMA QUESTIONNAIRES: ACT_TOTALSCORE: 18

## 2021-10-04 ENCOUNTER — TRANSFERRED RECORDS (OUTPATIENT)
Dept: HEALTH INFORMATION MANAGEMENT | Facility: CLINIC | Age: 71
End: 2021-10-04

## 2021-10-15 DIAGNOSIS — E53.8 VITAMIN B 12 DEFICIENCY: ICD-10-CM

## 2021-10-15 RX ORDER — LANOLIN ALCOHOL/MO/W.PET/CERES
1000 CREAM (GRAM) TOPICAL DAILY
Qty: 90 TABLET | Refills: 2 | Status: CANCELLED | OUTPATIENT
Start: 2021-10-15

## 2021-10-19 PROBLEM — F32.9 MAJOR DEPRESSION: Status: ACTIVE | Noted: 2017-04-24

## 2021-11-03 ENCOUNTER — VIRTUAL VISIT (OUTPATIENT)
Dept: FAMILY MEDICINE | Facility: CLINIC | Age: 71
End: 2021-11-03
Payer: COMMERCIAL

## 2021-11-03 DIAGNOSIS — J32.1 CHRONIC FRONTAL SINUSITIS: Primary | ICD-10-CM

## 2021-11-03 PROCEDURE — 99213 OFFICE O/P EST LOW 20 MIN: CPT | Mod: 95 | Performed by: FAMILY MEDICINE

## 2021-11-03 RX ORDER — PREDNISONE 20 MG/1
TABLET ORAL
Qty: 10 TABLET | Refills: 0 | Status: SHIPPED | OUTPATIENT
Start: 2021-11-03 | End: 2021-12-03

## 2021-11-03 NOTE — PROGRESS NOTES
Amee is a 71 year old who is being evaluated via a billable telephone visit.      What phone number would you like to be contacted at? [phone]  How would you like to obtain your AVS? MyChart    Assessment & Plan     Chronic frontal sinusitis  Chronic sinusitis  continue with Flonase, and Astelin  - amoxicillin-clavulanate (AUGMENTIN) 875-125 MG tablet; Take 1 tablet by mouth 2 times daily for 14 days  - predniSONE (DELTASONE) 20 MG tablet; 2 tab daily for 5 days       No follow-ups on file.    Chaisdy Crow MD  [facility]    Pam Lubin is a 71 year old who presents for the following health issues:  History of chronic sinus infection in the past.  She has been having pressure, fullness in her frontal area for the past few weeks.  Week ago she had a positive Covid test.  Currently, she has no cough, no short of breath, has no fever no chills.    Acute Illness  Acute illness concerns: Possible sinus infection  Onset/Duration: couple weeks  Symptoms:  Fever: no  Chills/Sweats: YES- chills/hot flashes  Headache (location?): YES frontal  Sinus Pressure: YES  Conjunctivitis:  no  Ear Pain: no  Rhinorrhea: no  Congestion: no  Sore Throat: no  Cough: no  Wheeze: no  Decreased Appetite: YES  Nausea: YES  Vomiting: no  Diarrhea: no  Dysuria/Freq.: no  Dysuria or Hematuria: no  Fatigue/Achiness: YES  Sick/Strep Exposure: no  Therapies tried and outcome:  Positive Covid 1 week ago Monday    Review of Systems   Constitutional, HEENT, cardiovascular, pulmonary, gi and gu systems are negative, except as otherwise noted.      Objective           Vitals:  No vitals were obtained today due to virtual visit.    Physical Exam   healthy, alert, active and no distress  PSYCH: Alert and oriented times 3; coherent speech, normal   rate and volume, able to articulate logical thoughts, able   to abstract reason, no tangential thoughts, no hallucinations   or delusions  Her affect is normal and  pleasant  RESP: No cough, no audible wheezing, able to talk in full sentences  Remainder of exam unable to be completed due to telephone visits      Chasidy Crow MD        Phone call duration: 6 minutes

## 2021-11-10 DIAGNOSIS — E53.8 VITAMIN B 12 DEFICIENCY: ICD-10-CM

## 2021-11-12 RX ORDER — LANOLIN ALCOHOL/MO/W.PET/CERES
1000 CREAM (GRAM) TOPICAL DAILY
Qty: 90 TABLET | Refills: 2 | OUTPATIENT
Start: 2021-11-12

## 2021-11-22 ENCOUNTER — MYC MEDICAL ADVICE (OUTPATIENT)
Dept: FAMILY MEDICINE | Facility: OTHER | Age: 71
End: 2021-11-22
Payer: COMMERCIAL

## 2021-11-29 ENCOUNTER — MYC MEDICAL ADVICE (OUTPATIENT)
Dept: FAMILY MEDICINE | Facility: OTHER | Age: 71
End: 2021-11-29
Payer: COMMERCIAL

## 2021-11-29 NOTE — TELEPHONE ENCOUNTER
Agreed visit is needed.       ROBYN Bojorquez CNP  Questions or concerns please feel free to send me a [employer] message or call me  Phone : [phone]

## 2021-12-03 ENCOUNTER — MYC MEDICAL ADVICE (OUTPATIENT)
Dept: FAMILY MEDICINE | Facility: OTHER | Age: 71
End: 2021-12-03

## 2021-12-03 ENCOUNTER — VIRTUAL VISIT (OUTPATIENT)
Dept: FAMILY MEDICINE | Facility: OTHER | Age: 71
End: 2021-12-03
Payer: COMMERCIAL

## 2021-12-03 DIAGNOSIS — R82.90 URINE ABNORMALITY: Primary | ICD-10-CM

## 2021-12-03 DIAGNOSIS — G25.81 RESTLESS LEG: Primary | ICD-10-CM

## 2021-12-03 PROCEDURE — 99213 OFFICE O/P EST LOW 20 MIN: CPT | Mod: 95 | Performed by: NURSE PRACTITIONER

## 2021-12-03 RX ORDER — PRAMIPEXOLE DIHYDROCHLORIDE 0.12 MG/1
0.12 TABLET ORAL AT BEDTIME
Qty: 30 TABLET | Refills: 0 | Status: SHIPPED | OUTPATIENT
Start: 2021-12-03 | End: 2021-12-06

## 2021-12-03 RX ORDER — CYCLOBENZAPRINE HCL 5 MG
1 TABLET ORAL
COMMUNITY
Start: 2021-11-30 | End: 2022-05-10

## 2021-12-03 ASSESSMENT — PAIN SCALES - GENERAL: PAINLEVEL: MODERATE PAIN (5)

## 2021-12-03 NOTE — PROGRESS NOTES
Amee is a 71 year old who is being evaluated via a billable telephone visit.      What phone number would you like to be contacted at? [phone]  How would you like to obtain your AVS? Fifi    Assessment & Plan     Restless leg  - Patient has previously been on requip and not seeing much for improvement of her symptoms. Currently she is on 1mg. I advised her we can increase this dose if she so chooses. She would like to trial something else.   - Discussed Mirapex along with side effects and taking 2-3 hours before bedtime.   - Encouraged her to let me know if she feels this does not help or if we need to increase the dose.     Follow up with fifi in 3-4 days.       The patient indicates understanding of these issues and agrees with the plan.    Patient Instructions     Patient Education     Mirapex Oral Tablet 0.125 mg  Uses  This medicine is used for the following purposes:    muscle aches    Parkinson's disease    restless leg syndrome  Instructions  This medicine may be taken with or without food.  You may take with food to prevent stomach upset.  It is very important that you take the medicine at about the same time every day. It will work best if you do this.  Store at room temperature in a dry place. Do not keep in the bathroom.  Keep the medicine away from heat and light.  It is important that you keep taking each dose of this medicine on time even if you are feeling well.  If you forget to take a dose on time, take it as soon as you remember. If it is almost time for the next dose, do not take the missed dose. Return to your normal dosing schedule. Do not take 2 doses of this medicine at one time.  Please tell your doctor and pharmacist about all the medicines you take. Include both prescription and over-the-counter medicines. Also tell them about any vitamins, herbal medicines, or anything else you take for your health.  If your symptoms do not improve or they worsen while on this medicine,  contact your doctor.  Do not suddenly stop taking this medicine. Check with your doctor before stopping.  Cautions  Tell your doctor and pharmacist if you ever had an allergic reaction to a medicine. Symptoms of an allergic reaction can include trouble breathing, skin rash, itching, swelling, or severe dizziness.  Some patients taking this medicine have experienced serious side effects. Please speak with your doctor to understand the risks and benefits associated with this medicine.  Do not use the medication any more than instructed.  This medicine may cause dizziness or fainting, especially after exercising or in hot weather. Be very careful when standing or sitting up quickly.  Your ability to stay alert or to react quickly may be impaired by this medicine. Do not drive or operate machinery until you know how this medicine will affect you.  Please check with your doctor before drinking alcohol while on this medicine.  If you drink more than a few alcoholic beverages each day, ask your doctor whether you should be on this medicine.  Contact your doctor if you notice a change in the amount or darkening of your urine.  Family should check on the patient often. Call the doctor if patient becomes more depressed or shows changes in behavior.  Call the doctor if there are any signs of confusion or unusual changes in behavior.  Tell the doctor or pharmacist if you are pregnant, planning to be pregnant, or breastfeeding.  Ask your pharmacist if this medicine can interact with any of your other medicines. Be sure to tell them about all the medicines you take.  Please tell all your doctors and dentists that you are on this medicine before they provide care.  Do not start or stop any other medicines without first speaking to your doctor or pharmacist.  Do not share this medicine with anyone who has not been prescribed this medicine.  Side Effects  The following is a list of some common side effects from this medicine. Please  speak with your doctor about what you should do if you experience these or other side effects.    loss of balance    constipation    dizziness    drowsiness or sedation    dry mouth    lack of energy and tiredness    headaches    low blood pressure    lightheadedness    nausea    stomach upset or abdominal pain    sweating    vomiting  Call your doctor or get medical help right away if you notice any of these more serious side effects:    agitated feeling or trouble sleeping    change in behavior    chest pain    changes in memory, mood, or thinking    confusion    depression or feeling sad    swelling of the legs, feet, and hands    fainting    hallucinations (unusual thoughts, seeing or hearing things that are not real)    fast or irregular heart beats    unusual strong urges - such as increased need to shop, gomez, or have sex    memory problems or loss    uncontrollable movement of face, tongue, arms or legs    muscle aches, spasms or abnormal movements    muscle pain    tight or rigid muscles    muscle weakness    falling asleep suddenly during usual daily activities    restlessness    problems with sexual functions or desire    shakiness    skin changes - brown or black area with uneven edges or coloring    change in the size or color of a skin mole    unusual or unexplained tiredness or weakness    blurring or changes of vision  A few people may have an allergic reactions to this medicine. Symptoms can include difficulty breathing, skin rash, itching, swelling, or severe dizziness. If you notice any of these symptoms, seek medical help quickly.  Extra  Please speak with your doctor, nurse, or pharmacist if you have any questions about this medicine.  [email or web address]  IMPORTANT NOTE: This document tells you briefly how to take your medicine, but it does not tell you all there is to know about it.Your doctor or pharmacist may give you other documents about your medicine. Please  talk to them if you have any questions.Always follow their advice. There is a more complete description of this medicine available in English.Scan this code on your smartphone or tablet or use the web address below. You can also ask your pharmacist for a printout. If you have any questions, please ask your pharmacist.     2021 [employer].               No follow-ups on file.    ROBYN Bojorquez CNP  [facility] BRI Lubin is a 71 year old who presents for the following health issues     HPI     Medication Followup of Ropinirole     Taking Medication as prescribed: NO- taking 2 tablets     Side Effects:  None    Medication Helping Symptoms:  NO         Review of Systems         Objective           Vitals:  No vitals were obtained today due to virtual visit.    Physical Exam   healthy, alert and no distress  PSYCH: Alert and oriented times 3; coherent speech, normal   rate and volume, able to articulate logical thoughts, able   to abstract reason, no tangential thoughts, no hallucinations   or delusions  Her affect is normal  RESP: No cough, no audible wheezing, able to talk in full sentences  Remainder of exam unable to be completed due to telephone visits    Diagnostic: None             Phone call duration: 10 minutes

## 2021-12-03 NOTE — PATIENT INSTRUCTIONS
Patient Education     Mirapex Oral Tablet 0.125 mg  Uses  This medicine is used for the following purposes:    muscle aches    Parkinson's disease    restless leg syndrome  Instructions  This medicine may be taken with or without food.  You may take with food to prevent stomach upset.  It is very important that you take the medicine at about the same time every day. It will work best if you do this.  Store at room temperature in a dry place. Do not keep in the bathroom.  Keep the medicine away from heat and light.  It is important that you keep taking each dose of this medicine on time even if you are feeling well.  If you forget to take a dose on time, take it as soon as you remember. If it is almost time for the next dose, do not take the missed dose. Return to your normal dosing schedule. Do not take 2 doses of this medicine at one time.  Please tell your doctor and pharmacist about all the medicines you take. Include both prescription and over-the-counter medicines. Also tell them about any vitamins, herbal medicines, or anything else you take for your health.  If your symptoms do not improve or they worsen while on this medicine, contact your doctor.  Do not suddenly stop taking this medicine. Check with your doctor before stopping.  Cautions  Tell your doctor and pharmacist if you ever had an allergic reaction to a medicine. Symptoms of an allergic reaction can include trouble breathing, skin rash, itching, swelling, or severe dizziness.  Some patients taking this medicine have experienced serious side effects. Please speak with your doctor to understand the risks and benefits associated with this medicine.  Do not use the medication any more than instructed.  This medicine may cause dizziness or fainting, especially after exercising or in hot weather. Be very careful when standing or sitting up quickly.  Your ability to stay alert or to react quickly may be impaired by this medicine. Do not drive or operate  machinery until you know how this medicine will affect you.  Please check with your doctor before drinking alcohol while on this medicine.  If you drink more than a few alcoholic beverages each day, ask your doctor whether you should be on this medicine.  Contact your doctor if you notice a change in the amount or darkening of your urine.  Family should check on the patient often. Call the doctor if patient becomes more depressed or shows changes in behavior.  Call the doctor if there are any signs of confusion or unusual changes in behavior.  Tell the doctor or pharmacist if you are pregnant, planning to be pregnant, or breastfeeding.  Ask your pharmacist if this medicine can interact with any of your other medicines. Be sure to tell them about all the medicines you take.  Please tell all your doctors and dentists that you are on this medicine before they provide care.  Do not start or stop any other medicines without first speaking to your doctor or pharmacist.  Do not share this medicine with anyone who has not been prescribed this medicine.  Side Effects  The following is a list of some common side effects from this medicine. Please speak with your doctor about what you should do if you experience these or other side effects.    loss of balance    constipation    dizziness    drowsiness or sedation    dry mouth    lack of energy and tiredness    headaches    low blood pressure    lightheadedness    nausea    stomach upset or abdominal pain    sweating    vomiting  Call your doctor or get medical help right away if you notice any of these more serious side effects:    agitated feeling or trouble sleeping    change in behavior    chest pain    changes in memory, mood, or thinking    confusion    depression or feeling sad    swelling of the legs, feet, and hands    fainting    hallucinations (unusual thoughts, seeing or hearing things that are not real)    fast or irregular heart beats    unusual strong urges -  such as increased need to shop, gomez, or have sex    memory problems or loss    uncontrollable movement of face, tongue, arms or legs    muscle aches, spasms or abnormal movements    muscle pain    tight or rigid muscles    muscle weakness    falling asleep suddenly during usual daily activities    restlessness    problems with sexual functions or desire    shakiness    skin changes - brown or black area with uneven edges or coloring    change in the size or color of a skin mole    unusual or unexplained tiredness or weakness    blurring or changes of vision  A few people may have an allergic reactions to this medicine. Symptoms can include difficulty breathing, skin rash, itching, swelling, or severe dizziness. If you notice any of these symptoms, seek medical help quickly.  Extra  Please speak with your doctor, nurse, or pharmacist if you have any questions about this medicine.  [email or web address]  IMPORTANT NOTE: This document tells you briefly how to take your medicine, but it does not tell you all there is to know about it.Your doctor or pharmacist may give you other documents about your medicine. Please talk to them if you have any questions.Always follow their advice. There is a more complete description of this medicine available in English.Scan this code on your smartphone or tablet or use the web address below. You can also ask your pharmacist for a printout. If you have any questions, please ask your pharmacist.     2021 [employer].

## 2021-12-06 ENCOUNTER — MYC MEDICAL ADVICE (OUTPATIENT)
Dept: FAMILY MEDICINE | Facility: OTHER | Age: 71
End: 2021-12-06
Payer: COMMERCIAL

## 2021-12-06 DIAGNOSIS — G25.81 RESTLESS LEG: ICD-10-CM

## 2021-12-06 DIAGNOSIS — R82.998 FOAMY URINE: Primary | ICD-10-CM

## 2021-12-06 RX ORDER — PRAMIPEXOLE DIHYDROCHLORIDE 0.12 MG/1
0.12 TABLET ORAL AT BEDTIME
Qty: 30 TABLET | Refills: 3 | Status: SHIPPED | OUTPATIENT
Start: 2021-12-06 | End: 2022-04-25

## 2021-12-06 NOTE — TELEPHONE ENCOUNTER
Contacted pt. Advised her of message from [facility]. Lab appointment made for tomorrow.    Khalida Lorenzo, BSN, RN, PHN  Registered Nurse-Clinic Triage  [facility]  12/6/2021 at 4:22 PM

## 2021-12-06 NOTE — TELEPHONE ENCOUNTER
Refill of Mirapex given since she is tolerating it and seeing symptom improvement. Recommend contacting me if she needs dose increasing.     As for her urine. I recommend urine check this week and being seen if her symptoms worsen.      ROBYN Bojorquez CNP  Questions or concerns please feel free to send me a [employer] message or call me  Phone : [phone]

## 2021-12-07 ENCOUNTER — MYC MEDICAL ADVICE (OUTPATIENT)
Dept: FAMILY MEDICINE | Facility: OTHER | Age: 71
End: 2021-12-07
Payer: COMMERCIAL

## 2021-12-07 DIAGNOSIS — M19.042 PRIMARY OSTEOARTHRITIS OF BOTH HANDS: Primary | ICD-10-CM

## 2021-12-07 DIAGNOSIS — M19.041 PRIMARY OSTEOARTHRITIS OF BOTH HANDS: Primary | ICD-10-CM

## 2021-12-08 ENCOUNTER — LAB (OUTPATIENT)
Dept: LAB | Facility: OTHER | Age: 71
End: 2021-12-08
Payer: COMMERCIAL

## 2021-12-08 DIAGNOSIS — Z13.220 SCREENING FOR HYPERLIPIDEMIA: Primary | ICD-10-CM

## 2021-12-08 DIAGNOSIS — R82.998 FOAMY URINE: ICD-10-CM

## 2021-12-08 DIAGNOSIS — R82.90 URINE ABNORMALITY: ICD-10-CM

## 2021-12-08 LAB
ALBUMIN UR-MCNC: NEGATIVE MG/DL
APPEARANCE UR: CLEAR
BACTERIA #/AREA URNS HPF: ABNORMAL /HPF
BILIRUB UR QL STRIP: NEGATIVE
CHOLEST SERPL-MCNC: 245 MG/DL
COLOR UR AUTO: YELLOW
FASTING STATUS PATIENT QL REPORTED: YES
GLUCOSE UR STRIP-MCNC: NEGATIVE MG/DL
HDLC SERPL-MCNC: 59 MG/DL
HGB UR QL STRIP: NEGATIVE
KETONES UR STRIP-MCNC: NEGATIVE MG/DL
LDLC SERPL CALC-MCNC: 150 MG/DL
LEUKOCYTE ESTERASE UR QL STRIP: ABNORMAL
NITRATE UR QL: NEGATIVE
NONHDLC SERPL-MCNC: 186 MG/DL
PH UR STRIP: 7 [PH] (ref 5–7)
RBC #/AREA URNS AUTO: ABNORMAL /HPF
SP GR UR STRIP: 1.02 (ref 1–1.03)
TRIGL SERPL-MCNC: 181 MG/DL
UROBILINOGEN UR STRIP-ACNC: 0.2 E.U./DL
WBC #/AREA URNS AUTO: ABNORMAL /HPF

## 2021-12-08 PROCEDURE — 80061 LIPID PANEL: CPT

## 2021-12-08 PROCEDURE — 81001 URINALYSIS AUTO W/SCOPE: CPT

## 2021-12-08 PROCEDURE — 36415 COLL VENOUS BLD VENIPUNCTURE: CPT

## 2021-12-08 PROCEDURE — 87086 URINE CULTURE/COLONY COUNT: CPT

## 2021-12-08 PROCEDURE — 87186 SC STD MICRODIL/AGAR DIL: CPT

## 2021-12-08 NOTE — TELEPHONE ENCOUNTER
Urine culture placed.       ROBYN Bojorquez CNP  Questions or concerns please feel free to send me a [employer] message or call me  Phone : [phone]

## 2021-12-08 NOTE — TELEPHONE ENCOUNTER
Responded.       ROBYN Bojorquez CNP  Questions or concerns please feel free to send me a [employer] message or call me  Phone : [phone]

## 2021-12-09 ENCOUNTER — TELEPHONE (OUTPATIENT)
Dept: FAMILY MEDICINE | Facility: OTHER | Age: 71
End: 2021-12-09
Payer: COMMERCIAL

## 2021-12-09 DIAGNOSIS — N30.00 ACUTE CYSTITIS WITHOUT HEMATURIA: Primary | ICD-10-CM

## 2021-12-09 RX ORDER — CIPROFLOXACIN 500 MG/1
500 TABLET, FILM COATED ORAL 2 TIMES DAILY
Qty: 10 TABLET | Refills: 0 | Status: SHIPPED | OUTPATIENT
Start: 2021-12-09 | End: 2021-12-14

## 2021-12-10 LAB — BACTERIA UR CULT: ABNORMAL

## 2021-12-10 NOTE — TELEPHONE ENCOUNTER
Antibiotics sent.       ROBYN Bojorquez CNP  Questions or concerns please feel free to send me a [employer] message or call me  Phone : [phone]

## 2021-12-10 NOTE — PROGRESS NOTES
Sports Medicine Clinic Visit       PCP: Patrica Kelly    Amee Crews is a 71 year old female who is seen in consultation at the request of Ary Adams CNP. Since last visit on 1/23/2020, Amee has noticed a recurrence of pain. She notes that past corticosteroid injections gave relief for about 1.5 years and pain has gradually come back over the past 6 months. Pain is over the 1st CMC joints and bilateral wrists with some radiation into her left forearm. She rates the pain at a 3/10 currently.  Symptoms are relieved with corticosteroid injections.  Symptoms are worsened by opening jars and doors and lifting.    She is retired.      History reviewed. No pertinent past surgical/medical/family/social history other than as mentioned in HPI.    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, moderatly severe,slowly worsening     Pinguecula of both eyes     Nuclear cataract of both eyes     Myopia, bilateral     Macular cyst, hole, or pseudohole, bilateral, moderatly severe     Adult vitelliform macular degeneration     Gastroesophageal reflux disease without esophagitis     Vitelliform macular dystrophy     DDD (degenerative disc disease), lumbar     Chronic pain of left knee     Vitamin B12 deficiency     Osteopenia, unspecified location     Migraine without aura and without status migrainosus, not intractable     Depression     Asthma     Adjustment  reaction with anxiety and depression     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     Allergic rhinitis      Allergies      asthma     mild intermittent     Chronic back pain     neck and low back     HA (headache)     muscle contraction      History of blood transfusion     In childhood     Hot flashes      Hyperlipidemia      Insomnia      Intermittent asthma 9/16/2011     Major depressive disorder, single episode, moderate (H)      Ocular migraine      Osteoarthritis of hand      Restless leg 9/16/2011     Past Surgical History:   Procedure Laterality Date     APPENDECTOMY      age 9      BIOPSY      took tissue from my uterus at least 15 years ago     COLONOSCOPY  14 years     COLONOSCOPY N/A 4/30/2015    Procedure: COMBINED COLONOSCOPY, SINGLE OR MULTIPLE BIOPSY/POLYPECTOMY BY BIOPSY;  Surgeon: Doni Carey MD;  Location: [facility]     COLONOSCOPY WITH CO2 INSUFFLATION N/A 4/30/2015    Procedure: COLONOSCOPY WITH CO2 INSUFFLATION;  Surgeon: Doni Carey MD;  Location: [facility]     COLONOSCOPY WITH CO2 INSUFFLATION N/A 12/8/2020    Procedure: COLONOSCOPY, WITH CO2 INSUFFLATION;  Surgeon: Kareem Littlejohn, ;  Location: [facility]     COMBINED ESOPHAGOSCOPY, GASTROSCOPY, DUODENOSCOPY (EGD) WITH CO2 INSUFFLATION N/A 8/24/2017    Procedure: COMBINED ESOPHAGOSCOPY, GASTROSCOPY, DUODENOSCOPY (EGD) WITH CO2 INSUFFLATION;  EGD, Gastroesophageal reflux disease, esophagitis presence not specified Abdominal pain, generalized, Ref Dahlheimer-Schroeder, 24.78 BMI;  Surgeon: Duane, William Charles, MD;  Location:  OR     ENT SURGERY      Tonsillectomy     ESOPHAGOSCOPY, GASTROSCOPY, DUODENOSCOPY (EGD), COMBINED N/A 8/24/2017    Procedure: COMBINED ESOPHAGOSCOPY, GASTROSCOPY, DUODENOSCOPY (EGD), BIOPSY SINGLE OR MULTIPLE;;  Surgeon: Duane, William Charles, MD;  Location: [facility]     LAPAROSCOPY PROCEDURE UNLISTED      polyps found     MOUTH SURGERY  2016      SINUS SURGERY      x2     Family History   Problem Relation Age of Onset     Diabetes Mother         Old age     Hypertension Mother      Cancer Father         stomach      Cancer Maternal Grandfather         lung      Heart Disease Other      Social History     Socioeconomic History     Marital status:      Spouse name: Not on file     Number of children: 2     Years of education: Not on file     Highest education level: Not on file   Occupational History     Employer: SELF   Tobacco Use     Smoking status: Former Smoker     Packs/day: 0.00     Years: 0.00     Pack years: 0.00     Types: Cigarettes     Quit date: 1973     Years since quittin.9     Smokeless tobacco: Never Used     Tobacco comment: Social smoker   Vaping Use     Vaping Use: Never used   Substance and Sexual Activity     Alcohol use: Yes     Alcohol/week: 0.0 standard drinks     Comment: one or two a day     Drug use: No     Sexual activity: Yes     Partners: Male   Other Topics Concern     Parent/sibling w/ CABG, MI or angioplasty before 65F 55M? No   Social History Narrative     Not on file     Social Determinants of Health     Financial Resource Strain: Not on file   Food Insecurity: Not on file   Transportation Needs: Not on file   Physical Activity: Not on file   Stress: Not on file   Social Connections: Not on file   Intimate Partner Violence: Not on file   Housing Stability: Not on file         Current Outpatient Medications   Medication     albuterol (PROAIR HFA/PROVENTIL HFA/VENTOLIN HFA) 108 (90 Base) MCG/ACT inhaler     azelastine (ASTELIN) 0.1 % nasal spray     ciprofloxacin (CIPRO) 500 MG tablet     cyanocobalamin (VITAMIN B-12) 1000 MCG tablet     fluticasone (FLONASE) 50 MCG/ACT nasal spray     fluticasone (FLOVENT HFA) 220 MCG/ACT inhaler     magnesium 200 MG TABS     pramipexole (MIRAPEX) 0.125 MG tablet     rosuvastatin (CRESTOR) 5 MG tablet     sertraline (ZOLOFT) 100 MG tablet     SUMAtriptan (IMITREX) 50 MG  "tablet     traZODone (DESYREL) 100 MG tablet     Triprolidine-Pseudoephedrine (APRODINE PO)     vitamin D3 (CHOLECALCIFEROL) 50 mcg (2000 units) tablet     cyclobenzaprine (FLEXERIL) 5 MG tablet     No current facility-administered medications for this visit.     Allergies   Allergen Reactions     Atorvastatin Cramps     Muscle pain     Morphine Itching     Morphine Sulfate Itching         Objective:  /70   Ht 1.6 m (5' 3\")   Wt 65.3 kg (144 lb)   BMI 25.51 kg/m      General: Alert and in no distress    Head: Normocephalic, atraumatic  Eyes: no scleral icterus or conjunctival erythema   Skin: no erythema, petechiae, or jaundice  Resp: normal respiratory effort without conversational dyspnea   Psych: normal mood and affect    Gait: Non-antalgic, appropriate coordination and balance     Musculoskeletal:  -Tender over the bilateral first CMC joints      Radiology:  X-rays ordered and independent visualization of images performed and reviewed with Amee.  Recent Results (from the past 744 hour(s))   XR Wrist Bilateral G/E 3 vw    Narrative    XR BILATERAL WRIST THREE OR MORE VIEWS   12/13/2021 1:33 PM     HISTORY:  Primary osteoarthritis of both hands.        Impression    IMPRESSION: Prominent degenerative arthrosis of the right thumb CMC  joint. Fairly prominent degenerative arthrosis of the left thumb CMC  joint. There is degenerative arthrosis at both STT joints (fairly  prominent right, moderate left).         FINGER BILATERAL TWO OR MORE VIEWS 1/23/2020 10:02 AM      HISTORY: Chronic thumb pain, bilateral.                                                                       IMPRESSION: Bilateral first carpometacarpal joint degenerative  arthrosis. Left scaphotrapezium trapezoid joint degenerative changes  are also noted. Left first metacarpophalangeal joint space  radial-sided narrowing or degenerative changes are also noted.     JONATHAN PUENTES MD      Small Joint Injection/Arthrocentesis: bilateral " thumb CMC    Date/Time: 12/13/2021 1:42 PM  Performed by: Yasemin Simental MD  Authorized by: Yasemin Simental MD   Indications:  Pain  Needle Size:  25 G  Guidance: landmark     Approach:  Radial  Location:  Thumb  Laterality:  Bilateral  Site:  Bilateral thumb CMC    Medications (Right):  20 mg triamcinolone 40 MG/ML   Medications (Right) comment:  0.5 mL 0.5% Bupivacaine  NDC: 7679-6889-14  CBU: [id number]  Exp: 03/01/2023          Medications (Left):  20 mg triamcinolone 40 MG/ML   Medications (Left) comment:  0.5 mL 0.5% Bupivacaine  NDC: 9154-5986-76  CBU: [id number]  Exp: 03/01/2023                    Procedure discussed: discussed risks, benefits, and alternatives    Consent Given by:  Patient              Assessment:  1. Chronic thumb pain, bilateral    2. Osteoarthritis of carpometacarpal (CMC) joint of both thumbs        Plan:  Discussed the assessment with the patient and developed a plan together:  -Steroid injections performed today.  Take it easy over the next few days. Keep in mind that the steroid may take up to 3 days to start working and up to 2 weeks to reach maximal effect.    -Ice for 15-20 minutes as needed for soreness and swelling.  -Patient's preferred over the counter medications as needed for soreness.  -Bracing as needed for comfort and support.  -Avoid aggravating activities.    -Follow up as needed if symptoms fail to improve or worsen.  Please call with questions or concerns.      Sujata Simental MD, CAQ Sports Medicine  [facility]

## 2021-12-13 ENCOUNTER — OFFICE VISIT (OUTPATIENT)
Dept: ORTHOPEDICS | Facility: OTHER | Age: 71
End: 2021-12-13
Payer: COMMERCIAL

## 2021-12-13 ENCOUNTER — ANCILLARY PROCEDURE (OUTPATIENT)
Dept: GENERAL RADIOLOGY | Facility: OTHER | Age: 71
End: 2021-12-13
Attending: PHYSICAL MEDICINE & REHABILITATION
Payer: COMMERCIAL

## 2021-12-13 VITALS
SYSTOLIC BLOOD PRESSURE: 122 MMHG | BODY MASS INDEX: 25.52 KG/M2 | HEIGHT: 63 IN | WEIGHT: 144 LBS | DIASTOLIC BLOOD PRESSURE: 70 MMHG

## 2021-12-13 DIAGNOSIS — M19.042 PRIMARY OSTEOARTHRITIS OF BOTH HANDS: ICD-10-CM

## 2021-12-13 DIAGNOSIS — M79.644 CHRONIC THUMB PAIN, BILATERAL: Primary | ICD-10-CM

## 2021-12-13 DIAGNOSIS — G89.29 CHRONIC THUMB PAIN, BILATERAL: Primary | ICD-10-CM

## 2021-12-13 DIAGNOSIS — M79.645 CHRONIC THUMB PAIN, BILATERAL: Primary | ICD-10-CM

## 2021-12-13 DIAGNOSIS — M18.0 OSTEOARTHRITIS OF CARPOMETACARPAL (CMC) JOINT OF BOTH THUMBS: ICD-10-CM

## 2021-12-13 DIAGNOSIS — M19.041 PRIMARY OSTEOARTHRITIS OF BOTH HANDS: ICD-10-CM

## 2021-12-13 PROCEDURE — 99213 OFFICE O/P EST LOW 20 MIN: CPT | Mod: 25 | Performed by: PHYSICAL MEDICINE & REHABILITATION

## 2021-12-13 PROCEDURE — 20600 DRAIN/INJ JOINT/BURSA W/O US: CPT | Mod: 50 | Performed by: PHYSICAL MEDICINE & REHABILITATION

## 2021-12-13 PROCEDURE — 73110 X-RAY EXAM OF WRIST: CPT | Mod: LT | Performed by: RADIOLOGY

## 2021-12-13 RX ORDER — TRIAMCINOLONE ACETONIDE 40 MG/ML
20 INJECTION, SUSPENSION INTRA-ARTICULAR; INTRAMUSCULAR
Status: SHIPPED | OUTPATIENT
Start: 2021-12-13

## 2021-12-13 RX ADMIN — TRIAMCINOLONE ACETONIDE 20 MG: 40 INJECTION, SUSPENSION INTRA-ARTICULAR; INTRAMUSCULAR at 13:42

## 2021-12-13 ASSESSMENT — MIFFLIN-ST. JEOR: SCORE: 1147.31

## 2021-12-13 ASSESSMENT — PAIN SCALES - GENERAL: PAINLEVEL: MILD PAIN (3)

## 2021-12-13 NOTE — LETTER
12/13/2021         RE: Amee Crews  [address] [facility] [id number]        Dear Colleague,    Thank you for referring your patient, Amee Crews, to the [facility] [place]. Please see a copy of my visit note below.    Sports Medicine Clinic Visit       PCP: Patrica Kelly    Amee Crews is a 71 year old female who is seen in consultation at the request of Ary Adams CNP. Since last visit on 1/23/2020, Amee has noticed a recurrence of pain. She notes that past corticosteroid injections gave relief for about 1.5 years and pain has gradually come back over the past 6 months. Pain is over the 1st CMC joints and bilateral wrists with some radiation into her left forearm. She rates the pain at a 3/10 currently.  Symptoms are relieved with corticosteroid injections.  Symptoms are worsened by opening jars and doors and lifting.    She is retired.      History reviewed. No pertinent past surgical/medical/family/social history other than as mentioned in HPI.    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, moderatly severe,slowly worsening     Pinguecula of both eyes     Nuclear cataract of both eyes     Myopia, bilateral     Macular cyst, hole, or pseudohole, bilateral, moderatly severe     Adult vitelliform macular degeneration     Gastroesophageal reflux disease without esophagitis     Vitelliform  macular dystrophy     DDD (degenerative disc disease), lumbar     Chronic pain of left knee     Vitamin B12 deficiency     Osteopenia, unspecified location     Migraine without aura and without status migrainosus, not intractable     Depression     Asthma     Adjustment reaction with anxiety and depression     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     Allergic rhinitis      Allergies      asthma     mild intermittent     Chronic back pain     neck and low back     HA (headache)     muscle contraction      History of blood transfusion     In childhood     Hot flashes      Hyperlipidemia      Insomnia      Intermittent asthma 9/16/2011     Major depressive disorder, single episode, moderate (H)      Ocular migraine      Osteoarthritis of hand      Restless leg 9/16/2011     Past Surgical History:   Procedure Laterality Date     APPENDECTOMY      age 9      BIOPSY      took tissue from my uterus at least 15 years ago     COLONOSCOPY  14 years     COLONOSCOPY N/A 4/30/2015    Procedure: COMBINED COLONOSCOPY, SINGLE OR MULTIPLE BIOPSY/POLYPECTOMY BY BIOPSY;  Surgeon: Doni Carey MD;  Location: [facility]     COLONOSCOPY WITH CO2 INSUFFLATION N/A 4/30/2015    Procedure: COLONOSCOPY WITH CO2 INSUFFLATION;  Surgeon: Doni Carey MD;  [facility]: [facility]     COLONOSCOPY WITH CO2 INSUFFLATION N/A 12/8/2020    Procedure: COLONOSCOPY, WITH CO2 INSUFFLATION;  Surgeon: Kareem Littlejohn DO;  Location: [facility]     COMBINED ESOPHAGOSCOPY, GASTROSCOPY, DUODENOSCOPY (EGD) WITH CO2 INSUFFLATION N/A 8/24/2017    Procedure: COMBINED ESOPHAGOSCOPY, GASTROSCOPY, DUODENOSCOPY (EGD) WITH CO2 INSUFFLATION;  EGD, Gastroesophageal reflux disease, esophagitis presence not specified Abdominal pain, generalized, Ref Dahlheimer-Schroeder, 24.78 BMI;  Surgeon: Duane, William Charles, MD;  Location: [facility]     ENT SURGERY      Tonsillectomy     ESOPHAGOSCOPY, GASTROSCOPY,  DUODENOSCOPY (EGD), COMBINED N/A 2017    Procedure: COMBINED ESOPHAGOSCOPY, GASTROSCOPY, DUODENOSCOPY (EGD), BIOPSY SINGLE OR MULTIPLE;;  Surgeon: Duane, William Charles, MD;  Location: [facility]     LAPAROSCOPY PROCEDURE UNLISTED      polyps found     MOUTH SURGERY  2016     SINUS SURGERY      x2     Family History   Problem Relation Age of Onset     Diabetes Mother         Old age     Hypertension Mother      Cancer Father         stomach      Cancer Maternal Grandfather         lung      Heart Disease Other      Social History     Socioeconomic History     Marital status:      Spouse name: Not on file     Number of children: 2     Years of education: Not on file     Highest education level: Not on file   Occupational History     Employer: SELF   Tobacco Use     Smoking status: Former Smoker     Packs/day: 0.00     Years: 0.00     Pack years: 0.00     Types: Cigarettes     Quit date: 1973     Years since quittin.9     Smokeless tobacco: Never Used     Tobacco comment: Social smoker   Vaping Use     Vaping Use: Never used   Substance and Sexual Activity     Alcohol use: Yes     Alcohol/week: 0.0 standard drinks     Comment: one or two a day     Drug use: No     Sexual activity: Yes     Partners: Male   Other Topics Concern     Parent/sibling w/ CABG, MI or angioplasty before 65F 55M? No   Social History Narrative     Not on file     Social Determinants of Health     Financial Resource Strain: Not on file   Food Insecurity: Not on file   Transportation Needs: Not on file   Physical Activity: Not on file   Stress: Not on file   Social Connections: Not on file   Intimate Partner Violence: Not on file   Housing Stability: Not on file         Current Outpatient Medications   Medication     albuterol (PROAIR HFA/PROVENTIL HFA/VENTOLIN HFA) 108 (90 Base) MCG/ACT inhaler     azelastine (ASTELIN) 0.1 % nasal spray     ciprofloxacin (CIPRO) 500 MG tablet     cyanocobalamin (VITAMIN B-12) 1000 MCG tablet      "fluticasone (FLONASE) 50 MCG/ACT nasal spray     fluticasone (FLOVENT HFA) 220 MCG/ACT inhaler     magnesium 200 MG TABS     pramipexole (MIRAPEX) 0.125 MG tablet     rosuvastatin (CRESTOR) 5 MG tablet     sertraline (ZOLOFT) 100 MG tablet     SUMAtriptan (IMITREX) 50 MG tablet     traZODone (DESYREL) 100 MG tablet     Triprolidine-Pseudoephedrine (APRODINE PO)     vitamin D3 (CHOLECALCIFEROL) 50 mcg (2000 units) tablet     cyclobenzaprine (FLEXERIL) 5 MG tablet     No current facility-administered medications for this visit.     Allergies   Allergen Reactions     Atorvastatin Cramps     Muscle pain     Morphine Itching     Morphine Sulfate Itching         Objective:  /70   Ht 1.6 m (5' 3\")   Wt 65.3 kg (144 lb)   BMI 25.51 kg/m      General: Alert and in no distress    Head: Normocephalic, atraumatic  Eyes: no scleral icterus or conjunctival erythema   Skin: no erythema, petechiae, or jaundice  Resp: normal respiratory effort without conversational dyspnea   Psych: normal mood and affect    Gait: Non-antalgic, appropriate coordination and balance     Musculoskeletal:  -Tender over the bilateral first CMC joints      Radiology:  X-rays ordered and independent visualization of images performed and reviewed with Amee.  Recent Results (from the past 744 hour(s))   XR Wrist Bilateral G/E 3 vw    Narrative    XR BILATERAL WRIST THREE OR MORE VIEWS   12/13/2021 1:33 PM     HISTORY:  Primary osteoarthritis of both hands.        Impression    IMPRESSION: Prominent degenerative arthrosis of the right thumb CMC  joint. Fairly prominent degenerative arthrosis of the left thumb CMC  joint. There is degenerative arthrosis at both STT joints (fairly  prominent right, moderate left).         FINGER BILATERAL TWO OR MORE VIEWS 1/23/2020 10:02 AM      HISTORY: Chronic thumb pain, bilateral.                                                                       IMPRESSION: Bilateral first carpometacarpal joint " degenerative  arthrosis. Left scaphotrapezium trapezoid joint degenerative changes  are also noted. Left first metacarpophalangeal joint space  radial-sided narrowing or degenerative changes are also noted.     JONATHAN PUENTES MD      Small Joint Injection/Arthrocentesis: bilateral thumb CMC    Date/Time: 12/13/2021 1:42 PM  Performed by: Yasemin Simental MD  Authorized by: Yasemin Simental MD   Indications:  Pain  Needle Size:  25 G  Guidance: landmark     Approach:  Radial  Location:  Thumb  Laterality:  Bilateral  Site:  Bilateral thumb CMC    Medications (Right):  20 mg triamcinolone 40 MG/ML   Medications (Right) comment:  0.5 mL 0.5% Bupivacaine  NDC: 2330-7108-98  CBU: [id number]  Exp: 03/01/2023          Medications (Left):  20 mg triamcinolone 40 MG/ML   Medications (Left) comment:  0.5 mL 0.5% Bupivacaine  NDC: 0963-3530-64  CBU: [id number]  Exp: 03/01/2023                    Procedure discussed: discussed risks, benefits, and alternatives    Consent Given by:  Patient              Assessment:  1. Chronic thumb pain, bilateral    2. Osteoarthritis of carpometacarpal (CMC) joint of both thumbs        Plan:  Discussed the assessment with the patient and developed a plan together:  -Steroid injections performed today.  Take it easy over the next few days. Keep in mind that the steroid may take up to 3 days to start working and up to 2 weeks to reach maximal effect.    -Ice for 15-20 minutes as needed for soreness and swelling.  -Patient's preferred over the counter medications as needed for soreness.  -Bracing as needed for comfort and support.  -Avoid aggravating activities.    -Follow up as needed if symptoms fail to improve or worsen.  Please call with questions or concerns.      Sujata Simental MD, [facility] Sports Medicine  [facility]          Again, thank you for allowing me to participate in the care of your patient.        Sincerely,        Yasemin Simental MD

## 2022-01-15 ENCOUNTER — HEALTH MAINTENANCE LETTER (OUTPATIENT)
Age: 72
End: 2022-01-15

## 2022-01-18 ENCOUNTER — TRANSFERRED RECORDS (OUTPATIENT)
Dept: HEALTH INFORMATION MANAGEMENT | Facility: CLINIC | Age: 72
End: 2022-01-18

## 2022-03-23 ENCOUNTER — TELEPHONE (OUTPATIENT)
Dept: FAMILY MEDICINE | Facility: OTHER | Age: 72
End: 2022-03-23
Payer: COMMERCIAL

## 2022-03-23 ENCOUNTER — MYC MEDICAL ADVICE (OUTPATIENT)
Dept: FAMILY MEDICINE | Facility: OTHER | Age: 72
End: 2022-03-23
Payer: COMMERCIAL

## 2022-03-23 NOTE — TELEPHONE ENCOUNTER
Patient has scheduled an appointment for 4/7/22 to evaluate left arm pain that has been going on for the past month. Pain is intermittent, No pain at this time. No know injury. No Chest pain or SOB.     No in clinic appointments available sooner than 4/7/22. Instructed patient to go to ER if pain worsens and radiates into her Chest or if she develops any SOB. She verbalized understanding and had no further questions.     Janice Munguia RN on 3/23/2022 at 2:51 PM

## 2022-03-25 ENCOUNTER — MYC MEDICAL ADVICE (OUTPATIENT)
Dept: FAMILY MEDICINE | Facility: OTHER | Age: 72
End: 2022-03-25
Payer: COMMERCIAL

## 2022-03-25 NOTE — TELEPHONE ENCOUNTER
Yes can use SAME DAY as a virtual or in person. Although if she has a UTI I would not wait that long.       ROBYN Bojorquez CNP  Questions or concerns please feel free to send me a [employer] message or call me  Phone : [phone]

## 2022-03-28 ENCOUNTER — OFFICE VISIT (OUTPATIENT)
Dept: FAMILY MEDICINE | Facility: OTHER | Age: 72
End: 2022-03-28
Payer: COMMERCIAL

## 2022-03-28 VITALS
TEMPERATURE: 98.5 F | DIASTOLIC BLOOD PRESSURE: 84 MMHG | WEIGHT: 142 LBS | RESPIRATION RATE: 16 BRPM | HEART RATE: 77 BPM | OXYGEN SATURATION: 99 % | BODY MASS INDEX: 25.15 KG/M2 | SYSTOLIC BLOOD PRESSURE: 132 MMHG

## 2022-03-28 DIAGNOSIS — M54.50 CHRONIC BILATERAL LOW BACK PAIN WITHOUT SCIATICA: Primary | ICD-10-CM

## 2022-03-28 DIAGNOSIS — Z12.31 VISIT FOR SCREENING MAMMOGRAM: ICD-10-CM

## 2022-03-28 DIAGNOSIS — T36.95XA ANTIBIOTIC-INDUCED YEAST INFECTION: ICD-10-CM

## 2022-03-28 DIAGNOSIS — N76.0 BACTERIAL VAGINITIS: ICD-10-CM

## 2022-03-28 DIAGNOSIS — K64.4 EXTERNAL HEMORRHOIDS: ICD-10-CM

## 2022-03-28 DIAGNOSIS — F32.5 MAJOR DEPRESSION IN COMPLETE REMISSION (H): ICD-10-CM

## 2022-03-28 DIAGNOSIS — G25.81 RESTLESS LEG: ICD-10-CM

## 2022-03-28 DIAGNOSIS — B37.9 ANTIBIOTIC-INDUCED YEAST INFECTION: ICD-10-CM

## 2022-03-28 DIAGNOSIS — B96.89 BACTERIAL VAGINITIS: ICD-10-CM

## 2022-03-28 DIAGNOSIS — R82.90 FOUL SMELLING URINE: ICD-10-CM

## 2022-03-28 DIAGNOSIS — G89.29 CHRONIC BILATERAL LOW BACK PAIN WITHOUT SCIATICA: Primary | ICD-10-CM

## 2022-03-28 LAB
ALBUMIN SERPL-MCNC: 4.2 G/DL (ref 3.4–5)
ALBUMIN UR-MCNC: NEGATIVE MG/DL
ALP SERPL-CCNC: 86 U/L (ref 40–150)
ALT SERPL W P-5'-P-CCNC: 28 U/L (ref 0–50)
ANION GAP SERPL CALCULATED.3IONS-SCNC: 5 MMOL/L (ref 3–14)
APPEARANCE UR: CLEAR
AST SERPL W P-5'-P-CCNC: 19 U/L (ref 0–45)
BACTERIA #/AREA URNS HPF: ABNORMAL /HPF
BILIRUB SERPL-MCNC: 0.6 MG/DL (ref 0.2–1.3)
BILIRUB UR QL STRIP: NEGATIVE
BUN SERPL-MCNC: 17 MG/DL (ref 7–30)
CALCIUM SERPL-MCNC: 9.1 MG/DL (ref 8.5–10.1)
CHLORIDE BLD-SCNC: 102 MMOL/L (ref 94–109)
CLUE CELLS: PRESENT
CO2 SERPL-SCNC: 29 MMOL/L (ref 20–32)
COLOR UR AUTO: ABNORMAL
CREAT SERPL-MCNC: 0.75 MG/DL (ref 0.52–1.04)
GFR SERPL CREATININE-BSD FRML MDRD: 86 ML/MIN/1.73M2
GLUCOSE BLD-MCNC: 102 MG/DL (ref 70–99)
GLUCOSE UR STRIP-MCNC: NEGATIVE MG/DL
HGB UR QL STRIP: NEGATIVE
KETONES UR STRIP-MCNC: NEGATIVE MG/DL
LEUKOCYTE ESTERASE UR QL STRIP: NEGATIVE
MUCOUS THREADS #/AREA URNS LPF: PRESENT /LPF
NITRATE UR QL: NEGATIVE
PH UR STRIP: 6 [PH] (ref 5–7)
POTASSIUM BLD-SCNC: 4.1 MMOL/L (ref 3.4–5.3)
PROT SERPL-MCNC: 7.2 G/DL (ref 6.8–8.8)
RBC #/AREA URNS AUTO: ABNORMAL /HPF
SODIUM SERPL-SCNC: 136 MMOL/L (ref 133–144)
SP GR UR STRIP: >=1.03 (ref 1–1.03)
SQUAMOUS #/AREA URNS AUTO: ABNORMAL /LPF
TRICHOMONAS, WET PREP: ABNORMAL
UROBILINOGEN UR STRIP-ACNC: 0.2 E.U./DL
WBC #/AREA URNS AUTO: ABNORMAL /HPF
WBC'S/HIGH POWER FIELD, WET PREP: ABNORMAL
YEAST, WET PREP: ABNORMAL

## 2022-03-28 PROCEDURE — 87210 SMEAR WET MOUNT SALINE/INK: CPT | Performed by: NURSE PRACTITIONER

## 2022-03-28 PROCEDURE — 87086 URINE CULTURE/COLONY COUNT: CPT | Performed by: NURSE PRACTITIONER

## 2022-03-28 PROCEDURE — 36415 COLL VENOUS BLD VENIPUNCTURE: CPT | Performed by: NURSE PRACTITIONER

## 2022-03-28 PROCEDURE — 99214 OFFICE O/P EST MOD 30 MIN: CPT | Performed by: NURSE PRACTITIONER

## 2022-03-28 PROCEDURE — 81001 URINALYSIS AUTO W/SCOPE: CPT | Performed by: NURSE PRACTITIONER

## 2022-03-28 PROCEDURE — 80053 COMPREHEN METABOLIC PANEL: CPT | Performed by: NURSE PRACTITIONER

## 2022-03-28 RX ORDER — HYDROCORTISONE 25 MG/G
CREAM TOPICAL 2 TIMES DAILY PRN
Qty: 30 G | Refills: 0 | Status: SHIPPED | OUTPATIENT
Start: 2022-03-28 | End: 2022-10-31

## 2022-03-28 RX ORDER — PRAMIPEXOLE DIHYDROCHLORIDE 0.12 MG/1
0.12 TABLET ORAL AT BEDTIME
Qty: 30 TABLET | Refills: 3 | Status: CANCELLED | OUTPATIENT
Start: 2022-03-28

## 2022-03-28 RX ORDER — PRAMIPEXOLE DIHYDROCHLORIDE 0.25 MG/1
0.25 TABLET ORAL AT BEDTIME
Qty: 30 TABLET | Refills: 0 | Status: SHIPPED | OUTPATIENT
Start: 2022-03-28 | End: 2022-04-22

## 2022-03-28 RX ORDER — FLUCONAZOLE 150 MG/1
150 TABLET ORAL ONCE
Qty: 1 TABLET | Refills: 0 | Status: SHIPPED | OUTPATIENT
Start: 2022-03-28 | End: 2022-03-28

## 2022-03-28 RX ORDER — METRONIDAZOLE 500 MG/1
500 TABLET ORAL 2 TIMES DAILY
Qty: 14 TABLET | Refills: 0 | Status: SHIPPED | OUTPATIENT
Start: 2022-03-28 | End: 2022-04-04

## 2022-03-28 ASSESSMENT — ASTHMA QUESTIONNAIRES
QUESTION_4 LAST FOUR WEEKS HOW OFTEN HAVE YOU USED YOUR RESCUE INHALER OR NEBULIZER MEDICATION (SUCH AS ALBUTEROL): ONCE A WEEK OR LESS
ACT_TOTALSCORE: 22
ACT_TOTALSCORE: 22
QUESTION_1 LAST FOUR WEEKS HOW MUCH OF THE TIME DID YOUR ASTHMA KEEP YOU FROM GETTING AS MUCH DONE AT WORK, SCHOOL OR AT HOME: NONE OF THE TIME
QUESTION_5 LAST FOUR WEEKS HOW WOULD YOU RATE YOUR ASTHMA CONTROL: COMPLETELY CONTROLLED
QUESTION_2 LAST FOUR WEEKS HOW OFTEN HAVE YOU HAD SHORTNESS OF BREATH: ONCE OR TWICE A WEEK
QUESTION_3 LAST FOUR WEEKS HOW OFTEN DID YOUR ASTHMA SYMPTOMS (WHEEZING, COUGHING, SHORTNESS OF BREATH, CHEST TIGHTNESS OR PAIN) WAKE YOU UP AT NIGHT OR EARLIER THAN USUAL IN THE MORNING: ONCE OR TWICE

## 2022-03-28 ASSESSMENT — PAIN SCALES - GENERAL: PAINLEVEL: NO PAIN (0)

## 2022-03-28 NOTE — PATIENT INSTRUCTIONS
- Start antibiotics for vaginal infection twice daily for 7 days  - Start Diflucan for yeast prevention half way through antibiotics.   - Contact me if any concerns or worsening symptoms  - Start Anusol cream twice daily for 14 days and then as needed   - Will test kidney and liver function today.       ROBYN Bojorquez CNP  Questions or concerns please feel free to send me a [employer] message or call me  Phone : [phone]

## 2022-03-28 NOTE — PROGRESS NOTES
Assessment & Plan     Chronic bilateral low back pain without sciatica  - UA negative for infection  - Pain radiated into the pelvic area. See below for diagnosis.   - UA Macro with Reflex to Micro and Culture - lab collect; Future  - Wet prep - lab collect; Future  - UA Macro with Reflex to Micro and Culture - lab collect  - Wet prep - lab collect  - Urine Microscopic  - Urine Culture Aerobic Bacterial - lab collect; Future  - Urine Culture Aerobic Bacterial - lab collect    Major depression in complete remission (H)  - Stable     Restless leg  Worsened  - Increase dose and recheck in 1-2 weeks with [employer] message  - pramipexole (MIRAPEX) 0.25 MG tablet; Take 1 tablet (0.25 mg) by mouth At Bedtime    Foul smelling urine  - Recommend labs today as UA did not show any signs and symptoms of infection Discussed this could also be from the BV.   - Comprehensive metabolic panel (BMP + Alb, Alk Phos, ALT, AST, Total. Bili, TP); Future  - Comprehensive metabolic panel (BMP + Alb, Alk Phos, ALT, AST, Total. Bili, TP)    Bacterial vaginitis  - Recommend treatment, discussed her bloating and pelvic pain could be from this as well. Recommend recheck in 3-4 weeks with me to see if her symptoms have resolved.   - metroNIDAZOLE (FLAGYL) 500 MG tablet; Take 1 tablet (500 mg) by mouth 2 times daily for 7 days    Antibiotic-induced yeast infection    - fluconazole (DIFLUCAN) 150 MG tablet; Take 1 tablet (150 mg) by mouth once for 1 dose    Visit for screening mammogram      External hemorrhoids  Recommend trial of steroid cream and readdress her concerns with stool leakage after BM at her follow up as we did not have time today to discuss this more in detail. May need GI consult vs imaging. Had colonoscopy, history of polyps.   - hydrocortisone, Perianal, (HYDROCORTISONE) 2.5 % cream; Place rectally 2 times daily as needed for hemorrhoids         BMI:   Estimated body mass index is 25.15 kg/m  as calculated from the following:     "Height as of 12/13/21: 1.6 m (5' 3\").    Weight as of this encounter: 64.4 kg (142 lb).           Return in about 4 weeks (around 4/25/2022) for Physical Exam.    ROBYN Bojorquez CNP  [facility] BRI Lubin is a 71 year old who presents for the following health issues     History of Present Illness       Back Pain:  She presents for follow up of back pain. Patient's back pain is a recurring problem.  Location of back pain:  Right lower back and left lower back  Description of back pain: dull ache  Back pain spreads: right buttocks and left buttocks    Since patient first noticed back pain, pain is: always present, but gets better and worse  Does back pain interfere with her job:  Yes      Reason for visit:  Left arm pain and back pain  Symptom onset:  More than a month  Symptoms include:  Small pain in back  Symptom progression:  Improving  Had these symptoms before:  Yes  Has tried/received treatment for these symptoms:  Yes  Previous treatment was successful:  Yes  Prior treatment description:  Meds for uti  What makes it worse:  No  What makes it better:  No    She eats 2-3 servings of fruits and vegetables daily.She consumes 1 sweetened beverage(s) daily.She exercises with enough effort to increase her heart rate 9 or less minutes per day.  She exercises with enough effort to increase her heart rate 3 or less days per week.   She is taking medications regularly.     Back pain has gotten better  Now the pain is better, has chronic back pain. Was radiating into the ovaries and this is what happening last time she had a UTI. She has a part time job and sits a lot so this goes in streaks.     Review of Systems         Objective    /84   Pulse 77   Temp 98.5  F (36.9  C) (Temporal)   Resp 16   Wt 64.4 kg (142 lb)   SpO2 99%   BMI 25.15 kg/m    Body mass index is 25.15 kg/m .  Physical Exam  Eyes:      Pupils: Pupils are equal, round, and reactive to light. "   Abdominal:      General: Bowel sounds are normal.      Tenderness: There is no abdominal tenderness. There is no right CVA tenderness or left CVA tenderness.   Genitourinary:     Rectum: External hemorrhoid present.   Neurological:      Mental Status: She is oriented to person, place, and time.   Psychiatric:         Mood and Affect: Mood normal.         Behavior: Behavior normal.         Thought Content: Thought content normal.         Judgment: Judgment normal.            Results for orders placed or performed in visit on 03/28/22   UA Macro with Reflex to Micro and Culture - lab collect     Status: Abnormal    Specimen: Urine, Clean Catch   Result Value Ref Range    Color Urine Lenore (A) Colorless, Straw, Light Yellow, Yellow    Appearance Urine Clear Clear    Glucose Urine Negative Negative mg/dL    Bilirubin Urine Negative Negative    Ketones Urine Negative Negative mg/dL    Specific Gravity Urine >=1.030 1.003 - 1.035    Blood Urine Negative Negative    pH Urine 6.0 5.0 - 7.0    Protein Albumin Urine Negative Negative mg/dL    Urobilinogen Urine 0.2 0.2, 1.0 E.U./dL    Nitrite Urine Negative Negative    Leukocyte Esterase Urine Negative Negative   Urine Microscopic     Status: Abnormal   Result Value Ref Range    Bacteria Urine Moderate (A) None Seen /HPF    RBC Urine 0-2 0-2 /HPF /HPF    WBC Urine 0-5 0-5 /HPF /HPF    Squamous Epithelials Urine Few (A) None Seen /LPF    Mucus Urine Present (A) None Seen /LPF    Narrative    Urine Culture not indicated   Wet prep - lab collect     Status: Abnormal    Specimen: Vagina; Swab   Result Value Ref Range    Trichomonas Absent Absent    Yeast Absent Absent    Clue Cells Present (A) Absent    WBCs/high power field 2+ (A) None

## 2022-03-29 ENCOUNTER — MYC MEDICAL ADVICE (OUTPATIENT)
Dept: FAMILY MEDICINE | Facility: OTHER | Age: 72
End: 2022-03-29
Payer: COMMERCIAL

## 2022-03-29 DIAGNOSIS — B96.89 BACTERIAL VAGINITIS: Primary | ICD-10-CM

## 2022-03-29 DIAGNOSIS — N76.0 BACTERIAL VAGINITIS: Primary | ICD-10-CM

## 2022-03-29 RX ORDER — METRONIDAZOLE 7.5 MG/G
1 GEL VAGINAL DAILY
Qty: 25 G | Refills: 0 | Status: SHIPPED | OUTPATIENT
Start: 2022-03-29 | End: 2022-04-03

## 2022-03-29 NOTE — TELEPHONE ENCOUNTER
Sent alternative medication.       ROBYN Bojorquez CNP  Questions or concerns please feel free to send me a [employer] message or call me  Phone : [phone]

## 2022-03-30 LAB — BACTERIA UR CULT: NORMAL

## 2022-04-22 DIAGNOSIS — J32.9 CHRONIC SINUSITIS, UNSPECIFIED LOCATION: ICD-10-CM

## 2022-04-22 DIAGNOSIS — G25.81 RESTLESS LEG: ICD-10-CM

## 2022-04-22 RX ORDER — FLUTICASONE PROPIONATE 50 MCG
2 SPRAY, SUSPENSION (ML) NASAL DAILY
Qty: 16 G | Refills: 6 | Status: SHIPPED | OUTPATIENT
Start: 2022-04-22 | End: 2023-05-04

## 2022-04-22 RX ORDER — PRAMIPEXOLE DIHYDROCHLORIDE 0.12 MG/1
TABLET ORAL
Qty: 30 TABLET | Refills: 3 | OUTPATIENT
Start: 2022-04-22

## 2022-04-22 RX ORDER — PRAMIPEXOLE DIHYDROCHLORIDE 0.25 MG/1
0.25 TABLET ORAL AT BEDTIME
Qty: 90 TABLET | Refills: 1 | Status: SHIPPED | OUTPATIENT
Start: 2022-04-22 | End: 2022-10-31

## 2022-04-22 NOTE — PATIENT INSTRUCTIONS
- Plantar Fascitis- Recommend stretching as we discussed, trial of heel wedge, let me know if things do not improve or worsen.   - Start Prilosec daily 30-60 minutes prior to meals, can use OTC pepcid at bedtime or during the day for breatk through indigestion symptoms. If worsening pain, nausea/vomiting please let me know. Follow up in 6-8 weeks for recheck symptoms.   - Recommend avoiding trigger foods  - Labs today  - Pepcid      Preventive Health Recommendations    See your health care provider every year to  Review health changes.   Discuss preventive care.    Review your medicines if your doctor has prescribed any.    You no longer need a yearly Pap test unless you've had an abnormal Pap test in the past 10 years. If you have vaginal symptoms, such as bleeding or discharge, be sure to talk with your provider about a Pap test.    Every 1 to 2 years, have a mammogram.  If you are over 69, talk with your health care provider about whether or not you want to continue having screening mammograms.    Every 10 years, have a colonoscopy. Or, have a yearly FIT test (stool test). These exams will check for colon cancer.     Have a cholesterol test every 5 years, or more often if your doctor advises it.     Have a diabetes test (fasting glucose) every three years. If you are at risk for diabetes, you should have this test more often.     At age 65, have a bone density scan (DEXA) to check for osteoporosis (brittle bone disease).    Shots:  Get a flu shot each year.  Get a tetanus shot every 10 years.  Talk to your doctor about your pneumonia vaccines. There are now two you should receive - Pneumovax (PPSV 23) and Prevnar (PCV 13).  Talk to your pharmacist about the shingles vaccine.  Talk to your doctor about the hepatitis B vaccine.    Nutrition:   Eat at least 5 servings of fruits and vegetables each day.    Eat whole-grain bread, whole-wheat pasta and brown rice instead of white grains and rice.    Get adequate about  Calcium and Vitamin D.     Lifestyle  Exercise at least 150 minutes a week (30 minutes a day, 5 days a week). This will help you control your weight and prevent disease.    Limit alcohol to one drink per day.    No smoking.     Wear sunscreen to prevent skin cancer.     See your dentist twice a year for an exam and cleaning.    See your eye doctor every 1 to 2 years to screen for conditions such as glaucoma, macular degeneration, cataracts, etc.    Personalized Prevention Plan  You are due for the preventive services outlined below.  Your care team is available to assist you in scheduling these services.  If you have already completed any of these items, please share that information with your care team to update in your medical record.    Health Maintenance Due   Topic Date Due    Zoster (Shingles) Vaccine (1 of 2) Never done    COVID-19 Vaccine (3 - Booster for Pfizer series) 08/19/2021    Annual Wellness Visit  10/22/2021    Mammogram  10/22/2021    Depression Assessment  03/20/2022     Patient Education   Personalized Prevention Plan  You are due for the preventive services outlined below.  Your care team is available to assist you in scheduling these services.  If you have already completed any of these items, please share that information with your care team to update in your medical record.  Health Maintenance Due   Topic Date Due    Zoster (Shingles) Vaccine (1 of 2) Never done    COVID-19 Vaccine (3 - Booster for Pfizer series) 08/19/2021    Mammogram  10/22/2021    Depression Assessment  03/20/2022        Patient Education   Personalized Prevention Plan  You are due for the preventive services outlined below.  Your care team is available to assist you in scheduling these services.  If you have already completed any of these items, please share that information with your care team to update in your medical record.  Health Maintenance Due   Topic Date Due    Zoster (Shingles) Vaccine (1 of 2) Never done     COVID-19 Vaccine (3 - Booster for Pfizer series) 08/19/2021    Mammogram  10/22/2021       Exercise for a Healthier Heart  You may wonder how you can improve the health of your heart. If you re thinking about exercise, you re on the right track. You don t need to become an athlete. But you do need a certain amount of brisk exercise to help strengthen your heart. If you have been diagnosed with a heart condition, your healthcare provider may advise exercise to help stabilize your condition. To help make exercise a habit, choose safe, fun activities.      Exercise with a friend. When activity is fun, you're more likely to stick with it.   Before you start  Check with your healthcare provider before starting an exercise program. This is especially important if you have not been active for a while. It's also important if you have a long-term (chronic) health problem such as heart disease, diabetes, or obesity. Or if you are at high risk for having these problems.   Why exercise?  Exercising regularly offers many healthy rewards. It can help you do all of the following:   Improve your blood cholesterol level to help prevent further heart trouble  Lower your blood pressure to help prevent a stroke or heart attack  Control diabetes, or reduce your risk of getting this disease  Improve your heart and lung function  Reach and stay at a healthy weight  Make your muscles stronger so you can stay active  Prevent falls and fractures by slowing the loss of bone mass (osteoporosis)  Manage stress better  Reduce your blood pressure  Improve your sense of self and your body image  Exercise tips    Ease into your routine. Set small goals. Then build on them. If you are not sure what your activity level should be, talk with your healthcare provider first before starting an exercise routine.  Exercise on most days. Aim for a total of 150 minutes (2 hours and 30 minutes) or more of moderate-intensity aerobic activity each week. Or 75  minutes (1 hour and 15 minutes) or more of vigorous-intensity aerobic activity each week. Or try for a combination of both. Moderate activity means that you breathe heavier and your heart rate increases but you can still talk. Think about doing 40 minutes of moderate exercise, 3 to 4 times a week. For best results, activity should last for about 40 minutes to lower blood pressure and cholesterol. It's OK to work up to the 40-minute period over time. Examples of moderate-intensity activity are walking 1 mile in 15 minutes. Or doing 30 to 45 minutes of yard work.  Step up your daily activity level.  Along with your exercise program, try being more active the whole day. Walk instead of drive. Or park further away so that you take more steps each day. Do more household tasks or yard work. You may not be able to meet the advised mount of physical activity. But doing some moderate- or vigorous-intensity aerobic activity can help reduce your risk for heart disease. Your healthcare provider can help you figure out what is best for you.  Choose 1 or more activities you enjoy.  Walking is one of the easiest things you can do. You can also try swimming, riding a bike, dancing, or taking an exercise class.    When to call your healthcare provider  Call your healthcare provider if you have any of these:   Chest pain or feel dizzy or lightheaded  Burning, tightness, pressure, or heaviness in your chest, neck, shoulders, back, or arms  Abnormal shortness of breath  More joint or muscle pain  A very fast or irregular heartbeat (palpitations)  [employer] last reviewed this educational content on 7/1/2019 2000-2021 [employer]. All rights reserved. This information is not intended as a substitute for professional medical care. Always follow your healthcare professional's instructions.          Understanding USDA MyPlate  The USDA has guidelines to help you make healthy food choices. These are called MyPlate. MyPlate  shows the food groups that make up healthy meals using the image of a place setting. Before you eat, think about the healthiest choices for what to put on your plate or in your cup or bowl. To learn more about building a healthy plate, visit www.choosemyplate.gov.    The food groups  Fruits. Any fruit or 100% fruit juice counts as part of the Fruit Group. Fruits may be fresh, canned, frozen, or dried, and may be whole, cut-up, or pureed. Make 1/2 of your plate fruits and vegetables.  Vegetables. Any vegetable or 100% vegetable juice counts as a member of the Vegetable Group. Vegetables may be fresh, frozen, canned, or dried. They can be served raw or cooked and may be whole, cut-up, or mashed. Make 1/2 of your plate fruits and vegetables.  Grains. All foods made from grains are part of the Grains Group. These include wheat, rice, oats, cornmeal, and barley. Grains are often used to make foods such as bread, pasta, oatmeal, cereal, tortillas, and grits. Grains should be no more than 1/4 of your plate. At least half of your grains should be whole grains.  Protein. This group includes meat, poultry, seafood, beans and peas, eggs, processed soy products (such as tofu), nuts (including nut butters), and seeds. Make protein choices no more than 1/4 of your plate. Meat and poultry choices should be lean or low fat.  Dairy. The Dairy Group includes all fluid milk products and foods made from milk that contain calcium, such as yogurt and cheese. (Foods that have little calcium, such as cream, butter, and cream cheese, are not part of this group.) Most dairy choices should be low-fat or fat-free.  Oils. Oils aren't a food group, but they do contain essential nutrients. However it's important to watch your intake of oils. These are fats that are liquid at room temperature. They include canola, corn, olive, soybean, vegetable, and sunflower oil. Foods that are mainly oil include mayonnaise, certain salad dressings, and soft  margarines. You likely already get your daily oil allowance from the foods you eat.  Things to limit  Eating healthy also means limiting these things in your diet:     Salt (sodium). Many processed foods have a lot of sodium. To keep sodium intake down, eat fresh vegetables, meats, poultry, and seafood when possible. Purchase low-sodium, reduced-sodium, or no-salt-added food products at the store. And don't add salt to your meals at home. Instead, season them with herbs and spices such as dill, oregano, cumin, and paprika. Or try adding flavor with lemon or lime zest and juice.  Saturated fat. Saturated fats are most often found in animal products such as beef, pork, and chicken. They are often solid at room temperature, such as butter. To reduce your saturated fat intake, choose leaner cuts of meat and poultry. And try healthier cooking methods such as grilling, broiling, roasting, or baking. For a simple lower-fat swap, use plain nonfat yogurt instead of mayonnaise when making potato salad or macaroni salad.  Added sugars. These are sugars added to foods. They are in foods such as ice cream, candy, soda, fruit drinks, sports drinks, energy drinks, cookies, pastries, jams, and syrups. Cut down on added sugars by sharing sweet treats with a family member or friend. You can also choose fruit for dessert, and drink water or other unsweetened beverages.     [employer] last reviewed this educational content on 6/1/2020 2000-2021 [employer]. All rights reserved. This information is not intended as a substitute for professional medical care. Always follow your healthcare professional's instructions.          Urinary Incontinence, Female (Adult)   Urinary incontinence means loss of bladder control. This problem affects many women, especially as they get older. If you have incontinence, you may be embarrassed to ask for help. But know that this problem can be treated.   Types of Incontinence  There are  different types of incontinence. Two of the main types are described here. You can have more than one type.   Stress incontinence. With this type, urine leaks when pressure (stress) is put on the bladder. This may happen when you cough, sneeze, or laugh. Stress incontinence most often occurs because the pelvic floor muscles that support the bladder and urethra are weak. This can happen after pregnancy and vaginal childbirth or a hysterectomy. It can also be due to excess body weight or hormone changes.  Urge incontinence (also called overactive bladder). With this type, a sudden urge to urinate is felt often. This may happen even though there may not be much urine in the bladder. The need to urinate often during the night is common. Urge incontinence most often occurs because of bladder spasms. This may be due to bladder irritation or infection. Damage to bladder nerves or pelvic muscles, constipation, and certain medicines can also lead to urge incontinence.  Treatment depends on the cause. Further evaluation is needed to find the type you have. This will likely include an exam and certain tests. Based on the results, you and your healthcare provider can then plan treatment. Until a diagnosis is made, the home care tips below can help ease symptoms.   Home care  Do pelvic floor muscle exercises, if they are prescribed. The pelvic floor muscles help support the bladder and urethra. Many women find that their symptoms improve when doing special exercises that strengthen these muscles. To do the exercises, contract the muscles you would use to stop your stream of urine. But do this when you re not urinating. Hold for 10 seconds, then relax. Repeat 10 to 20 times in a row, at least 3 times a day. Your healthcare provider may give you other instructions for how to do the exercises and how often.  Keep a bladder diary. This helps track how often and how much you urinate over a set period of time. Bring this diary with  you to your next visit with the provider. The information can help your provider learn more about your bladder problem.  Lose weight, if advised to by your provider. Extra weight puts pressure on the bladder. Your provider can help you create a weight-loss plan that s right for you. This may include exercising more and making certain diet changes.  Don't have foods and drinks that may irritate the bladder. These can include alcohol and caffeinated drinks.  Quit smoking. Smoking and other tobacco use can lead to a long-term (chronic) cough that strains the pelvic floor muscles. Smoking may also damage the bladder and urethra. Talk with your provider about treatments or methods you can use to quit smoking.  If drinking large amounts of fluid makes you have symptoms, you may be advised to limit your fluid intake. You may also be advised to drink most of your fluids during the day and to limit fluids at night.  If you re worried about urine leakage or accidents, you may wear absorbent pads to catch urine. Change the pads often. This helps reduce discomfort. It may also reduce the risk of skin or bladder infections.    Follow-up care  Follow up with your healthcare provider, or as directed. It may take some to find the right treatment for your problem. But healthy lifestyle changes can be made right away. These include such things as exercising on a regular basis, eating a healthy diet, losing weight (if needed), and quitting smoking. Your treatment plan may include special therapies or medicines. Certain procedures or surgery may also be options. Talk about any questions you have with your provider.   When to seek medical advice  Call the healthcare provider right away if any of these occur:  Fever of 100.4 F (38 C) or higher, or as directed by your provider  Bladder pain or fullness  Belly swelling  Nausea or vomiting  Back pain  Weakness, dizziness, or fainting  [employer] last reviewed this educational content on  1/1/2020 2000-2021 [employer]. All rights reserved. This information is not intended as a substitute for professional medical care. Always follow your healthcare professional's instructions.

## 2022-04-22 NOTE — TELEPHONE ENCOUNTER
Refill given. Patient tolerating .25mg dosing.       ROBYN Bojorquez CNP  Questions or concerns please feel free to send me a [employer] message or call me  Phone : [phone]

## 2022-04-22 NOTE — PROGRESS NOTES
SUBJECTIVE:   CC: Amee Crews is an 71 year old woman who presents for preventive health visit.     {Split Bill scripting  The purpose of this visit is to discuss your medical history and prevent health problems before you are sick. You may be responsible for a co-pay, coinsurance, or deductible if your visit today includes services such as checking on a sore throat, having an x-ray or lab test, or treating and evaluating a new or existing condition :192355}  {Patient advised of split billing (Optional):778657}  HPI  {Add if <65 person on Medicare  - Required Questions (Optional):257422}  {Outside tests to abstract? :625762}    {additional problems to add (Optional):852404}    Today's PHQ-2 Score:   PHQ-2 (  [employer]) 3/28/2022   Q1: Little interest or pleasure in doing things 0   Q2: Feeling down, depressed or hopeless 0   PHQ-2 Score 0   PHQ-2 Total Score (12-17 Years)- Positive if 3 or more points; Administer PHQ-A if positive -   Q1: Little interest or pleasure in doing things Not at all   Q2: Feeling down, depressed or hopeless Not at all   PHQ-2 Score 0       Abuse: Current or Past (Physical, Sexual or Emotional) - { :317757}  Do you feel safe in your environment? { :905350}        Social History     Tobacco Use     Smoking status: Former Smoker     Packs/day: 0.00     Years: 0.00     Pack years: 0.00     Types: Cigarettes     Quit date: 1973     Years since quittin.3     Smokeless tobacco: Never Used     Tobacco comment: Social smoker   Substance Use Topics     Alcohol use: Yes     Alcohol/week: 0.0 standard drinks     Comment: one or two a day     {Rooming Staff- Complete this question if Prescreen response is not shown below for today's visit. If you drink alcohol do you typically have >3 drinks per day or >7 drinks per week? (Optional):037959}    Alcohol Use 10/22/2020   Prescreen: >3 drinks/day or >7 drinks/week? No   Prescreen: >3 drinks/day or >7 drinks/week? -   AUDIT SCORE  -   {add  "AUDIT responses (Optional) (A score of 7 for adult men is an indication of hazardous drinking; a score of 8 or more is an indication of an alcohol use disorder.  A score of 7 or more for adult women is an indication of hazardous drinking or an alchohol use disorder):048971}    Reviewed orders with patient.  Reviewed health maintenance and updated orders accordingly - { :814939::\"Yes\"}  {Chronicprobdata (optional):568523}    Breast Cancer Screening:    FHS-7: No flowsheet data found.  {If any of the questions to the BCRA (FHS-7) are answered yes, consider ordering referral for genetic counseling (Optional) :412782::\"click delete button to remove this line now\"}  {AMB Mammogram Decision Support (Optional) :996306}  Pertinent mammograms are reviewed under the imaging tab.    History of abnormal Pap smear: { :276418}  PAP / HPV 8/27/2014 9/16/2011 9/10/2010   PAP (Historical) NIL NIL NIL     Reviewed and updated as needed this visit by clinical staff                    Reviewed and updated as needed this visit by Provider                   {HISTORY OPTIONS (Optional):878193}    Review of Systems  {FEMALE ROS (Optional):744504}     OBJECTIVE:   There were no vitals taken for this visit.  Physical Exam  {Exam Choices (Optional):794334}    {Diagnostic Test Results (Optional):407140::\"Diagnostic Test Results:\",\"Labs reviewed in Epic\"}    ASSESSMENT/PLAN:   {Diag Picklist:046620}    {Patient advised of split billing (Optional):133542}    COUNSELING:  {FEMALE COUNSELING MESSAGES:660352::\"Reviewed preventive health counseling, as reflected in patient instructions\"}    Estimated body mass index is 25.15 kg/m  as calculated from the following:    Height as of 12/13/21: 1.6 m (5' 3\").    Weight as of 3/28/22: 64.4 kg (142 lb).    {Weight Management Plan (ACO) Complete if BMI is abnormal-  Ages 18-64  BMI >24.9.  Age 65+ with BMI <23 or >30 (Optional):523480}    She reports that she quit smoking about 49 years ago. Her smoking use " included cigarettes. She smoked 0.00 packs per day for 0.00 years. She has never used smokeless tobacco.      Counseling Resources:  ATP IV Guidelines  Pooled Cohorts Equation Calculator  Breast Cancer Risk Calculator  BRCA-Related Cancer Risk Assessment: FHS-7 Tool  FRAX Risk Assessment  ICSI Preventive Guidelines  Dietary Guidelines for Americans, 2010  USDA's MyPlate  ASA Prophylaxis  Lung CA Screening    ROBYN Bojorquez CNP  [facility]

## 2022-04-25 ENCOUNTER — OFFICE VISIT (OUTPATIENT)
Dept: FAMILY MEDICINE | Facility: OTHER | Age: 72
End: 2022-04-25
Payer: COMMERCIAL

## 2022-04-25 VITALS
DIASTOLIC BLOOD PRESSURE: 62 MMHG | OXYGEN SATURATION: 99 % | BODY MASS INDEX: 25.94 KG/M2 | SYSTOLIC BLOOD PRESSURE: 118 MMHG | HEART RATE: 87 BPM | HEIGHT: 63 IN | WEIGHT: 146.4 LBS | TEMPERATURE: 98.6 F | RESPIRATION RATE: 22 BRPM

## 2022-04-25 DIAGNOSIS — M19.041 PRIMARY OSTEOARTHRITIS OF BOTH HANDS: ICD-10-CM

## 2022-04-25 DIAGNOSIS — M19.042 PRIMARY OSTEOARTHRITIS OF BOTH HANDS: ICD-10-CM

## 2022-04-25 DIAGNOSIS — N30.01 ACUTE CYSTITIS WITH HEMATURIA: ICD-10-CM

## 2022-04-25 DIAGNOSIS — G25.81 RESTLESS LEG: ICD-10-CM

## 2022-04-25 DIAGNOSIS — G43.809 OTHER MIGRAINE WITHOUT STATUS MIGRAINOSUS, NOT INTRACTABLE: ICD-10-CM

## 2022-04-25 DIAGNOSIS — M54.50 CHRONIC BILATERAL LOW BACK PAIN WITHOUT SCIATICA: ICD-10-CM

## 2022-04-25 DIAGNOSIS — N89.8 VAGINAL DISCHARGE: ICD-10-CM

## 2022-04-25 DIAGNOSIS — Z12.31 ENCOUNTER FOR SCREENING MAMMOGRAM FOR BREAST CANCER: ICD-10-CM

## 2022-04-25 DIAGNOSIS — F32.5 MAJOR DEPRESSION IN COMPLETE REMISSION (H): ICD-10-CM

## 2022-04-25 DIAGNOSIS — G47.00 INSOMNIA, UNSPECIFIED TYPE: ICD-10-CM

## 2022-04-25 DIAGNOSIS — J30.1 CHRONIC SEASONAL ALLERGIC RHINITIS DUE TO POLLEN: ICD-10-CM

## 2022-04-25 DIAGNOSIS — E53.8 VITAMIN B 12 DEFICIENCY: ICD-10-CM

## 2022-04-25 DIAGNOSIS — K21.00 GASTROESOPHAGEAL REFLUX DISEASE WITH ESOPHAGITIS, UNSPECIFIED WHETHER HEMORRHAGE: ICD-10-CM

## 2022-04-25 DIAGNOSIS — R53.83 FATIGUE, UNSPECIFIED TYPE: ICD-10-CM

## 2022-04-25 DIAGNOSIS — E55.9 VITAMIN D DEFICIENCY: ICD-10-CM

## 2022-04-25 DIAGNOSIS — Z13.29 SCREENING FOR THYROID DISORDER: ICD-10-CM

## 2022-04-25 DIAGNOSIS — J45.20 MILD INTERMITTENT ASTHMA WITHOUT COMPLICATION: ICD-10-CM

## 2022-04-25 DIAGNOSIS — Z00.00 ENCOUNTER FOR MEDICARE ANNUAL WELLNESS EXAM: Primary | ICD-10-CM

## 2022-04-25 DIAGNOSIS — G89.29 CHRONIC BILATERAL LOW BACK PAIN WITHOUT SCIATICA: ICD-10-CM

## 2022-04-25 DIAGNOSIS — M85.89 OSTEOPENIA OF MULTIPLE SITES: ICD-10-CM

## 2022-04-25 DIAGNOSIS — E78.5 HYPERLIPIDEMIA LDL GOAL <130: ICD-10-CM

## 2022-04-25 LAB
ALBUMIN UR-MCNC: NEGATIVE MG/DL
APPEARANCE UR: CLEAR
BACTERIA #/AREA URNS HPF: ABNORMAL /HPF
BASOPHILS # BLD AUTO: 0.1 10E3/UL (ref 0–0.2)
BASOPHILS NFR BLD AUTO: 1 %
BILIRUB UR QL STRIP: NEGATIVE
CHOLEST SERPL-MCNC: 263 MG/DL
CLUE CELLS: ABNORMAL
COLOR UR AUTO: YELLOW
EOSINOPHIL # BLD AUTO: 0.3 10E3/UL (ref 0–0.7)
EOSINOPHIL NFR BLD AUTO: 5 %
ERYTHROCYTE [DISTWIDTH] IN BLOOD BY AUTOMATED COUNT: 12.5 % (ref 10–15)
FASTING STATUS PATIENT QL REPORTED: NO
GLUCOSE UR STRIP-MCNC: NEGATIVE MG/DL
HCT VFR BLD AUTO: 39 % (ref 35–47)
HDLC SERPL-MCNC: 61 MG/DL
HGB BLD-MCNC: 13 G/DL (ref 11.7–15.7)
HGB UR QL STRIP: ABNORMAL
KETONES UR STRIP-MCNC: ABNORMAL MG/DL
LDLC SERPL CALC-MCNC: 158 MG/DL
LEUKOCYTE ESTERASE UR QL STRIP: ABNORMAL
LYMPHOCYTES # BLD AUTO: 1.7 10E3/UL (ref 0.8–5.3)
LYMPHOCYTES NFR BLD AUTO: 25 %
MCH RBC QN AUTO: 32.3 PG (ref 26.5–33)
MCHC RBC AUTO-ENTMCNC: 33.3 G/DL (ref 31.5–36.5)
MCV RBC AUTO: 97 FL (ref 78–100)
MONOCYTES # BLD AUTO: 0.7 10E3/UL (ref 0–1.3)
MONOCYTES NFR BLD AUTO: 10 %
MUCOUS THREADS #/AREA URNS LPF: PRESENT /LPF
NEUTROPHILS # BLD AUTO: 4.1 10E3/UL (ref 1.6–8.3)
NEUTROPHILS NFR BLD AUTO: 60 %
NITRATE UR QL: NEGATIVE
NONHDLC SERPL-MCNC: 202 MG/DL
PH UR STRIP: 7 [PH] (ref 5–7)
PLATELET # BLD AUTO: 251 10E3/UL (ref 150–450)
RBC # BLD AUTO: 4.02 10E6/UL (ref 3.8–5.2)
RBC #/AREA URNS AUTO: ABNORMAL /HPF
SP GR UR STRIP: 1.02 (ref 1–1.03)
SQUAMOUS #/AREA URNS AUTO: ABNORMAL /LPF
TRICHOMONAS, WET PREP: ABNORMAL
TRIGL SERPL-MCNC: 219 MG/DL
TSH SERPL DL<=0.005 MIU/L-ACNC: 0.94 MU/L (ref 0.4–4)
UROBILINOGEN UR STRIP-ACNC: 0.2 E.U./DL
VIT B12 SERPL-MCNC: 844 PG/ML (ref 193–986)
WBC # BLD AUTO: 6.8 10E3/UL (ref 4–11)
WBC #/AREA URNS AUTO: ABNORMAL /HPF
WBC'S/HIGH POWER FIELD, WET PREP: ABNORMAL
YEAST, WET PREP: ABNORMAL

## 2022-04-25 PROCEDURE — 87210 SMEAR WET MOUNT SALINE/INK: CPT | Performed by: NURSE PRACTITIONER

## 2022-04-25 PROCEDURE — 87086 URINE CULTURE/COLONY COUNT: CPT | Performed by: NURSE PRACTITIONER

## 2022-04-25 PROCEDURE — 85025 COMPLETE CBC W/AUTO DIFF WBC: CPT | Performed by: NURSE PRACTITIONER

## 2022-04-25 PROCEDURE — 84443 ASSAY THYROID STIM HORMONE: CPT | Performed by: NURSE PRACTITIONER

## 2022-04-25 PROCEDURE — 99214 OFFICE O/P EST MOD 30 MIN: CPT | Mod: 25 | Performed by: NURSE PRACTITIONER

## 2022-04-25 PROCEDURE — 99397 PER PM REEVAL EST PAT 65+ YR: CPT | Performed by: NURSE PRACTITIONER

## 2022-04-25 PROCEDURE — 82607 VITAMIN B-12: CPT | Performed by: NURSE PRACTITIONER

## 2022-04-25 PROCEDURE — 82306 VITAMIN D 25 HYDROXY: CPT | Performed by: NURSE PRACTITIONER

## 2022-04-25 PROCEDURE — 81001 URINALYSIS AUTO W/SCOPE: CPT | Performed by: NURSE PRACTITIONER

## 2022-04-25 PROCEDURE — 36415 COLL VENOUS BLD VENIPUNCTURE: CPT | Performed by: NURSE PRACTITIONER

## 2022-04-25 PROCEDURE — 87088 URINE BACTERIA CULTURE: CPT | Performed by: NURSE PRACTITIONER

## 2022-04-25 PROCEDURE — 80061 LIPID PANEL: CPT | Performed by: NURSE PRACTITIONER

## 2022-04-25 RX ORDER — OMEPRAZOLE 40 MG/1
40 CAPSULE, DELAYED RELEASE ORAL
Qty: 90 CAPSULE | Refills: 1 | Status: SHIPPED | OUTPATIENT
Start: 2022-04-25 | End: 2022-10-19

## 2022-04-25 RX ORDER — SUMATRIPTAN 50 MG/1
TABLET, FILM COATED ORAL
Qty: 18 TABLET | Refills: 5 | Status: SHIPPED | OUTPATIENT
Start: 2022-04-25 | End: 2024-02-12

## 2022-04-25 RX ORDER — ALBUTEROL SULFATE 90 UG/1
2 AEROSOL, METERED RESPIRATORY (INHALATION) EVERY 4 HOURS PRN
Qty: 18 G | Refills: 5 | Status: SHIPPED | OUTPATIENT
Start: 2022-04-25 | End: 2023-05-08

## 2022-04-25 RX ORDER — CIPROFLOXACIN 500 MG/1
500 TABLET, FILM COATED ORAL 2 TIMES DAILY
Qty: 6 TABLET | Refills: 0 | Status: SHIPPED | OUTPATIENT
Start: 2022-04-25 | End: 2022-04-28

## 2022-04-25 RX ORDER — ROSUVASTATIN CALCIUM 10 MG/1
10 TABLET, COATED ORAL DAILY
Qty: 90 TABLET | Refills: 3 | Status: SHIPPED | OUTPATIENT
Start: 2022-04-25 | End: 2023-09-19

## 2022-04-25 ASSESSMENT — ENCOUNTER SYMPTOMS
DIARRHEA: 0
NAUSEA: 1
CONSTIPATION: 0
EYE PAIN: 0
COUGH: 0
PALPITATIONS: 0
PARESTHESIAS: 0
SHORTNESS OF BREATH: 1
SORE THROAT: 0
NERVOUS/ANXIOUS: 0
MYALGIAS: 0
HEMATOCHEZIA: 0
WEAKNESS: 0
FEVER: 0
HEADACHES: 1
DIZZINESS: 0
CHILLS: 0
DYSURIA: 0
HEARTBURN: 1
HEMATURIA: 0
FREQUENCY: 0
BREAST MASS: 0
ABDOMINAL PAIN: 1
ARTHRALGIAS: 1

## 2022-04-25 ASSESSMENT — PATIENT HEALTH QUESTIONNAIRE - PHQ9
SUM OF ALL RESPONSES TO PHQ QUESTIONS 1-9: 2
SUM OF ALL RESPONSES TO PHQ QUESTIONS 1-9: 2
10. IF YOU CHECKED OFF ANY PROBLEMS, HOW DIFFICULT HAVE THESE PROBLEMS MADE IT FOR YOU TO DO YOUR WORK, TAKE CARE OF THINGS AT HOME, OR GET ALONG WITH OTHER PEOPLE: NOT DIFFICULT AT ALL

## 2022-04-25 ASSESSMENT — ACTIVITIES OF DAILY LIVING (ADL): CURRENT_FUNCTION: NO ASSISTANCE NEEDED

## 2022-04-25 ASSESSMENT — PAIN SCALES - GENERAL: PAINLEVEL: NO PAIN (0)

## 2022-04-25 NOTE — PROGRESS NOTES
"CiSUBJECTIVE:   Amee Crews is a 71 year old female who presents for Preventive Visit.  Patient has been advised of split billing requirements and indicates understanding: Yes  Are you in the first 12 months of your Medicare coverage?  No    Healthy Habits:     In general, how would you rate your overall health?  Good    Frequency of exercise:  None    Do you usually eat at least 4 servings of fruit and vegetables a day, include whole grains    & fiber and avoid regularly eating high fat or \"junk\" foods?  No    Taking medications regularly:  Yes    Medication side effects:  Not applicable    Ability to successfully perform activities of daily living:  No assistance needed    Home Safety:  No safety concerns identified    Hearing Impairment:  No hearing concerns    In the past 6 months, have you been bothered by leaking of urine? Yes    In general, how would you rate your overall mental or emotional health?  Good      PHQ-2 Total Score: 1    Additional concerns today:  Yes (left ankle, indigestion. )  Answers for HPI/ROS submitted by the patient on 4/25/2022  If you checked off any problems, how difficult have these problems made it for you to do your work, take care of things at home, or get along with other people?: Not difficult at all  PHQ9 TOTAL SCORE: 2    The 10-year ASCVD risk score (Maribel WILLIE Jr., et al., 2013) is: 7.8%    Values used to calculate the score:      Age: 71 years      Sex: Female      Is Non- : No      Diabetic: No      Tobacco smoker: No      Systolic Blood Pressure: 118 mmHg      Is BP treated: No      HDL Cholesterol: 61 mg/dL      Total Cholesterol: 263 mg/dL      Ankle   Left, aches at times.   At night sometimes she will feel it.   No trauma that she is aware of.  Uncomfortable with walking at times.   No pain previously    Indigestion  On and off for about 1 year  Gotten worse the last 6 months  She will eat and then bloat/pressure  Sometimes nauseated with it and " burping with it.   Does not take anything for this.   No specific foods will contribute to this.   No histo of abdominal surgeries.   Had esophogeal scope few years ago.       Do you feel safe in your environment? Yes  Have you ever done Advance Care Planning? (For example, a Health Directive, POLST, or a discussion with a medical provider or your loved ones about your wishes): Yes, patient states has an Advance Care Planning document and will bring a copy to the clinic.       Fall risk  Fallen 2 or more times in the past year?: No  Any fall with injury in the past year?: No    Cognitive Screening   1) Repeat 3 items (Leader, Season, Table)    2) Clock draw: NORMAL  3) 3 item recall: Recalls 2 objects   Results: NORMAL clock, 1-2 items recalled: COGNITIVE IMPAIRMENT LESS LIKELY    Mini-CogTM Copyright S Eva. Licensed by the author for use in [facility]; reprinted with permission (cb@[facility]). All rights reserved.      Do you have sleep apnea, excessive snoring or daytime drowsiness?: no    Reviewed and updated as needed this visit by clinical staff   Tobacco  Allergies  Meds   Med Hx  Surg Hx  Fam Hx  Soc Hx          Reviewed and updated as needed this visit by Provider                   Social History     Tobacco Use     Smoking status: Former Smoker     Packs/day: 0.00     Years: 0.00     Pack years: 0.00     Types: Cigarettes     Quit date: 1973     Years since quittin.3     Smokeless tobacco: Never Used     Tobacco comment: Social smoker   Substance Use Topics     Alcohol use: Yes     Alcohol/week: 0.0 standard drinks     Comment: one or two a day       Alcohol Use 2022   Prescreen: >3 drinks/day or >7 drinks/week? No   Prescreen: >3 drinks/day or >7 drinks/week? -   AUDIT SCORE  -       Current providers sharing in care for this patient include:   Patient Care Team:  Patrica Kelly MD as PCP - General (Internal Medicine)  Freddie Hinkle MD as Assigned Surgical  "Provider  Ary Adams APRN CNP as Assigned PCP  Yasemin Simental MD as Assigned Musculoskeletal Provider    The following health maintenance items are reviewed in Epic and correct as of today:  Health Maintenance Due   Topic Date Due     ZOSTER IMMUNIZATION (1 of 2) Never done     COVID-19 Vaccine (3 - Booster for Pfizer series) 08/19/2021     MAMMO SCREENING  10/22/2021         FHS-7: No flowsheet data found.  click delete button to remove this line now  Mammogram Screening: Recommended mammography every 1-2 years with patient discussion and risk factor consideration  Pertinent mammograms are reviewed under the imaging tab.    Review of Systems   Constitutional: Negative for chills and fever.   HENT: Negative for congestion, ear pain, hearing loss and sore throat.    Eyes: Negative for pain and visual disturbance.   Respiratory: Positive for shortness of breath. Negative for cough.    Cardiovascular: Positive for peripheral edema. Negative for chest pain and palpitations.   Gastrointestinal: Positive for abdominal pain, heartburn and nausea. Negative for constipation, diarrhea and hematochezia.   Breasts:  Negative for tenderness, breast mass and discharge.   Genitourinary: Positive for vaginal discharge. Negative for dysuria, frequency, genital sores, hematuria, pelvic pain, urgency and vaginal bleeding.   Musculoskeletal: Positive for arthralgias. Negative for myalgias.   Skin: Negative for rash.   Neurological: Positive for headaches. Negative for dizziness, weakness and paresthesias.   Psychiatric/Behavioral: Negative for mood changes. The patient is not nervous/anxious.        OBJECTIVE:   /62   Pulse 87   Temp 98.6  F (37  C) (Temporal)   Resp 22   Ht 1.6 m (5' 2.99\")   Wt 66.4 kg (146 lb 6.4 oz)   SpO2 99%   BMI 25.94 kg/m   Estimated body mass index is 25.94 kg/m  as calculated from the following:    Height as of this encounter: 1.6 m (5' 2.99\").    Weight as of this " encounter: 66.4 kg (146 lb 6.4 oz).  Physical Exam  GENERAL: healthy, alert and no distress  EYES: Eyes grossly normal to inspection, PERRL and conjunctivae and sclerae normal  HENT: ear canals and TM's normal, nose and mouth without ulcers or lesions  NECK: no adenopathy, no asymmetry, masses, or scars and thyroid normal to palpation  RESP: lungs clear to auscultation - no rales, rhonchi or wheezes  BREAST: normal without masses, tenderness or nipple discharge and no palpable axillary masses or adenopathy  CV: regular rate and rhythm, normal S1 S2, no S3 or S4, no murmur, click or rub, no peripheral edema and peripheral pulses strong  ABDOMEN: soft, nontender, no hepatosplenomegaly, no masses and bowel sounds normal  MS: LLE exam shows normal strength and muscle mass, no erythema, induration, or nodules and ROM of all joints is normal  SKIN: no suspicious lesions or rashes  NEURO: Normal strength and tone, mentation intact and speech normal  PSYCH: mentation appears normal, affect normal/bright    Diagnostic Test Results:  Labs reviewed in Epic  Results for orders placed or performed in visit on 04/25/22   UA Macro with Reflex to Micro and Culture - lab collect     Status: Abnormal    Specimen: Urine, Clean Catch   Result Value Ref Range    Color Urine Yellow Colorless, Straw, Light Yellow, Yellow    Appearance Urine Clear Clear    Glucose Urine Negative Negative mg/dL    Bilirubin Urine Negative Negative    Ketones Urine Trace (A) Negative mg/dL    Specific Gravity Urine 1.020 1.003 - 1.035    Blood Urine Trace (A) Negative    pH Urine 7.0 5.0 - 7.0    Protein Albumin Urine Negative Negative mg/dL    Urobilinogen Urine 0.2 0.2, 1.0 E.U./dL    Nitrite Urine Negative Negative    Leukocyte Esterase Urine Moderate (A) Negative   CBC with platelets and differential     Status: None   Result Value Ref Range    WBC Count 6.8 4.0 - 11.0 10e3/uL    RBC Count 4.02 3.80 - 5.20 10e6/uL    Hemoglobin 13.0 11.7 - 15.7 g/dL     Hematocrit 39.0 35.0 - 47.0 %    MCV 97 78 - 100 fL    MCH 32.3 26.5 - 33.0 pg    MCHC 33.3 31.5 - 36.5 g/dL    RDW 12.5 10.0 - 15.0 %    Platelet Count 251 150 - 450 10e3/uL    % Neutrophils 60 %    % Lymphocytes 25 %    % Monocytes 10 %    % Eosinophils 5 %    % Basophils 1 %    Absolute Neutrophils 4.1 1.6 - 8.3 10e3/uL    Absolute Lymphocytes 1.7 0.8 - 5.3 10e3/uL    Absolute Monocytes 0.7 0.0 - 1.3 10e3/uL    Absolute Eosinophils 0.3 0.0 - 0.7 10e3/uL    Absolute Basophils 0.1 0.0 - 0.2 10e3/uL   Urine Microscopic Exam     Status: Abnormal   Result Value Ref Range    Bacteria Urine Moderate (A) None Seen /HPF    RBC Urine 2-5 (A) 0-2 /HPF /HPF    WBC Urine 10-25 (A) 0-5 /HPF /HPF    Squamous Epithelials Urine Moderate (A) None Seen /LPF    Mucus Urine Present (A) None Seen /LPF   Wet prep - lab collect     Status: Abnormal    Specimen: Vagina; Swab   Result Value Ref Range    Trichomonas Absent Absent    Yeast Absent Absent    Clue Cells Absent Absent    WBCs/high power field 1+ (A) None   CBC with platelets and differential     Status: None    Narrative    The following orders were created for panel order CBC with platelets and differential.  Procedure                               Abnormality         Status                     ---------                               -----------         ------                     CBC with platelets and d...[[id number]]                      Final result                 Please view results for these tests on the individual orders.       ASSESSMENT / PLAN:   (Z00.00) Encounter for Medicare annual wellness exam  (primary encounter diagnosis)  Comment:   Plan: Updated     (G25.81) Restless leg  Comment:   Plan: Doing well on the increased dose of Mirapex.   Follow up in 1 year, ok for PRN refills.     (M54.50,  G89.29) Chronic bilateral low back pain without sciatica  Comment:   Plan: Stable     (F32.5) Major depression in complete remission (H)  Comment:   Plan: Stable      (E53.8) Vitamin B 12 deficiency  Comment:   Plan: Vitamin B12        Recheck today   Having some increased fatigue     (J45.20) Mild intermittent asthma without complication  Comment:   Plan: albuterol (PROAIR HFA/PROVENTIL HFA/VENTOLIN         HFA) 108 (90 Base) MCG/ACT inhaler        Stable Follow up in 6 months.     (E78.5) Hyperlipidemia LDL goal <130  Comment:   Plan: Lipid panel reflex to direct LDL Non-fasting        Doing well since starting Crestor   Recommend recheck labs today and then can go to yearly monitoring.   Addendum:   Cholesterol uncontrolled. Recommend increasing statin dose.   Crestor increased to 10mg daily.     (G43.809) Other migraine without status migrainosus, not intractable  Comment:   Plan: SUMAtriptan (IMITREX) 50 MG tablet        Stable PRN    (E55.9) Vitamin D deficiency  Comment:   Plan: 25 OH Vit D therapy monitoring        Recheck due to fatigue     (G47.00) Insomnia, unspecified type  Comment:   Plan: Trazodone PRN  Ok for prn refills.     (J30.1) Chronic seasonal allergic rhinitis due to pollen  Comment:   Plan: Encouraged monitoring with change of seasons.     (M19.041,  M19.042) Primary osteoarthritis of both hands  Comment:   Plan: Continue on Vitamin D and oral calcium intake 1200mg     (M85.89) Osteopenia of multiple sites  Comment:   Plan:     (Z12.31) Encounter for screening mammogram for breast cancer  Comment:   Plan: MA SCREENING DIGITAL BILAT - Future  (s+30), MA        Screen Bilateral w/Bk            (Z13.29) Screening for thyroid disorder  Comment:   Plan: TSH with free T4 reflex            (N89.8) Vaginal discharge  Comment:   Plan: Wet prep - lab collect, UA Macro with Reflex to        Micro and Culture - lab collect, Urine         Microscopic Exam, Urine Culture        Improved from flagyl, now continues to have fatigue    (R53.83) Fatigue, unspecified type  Comment:   Plan: UA Macro with Reflex to Micro and Culture - lab        collect, CBC with  "platelets and differential,         Urine Microscopic Exam, Urine Culture        Recommend Urine check for UTI given recent bacterial infection (BV).     (K21.00) Gastroesophageal reflux disease with esophagitis, unspecified whether hemorrhage  Comment:   Plan: omeprazole (PRILOSEC) 40 MG DR capsule        Discussed starting Prilosec daily for 6-8 weeks and then slowly tapering. Monitor for break through symptoms, Pepcid PRN for this and to let me know if having consistent pain/nausea with breakthrough and may want to consider EGD. Had EGD in 2017 and this was normal.     (N30.01) Acute cystitis with hematuria  Comment:   Plan: ciprofloxacin (CIPRO) 500 MG tablet            Plantar Fasciitis   Discussed her left ankle and foot pain is likely related to this given presentation of symptoms and heel correlation. We discussed stretching and treatment plan. Will trial heel wedge, stretching and tylenol. Follow up with podiatry for orthotics/evaluation if needed.       Patient has been advised of split billing requirements and indicates understanding: Yes    COUNSELING:  Reviewed preventive health counseling, as reflected in patient instructions    Estimated body mass index is 25.94 kg/m  as calculated from the following:    Height as of this encounter: 1.6 m (5' 2.99\").    Weight as of this encounter: 66.4 kg (146 lb 6.4 oz).    Weight management plan: Discussed healthy diet and exercise guidelines    She reports that she quit smoking about 49 years ago. Her smoking use included cigarettes. She smoked 0.00 packs per day for 0.00 years. She has never used smokeless tobacco.      Appropriate preventive services were discussed with this patient, including applicable screening as appropriate for cardiovascular disease, diabetes, osteopenia/osteoporosis, and glaucoma.  As appropriate for age/gender, discussed screening for colorectal cancer, prostate cancer, breast cancer, and cervical cancer. Checklist reviewing preventive " services available has been given to the patient.    Reviewed patients plan of care and provided an AVS. The Intermediate Care Plan ( asthma action plan, low back pain action plan, and migraine action plan) for Amee meets the Care Plan requirement. This Care Plan has been established and reviewed with the Patient.    Counseling Resources:  ATP IV Guidelines  Pooled Cohorts Equation Calculator  Breast Cancer Risk Calculator  Breast Cancer: Medication to Reduce Risk  FRAX Risk Assessment  ICSI Preventive Guidelines  Dietary Guidelines for Americans, 2010  [employer]'s MyPlate  ASA Prophylaxis  Lung CA Screening    ROBYN Bojorquez CNP  [facility]    Identified Health Risks:    She is at risk for lack of exercise and has been provided with information to increase physical activity for the benefit of her well-being.  The patient was counseled and encouraged to consider modifying their diet and eating habits. She was provided with information on recommended healthy diet options.  Information on urinary incontinence and treatment options given to patient.

## 2022-04-26 ENCOUNTER — TELEPHONE (OUTPATIENT)
Dept: FAMILY MEDICINE | Facility: OTHER | Age: 72
End: 2022-04-26
Payer: COMMERCIAL

## 2022-04-26 DIAGNOSIS — N30.01 ACUTE CYSTITIS WITH HEMATURIA: ICD-10-CM

## 2022-04-26 DIAGNOSIS — R41.3 MEMORY DEFICIT: Primary | ICD-10-CM

## 2022-04-26 RX ORDER — CEPHALEXIN 500 MG/1
500 CAPSULE ORAL 3 TIMES DAILY
Qty: 21 CAPSULE | Refills: 0 | Status: SHIPPED | OUTPATIENT
Start: 2022-04-26 | End: 2022-05-03

## 2022-04-26 NOTE — TELEPHONE ENCOUNTER
Lab results show GroupB strep in urine. Will do Keflex and discontinued Cipro. I notified patient of this.     She also would like her cognitive test sent to [facility] in [place] or [employer]. Please fax referral.      ROBYN Bojorquez CNP  Questions or concerns please feel free to send me a [employer] message or call me  Phone : [phone]

## 2022-04-27 LAB
BACTERIA UR CULT: ABNORMAL
BACTERIA UR CULT: ABNORMAL

## 2022-05-02 LAB
DEPRECATED CALCIDIOL+CALCIFEROL SERPL-MC: <45 UG/L (ref 20–75)
VITAMIN D2 SERPL-MCNC: <5 UG/L
VITAMIN D3 SERPL-MCNC: 40 UG/L

## 2022-05-10 ENCOUNTER — TELEPHONE (OUTPATIENT)
Dept: FAMILY MEDICINE | Facility: OTHER | Age: 72
End: 2022-05-10

## 2022-05-10 ENCOUNTER — MYC MEDICAL ADVICE (OUTPATIENT)
Dept: FAMILY MEDICINE | Facility: OTHER | Age: 72
End: 2022-05-10

## 2022-05-10 ENCOUNTER — LAB (OUTPATIENT)
Dept: LAB | Facility: CLINIC | Age: 72
End: 2022-05-10
Payer: COMMERCIAL

## 2022-05-10 DIAGNOSIS — N30.01 ACUTE CYSTITIS WITH HEMATURIA: ICD-10-CM

## 2022-05-10 DIAGNOSIS — N30.01 ACUTE CYSTITIS WITH HEMATURIA: Primary | ICD-10-CM

## 2022-05-10 DIAGNOSIS — N89.8 VAGINAL ITCHING: ICD-10-CM

## 2022-05-10 DIAGNOSIS — R82.90 BAD ODOR OF URINE: ICD-10-CM

## 2022-05-10 LAB
ALBUMIN UR-MCNC: NEGATIVE MG/DL
APPEARANCE UR: CLEAR
BILIRUB UR QL STRIP: NEGATIVE
CLUE CELLS: ABNORMAL
COLOR UR AUTO: YELLOW
GLUCOSE UR STRIP-MCNC: NEGATIVE MG/DL
HGB UR QL STRIP: NEGATIVE
KETONES UR STRIP-MCNC: NEGATIVE MG/DL
LEUKOCYTE ESTERASE UR QL STRIP: ABNORMAL
NITRATE UR QL: NEGATIVE
PH UR STRIP: 7 [PH] (ref 5–7)
RBC #/AREA URNS AUTO: ABNORMAL /HPF
SP GR UR STRIP: 1.02 (ref 1–1.03)
SQUAMOUS #/AREA URNS AUTO: ABNORMAL /LPF
TRICHOMONAS, WET PREP: ABNORMAL
UROBILINOGEN UR STRIP-ACNC: 0.2 E.U./DL
WBC #/AREA URNS AUTO: ABNORMAL /HPF
WBC'S/HIGH POWER FIELD, WET PREP: ABNORMAL
YEAST, WET PREP: ABNORMAL

## 2022-05-10 PROCEDURE — 81001 URINALYSIS AUTO W/SCOPE: CPT

## 2022-05-10 PROCEDURE — 87086 URINE CULTURE/COLONY COUNT: CPT

## 2022-05-10 PROCEDURE — 87210 SMEAR WET MOUNT SALINE/INK: CPT

## 2022-05-10 NOTE — TELEPHONE ENCOUNTER
Urine culture added.       ROBYN Bojorquez CNP  Questions or concerns please feel free to send me a [employer] message or call me  Phone : [phone]

## 2022-05-10 NOTE — TELEPHONE ENCOUNTER
Sent [employer] message, has appt 5/13 with [facility] but asked pt if she wants virtual for tomorrow per provider request  Lenore Meraz, CMA

## 2022-05-10 NOTE — TELEPHONE ENCOUNTER
Please schedule virtual Wednesday.       ROBYN Bojorquez CNP  Questions or concerns please feel free to send me a [employer] message or call me  Phone : [phone]

## 2022-05-11 ENCOUNTER — VIRTUAL VISIT (OUTPATIENT)
Dept: FAMILY MEDICINE | Facility: OTHER | Age: 72
End: 2022-05-11
Payer: COMMERCIAL

## 2022-05-11 DIAGNOSIS — T46.6X5A STATIN MYOPATHY: ICD-10-CM

## 2022-05-11 DIAGNOSIS — N95.2 ATROPHIC VAGINITIS: Primary | ICD-10-CM

## 2022-05-11 DIAGNOSIS — G72.0 STATIN MYOPATHY: ICD-10-CM

## 2022-05-11 PROCEDURE — 99214 OFFICE O/P EST MOD 30 MIN: CPT | Mod: 95 | Performed by: NURSE PRACTITIONER

## 2022-05-11 ASSESSMENT — PAIN SCALES - GENERAL: PAINLEVEL: MODERATE PAIN (5)

## 2022-05-11 NOTE — PROGRESS NOTES
Amee is a 71 year old who is being evaluated via a billable telephone visit.      What phone number would you like to be contacted at? [phone]  How would you like to obtain your AVS? MyChart    Assessment & Plan     Atrophic vaginitis  Discussed atrophic vaginitis   Avoid cerna margarita perfume washes, use water  Discussed water based lubricants     Statin myopathy  Having multiple muscle aches for awhile now. Been on Crestor from her previous provider for awhile now. Notes that she has had troubles with statin medication in the past. Discussed her symptoms are in the hips and upper legs. Advised statin holiday for 3-4 weeks and to message me on how she is doing. I would recommend that we consider changing her statin or doing a low dose of the Crestor at an every other day or every couple of day dosing as she should be on a statin given her risk factors. IfI do not think we need to do a CK total at this point, could consider this in the future. She just had a CMP and this did not show any liver enzyme concerns.   We could trial pravastatin as this is on the lower risk for statin myopathy's. Would need to review if this was one she has tried n the past.   In review of her medications and medical history:    No history of hypothyroidism that would explain any muscle aches and pains, she did have a recent TSH which was normal. NO CKD with normal recent chemistries. She does have a history of osteopenia and DDD of the lumbar spine and restless leg. I did increase her dose from 5mg to 10mg recently so this definitely could be the cause of her symptoms. She notes that this started before the increase in the dosing.   I    I did review her risks by using the RAMANA -IC   So far her socre would include 4 and will see what her score will be after she stops the the statin.     To note she has taken Simvastatin, pravastatin, Lipitor, lopid over the past 5-10 years.     The 10-year ASCVD risk score ([place] TAPIA Jr., et al.,  2013) is: 7.8%    Values used to calculate the score:      Age: 71 years      Sex: Female      Is Non- : No      Diabetic: No      Tobacco smoker: No      Systolic Blood Pressure: 118 mmHg      Is BP treated: No      HDL Cholesterol: 61 mg/dL      Total Cholesterol: 263 mg/dL             There are no Patient Instructions on file for this visit.    No follow-ups on file.    ROBYN Bojorquez CNP  [facility] BRI Lubin is a 71 year old who presents for the following health issues    HPI     Went and saw a doctor for her and recommended with just water to wash her perineal and vaginal area.     Has chronic back issues. Felt achy through her buttocks and her thighs and her ankles. Has had this on cholesterol medication in the past.     Review of Systems         Objective    Vitals - Patient Reported  Pain Score: Moderate Pain (5)  Pain Loc: Upper Leg        Physical Exam   healthy, alert and no distress  PSYCH: Alert and oriented times 3; coherent speech, normal   rate and volume, able to articulate logical thoughts, able   to abstract reason, no tangential thoughts, no hallucinations   or delusions  Her affect is normal  RESP: No cough, no audible wheezing, able to talk in full sentences  Remainder of exam unable to be completed due to telephone visits    Lab on 05/10/2022   Component Date Value Ref Range Status     Trichomonas 05/10/2022 Absent  Absent Final     Yeast 05/10/2022 Absent  Absent Final     Clue Cells 05/10/2022 Absent  Absent Final     WBCs/high power field 05/10/2022 2+ (A) None Final     Color Urine 05/10/2022 Yellow  Colorless, Straw, Light Yellow, Yellow Final     Appearance Urine 05/10/2022 Clear  Clear Final     Glucose Urine 05/10/2022 Negative  Negative mg/dL Final     Bilirubin Urine 05/10/2022 Negative  Negative Final     Ketones Urine 05/10/2022 Negative  Negative mg/dL Final     Specific Gravity Urine 05/10/2022 1.020  1.003 -  1.035 Final     Blood Urine 05/10/2022 Negative  Negative Final     pH Urine 05/10/2022 7.0  5.0 - 7.0 Final     Protein Albumin Urine 05/10/2022 Negative  Negative mg/dL Final     Urobilinogen Urine 05/10/2022 0.2  0.2, 1.0 E.U./dL Final     Nitrite Urine 05/10/2022 Negative  Negative Final     Leukocyte Esterase Urine 05/10/2022 Small (A) Negative Final     RBC Urine 05/10/2022 0-2  0-2 /HPF /HPF Final     WBC Urine 05/10/2022 0-5  0-5 /HPF /HPF Final     Squamous Epithelials Urine 05/10/2022 Few (A) None Seen /LPF Final     Culture 05/10/2022 50,000-100,000 CFU/mL Mixture of urogenital [place]   Final         Phone call duration: 15 minutes

## 2022-05-12 ENCOUNTER — MYC MEDICAL ADVICE (OUTPATIENT)
Dept: FAMILY MEDICINE | Facility: OTHER | Age: 72
End: 2022-05-12
Payer: COMMERCIAL

## 2022-05-12 LAB — BACTERIA UR CULT: NORMAL

## 2022-05-23 ENCOUNTER — HOSPITAL ENCOUNTER (OUTPATIENT)
Dept: MAMMOGRAPHY | Facility: CLINIC | Age: 72
Discharge: HOME | End: 2022-05-23
Attending: NURSE PRACTITIONER | Admitting: NURSE PRACTITIONER
Payer: COMMERCIAL

## 2022-05-23 DIAGNOSIS — Z12.31 ENCOUNTER FOR SCREENING MAMMOGRAM FOR BREAST CANCER: ICD-10-CM

## 2022-05-23 PROCEDURE — 77067 SCR MAMMO BI INCL CAD: CPT

## 2022-06-02 ENCOUNTER — TRANSFERRED RECORDS (OUTPATIENT)
Dept: HEALTH INFORMATION MANAGEMENT | Facility: CLINIC | Age: 72
End: 2022-06-02

## 2022-06-03 DIAGNOSIS — E53.8 VITAMIN B 12 DEFICIENCY: ICD-10-CM

## 2022-06-07 RX ORDER — LANOLIN ALCOHOL/MO/W.PET/CERES
CREAM (GRAM) TOPICAL
Qty: 90 TABLET | Refills: 2 | Status: SHIPPED | OUTPATIENT
Start: 2022-06-07 | End: 2023-02-01

## 2022-06-13 ENCOUNTER — TRANSFERRED RECORDS (OUTPATIENT)
Dept: HEALTH INFORMATION MANAGEMENT | Facility: CLINIC | Age: 72
End: 2022-06-13

## 2022-06-16 ENCOUNTER — OFFICE VISIT (OUTPATIENT)
Dept: NEUROLOGY | Facility: CLINIC | Age: 72
End: 2022-06-16
Payer: COMMERCIAL

## 2022-06-16 DIAGNOSIS — G31.84 MILD COGNITIVE IMPAIRMENT, SO STATED: Primary | ICD-10-CM

## 2022-06-16 DIAGNOSIS — R41.3 MEMORY DEFICIT: ICD-10-CM

## 2022-06-16 NOTE — LETTER
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 diagnostic clarification and treatment planning. 115 minutes of face-to-face testing were provided by this writer. An additional 40 minutes were spent scoring and compiling test results. The patient was cooperative with testing. No concerns were brought to my attention. Please see Dr. Coyle's report for a detailed description of the charges and interpretation and integration of the findings.      Again, thank you for allowing me to participate in the care of your patient.        Sincerely,        Mary Coyle Psy.D, LP

## 2022-06-21 NOTE — PROGRESS NOTES
The patient was seen for a neuropsychological evaluation for the purposes of diagnostic clarification and treatment planning. 115 minutes of face-to-face testing were provided by this writer. An additional 40 minutes were spent scoring and compiling test results. The patient was cooperative with testing. No concerns were brought to my attention. Please see Dr. Coyle's report for a detailed description of the charges and interpretation and integration of the findings.

## 2022-06-30 ENCOUNTER — VIRTUAL VISIT (OUTPATIENT)
Dept: NEUROLOGY | Facility: CLINIC | Age: 72
End: 2022-06-30
Payer: COMMERCIAL

## 2022-06-30 ENCOUNTER — TELEPHONE (OUTPATIENT)
Dept: FAMILY MEDICINE | Facility: CLINIC | Age: 72
End: 2022-06-30

## 2022-06-30 DIAGNOSIS — G89.29 CHRONIC BILATERAL LOW BACK PAIN WITHOUT SCIATICA: Primary | ICD-10-CM

## 2022-06-30 DIAGNOSIS — M54.50 CHRONIC BILATERAL LOW BACK PAIN WITHOUT SCIATICA: Primary | ICD-10-CM

## 2022-06-30 DIAGNOSIS — G31.84 MILD COGNITIVE IMPAIRMENT, SO STATED: Primary | ICD-10-CM

## 2022-06-30 PROCEDURE — 96132 NRPSYC TST EVAL PHYS/QHP 1ST: CPT | Mod: 95 | Performed by: CLINICAL NEUROPSYCHOLOGIST

## 2022-06-30 PROCEDURE — 96133 NRPSYC TST EVAL PHYS/QHP EA: CPT | Mod: 95 | Performed by: CLINICAL NEUROPSYCHOLOGIST

## 2022-06-30 PROCEDURE — 96138 PSYCL/NRPSYC TECH 1ST: CPT | Performed by: CLINICAL NEUROPSYCHOLOGIST

## 2022-06-30 PROCEDURE — 96116 NUBHVL XM PHYS/QHP 1ST HR: CPT | Performed by: CLINICAL NEUROPSYCHOLOGIST

## 2022-06-30 PROCEDURE — 96139 PSYCL/NRPSYC TST TECH EA: CPT | Performed by: CLINICAL NEUROPSYCHOLOGIST

## 2022-06-30 NOTE — PATIENT INSTRUCTIONS
SUMMARY OF FINDINGS:   Results of testing indicate that Ms. Crews is of estimated average premorbid intellectual functioning, and all of Ms. Crews's performances are generally commensurate with that estimate. Specifically, she exhibits performances that are within normal limits across all cognitive domains assessed. This includes attention/concentration, processing speed, visuospatial/constructional skills, language abilities, verbal and nonverbal learning/memory, and executive functions. In sum, there is no evidence of impairment in the current profile.    Emotionally, the patient endorses minimal symptoms of depression and anxiety on self-report questionnaires. However, recent/ongoing stressors were reported by the patient, and both she and her  acknowledge that she is more irritable lately, likely as a result of this increased stress.     IMPRESSIONS:  There is no evidence of diffuse or focal cerebral dysfunction in the current profile. As such, Ms. Crews does not meet criteria for a cognitive disorder at this time.  The mild memory issues she notices in daily life are most likely due to a combination of increased stress and her experience of what are actually normal, age-related memory changes.   Ms. Crews can be reassured that there is no evidence of an emerging neurodegenerative dementia syndrome at present.    DIAGNOSTIC IMPRESSIONS:  No Cognitive Disorder    RECOMMENDATIONS:  1) Ms. Crews is encouraged to utilize cognitive strategies in daily life for her own reassurance, particularly when she is feeling more overwhelmed, stressed, or fatigued. These strategies may include utilizing note pads, checklists, to-do lists, a calendar/planner, labeled alarm reminders, a GPS, a pillbox, and maintaining a daily morning and nighttime routine and an organized living/work environment.    2) From purely a cognitive perspective, I have no significant concerns about Ms. Crews's ability to work or her ability to independently  perform complex daily activities (including driving, medication and financial management, making complex decisions, etc.).    3) Ms. Crews may consider re-establishing care with a counselor if/when needed, particularly during times of increased stress. Consider adjustments to her antidepressant medication regimen as needed.    4) Ms. Crews is encouraged to remain physically, socially, and mentally active in order to optimize her brain health.    5) Neuropsychological follow-up is not clinically indicated at this time. However, the current test data can now serve as a baseline should a repeat assessment be warranted in the future.      Thank you for allowing me to participate in your care. Please contact me with any questions regarding the content of this report.        Mary Coyle PsyD, LP  Licensed Clinical Neuropsychologist  [facility] [address], [address]  Phone: [phone]      Cognitive Strategies  Take notes! Keep a small notepad with you in your purse/pocket/bag and write things down that you want to remember. You might also keep a notepad in a convenient location in your home (e.g., next to your telephone or calendar). Taking your notes in a note pad (instead of on multiple post-it notes) might help you stay organized, and you will also remember where to go to look for the notes that you took.  Create checklists, grocery lists, and to-do lists. Cross things off as you finish them.  Use a calendar or planner and get in the habit of checking it daily. Make it a part of your morning and/or nighttime routine to remind yourself of upcoming events, activities, or appointments. Cross the days off as they pass so that you can quickly glance at the calendar to know what day it is and what you have going on.  Set alarm reminders. For example, you can set an alarm to remind you to take your medications, turn off the oven, turn off the sprinkler outside, or to start  getting ready for your appointment. If you use a smart phone, often times you can label the alarm that goes off so that you know what the alarm is for when it chimes.  Use a pillbox to follow your medication regimen. A pillbox acts as a nice visual cue to remind us to take our medications. If you have trouble remembering whether or not you have already taken your medications today, a pillbox can be extremely helpful to help with this issue.  Use a GPS when driving to help you stay on route.  When having an important conversation or completing an important task, reduce as many distractions in the environment as you can. For example, turn off the TV or the music in the background. Turn off or silence your cell phone. Clear your work area of everything that you do not need for the task at hand.  Establish set locations for certain items. For example, if you keep your keys on a hook by your door, you will always know where to go to look for them.   Maintain a daily routine, especially for morning and nighttime tasks.

## 2022-06-30 NOTE — TELEPHONE ENCOUNTER
Pt requesting a referral to a neurosurgeon in [facility]. Who is best  you can recommend she is wondering.  She was told she possibly needed back surgery and wants a second opinion as she current neurologist doesn't do surgery.

## 2022-06-30 NOTE — TELEPHONE ENCOUNTER
Pt called and relayed info. She was seen @ [facility] Dr. Arvizu. She will get ANDREW filled out for her records to come to us regarding back.  She will wait for scheduling to contact her.

## 2022-06-30 NOTE — PROGRESS NOTES
"NEUROPSYCHOLOGY TELE-HEALTH FEEDBACK VISIT  [facility]    Telephone Visit  Amee Crews is a 71 year old female who is being evaluated via a billable telephone visit.     The patient has been notified of the following:      \"This telephone visit will be conducted via a call between you and your provider. We have found that certain health care needs can be provided without the need for a physical exam.  This service lets us provide the care you need with a phone conversation. If during the course of the call the provider feels a telephone visit is not appropriate, you will not be charged for this service.\"     Patient has given verbal consent to a Telephone visit? Yes  Consent has been obtained for this service by 1 care team member: yes.      Visit Summary  The purpose of today s appointment was to provide feedback regarding Ms. Crews recent neuropsychological consult completed on 6/16/2022. We began the session by discussing her experience during the evaluation. I provided Ms. Crews with detailed feedback regarding her performance on cognitive testing and her pattern of cognitive strengths and weaknesses.  I discussed my overall impressions and recommendations and provided the opportunity for Ms. Crews to ask any questions that she had about the evaluation. At the end of the session, she indicated that she understood the results and that I had answered all of her questions.       Mary Coyle PsyD, LP  Licensed Clinical Neuropsychologist  [address], [address] [place] [address]  Phone: [phone]      Telephone Visit Details    Type of service:  Telephone Visit  Start Time: 11:01 AM  End Time: 11:20 AM    Originating Location (pt. Location): Home    Distant Location (provider location): [facility]    Mode of Communication: Telephone    Amee Crews was evaluated via a billable telephone " visit. For diagnostic and coding purposes, Ms. 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 As this is the final date for this Episode of Care (initiated on 6/16/2022) all charges for the entire Episode of Care will be filed today. Please see the 6/16/2022 evaluation for a detailed description of codes and services, including services provided today.      In brief:   1 x 96116  1 x 96132  1 x 96133  1 x 96138  4 x 96139

## 2022-06-30 NOTE — LETTER
"    6/30/2022         RE: Amee Crews  [address] [facility] [address]        Dear Colleague,    Thank you for referring your patient, Amee Crews, to the [facility]. Please see a copy of my visit note below.    NEUROPSYCHOLOGY TELE-HEALTH FEEDBACK VISIT  [facility]    Telephone Visit  Amee Crews is a 71 year old female who is being evaluated via a billable telephone visit.     The patient has been notified of the following:      \"This telephone visit will be conducted via a call between you and your provider. We have found that certain health care needs can be provided without the need for a physical exam.  This service lets us provide the care you need with a phone conversation. If during the course of the call the provider feels a telephone visit is not appropriate, you will not be charged for this service.\"     Patient has given verbal consent to a Telephone visit? Yes  Consent has been obtained for this service by 1 care team member: yes.      Visit Summary  The purpose of today s appointment was to provide feedback regarding Ms. Crews recent neuropsychological consult completed on 6/16/2022. We began the session by discussing her experience during the evaluation. I provided Ms. Crews with detailed feedback regarding her performance on cognitive testing and her pattern of cognitive strengths and weaknesses.  I discussed my overall impressions and recommendations and provided the opportunity for Ms. Crews to ask any questions that she had about the evaluation. At the end of the session, she indicated that she understood the results and that I had answered all of her questions.       Mary Coyle PsyD, LP  Licensed Clinical Neuropsychologist  [facility] [address], [address]  [place] [address]  Phone: [phone]      Telephone Visit Details    Type of service:  Telephone Visit  Start " Time: 11:01 AM  End Time: 11:20 AM    Originating Location (pt. Location): Home    Distant Location (provider location): [facility]    Mode of Communication: Telephone    Amee Crews was evaluated via a billable telephone visit. For diagnostic and coding purposes, Ms. 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 As this is the final date for this Episode of Care (initiated on 6/16/2022) all charges for the entire Episode of Care will be filed today. Please see the 6/16/2022 evaluation for a detailed description of codes and services, including services provided today.      In brief:   1 x 96116  1 x 96132  1 x 96133  1 x 96138  4 x 96139        Again, thank you for allowing me to participate in the care of your patient.        Sincerely,        Sejal Church.ROMAN, LP

## 2022-06-30 NOTE — TELEPHONE ENCOUNTER
I do see that she has chronic low back pain. Who does she currently see for this?   I can put in a referral for [facility], I would recommend any of their providers. They will want her records so she will need to have them released.       ROBYN Bojorquez CNP  Questions or concerns please feel free to send me a [email or web address] message or call me  Phone : [phone]

## 2022-07-26 NOTE — TELEPHONE ENCOUNTER
SPINE PATIENTS - NEW PROTOCOL PREVISIT    RECORDS RECEIVED FROM: [facility]   REASON FOR VISIT: Chronic bilateral low back pain without sciatica [M54.50, G89.29]   Date of Appt: 08/18/2022   NOTES (FOR ALL VISITS) STATUS DETAILS   OFFICE NOTE from referring provider Internal 06/30/2022 Dr Kelly FV  Telephone encounter   OFFICE NOTE from other specialist N/A    DISCHARGE SUMMARY from hospital N/A    DISCHARGE REPORT from ER N/A    EMG REPORT N/A    MEDICATION LIST N/A    IMAGING  (FOR ALL VISITS)     MRI (HEAD, NECK, SPINE) Received 07/22/2022 lumbar spine  07/29/2022 lumbar spine   XRAY (SPINE) *NEUROSURGERY* N/A    CT (HEAD, NECK, SPINE) N/A       Records received. Placed in provider folder. Images in PACS

## 2022-08-10 DIAGNOSIS — G89.29 CHRONIC BILATERAL LOW BACK PAIN WITHOUT SCIATICA: Primary | ICD-10-CM

## 2022-08-10 DIAGNOSIS — M54.50 CHRONIC BILATERAL LOW BACK PAIN WITHOUT SCIATICA: Primary | ICD-10-CM

## 2022-08-18 ENCOUNTER — ANCILLARY PROCEDURE (OUTPATIENT)
Dept: GENERAL RADIOLOGY | Facility: CLINIC | Age: 72
End: 2022-08-18
Attending: STUDENT IN AN ORGANIZED HEALTH CARE EDUCATION/TRAINING PROGRAM
Payer: COMMERCIAL

## 2022-08-18 ENCOUNTER — TELEPHONE (OUTPATIENT)
Dept: NEUROSURGERY | Facility: CLINIC | Age: 72
End: 2022-08-18

## 2022-08-18 ENCOUNTER — PRE VISIT (OUTPATIENT)
Dept: NEUROSURGERY | Facility: CLINIC | Age: 72
End: 2022-08-18

## 2022-08-18 ENCOUNTER — OFFICE VISIT (OUTPATIENT)
Dept: NEUROSURGERY | Facility: CLINIC | Age: 72
End: 2022-08-18
Attending: NURSE PRACTITIONER

## 2022-08-18 VITALS
WEIGHT: 146 LBS | BODY MASS INDEX: 25.87 KG/M2 | DIASTOLIC BLOOD PRESSURE: 82 MMHG | HEART RATE: 74 BPM | SYSTOLIC BLOOD PRESSURE: 121 MMHG | HEIGHT: 63 IN

## 2022-08-18 DIAGNOSIS — M81.0 AGE-RELATED OSTEOPOROSIS WITHOUT CURRENT PATHOLOGICAL FRACTURE: ICD-10-CM

## 2022-08-18 DIAGNOSIS — M54.50 CHRONIC BILATERAL LOW BACK PAIN WITHOUT SCIATICA: ICD-10-CM

## 2022-08-18 DIAGNOSIS — G89.29 CHRONIC BILATERAL LOW BACK PAIN WITHOUT SCIATICA: ICD-10-CM

## 2022-08-18 PROCEDURE — 99205 OFFICE O/P NEW HI 60 MIN: CPT | Performed by: STUDENT IN AN ORGANIZED HEALTH CARE EDUCATION/TRAINING PROGRAM

## 2022-08-18 PROCEDURE — 2894A XR LUMBAR BENDING ONLY 2/3 VIEWS: CPT | Performed by: SURGERY

## 2022-08-18 PROCEDURE — 72083 X-RAY EXAM ENTIRE SPI 4/5 VW: CPT | Performed by: SURGERY

## 2022-08-18 ASSESSMENT — PAIN SCALES - GENERAL: PAINLEVEL: MODERATE PAIN (4)

## 2022-08-18 NOTE — PROGRESS NOTES
"HPI:  72-year-old female with at least 2 years of low back pain radiating to the left leg.  The pain radiates down the back of her thigh to the back of her lower leg and to the foot occasionally.  The pain is worse with prolonged sitting and standing.  She is not able to walk long distances due to the pain.  She does not have any significant right leg pain right now but has had some right leg pain in the past.  She has undergone physical therapy for which she did 3 visits and had to stop due to making the pain worse.  She is also tried chiropractic care which has not helped.  She has had at least 2 injections with the first injection helping for approximately 6 months and last injection in May or June of this year showing no improvement and actually making the pain worse.  She denies any weakness.  She denies any bowel or bladder control problems.  Of note she has had a history of osteopenia on a DEXA scan in 2017.    Current Outpatient Medications   Medication     albuterol (PROAIR HFA/PROVENTIL HFA/VENTOLIN HFA) 108 (90 Base) MCG/ACT inhaler     azelastine (ASTELIN) 0.1 % nasal spray     cyanocobalamin (VITAMIN B-12) 1000 MCG tablet     fluticasone (FLONASE) 50 MCG/ACT nasal spray     fluticasone (FLOVENT HFA) 220 MCG/ACT inhaler     hydrocortisone, Perianal, (HYDROCORTISONE) 2.5 % cream     magnesium 200 MG TABS     omeprazole (PRILOSEC) 40 MG DR capsule     pramipexole (MIRAPEX) 0.25 MG tablet     sertraline (ZOLOFT) 100 MG tablet     SUMAtriptan (IMITREX) 50 MG tablet     traZODone (DESYREL) 100 MG tablet     Triprolidine-Pseudoephedrine (APRODINE PO)     vitamin D3 (CHOLECALCIFEROL) 50 mcg (2000 units) tablet     rosuvastatin (CRESTOR) 10 MG tablet     Current Facility-Administered Medications   Medication     triamcinolone (KENALOG-40) injection 20 mg     triamcinolone (KENALOG-40) injection 20 mg      Physical Exam:  Vital signs:      BP: 121/82 Pulse: 74           Height: 160 cm (5' 3\") Weight: 66.2 kg (146 " "lb)  Estimated body mass index is 25.86 kg/m  as calculated from the following:    Height as of this encounter: 1.6 m (5' 3\").    Weight as of this encounter: 66.2 kg (146 lb).   She has a strength in her bilateral upper and lower extremities.  Sensation is intact to light touch throughout.  Patella and ankle reflexes are 2+ bilaterally.  Results Reviewed:  I personally viewed the images of an MRI of the lumbar spine showing an L4-5 grade 1 spondylolisthesis with lateral gutter stenosis bilaterally without any significant central canal stenosis.  There is also foraminal stenosis at L5-S1 to the loss of disc height at L5-S1 without any significant central canal stenosis as well.  Flexion-extension images show mild increase in the spondylolisthesis at L4-5.  Assessment:  72-year-old female with low back pain and a left leg radiculopathy from lumbar stenosis  Plan:  We discussed conservative and surgical management options going forward.  She has done physical therapy as well as injections and has failed long-term improvement with these.  The surgery option for her now would be a two-level fusion starting at L4-5 and going down to S1 given the listhesis as well as the foraminal stenosis at L5-S1.  I would likely recommend an interbody at L5-S1 to increase the foraminal height as well.  However with her history of a DEXA scan showing osteopenia 5 years ago would like to get a new DEXA scan to verify she has adequate bone density for fusion operation.  If she does not she will need a referral to endocrinology for treatment prior to any fusion operation.  In the meantime we discussed another injection and she would like to have this done with our facility.  I believe an option of an L5-S1 or an L4-5 epidural steroid injection could be beneficial especially with the improvement she has had in the past.  Should she not receive adequate improvement surgery would still be an option for her though pending the DEXA scan.  Once " we have a DEXA scan I will message her to let her know if she needs to see endocrinology or whether she could proceed with surgery if she fails conservative management.    I spent 60 minutes reviewing the patient's chart, interviewing examining the patient as well as discussing diagnosis and treatment options.    Gallo Tomas MD

## 2022-08-18 NOTE — LETTER
8/18/2022         RE: Amee Crews  [address] [facility] [address]        Dear Colleague,    Thank you for referring your patient, Amee Crews, to the [facility] [place]. Please see a copy of my visit note below.    HPI:  72-year-old female with at least 2 years of low back pain radiating to the left leg.  The pain radiates down the back of her thigh to the back of her lower leg and to the foot occasionally.  The pain is worse with prolonged sitting and standing.  She is not able to walk long distances due to the pain.  She does not have any significant right leg pain right now but has had some right leg pain in the past.  She has undergone physical therapy for which she did 3 visits and had to stop due to making the pain worse.  She is also tried chiropractic care which has not helped.  She has had at least 2 injections with the first injection helping for approximately 6 months and last injection in May or June of this year showing no improvement and actually making the pain worse.  She denies any weakness.  She denies any bowel or bladder control problems.  Of note she has had a history of osteopenia on a DEXA scan in 2017.    Current Outpatient Medications   Medication     albuterol (PROAIR HFA/PROVENTIL HFA/VENTOLIN HFA) 108 (90 Base) MCG/ACT inhaler     azelastine (ASTELIN) 0.1 % nasal spray     cyanocobalamin (VITAMIN B-12) 1000 MCG tablet     fluticasone (FLONASE) 50 MCG/ACT nasal spray     fluticasone (FLOVENT HFA) 220 MCG/ACT inhaler     hydrocortisone, Perianal, (HYDROCORTISONE) 2.5 % cream     magnesium 200 MG TABS     omeprazole (PRILOSEC) 40 MG DR capsule     pramipexole (MIRAPEX) 0.25 MG tablet     sertraline (ZOLOFT) 100 MG tablet     SUMAtriptan (IMITREX) 50 MG tablet     traZODone (DESYREL) 100 MG tablet     Triprolidine-Pseudoephedrine (APRODINE PO)     vitamin D3 (CHOLECALCIFEROL) 50 mcg (2000 units) tablet     rosuvastatin (CRESTOR) 10 MG tablet  "    Current Facility-Administered Medications   Medication     triamcinolone (KENALOG-40) injection 20 mg     triamcinolone (KENALOG-40) injection 20 mg      Physical Exam:  Vital signs:      BP: 121/82 Pulse: 74           Height: 160 cm (5' 3\") Weight: 66.2 kg (146 lb)  Estimated body mass index is 25.86 kg/m  as calculated from the following:    Height as of this encounter: 1.6 m (5' 3\").    Weight as of this encounter: 66.2 kg (146 lb).   She has a strength in her bilateral upper and lower extremities.  Sensation is intact to light touch throughout.  Patella and ankle reflexes are 2+ bilaterally.  Results Reviewed:  I personally viewed the images of an MRI of the lumbar spine showing an L4-5 grade 1 spondylolisthesis with lateral gutter stenosis bilaterally without any significant central canal stenosis.  There is also foraminal stenosis at L5-S1 to the loss of disc height at L5-S1 without any significant central canal stenosis as well.  Flexion-extension images show mild increase in the spondylolisthesis at L4-5.  Assessment:  72-year-old female with low back pain and a left leg radiculopathy from lumbar stenosis  Plan:  We discussed conservative and surgical management options going forward.  She has done physical therapy as well as injections and has failed long-term improvement with these.  The surgery option for her now would be a two-level fusion starting at L4-5 and going down to S1 given the listhesis as well as the foraminal stenosis at L5-S1.  I would likely recommend an interbody at L5-S1 to increase the foraminal height as well.  However with her history of a DEXA scan showing osteopenia 5 years ago would like to get a new DEXA scan to verify she has adequate bone density for fusion operation.  If she does not she will need a referral to endocrinology for treatment prior to any fusion operation.  In the meantime we discussed another injection and she would like to have this done with our facility.  I " believe an option of an L5-S1 or an L4-5 epidural steroid injection could be beneficial especially with the improvement she has had in the past.  Should she not receive adequate improvement surgery would still be an option for her though pending the DEXA scan.  Once we have a DEXA scan I will message her to let her know if she needs to see endocrinology or whether she could proceed with surgery if she fails conservative management.    I spent 60 minutes reviewing the patient's chart, interviewing examining the patient as well as discussing diagnosis and treatment options.    Gallo Tomas MD        Again, thank you for allowing me to participate in the care of your patient.        Sincerely,        Gallo Tomas MD

## 2022-08-18 NOTE — TELEPHONE ENCOUNTER
Writer reached out to patient to discuss [facility] referral ordered by Dr. Tomas. Pt had mentioned during visit that her first injection at [facility] provided pt with 6 months of pain relief. Writer inquired with patient if she had records or remembered which level this injection was performed. Pt reports she should have the records from [facility] and will call or send my chart with the information. If she cannot retrieve those records, then clinic will request records from [facility] directly. Will wait for return phone call or my chart from patient.       Mirtha Manrique, RNCC  Neurology/Neurosurgery/PM&R

## 2022-08-18 NOTE — NURSING NOTE
"Amee Crews's goals for this visit include:   Chief Complaint   Patient presents with     New Patient     Chronic low back pain wo sciatica       She requests these members of her care team be copied on today's visit information: yes    PCP: Ary Adams    Referring Provider:  ROBYN Cheek CNP  [address]  [place] [address]    /82 (BP Location: Right arm, Patient Position: Sitting, Cuff Size: Adult Regular)   Pulse 74   Ht 1.6 m (5' 3\")   Wt 66.2 kg (146 lb)   BMI 25.86 kg/m      Do you need any medication refills at today's visit? No  MIHIR Zapata., ANA ([facility])      "

## 2022-08-19 ENCOUNTER — TELEPHONE (OUTPATIENT)
Dept: PALLIATIVE MEDICINE | Facility: CLINIC | Age: 72
End: 2022-08-19

## 2022-08-19 DIAGNOSIS — M54.16 LUMBAR RADICULITIS: ICD-10-CM

## 2022-08-19 DIAGNOSIS — M48.07 NEURAL FORAMINAL STENOSIS OF LUMBOSACRAL SPINE: Primary | ICD-10-CM

## 2022-08-19 NOTE — TELEPHONE ENCOUNTER
Writer received my chart message from patient but message did not include specific level or approach that injection which was done. Pt stated she will call [facility] and get the specific information and then send message back with the information. Will wait for return call or my chart message.       Mirtha Manrique, RNCC  Neurology/Neurosurgery/PM&R

## 2022-08-19 NOTE — TELEPHONE ENCOUNTER
Called patient and scheduled injection for 9/9 with Dr. Bowman in .    PT will take an at home COVID test 1-2 days before surgery and bring a picture of the results the morning of.    No further questions/concerns at this time.

## 2022-08-19 NOTE — TELEPHONE ENCOUNTER
See my chart message from 8/18/22. Pt was able to retrieve information from [facility] and case request ordered.     Mirtha Manrique, RNCC  Neurology/Neurosurgery/PM&R

## 2022-08-19 NOTE — PROGRESS NOTES
Per neurosurgery, Dr. Gallo Tomas, order placed for left L4-5 transforaminal epidural steroid injection which she had completed successfully at [facility] ([place] previously and provided some relief.

## 2022-08-23 NOTE — TELEPHONE ENCOUNTER
Patient called and asked to reschedule injection from 9/9 to 9/16.     No further questions/concerns at this time.

## 2022-09-16 ENCOUNTER — HOSPITAL ENCOUNTER (OUTPATIENT)
Facility: AMBULATORY SURGERY CENTER | Age: 72
Discharge: HOME | End: 2022-09-16
Attending: PHYSICAL MEDICINE & REHABILITATION | Admitting: PHYSICAL MEDICINE & REHABILITATION
Payer: COMMERCIAL

## 2022-09-16 VITALS
RESPIRATION RATE: 16 BRPM | DIASTOLIC BLOOD PRESSURE: 75 MMHG | HEART RATE: 80 BPM | TEMPERATURE: 97 F | SYSTOLIC BLOOD PRESSURE: 128 MMHG | OXYGEN SATURATION: 99 %

## 2022-09-16 DIAGNOSIS — M54.16 LUMBAR RADICULITIS: ICD-10-CM

## 2022-09-16 DIAGNOSIS — M48.07 NEURAL FORAMINAL STENOSIS OF LUMBOSACRAL SPINE: ICD-10-CM

## 2022-09-16 PROCEDURE — G8918 PT W/O PREOP ORDER IV AB PRO: HCPCS

## 2022-09-16 PROCEDURE — G8907 PT DOC NO EVENTS ON DISCHARG: HCPCS

## 2022-09-16 PROCEDURE — 64483 NJX AA&/STRD TFRM EPI L/S 1: CPT | Mod: LT

## 2022-09-16 RX ORDER — LIDOCAINE HYDROCHLORIDE 10 MG/ML
INJECTION, SOLUTION EPIDURAL; INFILTRATION; INTRACAUDAL; PERINEURAL PRN
Status: DISCONTINUED | OUTPATIENT
Start: 2022-09-16 | End: 2022-09-16 | Stop reason: HOSPADM

## 2022-09-16 RX ORDER — IOPAMIDOL 408 MG/ML
INJECTION, SOLUTION INTRATHECAL PRN
Status: DISCONTINUED | OUTPATIENT
Start: 2022-09-16 | End: 2022-09-16 | Stop reason: HOSPADM

## 2022-09-16 RX ORDER — DEXAMETHASONE SODIUM PHOSPHATE 10 MG/ML
INJECTION INTRAMUSCULAR; INTRAVENOUS PRN
Status: DISCONTINUED | OUTPATIENT
Start: 2022-09-16 | End: 2022-09-16 | Stop reason: HOSPADM

## 2022-09-16 NOTE — DISCHARGE INSTRUCTIONS
PAIN INJECTION HOME CARE INSTRUCTIONS  Activity  -You may resume most normal activity levels with the exception of strenuous activity. It may help to move in ways that hurt before the injection, to see if the pain is still there, but do not overdo it.     -DO NOT remove bandaid for 24 hours  -DO NOT shower for 24 hours      Pain  -You may feel immediate pain relief and numbness for a period of time after the injection. This may indicate the medication has reached the right spot.  -Your pain may return after this short pain-free period, or may even be a little worse for a day or two. It may be caused by needle irritation or by the medication itself. The medications usually take two or three days to start working, but can take as long as a week.    -You may use an ice pack for 20 minutes every 2 hours for the first 24 hours  -You may use a heating pad after the first 24 hours  -You may use Tylenol (acetaminophen) every 4 hours or other pain medicines as directed by your physician      DID YOU RECEIVE SEDATION TODAY?  No      DID YOU RECEIVE STEROIDS TODAY?  Yes    Common side effects of steroids:  Not everyone will experience corticosteroid side effects. If side effects are experienced, they will gradually subside in the 7-10 day period following an injection. Most common side effects include:  -Flushed face and/or chest  -Feeling of warmth, particularly in the face but could be an overall feeling of warmth  -Increased blood sugar in diabetic patients  -Menstrual irregularities my occur. If taking hormone-based birth control an alternate method of birth control is recommended  -Sleep disturbances and/or mood swings are possible  -Leg cramps    PLEASE KEEP TRACK OF YOUR SYMPTOMS AND NOTE ANY CHANGES FOR YOUR DOCTOR.       Please contact us if you have:  -Severe pain  -Fever more than 101.5 degrees Fahrenheit  -Signs of infection at the injection site (redness, swelling, or drainage)          FOR PM&R PATIENTS of   [place]:  For patients seen by the PM and R service, please call [phone].(Monday through Friday 8a-5p.  After business hours and weekends call [phone] and ask for the PM and R doctor on call). If you need to fax a pain diary to PM&R the fax number is [phone]. If you are unable to fax, uploading to [employer] is encouraged, then send to provider. If you have procedure scheduling questions please call [phone] Option #2

## 2022-09-16 NOTE — PROCEDURES
PROCEDURE NOTE: Lumbar Transforaminal Epidural Steroid Injection Under Fluoroscopic Guidance    PROCEDURE DATE: 9/16/2022    PATIENT NAME: Amee Crews  YOB: 1950    ATTENDING PHYSICIAN: Laya Bowman MD   RESIDENT/FELLOW PHYSICIAN: None    PREOPERATIVE DIAGNOSIS:   Neural foraminal stenosis of lumbosacral spine  Lumbar radiculitis   POSTOPERATIVE DIAGNOSIS: same    PROCEDURE PERFORMED: Left Lumbar Transforaminal Epidural Steroid Injection at the L4-5 level    FLUOROSCOPY WAS USED.    INDICATIONS FOR PROCEDURE:   Amee Crews is a 72 year old female with a clinical picture consistent with the above-mentioned diagnosis, resulting in radicular pain to the left lower extremity.    PROCEDURE AND FINDINGS:    she was greeted in the pre-procedure holding area. The risk, benefits and alternatives to the procedure were again reviewed with the patient and written informed consent was placed in the chart. An IV line was not placed.  A 500 mL bag of NS was not connected to the patient. she was taken to the procedure room and positioned prone on the fluoroscopy table.  Routine monitors were applied including EKG leads notably with sedation, blood pressure cuff, and pulse oximetry. Prior to the procedure a time out was completed, verifying correct patient, procedure, site, positioning, and implants and/or special equipment.     An AP fluoroscopic  film was taken to identify the L4 pedicle and the L5 superior articulating process. The skin was prepped with chlorhexidine and draped in the usual sterile fashion. The skin and subcutaneous tissue overlying the aforementioned anatomic targets were anesthetized using a 25-gauge 1-1/2 inch needle with 1% preservative-free lidocaine for a total volume of 2 mls.      Then a 22-gauge 3.5 inch Quincke spinal needle was advanced under fluoroscopic guidance using an oblique view just inferior to the pedicle of the L4 level(s) on the left side(s).  Then, utilizing AP and  lateral fluoroscopic views, we confirmed the position of the needle tip to be within the neural foramen. After negative aspiration, 1 mls of isovue 200-220mg/ml contrast was injected under AP view at each level without DSA and confirmed adequate spread along the nerve root and in the epidural space. There was no evidence of intravascular uptake or intrathecal spread on imaging.     At this point, after negative aspiration, a total 2mL volume of treatment injectate, consisting of 1mL Dexamethasone (10mg/mL), and 1mL of 1% Lidocaine, was injected easily on the left.  The needle was then flushed with 0.5 ml of local anesthetic and removed. The needle insertion site was dressed appropriately.     Amee was taken to the recovery room where she was monitored for a brief period of time. She tolerated the procedure well and was discharged home in stable condition with post procedural instructions.    Before the procedure, she reported a pain score of 0/10.   After the procedure, she reported a pain score of 0/10.       Follow-up will be Clinic Visit      COMPLICATIONS:  None    Comment(s):  None

## 2022-09-29 DIAGNOSIS — G47.00 INSOMNIA, UNSPECIFIED TYPE: ICD-10-CM

## 2022-10-03 RX ORDER — TRAZODONE HYDROCHLORIDE 100 MG/1
TABLET ORAL
Qty: 90 TABLET | Refills: 0 | Status: SHIPPED | OUTPATIENT
Start: 2022-10-03 | End: 2023-01-03

## 2022-10-03 NOTE — TELEPHONE ENCOUNTER
Refill of trazodone given. Please have patient schedule medication and asthma check.      ROBYN Bojorquez CNP  Questions or concerns please feel free to send me a [employer] message or call me  Phone : [phone]

## 2022-10-03 NOTE — TELEPHONE ENCOUNTER
"Pending Prescriptions:                       Disp   Refills    traZODone (DESYREL) 100 MG tablet [Pharmac*90 tab*1        Sig: TAKE ONE TABLET BY MOUTH AT BEDTIME    Routing refill request to provider for review/approval because:  Drug interaction warning  Requested Prescriptions   Pending Prescriptions Disp Refills    traZODone (DESYREL) 100 MG tablet [Pharmacy Med Name: TRAZODONE HCL 100MG TABS] 90 tablet 1     Sig: TAKE ONE TABLET BY MOUTH AT BEDTIME        Serotonin Modulators Passed - 9/29/2022  5:09 AM        Passed - Recent (12 mo) or future (30 days) visit within the authorizing provider's specialty     Patient has had an office visit with the authorizing provider or a provider within the authorizing providers department within the previous 12 mos or has a future within next 30 days. See \"Patient Info\" tab in inbasket, or \"Choose Columns\" in Meds & Orders section of the refill encounter.              Passed - Medication is active on med list        Passed - Patient is age 18 or older        Passed - No active pregnancy on record        Passed - No positive pregnancy test in past 12 months                    "

## 2022-10-06 ENCOUNTER — ANCILLARY PROCEDURE (OUTPATIENT)
Dept: BONE DENSITY | Facility: CLINIC | Age: 72
End: 2022-10-06
Attending: STUDENT IN AN ORGANIZED HEALTH CARE EDUCATION/TRAINING PROGRAM
Payer: COMMERCIAL

## 2022-10-06 ENCOUNTER — MYC MEDICAL ADVICE (OUTPATIENT)
Dept: FAMILY MEDICINE | Facility: OTHER | Age: 72
End: 2022-10-06

## 2022-10-06 DIAGNOSIS — G89.29 CHRONIC BILATERAL LOW BACK PAIN WITHOUT SCIATICA: ICD-10-CM

## 2022-10-06 DIAGNOSIS — M81.0 AGE-RELATED OSTEOPOROSIS WITHOUT CURRENT PATHOLOGICAL FRACTURE: ICD-10-CM

## 2022-10-06 DIAGNOSIS — M54.50 CHRONIC BILATERAL LOW BACK PAIN WITHOUT SCIATICA: ICD-10-CM

## 2022-10-06 DIAGNOSIS — J30.1 CHRONIC SEASONAL ALLERGIC RHINITIS DUE TO POLLEN: Primary | ICD-10-CM

## 2022-10-06 PROCEDURE — 77080 DXA BONE DENSITY AXIAL: CPT | Performed by: RADIOLOGY

## 2022-10-07 RX ORDER — TRIPROLIDINE/PSEUDOEPHEDRINE 2.5MG-60MG
1 TABLET ORAL EVERY 6 HOURS PRN
Qty: 100 TABLET | Refills: 0 | Status: SHIPPED | OUTPATIENT
Start: 2022-10-07 | End: 2023-01-23

## 2022-10-07 NOTE — TELEPHONE ENCOUNTER
Please call patient I did fill the pseudaphed medication per her request. I do recommend she monitor for side effects and if any concerns for falls or risk for this we will need to discontinue this medication as it is used with caution at individuals older than 50.     Please also have her do PHQ 9 and ACT over the phone and rout to me.       ROBYN Bojorquez CNP  Questions or concerns please feel free to send me a [employer] message or call me  Phone : [phone]

## 2022-10-10 NOTE — TELEPHONE ENCOUNTER
Pt viewed message and did fill out questionnaires via [employer]. No other questions from pt at this time.

## 2022-10-10 NOTE — TELEPHONE ENCOUNTER
Noted.     ACT Total Scores 9/20/2021 3/28/2022 10/7/2022   ACT TOTAL SCORE - - -   ASTHMA ER VISITS - - -   ASTHMA HOSPITALIZATIONS - - -   ACT TOTAL SCORE (Goal Greater than or Equal to 20) 18 22 21   In the past 12 months, how many times did you visit the emergency room for your asthma without being admitted to the hospital? 0 0 0   In the past 12 months, how many times were you hospitalized overnight because of your asthma? 0 0 0

## 2022-10-18 DIAGNOSIS — K21.00 GASTROESOPHAGEAL REFLUX DISEASE WITH ESOPHAGITIS, UNSPECIFIED WHETHER HEMORRHAGE: ICD-10-CM

## 2022-10-19 RX ORDER — OMEPRAZOLE 40 MG/1
CAPSULE, DELAYED RELEASE ORAL
Qty: 90 CAPSULE | Refills: 0 | Status: SHIPPED | OUTPATIENT
Start: 2022-10-19 | End: 2023-01-16

## 2022-10-19 NOTE — TELEPHONE ENCOUNTER
Prescription approved per [facility] Refill Protocol.  Khalida Lorenzo RN on 10/19/2022 at 12:10 PM

## 2022-10-31 ENCOUNTER — VIRTUAL VISIT (OUTPATIENT)
Dept: FAMILY MEDICINE | Facility: OTHER | Age: 72
End: 2022-10-31
Payer: COMMERCIAL

## 2022-10-31 DIAGNOSIS — G25.81 RESTLESS LEG: Primary | ICD-10-CM

## 2022-10-31 DIAGNOSIS — M81.0 AGE-RELATED OSTEOPOROSIS WITHOUT CURRENT PATHOLOGICAL FRACTURE: ICD-10-CM

## 2022-10-31 DIAGNOSIS — G47.00 INSOMNIA, UNSPECIFIED TYPE: ICD-10-CM

## 2022-10-31 PROCEDURE — 99213 OFFICE O/P EST LOW 20 MIN: CPT | Mod: 95 | Performed by: NURSE PRACTITIONER

## 2022-10-31 RX ORDER — PRAMIPEXOLE DIHYDROCHLORIDE 0.25 MG/1
0.5 TABLET ORAL AT BEDTIME
Qty: 90 TABLET | Refills: 1 | Status: SHIPPED | OUTPATIENT
Start: 2022-10-31 | End: 2023-02-01

## 2022-10-31 NOTE — Clinical Note
Hello,  Met with patient today virtually. Discussed DEXA and starting Fosamax. She also wanted to hear from you in regards to your opinions on surgery etc. Just wanted to pass along the message.    ROBYN Bojorquez CNP Questions or concerns please feel free to send me a [employer] message or call me Phone : [phone]

## 2022-10-31 NOTE — PROGRESS NOTES
Amee is a 72 year old who is being evaluated via a billable telephone visit.      What phone number would you like to be contacted at? [phone]  How would you like to obtain your AVS? MyChart    Assessment & Plan     Restless leg  Recommend increase her dose given she is having breakthrough symptoms. We also discussed a possible sleep consult as well. Could go up on her Trazodone as well and see if that helps vs doing the Mirapex. She will let me know.   - pramipexole (MIRAPEX) 0.25 MG tablet; Take 2 tablets (0.5 mg) by mouth At Bedtime  - Sleep Psychology  Referral; Future    Insomnia, unspecified type    - Sleep Psychology  Referral; Future    Age-related osteoporosis without current pathological fracture  Discussed Dexa  Recommend Fosamax. Will let me know if she is interested in starting this. If so will do 70mg weekly. Discussed medication including side effects.     The patient indicates understanding of these issues and agrees with the plan.      The patient indicates understanding of these issues and agrees with the plan.      There are no Patient Instructions on file for this visit.    No follow-ups on file.    ROBYN Bojorquez CNP  [facility]   Amee is a 72 year old, presenting for the following health issues:  No chief complaint on file.      History of Present Illness       Reason for visit:  Refills    She eats 2-3 servings of fruits and vegetables daily.She consumes 1 sweetened beverage(s) daily.She exercises with enough effort to increase her heart rate 9 or less minutes per day.  She exercises with enough effort to increase her heart rate 3 or less days per week.   She is taking medications regularly.           Review of Systems         Objective           Vitals:  No vitals were obtained today due to virtual visit.    Physical Exam   healthy, alert and no distress  PSYCH: Alert and oriented times 3; coherent speech, normal   rate  and volume, able to articulate logical thoughts, able   to abstract reason, no tangential thoughts, no hallucinations   or delusions  Her affect is normal  RESP: No cough, no audible wheezing, able to talk in full sentences  Remainder of exam unable to be completed due to telephone visits    No results found for any visits on 10/31/22.      Phone call duration:19 minutes

## 2022-11-05 DIAGNOSIS — J30.1 CHRONIC SEASONAL ALLERGIC RHINITIS DUE TO POLLEN: ICD-10-CM

## 2022-11-07 RX ORDER — PSEUDOEPHEDRINE HCL AND TRIPOLIDINE 60; 2.5 MG/1; MG/1
TABLET, FILM COATED ORAL
Qty: 100 TABLET | Refills: 0 | OUTPATIENT
Start: 2022-11-07

## 2022-11-07 NOTE — TELEPHONE ENCOUNTER
100 tablets sent last month, should not need refill.       ROBYN Bojorquez CNP  Questions or concerns please feel free to send me a [employer] message or call me  Phone : [phone]

## 2022-11-15 ENCOUNTER — VIRTUAL VISIT (OUTPATIENT)
Dept: FAMILY MEDICINE | Facility: CLINIC | Age: 72
End: 2022-11-15
Payer: COMMERCIAL

## 2022-11-15 DIAGNOSIS — N39.0 URINARY TRACT INFECTION WITHOUT HEMATURIA, SITE UNSPECIFIED: Primary | ICD-10-CM

## 2022-11-15 PROCEDURE — 99213 OFFICE O/P EST LOW 20 MIN: CPT | Mod: 95 | Performed by: FAMILY MEDICINE

## 2022-11-15 RX ORDER — CEPHALEXIN 500 MG/1
500 CAPSULE ORAL 3 TIMES DAILY
Qty: 21 CAPSULE | Refills: 0 | Status: SHIPPED | OUTPATIENT
Start: 2022-11-15 | End: 2022-11-22

## 2022-11-15 NOTE — PROGRESS NOTES
Amee is a 72 year old who is being evaluated via a billable telephone visit.      What phone number would you like to be contacted at? [phone]  How would you like to obtain your AVS? Fifi Lubin is a 72 year old, presenting for the following health issues:  Urinary Pain      HPI     Off and on urinary problems since April       Review of Systems      Intermittent symptoms now   Lower back pain    Some burning, a little    Urine can smell    Staying hydrated    No vaginal discharge    Fine during summer    Started a few weeks ago     No fever / chills    Occasional nausea    No vomiting          Objective           Vitals:  No vitals were obtained today due to virtual visit.    Physical Exam   healthy, alert and no distress  PSYCH: Alert and oriented times 3; coherent speech, normal   rate and volume, able to articulate logical thoughts, able   to abstract reason, no tangential thoughts, no hallucinations   or delusions  Her affect is normal  RESP: No cough, no audible wheezing, able to talk in full sentences  Remainder of exam unable to be completed due to telephone visits       ASSESSMENT / PLAN:  (N39.0) Urinary tract infection without hematuria, site unspecified  (primary encounter diagnosis)  Comment: discussed in detail with patient.  Symptoms similar to late April.  Cephalexin worked well after group b strep grew out of urine culture.  Will treat with this.  Push fluids.  If symptoms not resolving then do lab visit for ua.  Patient agreed. Also advised over the counter cranberry pill to help as preventive in future.   Plan: cephALEXin (KEFLEX) 500 MG capsule, UA with         Microscopic reflex to Culture - lab collect                   I reviewed the patient's medications, allergies, medical history, family history, and social history.    Kleber Schmitz MD              Phone call duration: 12 minutes ( 8:32 to 8:44 am )    Kleber Schmitz MD

## 2022-12-09 ENCOUNTER — LAB (OUTPATIENT)
Dept: LAB | Facility: OTHER | Age: 72
End: 2022-12-09
Payer: COMMERCIAL

## 2022-12-09 DIAGNOSIS — N39.0 URINARY TRACT INFECTION WITHOUT HEMATURIA, SITE UNSPECIFIED: ICD-10-CM

## 2022-12-09 LAB
ALBUMIN UR-MCNC: NEGATIVE MG/DL
APPEARANCE UR: CLEAR
BACTERIA #/AREA URNS HPF: ABNORMAL /HPF
BILIRUB UR QL STRIP: NEGATIVE
COLOR UR AUTO: YELLOW
GLUCOSE UR STRIP-MCNC: NEGATIVE MG/DL
HGB UR QL STRIP: NEGATIVE
KETONES UR STRIP-MCNC: NEGATIVE MG/DL
LEUKOCYTE ESTERASE UR QL STRIP: ABNORMAL
MUCOUS THREADS #/AREA URNS LPF: PRESENT /LPF
NITRATE UR QL: NEGATIVE
PH UR STRIP: 6 [PH] (ref 5–7)
RBC #/AREA URNS AUTO: ABNORMAL /HPF
SP GR UR STRIP: 1.02 (ref 1–1.03)
SQUAMOUS #/AREA URNS AUTO: ABNORMAL /LPF
UROBILINOGEN UR STRIP-ACNC: 0.2 E.U./DL
WBC #/AREA URNS AUTO: ABNORMAL /HPF

## 2022-12-09 PROCEDURE — 81001 URINALYSIS AUTO W/SCOPE: CPT

## 2022-12-09 PROCEDURE — 87086 URINE CULTURE/COLONY COUNT: CPT

## 2022-12-11 LAB — BACTERIA UR CULT: NORMAL

## 2022-12-11 NOTE — RESULT ENCOUNTER NOTE
No infection based on urine culture.      Follow up as needed based on symptoms.    Kleber Schmitz MD

## 2022-12-15 ENCOUNTER — E-VISIT (OUTPATIENT)
Dept: FAMILY MEDICINE | Facility: OTHER | Age: 72
End: 2022-12-15
Payer: COMMERCIAL

## 2022-12-15 DIAGNOSIS — J01.90 ACUTE BACTERIAL SINUSITIS: Primary | ICD-10-CM

## 2022-12-15 DIAGNOSIS — B96.89 ACUTE BACTERIAL SINUSITIS: Primary | ICD-10-CM

## 2022-12-15 PROCEDURE — 99421 OL DIG E/M SVC 5-10 MIN: CPT | Performed by: NURSE PRACTITIONER

## 2022-12-15 NOTE — PATIENT INSTRUCTIONS
Dear Amee Crews    After reviewing your responses, I've been able to diagnose you with Acute bacterial sinusitis.      Based on your responses and diagnosis, I have prescribed   Orders Placed This Encounter     amoxicillin-clavulanate (AUGMENTIN) 875-125 MG tablet    to treat your symptoms. I have sent this to your pharmacy.?     It is also important to stay well hydrated, get lots of rest and take over-the-counter decongestants,?tylenol?or ibuprofen if you?are able to?take those medications per your primary care provider to help relieve discomfort.?     It is important that you take?all of?your prescribed medication even if your symptoms are improving after a few doses.? Taking?all of?your medicine helps prevent the symptoms from returning.?     If your symptoms worsen, you develop severe headache, vomiting, high fever (>102), or are not improving in 7 days, please contact your primary care provider for an appointment or visit any of our convenient Walk-in Care or Urgent Care Centers to be seen which can be found on our website?here.?     Thanks again for choosing?us?as your health care partner,?   ?  Ary Adams, ROBYN CNP?

## 2022-12-15 NOTE — TELEPHONE ENCOUNTER
Provider E-Visit time total (minutes): 5 minutes      ROBYN Bojorquez CNP  Questions or concerns please feel free to send me a [employer] message or call me  Phone : [phone]

## 2022-12-26 ENCOUNTER — HEALTH MAINTENANCE LETTER (OUTPATIENT)
Age: 72
End: 2022-12-26

## 2022-12-30 DIAGNOSIS — G47.00 INSOMNIA, UNSPECIFIED TYPE: ICD-10-CM

## 2023-01-02 ENCOUNTER — TELEPHONE (OUTPATIENT)
Dept: FAMILY MEDICINE | Facility: OTHER | Age: 73
End: 2023-01-02

## 2023-01-02 NOTE — TELEPHONE ENCOUNTER
Patient calling in and is still having left sided back pain from recent UTI.  No fever, no chills but does have darken urine    Patient is wondering if she  need another urine test done? Or more antibodies possibly?

## 2023-01-03 ENCOUNTER — E-VISIT (OUTPATIENT)
Dept: FAMILY MEDICINE | Facility: OTHER | Age: 73
End: 2023-01-03
Payer: COMMERCIAL

## 2023-01-03 DIAGNOSIS — M54.9 BACK PAIN, UNSPECIFIED BACK LOCATION, UNSPECIFIED BACK PAIN LATERALITY, UNSPECIFIED CHRONICITY: Primary | ICD-10-CM

## 2023-01-03 PROCEDURE — 99207 PR NON-BILLABLE SERV PER CHARTING: CPT | Performed by: FAMILY MEDICINE

## 2023-01-03 RX ORDER — TRAZODONE HYDROCHLORIDE 100 MG/1
TABLET ORAL
Qty: 90 TABLET | Refills: 0 | Status: SHIPPED | OUTPATIENT
Start: 2023-01-03 | End: 2023-03-31

## 2023-01-03 NOTE — TELEPHONE ENCOUNTER
Pt calls back. Advised that she do e-visit for her continued urinary symptoms. Sent [employer] message with link to complete. Pt to send a message or call back with any further questions.     CHRISTI MerazN, RN, PHN  Registered Nurse-Clinic Triage  [facility]  1/3/2023 at 2:25 PM

## 2023-01-03 NOTE — TELEPHONE ENCOUNTER
RN Triage    Patient Contact    Attempt # 1    Was call answered?  No.  Left message on voicemail with information to call me back.    Sent my chart message.     Breonna CARCAMO, RN  [facility]

## 2023-01-03 NOTE — PATIENT INSTRUCTIONS
Thank you for choosing us for your care. I think an in-clinic visit would be best next steps based on your symptoms. Please schedule a clinic appointment; you won t be charged for this eVisit.      You can schedule an appointment right here in [facility], or call [phone]

## 2023-01-13 ENCOUNTER — NURSE TRIAGE (OUTPATIENT)
Dept: NURSING | Facility: CLINIC | Age: 73
End: 2023-01-13

## 2023-01-13 NOTE — TELEPHONE ENCOUNTER
Caller reproting UTI for several weeks; Treated  early November and December  with antibiotics; would get better  and then symptoms of constant back pain and urine foul smell would return. Only had one U/A U/C done after finishing  initial antibiotic  Has been unable to obtain a routine follow up OV through scheduling in a timely manner.    Was seen in an UC most recently in past week  and  was switched to Macrobid after U/C.    Currently on  Macrobid for past 3 days and urine smell has improved but still has left sided  flank pain. No fever or vomiting but feels unwell.   Triage protocols reviewed   Advised being seen in [facility] this evening after  taking tylenol and fluids and rest  Advised to call back if she dvelops hematuria, fever or vomiting.   Will comply  Rayna Blancas RN  [facility]          Reason for Disposition    Age > 50 and no history of prior similar back pain    Taking antibiotic > 72 hours (3 days) for UTI and flank or lower back pain not improved    Additional Information    Negative: Passed out (i.e., fainted, collapsed and was not responding)    Negative: Shock suspected (e.g., cold/pale/clammy skin, too weak to stand, low BP, rapid pulse)    Negative: Sounds like a life-threatening emergency to the triager    Negative: Major injury to the back (e.g., MVA, fall > 10 feet or 3 meters, penetrating injury, etc.)    Negative: Pain in the upper back over the ribs (rib cage) that radiates (travels) into the chest    Negative: Pain in the upper back over the ribs (rib cage) and worsened by coughing (or clearly increases with breathing)    Negative: Back pain during pregnancy    Negative: SEVERE back pain of sudden onset and age > 60 years    Negative: SEVERE abdominal pain (e.g., excruciating)    Negative: Abdominal pain and age > 60 years    Negative: Unable to urinate (or only a few drops) and bladder feels very full    Negative: Loss of bladder or bowel control (urine or bowel incontinence; wetting  self, leaking stool) of new-onset    Negative: Numbness (loss of sensation) in groin or rectal area    Negative: Pain radiates into groin, scrotum    Negative: Blood in urine (red, pink, or tea-colored)    Negative: Vomiting and pain over lower ribs of back (i.e., flank - kidney area)    Negative: Weakness of a leg or foot (e.g., unable to bear weight, dragging foot)    Negative: Patient sounds very sick or weak to the triager    Negative: SEVERE back pain (e.g., excruciating, unable to do any normal activities) and not improved after pain medicine and CARE ADVICE    Negative: Numbness in an arm or hand (i.e., loss of sensation) and upper back pain    Negative: Numbness in a leg or foot (i.e., loss of sensation)    Negative: High-risk adult (e.g., history of cancer, history of HIV, or history of IV Drug Use)    Negative: Soft tissue infection (e.g., abscess, cellulitis) or other serious infection (e.g., bacteremia) in last 2 weeks    Negative: Painful rash with multiple small blisters grouped together (i.e., dermatomal distribution or 'band' or 'stripe')    Negative: Pain radiates into the thigh or further down the leg, and in both legs    Negative: Shock suspected (e.g., cold/pale/clammy skin, too weak to stand, low BP, rapid pulse)    Negative: Sounds like a life-threatening emergency to the triager    Negative: Urinary tract infection suspected, but not taking antibiotics    Negative: Unable to urinate (or only a few drops) > 4 hours and bladder feels very full (e.g., palpable bladder or strong urge to urinate)    Negative: Passing pure blood or large blood clots (i.e., size > a dime)  (Exceptions: Flecks, small strands, or pinkish-red color.)    Negative: Fever > 103 F (39.4 C)    Negative: Patient sounds very sick or weak to the triager    Negative: SEVERE pain (e.g., excruciating) and no improvement 2 hours after pain medications    Negative: Fever > 100.0 F (37.8 C) and new-onset since starting antibiotics     Negative: Side (flank) or lower back pain and new-onset since starting antibiotics    Negative: Taking antibiotic > 24 hours for UTI and flank or lower back pain worsening    Negative: Vomiting 2 or more times and interferes with taking oral antibiotic    Protocols used: BACK PAIN-A-[place], URINARY TRACT INFECTION ON ANTIBIOTIC FOLLOW-UP CALL - FEMALE-A-[place]

## 2023-01-14 DIAGNOSIS — K21.00 GASTROESOPHAGEAL REFLUX DISEASE WITH ESOPHAGITIS, UNSPECIFIED WHETHER HEMORRHAGE: ICD-10-CM

## 2023-01-16 RX ORDER — OMEPRAZOLE 40 MG/1
CAPSULE, DELAYED RELEASE ORAL
Qty: 90 CAPSULE | Refills: 2 | Status: SHIPPED | OUTPATIENT
Start: 2023-01-16

## 2023-01-22 DIAGNOSIS — J30.1 CHRONIC SEASONAL ALLERGIC RHINITIS DUE TO POLLEN: ICD-10-CM

## 2023-01-23 RX ORDER — PSEUDOEPHEDRINE HCL AND TRIPOLIDINE 60; 2.5 MG/1; MG/1
TABLET, FILM COATED ORAL
Qty: 100 TABLET | Refills: 0 | Status: SHIPPED | OUTPATIENT
Start: 2023-01-23 | End: 2023-07-12

## 2023-02-01 ENCOUNTER — OFFICE VISIT (OUTPATIENT)
Dept: FAMILY MEDICINE | Facility: OTHER | Age: 73
End: 2023-02-01
Payer: COMMERCIAL

## 2023-02-01 ENCOUNTER — ANCILLARY PROCEDURE (OUTPATIENT)
Dept: GENERAL RADIOLOGY | Facility: OTHER | Age: 73
End: 2023-02-01
Attending: NURSE PRACTITIONER
Payer: COMMERCIAL

## 2023-02-01 VITALS
WEIGHT: 146 LBS | TEMPERATURE: 99.7 F | BODY MASS INDEX: 25.87 KG/M2 | SYSTOLIC BLOOD PRESSURE: 112 MMHG | HEART RATE: 76 BPM | HEIGHT: 63 IN | OXYGEN SATURATION: 97 % | RESPIRATION RATE: 16 BRPM | DIASTOLIC BLOOD PRESSURE: 70 MMHG

## 2023-02-01 DIAGNOSIS — N39.0 RECURRENT UTI: Primary | ICD-10-CM

## 2023-02-01 DIAGNOSIS — G89.29 CHRONIC LEFT-SIDED LOW BACK PAIN WITHOUT SCIATICA: ICD-10-CM

## 2023-02-01 DIAGNOSIS — N39.0 RECURRENT UTI: ICD-10-CM

## 2023-02-01 DIAGNOSIS — M54.50 CHRONIC LEFT-SIDED LOW BACK PAIN WITHOUT SCIATICA: ICD-10-CM

## 2023-02-01 DIAGNOSIS — R10.2 PELVIC PAIN IN FEMALE: ICD-10-CM

## 2023-02-01 DIAGNOSIS — F33.42 RECURRENT MAJOR DEPRESSIVE DISORDER, IN FULL REMISSION (H): ICD-10-CM

## 2023-02-01 DIAGNOSIS — G25.81 RESTLESS LEG: ICD-10-CM

## 2023-02-01 LAB
ALBUMIN UR-MCNC: NEGATIVE MG/DL
APPEARANCE UR: CLEAR
BILIRUB UR QL STRIP: NEGATIVE
COLOR UR AUTO: YELLOW
GLUCOSE UR STRIP-MCNC: NEGATIVE MG/DL
HGB UR QL STRIP: NEGATIVE
KETONES UR STRIP-MCNC: NEGATIVE MG/DL
LEUKOCYTE ESTERASE UR QL STRIP: ABNORMAL
NITRATE UR QL: NEGATIVE
PH UR STRIP: 7 [PH] (ref 5–7)
RBC #/AREA URNS AUTO: NORMAL /HPF
SP GR UR STRIP: 1.02 (ref 1–1.03)
UROBILINOGEN UR STRIP-ACNC: 0.2 E.U./DL
WBC #/AREA URNS AUTO: NORMAL /HPF

## 2023-02-01 PROCEDURE — 74019 RADEX ABDOMEN 2 VIEWS: CPT | Mod: TC | Performed by: RADIOLOGY

## 2023-02-01 PROCEDURE — 87086 URINE CULTURE/COLONY COUNT: CPT | Performed by: NURSE PRACTITIONER

## 2023-02-01 PROCEDURE — 81001 URINALYSIS AUTO W/SCOPE: CPT | Performed by: NURSE PRACTITIONER

## 2023-02-01 PROCEDURE — 99214 OFFICE O/P EST MOD 30 MIN: CPT | Performed by: NURSE PRACTITIONER

## 2023-02-01 RX ORDER — PRAMIPEXOLE DIHYDROCHLORIDE 0.25 MG/1
0.5 TABLET ORAL AT BEDTIME
Qty: 90 TABLET | Refills: 1 | Status: SHIPPED | OUTPATIENT
Start: 2023-02-01 | End: 2023-04-17

## 2023-02-01 RX ORDER — SERTRALINE HYDROCHLORIDE 100 MG/1
100 TABLET, FILM COATED ORAL DAILY
Qty: 90 TABLET | Refills: 1 | Status: SHIPPED | OUTPATIENT
Start: 2023-02-01 | End: 2023-08-08

## 2023-02-01 RX ORDER — SERTRALINE HYDROCHLORIDE 100 MG/1
1 TABLET, FILM COATED ORAL DAILY
COMMUNITY
End: 2023-02-01

## 2023-02-01 ASSESSMENT — ASTHMA QUESTIONNAIRES
QUESTION_2 LAST FOUR WEEKS HOW OFTEN HAVE YOU HAD SHORTNESS OF BREATH: ONCE OR TWICE A WEEK
QUESTION_5 LAST FOUR WEEKS HOW WOULD YOU RATE YOUR ASTHMA CONTROL: COMPLETELY CONTROLLED
QUESTION_3 LAST FOUR WEEKS HOW OFTEN DID YOUR ASTHMA SYMPTOMS (WHEEZING, COUGHING, SHORTNESS OF BREATH, CHEST TIGHTNESS OR PAIN) WAKE YOU UP AT NIGHT OR EARLIER THAN USUAL IN THE MORNING: ONCE OR TWICE
ACT_TOTALSCORE: 20
QUESTION_4 LAST FOUR WEEKS HOW OFTEN HAVE YOU USED YOUR RESCUE INHALER OR NEBULIZER MEDICATION (SUCH AS ALBUTEROL): ONE OR TWO TIMES PER DAY
QUESTION_1 LAST FOUR WEEKS HOW MUCH OF THE TIME DID YOUR ASTHMA KEEP YOU FROM GETTING AS MUCH DONE AT WORK, SCHOOL OR AT HOME: NONE OF THE TIME
ACT_TOTALSCORE: 20

## 2023-02-01 ASSESSMENT — PAIN SCALES - GENERAL: PAINLEVEL: MODERATE PAIN (4)

## 2023-02-01 NOTE — PROGRESS NOTES
Assessment & Plan     Recurrent UTI  Currently not having UTI  Will culture urine today as her previous UA's have been negative and grow.   Her fatigue has improved  She continues to have pain in her low back that radiates to her pelvic area. Normal kidney labs. Recommend imaging and follow up with urology.   - UA Macro with Reflex to Micro and Culture - lab collect; Future  - UA Macro with Reflex to Micro and Culture - lab collect  - Urine Microscopic  - XR Abdomen 2 Views; Future  - Urine Culture Aerobic Bacterial - lab collect; Future  - Urine Culture Aerobic Bacterial - lab collect  - US Renal Complete Non-Vascular; Future  - US Pelvic Complete with Transvaginal; Future    Recurrent major depressive disorder, in full remission (H)  Stable   - sertraline (ZOLOFT) 100 MG tablet; Take 1 tablet (100 mg) by mouth daily    Chronic left-sided low back pain without sciatica  This pain is different then her chronic pain so ordered labs as noted above   - XR Abdomen 2 Views; Future  - US Renal Complete Non-Vascular; Future  - US Pelvic Complete with Transvaginal; Future    Pelvic pain in female  - XR Abdomen 2 Views; Future  - US Renal Complete Non-Vascular; Future  - US Pelvic Complete with Transvaginal; Future    Restless leg  Stable   - pramipexole (MIRAPEX) 0.25 MG tablet; Take 2 tablets (0.5 mg) by mouth At Bedtime      The patient indicates understanding of these issues and agrees with the plan.        There are no Patient Instructions on file for this visit.    No follow-ups on file.    ROBYN Bojorquez CNP  [facility]    Pam Lubin is a 72 year old, presenting for the following health issues:  Urinary Problem      History of Present Illness       Back Pain:  She presents for follow up of back pain. Patient's back pain is a recurring problem.  Location of back pain:  Left lower back and right side of waist  Description of back pain: gnawing  Back pain spreads: left  "buttocks    Since patient first noticed back pain, pain is: always present, but gets better and worse  Does back pain interfere with her job:  No      Reason for visit:  UTI recheck    She eats 2-3 servings of fruits and vegetables daily.She consumes 2 sweetened beverage(s) daily.She exercises with enough effort to increase her heart rate 9 or less minutes per day.  She exercises with enough effort to increase her heart rate 3 or less days per week.   She is taking medications regularly.         Genitourinary - Female  Onset/Duration: November 2022  Description:   Painful urination (Dysuria): No           Frequency: No  Blood in urine (Hematuria): No  Delay in urine (Hesitency): No  Intensity: mild, moderate, severe  Progression of Symptoms:  same and intermittent  Accompanying Signs & Symptoms:  Fever/chills: No  Flank pain: No  Nausea and vomiting: No  Vaginal symptoms: none  Abdominal/Pelvic Pain: YES  History:   History of frequent UTI s: YES  History of kidney stones: No  Sexually Active: No  Possibility of pregnancy: No  Precipitating or alleviating factors: None  Therapies tried and outcome: OTC advil or tylenol      Fatigue has subsided  Back pain.   Left side just below her waist, sometimes it will get painful where it will radiate into her ovaries.   Has a history of chronic low back pain, this is different.       Review of Systems         Objective    /70   Pulse 76   Temp 99.7  F (37.6  C) (Temporal)   Resp 16   Ht 1.595 m (5' 2.8\")   Wt 66.2 kg (146 lb)   SpO2 97%   BMI 26.03 kg/m    Body mass index is 26.03 kg/m .  Physical Exam  Abdominal:      General: Bowel sounds are normal.      Palpations: Abdomen is soft.      Tenderness: There is no right CVA tenderness or left CVA tenderness.   Musculoskeletal:        Back:         Results for orders placed or performed in visit on 02/01/23   UA Macro with Reflex to Micro and Culture - lab collect     Status: Abnormal    Specimen: Urine, NOS "   Result Value Ref Range    Color Urine Yellow Colorless, Straw, Light Yellow, Yellow    Appearance Urine Clear Clear    Glucose Urine Negative Negative mg/dL    Bilirubin Urine Negative Negative    Ketones Urine Negative Negative mg/dL    Specific Gravity Urine 1.020 1.003 - 1.035    Blood Urine Negative Negative    pH Urine 7.0 5.0 - 7.0    Protein Albumin Urine Negative Negative mg/dL    Urobilinogen Urine 0.2 0.2, 1.0 E.U./dL    Nitrite Urine Negative Negative    Leukocyte Esterase Urine Trace (A) Negative   Urine Microscopic     Status: Normal   Result Value Ref Range    RBC Urine 0-2 0-2 /HPF /HPF    WBC Urine 0-5 0-5 /HPF /HPF    Narrative    Urine Culture not indicated

## 2023-02-02 ENCOUNTER — ANCILLARY PROCEDURE (OUTPATIENT)
Dept: ULTRASOUND IMAGING | Facility: OTHER | Age: 73
End: 2023-02-02
Attending: NURSE PRACTITIONER
Payer: COMMERCIAL

## 2023-02-02 DIAGNOSIS — N39.0 RECURRENT UTI: ICD-10-CM

## 2023-02-02 DIAGNOSIS — G89.29 CHRONIC LEFT-SIDED LOW BACK PAIN WITHOUT SCIATICA: ICD-10-CM

## 2023-02-02 DIAGNOSIS — R10.2 PELVIC PAIN IN FEMALE: ICD-10-CM

## 2023-02-02 DIAGNOSIS — M54.50 CHRONIC LEFT-SIDED LOW BACK PAIN WITHOUT SCIATICA: ICD-10-CM

## 2023-02-02 LAB — BACTERIA UR CULT: NORMAL

## 2023-02-02 PROCEDURE — 76856 US EXAM PELVIC COMPLETE: CPT | Mod: TC | Performed by: RADIOLOGY

## 2023-02-02 PROCEDURE — 76770 US EXAM ABDO BACK WALL COMP: CPT | Mod: TC | Performed by: RADIOLOGY

## 2023-02-02 PROCEDURE — 76830 TRANSVAGINAL US NON-OB: CPT | Mod: TC | Performed by: RADIOLOGY

## 2023-02-20 NOTE — PROGRESS NOTES
SUBJECTIVE:      I spent a total of 15 minutes on the care of Amee on the day of service including 10 minutes of face-to-face time with remainder in chart review, care coordination, documentation on the day of service.                                                 Amee Crews is a 72 year old female who presents to clinic today for the following health issue(s):  fibroids    Amee is a 72-year-old  who presents to the OB/GYN clinic having had recent ultrasound demonstrating several small fibroids that are sub centimeters.  She has had multiple evaluations including ultrasounds as result of having multiple bladder infections.    Review of systems:  Review is negative for rashes, itching, burning, discharge, odors.  She does complain of vaginal dryness and is questioning whether the pain that she has on the lower pelvic side walls is related to the ovaries.    Past surgical history:  She has had both an appendectomy as well as exploratory laparotomy through abdominal incision.    GYN history:  She has been menopausal since 52 years of age.  She notes that she does have dryness but no other vaginal complaints.    Examination:  She is pleasant appropriate younger than stated age.  Examination is deferred    I drew diagrams of the uterus and surrounding organs and explained the findings of her ultrasound.  Of note her uterus is small and the fibroids are subcentimeter.  We talked about the natural course of the fibroid regression postmenopausal and at present she has no symptoms in her pelvis other than what is mentioned above.  We talked about other causes of what some I think is ovarian pain.  And with the small normal ovaries that are without cysts do not expect that this would be something GYN related.  She seemed accepting of the findings of the ultrasound.  At this point I suggest that small fibroids such as this probably have little bearing on her bladder which is diagrammed is in the same vicinity  as the uterus.  All her questions were addressed.    Assessment:  Amee is a 72-year-old postmenopausal patient.  She has small normal uterus with subcentimeter fibroids.  These are unlikely to have any bearing on her general health or her bladder status.    Plan:  Return to clinic as needed but at this point does not seem to have GYN issues.    No LMP recorded. Patient is postmenopausal..     Patient is sexually active, .  Using menopause for contraception.    reports that she quit smoking about 50 years ago. Her smoking use included cigarettes. She has never used smokeless tobacco.    STD testing offered?  Declined    Health maintenance updated:  yes    Today's PHQ-2 Score:   PHQ-2 (  [employer]) 2022   Q1: Little interest or pleasure in doing things 0   Q2: Feeling down, depressed or hopeless 1   PHQ-2 Score 1   PHQ-2 Total Score (12-17 Years)- Positive if 3 or more points; Administer PHQ-A if positive -   Q1: Little interest or pleasure in doing things Not at all   Q2: Feeling down, depressed or hopeless Several days   PHQ-2 Score 1     Today's PHQ-9 Score:   PHQ-9 SCORE 10/7/2022   PHQ-9 Total Score -   PHQ-9 Total Score MyChart 2 (Minimal depression)   PHQ-9 Total Score 2     Today's AMADOR-7 Score:   AMADOR-7 SCORE 2019   Total Score -   Total Score 0 (minimal anxiety)   Total Score 0       Problem list and histories reviewed & adjusted, as indicated.  Additional history: as documented.    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, moderatly severe,slowly worsening     Pinguecula of both eyes     Nuclear cataract of both eyes     Myopia, bilateral     Macular cyst, hole, or pseudohole, bilateral, moderatly severe     Adult vitelliform macular degeneration     Gastroesophageal reflux disease without esophagitis     Vitelliform macular dystrophy     DDD (degenerative disc disease), lumbar     Chronic pain of left knee     Vitamin B12 deficiency     Osteopenia, unspecified location     Migraine without aura and without status migrainosus, not intractable     Depression     Asthma     Adjustment reaction with anxiety and depression     Major depression in complete remission (H)     Neural foraminal stenosis of lumbosacral spine     Lumbar radiculitis     Past Surgical History:   Procedure Laterality Date     APPENDECTOMY      age 9      BIOPSY      took tissue from my uterus at least 15 years ago     COLONOSCOPY  14 years     COLONOSCOPY N/A 4/30/2015    Procedure: COMBINED COLONOSCOPY, SINGLE OR MULTIPLE BIOPSY/POLYPECTOMY BY BIOPSY;  Surgeon: Doni Carey MD;  Location: [facility]     COLONOSCOPY WITH CO2 INSUFFLATION N/A 4/30/2015    Procedure: COLONOSCOPY WITH CO2 INSUFFLATION;  Surgeon: Doni Carey MD;  Location: [facility]     COLONOSCOPY WITH CO2 INSUFFLATION N/A 12/8/2020    Procedure: COLONOSCOPY, WITH CO2 INSUFFLATION;  Surgeon: Kareem Littlejohn DO;  Location: [facility]     COMBINED ESOPHAGOSCOPY, GASTROSCOPY, DUODENOSCOPY (EGD) WITH CO2 INSUFFLATION N/A 8/24/2017    Procedure: COMBINED ESOPHAGOSCOPY, GASTROSCOPY, DUODENOSCOPY (EGD) WITH CO2 INSUFFLATION;  EGD, Gastroesophageal reflux disease, esophagitis presence not specified Abdominal pain, generalized, Ref Dahlheimer-Schroeder, 24.78 BMI;  Surgeon: Duane, William Charles, MD;  Location: [facility] OR     ENT SURGERY      Tonsillectomy     ESOPHAGOSCOPY, GASTROSCOPY,  DUODENOSCOPY (EGD), COMBINED N/A 2017    Procedure: COMBINED ESOPHAGOSCOPY, GASTROSCOPY, DUODENOSCOPY (EGD), BIOPSY SINGLE OR MULTIPLE;;  Surgeon: Duane, William Charles, MD;  Location: [facility]     INJECT EPIDURAL TRANSFORAMINAL Left 2022    Procedure: INJECTION, EPIDURAL, TRANSFORAMINAL APPROACH - L4-5;  Surgeon: Laya Bowman MD;  Location: [facility] OR     LAPAROSCOPY PROCEDURE UNLISTED      polyps found     MOUTH SURGERY  2016     SINUS SURGERY      x2      Social History     Tobacco Use     Smoking status: Former     Packs/day: 0.00     Years: 0.00     Pack years: 0.00     Types: Cigarettes     Quit date: 1973     Years since quittin.1     Smokeless tobacco: Never     Tobacco comments:     Social smoker   Substance Use Topics     Alcohol use: Yes     Alcohol/week: 0.0 standard drinks     Comment: one or two a day      Problem (# of Occurrences) Relation (Name,Age of Onset)    Cancer (2) Father: stomach , Maternal Grandfather: lung     Diabetes (1) Mother (Rosario Escudero): Old age    Heart Disease (1) Other    Hypertension (1) Mother (Rosario Escudero)            Current Outpatient Medications   Medication Sig     albuterol (PROAIR HFA/PROVENTIL HFA/VENTOLIN HFA) 108 (90 Base) MCG/ACT inhaler Inhale 2 puffs into the lungs every 4 hours as needed for shortness of breath / dyspnea     APRODINE 2.5-60 MG TABS per tablet TAKE ONE TABLET BY MOUTH EVERY 6 HOURS AS NEEDED FOR ALLERGIES (MAX 4 TABS/24 HRS)     azelastine (ASTELIN) 0.1 % nasal spray Spray 1 spray into both nostrils 2 times daily     fluticasone (FLONASE) 50 MCG/ACT nasal spray Spray 2 sprays into both nostrils daily     fluticasone (FLOVENT HFA) 220 MCG/ACT inhaler 1 puff twice daily, rinse mouth out after Medication     magnesium 200 MG TABS Take 200 mg by mouth daily     omeprazole (PRILOSEC) 40 MG DR capsule TAKE ONE CAPSULE BY MOUTH ONCE DAILY BEFORE BREAKFAST     pramipexole (MIRAPEX) 0.25 MG tablet Take 2 tablets (0.5 mg) by  mouth At Bedtime     rosuvastatin (CRESTOR) 10 MG tablet Take 1 tablet (10 mg) by mouth daily (Patient not taking: Reported on 2/1/2023)     sertraline (ZOLOFT) 100 MG tablet Take 1 tablet (100 mg) by mouth daily     SUMAtriptan (IMITREX) 50 MG tablet TAKE ONE TABLET AT ONSET OF HEADACHE. MAY REPEAT AFTER 2 HOURS. DO NOT EXCEED 200 MG IN 24 HOURS     traZODone (DESYREL) 100 MG tablet TAKE ONE TABLET BY MOUTH AT BEDTIME     vitamin B-12 (CYANOCOBALAMIN) 1000 MCG tablet Take 1,000 mcg by mouth daily     VITAMIN D3 50 MCG (2000 UT) tablet TAKE ONE TABLET BY MOUTH EVERY OTHER DAY     Current Facility-Administered Medications   Medication     triamcinolone (KENALOG-40) injection 20 mg     triamcinolone (KENALOG-40) injection 20 mg     Allergies   Allergen Reactions     Atorvastatin Cramps     Muscle pain     Morphine Itching     Morphine Sulfate Itching         OBJECTIVE:     There were no vitals taken for this visit.  There is no height or weight on file to calculate BMI.    Exam:  deferred     In-Clinic Test Results:  No results found for this or any previous visit (from the past 24 hour(s)).    ASSESSMENT/PLAN:                                                      Small fibroids    Patient Instructions                                                        If you have any questions regarding your visit, Please contact your care team.     [employer]: [phone]    To Schedule an Appointment 24/7  Call: [phone] HOURS TELEPHONE NUMBER     Wade Proctor MD    Medical Assistant    Roge Hopper-Surgery Scheduler  Kate-Surgery Scheduler     [place]  1:00 pm-4:30 pm    [facility]  7:30 am-4:30 pm    [facility]  7:30 am-4:30 pm        Typical Surgery Days: Monday or Thursday       [address] [address]  [phone] appointment line  [phone] Fax    Imaging  Scheduling all locations  [phone]    **Surgeries** Our Surgery Schedulers will contact you to schedule. If you do not receive a call within 3 business days, please call [phone].    If you need a medication refill, please contact your pharmacy. Please allow 3 business days for your refill to be completed.    As always, Thank you for trusting us with your healthcare needs!                   see additional instructions from your care team below          Wade Proctor MD  [facility]

## 2023-02-20 NOTE — PATIENT INSTRUCTIONS
If you have any questions regarding your visit, Please contact your care team.     [employer]: [phone]    To Schedule an Appointment 24/7  Call: [phone] HOURS TELEPHONE NUMBER     Wade Proctor MD    Medical Assistant    Roge Hopper-Surgery Scheduler  Kate-Surgery Scheduler     Monday-[place]  1:00 pm-4:30 pm    Tuesday-[place]  7:30 am-4:30 pm    Wednesday-[place]  7:30 am-4:30 pm        Typical Surgery Days: Monday or Thursday       [address] [address]  [phone] appointment line  [phone] Fax    Imaging Scheduling all locations  [phone]    **Surgeries** Our Surgery Schedulers will contact you to schedule. If you do not receive a call within 3 business days, please call [phone].    If you need a medication refill, please contact your pharmacy. Please allow 3 business days for your refill to be completed.    As always, Thank you for trusting us with your healthcare needs!                   see additional instructions from your care team below

## 2023-02-21 ENCOUNTER — OFFICE VISIT (OUTPATIENT)
Dept: OBGYN | Facility: OTHER | Age: 73
End: 2023-02-21
Payer: COMMERCIAL

## 2023-02-21 VITALS
HEART RATE: 78 BPM | WEIGHT: 148 LBS | SYSTOLIC BLOOD PRESSURE: 137 MMHG | BODY MASS INDEX: 26.39 KG/M2 | DIASTOLIC BLOOD PRESSURE: 84 MMHG

## 2023-02-21 DIAGNOSIS — D25.1 INTRAMURAL LEIOMYOMA OF UTERUS: Primary | ICD-10-CM

## 2023-02-21 PROCEDURE — 99202 OFFICE O/P NEW SF 15 MIN: CPT | Performed by: OBSTETRICS & GYNECOLOGY

## 2023-03-01 ENCOUNTER — TELEPHONE (OUTPATIENT)
Dept: NEUROSURGERY | Facility: CLINIC | Age: 73
End: 2023-03-01
Payer: COMMERCIAL

## 2023-03-01 NOTE — TELEPHONE ENCOUNTER
Called and left voicemail. Patient can either call back and let writer know the questions, or she can schedule a telephone visit with Dr. Tomas tomorrow.

## 2023-03-01 NOTE — TELEPHONE ENCOUNTER
[facility] Call Center    Phone Message    May a detailed message be left on voicemail: yes     Reason for Call: Other: Pt asking for call back regarding follow up apt with Dr. charles.  Pt was scheduled on 03/02 and rescheduled apt to 03/30.  Pt asking if there is another provider she can see of if Dr. charles can give her a call since she has a few questions. Pt can be reached at  [phone]     Action Taken: Message routed to:  Other: ump - Neurology Adult     Travel Screening: Not Applicable

## 2023-03-20 ENCOUNTER — PRE VISIT (OUTPATIENT)
Dept: NEUROSURGERY | Facility: CLINIC | Age: 73
End: 2023-03-20
Payer: COMMERCIAL

## 2023-03-20 NOTE — TELEPHONE ENCOUNTER
NEUROSURGERY- NEW PREVISIT PLANNING       Record Status/Location     Referring Provider N/A N/A   Diagnosis In 8/18/23 progress note Chronic bilateral low back pain w/o sciatica; age-related osteoporosis w/o current pathological fracture   MRI (HEAD, NECK, SPINE)  NO   CT  NO   X-ray Epic XR Lumbar and Scoliosis - 8/18/22   INJECTION Epic 9/16/22   PHYSICAL THERAPY  NO   SURGERY  NO

## 2023-03-28 NOTE — TELEPHONE ENCOUNTER
Writer left detailed message requesting patient call back if she has any questions prior to her follow-up appt scheduled this Thurs 3/30 at 11:20am with Dr. Tomas. She was reminded that appt is in-person but may be scheduled to telephone if needed. She may call back if any concerns.      Mirtha Manrique, RNCC  Neurology/Neurosurgery

## 2023-03-30 ENCOUNTER — ANCILLARY PROCEDURE (OUTPATIENT)
Dept: GENERAL RADIOLOGY | Facility: CLINIC | Age: 73
End: 2023-03-30
Attending: STUDENT IN AN ORGANIZED HEALTH CARE EDUCATION/TRAINING PROGRAM
Payer: COMMERCIAL

## 2023-03-30 ENCOUNTER — OFFICE VISIT (OUTPATIENT)
Dept: NEUROSURGERY | Facility: CLINIC | Age: 73
End: 2023-03-30
Payer: COMMERCIAL

## 2023-03-30 VITALS
HEIGHT: 63 IN | SYSTOLIC BLOOD PRESSURE: 129 MMHG | HEART RATE: 80 BPM | DIASTOLIC BLOOD PRESSURE: 74 MMHG | BODY MASS INDEX: 25.87 KG/M2 | WEIGHT: 146 LBS

## 2023-03-30 DIAGNOSIS — M81.0 AGE-RELATED OSTEOPOROSIS WITHOUT CURRENT PATHOLOGICAL FRACTURE: ICD-10-CM

## 2023-03-30 DIAGNOSIS — M54.50 CHRONIC BILATERAL LOW BACK PAIN WITHOUT SCIATICA: Primary | ICD-10-CM

## 2023-03-30 DIAGNOSIS — G89.29 CHRONIC BILATERAL LOW BACK PAIN WITHOUT SCIATICA: Primary | ICD-10-CM

## 2023-03-30 DIAGNOSIS — G89.29 CHRONIC BILATERAL LOW BACK PAIN WITHOUT SCIATICA: ICD-10-CM

## 2023-03-30 DIAGNOSIS — M54.50 CHRONIC BILATERAL LOW BACK PAIN WITHOUT SCIATICA: ICD-10-CM

## 2023-03-30 PROCEDURE — 72100 X-RAY EXAM L-S SPINE 2/3 VWS: CPT | Performed by: RADIOLOGY

## 2023-03-30 PROCEDURE — 72070 X-RAY EXAM THORAC SPINE 2VWS: CPT | Performed by: RADIOLOGY

## 2023-03-30 PROCEDURE — 99212 OFFICE O/P EST SF 10 MIN: CPT | Performed by: STUDENT IN AN ORGANIZED HEALTH CARE EDUCATION/TRAINING PROGRAM

## 2023-03-30 ASSESSMENT — PAIN SCALES - GENERAL: PAINLEVEL: MILD PAIN (2)

## 2023-03-30 NOTE — PROGRESS NOTES
"HPI:  72-year-old female with low back pain and tightness.  It gets worse as the day goes on.  Pain radiates to the bilateral abdomen and feels like menstrual cramps.  She does have a history of a prior lumbar radiculopathy and these symptoms are not similar to that.  She was treated with an VIPIN for her prior back and leg pain with significant improvement in the point and very happy with the improvement.  She tried physical therapy in the past but it made symptoms worse.  She is not taking anything for symptoms right now.  She did have a severe UTI in the past which was associated with back pain but this pain had improved with treatment for her UTI.  Current Outpatient Medications   Medication     albuterol (PROAIR HFA/PROVENTIL HFA/VENTOLIN HFA) 108 (90 Base) MCG/ACT inhaler     APRODINE 2.5-60 MG TABS per tablet     azelastine (ASTELIN) 0.1 % nasal spray     fluticasone (FLONASE) 50 MCG/ACT nasal spray     fluticasone (FLOVENT HFA) 220 MCG/ACT inhaler     magnesium 200 MG TABS     omeprazole (PRILOSEC) 40 MG DR capsule     pramipexole (MIRAPEX) 0.25 MG tablet     rosuvastatin (CRESTOR) 10 MG tablet     sertraline (ZOLOFT) 100 MG tablet     SUMAtriptan (IMITREX) 50 MG tablet     traZODone (DESYREL) 100 MG tablet     vitamin B-12 (CYANOCOBALAMIN) 1000 MCG tablet     VITAMIN D3 50 MCG (2000 UT) tablet     Current Facility-Administered Medications   Medication     triamcinolone (KENALOG-40) injection 20 mg     triamcinolone (KENALOG-40) injection 20 mg      Physical Exam:  Vital signs:      BP: 129/74 Pulse: 80           Height: 160 cm (5' 3\") Weight: 66.2 kg (146 lb)  Estimated body mass index is 25.86 kg/m  as calculated from the following:    Height as of this encounter: 1.6 m (5' 3\").    Weight as of this encounter: 66.2 kg (146 lb).   She has full-strength in her bilateral lower extremities.  Sensation is intact to light touch throughout.  Gait is normal.  Results Reviewed:  I reviewed x-rays of her thoracic and " lumbar spine to rule out new fracture that could be associated with her pain.  These do not show any evidence of new fracture.  There is a maintained L4-5 spondylolisthesis which is similar to her prior x-rays.  No change in overall alignment.  Assessment:  9-year-old female with low back pain.  Does have a history of osteoporosis in her femoral neck.  Recommend continue conservative management for her low back pain and tightness.  Would recommend over-the-counter medications and try her home TENS unit.  Also discussed physical therapy as an option again.  We will refer for acupuncture for low back pain and endocrinology for her osteoporosis.  Plan:  Recommend continue conservative management for her low back pain and tightness.  Would recommend over-the-counter medications and try her home TENS unit.  Also discussed physical therapy as an option again.  We will refer for acupuncture for low back pain and endocrinology for her osteoporosis.    Gallo Tomas MD

## 2023-03-30 NOTE — LETTER
"    3/30/2023         RE: Amee Crews  [address] [facility] [address]        Dear Colleague,    Thank you for referring your patient, Amee Crews, to the [facility] [place]. Please see a copy of my visit note below.    HPI:  72-year-old female with low back pain and tightness.  It gets worse as the day goes on.  Pain radiates to the bilateral abdomen and feels like menstrual cramps.  She does have a history of a prior lumbar radiculopathy and these symptoms are not similar to that.  She was treated with an VIPIN for her prior back and leg pain with significant improvement in the point and very happy with the improvement.  She tried physical therapy in the past but it made symptoms worse.  She is not taking anything for symptoms right now.  She did have a severe UTI in the past which was associated with back pain but this pain had improved with treatment for her UTI.  Current Outpatient Medications   Medication     albuterol (PROAIR HFA/PROVENTIL HFA/VENTOLIN HFA) 108 (90 Base) MCG/ACT inhaler     APRODINE 2.5-60 MG TABS per tablet     azelastine (ASTELIN) 0.1 % nasal spray     fluticasone (FLONASE) 50 MCG/ACT nasal spray     fluticasone (FLOVENT HFA) 220 MCG/ACT inhaler     magnesium 200 MG TABS     omeprazole (PRILOSEC) 40 MG DR capsule     pramipexole (MIRAPEX) 0.25 MG tablet     rosuvastatin (CRESTOR) 10 MG tablet     sertraline (ZOLOFT) 100 MG tablet     SUMAtriptan (IMITREX) 50 MG tablet     traZODone (DESYREL) 100 MG tablet     vitamin B-12 (CYANOCOBALAMIN) 1000 MCG tablet     VITAMIN D3 50 MCG (2000 UT) tablet     Current Facility-Administered Medications   Medication     triamcinolone (KENALOG-40) injection 20 mg     triamcinolone (KENALOG-40) injection 20 mg      Physical Exam:  Vital signs:      BP: 129/74 Pulse: 80           Height: 160 cm (5' 3\") Weight: 66.2 kg (146 lb)  Estimated body mass index is 25.86 kg/m  as calculated from the following:    Height as of " "this encounter: 1.6 m (5' 3\").    Weight as of this encounter: 66.2 kg (146 lb).   She has full-strength in her bilateral lower extremities.  Sensation is intact to light touch throughout.  Gait is normal.  Results Reviewed:  I reviewed x-rays of her thoracic and lumbar spine to rule out new fracture that could be associated with her pain.  These do not show any evidence of new fracture.  There is a maintained L4-5 spondylolisthesis which is similar to her prior x-rays.  No change in overall alignment.  Assessment:  9-year-old female with low back pain.  Does have a history of osteoporosis in her femoral neck.  Recommend continue conservative management for her low back pain and tightness.  Would recommend over-the-counter medications and try her home TENS unit.  Also discussed physical therapy as an option again.  We will refer for acupuncture for low back pain and endocrinology for her osteoporosis.  Plan:  Recommend continue conservative management for her low back pain and tightness.  Would recommend over-the-counter medications and try her home TENS unit.  Also discussed physical therapy as an option again.  We will refer for acupuncture for low back pain and endocrinology for her osteoporosis.    Gallo Tomas MD      Again, thank you for allowing me to participate in the care of your patient.        Sincerely,        Gallo Tomas MD    "

## 2023-03-31 ENCOUNTER — TELEPHONE (OUTPATIENT)
Dept: FAMILY MEDICINE | Facility: OTHER | Age: 73
End: 2023-03-31
Payer: COMMERCIAL

## 2023-03-31 DIAGNOSIS — G47.00 INSOMNIA, UNSPECIFIED TYPE: ICD-10-CM

## 2023-03-31 RX ORDER — TRAZODONE HYDROCHLORIDE 100 MG/1
TABLET ORAL
Qty: 90 TABLET | Refills: 0 | Status: SHIPPED | OUTPATIENT
Start: 2023-03-31 | End: 2023-06-29

## 2023-03-31 NOTE — TELEPHONE ENCOUNTER
Refill given, has upcoming visit      ROBYN Bojorquez CNP  Questions or concerns please feel free to send me a [employer] message or call me  Phone : [phone]

## 2023-03-31 NOTE — TELEPHONE ENCOUNTER
Pending Prescriptions:                       Disp   Refills    traZODone (DESYREL) 100 MG tablet [Pharmac*90 tab*0        Sig: TAKE ONE TABLET BY MOUTH AT BEDTIME    Routing refill request to provider for review/approval because:  Drug interaction warning    Khalida Lorenzo RN on 3/31/2023 at 1:59 PM

## 2023-03-31 NOTE — TELEPHONE ENCOUNTER
Appointments in Next Year    May 03, 2023  9:30 AM  (Arrive by 9:10 AM)  Annual Wellness Visit with ROBYN Cheek CNP  [facility] ([facility] ) [phone]   Jun 06, 2023  4:00 PM  NEW ENDOCRINE with Marcelino Centeno MD  [facility] ([facility] ) [phone]

## 2023-04-12 DIAGNOSIS — G25.81 RESTLESS LEG: ICD-10-CM

## 2023-04-13 RX ORDER — PRAMIPEXOLE DIHYDROCHLORIDE 0.25 MG/1
TABLET ORAL
Qty: 90 TABLET | Refills: 1 | OUTPATIENT
Start: 2023-04-13

## 2023-04-15 DIAGNOSIS — G25.81 RESTLESS LEG: ICD-10-CM

## 2023-04-15 NOTE — TELEPHONE ENCOUNTER
Reason for call:  Other   Patient called regarding (reason for call): call back and prescription  Additional comments: Per patient: needs a refill on pramipexole (MIRAPEX) 0.25 MG tablet and the pharmacy states it was denied and I wanted to know why was it denied. I have only 1 more left    Phone number to reach patient:  Cell number on file:    Telephone Information:   Mobile [phone]       Best Time:  Anytime    Can we leave a detailed message on this number?  YES    Travel screening: Not Applicable

## 2023-04-17 RX ORDER — PRAMIPEXOLE DIHYDROCHLORIDE 0.25 MG/1
0.5 TABLET ORAL AT BEDTIME
Qty: 180 TABLET | Refills: 0 | Status: SHIPPED | OUTPATIENT
Start: 2023-04-17 | End: 2023-07-14

## 2023-04-17 NOTE — TELEPHONE ENCOUNTER
Pending Prescriptions:                       Disp   Refills    pramipexole (MIRAPEX) 0.25 MG tablet       90 tab*1        Sig: Take 2 tablets (0.5 mg) by mouth At Bedtime    Routing refill request to provider for review/approval because:  Labs not current:    Lab Results   Component Value Date    ALT 28 03/28/2022    ALT 25 11/14/2018     Creatinine   Date Value Ref Range Status   03/28/2022 0.75 0.52 - 1.04 mg/dL Final   10/22/2020 0.82 0.52 - 1.04 mg/dL Final

## 2023-04-17 NOTE — TELEPHONE ENCOUNTER
Spoke to patient and pharmacy told her there was no refills. Spoke to pharmacy they filled twice. Patient takes 2 tablets a day so each prescription was for 45 days not 90 days. Needing asap refill as she is out.

## 2023-05-03 ENCOUNTER — LAB (OUTPATIENT)
Dept: LAB | Facility: OTHER | Age: 73
End: 2023-05-03
Payer: COMMERCIAL

## 2023-05-03 ENCOUNTER — TELEPHONE (OUTPATIENT)
Dept: FAMILY MEDICINE | Facility: OTHER | Age: 73
End: 2023-05-03

## 2023-05-03 DIAGNOSIS — E78.00 HYPERCHOLESTEROLEMIA: ICD-10-CM

## 2023-05-03 DIAGNOSIS — F33.42 RECURRENT MAJOR DEPRESSIVE DISORDER, IN FULL REMISSION (H): ICD-10-CM

## 2023-05-03 DIAGNOSIS — N30.00 ACUTE CYSTITIS WITHOUT HEMATURIA: Primary | ICD-10-CM

## 2023-05-03 DIAGNOSIS — N39.0 RECURRENT UTI: ICD-10-CM

## 2023-05-03 LAB
ALBUMIN SERPL BCG-MCNC: 4.7 G/DL (ref 3.5–5.2)
ALBUMIN UR-MCNC: NEGATIVE MG/DL
ALP SERPL-CCNC: 101 U/L (ref 35–104)
ALT SERPL W P-5'-P-CCNC: 25 U/L (ref 10–35)
ANION GAP SERPL CALCULATED.3IONS-SCNC: 10 MMOL/L (ref 7–15)
APPEARANCE UR: CLEAR
AST SERPL W P-5'-P-CCNC: 24 U/L (ref 10–35)
BILIRUB SERPL-MCNC: 0.7 MG/DL
BILIRUB UR QL STRIP: NEGATIVE
BUN SERPL-MCNC: 13.5 MG/DL (ref 8–23)
CALCIUM SERPL-MCNC: 9.5 MG/DL (ref 8.8–10.2)
CHLORIDE SERPL-SCNC: 102 MMOL/L (ref 98–107)
CHOLEST SERPL-MCNC: 386 MG/DL
COLOR UR AUTO: YELLOW
CREAT SERPL-MCNC: 0.87 MG/DL (ref 0.51–0.95)
DEPRECATED HCO3 PLAS-SCNC: 27 MMOL/L (ref 22–29)
GFR SERPL CREATININE-BSD FRML MDRD: 71 ML/MIN/1.73M2
GLUCOSE SERPL-MCNC: 103 MG/DL (ref 70–99)
GLUCOSE UR STRIP-MCNC: NEGATIVE MG/DL
HDLC SERPL-MCNC: 59 MG/DL
HGB UR QL STRIP: NEGATIVE
KETONES UR STRIP-MCNC: NEGATIVE MG/DL
LDLC SERPL CALC-MCNC: 284 MG/DL
LEUKOCYTE ESTERASE UR QL STRIP: ABNORMAL
MUCOUS THREADS #/AREA URNS LPF: PRESENT /LPF
NITRATE UR QL: NEGATIVE
NONHDLC SERPL-MCNC: 327 MG/DL
PH UR STRIP: 7 [PH] (ref 5–7)
POTASSIUM SERPL-SCNC: 4.5 MMOL/L (ref 3.4–5.3)
PROT SERPL-MCNC: 7.3 G/DL (ref 6.4–8.3)
RBC #/AREA URNS AUTO: ABNORMAL /HPF
SODIUM SERPL-SCNC: 139 MMOL/L (ref 136–145)
SP GR UR STRIP: 1.02 (ref 1–1.03)
SQUAMOUS #/AREA URNS AUTO: ABNORMAL /LPF
TRIGL SERPL-MCNC: 213 MG/DL
UROBILINOGEN UR STRIP-ACNC: 0.2 E.U./DL
WBC #/AREA URNS AUTO: ABNORMAL /HPF

## 2023-05-03 PROCEDURE — 80053 COMPREHEN METABOLIC PANEL: CPT

## 2023-05-03 PROCEDURE — 36415 COLL VENOUS BLD VENIPUNCTURE: CPT

## 2023-05-03 PROCEDURE — 80061 LIPID PANEL: CPT

## 2023-05-03 PROCEDURE — 81001 URINALYSIS AUTO W/SCOPE: CPT

## 2023-05-03 RX ORDER — CEPHALEXIN 500 MG/1
500 CAPSULE ORAL 2 TIMES DAILY
Qty: 10 CAPSULE | Refills: 0 | Status: SHIPPED | OUTPATIENT
Start: 2023-05-03 | End: 2023-05-08

## 2023-05-03 NOTE — TELEPHONE ENCOUNTER
Patient was scheduled today for 1030 and provider needed to switch to virtual    Left message for can to annual wellness as a video if she would like or can reschedule for another time or day.    Khoi Rendon, CHRISTIN, RN, PHN  [facility] ~ Registered Nurse  Clinic Triage ~ [place] & [place]  May 3, 2023

## 2023-05-03 NOTE — TELEPHONE ENCOUNTER
See other encounters dated today. Messages left and MyChart message sent.    Patient Contact    Attempt # 1    Was call answered?  No.  Left message on voicemail with information to call the clinic back.    CHRISTI MerazN, RN, PHN  Registered Nurse-Clinic Triage  [facility]/[place]  5/3/2023 at 2:13 PM

## 2023-05-03 NOTE — TELEPHONE ENCOUNTER
Please call patient, missed her virtual today. Unfortunately I am done for the day. Would recommend rescheduling and we can discuss her labs. I did put in for a urine culture review as she does have a apparent UTI and I would like to start her on antibiotics so I did send this in the meantime.         Ary Adams, ROBYN CNP  Questions or concerns please feel free to send me a [employer] message or call me  Phone : [phone]

## 2023-05-04 DIAGNOSIS — E55.9 VITAMIN D DEFICIENCY: ICD-10-CM

## 2023-05-04 DIAGNOSIS — J32.9 CHRONIC SINUSITIS, UNSPECIFIED LOCATION: ICD-10-CM

## 2023-05-04 RX ORDER — FLUTICASONE PROPIONATE 50 MCG
2 SPRAY, SUSPENSION (ML) NASAL DAILY
Qty: 16 G | Refills: 1 | Status: SHIPPED | OUTPATIENT
Start: 2023-05-04 | End: 2023-08-25

## 2023-05-04 RX ORDER — CHOLECALCIFEROL (VITAMIN D3) 50 MCG
1 TABLET ORAL EVERY OTHER DAY
Qty: 90 TABLET | Refills: 0 | Status: SHIPPED | OUTPATIENT
Start: 2023-05-04 | End: 2023-10-27

## 2023-05-04 NOTE — TELEPHONE ENCOUNTER
See patient response in [employer] message dated 5/3/23.  PCP message below sent via [employer]. Closing encounter.    Khalida Lorenzo, CHRISTIN, RN, PHN  Registered Nurse-Clinic Triage  [facility]  5/4/2023 at 7:45 AM

## 2023-05-08 ENCOUNTER — VIRTUAL VISIT (OUTPATIENT)
Dept: FAMILY MEDICINE | Facility: OTHER | Age: 73
End: 2023-05-08
Payer: COMMERCIAL

## 2023-05-08 ENCOUNTER — TELEPHONE (OUTPATIENT)
Dept: FAMILY MEDICINE | Facility: OTHER | Age: 73
End: 2023-05-08

## 2023-05-08 ENCOUNTER — ALLIED HEALTH/NURSE VISIT (OUTPATIENT)
Dept: FAMILY MEDICINE | Facility: OTHER | Age: 73
End: 2023-05-08
Payer: COMMERCIAL

## 2023-05-08 ENCOUNTER — ANCILLARY PROCEDURE (OUTPATIENT)
Dept: GENERAL RADIOLOGY | Facility: OTHER | Age: 73
End: 2023-05-08
Attending: NURSE PRACTITIONER
Payer: COMMERCIAL

## 2023-05-08 VITALS
OXYGEN SATURATION: 100 % | WEIGHT: 150.5 LBS | HEIGHT: 63 IN | RESPIRATION RATE: 18 BRPM | DIASTOLIC BLOOD PRESSURE: 73 MMHG | HEART RATE: 82 BPM | TEMPERATURE: 98 F | SYSTOLIC BLOOD PRESSURE: 135 MMHG | BODY MASS INDEX: 26.67 KG/M2

## 2023-05-08 DIAGNOSIS — N30.00 ACUTE CYSTITIS WITHOUT HEMATURIA: ICD-10-CM

## 2023-05-08 DIAGNOSIS — J45.20 MILD INTERMITTENT ASTHMA WITHOUT COMPLICATION: Primary | ICD-10-CM

## 2023-05-08 DIAGNOSIS — Z12.31 VISIT FOR SCREENING MAMMOGRAM: ICD-10-CM

## 2023-05-08 DIAGNOSIS — E78.00 HYPERCHOLESTEROLEMIA: ICD-10-CM

## 2023-05-08 DIAGNOSIS — E78.5 HYPERLIPIDEMIA LDL GOAL <130: ICD-10-CM

## 2023-05-08 DIAGNOSIS — S20.212A CONTUSION OF RIB ON LEFT SIDE, INITIAL ENCOUNTER: ICD-10-CM

## 2023-05-08 DIAGNOSIS — E78.1 HYPERTRIGLYCERIDEMIA: ICD-10-CM

## 2023-05-08 DIAGNOSIS — T46.6X5A STATIN-INDUCED MYOSITIS: ICD-10-CM

## 2023-05-08 DIAGNOSIS — M60.9 STATIN-INDUCED MYOSITIS: ICD-10-CM

## 2023-05-08 PROCEDURE — 71101 X-RAY EXAM UNILAT RIBS/CHEST: CPT | Mod: TC | Performed by: RADIOLOGY

## 2023-05-08 PROCEDURE — 99207 PR NO CHARGE NURSE ONLY: CPT

## 2023-05-08 PROCEDURE — 99214 OFFICE O/P EST MOD 30 MIN: CPT | Mod: 95 | Performed by: NURSE PRACTITIONER

## 2023-05-08 RX ORDER — ALBUTEROL SULFATE 90 UG/1
2 AEROSOL, METERED RESPIRATORY (INHALATION) EVERY 4 HOURS PRN
Qty: 18 G | Refills: 5 | Status: SHIPPED | OUTPATIENT
Start: 2023-05-08 | End: 2024-04-10

## 2023-05-08 RX ORDER — ROSUVASTATIN CALCIUM 5 MG/1
5 TABLET, COATED ORAL EVERY OTHER DAY
Qty: 90 TABLET | Refills: 0 | Status: SHIPPED | OUTPATIENT
Start: 2023-05-08 | End: 2023-09-19

## 2023-05-08 NOTE — TELEPHONE ENCOUNTER
RN's please call patient let her know that her xray did show a nondisplaced (not out of place) lateral left sixth rib fracture, good new is there are no acute airspace issues or pleural effusions. Rib fractures can be very painful and take time to heal.  The first 3-4 weeks can be the most painful.  I would recommend she do NSAID treatment around the clock with Tylenol extra strength. Ice can be helpful as well. Splinting with holding a pillow with any coughing or deep breathing.     NSAID- 600mg every 6 hours   Tylenol 1,000mg every 6 hours   Alternating.     Go to the ER or call 911 if any of the following:      Trouble breathing    Nausea, vomiting, or stomach pain with a sore or bruised rib    Pain that worsens over time    An injury to the chest or stomach in addition to current injury.       ROBYN Bojorquez CNP  Questions or concerns please feel free to send me a [employer] message or call me  Phone : [phone]

## 2023-05-08 NOTE — PROGRESS NOTES
"Patient here for full set of vitals.    /73   Pulse 82   Temp 98  F (36.7  C) (Oral)   Resp 18   Ht 1.6 m (5' 3\")   Wt 68.3 kg (150 lb 8 oz)   SpO2 100%   BMI 26.66 kg/m        Khoi Rendon, CHRISTIN, RN, PHN  [facility] ~ Registered Nurse  Clinic Triage ~ [place] [facility] [place]  May 8, 2023    "

## 2023-05-08 NOTE — PROGRESS NOTES
Amee is a 72 year old who is being evaluated via a billable telephone visit.      What phone number would you like to be contacted at? [phone]   How would you like to obtain your AVS? Fifi  Distant Location (provider location):  Off-site    Assessment & Plan     Mild intermittent asthma without complication  Continue albuterol   - albuterol (PROAIR HFA/PROVENTIL HFA/VENTOLIN HFA) 108 (90 Base) MCG/ACT inhaler; Inhale 2 puffs into the lungs every 4 hours as needed for shortness of breath  - Comprehensive metabolic panel (BMP + Alb, Alk Phos, ALT, AST, Total. Bili, TP); Future    Acute cystitis without hematuria  Resolved  Follow up with Urology as planned.   - Comprehensive metabolic panel (BMP + Alb, Alk Phos, ALT, AST, Total. Bili, TP); Future    Hypertriglyceridemia  Hyperlipidemia LDL goal <130  - Reviewed her cholesterol, very elevated   Concerns with cardiovascular and neurovascular risks.   Discussed restarting Crestor at every other day to avoid muscle pains that she had initially with 10mg. Failed Lipitor, simvastatin, and pravastatin in the past due to myalgias.   Labs in 2-3 months   - rosuvastatin (CRESTOR) 5 MG tablet; Take 1 tablet (5 mg) by mouth every other day  - Lipid panel reflex to direct LDL Fasting; Future  - Comprehensive metabolic panel (BMP + Alb, Alk Phos, ALT, AST, Total. Bili, TP); Future  - rosuvastatin (CRESTOR) 5 MG tablet; Take 1 tablet (5 mg) by mouth every other day  - Lipid panel reflex to direct LDL Fasting; Future  - Comprehensive metabolic panel (BMP + Alb, Alk Phos, ALT, AST, Total. Bili, TP); Future    Hypercholesterolemia  See above   - rosuvastatin (CRESTOR) 5 MG tablet; Take 1 tablet (5 mg) by mouth every other day  - Lipid panel reflex to direct LDL Fasting; Future  - Comprehensive metabolic panel (BMP + Alb, Alk Phos, ALT, AST, Total. Bili, TP); Future    Statin-induced myositis  See above   - Lipid panel reflex to direct LDL Fasting; Future  - Comprehensive  "metabolic panel (BMP + Alb, Alk Phos, ALT, AST, Total. Bili, TP); Future    Visit for screening mammogram    - MA SCREENING DIGITAL BILAT - Future  (s+30); Future    Contusion of rib on left side, initial encounter  Fell when getting out of the shower, hit her left side of her chest. Pain with coughing and taking deep breaths. Able to have complete sentences and no SOB or JAUREGUI. Recommend xray today and I will also listen to her lungs when she comes in today as well and review vitals. Discussed symptoms and when to go in for evaluation. Manage pain with tylenol and NSAIDs with ice or heat.   - XR Ribs & Chest Left G/E 3 Views; Future      BMI:   Estimated body mass index is 25.86 kg/m  as calculated from the following:    Height as of 3/30/23: 1.6 m (5' 3\").    Weight as of 3/30/23: 66.2 kg (146 lb).   Weight management plan: Discussed healthy diet and exercise guidelines    There are no Patient Instructions on file for this visit.    ROBYN Bojorquez [facility] BRI Lubin is a 72 year old, presenting for the following health issues:  Follow Up        5/8/2023     8:06 AM   Additional Questions   Roomed by evita   Accompanied by alone         5/8/2023     8:06 AM   Patient Reported Additional Medications   Patient reports taking the following new medications none     History of Present Illness       Reason for visit:  Anual check up and UTI    She eats 2-3 servings of fruits and vegetables daily.She consumes 1 sweetened beverage(s) daily.She exercises with enough effort to increase her heart rate 9 or less minutes per day.  She exercises with enough effort to increase her heart rate 3 or less days per week.   She is taking medications regularly.     Fell onto her left side, rib pain.   Hurts if she takes a deep breath.       Review of Systems         Objective    Vitals - Patient Reported  Pain Score: Extreme Pain (8)  Pain Loc: Other - see comment (ribs)        Physical " Exam   healthy and no distress  PSYCH: Alert and oriented times 3; coherent speech, normal   rate and volume, able to articulate logical thoughts, able   to abstract reason, no tangential thoughts, no hallucinations   or delusions  Her affect is normal  RESP: No cough, no audible wheezing, able to talk in full sentences  Remainder of exam unable to be completed due to telephone visits    Lab on 05/03/2023   Component Date Value Ref Range Status     Sodium 05/03/2023 139  136 - 145 mmol/L Final     Potassium 05/03/2023 4.5  3.4 - 5.3 mmol/L Final     Chloride 05/03/2023 102  98 - 107 mmol/L Final     Carbon Dioxide (CO2) 05/03/2023 27  22 - 29 mmol/L Final     Anion Gap 05/03/2023 10  7 - 15 mmol/L Final     Urea Nitrogen 05/03/2023 13.5  8.0 - 23.0 mg/dL Final     Creatinine 05/03/2023 0.87  0.51 - 0.95 mg/dL Final     Calcium 05/03/2023 9.5  8.8 - 10.2 mg/dL Final     Glucose 05/03/2023 103 (H)  70 - 99 mg/dL Final     Alkaline Phosphatase 05/03/2023 101  35 - 104 U/L Final     AST 05/03/2023 24  10 - 35 U/L Final     ALT 05/03/2023 25  10 - 35 U/L Final     Protein Total 05/03/2023 7.3  6.4 - 8.3 g/dL Final     Albumin 05/03/2023 4.7  3.5 - 5.2 g/dL Final     Bilirubin Total 05/03/2023 0.7  <=1.2 mg/dL Final     GFR Estimate 05/03/2023 71  >60 mL/min/1.73m2 Final    eGFR calculated using 2021 CKD-EPI equation.     Cholesterol 05/03/2023 386 (H)  <200 mg/dL Final     Triglycerides 05/03/2023 213 (H)  <150 mg/dL Final     Direct Measure HDL 05/03/2023 59  >=50 mg/dL Final     LDL Cholesterol Calculated 05/03/2023 284 (H)  <=100 mg/dL Final     Non HDL Cholesterol 05/03/2023 327 (H)  <130 mg/dL Final     Color Urine 05/03/2023 Yellow  Colorless, Straw, Light Yellow, Yellow Final     Appearance Urine 05/03/2023 Clear  Clear Final     Glucose Urine 05/03/2023 Negative  Negative mg/dL Final     Bilirubin Urine 05/03/2023 Negative  Negative Final     Ketones Urine 05/03/2023 Negative  Negative mg/dL Final     Specific  Gravity Urine 05/03/2023 1.020  1.003 - 1.035 Final     Blood Urine 05/03/2023 Negative  Negative Final     pH Urine 05/03/2023 7.0  5.0 - 7.0 Final     Protein Albumin Urine 05/03/2023 Negative  Negative mg/dL Final     Urobilinogen Urine 05/03/2023 0.2  0.2, 1.0 E.U./dL Final     Nitrite Urine 05/03/2023 Negative  Negative Final     Leukocyte Esterase Urine 05/03/2023 Small (A)  Negative Final     RBC Urine 05/03/2023 0-2  0-2 /HPF /HPF Final     WBC Urine 05/03/2023 5-10 (A)  0-5 /HPF /HPF Final     Squamous Epithelials Urine 05/03/2023 Few (A)  None Seen /LPF Final     Mucus Urine 05/03/2023 Present (A)  None Seen /LPF Final               Phone call duration: 20 minutes

## 2023-05-08 NOTE — TELEPHONE ENCOUNTER
Left message to have her call back the nurse triage line regarding the information from Ary Adams.    Eli Simpson RN  [facility] ~ Registered Nurse  Clinic Triage ~ [place] & [place]  May 8, 2023

## 2023-05-08 NOTE — TELEPHONE ENCOUNTER
Patient informed.   CHRISTI TravisN, RN, PHN  [place]  May 8, 2023

## 2023-05-18 ENCOUNTER — TELEPHONE (OUTPATIENT)
Dept: FAMILY MEDICINE | Facility: OTHER | Age: 73
End: 2023-05-18
Payer: COMMERCIAL

## 2023-05-18 DIAGNOSIS — S22.32XD CLOSED FRACTURE OF ONE RIB OF LEFT SIDE WITH ROUTINE HEALING, SUBSEQUENT ENCOUNTER: Primary | ICD-10-CM

## 2023-05-18 RX ORDER — LIDOCAINE 50 MG/G
1 PATCH TOPICAL EVERY 24 HOURS
Qty: 12 PATCH | Refills: 0 | Status: SHIPPED | OUTPATIENT
Start: 2023-05-18 | End: 2023-09-19

## 2023-05-18 NOTE — TELEPHONE ENCOUNTER
Spoke with Amee and read back the message written by Ary Adams.    Amee is upset because all she wants if something to help the pain at night time because every time she rolls over its painful.    She will try the lidocaine patches.    Eli Simpson RN  [facility] ~ Registered Nurse  Clinic Triage ~ [place] & [place]  May 18, 2023

## 2023-05-18 NOTE — TELEPHONE ENCOUNTER
Unfortunately Rib pain takes time to heal and it is typically worse before it is better. I hesitate to do narcotics as this increases the risk for falls and confusion.  I would actually recommend lidocaine patches or gel, is she doing NSAIDS and tylenol.  She is a high risk for fracture based on her dexa scan results from last year so I don't want to increase that risk.       ROBYN Bojorquez CNP  Questions or concerns please feel free to send me a [employer] message or call me  Phone : [phone]

## 2023-05-18 NOTE — TELEPHONE ENCOUNTER
Patient calling in requesting a prescription of Tylenol with codeine.  Patient states she is unable to sleep at night due to pain from her recent rib fracture.  Patient was advised an appointment but wanted a message sent to provider first.  Patient preferred pharmacy is [facility] in [place].

## 2023-05-18 NOTE — TELEPHONE ENCOUNTER
Spoke with Amee and she states she is leaving out of town tomorrow morning.    Eli Simpson RN  [facility] ~ Registered Nurse  Clinic Triage ~ [place] & [place]  May 18, 2023

## 2023-05-19 NOTE — TELEPHONE ENCOUNTER
Ok she can call Monday if no improvements.       ROBYN Bojorquez CNP  Questions or concerns please feel free to send me a "[employer] message or call me  Phone : [phone]

## 2023-05-20 ENCOUNTER — TELEPHONE (OUTPATIENT)
Dept: FAMILY MEDICINE | Facility: OTHER | Age: 73
End: 2023-05-20

## 2023-05-31 ENCOUNTER — TELEPHONE (OUTPATIENT)
Dept: FAMILY MEDICINE | Facility: OTHER | Age: 73
End: 2023-05-31

## 2023-05-31 ENCOUNTER — VIRTUAL VISIT (OUTPATIENT)
Dept: FAMILY MEDICINE | Facility: OTHER | Age: 73
End: 2023-05-31
Payer: COMMERCIAL

## 2023-05-31 DIAGNOSIS — R30.0 DYSURIA: Primary | ICD-10-CM

## 2023-05-31 PROCEDURE — 99213 OFFICE O/P EST LOW 20 MIN: CPT | Mod: 95 | Performed by: PHYSICIAN ASSISTANT

## 2023-05-31 NOTE — PROGRESS NOTES
Amee is a 72 year old who is being evaluated via a billable telephone visit.      What phone number would you like to be contacted at? [phone]  How would you like to obtain your AVS? Fifi   Distant Location (provider location):  On-site    Assessment & Plan       ICD-10-CM    1. Dysuria  R30.0 UA Macroscopic with reflex to Microscopic and Culture          Labs pending for tomorrow. Will discuss treatment options once results are back.  She is seeing Dr. Workman in July. We discussed the possibility of trying vaginal estrogen in the meantime. She will contact the clinic if she wants to try this.    *UA macro and micro do not reveal obvious infection but will send for culture.  Continue with plenty of fluids and if symptoms change/worsen, contact the clinic.      DESIREE Negrete [facility]    Pam Lubin is a 72 year old, presenting for the following health issues:  UTI        5/31/2023     1:45 PM   Additional Questions   Roomed by Marleen DOUGLASS   Accompanied by self     HPI     Genitourinary - Female  Onset/Duration: 3 weeks  Description:   Painful urination (Dysuria): YES           Frequency: YES  Blood in urine (Hematuria): No  Delay in urine (Hesitency): No  Intensity: mild  Progression of Symptoms:  same and constant  Accompanying Signs & Symptoms:  Fever/chills: No  Flank pain: No  Nausea and vomiting: No  Vaginal symptoms: none  Abdominal/Pelvic Pain: No  History:   History of frequent UTI s: YES  History of kidney stones: No  Sexually Active: No  Possibility of pregnancy: No  Precipitating or alleviating factors: None  Therapies tried and outcome:  none      History of recurrent cystitis, especially over the past year. She was treated with Keflex for 5 days on May 3 but symptoms never seemed to go away and were more noticeable 1 week after she finished the medication. She still has intermittent burning upon urination along with frequency. No fevers, chills or flank  pain. She has a lab appointment scheduled for tomorrow.      Review of Systems   Constitutional, HEENT, cardiovascular, pulmonary, gi and gu systems are negative, except as otherwise noted.      Objective    Vitals - Patient Reported  Weight (Patient Reported): 66.7 kg (147 lb)  Pain Score: No Pain (0)        Physical Exam   healthy, alert and no distress  PSYCH: Alert and oriented times 3; coherent speech, normal   rate and volume, able to articulate logical thoughts, able   to abstract reason, no tangential thoughts, no hallucinations   or delusions  Her affect is normal  RESP: No cough, no audible wheezing, able to talk in full sentences  Remainder of exam unable to be completed due to telephone visits        Phone call duration: 6:30 minutes

## 2023-05-31 NOTE — TELEPHONE ENCOUNTER
Pt thinks her UTI never fully cleared up from a few weeks ago. Asking if a urine lab can be placed or new prescription placed. Uses [facility]. Is having intermittent burning with urination.

## 2023-05-31 NOTE — TELEPHONE ENCOUNTER
Pt was notified. Not happy with having to have another appointment but did agree to do virtual and scheduled lab.

## 2023-05-31 NOTE — PROGRESS NOTES
"Amee is a 72 year old who is being evaluated via a billable video visit.      How would you like to obtain your AVS? MyChart  If the video visit is dropped, the invitation should be resent by: Text to cell phone: [phone]  Will anyone else be joining your video visit? No  {If patient encounters technical issues they should call [phone] :[id number]}        {PROVIDER CHARTING PREFERENCE:854854}    Subjective   Amee is a 72 year old, presenting for the following health issues:  UTI        5/31/2023     1:45 PM   Additional Questions   Roomed by Marleen DOUGLASS   Accompanied by self     UTI         Genitourinary - Female  Onset/Duration: 3 weeks  Description:   Painful urination (Dysuria): YES           Frequency: YES  Blood in urine (Hematuria): No  Delay in urine (Hesitency): No  Intensity: mild  Progression of Symptoms:  same and constant  Accompanying Signs & Symptoms:  Fever/chills: No  Flank pain: No  Nausea and vomiting: No  Vaginal symptoms: none  Abdominal/Pelvic Pain: No  History:   History of frequent UTI s: YES  History of kidney stones: No  Sexually Active: No  Possibility of pregnancy: No  Precipitating or alleviating factors: None  Therapies tried and outcome:  none      {additonal problems for provider to add (Optional):512228}      Review of Systems   {ROS COMP (Optional):753169}      Objective    Vitals - Patient Reported  Weight (Patient Reported): 147 lb (66.7 kg)  Pain Score: No Pain (0)        Physical Exam   {video visit exam brief selected:747664::\"GENERAL: Healthy, alert and no distress\",\"EYES: Eyes grossly normal to inspection.  No discharge or erythema, or obvious scleral/conjunctival abnormalities.\",\"RESP: No audible wheeze, cough, or visible cyanosis.  No visible retractions or increased work of breathing.  \",\"SKIN: Visible skin clear. No significant rash, abnormal pigmentation or lesions.\",\"NEURO: Cranial nerves grossly intact.  Mentation and speech appropriate for age.\",\"PSYCH: Mentation " "appears normal, affect normal/bright, judgement and insight intact, normal speech and appearance well-groomed.\"}    {Diagnostic Test Results (Optional):509928}    {AMBULATORY ATTESTATION (Optional):608416}        Video-Visit Details    Type of service:  Video Visit   Video Start Time: {video visit start/end time for provider to select:078798}  Video End Time:{video visit start/end time for provider to select:517233}    Originating Location (pt. Location): {video visit patient location:310119::\"Home\"}  {PROVIDER LOCATION On-site should be selected for visits conducted from your clinic location or adjoining [facility] hospital, academic office, or other nearby [facility] building. Off-site should be selected for all other provider locations, including home:082158}  Distant Location (provider location):  {virtual location provider:554920}  Platform used for Video Visit: {Virtual Visit Platforms:357974::\"Torrential\"}    "

## 2023-05-31 NOTE — TELEPHONE ENCOUNTER
Process for appointments she was seen and treated and not improved. Please advise. It has also been over 2 weeks so would need a urine and culture.     This includes   E-visit, virtual etc.       ROBYN Bojorquez CNP  Questions or concerns please feel free to send me a [employer] message or call me  Phone : [phone]

## 2023-06-01 ENCOUNTER — LAB (OUTPATIENT)
Dept: LAB | Facility: OTHER | Age: 73
End: 2023-06-01
Payer: COMMERCIAL

## 2023-06-01 DIAGNOSIS — R30.0 DYSURIA: ICD-10-CM

## 2023-06-01 LAB
ALBUMIN UR-MCNC: NEGATIVE MG/DL
AMORPH CRY #/AREA URNS HPF: ABNORMAL /HPF
APPEARANCE UR: CLEAR
BACTERIA #/AREA URNS HPF: ABNORMAL /HPF
BILIRUB UR QL STRIP: NEGATIVE
COLOR UR AUTO: YELLOW
GLUCOSE UR STRIP-MCNC: NEGATIVE MG/DL
HGB UR QL STRIP: ABNORMAL
KETONES UR STRIP-MCNC: NEGATIVE MG/DL
LEUKOCYTE ESTERASE UR QL STRIP: ABNORMAL
NITRATE UR QL: NEGATIVE
PH UR STRIP: 7 [PH] (ref 5–7)
RBC #/AREA URNS AUTO: ABNORMAL /HPF
SP GR UR STRIP: 1.02 (ref 1–1.03)
UROBILINOGEN UR STRIP-ACNC: 0.2 E.U./DL
WBC #/AREA URNS AUTO: ABNORMAL /HPF

## 2023-06-01 PROCEDURE — 81001 URINALYSIS AUTO W/SCOPE: CPT

## 2023-06-01 PROCEDURE — 87086 URINE CULTURE/COLONY COUNT: CPT

## 2023-06-02 ENCOUNTER — MYC MEDICAL ADVICE (OUTPATIENT)
Dept: FAMILY MEDICINE | Facility: OTHER | Age: 73
End: 2023-06-02
Payer: COMMERCIAL

## 2023-06-02 ENCOUNTER — HEALTH MAINTENANCE LETTER (OUTPATIENT)
Age: 73
End: 2023-06-02

## 2023-06-02 DIAGNOSIS — N30.90 RECURRENT CYSTITIS: Primary | ICD-10-CM

## 2023-06-02 RX ORDER — ESTRADIOL 0.1 MG/G
CREAM VAGINAL
Qty: 42.5 G | Refills: 1 | Status: SHIPPED | OUTPATIENT
Start: 2023-06-02 | End: 2023-07-17

## 2023-06-02 NOTE — TELEPHONE ENCOUNTER
Virtual visit on 5/31/23 this was discussed.    Eli Simpson RN  [facility] ~ Registered Nurse  Clinic Triage ~ [place] & [place]  June 2, 2023

## 2023-06-03 LAB — BACTERIA UR CULT: NO GROWTH

## 2023-06-05 ASSESSMENT — ENCOUNTER SYMPTOMS
STIFFNESS: 1
EYE WATERING: 1
EYE IRRITATION: 1
MYALGIAS: 1
BACK PAIN: 1
EYE PAIN: 0

## 2023-06-06 ENCOUNTER — VIRTUAL VISIT (OUTPATIENT)
Dept: ENDOCRINOLOGY | Facility: CLINIC | Age: 73
End: 2023-06-06
Payer: COMMERCIAL

## 2023-06-06 DIAGNOSIS — K21.9 GASTROESOPHAGEAL REFLUX DISEASE WITHOUT ESOPHAGITIS: Primary | ICD-10-CM

## 2023-06-06 DIAGNOSIS — M81.0 AGE-RELATED OSTEOPOROSIS WITHOUT CURRENT PATHOLOGICAL FRACTURE: ICD-10-CM

## 2023-06-06 PROCEDURE — 99204 OFFICE O/P NEW MOD 45 MIN: CPT | Mod: VID | Performed by: INTERNAL MEDICINE

## 2023-06-06 RX ORDER — ALBUTEROL SULFATE 0.83 MG/ML
2.5 SOLUTION RESPIRATORY (INHALATION)
Status: CANCELLED | OUTPATIENT
Start: 2023-06-06

## 2023-06-06 RX ORDER — DIPHENHYDRAMINE HYDROCHLORIDE 50 MG/ML
50 INJECTION INTRAMUSCULAR; INTRAVENOUS
Status: CANCELLED
Start: 2023-06-06

## 2023-06-06 RX ORDER — METHYLPREDNISOLONE SODIUM SUCCINATE 125 MG/2ML
125 INJECTION, POWDER, LYOPHILIZED, FOR SOLUTION INTRAMUSCULAR; INTRAVENOUS
Status: CANCELLED
Start: 2023-06-06

## 2023-06-06 RX ORDER — HEPARIN SODIUM,PORCINE 10 UNIT/ML
5 VIAL (ML) INTRAVENOUS
Status: CANCELLED | OUTPATIENT
Start: 2023-06-06

## 2023-06-06 RX ORDER — EPINEPHRINE 1 MG/ML
0.3 INJECTION, SOLUTION, CONCENTRATE INTRAVENOUS EVERY 5 MIN PRN
Status: CANCELLED | OUTPATIENT
Start: 2023-06-06

## 2023-06-06 RX ORDER — ACETAMINOPHEN 325 MG/1
975 TABLET ORAL ONCE
Status: CANCELLED | OUTPATIENT
Start: 2023-06-06

## 2023-06-06 RX ORDER — ALBUTEROL SULFATE 90 UG/1
1-2 AEROSOL, METERED RESPIRATORY (INHALATION)
Status: CANCELLED
Start: 2023-06-06

## 2023-06-06 RX ORDER — HEPARIN SODIUM (PORCINE) LOCK FLUSH IV SOLN 100 UNIT/ML 100 UNIT/ML
5 SOLUTION INTRAVENOUS
Status: CANCELLED | OUTPATIENT
Start: 2023-06-06

## 2023-06-06 RX ORDER — ZOLEDRONIC ACID 5 MG/100ML
5 INJECTION, SOLUTION INTRAVENOUS ONCE
Status: CANCELLED
Start: 2023-06-06

## 2023-06-06 NOTE — LETTER
6/6/2023         RE: Amee Crews  [address] [facility] [id number]        Dear Colleague,    Thank you for referring your patient, Amee Crews, to the [facility] ENDOCRINOLOGY. Please see a copy of my visit note below.    Amee is a 72 year old who is being evaluated via a billable video visit.      How would you like to obtain your AVS? MyChart  If the video visit is dropped, the invitation should be resent by: Text to cell phone: [phone]  Will anyone else be joining your video visit? No  Endocrine Consult Video visit note    Attending Assessment/Plan :        Age-related osteoporosis without current pathological fracture  Gastroesophageal reflux disease without esophagitis    Assessment:  -Osteoporosis by DEXA  -would benefit from bisphosphonate  -given frequent acid reflux, would recommend starting with Reclast rather than oral bisphosphonate     Plan:  -schedule reclast at [facility]  -ordered DEXA to be done 1-2 weeks ahead of followup to monitor response to therapy  -counseled pt on calcium intake     Calcium supplementation:  -recommend getting 1200mg elemental calcium daily through a combination of dietary intake (~300mg from each serving of milk, cheese, dairy), calcium carbonate (300mg elemental in each 750mg dose of Calcium Carbonate), or calcium citrate (300mg elemental in each 1500mg dose of calcium citrate).  Recommend Calcium citrate for supplementation in those taking gastric acid reducing medications such as PPI. Limit supplementation to 300mg elemental at one time to optimize absorption.      I have independently reviewed and interpreted labs, imaging as indicated.    RTC: 1 year with dexa 1-2 weeks ahead of visit if insurance will cover    Chief complaint:  Amee is a 72 year old female seen in consultation for metabolic bone.      HISTORY OF PRESENT ILLNESS    Amee is a 72F referred to endocrinology for evaluation of osteoporosis.      Fracture hx: fell out of shower and hit toilet and fractured a rib.  Had other falls but did not sustain fractures   Chronic glucocorticoid use: previous bursts of prednisone for sinus issues in the past.  Believes she had 5-6 total bursts over that period of time  Previous osteoporosis treatment(s): none   Hx of hypercalcemia: none  Hx of nephrolithiasis: none   Estrogen Post menopause: none.  Will be starting estrogen vaginal cream for recurrent UTIs.      Contraindications to osteoporosis treatment options:  Bisphosphonates:   -Has history of GERD   -No history of kidney disease  PTH Analogs:   -Denies hx of hypercalcemia   -Denies known history of nephrolithiasis   -Denies known family history of osteosarcoma   -Denies history of external beam radiation therapy  Evenity:   -Denies MI or CVA within the last 12 months    No upcoming invasive dental work.      Calcium intake:  -dietary: eats cheese.  Doesn't drink milk.    -supplement: none     Vitamin D supplementation: daily.  Doesn't know dose     Endocrine relevant labs and/or imaging are as follows:      Most recent DEXA scan done 10/6/2022 shows:    COMPARISON:   11/2/2020     Lumbar spine T-score in region of L1-L4 (L3)= -1.9   Lumbar percent change: Not significant%      HIPS:  Mean total hip T-score: -2.0  Mean total hip percent change: Not significant%      Left femoral neck T-score = -2.7  Right femoral neck T-score= -2.5      FRAX:  10 year probability of major osteoporotic fracture: 16.5%  10 year probability of hip fracture: 4.8%    REVIEW OF SYSTEMS  Answers for HPI/ROS submitted by the patient on 6/5/2023  General Symptoms: No  Skin Symptoms: No  HENT Symptoms: No  EYE SYMPTOMS: Yes  HEART SYMPTOMS: No  LUNG SYMPTOMS: No  INTESTINAL SYMPTOMS: No  URINARY SYMPTOMS: No  GYNECOLOGIC SYMPTOMS: No  BREAST SYMPTOMS: No  SKELETAL SYMPTOMS: Yes  BLOOD SYMPTOMS: No  NERVOUS SYSTEM SYMPTOMS: No  MENTAL HEALTH SYMPTOMS: No  Eye pain: No  Vision loss:  No  Watery eyes: Yes  Eye irritation: Yes  Back pain: Yes  Muscle aches: Yes  Joint stiffness: Yes  Bone fracture: Yes      10 system ROS otherwise as per the HPI or negative    Past Medical History  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     Allergic rhinitis      Allergies      asthma     mild intermittent     Chronic back pain     neck and low back     HA (headache)     muscle contraction      History of blood transfusion     In childhood     Hot flashes      Hyperlipidemia      Insomnia      Intermittent asthma 9/16/2011     Major depressive disorder, single episode, moderate (H)      Ocular migraine      Osteoarthritis of hand      Restless leg 9/16/2011       Medications  Current Outpatient Medications   Medication Sig Dispense Refill     albuterol (PROAIR HFA/PROVENTIL HFA/VENTOLIN HFA) 108 (90 Base) MCG/ACT inhaler Inhale 2 puffs into the lungs every 4 hours as needed for shortness of breath 18 g 5     APRODINE 2.5-60 MG TABS per tablet TAKE ONE TABLET BY MOUTH EVERY 6 HOURS AS NEEDED FOR ALLERGIES (MAX 4 TABS/24 HRS) 100 tablet 0     azelastine (ASTELIN) 0.1 % nasal spray Spray 1 spray into both nostrils 2 times daily 1 Bottle 2     estradiol (ESTRACE) 0.1 MG/GM vaginal cream Insert 2 g vaginally daily for 2 weeks then twice weekly thereafter 42.5 g 1     fluticasone (FLONASE) 50 MCG/ACT nasal spray Spray 2 sprays into both nostrils daily 16 g 1     fluticasone (FLOVENT HFA) 220 MCG/ACT inhaler 1 puff twice daily, rinse mouth out after Medication 12 g 11     lidocaine (LIDODERM) 5 % patch Place 1 patch onto the skin every 24 hours To prevent lidocaine toxicity, patient should be patch free for 12 hrs daily. 12 patch 0     magnesium 200 MG TABS Take 200 mg by mouth daily 1 tablet 0     omeprazole (PRILOSEC) 40 MG DR capsule TAKE ONE CAPSULE BY MOUTH ONCE DAILY BEFORE BREAKFAST 90 capsule 2     pramipexole (MIRAPEX) 0.25 MG tablet Take 2 tablets (0.5 mg) by  mouth At Bedtime 180 tablet 0     rosuvastatin (CRESTOR) 10 MG tablet Take 1 tablet (10 mg) by mouth daily 90 tablet 3     rosuvastatin (CRESTOR) 5 MG tablet Take 1 tablet (5 mg) by mouth every other day 90 tablet 0     sertraline (ZOLOFT) 100 MG tablet Take 1 tablet (100 mg) by mouth daily 90 tablet 1     SUMAtriptan (IMITREX) 50 MG tablet TAKE ONE TABLET AT ONSET OF HEADACHE. MAY REPEAT AFTER 2 HOURS. DO NOT EXCEED 200 MG IN 24 HOURS 18 tablet 5     traZODone (DESYREL) 100 MG tablet TAKE ONE TABLET BY MOUTH AT BEDTIME 90 tablet 0     vitamin B-12 (CYANOCOBALAMIN) 1000 MCG tablet Take 1 tablet (1,000 mcg) by mouth daily 90 tablet 0     Vitamin D3 50 mcg (2000 units) tablet Take 1 tablet (50 mcg) by mouth every other day 90 tablet 0       Allergies  Allergies   Allergen Reactions     Atorvastatin Cramps     Muscle pain     Morphine Itching     Morphine Sulfate Itching         Family History  family history includes Cancer in her father and maternal grandfather; Diabetes in her mother; Heart Disease in an other family member; Hypertension in her mother.    Social History  Social History     Tobacco Use     Smoking status: Former     Packs/day: 0.00     Years: 0.00     Pack years: 0.00     Types: Cigarettes     Quit date: 1973     Years since quittin.4     Smokeless tobacco: Never     Tobacco comments:     Social smoker   Vaping Use     Vaping status: Never Used   Substance Use Topics     Alcohol use: Yes     Alcohol/week: 0.0 standard drinks of alcohol     Comment: one or two a day     Drug use: No     Physical Exam  There is no height or weight on file to calculate BMI.  GENERAL: no distress  SKIN: Visible skin clear. No significant rash, abnormal pigmentation or lesions.  EYES: Eyes grossly normal to inspection.  No discharge or erythema, or obvious scleral/conjunctival abnormalities.  NECK: visible goiter is not present; no visible neck masses  RESP: No audible wheeze, cough, or visible cyanosis.  No  visible retractions or increased work of breathing.    NEURO: Awake, alert, responds appropriately to questions.  Mentation and speech fluent.  PSYCH:affect normal and appearance well-groomed.    Video-Visit Details    Type of service:  Video Visit    Video Start Time (time video started): 1600    Video End Time (time video stopped): 1635    Originating Location (pt. Location): Home        Distant Location (provider location):  Off-site    Mode of Communication:  Video Conference via [facility]    Physician has received verbal consent for a Video Visit from the patient? Yes    I spent a total of 45 minutes on the day of this new patient visit on chart review, lab review, review of imaging, coordinating care, exam and counseling of patient    Marcelino Centeno MD             Again, thank you for allowing me to participate in the care of your patient.        Sincerely,        Marcelino Centeno MD

## 2023-06-06 NOTE — PROGRESS NOTES
Amee is a 72 year old who is being evaluated via a billable video visit.      How would you like to obtain your AVS? MyChart  If the video visit is dropped, the invitation should be resent by: Text to cell phone: [phone]  Will anyone else be joining your video visit? No  Endocrine Consult Video visit note    Attending Assessment/Plan :        Age-related osteoporosis without current pathological fracture  Gastroesophageal reflux disease without esophagitis    Assessment:  -Osteoporosis by DEXA  -would benefit from bisphosphonate  -given frequent acid reflux, would recommend starting with Reclast rather than oral bisphosphonate     Plan:  -schedule reclast at [facility]  -ordered DEXA to be done 1-2 weeks ahead of followup to monitor response to therapy  -counseled pt on calcium intake     Calcium supplementation:  -recommend getting 1200mg elemental calcium daily through a combination of dietary intake (~300mg from each serving of milk, cheese, dairy), calcium carbonate (300mg elemental in each 750mg dose of Calcium Carbonate), or calcium citrate (300mg elemental in each 1500mg dose of calcium citrate).  Recommend Calcium citrate for supplementation in those taking gastric acid reducing medications such as PPI. Limit supplementation to 300mg elemental at one time to optimize absorption.      I have independently reviewed and interpreted labs, imaging as indicated.    RTC: 1 year with dexa 1-2 weeks ahead of visit if insurance will cover    Chief complaint:  Amee is a 72 year old female seen in consultation for metabolic bone.      HISTORY OF PRESENT ILLNESS    Amee is a 72F referred to endocrinology for evaluation of osteoporosis.     Fracture hx: fell out of shower and hit toilet and fractured a rib.  Had other falls but did not sustain fractures   Chronic glucocorticoid use: previous bursts of prednisone for sinus issues in the past.  Believes she had 5-6 total bursts over that period  of time  Previous osteoporosis treatment(s): none   Hx of hypercalcemia: none  Hx of nephrolithiasis: none   Estrogen Post menopause: none.  Will be starting estrogen vaginal cream for recurrent UTIs.      Contraindications to osteoporosis treatment options:  Bisphosphonates:   -Has history of GERD   -No history of kidney disease  PTH Analogs:   -Denies hx of hypercalcemia   -Denies known history of nephrolithiasis   -Denies known family history of osteosarcoma   -Denies history of external beam radiation therapy  Evenity:   -Denies MI or CVA within the last 12 months    No upcoming invasive dental work.      Calcium intake:  -dietary: eats cheese.  Doesn't drink milk.    -supplement: none     Vitamin D supplementation: daily.  Doesn't know dose     Endocrine relevant labs and/or imaging are as follows:      Most recent DEXA scan done 10/6/2022 shows:    COMPARISON:   11/2/2020     Lumbar spine T-score in region of L1-L4 (L3)= -1.9   Lumbar percent change: Not significant%      HIPS:  Mean total hip T-score: -2.0  Mean total hip percent change: Not significant%      Left femoral neck T-score = -2.7  Right femoral neck T-score= -2.5      FRAX:  10 year probability of major osteoporotic fracture: 16.5%  10 year probability of hip fracture: 4.8%    REVIEW OF SYSTEMS  Answers for HPI/ROS submitted by the patient on 6/5/2023  General Symptoms: No  Skin Symptoms: No  HENT Symptoms: No  EYE SYMPTOMS: Yes  HEART SYMPTOMS: No  LUNG SYMPTOMS: No  INTESTINAL SYMPTOMS: No  URINARY SYMPTOMS: No  GYNECOLOGIC SYMPTOMS: No  BREAST SYMPTOMS: No  SKELETAL SYMPTOMS: Yes  BLOOD SYMPTOMS: No  NERVOUS SYSTEM SYMPTOMS: No  MENTAL HEALTH SYMPTOMS: No  Eye pain: No  Vision loss: No  Watery eyes: Yes  Eye irritation: Yes  Back pain: Yes  Muscle aches: Yes  Joint stiffness: Yes  Bone fracture: Yes      10 system ROS otherwise as per the HPI or negative    Past Medical History  Past Medical History:   Diagnosis Date     Adult vitelliform  macular degeneration 8/31/2017     Allergic rhinitis      Allergies      asthma     mild intermittent     Chronic back pain     neck and low back     HA (headache)     muscle contraction      History of blood transfusion     In childhood     Hot flashes      Hyperlipidemia      Insomnia      Intermittent asthma 9/16/2011     Major depressive disorder, single episode, moderate (H)      Ocular migraine      Osteoarthritis of hand      Restless leg 9/16/2011       Medications  Current Outpatient Medications   Medication Sig Dispense Refill     albuterol (PROAIR HFA/PROVENTIL HFA/VENTOLIN HFA) 108 (90 Base) MCG/ACT inhaler Inhale 2 puffs into the lungs every 4 hours as needed for shortness of breath 18 g 5     APRODINE 2.5-60 MG TABS per tablet TAKE ONE TABLET BY MOUTH EVERY 6 HOURS AS NEEDED FOR ALLERGIES (MAX 4 TABS/24 HRS) 100 tablet 0     azelastine (ASTELIN) 0.1 % nasal spray Spray 1 spray into both nostrils 2 times daily 1 Bottle 2     estradiol (ESTRACE) 0.1 MG/GM vaginal cream Insert 2 g vaginally daily for 2 weeks then twice weekly thereafter 42.5 g 1     fluticasone (FLONASE) 50 MCG/ACT nasal spray Spray 2 sprays into both nostrils daily 16 g 1     fluticasone (FLOVENT HFA) 220 MCG/ACT inhaler 1 puff twice daily, rinse mouth out after Medication 12 g 11     lidocaine (LIDODERM) 5 % patch Place 1 patch onto the skin every 24 hours To prevent lidocaine toxicity, patient should be patch free for 12 hrs daily. 12 patch 0     magnesium 200 MG TABS Take 200 mg by mouth daily 1 tablet 0     omeprazole (PRILOSEC) 40 MG DR capsule TAKE ONE CAPSULE BY MOUTH ONCE DAILY BEFORE BREAKFAST 90 capsule 2     pramipexole (MIRAPEX) 0.25 MG tablet Take 2 tablets (0.5 mg) by mouth At Bedtime 180 tablet 0     rosuvastatin (CRESTOR) 10 MG tablet Take 1 tablet (10 mg) by mouth daily 90 tablet 3     rosuvastatin (CRESTOR) 5 MG tablet Take 1 tablet (5 mg) by mouth every other day 90 tablet 0     sertraline (ZOLOFT) 100 MG tablet Take 1  tablet (100 mg) by mouth daily 90 tablet 1     SUMAtriptan (IMITREX) 50 MG tablet TAKE ONE TABLET AT ONSET OF HEADACHE. MAY REPEAT AFTER 2 HOURS. DO NOT EXCEED 200 MG IN 24 HOURS 18 tablet 5     traZODone (DESYREL) 100 MG tablet TAKE ONE TABLET BY MOUTH AT BEDTIME 90 tablet 0     vitamin B-12 (CYANOCOBALAMIN) 1000 MCG tablet Take 1 tablet (1,000 mcg) by mouth daily 90 tablet 0     Vitamin D3 50 mcg (2000 units) tablet Take 1 tablet (50 mcg) by mouth every other day 90 tablet 0       Allergies  Allergies   Allergen Reactions     Atorvastatin Cramps     Muscle pain     Morphine Itching     Morphine Sulfate Itching         Family History  family history includes Cancer in her father and maternal grandfather; Diabetes in her mother; Heart Disease in an other family member; Hypertension in her mother.    Social History  Social History     Tobacco Use     Smoking status: Former     Packs/day: 0.00     Years: 0.00     Pack years: 0.00     Types: Cigarettes     Quit date: 1973     Years since quittin.4     Smokeless tobacco: Never     Tobacco comments:     Social smoker   Vaping Use     Vaping status: Never Used   Substance Use Topics     Alcohol use: Yes     Alcohol/week: 0.0 standard drinks of alcohol     Comment: one or two a day     Drug use: No     Physical Exam  There is no height or weight on file to calculate BMI.  GENERAL: no distress  SKIN: Visible skin clear. No significant rash, abnormal pigmentation or lesions.  EYES: Eyes grossly normal to inspection.  No discharge or erythema, or obvious scleral/conjunctival abnormalities.  NECK: visible goiter is not present; no visible neck masses  RESP: No audible wheeze, cough, or visible cyanosis.  No visible retractions or increased work of breathing.    NEURO: Awake, alert, responds appropriately to questions.  Mentation and speech fluent.  PSYCH:affect normal and appearance well-groomed.    Video-Visit Details    Type of service:  Video Visit    Video Start  Time (time video started): 1600    Video End Time (time video stopped): 1635    Originating Location (pt. Location): Home        Distant Location (provider location):  Off-site    Mode of Communication:  Video Conference via [facility]    Physician has received verbal consent for a Video Visit from the patient? Yes    I spent a total of 45 minutes on the day of this new patient visit on chart review, lab review, review of imaging, coordinating care, exam and counseling of patient    Marcelino Centeno MD

## 2023-06-06 NOTE — PATIENT INSTRUCTIONS
Please reach out to the following centers to schedule your bone density appointment.  Schedule this at [place] (where you got your last one) about 1-2 weeks before your followup with  in 1 year.     If insurance won't cover it at that time, skip it and come for your followup.     Schedule Reclast infusion with the [facility].  They will contact you after insurance approval.  That will take a short amount of time but their number is below if you haven't heard from us in 2 weeks or so.       Imaging (DEXA, CT, MRI, XRAY)    [facility] [place] [place] [phone]           Infusion    [place] [phone]     For any questions, please reach out to the [facility] Number for assistance: [phone].

## 2023-06-09 NOTE — TELEPHONE ENCOUNTER
PRIOR AUTHORIZATION DENIED    Medication: LIDOCAINE 5 % EX PTCH  Insurance Company: [employer] [place] - Phone [phone] Fax [phone]  Denial Date: 5/19/2023  Denial Rational:     Appeal Information:     Patient Notified: No

## 2023-06-29 DIAGNOSIS — G47.00 INSOMNIA, UNSPECIFIED TYPE: ICD-10-CM

## 2023-06-29 RX ORDER — TRAZODONE HYDROCHLORIDE 100 MG/1
TABLET ORAL
Qty: 90 TABLET | Refills: 0 | Status: SHIPPED | OUTPATIENT
Start: 2023-06-29 | End: 2023-10-04

## 2023-07-11 ENCOUNTER — MYC MEDICAL ADVICE (OUTPATIENT)
Dept: FAMILY MEDICINE | Facility: OTHER | Age: 73
End: 2023-07-11
Payer: COMMERCIAL

## 2023-07-11 DIAGNOSIS — M79.644 PAIN OF FINGER OF RIGHT HAND: Primary | ICD-10-CM

## 2023-07-11 DIAGNOSIS — J30.1 CHRONIC SEASONAL ALLERGIC RHINITIS DUE TO POLLEN: ICD-10-CM

## 2023-07-11 DIAGNOSIS — M79.641 PAIN OF RIGHT HAND: ICD-10-CM

## 2023-07-11 NOTE — TELEPHONE ENCOUNTER
She could see me but it would be 2 weeks as I am out next week and do not have any availability this week. I can put a orthopedic referral in for her as that would be a good start as well. It sounds like a tendonitis.         ROBYN Bojorquez CNP  Questions or concerns please feel free to send me a Clipboard message or call me  Phone : [phone]

## 2023-07-12 RX ORDER — PSEUDOEPHEDRINE HCL AND TRIPOLIDINE 60; 2.5 MG/1; MG/1
TABLET, FILM COATED ORAL
Qty: 100 TABLET | Refills: 0 | Status: SHIPPED | OUTPATIENT
Start: 2023-07-12 | End: 2023-08-30

## 2023-07-12 NOTE — TELEPHONE ENCOUNTER
Please have patient do ACT and due for MAW so please schedule. Refill given.       ROBYN Bojorquez CNP  Questions or concerns please feel free to send me a [employer] message or call me  Phone : [phone]

## 2023-07-14 DIAGNOSIS — G25.81 RESTLESS LEG: ICD-10-CM

## 2023-07-14 RX ORDER — PRAMIPEXOLE DIHYDROCHLORIDE 0.25 MG/1
TABLET ORAL
Qty: 180 TABLET | Refills: 0 | Status: SHIPPED | OUTPATIENT
Start: 2023-07-14 | End: 2023-09-19

## 2023-07-14 NOTE — TELEPHONE ENCOUNTER
"Pending Prescriptions:                       Disp   Refills    pramipexole (MIRAPEX) 0.25 MG tablet [Phar*180 ta*0        Sig: TAKE TWO TABLETS BY MOUTH AT BEDTIME    Routing refill request to provider for review/approval because:  Labs not current:      PHQ-9 score:        5/3/2023     8:19 PM   PHQ   PHQ-9 Total Score 0    0   Q9: Thoughts of better off dead/self-harm past 2 weeks Not at all    Not at all         Requested Prescriptions   Pending Prescriptions Disp Refills    pramipexole (MIRAPEX) 0.25 MG tablet [Pharmacy Med Name: PRAMIPXOLE DIHYDRO 0.25 TABS] 180 tablet 0     Sig: TAKE TWO TABLETS BY MOUTH AT BEDTIME       Antiparkinson's Agents Protocol Failed - 7/14/2023  5:09 AM        Failed - CBC on record in past 12 months     Recent Labs   Lab Test 04/25/22  1051   WBC 6.8   RBC 4.02   HGB 13.0   HCT 39.0                    Passed - Blood pressure under 140/90 in past 12 months     BP Readings from Last 3 Encounters:   05/08/23 135/73   03/30/23 129/74   02/21/23 137/84                 Passed - ALT on record in past 12 months         Recent Labs   Lab Test 05/03/23  0929   ALT 25             Passed - Serum Creatinine on file in past 12 months     Recent Labs   Lab Test 05/03/23  0929   CR 0.87       Ok to refill medication if creatinine is low          Passed - Medication is active on med list        Passed - Patient is age 18 or older        Passed - No active pregnancy on record        Passed - No positive pregnancy test in the past 12 months        Passed - Recent (6 mo) or future (30 days) visit within the authorizing provider's specialty     Patient had office visit in the last 6 months or has a visit in the next 30 days with authorizing provider or within the authorizing provider's specialty.  See \"Patient Info\" tab in inbasket, or \"Choose Columns\" in Meds & Orders section of the refill encounter.                       "

## 2023-07-17 ENCOUNTER — OFFICE VISIT (OUTPATIENT)
Dept: UROLOGY | Facility: CLINIC | Age: 73
End: 2023-07-17
Attending: NURSE PRACTITIONER
Payer: COMMERCIAL

## 2023-07-17 VITALS — OXYGEN SATURATION: 99 % | HEART RATE: 84 BPM | DIASTOLIC BLOOD PRESSURE: 71 MMHG | SYSTOLIC BLOOD PRESSURE: 132 MMHG

## 2023-07-17 DIAGNOSIS — R53.83 OTHER FATIGUE: Primary | ICD-10-CM

## 2023-07-17 DIAGNOSIS — Z87.440 HISTORY OF UTI: ICD-10-CM

## 2023-07-17 PROCEDURE — 51798 US URINE CAPACITY MEASURE: CPT | Performed by: UROLOGY

## 2023-07-17 PROCEDURE — 99203 OFFICE O/P NEW LOW 30 MIN: CPT | Mod: 25 | Performed by: UROLOGY

## 2023-07-17 NOTE — PROGRESS NOTES
S: Patient is a pleasant 72-year-old  female who was requested to be seen by Ary Adams for a consultation with regard to patient's history of UTI.  Patient complains of being fatigued occasionally with some intermittent urgency and dysuria.  She has been diagnosed with UTIs and has been getting antibiotics for it.  This started several months ago per patient.  Now she is feeling fine without any problems.  She had a renal ultrasound done recently that was unremarkable.  Her urine tests were reviewed extensively by me.  The only positive urine culture she had was in January of this year.  The rest of her urine tests were completely unremarkable.  Current Outpatient Medications   Medication Sig Dispense Refill     albuterol (PROAIR HFA/PROVENTIL HFA/VENTOLIN HFA) 108 (90 Base) MCG/ACT inhaler Inhale 2 puffs into the lungs every 4 hours as needed for shortness of breath 18 g 5     APRODINE 2.5-60 MG TABS per tablet TAKE ONE TABLET BY MOUTH EVERY 6 HOURS AS NEEDED 100 tablet 0     azelastine (ASTELIN) 0.1 % nasal spray Spray 1 spray into both nostrils 2 times daily 1 Bottle 2     fluticasone (FLONASE) 50 MCG/ACT nasal spray Spray 2 sprays into both nostrils daily 16 g 1     fluticasone (FLOVENT HFA) 220 MCG/ACT inhaler 1 puff twice daily, rinse mouth out after Medication 12 g 11     lidocaine (LIDODERM) 5 % patch Place 1 patch onto the skin every 24 hours To prevent lidocaine toxicity, patient should be patch free for 12 hrs daily. 12 patch 0     magnesium 200 MG TABS Take 200 mg by mouth daily 1 tablet 0     omeprazole (PRILOSEC) 40 MG DR capsule TAKE ONE CAPSULE BY MOUTH ONCE DAILY BEFORE BREAKFAST 90 capsule 2     pramipexole (MIRAPEX) 0.25 MG tablet TAKE TWO TABLETS BY MOUTH AT BEDTIME 180 tablet 0     rosuvastatin (CRESTOR) 10 MG tablet Take 1 tablet (10 mg) by mouth daily 90 tablet 3     rosuvastatin (CRESTOR) 5 MG tablet Take 1 tablet (5 mg) by mouth every other day 90 tablet 0     sertraline (ZOLOFT)  100 MG tablet Take 1 tablet (100 mg) by mouth daily 90 tablet 1     SUMAtriptan (IMITREX) 50 MG tablet TAKE ONE TABLET AT ONSET OF HEADACHE. MAY REPEAT AFTER 2 HOURS. DO NOT EXCEED 200 MG IN 24 HOURS 18 tablet 5     traZODone (DESYREL) 100 MG tablet TAKE ONE TABLET BY MOUTH AT BEDTIME 90 tablet 0     vitamin B-12 (CYANOCOBALAMIN) 1000 MCG tablet Take 1 tablet (1,000 mcg) by mouth daily 90 tablet 0     Vitamin D3 50 mcg (2000 units) tablet Take 1 tablet (50 mcg) by mouth every other day 90 tablet 0     estradiol (ESTRACE) 0.1 MG/GM vaginal cream Insert 2 g vaginally daily for 2 weeks then twice weekly thereafter 42.5 g 1     Allergies   Allergen Reactions     Atorvastatin Cramps     Muscle pain     Morphine Itching     Morphine Sulfate Itching     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     Allergic rhinitis      Allergies      asthma     mild intermittent     Chronic back pain     neck and low back     HA (headache)     muscle contraction      History of blood transfusion     In childhood     Hot flashes      Hyperlipidemia      Insomnia      Intermittent asthma 9/16/2011     Major depressive disorder, single episode, moderate (H)      Ocular migraine      Osteoarthritis of hand      Restless leg 9/16/2011     Past Surgical History:   Procedure Laterality Date     APPENDECTOMY      age 9      BIOPSY      took tissue from my uterus at least 15 years ago     COLONOSCOPY  14 years     COLONOSCOPY N/A 4/30/2015    Procedure: COMBINED COLONOSCOPY, SINGLE OR MULTIPLE BIOPSY/POLYPECTOMY BY BIOPSY;  Surgeon: Doni Carey MD;  Location: [facility]     COLONOSCOPY WITH CO2 INSUFFLATION N/A 4/30/2015    Procedure: COLONOSCOPY WITH CO2 INSUFFLATION;  Surgeon: Doni Carey MD;  Location: [facility]     COLONOSCOPY WITH CO2 INSUFFLATION N/A 12/8/2020    Procedure: COLONOSCOPY, WITH CO2 INSUFFLATION;  Surgeon: Kareem Littlejohn DO;  Location: [facility]      COMBINED ESOPHAGOSCOPY, GASTROSCOPY, DUODENOSCOPY (EGD) WITH CO2 INSUFFLATION N/A 2017    Procedure: COMBINED ESOPHAGOSCOPY, GASTROSCOPY, DUODENOSCOPY (EGD) WITH CO2 INSUFFLATION;  EGD, Gastroesophageal reflux disease, esophagitis presence not specified Abdominal pain, generalized, Ref Paramjit, 24.78 BMI;  Surgeon: Duane, William Charles, MD;  Location: [facility]     ENT SURGERY      Tonsillectomy     ESOPHAGOSCOPY, GASTROSCOPY, DUODENOSCOPY (EGD), COMBINED N/A 2017    Procedure: COMBINED ESOPHAGOSCOPY, GASTROSCOPY, DUODENOSCOPY (EGD), BIOPSY SINGLE OR MULTIPLE;;  Surgeon: Duane, William Charles, MD;  Location: [facility]     INJECT EPIDURAL TRANSFORAMINAL Left 2022    Procedure: INJECTION, EPIDURAL, TRANSFORAMINAL APPROACH - L4-5;  Surgeon: Laya Bowman MD;  Location: [facility]     LAPAROSCOPY PROCEDURE UNLISTED      polyps found     MOUTH SURGERY  2016     SINUS SURGERY      x2      Family History   Problem Relation Age of Onset     Diabetes Mother         Old age     Hypertension Mother      Cancer Father         stomach      Cancer Maternal Grandfather         lung      Heart Disease Other      Social History     Socioeconomic History     Marital status:      Spouse name: None     Number of children: 2     Years of education: None     Highest education level: None   Occupational History     Employer: SELF   Tobacco Use     Smoking status: Former     Packs/day: 0.00     Years: 0.00     Pack years: 0.00     Types: Cigarettes     Quit date: 1973     Years since quittin.5     Smokeless tobacco: Never     Tobacco comments:     Social smoker   Vaping Use     Vaping Use: Never used   Substance and Sexual Activity     Alcohol use: Yes     Alcohol/week: 0.0 standard drinks of alcohol     Comment: one or two a day     Drug use: No     Sexual activity: Yes     Partners: Male   Other Topics Concern     Parent/sibling w/ CABG, MI or angioplasty before 65F 55M? No       REVIEW OF  SYSTEMS  =================  C: NEGATIVE for fever, chills, change in weight  I: NEGATIVE for worrisome rashes, moles or lesions  E/M: NEGATIVE for ear, mouth and throat problems  R: NEGATIVE for significant cough or SHORTNESS OF BREATH  CV:  NEGATIVE for chest pain, palpitations or peripheral edema  GI: NEGATIVE for nausea, abdominal pain, heartburn, or change in bowel habits  NEURO: NEGATIVE numbness/weakness  : see HPI  PSYCH: NEGATIVE depression/anxiety  LYmph: no new enlarged lymph nodes  Ortho: no new trauma/movements      Physical Exam:  /71 (BP Location: Right arm, Patient Position: Chair, Cuff Size: Adult Large)   Pulse 84   SpO2 99%    GENERAL: Healthy, alert and no distress  EYES: Eyes grossly normal to inspection.  No discharge or erythema, or obvious scleral/conjunctival abnormalities.  RESP: No audible wheeze, cough, or visible cyanosis.  No visible retractions or increased work of breathing.    SKIN: Visible skin clear. No significant rash, abnormal pigmentation or lesions.  NEURO: Cranial nerves grossly intact.  Mentation and speech appropriate for age.  PSYCH: Mentation appears normal, affect normal/bright, judgement and insight intact, normal speech and appearance well-groomed.    Assessment/Plan:     Pleasant 72-year-old  female with history of UTI x1 this year.  I do not see any obvious persistent UTIs which can explain her symptoms since her urine culture have been unremarkable.  Patient was reassured.  Follow-up with me as needed.

## 2023-07-27 DIAGNOSIS — E55.9 VITAMIN D DEFICIENCY: ICD-10-CM

## 2023-07-27 DIAGNOSIS — J32.9 CHRONIC SINUSITIS, UNSPECIFIED LOCATION: ICD-10-CM

## 2023-07-27 DIAGNOSIS — G25.81 RESTLESS LEG: ICD-10-CM

## 2023-07-27 RX ORDER — LANOLIN ALCOHOL/MO/W.PET/CERES
CREAM (GRAM) TOPICAL
Qty: 90 TABLET | Refills: 0 | Status: SHIPPED | OUTPATIENT
Start: 2023-07-27 | End: 2023-09-19

## 2023-07-27 RX ORDER — PRAMIPEXOLE DIHYDROCHLORIDE 0.25 MG/1
TABLET ORAL
Qty: 180 TABLET | Refills: 0 | OUTPATIENT
Start: 2023-07-27

## 2023-07-29 NOTE — PROGRESS NOTES
Amee Crews  :  1950  DOS: 2023  MRN: [id number]  PCP: Ary Adams    Sports Medicine Clinic Visit      HPI  Amee Crews is a 72 year old female who is seen in consultation at the request of  Ary Adams C.N.P. presenting with right hand pain and numbness.    - Mechanism of Injury:  No inciting injury.   - Prior evaluation: No formal evaluation recently. Chronic CMC joint pain noted in  with Dr. Sujata Simental, responded well to corticosteroid injections that gave approximately 1.5 years of relief.  Radiographs show degenerative changes of the first CMC and STT joints bilaterally.  May have used braces in the past.    - Pain Character:  Pain has been present for  3 weeks ago after doing a lot of painting .  Numbness is in the right hand in the median nerve distribution with radiation into the right forearm.  Positive flick sign.  - Endorses:  Sensation of hand falling asleep, thumb feels it the most, tingling sensation.  Little finger does not seem to be involved.  Mild swelling and grinding at the base of the thumb which has been chronic.  - Denies:  mechanical locking symptoms, instability, weakness.   - Alleviating factors:   no recent treatments , positive flick sign.  - Aggravating factors:  driving, pressure onto the hand, repetitive wrist motion activities.  - Treatments tried:  past corticosteroid injections in the CMC joints, and has worn a thumb spica brace in the past. Nothing recently.    - Patient Goals:  discuss treatment options  - Social History: retired    - Pertinent PMH: Bilateral CMC OA, cLBP, osteoporosis      Review of Systems  Musculoskeletal: as above  Remainder of review of systems is negative including constitutional, CV, pulmonary, GI, Skin and Neurologic except as noted in HPI or medical history.    Past Medical History:   Diagnosis Date    Adult vitelliform macular degeneration 2017    Allergic rhinitis     Allergies     asthma     mild intermittent     Chronic back pain     neck and low back    HA (headache)     muscle contraction     History of blood transfusion     In childhood    Hot flashes     Hyperlipidemia     Insomnia     Intermittent asthma 9/16/2011    Major depressive disorder, single episode, moderate (H)     Ocular migraine     Osteoarthritis of hand     Restless leg 9/16/2011     Past Surgical History:   Procedure Laterality Date    APPENDECTOMY      age 9     BIOPSY      took tissue from my uterus at least 15 years ago    COLONOSCOPY  14 years    COLONOSCOPY N/A 4/30/2015    Procedure: COMBINED COLONOSCOPY, SINGLE OR MULTIPLE BIOPSY/POLYPECTOMY BY BIOPSY;  Surgeon: Doni Carey MD;  Location: [facility]    COLONOSCOPY WITH CO2 INSUFFLATION N/A 4/30/2015    Procedure: COLONOSCOPY WITH CO2 INSUFFLATION;  Surgeon: Doni Carey MD;  [facility]: [facility]    COLONOSCOPY WITH CO2 INSUFFLATION N/A 12/8/2020    Procedure: COLONOSCOPY, WITH CO2 INSUFFLATION;  Surgeon: Kareem Littlejohn DO;  Location: [facility]    COMBINED ESOPHAGOSCOPY, GASTROSCOPY, DUODENOSCOPY (EGD) WITH CO2 INSUFFLATION N/A 8/24/2017    Procedure: COMBINED ESOPHAGOSCOPY, GASTROSCOPY, DUODENOSCOPY (EGD) WITH CO2 INSUFFLATION;  EGD, Gastroesophageal reflux disease, esophagitis presence not specified Abdominal pain, generalized, Ref Dahlheimer-Schroeder, 24.78 BMI;  Surgeon: Duane, William Charles, MD;  Location: [facility]    ENT SURGERY      Tonsillectomy    ESOPHAGOSCOPY, GASTROSCOPY, DUODENOSCOPY (EGD), COMBINED N/A 8/24/2017    Procedure: COMBINED ESOPHAGOSCOPY, GASTROSCOPY, DUODENOSCOPY (EGD), BIOPSY SINGLE OR MULTIPLE;;  Surgeon: Duane, William Charles, MD;  Location: [facility]    INJECT EPIDURAL TRANSFORAMINAL Left 9/16/2022    Procedure: INJECTION, EPIDURAL, TRANSFORAMINAL APPROACH - L4-5;  Surgeon: Laya Bowman MD;  Location: [facility]    LAPAROSCOPY PROCEDURE UNLISTED      polyps found    MOUTH SURGERY  2016    SINUS SURGERY      x2     Family History   Problem Relation  Age of Onset    Diabetes Mother         Old age    Hypertension Mother     Cancer Father         stomach     Cancer Maternal Grandfather         lung     Heart Disease Other          Objective  /64     General: healthy, alert and in no acute distress.    HEENT: no scleral icterus or conjunctival erythema.   Skin: no suspicious lesions or rash. No jaundice.   CV: regular rhythm by palpation, 2+ distal pulses.  Resp: normal respiratory effort without conversational dyspnea.   Psych: normal mood and affect.    Gait: nonantalgic, appropriate coordination and balance.     Neuro:        - Sensation to light touch:    - Intact throughout the BUE including all peripheral nerve distributions.        - MSR:       RUE  LUE  - Biceps  2+ 2+  - Brachioradialis 2+ 2+  - Triceps  2+ 2+       - Special tests:   - Spurling's:  Neg    - Tinel's at the wrist:  Neg    - Tinel's at the elbow:  Neg    - Stressed Phalen's: Positive    MSK - Wrist/Hand:       - Inspection:    - No significant swelling, erythema, warmth, ecchymosis, lesion, or atrophy noted.        - ROM:    - Full AROM/PROM with pain during wrist flexion, wrist extension.       - Palpation:    - TTP at the first CMC joint.   - NTTP elsewhere.        - Strength:  (*antalgic)  RUE LUE  - Shoulder Abduction   5 5   - Shoulder Flexion   5 5   - Shoulder Internal Rotation  5 5   - Shoulder External Rotation  5 5  - Elbow Flexion   5 5  - Elbow Extension   5 5  - Forearm Pronation   5 5  - Forearm Supination   5 5  - Wrist Extension   5* 5  - Wrist Flexion    5* 5  - FDI     5 5  - ADM     5 5  - FPL     5 5  - APB     5 5  - EIP     5 5  - EDC     5 5  - APL/EPB    5 5           - Special tests:        - CMC grind: Positive   - Finkelstein's:  Neg    - TFCC compression:  Neg    - Capone's:  Neg       Radiology  I independently reviewed today's new relevant imaging, with the following interpretation:  -XR R wrist 8/1/2023 shows advanced degenerative changes of the first  CMC joint and STT joint, no acute fractures or dislocations.      Assessment  1. Carpal tunnel syndrome of right wrist        Plan  Amee Crews is a 72 year old female that presents with right wrist pain and paresthesias in the median nerve distribution distal to the wrist, that was exacerbated about 3 weeks ago from a lot of painting. History and physical exam appear most consistent with median mononeuropathy at the wrist (carpal tunnel syndrome), mild in severity.  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    Discussed the nature of the condition and treatment options and mutually agreed upon the following plan:    - Imaging:          - Reviewed relevant imaging in the chart.  -XR R wrist ordered today pre-clinic and independently interpreted by myself.    - Reviewed results and images with patient.   - Medications:          - Discussed pharmacologic options for pain relief.   - May use NSAIDs (Ibuprofen, Naproxen) or Acetaminophen (Tylenol) as needed for pain control.   - May also use topical medications such as lidocaine, IcyHot, BioFreeze, or Voltaren gel as needed for pain control.  May use Voltaren gel up to 4 times per day as needed over the painful area, which would include at the palmar wrist over the carpal tunnel, and you can also use it at the base of your thumb.  - Injections:          - Discussed possible injection options and alternatives.    - Options include corticosteroid injection into the carpal tunnel or CMC joints.    - Deferred injections today and will consider them in the future as needed.   - Therapy:          - Discussed the benefits of hand therapy vs home exercise program for optimization of range of motion, flexibility, strength, stability and function.   - Preference is for a home exercise program.   - Home Exercise Program with median nerve glide exercises given today in clinic and recommendation given to perform HEP daily and after  exacerbations.  - Modalities:          - May use ice, heat, massage or other modalities as needed.   - Bracing:          - Discussed bracing options and recommend using thumb spica bracing at night consistently for the next 6 weeks.  This will help decompress the median nerve and assist with rest of the CMC joint as well.  Brace given today in clinic  - Surgery:          - Discussed non-operative and operative treatment options for the patient's condition.   - There is a carpal tunnel release procedure that would be possible in the future if symptoms were to persist.  At this time, I expect symptoms to resolve well with conservative management.  - Activity:          - Encouraged to remain active and participate in regular activities as symptoms allow.  Avoid or modify exacerbating activities as needed, which would typically be repetitive wrist motion activities, driving, keyboarding.   - Follow up:          - As needed in the future for re-evaluation and update to treatment plan, or sooner for new/worsening symptoms.  - Patient has clinic contact information for questions or concerns.       Alvin Norman DO, CAQSM  [facility] - Sports Medicine  [facility] - Department of Orthopedic Surgery       Disclaimer:  This note was prepared and written using Dragon Medical dictation software. As a result, there may be errors in the script that have gone undetected. Please consider this when interpreting the information in this note.

## 2023-08-01 ENCOUNTER — OFFICE VISIT (OUTPATIENT)
Dept: ORTHOPEDICS | Facility: OTHER | Age: 73
End: 2023-08-01
Attending: NURSE PRACTITIONER
Payer: COMMERCIAL

## 2023-08-01 ENCOUNTER — ANCILLARY PROCEDURE (OUTPATIENT)
Dept: GENERAL RADIOLOGY | Facility: OTHER | Age: 73
End: 2023-08-01
Attending: STUDENT IN AN ORGANIZED HEALTH CARE EDUCATION/TRAINING PROGRAM
Payer: COMMERCIAL

## 2023-08-01 VITALS — SYSTOLIC BLOOD PRESSURE: 100 MMHG | DIASTOLIC BLOOD PRESSURE: 64 MMHG

## 2023-08-01 DIAGNOSIS — M79.641 PAIN OF RIGHT HAND: ICD-10-CM

## 2023-08-01 DIAGNOSIS — M18.0 OSTEOARTHRITIS OF CARPOMETACARPAL (CMC) JOINT OF BOTH THUMBS: ICD-10-CM

## 2023-08-01 DIAGNOSIS — G56.01 CARPAL TUNNEL SYNDROME OF RIGHT WRIST: Primary | ICD-10-CM

## 2023-08-01 PROCEDURE — 73110 X-RAY EXAM OF WRIST: CPT | Mod: TC | Performed by: RADIOLOGY

## 2023-08-01 PROCEDURE — 99214 OFFICE O/P EST MOD 30 MIN: CPT | Performed by: STUDENT IN AN ORGANIZED HEALTH CARE EDUCATION/TRAINING PROGRAM

## 2023-08-01 NOTE — PATIENT INSTRUCTIONS
Ivonne Amee Crews ,     A copy of your assessment and our treatment plan that we discussed together is included below, as written in your medical chart.   If you have any questions, please feel free to call the clinic.     --------------------------------------------------  Amee Crews is a 72 year old female that presents with right wrist pain and paresthesias in the median nerve distribution distal to the wrist, that was exacerbated about 3 weeks ago from a lot of painting. History and physical exam appear most consistent with median mononeuropathy at the wrist (carpal tunnel syndrome).  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    Discussed the nature of the condition and treatment options and mutually agreed upon the following plan:    - Imaging:          - Reviewed relevant imaging in the chart.  -XR R wrist ordered today pre-clinic and independently interpreted by myself.    - Reviewed results and images with patient.   - Medications:          - Discussed pharmacologic options for pain relief.   - May use NSAIDs (Ibuprofen, Naproxen) or Acetaminophen (Tylenol) as needed for pain control.   - May also use topical medications such as lidocaine, IcyHot, BioFreeze, or Voltaren gel as needed for pain control.  May use Voltaren gel up to 4 times per day as needed over the painful area, which would include at the palmar wrist over the carpal tunnel, and you can also use it at the base of your thumb.  - Injections:          - Discussed possible injection options and alternatives.    - Options include corticosteroid injection into the carpal tunnel or CMC joints.    - Deferred injections today and will consider them in the future as needed.   - Therapy:          - Discussed the benefits of hand therapy vs home exercise program for optimization of range of motion, flexibility, strength, stability and function.   - Preference is for a home exercise program.   - Home  Exercise Program with median nerve glide exercises given today in clinic and recommendation given to perform HEP daily and after exacerbations.  - Modalities:          - May use ice, heat, massage or other modalities as needed.   - Bracing:          - Discussed bracing options and recommend using thumb spica bracing at night consistently for the next 6 weeks.  This will help decompress the median nerve and assist with rest of the CMC joint as well.  Brace given today in clinic  - Surgery:          - Discussed non-operative and operative treatment options for the patient's condition.   - There is a carpal tunnel release procedure that would be possible in the future if symptoms were to persist.  At this time, I expect symptoms to resolve well with conservative management.  - Activity:          - Encouraged to remain active and participate in regular activities as symptoms allow.  Avoid or modify exacerbating activities as needed, which would typically be repetitive wrist motion activities, driving, keyboarding.   - Follow up:          - As needed in the future for re-evaluation and update to treatment plan, or sooner for new/worsening symptoms.  - Patient has clinic contact information for questions or concerns.   --------------------------------------------------    It was a pleasure seeing you today. Thank you for choosing [facility] for your care.       Alvin Norman DO, JC  [facility] - Sports Medicine  [facility] - Department of Orthopedic Surgery     Disclaimer:  This note was prepared and written using Dragon Medical dictation software. As a result, there may be errors in the script that have gone undetected. Please consider this when interpreting the information in this note.

## 2023-08-01 NOTE — LETTER
2023         RE: Amee Crews  [address] [facility] [address]        Dear Colleague,    Thank you for referring your patient, Amee Crews, to the [facility] [place]. Please see a copy of my visit note below.    Amee Crews  :  1950  DOS: 2023  MRN: [id number]  PCP: Ary Adams    Sports Medicine Clinic Visit      HPI  Amee Crews is a 72 year old female who is seen in consultation at the request of  Ary Adams C.N.P. presenting with right hand pain and numbness.    - Mechanism of Injury:  No inciting injury.   - Prior evaluation: No formal evaluation recently. Chronic CMC joint pain noted in  with Dr. Suajta Simental, responded well to corticosteroid injections that gave approximately 1.5 years of relief.  Radiographs show degenerative changes of the first CMC and STT joints bilaterally.  May have used braces in the past.    - Pain Character:  Pain has been present for  3 weeks ago after doing a lot of painting .  Numbness is in the right hand in the median nerve distribution with radiation into the right forearm.  Positive flick sign.  - Endorses:  Sensation of hand falling asleep, thumb feels it the most, tingling sensation.  Little finger does not seem to be involved.  Mild swelling and grinding at the base of the thumb which has been chronic.  - Denies:  mechanical locking symptoms, instability, weakness.   - Alleviating factors:   no recent treatments , positive flick sign.  - Aggravating factors:  driving, pressure onto the hand, repetitive wrist motion activities.  - Treatments tried:  past corticosteroid injections in the CMC joints, and has worn a thumb spica brace in the past. Nothing recently.    - Patient Goals:  discuss treatment options  - Social History: retired    - Pertinent PMH: Bilateral CMC OA, cLBP, osteoporosis      Review of Systems  Musculoskeletal: as above  Remainder of review of systems is negative including  constitutional, CV, pulmonary, GI, Skin and Neurologic except as noted in HPI or medical history.    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     Allergic rhinitis      Allergies      asthma     mild intermittent     Chronic back pain     neck and low back     HA (headache)     muscle contraction      History of blood transfusion     In childhood     Hot flashes      Hyperlipidemia      Insomnia      Intermittent asthma 9/16/2011     Major depressive disorder, single episode, moderate (H)      Ocular migraine      Osteoarthritis of hand      Restless leg 9/16/2011     Past Surgical History:   Procedure Laterality Date     APPENDECTOMY      age 9      BIOPSY      took tissue from my uterus at least 15 years ago     COLONOSCOPY  14 years     COLONOSCOPY N/A 4/30/2015    Procedure: COMBINED COLONOSCOPY, SINGLE OR MULTIPLE BIOPSY/POLYPECTOMY BY BIOPSY;  Surgeon: Doni Carey MD;  Location: [facility] OR     COLONOSCOPY WITH CO2 INSUFFLATION N/A 4/30/2015    Procedure: COLONOSCOPY WITH CO2 INSUFFLATION;  Surgeon: Doni Carey MD;  Location: [facility] OR     COLONOSCOPY WITH CO2 INSUFFLATION N/A 12/8/2020    Procedure: COLONOSCOPY, WITH CO2 INSUFFLATION;  Surgeon: Kareem Littlejohn DO;  Location: [facility] OR     COMBINED ESOPHAGOSCOPY, GASTROSCOPY, DUODENOSCOPY (EGD) WITH CO2 INSUFFLATION N/A 8/24/2017    Procedure: COMBINED ESOPHAGOSCOPY, GASTROSCOPY, DUODENOSCOPY (EGD) WITH CO2 INSUFFLATION;  EGD, Gastroesophageal reflux disease, esophagitis presence not specified Abdominal pain, generalized, Ref Dahlheimer-Schroeder, 24.78 BMI;  Surgeon: Duane, William Charles, MD;  Location: [facility] OR     ENT SURGERY      Tonsillectomy     ESOPHAGOSCOPY, GASTROSCOPY, DUODENOSCOPY (EGD), COMBINED N/A 8/24/2017    Procedure: COMBINED ESOPHAGOSCOPY, GASTROSCOPY, DUODENOSCOPY (EGD), BIOPSY SINGLE OR MULTIPLE;;  Surgeon: Duane, William Charles, MD;  Location: [facility] OR     INJECT  EPIDURAL TRANSFORAMINAL Left 9/16/2022    Procedure: INJECTION, EPIDURAL, TRANSFORAMINAL APPROACH - L4-5;  Surgeon: Laya Bowman MD;  Location: [facility] OR     LAPAROSCOPY PROCEDURE UNLISTED      polyps found     MOUTH SURGERY  2016     SINUS SURGERY      x2     Family History   Problem Relation Age of Onset     Diabetes Mother         Old age     Hypertension Mother      Cancer Father         stomach      Cancer Maternal Grandfather         lung      Heart Disease Other          Objective  /64     General: healthy, alert and in no acute distress.    HEENT: no scleral icterus or conjunctival erythema.   Skin: no suspicious lesions or rash. No jaundice.   CV: regular rhythm by palpation, 2+ distal pulses.  Resp: normal respiratory effort without conversational dyspnea.   Psych: normal mood and affect.    Gait: nonantalgic, appropriate coordination and balance.     Neuro:        - Sensation to light touch:    - Intact throughout the BUE including all peripheral nerve distributions.        - MSR:       RUE  LUE  - Biceps  2+ 2+  - Brachioradialis 2+ 2+  - Triceps  2+ 2+       - Special tests:   - Spurling's:  Neg    - Tinel's at the wrist:  Neg    - Tinel's at the elbow:  Neg    - Stressed Phalen's: Positive    MSK - Wrist/Hand:       - Inspection:    - No significant swelling, erythema, warmth, ecchymosis, lesion, or atrophy noted.        - ROM:    - Full AROM/PROM with pain during wrist flexion, wrist extension.       - Palpation:    - TTP at the first CMC joint.   - NTTP elsewhere.        - Strength:  (*antalgic)  RUE LUE  - Shoulder Abduction   5 5   - Shoulder Flexion   5 5   - Shoulder Internal Rotation  5 5   - Shoulder External Rotation  5 5  - Elbow Flexion   5 5  - Elbow Extension   5 5  - Forearm Pronation   5 5  - Forearm Supination   5 5  - Wrist Extension   5* 5  - Wrist Flexion    5* 5  - FDI     5 5  - ADM     5 5  - FPL     5 5  - APB     5 5  - EIP     5 5  - EDC     5 5  - APL/EPB    5 5            - Special tests:        - CMC grind: Positive   - Finkelstein's:  Neg    - TFCC compression:  Neg    - Capone's:  Neg       Radiology  I independently reviewed today's new relevant imaging, with the following interpretation:  -XR R wrist 8/1/2023 shows advanced degenerative changes of the first CMC joint and STT joint, no acute fractures or dislocations.      Assessment  1. Carpal tunnel syndrome of right wrist        Plan  Amee Crews is a 72 year old female that presents with right wrist pain and paresthesias in the median nerve distribution distal to the wrist, that was exacerbated about 3 weeks ago from a lot of painting. History and physical exam appear most consistent with median mononeuropathy at the wrist (carpal tunnel syndrome), mild in severity.  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    Discussed the nature of the condition and treatment options and mutually agreed upon the following plan:    - Imaging:          - Reviewed relevant imaging in the chart.  -XR R wrist ordered today pre-clinic and independently interpreted by myself.    - Reviewed results and images with patient.   - Medications:          - Discussed pharmacologic options for pain relief.   - May use NSAIDs (Ibuprofen, Naproxen) or Acetaminophen (Tylenol) as needed for pain control.   - May also use topical medications such as lidocaine, IcyHot, BioFreeze, or Voltaren gel as needed for pain control.  May use Voltaren gel up to 4 times per day as needed over the painful area, which would include at the palmar wrist over the carpal tunnel, and you can also use it at the base of your thumb.  - Injections:          - Discussed possible injection options and alternatives.    - Options include corticosteroid injection into the carpal tunnel or CMC joints.    - Deferred injections today and will consider them in the future as needed.   - Therapy:          - Discussed the benefits of hand therapy  vs home exercise program for optimization of range of motion, flexibility, strength, stability and function.   - Preference is for a home exercise program.   - Home Exercise Program with median nerve glide exercises given today in clinic and recommendation given to perform HEP daily and after exacerbations.  - Modalities:          - May use ice, heat, massage or other modalities as needed.   - Bracing:          - Discussed bracing options and recommend using thumb spica bracing at night consistently for the next 6 weeks.  This will help decompress the median nerve and assist with rest of the CMC joint as well.  Brace given today in clinic  - Surgery:          - Discussed non-operative and operative treatment options for the patient's condition.   - There is a carpal tunnel release procedure that would be possible in the future if symptoms were to persist.  At this time, I expect symptoms to resolve well with conservative management.  - Activity:          - Encouraged to remain active and participate in regular activities as symptoms allow.  Avoid or modify exacerbating activities as needed, which would typically be repetitive wrist motion activities, driving, keyboarding.   - Follow up:          - As needed in the future for re-evaluation and update to treatment plan, or sooner for new/worsening symptoms.  - Patient has clinic contact information for questions or concerns.       Alvin Norman DO CAQSM  [facility]  [facility] - Department of Orthopedic Surgery       Disclaimer:  This note was prepared and written using Dragon Medical dictation software. As a result, there may be errors in the script that have gone undetected. Please consider this when interpreting the information in this note.       Again, thank you for allowing me to participate in the care of your patient.        Sincerely,        Alvin Norman DO

## 2023-08-08 DIAGNOSIS — F33.42 RECURRENT MAJOR DEPRESSIVE DISORDER, IN FULL REMISSION (H): ICD-10-CM

## 2023-08-08 RX ORDER — SERTRALINE HYDROCHLORIDE 100 MG/1
100 TABLET, FILM COATED ORAL DAILY
Qty: 90 TABLET | Refills: 0 | Status: SHIPPED | OUTPATIENT
Start: 2023-08-08 | End: 2023-09-19

## 2023-08-08 NOTE — TELEPHONE ENCOUNTER
Appointments in Next Year    Aug 16, 2023 10:45 AM  (Arrive by 10:30 AM)  MA SCREENING DIGITAL BILATERAL with [facility]  [facility] ([facility]) [phone]        Please schedule patient for medicare annual wellness as she is due and please have her do ACT. Refill given. She is due for her medicare annual wellness.       ROBYN Bojorquez CNP  Questions or concerns please feel free to send me a [employer] message or call me  Phone : [phone]

## 2023-08-09 ENCOUNTER — MYC MEDICAL ADVICE (OUTPATIENT)
Dept: PEDIATRICS | Facility: OTHER | Age: 73
End: 2023-08-09
Payer: COMMERCIAL

## 2023-08-09 NOTE — TELEPHONE ENCOUNTER
MyChart message sent. Postponing to see if patient views message and/or schedules appointment.    Carline Navarro, CMA

## 2023-08-11 NOTE — TELEPHONE ENCOUNTER
I spoke to Amee today. We have scheduled her MAW visit 9/19/23. ACT has been assigned to patient for completion through [employer]

## 2023-08-11 NOTE — TELEPHONE ENCOUNTER
Encounter has now turned into [facility] message encounter. Closing this one as it is now a duplicate.    Carline Navarro, CMA

## 2023-08-16 ENCOUNTER — ANCILLARY ORDERS (OUTPATIENT)
Dept: FAMILY MEDICINE | Facility: OTHER | Age: 73
End: 2023-08-16

## 2023-08-16 ENCOUNTER — ANCILLARY PROCEDURE (OUTPATIENT)
Dept: MAMMOGRAPHY | Facility: CLINIC | Age: 73
End: 2023-08-16
Attending: NURSE PRACTITIONER
Payer: COMMERCIAL

## 2023-08-16 DIAGNOSIS — E78.5 HYPERLIPIDEMIA LDL GOAL <130: ICD-10-CM

## 2023-08-16 DIAGNOSIS — S20.212A CONTUSION OF RIB ON LEFT SIDE, INITIAL ENCOUNTER: ICD-10-CM

## 2023-08-16 DIAGNOSIS — E78.00 HYPERCHOLESTEROLEMIA: ICD-10-CM

## 2023-08-16 DIAGNOSIS — N30.00 ACUTE CYSTITIS WITHOUT HEMATURIA: ICD-10-CM

## 2023-08-16 DIAGNOSIS — M60.9 STATIN-INDUCED MYOSITIS: ICD-10-CM

## 2023-08-16 DIAGNOSIS — J45.20 MILD INTERMITTENT ASTHMA WITHOUT COMPLICATION: Primary | ICD-10-CM

## 2023-08-16 DIAGNOSIS — Z12.31 VISIT FOR SCREENING MAMMOGRAM: ICD-10-CM

## 2023-08-16 DIAGNOSIS — E78.1 HYPERTRIGLYCERIDEMIA: ICD-10-CM

## 2023-08-16 DIAGNOSIS — T46.6X5A STATIN-INDUCED MYOSITIS: ICD-10-CM

## 2023-08-16 PROCEDURE — 77063 BREAST TOMOSYNTHESIS BI: CPT

## 2023-08-16 PROCEDURE — 77067 SCR MAMMO BI INCL CAD: CPT

## 2023-08-24 DIAGNOSIS — J32.9 CHRONIC SINUSITIS, UNSPECIFIED LOCATION: ICD-10-CM

## 2023-08-24 NOTE — TELEPHONE ENCOUNTER
Medication Question or Refill    Contacts         Type Contact Phone/Fax    08/24/2023 06:16 PM CDT Interface (Incoming) [facility] [place] - [address] [phone]    08/24/2023 06:19 PM CDT Phone (Incoming) Amee Crews (Self) [phone] (M)            What medication are you calling about (include dose and sig)?: Flonase spray     Preferred Pharmacy:   [facility] [facility] - [address]  [address]  [place] [address]  Phone: [phone] Fax: [phone]      Controlled Substance Agreement on file:   CSA -- Patient Level:    CSA: None found at the patient level.       Who prescribed the medication?: [facility]    Do you need a refill? Yes    When did you use the medication last? 08/23/2023    Patient offered an appointment? No    Do you have any questions or concerns?  No      Could we send this information to you in [facility] or would you prefer to receive a phone call?:   Patient would prefer a phone call   Okay to leave a detailed message?: Yes at Cell number on file:    Telephone Information:   Mobile [phone]

## 2023-08-25 RX ORDER — FLUTICASONE PROPIONATE 50 MCG
SPRAY, SUSPENSION (ML) NASAL
Qty: 16 G | Refills: 1 | Status: SHIPPED | OUTPATIENT
Start: 2023-08-25 | End: 2023-11-08

## 2023-08-29 DIAGNOSIS — J30.1 CHRONIC SEASONAL ALLERGIC RHINITIS DUE TO POLLEN: ICD-10-CM

## 2023-08-29 DIAGNOSIS — J32.9 CHRONIC SINUSITIS, UNSPECIFIED LOCATION: Primary | ICD-10-CM

## 2023-08-29 DIAGNOSIS — J31.0 CHRONIC RHINITIS: ICD-10-CM

## 2023-08-30 ENCOUNTER — APPOINTMENT (OUTPATIENT)
Dept: LAB | Facility: CLINIC | Age: 73
End: 2023-08-30
Payer: COMMERCIAL

## 2023-08-30 ENCOUNTER — MYC MEDICAL ADVICE (OUTPATIENT)
Dept: ORTHOPEDICS | Facility: CLINIC | Age: 73
End: 2023-08-30

## 2023-08-30 ENCOUNTER — INFUSION THERAPY VISIT (OUTPATIENT)
Dept: INFUSION THERAPY | Facility: CLINIC | Age: 73
End: 2023-08-30
Attending: INTERNAL MEDICINE
Payer: COMMERCIAL

## 2023-08-30 ENCOUNTER — TELEPHONE (OUTPATIENT)
Dept: ADMISSION | Facility: CLINIC | Age: 73
End: 2023-08-30

## 2023-08-30 VITALS
BODY MASS INDEX: 26.21 KG/M2 | TEMPERATURE: 98.3 F | HEART RATE: 66 BPM | WEIGHT: 147.9 LBS | OXYGEN SATURATION: 98 % | DIASTOLIC BLOOD PRESSURE: 81 MMHG | HEIGHT: 63 IN | RESPIRATION RATE: 16 BRPM | SYSTOLIC BLOOD PRESSURE: 148 MMHG

## 2023-08-30 DIAGNOSIS — K21.9 GASTROESOPHAGEAL REFLUX DISEASE WITHOUT ESOPHAGITIS: Primary | ICD-10-CM

## 2023-08-30 DIAGNOSIS — M81.0 AGE-RELATED OSTEOPOROSIS WITHOUT CURRENT PATHOLOGICAL FRACTURE: ICD-10-CM

## 2023-08-30 LAB
CALCIUM SERPL-MCNC: 9.5 MG/DL (ref 8.8–10.2)
CREAT SERPL-MCNC: 0.8 MG/DL (ref 0.51–0.95)
GFR SERPL CREATININE-BSD FRML MDRD: 78 ML/MIN/1.73M2

## 2023-08-30 PROCEDURE — 250N000013 HC RX MED GY IP 250 OP 250 PS 637: Performed by: INTERNAL MEDICINE

## 2023-08-30 PROCEDURE — 82310 ASSAY OF CALCIUM: CPT | Performed by: INTERNAL MEDICINE

## 2023-08-30 PROCEDURE — 96365 THER/PROPH/DIAG IV INF INIT: CPT

## 2023-08-30 PROCEDURE — 82565 ASSAY OF CREATININE: CPT | Performed by: INTERNAL MEDICINE

## 2023-08-30 PROCEDURE — 250N000011 HC RX IP 250 OP 636: Mod: JZ | Performed by: INTERNAL MEDICINE

## 2023-08-30 PROCEDURE — 258N000003 HC RX IP 258 OP 636: Performed by: INTERNAL MEDICINE

## 2023-08-30 PROCEDURE — 36415 COLL VENOUS BLD VENIPUNCTURE: CPT | Performed by: INTERNAL MEDICINE

## 2023-08-30 RX ORDER — EPINEPHRINE 1 MG/ML
0.3 INJECTION, SOLUTION INTRAMUSCULAR; SUBCUTANEOUS EVERY 5 MIN PRN
Status: CANCELLED | OUTPATIENT
Start: 2023-08-30

## 2023-08-30 RX ORDER — DIPHENHYDRAMINE HYDROCHLORIDE 50 MG/ML
50 INJECTION INTRAMUSCULAR; INTRAVENOUS
Status: CANCELLED
Start: 2023-08-30

## 2023-08-30 RX ORDER — ACETAMINOPHEN 325 MG/1
975 TABLET ORAL ONCE
Status: COMPLETED | OUTPATIENT
Start: 2023-08-30 | End: 2023-08-30

## 2023-08-30 RX ORDER — METHYLPREDNISOLONE SODIUM SUCCINATE 125 MG/2ML
125 INJECTION, POWDER, LYOPHILIZED, FOR SOLUTION INTRAMUSCULAR; INTRAVENOUS
Status: CANCELLED
Start: 2023-08-30

## 2023-08-30 RX ORDER — ALBUTEROL SULFATE 90 UG/1
1-2 AEROSOL, METERED RESPIRATORY (INHALATION)
Status: CANCELLED
Start: 2023-08-30

## 2023-08-30 RX ORDER — ZOLEDRONIC ACID 5 MG/100ML
5 INJECTION, SOLUTION INTRAVENOUS ONCE
Status: CANCELLED
Start: 2023-08-30

## 2023-08-30 RX ORDER — HEPARIN SODIUM,PORCINE 10 UNIT/ML
5 VIAL (ML) INTRAVENOUS
Status: CANCELLED | OUTPATIENT
Start: 2023-08-30

## 2023-08-30 RX ORDER — HEPARIN SODIUM (PORCINE) LOCK FLUSH IV SOLN 100 UNIT/ML 100 UNIT/ML
5 SOLUTION INTRAVENOUS
Status: CANCELLED | OUTPATIENT
Start: 2023-08-30

## 2023-08-30 RX ORDER — ZOLEDRONIC ACID 5 MG/100ML
5 INJECTION, SOLUTION INTRAVENOUS ONCE
Status: COMPLETED | OUTPATIENT
Start: 2023-08-30 | End: 2023-08-30

## 2023-08-30 RX ORDER — ACETAMINOPHEN 325 MG/1
975 TABLET ORAL ONCE
Status: CANCELLED | OUTPATIENT
Start: 2023-08-30

## 2023-08-30 RX ORDER — PSEUDOEPHEDRINE HCL AND TRIPOLIDINE 60; 2.5 MG/1; MG/1
TABLET, FILM COATED ORAL
Qty: 100 TABLET | Refills: 0 | Status: SHIPPED | OUTPATIENT
Start: 2023-08-30 | End: 2024-01-05

## 2023-08-30 RX ORDER — ALBUTEROL SULFATE 0.83 MG/ML
2.5 SOLUTION RESPIRATORY (INHALATION)
Status: CANCELLED | OUTPATIENT
Start: 2023-08-30

## 2023-08-30 RX ADMIN — SODIUM CHLORIDE 250 ML: 9 INJECTION, SOLUTION INTRAVENOUS at 11:58

## 2023-08-30 RX ADMIN — ACETAMINOPHEN 975 MG: 325 TABLET, FILM COATED ORAL at 11:38

## 2023-08-30 RX ADMIN — ZOLEDRONIC ACID 5 MG: 5 INJECTION, SOLUTION INTRAVENOUS at 11:58

## 2023-08-30 ASSESSMENT — PAIN SCALES - GENERAL: PAINLEVEL: NO PAIN (0)

## 2023-08-30 NOTE — PROGRESS NOTES
Infusion Nursing Note:  Amee Crews presents today for Reclast.    Patient seen by provider today: No   present during visit today: Not Applicable.    Note: Printed information provided re: Reclast.  Pt encouraged to drink plenty of fluids today. .      Intravenous Access:  Peripheral IV placed.    Treatment Conditions:  Lab Results   Component Value Date     05/03/2023    POTASSIUM 4.5 05/03/2023    MAG 2.3 09/16/2011    CR 0.80 08/30/2023    ANGE 9.5 08/30/2023    BILITOTAL 0.7 05/03/2023    ALBUMIN 4.7 05/03/2023    ALT 25 05/03/2023    AST 24 05/03/2023         Post Infusion Assessment:  Patient tolerated infusion without incident.  Patient observed for 15 minutes post Reclast   per protocol.       Discharge Plan:   Discharge instructions reviewed with: Patient.  Patient discharged in stable condition accompanied by: self.  Departure Mode: Ambulatory.      Brittany Morrissey RN

## 2023-08-30 NOTE — TELEPHONE ENCOUNTER
Reason for Call:  Other call back    Detailed comments: pt requesting prednisone as acupuncture is not working to be filled at [facility] in [place].      Phone Number Patient can be reached at: Home number on file [phone] (home)    Best Time: any    Can we leave a detailed message on this number? YES    Call taken on 8/30/2023 at 12:58 PM by Arpita Torres

## 2023-09-01 ENCOUNTER — OFFICE VISIT (OUTPATIENT)
Dept: URGENT CARE | Facility: URGENT CARE | Age: 73
End: 2023-09-01
Payer: COMMERCIAL

## 2023-09-01 VITALS
BODY MASS INDEX: 26.04 KG/M2 | OXYGEN SATURATION: 99 % | SYSTOLIC BLOOD PRESSURE: 138 MMHG | RESPIRATION RATE: 16 BRPM | DIASTOLIC BLOOD PRESSURE: 81 MMHG | WEIGHT: 147 LBS | HEART RATE: 81 BPM | TEMPERATURE: 98.2 F

## 2023-09-01 DIAGNOSIS — J01.40 ACUTE PANSINUSITIS, RECURRENCE NOT SPECIFIED: Primary | ICD-10-CM

## 2023-09-01 PROCEDURE — 99213 OFFICE O/P EST LOW 20 MIN: CPT | Performed by: NURSE PRACTITIONER

## 2023-09-01 RX ORDER — CEFDINIR 300 MG/1
300 CAPSULE ORAL 2 TIMES DAILY
Qty: 20 CAPSULE | Refills: 0 | Status: SHIPPED | OUTPATIENT
Start: 2023-09-01 | End: 2023-09-11

## 2023-09-01 NOTE — TELEPHONE ENCOUNTER
Reviewed patient's chart.  Dr Norman has not previously provided patient Rx of oral prednisone and is on treating patient's carpal tunnel syndrome and chronic thumb issues.      Based on patient's response - it does NOT appear that she is requesting prednisone for these conditions.    Primary care team please reach out to patient and discuss.    Chandan Delgaod ATC

## 2023-09-01 NOTE — PROGRESS NOTES
SUBJECTIVE:  Amee Crews is a 73 year old female here with concerns about sinus infection.  She states onset of symptoms were 3 week(s) ago.  She has had maxillary, frontal pressure. Course of illness is worsening. Severity moderate  Current and Associated symptoms: facial pain/pressure, headache, and very tired, some nausea  Predisposing factors include HX of recurrent sinusitis.   Recent treatment has included: tylenol for pain, and accupressure. apridine    Past Medical History:   Diagnosis Date    Adult vitelliform macular degeneration 2017    Allergic rhinitis     Allergies     asthma     mild intermittent    Chronic back pain     neck and low back    HA (headache)     muscle contraction     History of blood transfusion     In childhood    Hot flashes     Hyperlipidemia     Insomnia     Intermittent asthma 2011    Major depressive disorder, single episode, moderate (H)     Ocular migraine     Osteoarthritis of hand     Restless leg 2011     Social History     Tobacco Use    Smoking status: Former     Packs/day: 0.00     Years: 0.00     Pack years: 0.00     Types: Cigarettes     Quit date: 1973     Years since quittin.6    Smokeless tobacco: Never    Tobacco comments:     Social smoker   Substance Use Topics    Alcohol use: Yes     Alcohol/week: 0.0 standard drinks of alcohol     Comment: one or two a day         OBJECTIVE:  /81   Pulse 81   Temp 98.2  F (36.8  C) (Tympanic)   Resp 16   Wt 66.7 kg (147 lb)   SpO2 99%   BMI 26.04 kg/m    Exam:GENERAL APPEARANCE: healthy, alert and no distress  EYES: EOMI, conjunctiva clear  HENT: ear canals and TM's normal.  Nose and mouth without ulcers, erythema or lesions, pain over maxillary and frontal sinuses  NECK: supple, nontender, no lymphadenopathy  RESP: lungs clear to auscultation - no rales, rhonchi or wheezes  CV: regular rates and rhythm, normal S1 S2, no murmur noted  ABDOMEN:  soft, nontender, no HSM or masses and bowel  sounds normal  NEURO: Normal strength and tone, sensory exam grossly normal,  normal speech and mentation  SKIN: no suspicious lesions or rashes    ASSESSMENT:    1. Acute pansinusitis, recurrence not specified    - cefdinir (OMNICEF) 300 MG capsule; Take 1 capsule (300 mg) by mouth 2 times daily for 10 days  Dispense: 20 capsule; Refill: 0      PLAN:  1.  Take antibiotic according to bottle instructions with food to avoid stomach upset.  If you are prone to stomach upset with antibiotics, I recommend adding a probiotic to this regimen.  Culturelle is a trusted brand.  2.  I recommend using Mucinex to help thin mucus secretions.  Adding a saline nasal spray can also be helpful with clearing sinus congestion.  3.  Take Advil or Tylenol as needed for pain or fever control.  4.  Follow-up if you not having any improvement in your symptoms over the next 5 days.

## 2023-09-01 NOTE — TELEPHONE ENCOUNTER
I would recommend a visit either video visit next Monday or in person       ROBYN Bojorquez CNP  Questions or concerns please feel free to send me a [employer] message or call me  Phone : [phone]  n

## 2023-09-12 DIAGNOSIS — G25.81 RESTLESS LEG: ICD-10-CM

## 2023-09-12 RX ORDER — PRAMIPEXOLE DIHYDROCHLORIDE 0.25 MG/1
TABLET ORAL
Qty: 180 TABLET | Refills: 0 | OUTPATIENT
Start: 2023-09-12

## 2023-09-18 ASSESSMENT — ENCOUNTER SYMPTOMS
EYE PAIN: 0
FEVER: 0
PALPITATIONS: 0
HEADACHES: 1
DYSURIA: 0
DIARRHEA: 0
FREQUENCY: 0
HEMATURIA: 0
NAUSEA: 0
PARESTHESIAS: 0
NERVOUS/ANXIOUS: 0
MYALGIAS: 0
SHORTNESS OF BREATH: 0
HEARTBURN: 0
DIZZINESS: 0
JOINT SWELLING: 0
ARTHRALGIAS: 0
SORE THROAT: 0
COUGH: 0
BREAST MASS: 0
ABDOMINAL PAIN: 0
HEMATOCHEZIA: 0
WEAKNESS: 0
CONSTIPATION: 0
CHILLS: 0

## 2023-09-18 ASSESSMENT — PATIENT HEALTH QUESTIONNAIRE - PHQ9
SUM OF ALL RESPONSES TO PHQ QUESTIONS 1-9: 0
10. IF YOU CHECKED OFF ANY PROBLEMS, HOW DIFFICULT HAVE THESE PROBLEMS MADE IT FOR YOU TO DO YOUR WORK, TAKE CARE OF THINGS AT HOME, OR GET ALONG WITH OTHER PEOPLE: NOT DIFFICULT AT ALL
SUM OF ALL RESPONSES TO PHQ QUESTIONS 1-9: 0

## 2023-09-18 ASSESSMENT — ACTIVITIES OF DAILY LIVING (ADL): CURRENT_FUNCTION: NO ASSISTANCE NEEDED

## 2023-09-18 ASSESSMENT — ASTHMA QUESTIONNAIRES: ACT_TOTALSCORE: 22

## 2023-09-19 ENCOUNTER — OFFICE VISIT (OUTPATIENT)
Dept: FAMILY MEDICINE | Facility: OTHER | Age: 73
End: 2023-09-19
Payer: COMMERCIAL

## 2023-09-19 ENCOUNTER — ANCILLARY PROCEDURE (OUTPATIENT)
Dept: GENERAL RADIOLOGY | Facility: OTHER | Age: 73
End: 2023-09-19
Attending: NURSE PRACTITIONER
Payer: COMMERCIAL

## 2023-09-19 VITALS
OXYGEN SATURATION: 97 % | HEIGHT: 63 IN | DIASTOLIC BLOOD PRESSURE: 72 MMHG | RESPIRATION RATE: 17 BRPM | TEMPERATURE: 98.5 F | HEART RATE: 79 BPM | BODY MASS INDEX: 26.13 KG/M2 | WEIGHT: 147.5 LBS | SYSTOLIC BLOOD PRESSURE: 118 MMHG

## 2023-09-19 DIAGNOSIS — J01.01 ACUTE RECURRENT MAXILLARY SINUSITIS: ICD-10-CM

## 2023-09-19 DIAGNOSIS — S97.82XD CRUSHING INJURY OF LEFT FOOT, SUBSEQUENT ENCOUNTER: ICD-10-CM

## 2023-09-19 DIAGNOSIS — E78.1 HYPERTRIGLYCERIDEMIA: ICD-10-CM

## 2023-09-19 DIAGNOSIS — G25.81 RESTLESS LEG: ICD-10-CM

## 2023-09-19 DIAGNOSIS — J32.9 CHRONIC SINUSITIS, UNSPECIFIED LOCATION: ICD-10-CM

## 2023-09-19 DIAGNOSIS — F33.42 RECURRENT MAJOR DEPRESSIVE DISORDER, IN FULL REMISSION (H): ICD-10-CM

## 2023-09-19 DIAGNOSIS — Z00.00 ENCOUNTER FOR MEDICARE ANNUAL WELLNESS EXAM: Primary | ICD-10-CM

## 2023-09-19 DIAGNOSIS — E78.5 HYPERLIPIDEMIA LDL GOAL <130: ICD-10-CM

## 2023-09-19 DIAGNOSIS — J45.20 MILD INTERMITTENT ASTHMA WITHOUT COMPLICATION: ICD-10-CM

## 2023-09-19 DIAGNOSIS — E55.9 VITAMIN D DEFICIENCY: ICD-10-CM

## 2023-09-19 LAB
ALBUMIN SERPL BCG-MCNC: 4.7 G/DL (ref 3.5–5.2)
ALP SERPL-CCNC: 103 U/L (ref 35–104)
ALT SERPL W P-5'-P-CCNC: 29 U/L (ref 0–50)
ANION GAP SERPL CALCULATED.3IONS-SCNC: 12 MMOL/L (ref 7–15)
AST SERPL W P-5'-P-CCNC: 30 U/L (ref 0–45)
BILIRUB SERPL-MCNC: 0.7 MG/DL
BUN SERPL-MCNC: 14.2 MG/DL (ref 8–23)
CALCIUM SERPL-MCNC: 8.7 MG/DL (ref 8.8–10.2)
CHLORIDE SERPL-SCNC: 103 MMOL/L (ref 98–107)
CHOLEST SERPL-MCNC: 330 MG/DL
CREAT SERPL-MCNC: 0.86 MG/DL (ref 0.51–0.95)
DEPRECATED HCO3 PLAS-SCNC: 24 MMOL/L (ref 22–29)
EGFRCR SERPLBLD CKD-EPI 2021: 72 ML/MIN/1.73M2
GLUCOSE SERPL-MCNC: 106 MG/DL (ref 70–99)
HDLC SERPL-MCNC: 51 MG/DL
LDLC SERPL CALC-MCNC: 230 MG/DL
NONHDLC SERPL-MCNC: 279 MG/DL
POTASSIUM SERPL-SCNC: 4.5 MMOL/L (ref 3.4–5.3)
PROT SERPL-MCNC: 7.2 G/DL (ref 6.4–8.3)
SODIUM SERPL-SCNC: 139 MMOL/L (ref 136–145)
TRIGL SERPL-MCNC: 244 MG/DL

## 2023-09-19 PROCEDURE — 36415 COLL VENOUS BLD VENIPUNCTURE: CPT | Performed by: NURSE PRACTITIONER

## 2023-09-19 PROCEDURE — 73630 X-RAY EXAM OF FOOT: CPT | Mod: TC | Performed by: RADIOLOGY

## 2023-09-19 PROCEDURE — 99214 OFFICE O/P EST MOD 30 MIN: CPT | Mod: 25 | Performed by: NURSE PRACTITIONER

## 2023-09-19 PROCEDURE — G0439 PPPS, SUBSEQ VISIT: HCPCS | Performed by: NURSE PRACTITIONER

## 2023-09-19 PROCEDURE — 80061 LIPID PANEL: CPT | Performed by: NURSE PRACTITIONER

## 2023-09-19 PROCEDURE — 80053 COMPREHEN METABOLIC PANEL: CPT | Performed by: NURSE PRACTITIONER

## 2023-09-19 RX ORDER — SERTRALINE HYDROCHLORIDE 100 MG/1
100 TABLET, FILM COATED ORAL DAILY
Qty: 90 TABLET | Refills: 1 | Status: SHIPPED | OUTPATIENT
Start: 2023-09-19 | End: 2024-06-24

## 2023-09-19 RX ORDER — LANOLIN ALCOHOL/MO/W.PET/CERES
1000 CREAM (GRAM) TOPICAL DAILY
Qty: 90 TABLET | Refills: 3 | Status: SHIPPED | OUTPATIENT
Start: 2023-09-19 | End: 2024-01-05

## 2023-09-19 RX ORDER — FLUCONAZOLE 150 MG/1
150 TABLET ORAL ONCE
Qty: 1 TABLET | Refills: 0 | Status: SHIPPED | OUTPATIENT
Start: 2023-09-19 | End: 2023-09-19

## 2023-09-19 RX ORDER — METHYLPREDNISOLONE 4 MG
TABLET, DOSE PACK ORAL
Qty: 21 TABLET | Refills: 0 | Status: SHIPPED | OUTPATIENT
Start: 2023-09-19 | End: 2024-01-05

## 2023-09-19 RX ORDER — PRAMIPEXOLE DIHYDROCHLORIDE 0.25 MG/1
0.5 TABLET ORAL AT BEDTIME
Qty: 180 TABLET | Refills: 1 | Status: SHIPPED | OUTPATIENT
Start: 2023-09-19 | End: 2024-03-20

## 2023-09-19 RX ORDER — DOXYCYCLINE 100 MG/1
100 CAPSULE ORAL 2 TIMES DAILY
Qty: 28 CAPSULE | Refills: 0 | Status: SHIPPED | OUTPATIENT
Start: 2023-09-19 | End: 2023-10-03

## 2023-09-19 ASSESSMENT — ENCOUNTER SYMPTOMS
CONSTIPATION: 0
PALPITATIONS: 0
HEMATURIA: 0
HEARTBURN: 0
SORE THROAT: 0
DYSURIA: 0
ABDOMINAL PAIN: 0
CHILLS: 0
DIARRHEA: 0
JOINT SWELLING: 0
FEVER: 0
DIZZINESS: 0
SHORTNESS OF BREATH: 0
NAUSEA: 0
MYALGIAS: 0
PARESTHESIAS: 0
FREQUENCY: 0
WEAKNESS: 0
COUGH: 0
NERVOUS/ANXIOUS: 0
BREAST MASS: 0
HEADACHES: 1
HEMATOCHEZIA: 0
EYE PAIN: 0
ARTHRALGIAS: 0

## 2023-09-19 ASSESSMENT — PAIN SCALES - GENERAL: PAINLEVEL: MODERATE PAIN (4)

## 2023-09-19 ASSESSMENT — ACTIVITIES OF DAILY LIVING (ADL): CURRENT_FUNCTION: NO ASSISTANCE NEEDED

## 2023-09-19 NOTE — PATIENT INSTRUCTIONS
"Patient Education   Personalized Prevention Plan  You are due for the preventive services outlined below.  Your care team is available to assist you in scheduling these services.  If you have already completed any of these items, please share that information with your care team to update in your medical record.  Health Maintenance Due   Topic Date Due     Zoster (Shingles) Vaccine (1 of 2) Never done     COVID-19 Vaccine (3 - Pfizer series) 05/14/2021     Annual Wellness Visit  04/25/2023     Flu Vaccine (1) 09/01/2023     ANNUAL REVIEW OF HM ORDERS  10/03/2023     Asthma Action Plan - yearly  10/10/2023     Learning About Being Physically Active  What is physical activity?     Being physically active means doing any kind of activity that gets your body moving.  The types of physical activity that can help you get fit and stay healthy include:  Aerobic or \"cardio\" activities. These make your heart beat faster and make you breathe harder, such as brisk walking, riding a bike, or running. They strengthen your heart and lungs and build up your endurance.  Strength training activities. These make your muscles work against, or \"resist,\" something. Examples include lifting weights or doing push-ups. These activities help tone and strengthen your muscles and bones.  Stretches. These let you move your joints and muscles through their full range of motion. Stretching helps you be more flexible.  Reaching a balance between these three types of physical activity is important because each one contributes to your overall fitness.  What are the benefits of being active?  Being active is one of the best things you can do for your health. It helps you to:  Feel stronger and have more energy to do all the things you like to do.  Focus better at school or work.  Feel, think, and sleep better.  Reach and stay at a healthy weight.  Lose fat and build lean muscle.  Lower your risk for serious health problems, including diabetes, heart " "attack, high blood pressure, and some cancers.  Keep your heart, lungs, bones, muscles, and joints strong and healthy.  How can you make being active part of your life?  Start slowly. Make it your long-term goal to get at least 30 minutes of exercise on most days of the week. Walking is a good choice. You also may want to do other activities, such as running, swimming, cycling, or playing tennis or team sports.  Pick activities that you like--ones that make your heart beat faster, your muscles stronger, and your muscles and joints more flexible. If you find more than one thing you like doing, do them all. You don't have to do the same thing every day.  Get your heart pumping every day. Any activity that makes your heart beat faster and keeps it at that rate for a while counts.  Here are some great ways to get your heart beating faster:  Go for a brisk walk, run, or bike ride.  Go for a hike or swim.  Go in-line skating.  Play a game of touch football, basketball, or soccer.  Ride a bike.  Play tennis or racquetball.  Climb stairs.  Even some household chores can be aerobic--just do them at a faster pace. Vacuuming, raking or mowing the lawn, sweeping the garage, and washing and waxing the car all can help get your heart rate up.  Strengthen your muscles during the week. You don't have to lift heavy weights or grow big, bulky muscles to get stronger. Doing a few simple activities that make your muscles work against, or \"resist,\" something can help you get stronger.  For example, you can:  Do push-ups or sit-ups, which use your own body weight as resistance.  Lift weights or dumbbells or use stretch bands at home or in a gym or community center.  Stretch your muscles often. Stretching will help you as you become more active. It can help you stay flexible, loosen tight muscles, and avoid injury. It can also help improve your balance and posture and can be a great way to relax.  Be sure to stretch the muscles you'll be " "using when you work out. It's best to warm your muscles slightly before you stretch them. Walk or do some other light aerobic activity for a few minutes, and then start stretching.  When you stretch your muscles:  Do it slowly. Stretching is not about going fast or making sudden movements.  Don't push or bounce during a stretch.  Hold each stretch for at least 15 to 30 seconds, if you can. You should feel a stretch in the muscle, but not pain.  Breathe out as you do the stretch. Then breathe in as you hold the stretch. Don't hold your breath.  If you're worried about how more activity might affect your health, have a checkup before you start. Follow any special advice your doctor gives you for getting a smart start.  Where can you learn more?  Go to https://www.[employer].FamilyApp/patiented  Enter W332 in the search box to learn more about \"Learning About Being Physically Active.\"  Current as of: October 10, 2022               Content Version: 13.7    3116-8007 [employer].   Care instructions adapted under license by your healthcare professional. If you have questions about a medical condition or this instruction, always ask your healthcare professional. [employer] disclaims any warranty or liability for your use of this information.      Preventing Falls: Care Instructions    Talk to your doctor about the medicines you take. Ask if any of them increase the risk of falls and whether they can be changed or stopped.   Try to exercise regularly. It can help improve your strength and balance. This can help lower your risk of falling.     Practice fall safety and prevention.    Wear low-heeled shoes that fit well and give your feet good support. Talk to your doctor if you have foot problems that make this hard.  Carry a cellphone or wear a medical alert device that you can use to call for help.  Use stepladders instead of chairs to reach high objects. Don't climb if you're at risk for falls. Ask " "for help, if needed.  Wear the correct eyeglasses, if you need them.    Make your home safer.    Remove rugs, cords, clutter, and furniture from walkways.  Keep your house well lit. Use night-lights in hallways and bathrooms.  Install and use sturdy handrails on stairways.  Wear nonskid footwear, even inside. Don't walk barefoot or in socks without shoes.    Be safe outside.    Use handrails, curb cuts, and ramps whenever possible.  Keep your hands free by using a shoulder bag or backpack.  Try to walk in well-lit areas. Watch out for uneven ground, changes in pavement, and debris.  Be careful in the winter. Walk on the grass or gravel when sidewalks are slippery. Use de-icer on steps and walkways. Add non-slip devices to shoes.    Put grab bars and nonskid mats in your shower or tub and near the toilet. Try to use a shower chair or bath bench when bathing.   Get into a tub or shower by putting in your weaker leg first. Get out with your strong side first. Have a phone or medical alert device in the bathroom with you.   Where can you learn more?  Go to https://www.[employer]net/patiented  Enter G117 in the search box to learn more about \"Preventing Falls: Care Instructions.\"  Current as of: November 9, 2022               Content Version: 13.7    6766-7430 [employer].   Care instructions adapted under license by your healthcare professional. If you have questions about a medical condition or this instruction, always ask your healthcare professional. [employer] disclaims any warranty or liability for your use of this information.      How to Get Up Safely After a Fall: Care Instructions  Overview     If you have injuries, health problems, or other reasons that may make it easy for you to fall at home, it is a good idea to learn how to get up safely after a fall. Learning how to get up correctly can help you avoid making an injury worse.  Also, knowing what to do if you cannot get up can " help you stay safe until help arrives.  Follow-up care is a key part of your treatment and safety. Be sure to make and go to all appointments, and call your doctor if you are having problems. It's also a good idea to know your test results and keep a list of the medicines you take.  How can you care for yourself after a fall?  If you think you can get up  First lie still for a few minutes and think about how you feel. If your body feels okay and you think you can get up safely, follow the rest of the steps below:  Look for a chair or other piece of furniture that is close to you.  Roll onto your side and rest. Roll by turning your head in the direction you want to roll, move your shoulder and arm, then hip and leg in the same direction.  Lie still for a moment to let your blood pressure adjust.  Slowly push your upper body up, lift your head, and take a moment to rest.  Slowly get up on your hands and knees, and crawl to the chair or other stable piece of furniture.  Put your hands on the chair.  Move one foot forward, and place it flat on the floor. Your other leg should be bent with the knee on the floor.  Rise slowly, turn your body, and sit in the chair. Stay seated for a bit and think about how you feel. Call for help. Even if you feel okay, let someone know what happened to you. You might not know that you have a serious injury.  If you cannot get up  If you think you are injured after a fall or you cannot get up, try not to panic.  Call out for help.  If you have a phone within reach or you have an emergency call device, use it to call for help.  If you do not have a phone within reach, try to slide yourself toward it. If you cannot get to the phone, try to slide toward a door or window or a place where you think you can be heard.  [place] or use an object to make noise so someone might hear you.  If you can reach something that you can use for a pillow, place it under your head. Try to stay warm by covering  "yourself with a blanket or clothing while you wait for help.  When should you call for help?   Call 911 anytime you think you may need emergency care. For example, call if:    You passed out (lost consciousness).     You cannot get up after a fall.     You have severe pain.   Call your doctor now or seek immediate medical care if:    You have new or worse pain.     You are dizzy or lightheaded.     You hit your head.   Watch closely for changes in your health, and be sure to contact your doctor if:    You do not get better as expected.   Where can you learn more?  Go to https://www.[employer].net/patiented  Enter G513 in the search box to learn more about \"How to Get Up Safely After a Fall: Care Instructions.\"  Current as of: November 14, 2022               Content Version: 13.7    [id number] [employer].   Care instructions adapted under license by your healthcare professional. If you have questions about a medical condition or this instruction, always ask your healthcare professional. [employer] disclaims any warranty or liability for your use of this information.         "

## 2023-09-19 NOTE — PROGRESS NOTES
"SUBJECTIVE:   Amee is a 73 year old who presents for Preventive Visit.      9/19/2023     8:46 AM   Additional Questions   Roomed by Sherley LEWIS       Are you in the first 12 months of your Medicare coverage?  No    Healthy Habits:     In general, how would you rate your overall health?  Good    Frequency of exercise:  None    Do you usually eat at least 4 servings of fruit and vegetables a day, include whole grains    & fiber and avoid regularly eating high fat or \"junk\" foods?  Yes    Taking medications regularly:  Yes    Medication side effects:  Muscle aches    Ability to successfully perform activities of daily living:  No assistance needed    Home Safety:  No safety concerns identified    Hearing Impairment:  No hearing concerns    In the past 6 months, have you been bothered by leaking of urine?  No    In general, how would you rate your overall mental or emotional health?  Good    Additional concerns today:  Yes        Have you ever done Advance Care Planning? (For example, a Health Directive, POLST, or a discussion with a medical provider or your loved ones about your wishes): No, advance care planning information given to patient to review.  Patient declined advance care planning discussion at this time.    Fall risk  Fallen 2 or more times in the past year?: No  Any fall with injury in the past year?: Yes  Timed Up and Go Test (>13.5 is fall risk; contact physician) : 6.09  click delete button to remove this line now  Cognitive Screening   1) Repeat 3 items (Leader, Season, Table)    2) Clock draw: NORMAL  3) 3 item recall: Recalls 3 objects  Results: 3 items recalled: COGNITIVE IMPAIRMENT LESS LIKELY    Mini-CogTM Copyright FRANCIS Velasquez. Licensed by the author for use in [facility]; reprinted with permission (cb@.[email or web address]). All rights reserved.      Reviewed and updated as needed this visit by clinical staff   Tobacco  Allergies  Meds              Reviewed and updated as needed this visit by " Provider                 Social History     Tobacco Use    Smoking status: Former     Packs/day: 0.00     Years: 0.00     Pack years: 0.00     Types: Cigarettes     Quit date: 1973     Years since quittin.7     Passive exposure: Never    Smokeless tobacco: Never    Tobacco comments:     Social smoker   Substance Use Topics    Alcohol use: Yes     Alcohol/week: 0.0 standard drinks of alcohol     Comment: one or two a day         2023     9:59 PM   Alcohol Use   Prescreen: >3 drinks/day or >7 drinks/week? No     Do you have a current opioid prescription? No  Do you use any other controlled substances or medications that are not prescribed by a provider? Alcohol    Joint Pain  Onset: Late July  Description:   Location: Left Foot  Character: Dull ache  Intensity: 4/10  Progression of Symptoms: worse, intermittent  Accompanying Signs & Symptoms:  Other symptoms: none  History:   Previous similar pain: no     Precipitating factors:   Trauma or overuse: YES- someone stepped on it  Alleviating factors:  Improved by: nothing    Therapies Tried and outcome: none  Someone  stepped on her foot and since then it has not been right  Progressively getting worse.       Sinus problems have not improved much from the medications, her head has but not the taste or smell.       Stopped Statin 2 months ago, every other day still caused her joint pain. This improved with stopping it.       Current providers sharing in care for this patient include:   Patient Care Team:  Ary Adams APRN CNP as PCP - General (Family Medicine)  Ary Adams APRN CNP as Assigned PCP  Mary Coyle Psy.D, LP as Assigned Behavioral Health Provider  Gallo Tomas MD as Assigned Neuroscience Provider  Fam Workman MD as MD (Urology)  Wade Proctor MD as Assigned OBGYN Provider  Marcelino Cneteno MD as Physician (Endocrinology, Diabetes, and Metabolism)  Fam Workman MD as MD (Urology)  Marcelino Centeno MD as  Assigned Endocrinology Provider  Fam Workman MD as Assigned Surgical Provider    The following health maintenance items are reviewed in Epic and correct as of today:  Health Maintenance   Topic Date Due    ZOSTER IMMUNIZATION (1 of 2) Never done    COVID-19 Vaccine (3 - Pfizer series) 05/14/2021    MEDICARE ANNUAL WELLNESS VISIT  04/25/2023    INFLUENZA VACCINE (1) 09/01/2023    ANNUAL REVIEW OF HM ORDERS  10/03/2023    ASTHMA ACTION PLAN  10/10/2023    ASTHMA CONTROL TEST  03/19/2024    PHQ-9  03/19/2024    LIPID  05/03/2024    MAMMO SCREENING  08/16/2024    FALL RISK ASSESSMENT  09/19/2024    COLORECTAL CANCER SCREENING  12/08/2025    DTAP/TDAP/TD IMMUNIZATION (3 - Td or Tdap) 05/24/2026    ADVANCE CARE PLANNING  04/25/2027    DEXA  10/06/2037    HEPATITIS C SCREENING  Completed    DEPRESSION ACTION PLAN  Completed    MIGRAINE ACTION PLAN  Completed    Pneumococcal Vaccine: 65+ Years  Completed    IPV IMMUNIZATION  Aged Out    HPV IMMUNIZATION  Aged Out    MENINGITIS IMMUNIZATION  Aged Out     Lab work is in process          Review of Systems   Constitutional:  Negative for chills and fever.   HENT:  Positive for congestion. Negative for ear pain, hearing loss and sore throat.    Eyes:  Positive for visual disturbance. Negative for pain.   Respiratory:  Negative for cough and shortness of breath.    Cardiovascular:  Negative for chest pain, palpitations and peripheral edema.   Gastrointestinal:  Negative for abdominal pain, constipation, diarrhea, heartburn, hematochezia and nausea.   Breasts:  Negative for tenderness, breast mass and discharge.   Genitourinary:  Negative for dysuria, frequency, genital sores, hematuria, pelvic pain, urgency, vaginal bleeding and vaginal discharge.   Musculoskeletal:  Negative for arthralgias, joint swelling and myalgias.   Skin:  Negative for rash.   Neurological:  Positive for headaches. Negative for dizziness, weakness and paresthesias.   Psychiatric/Behavioral:   "Negative for mood changes. The patient is not nervous/anxious.        OBJECTIVE:   /72 (Cuff Size: Adult Regular)   Pulse 79   Temp 98.5  F (36.9  C) (Oral)   Resp 17   Ht 1.597 m (5' 2.87\")   Wt 66.9 kg (147 lb 8 oz)   SpO2 97%   BMI 26.23 kg/m   Estimated body mass index is 26.23 kg/m  as calculated from the following:    Height as of this encounter: 1.597 m (5' 2.87\").    Weight as of this encounter: 66.9 kg (147 lb 8 oz).  Physical Exam  HENT:      Right Ear: Tympanic membrane, ear canal and external ear normal.      Left Ear: Tympanic membrane, ear canal and external ear normal.      Nose: Congestion present.      Right Sinus: Maxillary sinus tenderness and frontal sinus tenderness present.      Left Sinus: Maxillary sinus tenderness and frontal sinus tenderness present.      Mouth/Throat:      Mouth: Mucous membranes are moist.   Eyes:      General: Lids are normal.   Cardiovascular:      Rate and Rhythm: Normal rate and regular rhythm.      Heart sounds: Normal heart sounds.   Pulmonary:      Effort: Pulmonary effort is normal.      Breath sounds: Normal breath sounds and air entry.   Abdominal:      Tenderness: There is no abdominal tenderness.   Musculoskeletal:      Cervical back: Full passive range of motion without pain and normal range of motion.      Right lower leg: No edema.      Left lower leg: No edema.      Left ankle: Swelling present. No deformity, ecchymosis or lacerations. No tenderness. Normal range of motion.      Comments: Left ankle- inversion movement indicated pain with no limited ROM. Eversion of ankle movement no indication of pain with no limitation in ROM.   Mild swelling noted along the lateral malleolus region.   No redness or tenderness to touch     Neurological:      Mental Status: She is alert.         Diagnostic Test Results:  Labs reviewed in Epic  No results found for any visits on 09/19/23.    ASSESSMENT / PLAN:   (Z00.00) Encounter for Medicare annual wellness " exam  (primary encounter diagnosis)  Comment:   Plan:  Updated HM   Declined covid and influenza vaccine today  Follow up at pharmacy for shingles vaccine.     (J45.20) Mild intermittent asthma without complication  Comment: Stable  Plan:         Currently on albuterol as needed along with Flovent.  Okay for as needed refills x6 months.  No refills given today.  As she did not need them.  Level 4        10/27/2022     2:38 PM 2/1/2023     9:47 AM 9/18/2023    10:01 PM   ACT Total Scores   ACT TOTAL SCORE (Goal Greater than or Equal to 20) 18 20 22   In the past 12 months, how many times did you visit the emergency room for your asthma without being admitted to the hospital? 0 0 0   In the past 12 months, how many times were you hospitalized overnight because of your asthma? 0 0 0         (E78.1) Hypertriglyceridemia  Comment:   Plan: Comprehensive metabolic panel (BMP + Alb, Alk         Phos, ALT, AST, Total. Bili, TP), Lipid panel         reflex to direct LDL Fasting,        We will recheck labs today.  Has not tolerated statins.  The ASCVD Risk score (Dariusz DK, et al., 2019) failed to calculate for the following reasons:    The valid total cholesterol range is 130 to 320 mg/dL  Advised on importance of fish oil, Mediterranean diet, and exercise especially if she is not can to do a statin.        (E78.5) Hyperlipidemia LDL goal <130  Comment: As noted above  Plan: Comprehensive metabolic panel (BMP + Alb, Alk         Phos, ALT, AST, Total. Bili, TP), Lipid panel         reflex to direct LDL Fasting,         As noted above    (F33.42) Recurrent major depressive disorder, in full remission (H)  Comment:   Plan: Stable on Zoloft   Level 4    (J01.01) Acute recurrent maxillary sinusitis  Comment: Was on Omnicef at the beginning of the month.  Continues to have sinus pressure and pain along with nasal congestion.  Using Flonase.  Using her decongestant.  Plan: doxycycline hyclate (VIBRAMYCIN) 100 MG         capsule,  fluconazole (DIFLUCAN) 150 MG tablet,         methylPREDNISolone (MEDROL DOSEPAK) 4 MG tablet        therapy pack        We will recommend a longer trial of antibiotics.  Along with steroids.  Advised on using a Maria Eugenia pot for saline rinses.  Continue Flonase.  Can add in azelastine.  Discussed plan to follow-up with ENT.  Could consider a CT as well in the future of her sinuses if needed.  She will schedule a visit with me virtually if her symptoms are not improving with above.  I also provided her with 1 dose of Diflucan to take in the middle of her antibiotics.  Can reach out for another dose (diflucan) if needed given the length of treatment.  Advised using probiotics/yogurt given the antibiotic use in the last month.  Level 4      (J32.9) Chronic sinusitis, unspecified location  Comment:   Plan: As noted above.    (G25.81) Restless leg  Comment: Stable  Plan: pramipexole (MIRAPEX) 0.25 MG tablet  Level 4          (S97.82XD) Crushing injury of left foot, subsequent encounter  Comment: Had her foot stepped on when she was [place] the beach.  This happened about a month ago.  Recommend x-ray today.  Will advise based on results.  Plan: XR Foot Left G/E 3 Views        Level 4    Patient has been advised of split billing requirements and indicates understanding: Yes      COUNSELING:  Reviewed preventive health counseling, as reflected in patient instructions        She reports that she quit smoking about 50 years ago. Her smoking use included cigarettes. She has never been exposed to tobacco smoke. She has never used smokeless tobacco.      Appropriate preventive services were discussed with this patient, including applicable screening as appropriate for cardiovascular disease, diabetes, osteopenia/osteoporosis, and glaucoma.  As appropriate for age/gender, discussed screening for colorectal cancer, prostate cancer, breast cancer, and cervical cancer. Checklist reviewing preventive services available has been given to  the patient.    Reviewed patients plan of care and provided an AVS. The Intermediate Care Plan ( asthma action plan, low back pain action plan, and migraine action plan) for Amee meets the Care Plan requirement. This Care Plan has been established and reviewed with the Patient.          ROBYN Bojor[facility]    Identified Health Risks:  I have reviewed Opioid Use Disorder and Substance Use Disorder risk factors and made any needed referrals. Answers submitted by the patient for this visit:  Patient Health Questionnaire (Submitted on 9/18/2023)  If you checked off any problems, how difficult have these problems made it for you to do your work, take care of things at home, or get along with other people?: Not difficult at all  PHQ9 TOTAL SCORE: 0  She is at risk for lack of exercise and has been provided with information to increase physical activity for the benefit of her well-being.  She is at risk for falling and has been provided with information to reduce the risk of falling at home.

## 2023-10-04 DIAGNOSIS — G47.00 INSOMNIA, UNSPECIFIED TYPE: ICD-10-CM

## 2023-10-04 RX ORDER — TRAZODONE HYDROCHLORIDE 100 MG/1
TABLET ORAL
Qty: 90 TABLET | Refills: 0 | Status: SHIPPED | OUTPATIENT
Start: 2023-10-04 | End: 2023-12-21

## 2023-10-25 ENCOUNTER — MYC MEDICAL ADVICE (OUTPATIENT)
Dept: FAMILY MEDICINE | Facility: OTHER | Age: 73
End: 2023-10-25
Payer: COMMERCIAL

## 2023-10-27 DIAGNOSIS — E55.9 VITAMIN D DEFICIENCY: ICD-10-CM

## 2023-10-27 RX ORDER — CHOLECALCIFEROL (VITAMIN D3) 50 MCG
1 TABLET ORAL EVERY OTHER DAY
Qty: 90 TABLET | Refills: 0 | Status: SHIPPED | OUTPATIENT
Start: 2023-10-27 | End: 2024-03-20

## 2023-10-30 DIAGNOSIS — E78.00 HYPERCHOLESTEROLEMIA: ICD-10-CM

## 2023-10-30 DIAGNOSIS — E78.1 HYPERTRIGLYCERIDEMIA: ICD-10-CM

## 2023-10-30 DIAGNOSIS — E78.5 HYPERLIPIDEMIA LDL GOAL <130: ICD-10-CM

## 2023-10-30 NOTE — TELEPHONE ENCOUNTER
"rosuvastatin (CRESTOR) 5 MG tablet     - Fax from pharmacy requests: \"Rosuvastatin Calcium 10MG tabs 10\" - Med not on patient's current med list. Please advise.\"  "

## 2023-11-01 RX ORDER — ROSUVASTATIN CALCIUM 5 MG/1
5 TABLET, COATED ORAL EVERY OTHER DAY
Qty: 90 TABLET | Refills: 1 | Status: SHIPPED | OUTPATIENT
Start: 2023-11-01 | End: 2024-09-18

## 2023-11-06 ENCOUNTER — MYC MEDICAL ADVICE (OUTPATIENT)
Dept: FAMILY MEDICINE | Facility: OTHER | Age: 73
End: 2023-11-06
Payer: COMMERCIAL

## 2023-11-06 DIAGNOSIS — S97.82XD CRUSHING INJURY OF LEFT FOOT, SUBSEQUENT ENCOUNTER: Primary | ICD-10-CM

## 2023-11-07 DIAGNOSIS — J32.9 CHRONIC SINUSITIS, UNSPECIFIED LOCATION: ICD-10-CM

## 2023-11-08 RX ORDER — FLUTICASONE PROPIONATE 50 MCG
SPRAY, SUSPENSION (ML) NASAL
Qty: 16 G | Refills: 1 | Status: SHIPPED | OUTPATIENT
Start: 2023-11-08 | End: 2024-02-01

## 2023-11-14 ENCOUNTER — MYC MEDICAL ADVICE (OUTPATIENT)
Dept: FAMILY MEDICINE | Facility: OTHER | Age: 73
End: 2023-11-14
Payer: COMMERCIAL

## 2023-12-21 DIAGNOSIS — G47.00 INSOMNIA, UNSPECIFIED TYPE: ICD-10-CM

## 2023-12-21 RX ORDER — TRAZODONE HYDROCHLORIDE 100 MG/1
TABLET ORAL
Qty: 90 TABLET | Refills: 0 | Status: SHIPPED | OUTPATIENT
Start: 2023-12-21 | End: 2024-04-03

## 2024-01-03 ENCOUNTER — MYC MEDICAL ADVICE (OUTPATIENT)
Dept: FAMILY MEDICINE | Facility: OTHER | Age: 74
End: 2024-01-03

## 2024-01-03 ENCOUNTER — ANCILLARY PROCEDURE (OUTPATIENT)
Dept: GENERAL RADIOLOGY | Facility: OTHER | Age: 74
End: 2024-01-03
Attending: PODIATRIST
Payer: COMMERCIAL

## 2024-01-03 ENCOUNTER — OFFICE VISIT (OUTPATIENT)
Dept: PODIATRY | Facility: OTHER | Age: 74
End: 2024-01-03
Attending: NURSE PRACTITIONER
Payer: COMMERCIAL

## 2024-01-03 ENCOUNTER — NURSE TRIAGE (OUTPATIENT)
Dept: FAMILY MEDICINE | Facility: OTHER | Age: 74
End: 2024-01-03

## 2024-01-03 VITALS
HEIGHT: 63 IN | WEIGHT: 147 LBS | SYSTOLIC BLOOD PRESSURE: 132 MMHG | BODY MASS INDEX: 26.05 KG/M2 | HEART RATE: 74 BPM | DIASTOLIC BLOOD PRESSURE: 70 MMHG

## 2024-01-03 DIAGNOSIS — M79.672 LEFT FOOT PAIN: ICD-10-CM

## 2024-01-03 DIAGNOSIS — M19.072 DJD (DEGENERATIVE JOINT DISEASE), ANKLE AND FOOT, LEFT: Primary | ICD-10-CM

## 2024-01-03 PROCEDURE — 73630 X-RAY EXAM OF FOOT: CPT | Mod: TC | Performed by: RADIOLOGY

## 2024-01-03 PROCEDURE — 99203 OFFICE O/P NEW LOW 30 MIN: CPT | Performed by: PODIATRIST

## 2024-01-03 ASSESSMENT — PAIN SCALES - GENERAL: PAINLEVEL: MILD PAIN (3)

## 2024-01-03 NOTE — PROGRESS NOTES
HPI:  Amee Crews is a 73 year old female who is seen in consultation at the request of Ary Adams, ROBYN, CNP    Pt presents for eval of:   (Onset, Location, L/R, Character, Treatments, Injury if yes)    XR Left foot 1/3/2024, 9/19/2023     Onset late July 2023, person stepped on top of left foot. Presents with dorsal left foot pain.   Constant dull ache. Intermittent sharp, stabbing, throbbing pain that radiates up leg.  While pushing a boat in the water someone else stepped on top of her barefoot they were also barefoot.    Essential oils, OTC pain ointment, compression socks    Retired.    ROS:  10 point ROS neg other than the symptoms noted above in the HPI.    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, moderatly severe,slowly worsening    Pinguecula of both eyes    Nuclear cataract of both eyes    Myopia, bilateral    Macular cyst, hole, or pseudohole, bilateral, moderatly severe    Adult vitelliform macular degeneration    Gastroesophageal reflux disease without esophagitis    Vitelliform macular dystrophy    DDD (degenerative disc disease), lumbar    Chronic pain of left knee    Vitamin B12 deficiency    Osteopenia, unspecified location    Migraine without aura and without status migrainosus, not intractable    Depression    Asthma    Adjustment reaction with anxiety and depression    Major depression in complete remission (H24)    Neural foraminal  stenosis of lumbosacral spine    Lumbar radiculitis    Hypertriglyceridemia    Age-related osteoporosis without current pathological fracture       PAST MEDICAL HISTORY:   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    Allergic rhinitis     Allergies     asthma     mild intermittent    Chronic back pain     neck and low back    HA (headache)     muscle contraction     History of blood transfusion     In childhood    Hot flashes     Hyperlipidemia     Insomnia     Intermittent asthma 9/16/2011    Major depressive disorder, single episode, moderate (H)     Ocular migraine     Osteoarthritis of hand     Restless leg 9/16/2011        PAST SURGICAL HISTORY:   Past Surgical History:   Procedure Laterality Date    APPENDECTOMY      age 9     BIOPSY      took tissue from my uterus at least 15 years ago    COLONOSCOPY  14 years    COLONOSCOPY N/A 4/30/2015    Procedure: COMBINED COLONOSCOPY, SINGLE OR MULTIPLE BIOPSY/POLYPECTOMY BY BIOPSY;  Surgeon: Doni Carey MD;  Location: [facility]    COLONOSCOPY WITH CO2 INSUFFLATION N/A 4/30/2015    Procedure: COLONOSCOPY WITH CO2 INSUFFLATION;  Surgeon: Doni Carey MD;  Location: [facility]    COLONOSCOPY WITH CO2 INSUFFLATION N/A 12/8/2020    Procedure: COLONOSCOPY, WITH CO2 INSUFFLATION;  Surgeon: Kareem Littlejohn DO;  Location: [facility]    COMBINED ESOPHAGOSCOPY, GASTROSCOPY, DUODENOSCOPY (EGD) WITH CO2 INSUFFLATION N/A 8/24/2017    Procedure: COMBINED ESOPHAGOSCOPY, GASTROSCOPY, DUODENOSCOPY (EGD) WITH CO2 INSUFFLATION;  EGD, Gastroesophageal reflux disease, esophagitis presence not specified Abdominal pain, generalized, Ref Dahlheimer-Schroeder, 24.78 BMI;  Surgeon: Duane, William Charles, MD;  Location: [facility] OR    ENT SURGERY      Tonsillectomy    ESOPHAGOSCOPY, GASTROSCOPY, DUODENOSCOPY (EGD), COMBINED N/A 8/24/2017    Procedure: COMBINED ESOPHAGOSCOPY, GASTROSCOPY, DUODENOSCOPY (EGD), BIOPSY SINGLE OR  MULTIPLE;;  Surgeon: Duane, William Charles, MD;  Location: [facility]    INJECT EPIDURAL TRANSFORAMINAL Left 9/16/2022    Procedure: INJECTION, EPIDURAL, TRANSFORAMINAL APPROACH - L4-5;  Surgeon: Laya Bowman MD;  Location: [facility]    LAPAROSCOPY PROCEDURE UNLISTED      polyps found    MOUTH SURGERY  2016    SINUS SURGERY      x2        MEDICATIONS:   Current Outpatient Medications:     azelastine (ASTELIN) 0.1 % nasal spray, Spray 1 spray into both nostrils 2 times daily, Disp: 1 Bottle, Rfl: 2    fluticasone (FLONASE) 50 MCG/ACT nasal spray, SPRAY TWO SPRAYS IN EACH NOSTRIL DAILY, Disp: 16 g, Rfl: 1    magnesium 200 MG TABS, Take 200 mg by mouth daily, Disp: 1 tablet, Rfl: 0    omeprazole (PRILOSEC) 40 MG DR capsule, TAKE ONE CAPSULE BY MOUTH ONCE DAILY BEFORE BREAKFAST, Disp: 90 capsule, Rfl: 2    pramipexole (MIRAPEX) 0.25 MG tablet, Take 2 tablets (0.5 mg) by mouth At Bedtime, Disp: 180 tablet, Rfl: 1    rosuvastatin (CRESTOR) 5 MG tablet, Take 1 tablet (5 mg) by mouth every other day, Disp: 90 tablet, Rfl: 1    sertraline (ZOLOFT) 100 MG tablet, Take 1 tablet (100 mg) by mouth daily, Disp: 90 tablet, Rfl: 1    traZODone (DESYREL) 100 MG tablet, TAKE ONE TABLET BY MOUTH AT BEDTIME, Disp: 90 tablet, Rfl: 0    VITAMIN D3 50 MCG (2000 UT) tablet, TAKE ONE TABLET BY MOUTH EVERY OTHER DAY, Disp: 90 tablet, Rfl: 0    albuterol (PROAIR HFA/PROVENTIL HFA/VENTOLIN HFA) 108 (90 Base) MCG/ACT inhaler, Inhale 2 puffs into the lungs every 4 hours as needed for shortness of breath (Patient not taking: Reported on 1/3/2024), Disp: 18 g, Rfl: 5    APRODINE 2.5-60 MG TABS per tablet, TAKE ONE TABLET BY MOUTH EVERY 6 HOURS AS NEEDED (Patient not taking: Reported on 1/3/2024), Disp: 100 tablet, Rfl: 0    cyanocobalamin (VITAMIN B-12) 1000 MCG tablet, Take 1 tablet (1,000 mcg) by mouth daily, Disp: 90 tablet, Rfl: 3    fluticasone (FLOVENT HFA) 220 MCG/ACT inhaler, 1 puff twice daily, rinse mouth out after Medication (Patient not  taking: Reported on 1/3/2024), Disp: 12 g, Rfl: 11    methylPREDNISolone (MEDROL DOSEPAK) 4 MG tablet therapy pack, Follow Package Directions, Disp: 21 tablet, Rfl: 0    SUMAtriptan (IMITREX) 50 MG tablet, TAKE ONE TABLET AT ONSET OF HEADACHE. MAY REPEAT AFTER 2 HOURS. DO NOT EXCEED 200 MG IN 24 HOURS (Patient not taking: Reported on 1/3/2024), Disp: 18 tablet, Rfl: 5    Current Facility-Administered Medications:     triamcinolone (KENALOG-40) injection 20 mg, 20 mg, , , Yasemin Simental MD, 20 mg at 21 1342    triamcinolone (KENALOG-40) injection 20 mg, 20 mg, , , Yasemin Simental MD, 20 mg at 21 1342     ALLERGIES:    Allergies   Allergen Reactions    Atorvastatin Cramps     Muscle pain    Crestor [Rosuvastatin] Muscle Pain (Myalgia)    Morphine Itching    Morphine Sulfate Itching        SOCIAL HISTORY:   Social History     Socioeconomic History    Marital status:      Spouse name: Not on file    Number of children: 2    Years of education: Not on file    Highest education level: Not on file   Occupational History     Employer: SELF   Tobacco Use    Smoking status: Former     Packs/day: 0.00     Years: 0.00     Additional pack years: 0.00     Total pack years: 0.00     Types: Cigarettes     Quit date: 1973     Years since quittin.0     Passive exposure: Never    Smokeless tobacco: Never    Tobacco comments:     Social smoker   Vaping Use    Vaping Use: Never used   Substance and Sexual Activity    Alcohol use: Yes     Alcohol/week: 0.0 standard drinks of alcohol     Comment: one or two a day    Drug use: No    Sexual activity: Yes     Partners: Male   Other Topics Concern    Parent/sibling w/ CABG, MI or angioplasty before 65F 55M? No   Social History Narrative    Not on file     Social Determinants of Health     Financial Resource Strain: Not on file   Food Insecurity: Not on file   Transportation Needs: Not on file   Physical Activity: Not on file   Stress: Not on  "file   Social Connections: Not on file   Interpersonal Safety: Not on file   Housing Stability: Not on file        FAMILY HISTORY:   Family History   Problem Relation Age of Onset    Diabetes Mother         Old age    Hypertension Mother     Cancer Father         stomach     Cancer Maternal Grandfather         lung     Heart Disease Other         EXAM:Vitals: /70 (BP Location: Left arm, Patient Position: Sitting, Cuff Size: Adult Regular)   Pulse 74   Ht 1.59 m (5' 2.6\")   Wt 66.7 kg (147 lb)   BMI 26.38 kg/m    BMI= Body mass index is 26.38 kg/m .    General appearance: Patient is alert and fully cooperative with history & exam.  No sign of distress is noted during the visit.     Psychiatric: Affect is pleasant & appropriate.  Patient appears motivated to improve health.     Respiratory: Breathing is regular & unlabored while sitting.     HEENT: Hearing is intact to spoken word.  Speech is clear.  No gross evidence of visual impairment that would impact ambulation.     Vascular: DP & PT pulses are intact & regular bilaterally.  No significant edema or varicosities noted.  CFT and skin temperature is normal to both lower extremities.     Neurologic: Lower extremity sensation is intact to light touch.  No evidence of weakness or contracture in the lower extremities.  No evidence of neuropathy.    Dermatologic: Skin is intact to both lower extremities with adequate texture, turgor and tone about the integument.  No paronychia or evidence of soft tissue infection is noted.     Musculoskeletal: Patient is ambulatory without assistive device or brace.  No gross ankle deformity noted.  No foot or ankle joint effusion is noted.  Generalized high arched foot is noted however medial column collapse also noted and no degenerative changes throughout the midfoot.  There is crepitus throughout the midfoot bilateral.  Symptoms mostly appear to be localized over the third fourth metatarsal cuboid cuneiform joint left " foot.  There is palpable bony prominence multiple locations both feet.  Manual muscle strength is 5/5 to all 4 quadrants.  Ankle joint and subtalar joint appears to be supple range of motion without subluxation.    Radiographs: 3 views left foot 1/3/2024 demonstrate osteophytes about the dorsal foot.  Subtle metatarsus adductus with joint space narrowing through the midtarsal joints.     ASSESSMENT:       ICD-10-CM    1. DJD (degenerative joint disease), ankle and foot, left  M19.072 MR Ankle Left w/o Contrast           PLAN:  Reviewed patient's chart in [facility].      1/3/2024   Obtained and interpreted radiographs  Symptoms are not well localized and quite limiting and frustrating for her therefore recommend MRI to further evaluate this thoroughly as it has been more than 6 months with continued symptoms.    Mechanism of injury to people barefoot pushing a boat in 1 person stepped on top of her foot.  Follow-up after the MRI        Gunner Wolf DPM

## 2024-01-03 NOTE — TELEPHONE ENCOUNTER
RN called patient to triage dizziness/lightheadedness and high BP. Dizziness and lightheadedness has been going on about a week and comes and goes. She only started taking BP last night after looking up symptoms and saw it correlated to BP. BP last night was 180/99. Current BP is 154/80. Patient states she did notice blurry vision yesterday and described that it was hard to focus her eyes on things and this is not normal for her. Mild headache as well 3/10.     RN advised patient I would get a message to provider to see what they advise given BP has come down now. RN reviewed red flag symptoms with patient and when to see emergency care. Patient agreed and understood. RN advised patient if BP increases again she should present to ED. Patient understood and agreed.     Do you recommend patient be seen in UC/ED or if ok to wait for an appointment with you?    CARLOS ALBERTO Malik, RN     Reason for Disposition   Systolic BP >= 130 OR Diastolic >= 80, and is not taking BP medications   [1] MODERATE dizziness (e.g., interferes with normal activities) AND [2] has NOT been evaluated by doctor (or NP/PA) for this  (Exception: Dizziness caused by heat exposure, sudden standing, or poor fluid intake.)    Additional Information   Negative: Sounds like a life-threatening emergency to the triager   Negative: Symptom is main concern (e.g., headache, chest pain)   Negative: Low blood pressure is main concern   Negative: Systolic BP >= 160 OR Diastolic >= 100, and any cardiac (e.g., breathing difficulty, chest pain) or neurologic symptoms (e.g., new-onset blurred or double vision)   Negative: Pregnant 20 or more weeks (or postpartum < 6 weeks) with new hand or face swelling   Negative: Pregnant 20 or more weeks (or postpartum < 6 weeks) and Systolic BP >= 160 OR Diastolic >= 110   Negative: Patient sounds very sick or weak to the triager   Negative: Systolic BP >= 200 OR Diastolic >= 120 and having NO cardiac or neurologic  "symptoms   Negative: Pregnant 20 or more weeks (or postpartum < 6 weeks) with Systolic BP >= 140 OR Diastolic >= 90   Negative: Systolic BP >= 180 OR Diastolic >= 110, and missed most recent dose of blood pressure medication   Negative: Systolic BP >= 180 OR Diastolic >= 110   Negative: Patient wants to be seen   Negative: Ran out of BP medications   Negative: Taking BP medications and feels is having side effects (e.g., impotence, cough, dizziness)   Negative: Systolic BP >= 160 OR Diastolic >= 100   Negative: Systolic BP >= 130 OR Diastolic >= 80, and pregnant   Negative: SEVERE difficulty breathing (e.g., struggling for each breath, speaks in single words)   Negative: [1] Difficulty breathing or swallowing AND [2] started suddenly after medicine, an allergic food or bee sting   Negative: Shock suspected (e.g., cold/pale/clammy skin, too weak to stand, low BP, rapid pulse)   Negative: Difficult to awaken or acting confused (e.g., disoriented, slurred speech)   Negative: [1] Weakness (i.e., paralysis, loss of muscle strength) of the face, arm or leg on one side of the body AND [2] sudden onset AND [3] present now   Negative: [1] Numbness (i.e., loss of sensation) of the face, arm or leg on one side of the body AND [2] sudden onset AND [3] present now   Negative: [1] Loss of speech or garbled speech AND [2] sudden onset AND [3] present now   Negative: Overdose (accidental or intentional) of medications   Negative: [1] Fainted > 15 minutes ago AND [2] still feels too weak or dizzy to stand   Negative: Heart beating < 50 beats per minute OR > 140 beats per minute   Negative: Sounds like a life-threatening emergency to the triager   Negative: Chest pain   Negative: Rectal bleeding, bloody stool, or tarry-black stool   Negative: [1] Vomiting AND [2] contains red blood or black (\"coffee ground\") material   Negative: Vomiting is main symptom   Negative: Diarrhea is main symptom   Negative: Headache is main symptom   " "Negative: Patient states that they are having an anxiety or panic attack   Negative: Dizziness from low blood sugar (i.e., < 60 mg/dl or 3.5 mmol/l)   Negative: Dizziness is described as a spinning sensation (i.e., vertigo)   Negative: Heat exhaustion suspected (i.e., dehydration from heat exposure)   Negative: Difficulty breathing   Negative: SEVERE dizziness (e.g., unable to stand, requires support to walk, feels like passing out now)   Negative: Extra heartbeats, irregular heart beating, or heart is beating very fast  (i.e., \"palpitations\")   Negative: [1] Drinking very little AND [2] dehydration suspected (e.g., no urine > 12 hours, very dry mouth, very lightheaded)   Negative: [1] Weakness (i.e., paralysis, loss of muscle strength) of the face, arm / hand, or leg / foot on one side of the body AND [2] sudden onset AND [3] brief (now gone)   Negative: [1] Numbness (i.e., loss of sensation) of the face, arm / hand, or leg / foot on one side of the body AND [2] sudden onset AND [3] brief (now gone)   Negative: [1] Loss of speech or garbled speech AND [2] sudden onset AND [3] brief (now gone)   Negative: Loss of vision or double vision  (Exception: Similar to previous migraines.)   Negative: Patient sounds very sick or weak to the triager   Negative: [1] Dizziness caused by heat exposure, sudden standing, or poor fluid intake AND [2] no improvement after 2 hours of rest and fluids   Negative: [1] Fever > 103 F (39.4 C) AND [2] not able to get the fever down using Fever Care Advice   Negative: [1] Fever > 101 F (38.3 C) AND [2] age > 60 years   Negative: [1] Fever > 100.0 F (37.8 C) AND [2] bedridden (e.g., CVA, chronic illness, recovering from surgery)   Negative: [1] Fever > 100.0 F (37.8 C) AND [2] diabetes mellitus or weak immune system (e.g., HIV positive, cancer chemo, splenectomy, organ transplant, chronic steroids)    Protocols used: Blood Pressure - High-A-OH, Dizziness - [id number]    "

## 2024-01-03 NOTE — LETTER
1/3/2024         RE: Amee Crews  [address] [facility] [address]        Dear Colleague,    Thank you for referring your patient, Amee Crews, to the [facility]. Please see a copy of my visit note below.    HPI:  Amee Crews is a 73 year old female who is seen in consultation at the request of Ary Adams, APRN, CNP    Pt presents for eval of:   (Onset, Location, L/R, Character, Treatments, Injury if yes)    XR Left foot 1/3/2024, 9/19/2023     Onset late July 2023, person stepped on top of left foot. Presents with dorsal left foot pain.   Constant dull ache. Intermittent sharp, stabbing, throbbing pain that radiates up leg.  While pushing a boat in the water someone else stepped on top of her barefoot they were also barefoot.    Essential oils, OTC pain ointment, compression socks    Retired.    ROS:  10 point ROS neg other than the symptoms noted above in the HPI.    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, moderatly severe,slowly worsening     Pinguecula of both eyes     Nuclear cataract of both eyes     Myopia, bilateral     Macular cyst, hole, or pseudohole, bilateral, moderatly severe     Adult vitelliform macular degeneration     Gastroesophageal reflux disease without esophagitis     Vitelliform macular dystrophy     DDD (degenerative disc disease), lumbar     Chronic pain of left knee      Vitamin B12 deficiency     Osteopenia, unspecified location     Migraine without aura and without status migrainosus, not intractable     Depression     Asthma     Adjustment reaction with anxiety and depression     Major depression in complete remission (H24)     Neural foraminal stenosis of lumbosacral spine     Lumbar radiculitis     Hypertriglyceridemia     Age-related osteoporosis without current pathological fracture       PAST MEDICAL HISTORY:   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     Allergic rhinitis      Allergies      asthma     mild intermittent     Chronic back pain     neck and low back     HA (headache)     muscle contraction      History of blood transfusion     In childhood     Hot flashes      Hyperlipidemia      Insomnia      Intermittent asthma 9/16/2011     Major depressive disorder, single episode, moderate (H)      Ocular migraine      Osteoarthritis of hand      Restless leg 9/16/2011        PAST SURGICAL HISTORY:   Past Surgical History:   Procedure Laterality Date     APPENDECTOMY      age 9      BIOPSY      took tissue from my uterus at least 15 years ago     COLONOSCOPY  14 years     COLONOSCOPY N/A 4/30/2015    Procedure: COMBINED COLONOSCOPY, SINGLE OR MULTIPLE BIOPSY/POLYPECTOMY BY BIOPSY;  Surgeon: Doni Carey MD;  Location: [facility] OR     COLONOSCOPY WITH CO2 INSUFFLATION N/A 4/30/2015    Procedure: COLONOSCOPY WITH CO2 INSUFFLATION;  Surgeon: Doni Carey MD;  Location: [facility] OR     COLONOSCOPY WITH CO2 INSUFFLATION N/A 12/8/2020    Procedure: COLONOSCOPY, WITH CO2 INSUFFLATION;  Surgeon: Kareem Littlejohn DO;  Location: [facility] OR     COMBINED ESOPHAGOSCOPY, GASTROSCOPY, DUODENOSCOPY (EGD) WITH CO2 INSUFFLATION N/A 8/24/2017    Procedure: COMBINED ESOPHAGOSCOPY, GASTROSCOPY, DUODENOSCOPY (EGD) WITH CO2 INSUFFLATION;  EGD, Gastroesophageal reflux disease, esophagitis presence not specified Abdominal pain,  generalized, Ref Paramjit, 24.78 BMI;  Surgeon: Duane, William Charles, MD;  Location: [facility] OR     ENT SURGERY      Tonsillectomy     ESOPHAGOSCOPY, GASTROSCOPY, DUODENOSCOPY (EGD), COMBINED N/A 8/24/2017    Procedure: COMBINED ESOPHAGOSCOPY, GASTROSCOPY, DUODENOSCOPY (EGD), BIOPSY SINGLE OR MULTIPLE;;  Surgeon: Duane, William Charles, MD;  Location: [facility] OR     INJECT EPIDURAL TRANSFORAMINAL Left 9/16/2022    Procedure: INJECTION, EPIDURAL, TRANSFORAMINAL APPROACH - L4-5;  Surgeon: Laya Bowman MD;  Location: [facility] OR     LAPAROSCOPY PROCEDURE UNLISTED      polyps found     MOUTH SURGERY  2016     SINUS SURGERY      x2        MEDICATIONS:   Current Outpatient Medications:      azelastine (ASTELIN) 0.1 % nasal spray, Spray 1 spray into both nostrils 2 times daily, Disp: 1 Bottle, Rfl: 2     fluticasone (FLONASE) 50 MCG/ACT nasal spray, SPRAY TWO SPRAYS IN EACH NOSTRIL DAILY, Disp: 16 g, Rfl: 1     magnesium 200 MG TABS, Take 200 mg by mouth daily, Disp: 1 tablet, Rfl: 0     omeprazole (PRILOSEC) 40 MG DR capsule, TAKE ONE CAPSULE BY MOUTH ONCE DAILY BEFORE BREAKFAST, Disp: 90 capsule, Rfl: 2     pramipexole (MIRAPEX) 0.25 MG tablet, Take 2 tablets (0.5 mg) by mouth At Bedtime, Disp: 180 tablet, Rfl: 1     rosuvastatin (CRESTOR) 5 MG tablet, Take 1 tablet (5 mg) by mouth every other day, Disp: 90 tablet, Rfl: 1     sertraline (ZOLOFT) 100 MG tablet, Take 1 tablet (100 mg) by mouth daily, Disp: 90 tablet, Rfl: 1     traZODone (DESYREL) 100 MG tablet, TAKE ONE TABLET BY MOUTH AT BEDTIME, Disp: 90 tablet, Rfl: 0     VITAMIN D3 50 MCG (2000 UT) tablet, TAKE ONE TABLET BY MOUTH EVERY OTHER DAY, Disp: 90 tablet, Rfl: 0     albuterol (PROAIR HFA/PROVENTIL HFA/VENTOLIN HFA) 108 (90 Base) MCG/ACT inhaler, Inhale 2 puffs into the lungs every 4 hours as needed for shortness of breath (Patient not taking: Reported on 1/3/2024), Disp: 18 g, Rfl: 5     APRODINE 2.5-60 MG TABS per tablet, TAKE ONE TABLET BY MOUTH EVERY 6  HOURS AS NEEDED (Patient not taking: Reported on 1/3/2024), Disp: 100 tablet, Rfl: 0     cyanocobalamin (VITAMIN B-12) 1000 MCG tablet, Take 1 tablet (1,000 mcg) by mouth daily, Disp: 90 tablet, Rfl: 3     fluticasone (FLOVENT HFA) 220 MCG/ACT inhaler, 1 puff twice daily, rinse mouth out after Medication (Patient not taking: Reported on 1/3/2024), Disp: 12 g, Rfl: 11     methylPREDNISolone (MEDROL DOSEPAK) 4 MG tablet therapy pack, Follow Package Directions, Disp: 21 tablet, Rfl: 0     SUMAtriptan (IMITREX) 50 MG tablet, TAKE ONE TABLET AT ONSET OF HEADACHE. MAY REPEAT AFTER 2 HOURS. DO NOT EXCEED 200 MG IN 24 HOURS (Patient not taking: Reported on 1/3/2024), Disp: 18 tablet, Rfl: 5    Current Facility-Administered Medications:      triamcinolone (KENALOG-40) injection 20 mg, 20 mg, , , Yasemin Simental MD, 20 mg at 21 1342     triamcinolone (KENALOG-40) injection 20 mg, 20 mg, , , Yasemin Simental MD, 20 mg at 21 1342     ALLERGIES:    Allergies   Allergen Reactions     Atorvastatin Cramps     Muscle pain     Crestor [Rosuvastatin] Muscle Pain (Myalgia)     Morphine Itching     Morphine Sulfate Itching        SOCIAL HISTORY:   Social History     Socioeconomic History     Marital status:      Spouse name: Not on file     Number of children: 2     Years of education: Not on file     Highest education level: Not on file   Occupational History     Employer: SELF   Tobacco Use     Smoking status: Former     Packs/day: 0.00     Years: 0.00     Additional pack years: 0.00     Total pack years: 0.00     Types: Cigarettes     Quit date: 1973     Years since quittin.0     Passive exposure: Never     Smokeless tobacco: Never     Tobacco comments:     Social smoker   Vaping Use     Vaping Use: Never used   Substance and Sexual Activity     Alcohol use: Yes     Alcohol/week: 0.0 standard drinks of alcohol     Comment: one or two a day     Drug use: No     Sexual activity: Yes      "Partners: Male   Other Topics Concern     Parent/sibling w/ CABG, MI or angioplasty before 65F 55M? No   Social History Narrative     Not on file     Social Determinants of Health     Financial Resource Strain: Not on file   Food Insecurity: Not on file   Transportation Needs: Not on file   Physical Activity: Not on file   Stress: Not on file   Social Connections: Not on file   Interpersonal Safety: Not on file   Housing Stability: Not on file        FAMILY HISTORY:   Family History   Problem Relation Age of Onset     Diabetes Mother         Old age     Hypertension Mother      Cancer Father         stomach      Cancer Maternal Grandfather         lung      Heart Disease Other         EXAM:Vitals: /70 (BP Location: Left arm, Patient Position: Sitting, Cuff Size: Adult Regular)   Pulse 74   Ht 1.59 m (5' 2.6\")   Wt 66.7 kg (147 lb)   BMI 26.38 kg/m    BMI= Body mass index is 26.38 kg/m .    General appearance: Patient is alert and fully cooperative with history & exam.  No sign of distress is noted during the visit.     Psychiatric: Affect is pleasant & appropriate.  Patient appears motivated to improve health.     Respiratory: Breathing is regular & unlabored while sitting.     HEENT: Hearing is intact to spoken word.  Speech is clear.  No gross evidence of visual impairment that would impact ambulation.     Vascular: DP & PT pulses are intact & regular bilaterally.  No significant edema or varicosities noted.  CFT and skin temperature is normal to both lower extremities.     Neurologic: Lower extremity sensation is intact to light touch.  No evidence of weakness or contracture in the lower extremities.  No evidence of neuropathy.    Dermatologic: Skin is intact to both lower extremities with adequate texture, turgor and tone about the integument.  No paronychia or evidence of soft tissue infection is noted.     Musculoskeletal: Patient is ambulatory without assistive device or brace.  No gross ankle " deformity noted.  No foot or ankle joint effusion is noted.  Generalized high arched foot is noted however medial column collapse also noted and no degenerative changes throughout the midfoot.  There is crepitus throughout the midfoot bilateral.  Symptoms mostly appear to be localized over the third fourth metatarsal cuboid cuneiform joint left foot.  There is palpable bony prominence multiple locations both feet.  Manual muscle strength is 5/5 to all 4 quadrants.  Ankle joint and subtalar joint appears to be supple range of motion without subluxation.    Radiographs: 3 views left foot 1/3/2024 demonstrate osteophytes about the dorsal foot.  Subtle metatarsus adductus with joint space narrowing through the midtarsal joints.     ASSESSMENT:       ICD-10-CM    1. DJD (degenerative joint disease), ankle and foot, left  M19.072 MR Ankle Left w/o Contrast           PLAN:  Reviewed patient's chart in [facility].      1/3/2024   Obtained and interpreted radiographs  Symptoms are not well localized and quite limiting and frustrating for her therefore recommend MRI to further evaluate this thoroughly as it has been more than 6 months with continued symptoms.    Mechanism of injury to people barefoot pushing a boat in 1 person stepped on top of her foot.  Follow-up after the MRI        Gunner Wolf DPM            Again, thank you for allowing me to participate in the care of your patient.        Sincerely,        Gunner Wolf DPM

## 2024-01-03 NOTE — NURSING NOTE
Patient reports feeling dizzy for past few days. Notes her BP has been elevated based on her home BP machine. BP and pulse were in normal range at today's OV with Dr. Wolf. Patient spoke to triage nurse earlier today and she was directed to Urgent Care or ED DEPT. She is planning on going straight there after this OV. Zenia Hodges CMA, January 3, 2024

## 2024-01-03 NOTE — TELEPHONE ENCOUNTER
Patient is called and informed of this message.  Patient understands and agrees to this plan.  Closing this encounter.    CHRISTI MalikN, RN

## 2024-01-03 NOTE — TELEPHONE ENCOUNTER
Please triage, unfortunately I can't see her today.       ROBYN Bojorquez CNP  Questions or concerns please feel free to send me a [employer] message or call me  Phone : [phone]

## 2024-01-03 NOTE — PATIENT INSTRUCTIONS
Please call [phone] to schedule your MRI .     *Once your imaging exam has been scheduled, our prior authorization team will connect with your insurance to ensure coverage of the exam. They will only reach out to you if a prior authorization is denied.  Please schedule your imaging exam at least 3 days out so our team has time to complete this process.  Failure to do so could result in insurance denial and you becoming responsible for the cost of the exam.     After your imaging appointment is scheduled, call [phone] to schedule your 30 minute follow-up visit with Dr. Wolf to discuss the results. .          Reliable shoe stores: To maximize your experience and provide the best possible fit.  Be sure to show them your foot concerns and tell them Dr. Wolf sent you.      Stores listed in bold have only athletic shoes, and stores that are not bold are mostly casual or variety of shoes    [employer]  [address] [place] [address]  [phone]    [employer]  [address] [place] [address]  [phone]    [employer] [place]  [address] [place] [address]  [phone]    [employer]  [address] [facility] [address]  [phone]    [employer]  [address] [place], [place] [address] [phone]    [employer]  [address] [place] [address]  [phone]                         [employer] Locations:     [address] [place] [address]   [phone]     [address] [place] [address]   [phone]     [address] [place] [address]   [phone]     [address] [facility] [place] [address]   [phone]     [address] [place] [address]   [phone]     [address] [facility] [place] [address]   [phone]     [address] [facility] [address]   [place] [address]   [phone]     [address]   [place]  [address]   [phone]             How to find reasonable shoes          The correct width    Correct Fitting    Correct Length      Foot Distortion    Posture Distortion                          Torsional Rigidity      Grasp behind the heel and underneath the foot and twist      Bad    Excessive torsion/twist in midfoot     Less torsion/twist in midfoot is better                   Heel Counter Rigidity      Grasp just above   midsole and squeeze      Bad    Soft heel counter      Good    Rigid Heel Counter      Flexion Rigidity      Grasp shoe and bend from forefoot to rearfoot

## 2024-01-03 NOTE — TELEPHONE ENCOUNTER
Recommend uc/urgent care.       Ary Adams, ROBYN CNP  Questions or concerns please feel free to send me a [employer] message or call me  Phone : [phone]

## 2024-01-04 ENCOUNTER — MYC MEDICAL ADVICE (OUTPATIENT)
Dept: FAMILY MEDICINE | Facility: OTHER | Age: 74
End: 2024-01-04
Payer: COMMERCIAL

## 2024-01-04 DIAGNOSIS — R42 DIZZINESS: Primary | ICD-10-CM

## 2024-01-04 DIAGNOSIS — F43.23 ADJUSTMENT REACTION WITH ANXIETY AND DEPRESSION: ICD-10-CM

## 2024-01-04 DIAGNOSIS — F33.42 RECURRENT MAJOR DEPRESSIVE DISORDER, IN FULL REMISSION (H): ICD-10-CM

## 2024-01-05 ENCOUNTER — MYC MEDICAL ADVICE (OUTPATIENT)
Dept: FAMILY MEDICINE | Facility: OTHER | Age: 74
End: 2024-01-05
Payer: COMMERCIAL

## 2024-01-05 NOTE — TELEPHONE ENCOUNTER
I recommend an in person visit to discuss anxiety medication. Ok to use any slot of mine. If she really wants to do virtual can schedule her for Monday. I worry her dizziness could be a UTI so she may want to do urgent care in the meantime.       ROBYN Bojorquez CNP  Questions or concerns please feel free to send me a [employer] message or call me  Phone : [phone]

## 2024-01-05 NOTE — TELEPHONE ENCOUNTER
Routing to provider     Pt scheduled Monday for mental health medication discussion     Pt states she was seen in UC for her dizziness and nothing was done (see 1/3/23 notes) pt states she was told to go back to PCP     Pt states not UTI symptoms at this time.    Please review/advise     Gela Marie RN

## 2024-01-08 ENCOUNTER — LAB (OUTPATIENT)
Dept: LAB | Facility: OTHER | Age: 74
End: 2024-01-08
Payer: COMMERCIAL

## 2024-01-08 ENCOUNTER — VIRTUAL VISIT (OUTPATIENT)
Dept: FAMILY MEDICINE | Facility: OTHER | Age: 74
End: 2024-01-08
Payer: COMMERCIAL

## 2024-01-08 ENCOUNTER — ALLIED HEALTH/NURSE VISIT (OUTPATIENT)
Dept: FAMILY MEDICINE | Facility: OTHER | Age: 74
End: 2024-01-08
Payer: COMMERCIAL

## 2024-01-08 VITALS — DIASTOLIC BLOOD PRESSURE: 74 MMHG | SYSTOLIC BLOOD PRESSURE: 126 MMHG

## 2024-01-08 DIAGNOSIS — J45.20 MILD INTERMITTENT ASTHMA WITHOUT COMPLICATION: ICD-10-CM

## 2024-01-08 DIAGNOSIS — R42 DIZZINESS: ICD-10-CM

## 2024-01-08 DIAGNOSIS — F33.42 RECURRENT MAJOR DEPRESSIVE DISORDER, IN FULL REMISSION (H): ICD-10-CM

## 2024-01-08 DIAGNOSIS — E78.1 HYPERTRIGLYCERIDEMIA: ICD-10-CM

## 2024-01-08 DIAGNOSIS — M60.9 STATIN-INDUCED MYOSITIS: ICD-10-CM

## 2024-01-08 DIAGNOSIS — Z01.30 BP CHECK: Primary | ICD-10-CM

## 2024-01-08 DIAGNOSIS — E78.5 HYPERLIPIDEMIA LDL GOAL <130: ICD-10-CM

## 2024-01-08 DIAGNOSIS — N30.00 ACUTE CYSTITIS WITHOUT HEMATURIA: ICD-10-CM

## 2024-01-08 DIAGNOSIS — E78.00 HYPERCHOLESTEROLEMIA: ICD-10-CM

## 2024-01-08 DIAGNOSIS — F41.1 GAD (GENERALIZED ANXIETY DISORDER): Primary | ICD-10-CM

## 2024-01-08 DIAGNOSIS — T46.6X5A STATIN-INDUCED MYOSITIS: ICD-10-CM

## 2024-01-08 LAB
ALBUMIN SERPL BCG-MCNC: 4.8 G/DL (ref 3.5–5.2)
ALBUMIN UR-MCNC: NEGATIVE MG/DL
ALP SERPL-CCNC: 81 U/L (ref 40–150)
ALT SERPL W P-5'-P-CCNC: 26 U/L (ref 0–50)
ANION GAP SERPL CALCULATED.3IONS-SCNC: 13 MMOL/L (ref 7–15)
APPEARANCE UR: CLEAR
AST SERPL W P-5'-P-CCNC: 22 U/L (ref 0–45)
BASOPHILS # BLD AUTO: 0.1 10E3/UL (ref 0–0.2)
BASOPHILS NFR BLD AUTO: 1 %
BILIRUB SERPL-MCNC: 0.5 MG/DL
BILIRUB UR QL STRIP: NEGATIVE
BUN SERPL-MCNC: 12.6 MG/DL (ref 8–23)
CALCIUM SERPL-MCNC: 8.9 MG/DL (ref 8.8–10.2)
CHLORIDE SERPL-SCNC: 102 MMOL/L (ref 98–107)
COLOR UR AUTO: YELLOW
CREAT SERPL-MCNC: 0.8 MG/DL (ref 0.51–0.95)
DEPRECATED HCO3 PLAS-SCNC: 22 MMOL/L (ref 22–29)
EGFRCR SERPLBLD CKD-EPI 2021: 78 ML/MIN/1.73M2
EOSINOPHIL # BLD AUTO: 0.5 10E3/UL (ref 0–0.7)
EOSINOPHIL NFR BLD AUTO: 7 %
ERYTHROCYTE [DISTWIDTH] IN BLOOD BY AUTOMATED COUNT: 12.2 % (ref 10–15)
GLUCOSE SERPL-MCNC: 120 MG/DL (ref 70–99)
GLUCOSE UR STRIP-MCNC: NEGATIVE MG/DL
HCT VFR BLD AUTO: 39.5 % (ref 35–47)
HGB BLD-MCNC: 13.5 G/DL (ref 11.7–15.7)
HGB UR QL STRIP: NEGATIVE
IMM GRANULOCYTES # BLD: 0 10E3/UL
IMM GRANULOCYTES NFR BLD: 0 %
KETONES UR STRIP-MCNC: NEGATIVE MG/DL
LEUKOCYTE ESTERASE UR QL STRIP: ABNORMAL
LYMPHOCYTES # BLD AUTO: 1.6 10E3/UL (ref 0.8–5.3)
LYMPHOCYTES NFR BLD AUTO: 22 %
MCH RBC QN AUTO: 32.5 PG (ref 26.5–33)
MCHC RBC AUTO-ENTMCNC: 34.2 G/DL (ref 31.5–36.5)
MCV RBC AUTO: 95 FL (ref 78–100)
MONOCYTES # BLD AUTO: 0.5 10E3/UL (ref 0–1.3)
MONOCYTES NFR BLD AUTO: 7 %
NEUTROPHILS # BLD AUTO: 4.5 10E3/UL (ref 1.6–8.3)
NEUTROPHILS NFR BLD AUTO: 63 %
NITRATE UR QL: NEGATIVE
PH UR STRIP: 5.5 [PH] (ref 5–7)
PLATELET # BLD AUTO: 256 10E3/UL (ref 150–450)
POTASSIUM SERPL-SCNC: 4 MMOL/L (ref 3.4–5.3)
PROT SERPL-MCNC: 7.1 G/DL (ref 6.4–8.3)
RBC # BLD AUTO: 4.15 10E6/UL (ref 3.8–5.2)
RBC #/AREA URNS AUTO: ABNORMAL /HPF
SODIUM SERPL-SCNC: 137 MMOL/L (ref 135–145)
SP GR UR STRIP: 1.02 (ref 1–1.03)
SQUAMOUS #/AREA URNS AUTO: ABNORMAL /LPF
UROBILINOGEN UR STRIP-ACNC: 0.2 E.U./DL
WBC # BLD AUTO: 7.2 10E3/UL (ref 4–11)
WBC #/AREA URNS AUTO: ABNORMAL /HPF

## 2024-01-08 PROCEDURE — 99207 PR NO CHARGE NURSE ONLY: CPT

## 2024-01-08 PROCEDURE — 99442 PR PHYSICIAN TELEPHONE EVALUATION 11-20 MIN: CPT | Performed by: NURSE PRACTITIONER

## 2024-01-08 PROCEDURE — 85025 COMPLETE CBC W/AUTO DIFF WBC: CPT

## 2024-01-08 PROCEDURE — 81001 URINALYSIS AUTO W/SCOPE: CPT

## 2024-01-08 PROCEDURE — 80053 COMPREHEN METABOLIC PANEL: CPT

## 2024-01-08 PROCEDURE — 36415 COLL VENOUS BLD VENIPUNCTURE: CPT

## 2024-01-08 NOTE — TELEPHONE ENCOUNTER
Pt had appointment with provider today 1/8/24 at 9am     RN closing current encounter    Gela Marie RN

## 2024-01-08 NOTE — PROGRESS NOTES
Amee Crews is a 73 year old patient who comes in today for a Blood Pressure check with a Visit Facilitator because of Virtual Visit with provider.  Initial BP:  /74 (Cuff Size: Adult Regular)      Blood pressure is not high.   Repeat Blood pressure: No  Patient is taking medication as prescribed.  Patient is tolerating medications well.  Is patient taking blood pressures at home? YES  Current Symptoms: headaches and light-headedness    Disposition:   Abnormal Blood Pressure NO    No: CC this chart to requesting provider/PCP.      Checked with patient's immediately after 136/82. Asked patient how she normally takes her BPs at home and took her BP that way 148/74. Huddled with KV and RNs due to symptoms. KV saw patient and looked in ears. KV verbally ordered bilateral ear lavage. EG to do ear lavage.

## 2024-01-08 NOTE — PROGRESS NOTES
Patient identified using two patient identifiers.  Ear exam showing wax occlusion completed by provider.  H202/H20 was placed in the bilateral ear(s) via irrigation tool: elephant ear.

## 2024-01-08 NOTE — PROGRESS NOTES
Amee is a 73 year old who is being evaluated via a billable telephone visit.      What phone number would you like to be contacted at? Cell  How would you like to obtain your AVS? [place]  Distant Location (provider location):  Off-site    Assessment & Plan     AMADOR (generalized anxiety disorder)  Recommend increasing her Zoloft if needed, first we are going to focus on other causes of her dizziness/lightheadedness. See below     Dizziness  Work up with CBC and UA negative   I did have patient come to clinic and we did a BP check which was off from her at home way that she was taking her BP so I do not suspect that it is a blood pressure concern.   BP Readings from Last 3 Encounters:   01/08/24 126/74   01/03/24 132/70   09/19/23 118/72     I also looked into patients ears since she was here and that does not appear to be the cause so we are going to have her do a carotid ultrasound and recommend BPVV therapy at home. Contact me if there are any worsening symptoms. Go in to be evaluated in the ER if any changes in symptoms or warning symptoms.   - UA Macroscopic with reflex to Microscopic and Culture - Lab Collect; Future  - CBC with platelets and differential; Future  - Comprehensive metabolic panel (BMP + Alb, Alk Phos, ALT, AST, Total. Bili, TP); Future  - US Carotid Bilateral; Future    Recurrent major depressive disorder, in full remission (H24)  As noted above     The patient indicates understanding of these issues and agrees with the plan.      See Patient Instructions    ROBYN Bojorquez CNP  [facility]   Amee is a 73 year old, presenting for the following health issues:  No chief complaint on file.      HPI     Abnormal Mood Symptoms  Onset/Duration: About 2 weeks where she feels her anxiety is out of control. Menopause was bad for her. The Zoloft.  Having some dizziness this has improved.         10/7/2022    11:35 AM 5/3/2023     8:19 PM 9/18/2023     9:53 PM    PHQ   PHQ-9 Total Score 2 0    0 0   Q9: Thoughts of better off dead/self-harm past 2 weeks Not at all Not at all Not at all         6/26/2018    11:59 AM 4/19/2019    11:04 AM 5/2/2023    10:02 AM   AMADOR-7 SCORE   Total Score  0 (minimal anxiety) 0 (minimal anxiety)   Total Score 0 0 0     Had EKG  Lightheadedness/dizziness  Went to  and everything was normal  No heart palpitations  No changes in breathing    BP Readings from Last 3 Encounters:   01/03/24 132/70   09/19/23 118/72   09/01/23 138/81             Review of Systems         Objective           Vitals:  No vitals were obtained today due to virtual visit.    Physical Exam   healthy and no distress  PSYCH: Alert and oriented times 3; coherent speech, normal   rate and volume, able to articulate logical thoughts, able   to abstract reason, no tangential thoughts, no hallucinations   or delusions  Her affect is normal  RESP: No cough, no audible wheezing, able to talk in full sentences  Remainder of exam unable to be completed due to telephone visits    Results for orders placed or performed in visit on 01/08/24   Comprehensive metabolic panel (BMP + Alb, Alk Phos, ALT, AST, Total. Bili, TP)     Status: Abnormal   Result Value Ref Range    Sodium 137 135 - 145 mmol/L    Potassium 4.0 3.4 - 5.3 mmol/L    Carbon Dioxide (CO2) 22 22 - 29 mmol/L    Anion Gap 13 7 - 15 mmol/L    Urea Nitrogen 12.6 8.0 - 23.0 mg/dL    Creatinine 0.80 0.51 - 0.95 mg/dL    GFR Estimate 78 >60 mL/min/1.73m2    Calcium 8.9 8.8 - 10.2 mg/dL    Chloride 102 98 - 107 mmol/L    Glucose 120 (H) 70 - 99 mg/dL    Alkaline Phosphatase 81 40 - 150 U/L    AST 22 0 - 45 U/L    ALT 26 0 - 50 U/L    Protein Total 7.1 6.4 - 8.3 g/dL    Albumin 4.8 3.5 - 5.2 g/dL    Bilirubin Total 0.5 <=1.2 mg/dL   UA Macroscopic with reflex to Microscopic and Culture - Lab Collect     Status: Abnormal    Specimen: Urine, NOS   Result Value Ref Range    Color Urine Yellow Colorless, Straw, Light Yellow, Yellow     Appearance Urine Clear Clear    Glucose Urine Negative Negative mg/dL    Bilirubin Urine Negative Negative    Ketones Urine Negative Negative mg/dL    Specific Gravity Urine 1.025 1.003 - 1.035    Blood Urine Negative Negative    pH Urine 5.5 5.0 - 7.0    Protein Albumin Urine Negative Negative mg/dL    Urobilinogen Urine 0.2 0.2, 1.0 E.U./dL    Nitrite Urine Negative Negative    Leukocyte Esterase Urine Trace (A) Negative   CBC with platelets and differential     Status: None   Result Value Ref Range    WBC Count 7.2 4.0 - 11.0 10e3/uL    RBC Count 4.15 3.80 - 5.20 10e6/uL    Hemoglobin 13.5 11.7 - 15.7 g/dL    Hematocrit 39.5 35.0 - 47.0 %    MCV 95 78 - 100 fL    MCH 32.5 26.5 - 33.0 pg    MCHC 34.2 31.5 - 36.5 g/dL    RDW 12.2 10.0 - 15.0 %    Platelet Count 256 150 - 450 10e3/uL    % Neutrophils 63 %    % Lymphocytes 22 %    % Monocytes 7 %    % Eosinophils 7 %    % Basophils 1 %    % Immature Granulocytes 0 %    Absolute Neutrophils 4.5 1.6 - 8.3 10e3/uL    Absolute Lymphocytes 1.6 0.8 - 5.3 10e3/uL    Absolute Monocytes 0.5 0.0 - 1.3 10e3/uL    Absolute Eosinophils 0.5 0.0 - 0.7 10e3/uL    Absolute Basophils 0.1 0.0 - 0.2 10e3/uL    Absolute Immature Granulocytes 0.0 <=0.4 10e3/uL   UA Microscopic with Reflex to Culture     Status: Abnormal   Result Value Ref Range    RBC Urine 0-2 0-2 /HPF /HPF    WBC Urine 0-5 0-5 /HPF /HPF    Squamous Epithelials Urine Few (A) None Seen /LPF    Narrative    Urine Culture not indicated   CBC with platelets and differential     Status: None    Narrative    The following orders were created for panel order CBC with platelets and differential.  Procedure                               Abnormality         Status                     ---------                               -----------         ------                     CBC with platelets and d...[[id number]]                      Final result                 Please view results for these tests on the individual orders.                Phone call duration: 15 minutes

## 2024-01-08 NOTE — PROGRESS NOTES
Bilateral ear wash    Having some lightheadedness. Had BP check today    BP Readings from Last 3 Encounters:   01/08/24 126/74   01/03/24 132/70   09/19/23 118/72

## 2024-01-11 ENCOUNTER — LAB (OUTPATIENT)
Dept: LAB | Facility: CLINIC | Age: 74
End: 2024-01-11
Payer: COMMERCIAL

## 2024-01-11 DIAGNOSIS — R42 DIZZINESS: ICD-10-CM

## 2024-01-11 LAB
FASTING STATUS PATIENT QL REPORTED: YES
GLUCOSE SERPL-MCNC: 96 MG/DL (ref 70–99)

## 2024-01-11 PROCEDURE — 36415 COLL VENOUS BLD VENIPUNCTURE: CPT

## 2024-01-11 PROCEDURE — 82947 ASSAY GLUCOSE BLOOD QUANT: CPT

## 2024-01-12 ENCOUNTER — HOSPITAL ENCOUNTER (OUTPATIENT)
Dept: MRI IMAGING | Facility: CLINIC | Age: 74
Discharge: HOME | End: 2024-01-12
Attending: PODIATRIST | Admitting: PODIATRIST
Payer: COMMERCIAL

## 2024-01-12 DIAGNOSIS — M19.072 DJD (DEGENERATIVE JOINT DISEASE), ANKLE AND FOOT, LEFT: ICD-10-CM

## 2024-01-12 PROCEDURE — 73721 MRI JNT OF LWR EXTRE W/O DYE: CPT | Mod: LT

## 2024-01-12 RX ORDER — SERTRALINE HYDROCHLORIDE 25 MG/1
25 TABLET, FILM COATED ORAL DAILY
Qty: 30 TABLET | Refills: 0 | Status: SHIPPED | OUTPATIENT
Start: 2024-01-12 | End: 2024-02-09

## 2024-01-17 ENCOUNTER — OFFICE VISIT (OUTPATIENT)
Dept: PODIATRY | Facility: OTHER | Age: 74
End: 2024-01-17
Payer: COMMERCIAL

## 2024-01-17 VITALS
DIASTOLIC BLOOD PRESSURE: 78 MMHG | HEART RATE: 80 BPM | WEIGHT: 147 LBS | HEIGHT: 63 IN | BODY MASS INDEX: 26.05 KG/M2 | SYSTOLIC BLOOD PRESSURE: 121 MMHG

## 2024-01-17 DIAGNOSIS — M19.072 DJD (DEGENERATIVE JOINT DISEASE), ANKLE AND FOOT, LEFT: ICD-10-CM

## 2024-01-17 DIAGNOSIS — Q66.6 PES VALGUS: Primary | ICD-10-CM

## 2024-01-17 PROCEDURE — 99213 OFFICE O/P EST LOW 20 MIN: CPT | Performed by: PODIATRIST

## 2024-01-17 ASSESSMENT — PAIN SCALES - GENERAL: PAINLEVEL: SEVERE PAIN (6)

## 2024-01-17 NOTE — PROGRESS NOTES
Chief Complaint   Patient presents with    Results     MRI Left ankle 1/12/2024    RECHECK     Left foot pain, onset late July 2023; LOV 1/3/2024     HPI:  Amee Crews is a 73 year old female who is seen in consultation at the request of Ary Adams APRN, CNP    Pt presents for eval of:   (Onset, Location, L/R, Character, Treatments, Injury if yes)    XR Left foot 1/3/2024, 9/19/2023     Onset late July 2023, person stepped on top of left foot. Presents with dorsal left foot pain.   Constant dull ache. Intermittent sharp, stabbing, throbbing pain that radiates up leg.  While pushing a boat in the water someone else stepped on top of her barefoot they were also barefoot.    Essential oils, OTC pain ointment, compression socks    Retired.    ROS:  10 point ROS neg other than the symptoms noted above in the HPI.    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, moderatly severe,slowly worsening    Pinguecula of both eyes    Nuclear cataract of both eyes    Myopia, bilateral    Macular cyst, hole, or pseudohole, bilateral, moderatly severe    Adult vitelliform macular degeneration    Gastroesophageal reflux disease without esophagitis    Vitelliform macular dystrophy    DDD (degenerative disc disease), lumbar    Chronic pain of left knee    Vitamin B12 deficiency    Osteopenia, unspecified location    Migraine without aura and without status migrainosus, not  intractable    Depression    Asthma    Adjustment reaction with anxiety and depression    Major depression in complete remission (H24)    Neural foraminal stenosis of lumbosacral spine    Lumbar radiculitis    Hypertriglyceridemia    Age-related osteoporosis without current pathological fracture       PAST MEDICAL HISTORY:   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    Allergic rhinitis     Allergies     asthma     mild intermittent    Chronic back pain     neck and low back    HA (headache)     muscle contraction     History of blood transfusion     In childhood    Hot flashes     Hyperlipidemia     Insomnia     Intermittent asthma 9/16/2011    Major depressive disorder, single episode, moderate (H)     Ocular migraine     Osteoarthritis of hand     Restless leg 9/16/2011        PAST SURGICAL HISTORY:   Past Surgical History:   Procedure Laterality Date    APPENDECTOMY      age 9     BIOPSY      took tissue from my uterus at least 15 years ago    COLONOSCOPY  14 years    COLONOSCOPY N/A 4/30/2015    Procedure: COMBINED COLONOSCOPY, SINGLE OR MULTIPLE BIOPSY/POLYPECTOMY BY BIOPSY;  Surgeon: Doni Carey MD;  Location: [facility]    COLONOSCOPY WITH CO2 INSUFFLATION N/A 4/30/2015    Procedure: COLONOSCOPY WITH CO2 INSUFFLATION;  Surgeon: Doni Carey MD;  Location: [facility]    COLONOSCOPY WITH CO2 INSUFFLATION N/A 12/8/2020    Procedure: COLONOSCOPY, WITH CO2 INSUFFLATION;  Surgeon: Kareem Littlejohn DO;  Location: [facility]    COMBINED ESOPHAGOSCOPY, GASTROSCOPY, DUODENOSCOPY (EGD) WITH CO2 INSUFFLATION N/A 8/24/2017    Procedure: COMBINED ESOPHAGOSCOPY, GASTROSCOPY, DUODENOSCOPY (EGD) WITH CO2 INSUFFLATION;  EGD, Gastroesophageal reflux disease, esophagitis presence not specified Abdominal pain, generalized, Ref Dahlheimer-Schroeder, 24.78 BMI;  Surgeon: Duane, William Charles, MD;  Location: [facility]    ENT SURGERY      Tonsillectomy     ESOPHAGOSCOPY, GASTROSCOPY, DUODENOSCOPY (EGD), COMBINED N/A 8/24/2017    Procedure: COMBINED ESOPHAGOSCOPY, GASTROSCOPY, DUODENOSCOPY (EGD), BIOPSY SINGLE OR MULTIPLE;;  Surgeon: Duane, William Charles, MD;  Location: [facility]    INJECT EPIDURAL TRANSFORAMINAL Left 9/16/2022    Procedure: INJECTION, EPIDURAL, TRANSFORAMINAL APPROACH - L4-5;  Surgeon: Laya Bowman MD;  Location: [facility]    LAPAROSCOPY PROCEDURE UNLISTED      polyps found    MOUTH SURGERY  2016    SINUS SURGERY      x2        MEDICATIONS:   Current Outpatient Medications:     albuterol (PROAIR HFA/PROVENTIL HFA/VENTOLIN HFA) 108 (90 Base) MCG/ACT inhaler, Inhale 2 puffs into the lungs every 4 hours as needed for shortness of breath, Disp: 18 g, Rfl: 5    azelastine (ASTELIN) 0.1 % nasal spray, Spray 1 spray into both nostrils 2 times daily, Disp: 1 Bottle, Rfl: 2    fluticasone (FLONASE) 50 MCG/ACT nasal spray, SPRAY TWO SPRAYS IN EACH NOSTRIL DAILY, Disp: 16 g, Rfl: 1    fluticasone (FLOVENT HFA) 220 MCG/ACT inhaler, 1 puff twice daily, rinse mouth out after Medication, Disp: 12 g, Rfl: 11    magnesium 200 MG TABS, Take 200 mg by mouth daily, Disp: 1 tablet, Rfl: 0    omeprazole (PRILOSEC) 40 MG DR capsule, TAKE ONE CAPSULE BY MOUTH ONCE DAILY BEFORE BREAKFAST, Disp: 90 capsule, Rfl: 2    pramipexole (MIRAPEX) 0.25 MG tablet, Take 2 tablets (0.5 mg) by mouth At Bedtime, Disp: 180 tablet, Rfl: 1    rosuvastatin (CRESTOR) 5 MG tablet, Take 1 tablet (5 mg) by mouth every other day, Disp: 90 tablet, Rfl: 1    sertraline (ZOLOFT) 100 MG tablet, Take 1 tablet (100 mg) by mouth daily, Disp: 90 tablet, Rfl: 1    sertraline (ZOLOFT) 25 MG tablet, Take 1 tablet (25 mg) by mouth daily To take with your 100mg tablet., Disp: 30 tablet, Rfl: 0    SUMAtriptan (IMITREX) 50 MG tablet, TAKE ONE TABLET AT ONSET OF HEADACHE. MAY REPEAT AFTER 2 HOURS. DO NOT EXCEED 200 MG IN 24 HOURS, Disp: 18 tablet, Rfl: 5    traZODone (DESYREL) 100 MG tablet, TAKE ONE TABLET BY MOUTH  AT BEDTIME, Disp: 90 tablet, Rfl: 0    VITAMIN D3 50 MCG ( UT) tablet, TAKE ONE TABLET BY MOUTH EVERY OTHER DAY, Disp: 90 tablet, Rfl: 0    Current Facility-Administered Medications:     triamcinolone (KENALOG-40) injection 20 mg, 20 mg, , , Yasemin Simental MD, 20 mg at 21 1342    triamcinolone (KENALOG-40) injection 20 mg, 20 mg, , , Yasemin Simental MD, 20 mg at 21 1342     ALLERGIES:    Allergies   Allergen Reactions    Atorvastatin Cramps     Muscle pain    Crestor [Rosuvastatin] Muscle Pain (Myalgia)    Morphine Itching    Morphine Sulfate Itching        SOCIAL HISTORY:   Social History     Socioeconomic History    Marital status:      Spouse name: Not on file    Number of children: 2    Years of education: Not on file    Highest education level: Not on file   Occupational History     Employer: SELF   Tobacco Use    Smoking status: Former     Packs/day: 0.00     Years: 0.00     Additional pack years: 0.00     Total pack years: 0.00     Types: Cigarettes     Quit date: 1973     Years since quittin.0     Passive exposure: Never    Smokeless tobacco: Never    Tobacco comments:     Social smoker   Vaping Use    Vaping Use: Never used   Substance and Sexual Activity    Alcohol use: Yes     Alcohol/week: 0.0 standard drinks of alcohol     Comment: one or two a day    Drug use: No    Sexual activity: Yes     Partners: Male   Other Topics Concern    Parent/sibling w/ CABG, MI or angioplasty before 65F 55M? No   Social History Narrative    Not on file     Social Determinants of Health     Financial Resource Strain: Not on file   Food Insecurity: Not on file   Transportation Needs: Not on file   Physical Activity: Not on file   Stress: Not on file   Social Connections: Not on file   Interpersonal Safety: Not on file   Housing Stability: Not on file        FAMILY HISTORY:   Family History   Problem Relation Age of Onset    Diabetes Mother         Old age    Hypertension  "Mother     Cancer Father         stomach     Cancer Maternal Grandfather         lung     Heart Disease Other         EXAM:Vitals: /78 (BP Location: Left arm, Patient Position: Sitting, Cuff Size: Adult Regular)   Pulse 80   Ht 1.59 m (5' 2.6\")   Wt 66.7 kg (147 lb)   BMI 26.37 kg/m    BMI= Body mass index is 26.37 kg/m .    General appearance: Patient is alert and fully cooperative with history & exam.  No sign of distress is noted during the visit.     Psychiatric: Affect is pleasant & appropriate.  Patient appears motivated to improve health.     Respiratory: Breathing is regular & unlabored while sitting.     HEENT: Hearing is intact to spoken word.  Speech is clear.  No gross evidence of visual impairment that would impact ambulation.     Vascular: DP & PT pulses are intact & regular bilaterally.  No significant edema or varicosities noted.  CFT and skin temperature is normal to both lower extremities.     Neurologic: Lower extremity sensation is intact to light touch.  No evidence of weakness or contracture in the lower extremities.  No evidence of neuropathy.    Dermatologic: Skin is intact to both lower extremities with adequate texture, turgor and tone about the integument.  No paronychia or evidence of soft tissue infection is noted.     Musculoskeletal: Patient is ambulatory without assistive device or brace.  No gross ankle deformity noted.  No foot or ankle joint effusion is noted.  Generalized high arched foot is noted however medial column collapse also noted and no degenerative changes throughout the midfoot.  There is crepitus throughout the midfoot bilateral.  Symptoms mostly appear to be localized over the third fourth metatarsal cuboid cuneiform joint left foot.  There is palpable bony prominence multiple locations both feet.  Manual muscle strength is 5/5 to all 4 quadrants.  Ankle joint and subtalar joint appears to be supple range of motion without subluxation.    Radiographs: 3 " views left foot 1/3/2024 demonstrate osteophytes about the dorsal foot.  Subtle metatarsus adductus with joint space narrowing through the midtarsal joints.       ASSESSMENT:       ICD-10-CM    1. Pes valgus  Q66.6 Orthotics and Prosthetics Order Orthotic; Foot Orthotics; Bilateral      2. DJD (degenerative joint disease), ankle and foot, left  M19.072          PLAN:  Reviewed patient's chart in [facility].      1/3/2024   Obtained and interpreted radiographs  Symptoms are not well localized and quite limiting and frustrating for her therefore recommend MRI to further evaluate this thoroughly as it has been more than 6 months with continued symptoms.    Mechanism of injury to people barefoot pushing a boat in 1 person stepped on top of her foot.  Follow-up after the MRI    1/17/2024  Order for orthotics  Reviewed MRI findings demonstrating mild to moderate degenerative changes throughout the dorsal left midfoot to rear foot second metatarsal cuneiform joint mostly as well as about the ankle.  Discussed and recommended appropriate shoe gear written instructions dispensed  Discussed injections oral anti-inflammatories and arthrodesis as well  Follow-up after attempting orthotics and changes in shoe gear      Gunner Wolf DPM

## 2024-01-17 NOTE — LETTER
1/17/2024         RE: Amee Crews  [id number] [facility] [id number]        Dear Colleague,    Thank you for referring your patient, Amee Crews, to the [facility]. Please see a copy of my visit note below.    Chief Complaint   Patient presents with     Results     MRI Left ankle 1/12/2024     RECHECK     Left foot pain, onset late July 2023; LOV 1/3/2024     HPI:  Amee Crews is a 73 year old female who is seen in consultation at the request of Ary Adams, APRN, CNP    Pt presents for eval of:   (Onset, Location, L/R, Character, Treatments, Injury if yes)    XR Left foot 1/3/2024, 9/19/2023     Onset late July 2023, person stepped on top of left foot. Presents with dorsal left foot pain.   Constant dull ache. Intermittent sharp, stabbing, throbbing pain that radiates up leg.  While pushing a boat in the water someone else stepped on top of her barefoot they were also barefoot.    Essential oils, OTC pain ointment, compression socks    Retired.    ROS:  10 point ROS neg other than the symptoms noted above in the HPI.    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, moderatly severe,slowly worsening     Pinguecula of both eyes     Nuclear cataract of both eyes     Myopia, bilateral     Macular cyst, hole, or pseudohole, bilateral, moderatly severe     Adult vitelliform macular degeneration      Gastroesophageal reflux disease without esophagitis     Vitelliform macular dystrophy     DDD (degenerative disc disease), lumbar     Chronic pain of left knee     Vitamin B12 deficiency     Osteopenia, unspecified location     Migraine without aura and without status migrainosus, not intractable     Depression     Asthma     Adjustment reaction with anxiety and depression     Major depression in complete remission (H24)     Neural foraminal stenosis of lumbosacral spine     Lumbar radiculitis     Hypertriglyceridemia     Age-related osteoporosis without current pathological fracture       PAST MEDICAL HISTORY:   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     Allergic rhinitis      Allergies      asthma     mild intermittent     Chronic back pain     neck and low back     HA (headache)     muscle contraction      History of blood transfusion     In childhood     Hot flashes      Hyperlipidemia      Insomnia      Intermittent asthma 9/16/2011     Major depressive disorder, single episode, moderate (H)      Ocular migraine      Osteoarthritis of hand      Restless leg 9/16/2011        PAST SURGICAL HISTORY:   Past Surgical History:   Procedure Laterality Date     APPENDECTOMY      age 9      BIOPSY      took tissue from my uterus at least 15 years ago     COLONOSCOPY  14 years     COLONOSCOPY N/A 4/30/2015    Procedure: COMBINED COLONOSCOPY, SINGLE OR MULTIPLE BIOPSY/POLYPECTOMY BY BIOPSY;  Surgeon: Doni Carey MD;  Location: [facility]     COLONOSCOPY WITH CO2 INSUFFLATION N/A 4/30/2015    Procedure: COLONOSCOPY WITH CO2 INSUFFLATION;  Surgeon: Doni Carey MD;  Location: [facility]     COLONOSCOPY WITH CO2 INSUFFLATION N/A 12/8/2020    Procedure: COLONOSCOPY, WITH CO2 INSUFFLATION;  Surgeon: Kareem Littlejohn DO;  Location: [facility]     COMBINED ESOPHAGOSCOPY, GASTROSCOPY, DUODENOSCOPY (EGD) WITH CO2 INSUFFLATION N/A 8/24/2017    Procedure:  COMBINED ESOPHAGOSCOPY, GASTROSCOPY, DUODENOSCOPY (EGD) WITH CO2 INSUFFLATION;  EGD, Gastroesophageal reflux disease, esophagitis presence not specified Abdominal pain, generalized, Ref Paramjit, 24.78 BMI;  Surgeon: Duane, William Charles, MD;  Location: [facility]     ENT SURGERY      Tonsillectomy     ESOPHAGOSCOPY, GASTROSCOPY, DUODENOSCOPY (EGD), COMBINED N/A 8/24/2017    Procedure: COMBINED ESOPHAGOSCOPY, GASTROSCOPY, DUODENOSCOPY (EGD), BIOPSY SINGLE OR MULTIPLE;;  Surgeon: Duane, William Charles, MD;  Location: [facility]     INJECT EPIDURAL TRANSFORAMINAL Left 9/16/2022    Procedure: INJECTION, EPIDURAL, TRANSFORAMINAL APPROACH - L4-5;  Surgeon: Laya Bowman MD;  Location: [facility]     LAPAROSCOPY PROCEDURE UNLISTED      polyps found     MOUTH SURGERY  2016     SINUS SURGERY      x2        MEDICATIONS:   Current Outpatient Medications:      albuterol (PROAIR HFA/PROVENTIL HFA/VENTOLIN HFA) 108 (90 Base) MCG/ACT inhaler, Inhale 2 puffs into the lungs every 4 hours as needed for shortness of breath, Disp: 18 g, Rfl: 5     azelastine (ASTELIN) 0.1 % nasal spray, Spray 1 spray into both nostrils 2 times daily, Disp: 1 Bottle, Rfl: 2     fluticasone (FLONASE) 50 MCG/ACT nasal spray, SPRAY TWO SPRAYS IN EACH NOSTRIL DAILY, Disp: 16 g, Rfl: 1     fluticasone (FLOVENT HFA) 220 MCG/ACT inhaler, 1 puff twice daily, rinse mouth out after Medication, Disp: 12 g, Rfl: 11     magnesium 200 MG TABS, Take 200 mg by mouth daily, Disp: 1 tablet, Rfl: 0     omeprazole (PRILOSEC) 40 MG DR capsule, TAKE ONE CAPSULE BY MOUTH ONCE DAILY BEFORE BREAKFAST, Disp: 90 capsule, Rfl: 2     pramipexole (MIRAPEX) 0.25 MG tablet, Take 2 tablets (0.5 mg) by mouth At Bedtime, Disp: 180 tablet, Rfl: 1     rosuvastatin (CRESTOR) 5 MG tablet, Take 1 tablet (5 mg) by mouth every other day, Disp: 90 tablet, Rfl: 1     sertraline (ZOLOFT) 100 MG tablet, Take 1 tablet (100 mg) by mouth daily, Disp: 90 tablet, Rfl: 1     sertraline (ZOLOFT) 25 MG  tablet, Take 1 tablet (25 mg) by mouth daily To take with your 100mg tablet., Disp: 30 tablet, Rfl: 0     SUMAtriptan (IMITREX) 50 MG tablet, TAKE ONE TABLET AT ONSET OF HEADACHE. MAY REPEAT AFTER 2 HOURS. DO NOT EXCEED 200 MG IN 24 HOURS, Disp: 18 tablet, Rfl: 5     traZODone (DESYREL) 100 MG tablet, TAKE ONE TABLET BY MOUTH AT BEDTIME, Disp: 90 tablet, Rfl: 0     VITAMIN D3 50 MCG (2000) tablet, TAKE ONE TABLET BY MOUTH EVERY OTHER DAY, Disp: 90 tablet, Rfl: 0    Current Facility-Administered Medications:      triamcinolone (KENALOG-40) injection 20 mg, 20 mg, , , Yasemin Simental MD, 20 mg at 21 1342     triamcinolone (KENALOG-40) injection 20 mg, 20 mg, , , Yasemin Simental MD, 20 mg at 21 1342     ALLERGIES:    Allergies   Allergen Reactions     Atorvastatin Cramps     Muscle pain     Crestor [Rosuvastatin] Muscle Pain (Myalgia)     Morphine Itching     Morphine Sulfate Itching        SOCIAL HISTORY:   Social History     Socioeconomic History     Marital status:      Spouse name: Not on file     Number of children: 2     Years of education: Not on file     Highest education level: Not on file   Occupational History     Employer: SELF   Tobacco Use     Smoking status: Former     Packs/day: 0.00     Years: 0.00     Additional pack years: 0.00     Total pack years: 0.00     Types: Cigarettes     Quit date: 1973     Years since quittin.0     Passive exposure: Never     Smokeless tobacco: Never     Tobacco comments:     Social smoker   Vaping Use     Vaping Use: Never used   Substance and Sexual Activity     Alcohol use: Yes     Alcohol/week: 0.0 standard drinks of alcohol     Comment: one or two a day     Drug use: No     Sexual activity: Yes     Partners: Male   Other Topics Concern     Parent/sibling w/ CABG, MI or angioplasty before 65F 55M? No   Social History Narrative     Not on file     Social Determinants of Health     Financial Resource Strain: Not on file  "  Food Insecurity: Not on file   Transportation Needs: Not on file   Physical Activity: Not on file   Stress: Not on file   Social Connections: Not on file   Interpersonal Safety: Not on file   Housing Stability: Not on file        FAMILY HISTORY:   Family History   Problem Relation Age of Onset     Diabetes Mother         Old age     Hypertension Mother      Cancer Father         stomach      Cancer Maternal Grandfather         lung      Heart Disease Other         EXAM:Vitals: /78 (BP Location: Left arm, Patient Position: Sitting, Cuff Size: Adult Regular)   Pulse 80   Ht 1.59 m (5' 2.6\")   Wt 66.7 kg (147 lb)   BMI 26.37 kg/m    BMI= Body mass index is 26.37 kg/m .    General appearance: Patient is alert and fully cooperative with history & exam.  No sign of distress is noted during the visit.     Psychiatric: Affect is pleasant & appropriate.  Patient appears motivated to improve health.     Respiratory: Breathing is regular & unlabored while sitting.     HEENT: Hearing is intact to spoken word.  Speech is clear.  No gross evidence of visual impairment that would impact ambulation.     Vascular: DP & PT pulses are intact & regular bilaterally.  No significant edema or varicosities noted.  CFT and skin temperature is normal to both lower extremities.     Neurologic: Lower extremity sensation is intact to light touch.  No evidence of weakness or contracture in the lower extremities.  No evidence of neuropathy.    Dermatologic: Skin is intact to both lower extremities with adequate texture, turgor and tone about the integument.  No paronychia or evidence of soft tissue infection is noted.     Musculoskeletal: Patient is ambulatory without assistive device or brace.  No gross ankle deformity noted.  No foot or ankle joint effusion is noted.  Generalized high arched foot is noted however medial column collapse also noted and no degenerative changes throughout the midfoot.  There is crepitus throughout the " midfoot bilateral.  Symptoms mostly appear to be localized over the third fourth metatarsal cuboid cuneiform joint left foot.  There is palpable bony prominence multiple locations both feet.  Manual muscle strength is 5/5 to all 4 quadrants.  Ankle joint and subtalar joint appears to be supple range of motion without subluxation.    Radiographs: 3 views left foot 1/3/2024 demonstrate osteophytes about the dorsal foot.  Subtle metatarsus adductus with joint space narrowing through the midtarsal joints.       ASSESSMENT:       ICD-10-CM    1. Pes valgus  Q66.6 Orthotics and Prosthetics Order Orthotic; Foot Orthotics; Bilateral      2. DJD (degenerative joint disease), ankle and foot, left  M19.072          PLAN:  Reviewed patient's chart in [facility].      1/3/2024   Obtained and interpreted radiographs  Symptoms are not well localized and quite limiting and frustrating for her therefore recommend MRI to further evaluate this thoroughly as it has been more than 6 months with continued symptoms.    Mechanism of injury to people barefoot pushing a boat in 1 person stepped on top of her foot.  Follow-up after the MRI    1/17/2024  Order for orthotics  Reviewed MRI findings demonstrating mild to moderate degenerative changes throughout the dorsal left midfoot to rear foot second metatarsal cuneiform joint mostly as well as about the ankle.  Discussed and recommended appropriate shoe gear written instructions dispensed  Discussed injections oral anti-inflammatories and arthrodesis as well  Follow-up after attempting orthotics and changes in shoe gear      Gunner Wolf DPM                Again, thank you for allowing me to participate in the care of your patient.        Sincerely,        Gunner Wolf DPM

## 2024-01-17 NOTE — PATIENT INSTRUCTIONS
Reliable shoe stores: To maximize your experience and provide the best possible fit.  Be sure to show them your foot concerns and tell them Dr. Wolf sent you.      Stores listed in bold have only athletic shoes, and stores that are not bold are mostly casual or variety of shoes    [employer]  [address] [place] [address]  [phone]    [facility] [employer] [facility] [place]  [address] [place] [address]  [phone]     [employer] [facility]  [address] [place] [address]  [phone]    [employer]  [address] [facility] [address]  [phone]    [employer]  [address] [place], [place] [address] [phone]    [employer]  [address] [place] [address]  [phone]                         [employer] Locations:     [address] [place] [address]   [phone]     [address] [place] [address] [address] [place] [address]   [phone]     [address] [place] [address]   [phone]     [address] [facility] [place] [address]   [phone]     [address] [facility] [place] [address]   [phone]     [address] [place] [address]   [phone]     [address] [facility] [address]   [place] [address]   [phone]     [address] [facility]   [place] [address]   [phone]             How to find reasonable shoes          The correct width    Correct Fitting    Correct Length      Foot Distortion    Posture Distortion                          Torsional Rigidity      Grasp behind the heel and underneath the foot and twist      Bad    Excessive torsion/twist in midfoot     Less torsion/twist in midfoot is better                   Heel Counter Rigidity      Grasp just above   midsole and squeeze      Bad    Soft heel counter      Good    Rigid Heel Counter      Flexion Rigidity      Grasp shoe and bend from forefoot to rearfoot

## 2024-01-31 ENCOUNTER — ANCILLARY PROCEDURE (OUTPATIENT)
Dept: ULTRASOUND IMAGING | Facility: OTHER | Age: 74
End: 2024-01-31
Attending: NURSE PRACTITIONER
Payer: COMMERCIAL

## 2024-01-31 DIAGNOSIS — R42 DIZZINESS: ICD-10-CM

## 2024-01-31 PROCEDURE — 93880 EXTRACRANIAL BILAT STUDY: CPT | Mod: TC | Performed by: RADIOLOGY

## 2024-02-01 ENCOUNTER — MYC REFILL (OUTPATIENT)
Dept: FAMILY MEDICINE | Facility: OTHER | Age: 74
End: 2024-02-01

## 2024-02-01 DIAGNOSIS — J32.9 CHRONIC SINUSITIS, UNSPECIFIED LOCATION: ICD-10-CM

## 2024-02-01 RX ORDER — FLUTICASONE PROPIONATE 50 MCG
SPRAY, SUSPENSION (ML) NASAL
Qty: 16 G | Refills: 1 | Status: SHIPPED | OUTPATIENT
Start: 2024-02-01 | End: 2024-03-20

## 2024-02-02 RX ORDER — FLUTICASONE PROPIONATE 50 MCG
SPRAY, SUSPENSION (ML) NASAL
Qty: 16 G | Refills: 1 | OUTPATIENT
Start: 2024-02-02

## 2024-02-06 ENCOUNTER — E-VISIT (OUTPATIENT)
Dept: FAMILY MEDICINE | Facility: OTHER | Age: 74
End: 2024-02-06
Payer: COMMERCIAL

## 2024-02-06 DIAGNOSIS — J32.9 CHRONIC SINUSITIS, UNSPECIFIED LOCATION: Primary | ICD-10-CM

## 2024-02-06 PROCEDURE — 99421 OL DIG E/M SVC 5-10 MIN: CPT | Performed by: NURSE PRACTITIONER

## 2024-02-06 RX ORDER — DOXYCYCLINE 100 MG/1
100 CAPSULE ORAL 2 TIMES DAILY
Qty: 14 CAPSULE | Refills: 0 | Status: SHIPPED | OUTPATIENT
Start: 2024-02-06 | End: 2024-02-09

## 2024-02-07 NOTE — TELEPHONE ENCOUNTER
Provider E-Visit time total (minutes): 5 minutes      ROBYN Bojorquez CNP  Questions or concerns please feel free to send me a [employer] message or call me  Phone : [phone]

## 2024-02-07 NOTE — PATIENT INSTRUCTIONS
Thank you for choosing us for your care. I have placed an order for a prescription so that you can start treatment. View your full visit summary for details by clicking on the link below. Your pharmacist will able to address any questions you may have about the medication.     If you're not feeling better within 5-7 days, please schedule an appointment.  You can schedule an appointment right here in [facility], or call [phone]  If the visit is for the same symptoms as your eVisit, we'll refund the cost of your eVisit if seen within seven days.

## 2024-02-09 DIAGNOSIS — F43.23 ADJUSTMENT REACTION WITH ANXIETY AND DEPRESSION: ICD-10-CM

## 2024-02-09 DIAGNOSIS — F33.42 RECURRENT MAJOR DEPRESSIVE DISORDER, IN FULL REMISSION (H): ICD-10-CM

## 2024-02-09 RX ORDER — AZITHROMYCIN 250 MG/1
TABLET, FILM COATED ORAL
Qty: 6 TABLET | Refills: 0 | Status: SHIPPED | OUTPATIENT
Start: 2024-02-09 | End: 2024-02-14

## 2024-02-09 RX ORDER — SERTRALINE HYDROCHLORIDE 25 MG/1
TABLET, FILM COATED ORAL
Qty: 30 TABLET | Refills: 0 | Status: SHIPPED | OUTPATIENT
Start: 2024-02-09 | End: 2024-03-11

## 2024-02-12 DIAGNOSIS — G43.809 OTHER MIGRAINE WITHOUT STATUS MIGRAINOSUS, NOT INTRACTABLE: ICD-10-CM

## 2024-02-12 NOTE — TELEPHONE ENCOUNTER
[employer] message was unread. RN called patient to confirm she received the medication. Patient is aware and picked this up on 2/9.     CHRISTI MalikN, RN

## 2024-02-13 RX ORDER — SUMATRIPTAN 50 MG/1
TABLET, FILM COATED ORAL
Qty: 18 TABLET | Refills: 1 | Status: SHIPPED | OUTPATIENT
Start: 2024-02-13

## 2024-02-16 ENCOUNTER — TELEPHONE (OUTPATIENT)
Dept: FAMILY MEDICINE | Facility: OTHER | Age: 74
End: 2024-02-16
Payer: COMMERCIAL

## 2024-02-16 DIAGNOSIS — J30.1 CHRONIC SEASONAL ALLERGIC RHINITIS DUE TO POLLEN: ICD-10-CM

## 2024-02-16 RX ORDER — AZELASTINE 1 MG/ML
1 SPRAY, METERED NASAL 2 TIMES DAILY
Qty: 30 ML | Refills: 0 | Status: CANCELLED | OUTPATIENT
Start: 2024-02-16

## 2024-02-16 NOTE — TELEPHONE ENCOUNTER
See e-visit 2/6/24. Patient sent message today indicating she still has sinus pressure and fatigue. Requested patient call and speak to triage, patient returning call.     She states she does feel improvement, no longer having green drainage, but doesn't feel back to normal.   She is experiencing tension headaches radiating into her right temple and post-nasal drip. She has been using saline nasal rinses Flonase twice per day. She does state azelastine spray has helped in the past for post-nasal drip, request refill of this.     She is wondering if further antibiotics is needed.    Breonna BARRAZAN, RN  [facility]

## 2024-02-16 NOTE — TELEPHONE ENCOUNTER
RN called and relayed provider message    Patient verbalized understanding and in agreement with plan of care.         Recommend in person, at this point she is best being seen in UC.    I have no openings and she has already done virtual with failure of therapy.    DIMPLE Marie, RN

## 2024-02-23 ENCOUNTER — TELEPHONE (OUTPATIENT)
Dept: FAMILY MEDICINE | Facility: OTHER | Age: 74
End: 2024-02-23
Payer: COMMERCIAL

## 2024-02-23 NOTE — TELEPHONE ENCOUNTER
Attempt #1 to call patient.     RN left voicemail and requested return call to [facility] at [phone]    Gela Marie RN  [facility]: [place]

## 2024-02-23 NOTE — TELEPHONE ENCOUNTER
Please call and see what patient wants to do for a visit. She is not due for an annual wellness until September. If this is for her sinuses I would recommend an in person since she has had multiple virtual's for this.     ROBYN Bojorquez CNP  Questions or concerns please feel free to send me a [employer] message or call me  Phone : [phone]

## 2024-02-26 NOTE — TELEPHONE ENCOUNTER
She does not need a med check. Let me know if she needs any refills and I can assist. Ok to do a late cancel for visit. I will do this portion.       ROBYN Bojorquez CNP  Questions or concerns please feel free to send me a [employer] message or call me  Phone : [phone]

## 2024-02-26 NOTE — TELEPHONE ENCOUNTER
Pt states she doesn't know what this appointment is for . She is thinking maybe med check     Pt states she continues to call for her medication refills and she didn't used to do this    Please advise- do you want this appointment for medication check?    Gela Marie RN

## 2024-03-07 ENCOUNTER — NURSE TRIAGE (OUTPATIENT)
Dept: FAMILY MEDICINE | Facility: OTHER | Age: 74
End: 2024-03-07
Payer: COMMERCIAL

## 2024-03-07 NOTE — TELEPHONE ENCOUNTER
"As the patient has not been seen by me previously,  And as the patient has not been seen at this clinic previously,  And as that she seems to have a primary care provider already,  And as \"skin lumps\" are not really an Internal Medicine problem,  I believe she should see her primary care provider (in person) for evaluation of her skin concern.  If she wishes to seek internal medicine opinion for her other medical problems, the current appointment that she has with me is adequate..  Morena    "

## 2024-03-07 NOTE — TELEPHONE ENCOUNTER
Patient calling regarding a lump the size of a quarter just below bikini line. Patient first noticed this last night. Denies any pain or discoloration to the area. Denies fever.     Patient is requesting to be seen with an internal med provider in [place]. Protocol does say to be seen within 3 days. RN was able to schedule patient for next available clinic slot on 3/26 at 1pm with Dr. Berg. RN reviewed red flag symptoms with patient and when to see emergency care. Patient agreed and understood.     Are you ok with patient waiting until 3/26? Or do you suggest they be seen sooner?    CARLOS ALBERTO Malik, RN       Reason for Disposition   New-onset hernia suspected (reducible bulge in groin or abdomen; non-tender) and NO pain or vomiting    Additional Information   Negative: Sounds like a life-threatening emergency to the triager   Negative: SEVERE pain (e.g., excruciating)   Negative: Swelling is painful to touch AND fever   Negative: Swelling is red and fever   Negative: Swelling is red and size > 2 inches (5.0 cm)   Negative: Swelling is painful to touch and no fever   Negative: Looks like a boil, infected sore, deep ulcer or other infected rash   Negative: Small growth, spot, bump, or pigmented area of skin (e.g., moles, skin tags, wart, melanoma, skin cancer)   Negative: Inguinal hernia previously diagnosed by a doctor (or NP/PA)   Negative: Followed a skin injury   Negative: Followed an insect bite   Negative: Swelling of ankle joint   Negative: Swelling of entire face   Negative: Swelling of eyelid   Negative: Swelling of knee joint   Negative: Swelling of labia   Negative: Swelling of lymph node suspected   Negative: Swelling of rectum   Negative: Swelling of scrotum   Negative: Swelling of surgical incision   Negative: Swelling of vaccination site   Negative: Hernia suspected (bulge in groin or abdomen) and painful or vomiting   Negative: Swollen lump in groin and pulsating (like heartbeat)    Negative: Patient sounds very sick or weak to the triager    Protocols used: Skin Lump or Localized Swelling-A-OH

## 2024-03-08 NOTE — TELEPHONE ENCOUNTER
Patient did return call and is scheduled at the [facility] with Mary Martino on 3/18/24 for further evaluation. Appointment with Dr. Berg has been canceled. Sharyn Lynch LPN

## 2024-03-11 DIAGNOSIS — F43.23 ADJUSTMENT REACTION WITH ANXIETY AND DEPRESSION: ICD-10-CM

## 2024-03-11 DIAGNOSIS — F33.42 RECURRENT MAJOR DEPRESSIVE DISORDER, IN FULL REMISSION (H): ICD-10-CM

## 2024-03-11 RX ORDER — SERTRALINE HYDROCHLORIDE 25 MG/1
TABLET, FILM COATED ORAL
Qty: 30 TABLET | Refills: 0 | Status: SHIPPED | OUTPATIENT
Start: 2024-03-11 | End: 2024-04-15

## 2024-03-18 ENCOUNTER — OFFICE VISIT (OUTPATIENT)
Dept: FAMILY MEDICINE | Facility: OTHER | Age: 74
End: 2024-03-18
Payer: COMMERCIAL

## 2024-03-18 VITALS
DIASTOLIC BLOOD PRESSURE: 80 MMHG | HEART RATE: 77 BPM | OXYGEN SATURATION: 96 % | RESPIRATION RATE: 16 BRPM | WEIGHT: 153 LBS | HEIGHT: 63 IN | TEMPERATURE: 98.6 F | BODY MASS INDEX: 27.11 KG/M2 | SYSTOLIC BLOOD PRESSURE: 120 MMHG

## 2024-03-18 DIAGNOSIS — R19.04 BILATERAL LOWER QUADRANT ABDOMINAL MASS: Primary | ICD-10-CM

## 2024-03-18 DIAGNOSIS — R19.03 BILATERAL LOWER QUADRANT ABDOMINAL MASS: Primary | ICD-10-CM

## 2024-03-18 PROCEDURE — 99213 OFFICE O/P EST LOW 20 MIN: CPT | Performed by: PHYSICIAN ASSISTANT

## 2024-03-18 ASSESSMENT — ASTHMA QUESTIONNAIRES
QUESTION_2 LAST FOUR WEEKS HOW OFTEN HAVE YOU HAD SHORTNESS OF BREATH: THREE TO SIX TIMES A WEEK
QUESTION_4 LAST FOUR WEEKS HOW OFTEN HAVE YOU USED YOUR RESCUE INHALER OR NEBULIZER MEDICATION (SUCH AS ALBUTEROL): TWO OR THREE TIMES PER WEEK
QUESTION_1 LAST FOUR WEEKS HOW MUCH OF THE TIME DID YOUR ASTHMA KEEP YOU FROM GETTING AS MUCH DONE AT WORK, SCHOOL OR AT HOME: NONE OF THE TIME
QUESTION_3 LAST FOUR WEEKS HOW OFTEN DID YOUR ASTHMA SYMPTOMS (WHEEZING, COUGHING, SHORTNESS OF BREATH, CHEST TIGHTNESS OR PAIN) WAKE YOU UP AT NIGHT OR EARLIER THAN USUAL IN THE MORNING: TWO OR THREE NIGHTS A WEEK
ACT_TOTALSCORE: 17
QUESTION_5 LAST FOUR WEEKS HOW WOULD YOU RATE YOUR ASTHMA CONTROL: WELL CONTROLLED
ACT_TOTALSCORE: 17

## 2024-03-18 ASSESSMENT — PATIENT HEALTH QUESTIONNAIRE - PHQ9
10. IF YOU CHECKED OFF ANY PROBLEMS, HOW DIFFICULT HAVE THESE PROBLEMS MADE IT FOR YOU TO DO YOUR WORK, TAKE CARE OF THINGS AT HOME, OR GET ALONG WITH OTHER PEOPLE: NOT DIFFICULT AT ALL
SUM OF ALL RESPONSES TO PHQ QUESTIONS 1-9: 0
SUM OF ALL RESPONSES TO PHQ QUESTIONS 1-9: 0

## 2024-03-18 ASSESSMENT — PAIN SCALES - GENERAL: PAINLEVEL: NO PAIN (0)

## 2024-03-18 NOTE — PROGRESS NOTES
"  Assessment & Plan     Bilateral lower quadrant abdominal mass  We will get an ultrasound to verify there are no masses or hernias present. No concerns at this time on exam otherwise.  Followup if any changes or concerns based on imaging.    - US Abdomen Limited; Future    Options for treatment and follow-up care were reviewed with the patient and/or guardian. Patient and/or guardian engaged in the decision making process and verbalized understanding of the options discussed and agreed with the final plan.         Pam Lubin is a 73 year old, presenting for the following health issues:  Lump       3/18/2024     2:02 PM   Additional Questions   Roomed by Herminia HANCOCK   Accompanied by Self     History of Present Illness       Reason for visit:  Lump in lower abdomen  Symptom onset:  1-2 weeks ago  Symptoms include:  Just visual  Symptom intensity:  Mild  Symptom progression:  Staying the same  Had these symptoms before:  No    She eats 2-3 servings of fruits and vegetables daily.She consumes 2 sweetened beverage(s) daily.She exercises with enough effort to increase her heart rate 10 to 19 minutes per day.  She exercises with enough effort to increase her heart rate 4 days per week.   She is taking medications regularly.     Noticed a potential lump on lower abdomen about 2 weeks ago. Just noticed that it was sticking out.   No pain associated. No swelling.  No increase in size.  No color changes. She had an exploratory laparotomy when younger but it is above the incision.  Nothing makes it better or worse.   No GI or  symptoms.     Review of Systems  Constitutional, gastrointestinal, genitourinary, musculoskeletal, integument systems are negative, except as otherwise noted.      Objective    /80   Pulse 77   Temp 98.6  F (37  C) (Temporal)   Resp 16   Ht 1.59 m (5' 2.6\")   Wt 69.4 kg (153 lb)   SpO2 96%   BMI 27.45 kg/m    Body mass index is 27.45 kg/m .  Physical Exam   GENERAL: alert and no " distress  RESP: lungs clear to auscultation - no rales, rhonchi or wheezes  CV: regular rate and rhythm, normal S1 S2, no S3 or S4, no murmur, click or rub, no peripheral edema  ABDOMEN: soft, nontender, no hepatosplenomegaly, no masses and bowel sounds normal.  There is a well healed incision site on lower abdomen that has no induration present.  There is some prominence of the lower pannus region without a discrete mass. No defect in the fascia, no hernia palpable with valsalva.    MS: no gross musculoskeletal defects noted, no edema            Signed Electronically by: Mary Martino PA-C

## 2024-03-19 ENCOUNTER — ANCILLARY PROCEDURE (OUTPATIENT)
Dept: ULTRASOUND IMAGING | Facility: OTHER | Age: 74
End: 2024-03-19
Attending: PHYSICIAN ASSISTANT
Payer: COMMERCIAL

## 2024-03-19 DIAGNOSIS — R19.04 BILATERAL LOWER QUADRANT ABDOMINAL MASS: ICD-10-CM

## 2024-03-19 DIAGNOSIS — J32.9 CHRONIC SINUSITIS, UNSPECIFIED LOCATION: ICD-10-CM

## 2024-03-19 DIAGNOSIS — R19.03 BILATERAL LOWER QUADRANT ABDOMINAL MASS: ICD-10-CM

## 2024-03-19 PROCEDURE — 76705 ECHO EXAM OF ABDOMEN: CPT | Mod: TC | Performed by: FAMILY MEDICINE

## 2024-03-20 DIAGNOSIS — E55.9 VITAMIN D DEFICIENCY: ICD-10-CM

## 2024-03-20 DIAGNOSIS — G25.81 RESTLESS LEG: ICD-10-CM

## 2024-03-20 RX ORDER — CHOLECALCIFEROL (VITAMIN D3) 50 MCG
1 TABLET ORAL EVERY OTHER DAY
Qty: 90 TABLET | Refills: 0 | Status: SHIPPED | OUTPATIENT
Start: 2024-03-20 | End: 2024-04-02

## 2024-03-20 RX ORDER — FLUTICASONE PROPIONATE 50 MCG
SPRAY, SUSPENSION (ML) NASAL
Qty: 16 G | Refills: 1 | Status: SHIPPED | OUTPATIENT
Start: 2024-03-20 | End: 2024-08-28

## 2024-03-20 RX ORDER — PRAMIPEXOLE DIHYDROCHLORIDE 0.25 MG/1
0.5 TABLET ORAL AT BEDTIME
Qty: 180 TABLET | Refills: 0 | Status: SHIPPED | OUTPATIENT
Start: 2024-03-20 | End: 2024-07-31

## 2024-04-02 ENCOUNTER — MYC REFILL (OUTPATIENT)
Dept: FAMILY MEDICINE | Facility: OTHER | Age: 74
End: 2024-04-02
Payer: COMMERCIAL

## 2024-04-02 DIAGNOSIS — E55.9 VITAMIN D DEFICIENCY: ICD-10-CM

## 2024-04-02 DIAGNOSIS — J32.9 CHRONIC SINUSITIS, UNSPECIFIED LOCATION: ICD-10-CM

## 2024-04-02 DIAGNOSIS — K21.00 GASTROESOPHAGEAL REFLUX DISEASE WITH ESOPHAGITIS WITHOUT HEMORRHAGE: Primary | ICD-10-CM

## 2024-04-02 DIAGNOSIS — J30.1 CHRONIC SEASONAL ALLERGIC RHINITIS DUE TO POLLEN: ICD-10-CM

## 2024-04-02 DIAGNOSIS — J45.20 MILD INTERMITTENT ASTHMA WITHOUT COMPLICATION: ICD-10-CM

## 2024-04-02 RX ORDER — FLUTICASONE PROPIONATE 50 MCG
SPRAY, SUSPENSION (ML) NASAL
Qty: 16 G | Refills: 1 | OUTPATIENT
Start: 2024-04-02

## 2024-04-02 NOTE — LETTER
My Asthma Action Plan    Name: Amee Crews   YOB: 1950  Date: 4/3/2024   My doctor: ROBYN Bojorquez CNP   My clinic: [facility]        My Rescue Medicine:   Albuterol inhaler (Proair/Ventolin/Proventil HFA)  2-4 puffs EVERY 4 HOURS as needed. Use a spacer if recommended by your provider.   My Asthma Severity:   Intermittent / Exercise Induced  Know your asthma triggers:     pollens          GREEN ZONE   Good Control  I feel good  No cough or wheeze  Can work, sleep and play without asthma symptoms       Take your asthma control medicine every day.     If exercise triggers your asthma, take your rescue medication  15 minutes before exercise or sports, and  During exercise if you have asthma symptoms  Spacer to use with inhaler: If you have a spacer, make sure to use it with your inhaler             YELLOW ZONE Getting Worse  I have ANY of these:  I do not feel good  Cough or wheeze  Chest feels tight  Wake up at night   Keep taking your Green Zone medications  Start taking your rescue medicine:  every 20 minutes for up to 1 hour. Then every 4 hours for 24-48 hours.  If you stay in the Yellow Zone for more than 12-24 hours, contact your doctor.  If you do not return to the Green Zone in 12-24 hours or you get worse, start taking your oral steroid medicine if prescribed by your provider.           RED ZONE Medical Alert - Get Help  I have ANY of these:  I feel awful  Medicine is not helping  Breathing getting harder  Trouble walking or talking  Nose opens wide to breathe       Take your rescue medicine NOW  If your provider has prescribed an oral steroid medicine, start taking it NOW  Call your doctor NOW  If you are still in the Red Zone after 20 minutes and you have not reached your doctor:  Take your rescue medicine again and  Call 911 or go to the emergency room right away    See your regular doctor within 2 weeks of an Emergency Room or Urgent Care visit for follow-up  treatment.          Annual Reminders:  Meet with Asthma Educator,  Flu Shot in the Fall, consider Pneumonia Vaccination for patients with asthma (aged 19 and older).    Pharmacy:    [id number] - [place], [place] - [address] [facility]  [id number] - [place] - [address] [id number] - [place], [place] - [address] [facility] [address] [facility] [address]    Electronically signed by ROBYN Bojorquez CNP   Date: 04/03/24                    Asthma Triggers  How To Control Things That Make Your Asthma Worse    Triggers are things that make your asthma worse.  Look at the list below to help you find your triggers and   what you can do about them. You can help prevent asthma flare-ups by staying away from your triggers.      Trigger                                                          What you can do   Cigarette Smoke  Tobacco smoke can make asthma worse. Do not allow smoking in your home, car or around you.  Be sure no one smokes at a child s day care or school.  If you smoke, ask your health care provider for ways to help you quit.  Ask family members to quit too.  Ask your health care provider for a referral to Quit Plan to help you quit smoking, or call 2-071-112-PLAN.     Colds, Flu, Bronchitis  These are common triggers of asthma. Wash your hands often.  Don t touch your eyes, nose or mouth.  Get a flu shot every year.     Dust Mites  These are tiny bugs that live in cloth or carpet. They are too small to see. Wash sheets and blankets in hot water every week.   Encase pillows and mattress in dust mite proof covers.  Avoid having carpet if you can. If you have carpet, vacuum weekly.   Use a dust mask and HEPA vacuum.   Pollen and Outdoor Mold  Some people are allergic to trees, grass, or weed pollen, or molds. Try to keep your windows closed.  Limit time out doors when pollen count is high.   Ask you health care provider about taking medicine during allergy season.     Animal  Dander  Some people are allergic to skin flakes, urine or saliva from pets with fur or feathers. Keep pets with fur or feathers out of your home.    If you can t keep the pet outdoors, then keep the pet out of your bedroom.  Keep the bedroom door closed.  Keep pets off cloth furniture and away from stuffed toys.     Mice, Rats, and Cockroaches  Some people are allergic to the waste from these pests.   Cover food and garbage.  Clean up spills and food crumbs.  Store grease in the refrigerator.   Keep food out of the bedroom.   Indoor Mold  This can be a trigger if your home has high moisture. Fix leaking faucets, pipes, or other sources of water.   Clean moldy surfaces.  Dehumidify basement if it is damp and smelly.   Smoke, Strong Odors, and Sprays  These can reduce air quality. Stay away from strong odors and sprays, such as perfume, powder, hair spray, paints, smoke incense, paint, cleaning products, candles and new carpet.   Exercise or Sports  Some people with asthma have this trigger. Be active!  Ask your doctor about taking medicine before sports or exercise to prevent symptoms.    Warm up for 5-10 minutes before and after sports or exercise.     Other Triggers of Asthma  Cold air:  Cover your nose and mouth with a scarf.  Sometimes laughing or crying can be a trigger.  Some medicines and food can trigger asthma.

## 2024-04-03 DIAGNOSIS — G47.00 INSOMNIA, UNSPECIFIED TYPE: ICD-10-CM

## 2024-04-03 RX ORDER — FAMOTIDINE 20 MG/1
20 TABLET, FILM COATED ORAL 2 TIMES DAILY PRN
Qty: 60 TABLET | Refills: 3 | Status: SHIPPED | OUTPATIENT
Start: 2024-04-03

## 2024-04-03 RX ORDER — TRAZODONE HYDROCHLORIDE 100 MG/1
TABLET ORAL
Qty: 90 TABLET | Refills: 0 | Status: SHIPPED | OUTPATIENT
Start: 2024-04-03 | End: 2024-07-03

## 2024-04-03 RX ORDER — FLUTICASONE PROPIONATE 220 UG/1
AEROSOL, METERED RESPIRATORY (INHALATION)
Qty: 12 G | Refills: 11 | Status: SHIPPED | OUTPATIENT
Start: 2024-04-03 | End: 2024-04-16

## 2024-04-03 RX ORDER — LANOLIN ALCOHOL/MO/W.PET/CERES
1000 CREAM (GRAM) TOPICAL DAILY
Qty: 90 TABLET | Refills: 3 | Status: SHIPPED | OUTPATIENT
Start: 2024-04-03

## 2024-04-03 RX ORDER — CHOLECALCIFEROL (VITAMIN D3) 50 MCG
1 TABLET ORAL DAILY
Qty: 90 TABLET | Refills: 1 | Status: SHIPPED | OUTPATIENT
Start: 2024-04-03 | End: 2024-10-04

## 2024-04-03 RX ORDER — PSEUDOEPHEDRINE HCL AND TRIPOLIDINE 60; 2.5 MG/1; MG/1
TABLET, FILM COATED ORAL
Qty: 100 TABLET | Refills: 0 | Status: SHIPPED | OUTPATIENT
Start: 2024-04-03 | End: 2024-04-29

## 2024-04-03 NOTE — TELEPHONE ENCOUNTER
I sent refills. Please triage her acid reflux. I am confused if she is having breakthrough symptoms with Prilosec this is concerning.

## 2024-04-03 NOTE — TELEPHONE ENCOUNTER
Pt states she gets a bloat in her stomach every once and awhile and the acid reflux is only on the weekends when she knows she eats too much     Pt states symptoms are not worsening,     Gela Marie RN

## 2024-04-03 NOTE — TELEPHONE ENCOUNTER
Please call patient regarding her comments about Vitamin D, Vitamin B12 and prilosec, I need clarification please also advise her flonase was sent already a couple weeks ago.       ROBYN Bojorquez CNP  Questions or concerns please feel free to send me a [employer] message or call me  Phone : [phone]  n

## 2024-04-03 NOTE — TELEPHONE ENCOUNTER
Patient states she has always been on B12. Reviewing med list this was discontinued on 1/5/24. Reordered and pended below.     Informed patient vitamin D was sent today.     She states instead of omeprazole she needs something more immediate relief.     Also needs refill of allergy medication, pended below.     Breonna Solares BSN, RN

## 2024-04-10 ENCOUNTER — MYC MEDICAL ADVICE (OUTPATIENT)
Dept: FAMILY MEDICINE | Facility: OTHER | Age: 74
End: 2024-04-10
Payer: COMMERCIAL

## 2024-04-10 DIAGNOSIS — J45.20 MILD INTERMITTENT ASTHMA WITHOUT COMPLICATION: ICD-10-CM

## 2024-04-11 ENCOUNTER — TELEPHONE (OUTPATIENT)
Dept: FAMILY MEDICINE | Facility: OTHER | Age: 74
End: 2024-04-11
Payer: COMMERCIAL

## 2024-04-11 NOTE — TELEPHONE ENCOUNTER
RN Triage    Patient Contact    Attempt # 1    Was call answered?  No.  Left message on voicemail with information to call me back.    Upon call back please offer visits as below.    Delfina Reyna RN  [facility] - Registered Nurse  Clinic Triage [place]   April 11, 2024

## 2024-04-11 NOTE — TELEPHONE ENCOUNTER
RN called and spoke with patient and appt. Made.    Appointments in Next Year      Apr 12, 2024 11:30 AM  (Arrive by 11:10 AM)  Provider Visit with ROBYN Cheek CNP  [facility] ([facility] ) [phone]          Delfina Reyna RN  [facility] - Registered Nurse  Clinic Triage [place]   April 11, 2024

## 2024-04-12 ENCOUNTER — VIRTUAL VISIT (OUTPATIENT)
Dept: FAMILY MEDICINE | Facility: OTHER | Age: 74
End: 2024-04-12
Payer: COMMERCIAL

## 2024-04-12 DIAGNOSIS — J45.31 MILD PERSISTENT ASTHMA WITH ACUTE EXACERBATION: Primary | ICD-10-CM

## 2024-04-12 PROCEDURE — 99442 PR PHYSICIAN TELEPHONE EVALUATION 11-20 MIN: CPT | Mod: 93 | Performed by: NURSE PRACTITIONER

## 2024-04-12 RX ORDER — PREDNISONE 20 MG/1
40 TABLET ORAL DAILY
Qty: 10 TABLET | Refills: 0 | Status: SHIPPED | OUTPATIENT
Start: 2024-04-12 | End: 2024-04-17

## 2024-04-12 RX ORDER — ALBUTEROL SULFATE 90 UG/1
2 AEROSOL, METERED RESPIRATORY (INHALATION) EVERY 4 HOURS PRN
Qty: 18 G | Refills: 5 | Status: SHIPPED | OUTPATIENT
Start: 2024-04-12

## 2024-04-12 ASSESSMENT — ASTHMA QUESTIONNAIRES
ACT_TOTALSCORE: 14
QUESTION_3 LAST FOUR WEEKS HOW OFTEN DID YOUR ASTHMA SYMPTOMS (WHEEZING, COUGHING, SHORTNESS OF BREATH, CHEST TIGHTNESS OR PAIN) WAKE YOU UP AT NIGHT OR EARLIER THAN USUAL IN THE MORNING: TWO OR THREE NIGHTS A WEEK
QUESTION_1 LAST FOUR WEEKS HOW MUCH OF THE TIME DID YOUR ASTHMA KEEP YOU FROM GETTING AS MUCH DONE AT WORK, SCHOOL OR AT HOME: NONE OF THE TIME
QUESTION_5 LAST FOUR WEEKS HOW WOULD YOU RATE YOUR ASTHMA CONTROL: SOMEWHAT CONTROLLED
QUESTION_2 LAST FOUR WEEKS HOW OFTEN HAVE YOU HAD SHORTNESS OF BREATH: MORE THAN ONCE A DAY
QUESTION_4 LAST FOUR WEEKS HOW OFTEN HAVE YOU USED YOUR RESCUE INHALER OR NEBULIZER MEDICATION (SUCH AS ALBUTEROL): TWO OR THREE TIMES PER WEEK
ACT_TOTALSCORE: 14

## 2024-04-12 ASSESSMENT — ANXIETY QUESTIONNAIRES
1. FEELING NERVOUS, ANXIOUS, OR ON EDGE: MORE THAN HALF THE DAYS
3. WORRYING TOO MUCH ABOUT DIFFERENT THINGS: NEARLY EVERY DAY
2. NOT BEING ABLE TO STOP OR CONTROL WORRYING: NEARLY EVERY DAY
7. FEELING AFRAID AS IF SOMETHING AWFUL MIGHT HAPPEN: SEVERAL DAYS
GAD7 TOTAL SCORE: 11
IF YOU CHECKED OFF ANY PROBLEMS ON THIS QUESTIONNAIRE, HOW DIFFICULT HAVE THESE PROBLEMS MADE IT FOR YOU TO DO YOUR WORK, TAKE CARE OF THINGS AT HOME, OR GET ALONG WITH OTHER PEOPLE: NOT DIFFICULT AT ALL
4. TROUBLE RELAXING: NOT AT ALL
GAD7 TOTAL SCORE: 11
7. FEELING AFRAID AS IF SOMETHING AWFUL MIGHT HAPPEN: SEVERAL DAYS
5. BEING SO RESTLESS THAT IT IS HARD TO SIT STILL: NOT AT ALL
GAD7 TOTAL SCORE: 11
8. IF YOU CHECKED OFF ANY PROBLEMS, HOW DIFFICULT HAVE THESE MADE IT FOR YOU TO DO YOUR WORK, TAKE CARE OF THINGS AT HOME, OR GET ALONG WITH OTHER PEOPLE?: NOT DIFFICULT AT ALL
6. BECOMING EASILY ANNOYED OR IRRITABLE: MORE THAN HALF THE DAYS

## 2024-04-12 ASSESSMENT — PATIENT HEALTH QUESTIONNAIRE - PHQ9
SUM OF ALL RESPONSES TO PHQ QUESTIONS 1-9: 6
SUM OF ALL RESPONSES TO PHQ QUESTIONS 1-9: 6
10. IF YOU CHECKED OFF ANY PROBLEMS, HOW DIFFICULT HAVE THESE PROBLEMS MADE IT FOR YOU TO DO YOUR WORK, TAKE CARE OF THINGS AT HOME, OR GET ALONG WITH OTHER PEOPLE: NOT DIFFICULT AT ALL

## 2024-04-12 NOTE — LETTER
My Asthma Action Plan    Name: Amee Crews   YOB: 1950  Date: 4/12/2024   My doctor: ROBYN Bojorquez CNP   My clinic: [facility]        My Control Medicine: Fluticasone propionate (Flovent HFA) - 220 mcg 1 puff twice daily, rinse mouth after use.   My Rescue Medicine: Albuterol (Proair/Ventolin/Proventil HFA) 2-4 puffs EVERY 4 HOURS as needed. Use a spacer if recommended by your provider.  My Oral Steroid Medicine: As needed for flares  My Asthma Severity:   Mild Persistent  Know your asthma triggers: upper respiratory infections, dust mites, and pollens  pollens            GREEN ZONE   Good Control  I feel good  No cough or wheeze  Can work, sleep and play without asthma symptoms       Take your asthma control medicine every day.     If exercise triggers your asthma, take your rescue medication  15 minutes before exercise or sports, and  During exercise if you have asthma symptoms  Spacer to use with inhaler: If you have a spacer, make sure to use it with your inhaler             YELLOW ZONE Getting Worse  I have ANY of these:  I do not feel good  Cough or wheeze  Chest feels tight  Wake up at night   Keep taking your Green Zone medications  Start taking your rescue medicine:  every 20 minutes for up to 1 hour. Then every 4 hours for 24-48 hours.  If you stay in the Yellow Zone for more than 12-24 hours, contact your doctor.  If you do not return to the Green Zone in 12-24 hours or you get worse, start taking your oral steroid medicine if prescribed by your provider.           RED ZONE Medical Alert - Get Help  I have ANY of these:  I feel awful  Medicine is not helping  Breathing getting harder  Trouble walking or talking  Nose opens wide to breathe       Take your rescue medicine NOW  If your provider has prescribed an oral steroid medicine, start taking it NOW  Call your doctor NOW  If you are still in the Red Zone after 20 minutes and you have not reached your  doctor:  Take your rescue medicine again and  Call 911 or go to the emergency room right away    See your regular doctor within 2 weeks of an Emergency Room or Urgent Care visit for follow-up treatment.          Annual Reminders:  Meet with Asthma Educator,  Flu Shot in the Fall, consider Pneumonia Vaccination for patients with asthma (aged 19 and older).    Pharmacy:    [id number] - [place], [place] - [address] [facility]  [id number] - [place] - [address] [facility] #[id number] - [place] - [address]  [address] [facility] [address]    Electronically signed by ROBYN Bojorquez CNP   Date: 04/12/24                      Asthma Triggers  How To Control Things That Make Your Asthma Worse    Triggers are things that make your asthma worse.  Look at the list below to help you find your triggers and what you can do about them.  You can help prevent asthma flare-ups by staying away from your triggers.      Trigger                                                          What you can do   Cigarette Smoke  Tobacco smoke can make asthma worse. Do not allow smoking in your home, car or around you.  Be sure no one smokes at a child s day care or school.  If you smoke, ask your health care provider for ways to help you quit.  Ask family members to quit too.  Ask your health care provider for a referral to Quit Plan to help you quit smoking, or call 2-874-060-PLAN.     Colds, Flu, Bronchitis  These are common triggers of asthma. Wash your hands often.  Don t touch your eyes, nose or mouth.  Get a flu shot every year.     Dust Mites  These are tiny bugs that live in cloth or carpet. They are too small to see. Wash sheets and blankets in hot water every week.   Encase pillows and mattress in dust mite proof covers.  Avoid having carpet if you can. If you have carpet, vacuum weekly.   Use a dust mask and HEPA vacuum.   Pollen and Outdoor Mold  Some people are allergic to trees, grass, or weed  pollen, or molds. Try to keep your windows closed.  Limit time out doors when pollen count is high.   Ask you health care provider about taking medicine during allergy season.     Animal Dander  Some people are allergic to skin flakes, urine or saliva from pets with fur or feathers. Keep pets with fur or feathers out of your home.    If you can t keep the pet outdoors, then keep the pet out of your bedroom.  Keep the bedroom door closed.  Keep pets off cloth furniture and away from stuffed toys.     Mice, Rats, and Cockroaches   Some people are allergic to the waste from these pests.   Cover food and garbage.  Clean up spills and food crumbs.  Store grease in the refrigerator.   Keep food out of the bedroom.   Indoor Mold  This can be a trigger if your home has high moisture. Fix leaking faucets, pipes, or other sources of water.   Clean moldy surfaces.  Dehumidify basement if it is damp and smelly.   Smoke, Strong Odors, and Sprays  These can reduce air quality. Stay away from strong odors and sprays, such as perfume, powder, hair spray, paints, smoke incense, paint, cleaning products, candles and new carpet.   Exercise or Sports  Some people with asthma have this trigger. Be active!  Ask your doctor about taking medicine before sports or exercise to prevent symptoms.    Warm up for 5-10 minutes before and after sports or exercise.     Other Triggers of Asthma  Cold air:  Cover your nose and mouth with a scarf.  Sometimes laughing or crying can be a trigger.  Some medicines and food can trigger asthma.

## 2024-04-12 NOTE — PROGRESS NOTES
"    Instructions Relayed to Patient by Virtual Roomer:     Patient is active on "[employer]:   Relayed following to patient: \"It looks like you are active on "[employer] are you able to join the visit this way? If not, do you need us to send you a link now or would you like your provider to send a link via text or email when they are ready to initiate the visit?\"    Reminded patient to ensure they were logged on to virtual visit by arrival time listed. Documented in appointment notes if patient had flexibility to initiate visit sooner than arrival time. If pediatric virtual visit, ensured pediatric patient along with parent/guardian will be present for video visit.     Patient offered the website [email or web address] and/or phone number to "[employer] Help line: [phone]    Amee is a 73 year old who is being evaluated via a billable telephone visit.    What phone number would you like to be contacted at? [phone]   How would you like to obtain your AVS? [email or web address]  Originating Location (pt. Location): Home    Distant Location (provider location):  Off-site    Assessment & Plan     Mild persistent asthma with acute exacerbation  Discussed her asthma therapy and she was unaware specifically how her regimen worked. We discussed in detail today when to use the flovent and when to use the albuterol. I have also given her some prednisone until the Flovent is able to allow stability of her symptoms  Follow up with ACT in 1 month.   - predniSONE (DELTASONE) 20 MG tablet; Take 2 tablets (40 mg) by mouth daily for 5 days    The patient indicates understanding of these issues and agrees with the plan.          See Patient Instructions    Subjective   Amee is a 73 year old, presenting for the following health issues:  Asthma and Follow Up        4/12/2024    10:43 AM   Additional Questions   Roomed by Arnel OWENS     History of Present Illness     Asthma:  She presents for follow up of asthma.  She has some cough, " no wheezing, and some shortness of breath.  She is using a relief medication daily. She does not have a controller medication. Patient is aware of the following triggers: same as previous visit, exercise or sports and pollens. The patient has not had a visit to the Emergency Room, Urgent Care or Hospital due to asthma since the last clinic visit.       Using the albuterol   Today is better than previous.   The day before was bad took about 5 hours before she got it under good control.   Couple weeks since sinus infection  Worsening asthma not doing Flovent didn't;t know regimen.     Review of Systems  Constitutional, HEENT, cardiovascular, pulmonary, gi and gu systems are negative, except as otherwise noted.      Objective           Vitals:  No vitals were obtained today due to virtual visit.    Physical Exam   General: Alert and no distress //Respiratory: No audible wheeze, cough, or shortness of breath // Psychiatric:  Appropriate affect, tone, and pace of words      Diagnostic: None       Phone call duration: 15  minutes  Signed Electronically by: ROBYN Bojorquez CNP

## 2024-04-15 DIAGNOSIS — F43.23 ADJUSTMENT REACTION WITH ANXIETY AND DEPRESSION: ICD-10-CM

## 2024-04-15 DIAGNOSIS — F33.42 RECURRENT MAJOR DEPRESSIVE DISORDER, IN FULL REMISSION (H): ICD-10-CM

## 2024-04-15 RX ORDER — SERTRALINE HYDROCHLORIDE 25 MG/1
TABLET, FILM COATED ORAL
Qty: 90 TABLET | Refills: 1 | Status: SHIPPED | OUTPATIENT
Start: 2024-04-15 | End: 2024-10-07

## 2024-04-16 DIAGNOSIS — J45.20 MILD INTERMITTENT ASTHMA WITHOUT COMPLICATION: ICD-10-CM

## 2024-04-16 NOTE — TELEPHONE ENCOUNTER
Patient needs a signed prescription for her flovent inhaler so she can send it to her pharmacy in [place].  Ruchi CRUZ RN

## 2024-04-17 RX ORDER — FLUTICASONE PROPIONATE 220 UG/1
AEROSOL, METERED RESPIRATORY (INHALATION)
Qty: 12 G | Refills: 11 | Status: SHIPPED | OUTPATIENT
Start: 2024-04-17

## 2024-04-17 NOTE — TELEPHONE ENCOUNTER
RX signed and in my box please call patient for next steps.       ROBYN Bojorquez CNP  Questions or concerns please feel free to send me a [employer] message or call me  Phone : [phone]

## 2024-04-24 ENCOUNTER — MYC MEDICAL ADVICE (OUTPATIENT)
Dept: FAMILY MEDICINE | Facility: OTHER | Age: 74
End: 2024-04-24
Payer: COMMERCIAL

## 2024-04-28 DIAGNOSIS — J30.1 CHRONIC SEASONAL ALLERGIC RHINITIS DUE TO POLLEN: ICD-10-CM

## 2024-04-29 RX ORDER — PSEUDOEPHEDRINE HCL AND TRIPOLIDINE 60; 2.5 MG/1; MG/1
TABLET, FILM COATED ORAL
Qty: 96 TABLET | Refills: 0 | Status: SHIPPED | OUTPATIENT
Start: 2024-04-29

## 2024-05-03 ENCOUNTER — TELEPHONE (OUTPATIENT)
Dept: FAMILY MEDICINE | Facility: OTHER | Age: 74
End: 2024-05-03
Payer: COMMERCIAL

## 2024-05-03 NOTE — TELEPHONE ENCOUNTER
This has been done by endocrinology so I recommend she reach out to them.       ROBYN Bojorquez CNP  Questions or concerns please feel free to send me a [employer] message or call me  Phone : [phone]

## 2024-05-03 NOTE — TELEPHONE ENCOUNTER
Called patient and notified them of the message below. Patient also has no further questions at this time.        Carol Bradford MA

## 2024-05-03 NOTE — TELEPHONE ENCOUNTER
Pt called regarding her bone density exam -by the time she is due, her current order will be . Can you please create a new bone density order for her.    Bone density due : Oct 6 2024    Please call pt at [phone] - if she doesn't answer please leave a detailed msg

## 2024-05-08 ENCOUNTER — MYC MEDICAL ADVICE (OUTPATIENT)
Dept: FAMILY MEDICINE | Facility: OTHER | Age: 74
End: 2024-05-08
Payer: COMMERCIAL

## 2024-05-09 NOTE — TELEPHONE ENCOUNTER
Please advise patient to make e-visit or virtual to discuss.       ROBYN Bojorquez CNP  Questions or concerns please feel free to send me a [employer] message or call me  Phone : [phone]

## 2024-05-21 ENCOUNTER — ANCILLARY PROCEDURE (OUTPATIENT)
Dept: BONE DENSITY | Facility: CLINIC | Age: 74
End: 2024-05-21
Attending: INTERNAL MEDICINE
Payer: COMMERCIAL

## 2024-05-21 DIAGNOSIS — M81.0 AGE-RELATED OSTEOPOROSIS WITHOUT CURRENT PATHOLOGICAL FRACTURE: ICD-10-CM

## 2024-05-21 PROCEDURE — 77080 DXA BONE DENSITY AXIAL: CPT | Performed by: RADIOLOGY

## 2024-05-29 ENCOUNTER — TELEPHONE (OUTPATIENT)
Dept: FAMILY MEDICINE | Facility: OTHER | Age: 74
End: 2024-05-29

## 2024-05-29 ENCOUNTER — E-VISIT (OUTPATIENT)
Dept: FAMILY MEDICINE | Facility: OTHER | Age: 74
End: 2024-05-29
Payer: COMMERCIAL

## 2024-05-29 DIAGNOSIS — R19.7 DIARRHEA, UNSPECIFIED TYPE: Primary | ICD-10-CM

## 2024-05-29 PROCEDURE — 99207 PR NON-BILLABLE SERV PER CHARTING: CPT | Performed by: NURSE PRACTITIONER

## 2024-05-29 NOTE — TELEPHONE ENCOUNTER
Please call patient I had a last minute opening on Friday that I put on hold for her. Please offer appointment for her. It would be my 9:00AM with arrival of 8:40AM.       ROBYN Bojorquez CNP  Questions or concerns please feel free to send me a [employer] message or call me  Phone : [phone]

## 2024-06-23 DIAGNOSIS — F33.42 RECURRENT MAJOR DEPRESSIVE DISORDER, IN FULL REMISSION (H): ICD-10-CM

## 2024-06-24 RX ORDER — SERTRALINE HYDROCHLORIDE 100 MG/1
100 TABLET, FILM COATED ORAL DAILY
Qty: 90 TABLET | Refills: 1 | Status: SHIPPED | OUTPATIENT
Start: 2024-06-24

## 2024-06-24 NOTE — TELEPHONE ENCOUNTER
Appointments in Next Year      Sep 25, 2024 10:30 AM  (Arrive by 10:10 AM)  MEDICARE ANNUAL WELLNESS VISIT with ROBYN Cheek CNP  [facility] ([facility] [place] ) [phone]

## 2024-06-25 ENCOUNTER — MYC MEDICAL ADVICE (OUTPATIENT)
Dept: ENDOCRINOLOGY | Facility: CLINIC | Age: 74
End: 2024-06-25
Payer: COMMERCIAL

## 2024-07-03 DIAGNOSIS — G47.00 INSOMNIA, UNSPECIFIED TYPE: ICD-10-CM

## 2024-07-03 RX ORDER — TRAZODONE HYDROCHLORIDE 100 MG/1
TABLET ORAL
Qty: 90 TABLET | Refills: 2 | Status: SHIPPED | OUTPATIENT
Start: 2024-07-03

## 2024-07-03 NOTE — TELEPHONE ENCOUNTER
Appointments in Next Year      Sep 25, 2024 10:30 AM  (Arrive by 10:10 AM)  MEDICARE ANNUAL WELLNESS VISIT with ROBYN Cheek CNP  [facility] ([facility] [place] ) [phone]

## 2024-07-17 ENCOUNTER — PATIENT OUTREACH (OUTPATIENT)
Dept: CARE COORDINATION | Facility: CLINIC | Age: 74
End: 2024-07-17
Payer: COMMERCIAL

## 2024-07-19 ENCOUNTER — PATIENT OUTREACH (OUTPATIENT)
Dept: CARE COORDINATION | Facility: CLINIC | Age: 74
End: 2024-07-19
Payer: COMMERCIAL

## 2024-07-30 ASSESSMENT — ASTHMA QUESTIONNAIRES
ACT_TOTALSCORE: 20
QUESTION_5 LAST FOUR WEEKS HOW WOULD YOU RATE YOUR ASTHMA CONTROL: WELL CONTROLLED
QUESTION_3 LAST FOUR WEEKS HOW OFTEN DID YOUR ASTHMA SYMPTOMS (WHEEZING, COUGHING, SHORTNESS OF BREATH, CHEST TIGHTNESS OR PAIN) WAKE YOU UP AT NIGHT OR EARLIER THAN USUAL IN THE MORNING: ONCE OR TWICE
QUESTION_1 LAST FOUR WEEKS HOW MUCH OF THE TIME DID YOUR ASTHMA KEEP YOU FROM GETTING AS MUCH DONE AT WORK, SCHOOL OR AT HOME: NONE OF THE TIME
ACT_TOTALSCORE: 20
QUESTION_4 LAST FOUR WEEKS HOW OFTEN HAVE YOU USED YOUR RESCUE INHALER OR NEBULIZER MEDICATION (SUCH AS ALBUTEROL): TWO OR THREE TIMES PER WEEK
QUESTION_2 LAST FOUR WEEKS HOW OFTEN HAVE YOU HAD SHORTNESS OF BREATH: ONCE OR TWICE A WEEK

## 2024-07-30 ASSESSMENT — PATIENT HEALTH QUESTIONNAIRE - PHQ9
SUM OF ALL RESPONSES TO PHQ QUESTIONS 1-9: 1
10. IF YOU CHECKED OFF ANY PROBLEMS, HOW DIFFICULT HAVE THESE PROBLEMS MADE IT FOR YOU TO DO YOUR WORK, TAKE CARE OF THINGS AT HOME, OR GET ALONG WITH OTHER PEOPLE: NOT DIFFICULT AT ALL
SUM OF ALL RESPONSES TO PHQ QUESTIONS 1-9: 1

## 2024-07-31 ENCOUNTER — VIRTUAL VISIT (OUTPATIENT)
Dept: FAMILY MEDICINE | Facility: OTHER | Age: 74
End: 2024-07-31
Payer: COMMERCIAL

## 2024-07-31 DIAGNOSIS — K64.4 EXTERNAL HEMORRHOIDS: Primary | ICD-10-CM

## 2024-07-31 DIAGNOSIS — M77.8 TENDONITIS OF BOTH WRISTS: ICD-10-CM

## 2024-07-31 DIAGNOSIS — G25.81 RESTLESS LEG: ICD-10-CM

## 2024-07-31 DIAGNOSIS — Z12.31 VISIT FOR SCREENING MAMMOGRAM: ICD-10-CM

## 2024-07-31 PROCEDURE — 99441 PR PHYSICIAN TELEPHONE EVALUATION 5-10 MIN: CPT | Mod: 93 | Performed by: NURSE PRACTITIONER

## 2024-07-31 RX ORDER — HYDROCORTISONE 25 MG/G
CREAM TOPICAL 2 TIMES DAILY PRN
Qty: 30 G | Refills: 0 | Status: SHIPPED | OUTPATIENT
Start: 2024-07-31

## 2024-07-31 RX ORDER — PRAMIPEXOLE DIHYDROCHLORIDE 0.25 MG/1
0.75 TABLET ORAL AT BEDTIME
Qty: 270 TABLET | Refills: 0 | Status: SHIPPED | OUTPATIENT
Start: 2024-07-31

## 2024-07-31 NOTE — PROGRESS NOTES
Amee is a 73 year old who is being evaluated via a billable telephone visit.    What phone number would you like to be contacted at? [phone]  How would you like to obtain your AVS? Fifi  Originating Location (pt. Location): Home  Distant Location (provider location):  On-site    Assessment & Plan     External hemorrhoids  Recommend anusol cream  She would like to meet with [facility] to discuss if she could have these removed  Not able to visualize this as it is a virtual. Advised being seen in person in the future if no improvements or worsening symptoms.   - hydrocortisone, Perianal, (HYDROCORTISONE) 2.5 % cream; Place rectally 2 times daily as needed for hemorrhoids  - Adult Gen Surg  Referral; Future    Tendonitis of both wrists  Has  had injections in the past and would like to do this today   - Orthopedic  Referral; Future    Restless leg  Would like to increase dose   Will increase to 3 tablets and if no improvement follow up in person   - pramipexole (MIRAPEX) 0.25 MG tablet; Take 3 tablets (0.75 mg) by mouth at bedtime    Visit for screening mammogram    - MA Screening Bilateral w/ Bk; Future        See Patient Instructions    Subjective   Amee is a 73 year old, presenting for the following health issues:  Hemorrhoids and Arthritis        7/31/2024    12:36 PM   Additional Questions   Roomed by Sherley LEWIS     History of Present Illness       Reason for visit:  Bad hemorrhoids and wrists Ct consumes 2 sweetened beverage(s) daily.She exercises with enough effort to increase her heart rate 20 to 29 minutes per day.  She exercises with enough effort to increase her heart rate 4 days per week.   She is taking medications regularly.       Arthritis in her hands.     Review of Systems  Constitutional, HEENT, cardiovascular, pulmonary, gi and gu systems are negative, except as otherwise noted.      Objective    Vitals - Patient Reported  Weight (Patient Reported): 65.8 kg (145  "lb)  Height (Patient Reported): 160 cm (5' 3\")  BMI (Based on Pt Reported Ht/Wt): 25.69  Pain Score: No Pain (0)        Physical Exam   General: Alert and no distress //Respiratory: No audible wheeze, cough, or shortness of breath // Psychiatric:  Appropriate affect, tone, and pace of words      Diagnostic: None       Phone call duration: 8 minutes  Signed Electronically by: ROBYN Bojorquez CNP    "

## 2024-08-28 DIAGNOSIS — J32.9 CHRONIC SINUSITIS, UNSPECIFIED LOCATION: ICD-10-CM

## 2024-08-28 RX ORDER — FLUTICASONE PROPIONATE 50 MCG
2 SPRAY, SUSPENSION (ML) NASAL DAILY
Qty: 16 G | Refills: 0 | Status: SHIPPED | OUTPATIENT
Start: 2024-08-28

## 2024-09-09 ENCOUNTER — TELEPHONE (OUTPATIENT)
Dept: FAMILY MEDICINE | Facility: OTHER | Age: 74
End: 2024-09-09
Payer: COMMERCIAL

## 2024-09-09 NOTE — TELEPHONE ENCOUNTER
Medication Question or Refill    Contacts       Contact Date/Time Type Contact Phone/Fax    09/09/2024 11:41 AM CDT Phone (Incoming) Amee Crews (Self) [phone] (M)            What medication are you calling about (include dose and sig)?: patient is not sure but need PCP to check all meds and refill.     Preferred Pharmacy:   [id number] - [place], [place] - [address]  [address] [address]  Phone: [phone] Fax: [phone]        Controlled Substance Agreement on file:   CSA -- Patient Level:    CSA: None found at the patient level.       Who prescribed the medication?: PCP    Do you need a refill? Yes    When did you use the medication last? 09/08/24    Patient offered an appointment? Yes: 10/2/24    Do you have any questions or concerns?  Yes: please contact patient, patient would like a relaxer medication with everything that's going on.      Could we send this information to you in [employer] or would you prefer to receive a phone call?:   No preference   Okay to leave a detailed message?: Yes at Cell number on file:    Telephone Information:   Mobile [phone]      Left message on Matthew's voicemail with initial report.

## 2024-09-12 ENCOUNTER — MYC MEDICAL ADVICE (OUTPATIENT)
Dept: FAMILY MEDICINE | Facility: OTHER | Age: 74
End: 2024-09-12
Payer: COMMERCIAL

## 2024-09-18 DIAGNOSIS — E78.1 HYPERTRIGLYCERIDEMIA: ICD-10-CM

## 2024-09-18 DIAGNOSIS — E78.00 HYPERCHOLESTEROLEMIA: ICD-10-CM

## 2024-09-18 DIAGNOSIS — E78.5 HYPERLIPIDEMIA LDL GOAL <130: ICD-10-CM

## 2024-09-18 RX ORDER — ROSUVASTATIN CALCIUM 5 MG/1
5 TABLET, COATED ORAL EVERY OTHER DAY
Qty: 90 TABLET | Refills: 0 | Status: SHIPPED | OUTPATIENT
Start: 2024-09-18

## 2024-09-18 NOTE — TELEPHONE ENCOUNTER
Appointments in Next Year      Oct 02, 2024 1:00 PM  (Arrive by 12:55 PM)  Annual Wellness Visit with ROBYN Cheek CNP  [facility] ([facility] [place] ) [phone]

## 2024-10-04 DIAGNOSIS — E55.9 VITAMIN D DEFICIENCY: ICD-10-CM

## 2024-10-04 RX ORDER — CHOLECALCIFEROL (VITAMIN D3) 50 MCG
1 TABLET ORAL DAILY
Qty: 90 TABLET | Refills: 0 | Status: SHIPPED | OUTPATIENT
Start: 2024-10-04

## 2024-10-07 ENCOUNTER — MYC MEDICAL ADVICE (OUTPATIENT)
Dept: FAMILY MEDICINE | Facility: OTHER | Age: 74
End: 2024-10-07
Payer: COMMERCIAL

## 2024-10-07 DIAGNOSIS — F43.23 ADJUSTMENT REACTION WITH ANXIETY AND DEPRESSION: ICD-10-CM

## 2024-10-07 DIAGNOSIS — F33.42 RECURRENT MAJOR DEPRESSIVE DISORDER, IN FULL REMISSION (H): ICD-10-CM

## 2024-10-07 RX ORDER — SERTRALINE HYDROCHLORIDE 25 MG/1
TABLET, FILM COATED ORAL
Qty: 90 TABLET | Refills: 0 | Status: SHIPPED | OUTPATIENT
Start: 2024-10-07

## 2024-10-09 ENCOUNTER — OFFICE VISIT (OUTPATIENT)
Dept: FAMILY MEDICINE | Facility: OTHER | Age: 74
End: 2024-10-09
Payer: COMMERCIAL

## 2024-10-09 VITALS
RESPIRATION RATE: 16 BRPM | OXYGEN SATURATION: 98 % | WEIGHT: 147 LBS | SYSTOLIC BLOOD PRESSURE: 110 MMHG | HEIGHT: 63 IN | DIASTOLIC BLOOD PRESSURE: 80 MMHG | TEMPERATURE: 96.9 F | HEART RATE: 77 BPM | BODY MASS INDEX: 26.05 KG/M2

## 2024-10-09 DIAGNOSIS — G47.00 INSOMNIA, UNSPECIFIED TYPE: ICD-10-CM

## 2024-10-09 DIAGNOSIS — M77.8 TENDONITIS OF BOTH WRISTS: ICD-10-CM

## 2024-10-09 DIAGNOSIS — Z12.31 ENCOUNTER FOR SCREENING MAMMOGRAM FOR BREAST CANCER: ICD-10-CM

## 2024-10-09 DIAGNOSIS — E53.8 VITAMIN B12 DEFICIENCY: ICD-10-CM

## 2024-10-09 DIAGNOSIS — J45.30 MILD PERSISTENT ASTHMA WITHOUT COMPLICATION: ICD-10-CM

## 2024-10-09 DIAGNOSIS — G25.81 RESTLESS LEG: ICD-10-CM

## 2024-10-09 DIAGNOSIS — F33.42 RECURRENT MAJOR DEPRESSIVE DISORDER, IN FULL REMISSION (H): ICD-10-CM

## 2024-10-09 DIAGNOSIS — G43.009 MIGRAINE WITHOUT AURA AND WITHOUT STATUS MIGRAINOSUS, NOT INTRACTABLE: ICD-10-CM

## 2024-10-09 DIAGNOSIS — J45.20 MILD INTERMITTENT ASTHMA WITHOUT COMPLICATION: ICD-10-CM

## 2024-10-09 DIAGNOSIS — Z00.00 ENCOUNTER FOR MEDICARE ANNUAL WELLNESS EXAM: Primary | ICD-10-CM

## 2024-10-09 DIAGNOSIS — M85.89 OSTEOPENIA OF MULTIPLE SITES: ICD-10-CM

## 2024-10-09 DIAGNOSIS — F43.23 ADJUSTMENT REACTION WITH ANXIETY AND DEPRESSION: ICD-10-CM

## 2024-10-09 DIAGNOSIS — M77.8 LEFT SHOULDER TENDONITIS: ICD-10-CM

## 2024-10-09 DIAGNOSIS — E78.5 HYPERLIPIDEMIA LDL GOAL <130: ICD-10-CM

## 2024-10-09 LAB
ALBUMIN SERPL BCG-MCNC: 4.6 G/DL (ref 3.5–5.2)
ALP SERPL-CCNC: 86 U/L (ref 40–150)
ALT SERPL W P-5'-P-CCNC: 16 U/L (ref 0–50)
ANION GAP SERPL CALCULATED.3IONS-SCNC: 16 MMOL/L (ref 7–15)
AST SERPL W P-5'-P-CCNC: 21 U/L (ref 0–45)
BILIRUB SERPL-MCNC: 0.8 MG/DL
BUN SERPL-MCNC: 10.1 MG/DL (ref 8–23)
CALCIUM SERPL-MCNC: 9.2 MG/DL (ref 8.8–10.4)
CHLORIDE SERPL-SCNC: 103 MMOL/L (ref 98–107)
CHOLEST SERPL-MCNC: 256 MG/DL
CREAT SERPL-MCNC: 0.86 MG/DL (ref 0.51–0.95)
EGFRCR SERPLBLD CKD-EPI 2021: 71 ML/MIN/1.73M2
FASTING STATUS PATIENT QL REPORTED: NO
FASTING STATUS PATIENT QL REPORTED: NO
GLUCOSE SERPL-MCNC: 95 MG/DL (ref 70–99)
HCO3 SERPL-SCNC: 22 MMOL/L (ref 22–29)
HDLC SERPL-MCNC: 52 MG/DL
LDLC SERPL CALC-MCNC: 180 MG/DL
NONHDLC SERPL-MCNC: 204 MG/DL
POTASSIUM SERPL-SCNC: 4.6 MMOL/L (ref 3.4–5.3)
PROT SERPL-MCNC: 7.2 G/DL (ref 6.4–8.3)
SODIUM SERPL-SCNC: 141 MMOL/L (ref 135–145)
TRIGL SERPL-MCNC: 122 MG/DL

## 2024-10-09 PROCEDURE — 80053 COMPREHEN METABOLIC PANEL: CPT | Performed by: NURSE PRACTITIONER

## 2024-10-09 PROCEDURE — G0439 PPPS, SUBSEQ VISIT: HCPCS | Performed by: NURSE PRACTITIONER

## 2024-10-09 PROCEDURE — 36415 COLL VENOUS BLD VENIPUNCTURE: CPT | Performed by: NURSE PRACTITIONER

## 2024-10-09 PROCEDURE — 80061 LIPID PANEL: CPT | Performed by: NURSE PRACTITIONER

## 2024-10-09 PROCEDURE — 99214 OFFICE O/P EST MOD 30 MIN: CPT | Mod: 25 | Performed by: NURSE PRACTITIONER

## 2024-10-09 RX ORDER — TRAZODONE HYDROCHLORIDE 100 MG/1
TABLET ORAL
Qty: 90 TABLET | Refills: 2 | Status: SHIPPED | OUTPATIENT
Start: 2024-10-09

## 2024-10-09 RX ORDER — PRAMIPEXOLE DIHYDROCHLORIDE 0.25 MG/1
0.75 TABLET ORAL AT BEDTIME
Qty: 270 TABLET | Refills: 1 | Status: SHIPPED | OUTPATIENT
Start: 2024-10-09

## 2024-10-09 RX ORDER — ROSUVASTATIN CALCIUM 5 MG/1
5 TABLET, COATED ORAL EVERY OTHER DAY
Qty: 90 TABLET | Refills: 3 | Status: SHIPPED | OUTPATIENT
Start: 2024-10-09

## 2024-10-09 RX ORDER — ALBUTEROL SULFATE 90 UG/1
2 INHALANT RESPIRATORY (INHALATION) EVERY 4 HOURS PRN
Qty: 18 G | Refills: 5 | Status: SHIPPED | OUTPATIENT
Start: 2024-10-09

## 2024-10-09 RX ORDER — SERTRALINE HYDROCHLORIDE 100 MG/1
100 TABLET, FILM COATED ORAL DAILY
Qty: 90 TABLET | Refills: 3 | Status: SHIPPED | OUTPATIENT
Start: 2024-10-09

## 2024-10-09 RX ORDER — SERTRALINE HYDROCHLORIDE 25 MG/1
25 TABLET, FILM COATED ORAL DAILY
Qty: 90 TABLET | Refills: 3 | Status: CANCELLED | OUTPATIENT
Start: 2024-10-09

## 2024-10-09 RX ORDER — SUMATRIPTAN 50 MG/1
TABLET, FILM COATED ORAL
Qty: 18 TABLET | Refills: 1 | Status: SHIPPED | OUTPATIENT
Start: 2024-10-09

## 2024-10-09 SDOH — HEALTH STABILITY: PHYSICAL HEALTH: ON AVERAGE, HOW MANY DAYS PER WEEK DO YOU ENGAGE IN MODERATE TO STRENUOUS EXERCISE (LIKE A BRISK WALK)?: 7 DAYS

## 2024-10-09 SDOH — HEALTH STABILITY: PHYSICAL HEALTH: ON AVERAGE, HOW MANY MINUTES DO YOU ENGAGE IN EXERCISE AT THIS LEVEL?: 10 MIN

## 2024-10-09 ASSESSMENT — SOCIAL DETERMINANTS OF HEALTH (SDOH): HOW OFTEN DO YOU GET TOGETHER WITH FRIENDS OR RELATIVES?: PATIENT DECLINED

## 2024-10-09 ASSESSMENT — PATIENT HEALTH QUESTIONNAIRE - PHQ9
SUM OF ALL RESPONSES TO PHQ QUESTIONS 1-9: 3
SUM OF ALL RESPONSES TO PHQ QUESTIONS 1-9: 3
10. IF YOU CHECKED OFF ANY PROBLEMS, HOW DIFFICULT HAVE THESE PROBLEMS MADE IT FOR YOU TO DO YOUR WORK, TAKE CARE OF THINGS AT HOME, OR GET ALONG WITH OTHER PEOPLE: SOMEWHAT DIFFICULT

## 2024-10-09 ASSESSMENT — ASTHMA QUESTIONNAIRES
QUESTION_2 LAST FOUR WEEKS HOW OFTEN HAVE YOU HAD SHORTNESS OF BREATH: THREE TO SIX TIMES A WEEK
QUESTION_3 LAST FOUR WEEKS HOW OFTEN DID YOUR ASTHMA SYMPTOMS (WHEEZING, COUGHING, SHORTNESS OF BREATH, CHEST TIGHTNESS OR PAIN) WAKE YOU UP AT NIGHT OR EARLIER THAN USUAL IN THE MORNING: ONCE OR TWICE
QUESTION_4 LAST FOUR WEEKS HOW OFTEN HAVE YOU USED YOUR RESCUE INHALER OR NEBULIZER MEDICATION (SUCH AS ALBUTEROL): TWO OR THREE TIMES PER WEEK
ACT_TOTALSCORE: 20
QUESTION_5 LAST FOUR WEEKS HOW WOULD YOU RATE YOUR ASTHMA CONTROL: COMPLETELY CONTROLLED
QUESTION_1 LAST FOUR WEEKS HOW MUCH OF THE TIME DID YOUR ASTHMA KEEP YOU FROM GETTING AS MUCH DONE AT WORK, SCHOOL OR AT HOME: NONE OF THE TIME
ACT_TOTALSCORE: 20

## 2024-10-09 ASSESSMENT — PAIN SCALES - GENERAL: PAINLEVEL: NO PAIN (0)

## 2024-10-09 NOTE — Clinical Note
Patient due for re-clast infusion are you able to order this for her?   ROBYN Bojorquez CNP Questions or concerns please feel free to send me a [employer] message or call me Phone : [phone]

## 2024-10-09 NOTE — PATIENT INSTRUCTIONS
Patient Education   Preventive Care Advice   This is general advice given by our system to help you stay healthy. However, your care team may have specific advice just for you. Please talk to your care team about your preventive care needs.  Nutrition  Eat 5 or more servings of fruits and vegetables each day.  Try wheat bread, brown rice and whole grain pasta (instead of white bread, rice, and pasta).  Get enough calcium and vitamin D. Check the label on foods and aim for 100% of the RDA (recommended daily allowance).  Lifestyle  Exercise at least 150 minutes each week  (30 minutes a day, 5 days a week).  Do muscle strengthening activities 2 days a week. These help control your weight and prevent disease.  No smoking.  Wear sunscreen to prevent skin cancer.  Have a dental exam and cleaning every 6 months.  Yearly exams  See your health care team every year to talk about:  Any changes in your health.  Any medicines your care team has prescribed.  Preventive care, family planning, and ways to prevent chronic diseases.  Shots (vaccines)   HPV shots (up to age 26), if you've never had them before.  Hepatitis B shots (up to age 59), if you've never had them before.  COVID-19 shot: Get this shot when it's due.  Flu shot: Get a flu shot every year.  Tetanus shot: Get a tetanus shot every 10 years.  Pneumococcal, hepatitis A, and RSV shots: Ask your care team if you need these based on your risk.  Shingles shot (for age 50 and up)  General health tests  Diabetes screening:  Starting at age 35, Get screened for diabetes at least every 3 years.  If you are younger than age 35, ask your care team if you should be screened for diabetes.  Cholesterol test: At age 39, start having a cholesterol test every 5 years, or more often if advised.  Bone density scan (DEXA): At age 50, ask your care team if you should have this scan for osteoporosis (brittle bones).  Hepatitis C: Get tested at least once in your life.  STIs (sexually  transmitted infections)  Before age 24: Ask your care team if you should be screened for STIs.  After age 24: Get screened for STIs if you're at risk. You are at risk for STIs (including HIV) if:  You are sexually active with more than one person.  You don't use condoms every time.  You or a partner was diagnosed with a sexually transmitted infection.  If you are at risk for HIV, ask about PrEP medicine to prevent HIV.  Get tested for HIV at least once in your life, whether you are at risk for HIV or not.  Cancer screening tests  Cervical cancer screening: If you have a cervix, begin getting regular cervical cancer screening tests starting at age 21.  Breast cancer scan (mammogram): If you've ever had breasts, begin having regular mammograms starting at age 40. This is a scan to check for breast cancer.  Colon cancer screening: It is important to start screening for colon cancer at age 45.  Have a colonoscopy test every 10 years (or more often if you're at risk) Or, ask your provider about stool tests like a FIT test every year or Cologuard test every 3 years.  To learn more about your testing options, visit:   .  For help making a decision, visit:   https://bit.ly/rw06081.  Prostate cancer screening test: If you have a prostate, ask your care team if a prostate cancer screening test (PSA) at age 55 is right for you.  Lung cancer screening: If you are a current or former smoker ages 50 to 80, ask your care team if ongoing lung cancer screenings are right for you.  For informational purposes only. Not to replace the advice of your health care provider. Copyright   2023 [employer]. All rights reserved. Clinically reviewed by the [facility] Transitions Program. [employer] 769775 - REV 01/24.

## 2024-10-09 NOTE — PROGRESS NOTES
Dr. Centeno is no longer with [facility]. Please seek out new endocrinology provider.     Thank you,  Elaine DOUGLASS RN

## 2024-10-09 NOTE — PROGRESS NOTES
Preventive Care Visit  [facility] C[facility]  Oct 9, 2024      Assessment & Plan     Encounter for Medicare annual wellness exam  Updated HM   Declined vaccines today   - Comprehensive metabolic panel (BMP + Alb, Alk Phos, ALT, AST, Total. Bili, TP); Future  - Comprehensive metabolic panel (BMP + Alb, Alk Phos, ALT, AST, Total. Bili, TP)    Recurrent major depressive disorder, in full remission (H)  Having some family concerns, overall she feels this is stable with the increase to 150mg daily   - Comprehensive metabolic panel (BMP + Alb, Alk Phos, ALT, AST, Total. Bili, TP); Future  - sertraline (ZOLOFT) 100 MG tablet; Take 1 tablet (100 mg) by mouth daily. With 25mg tablet  - sertraline (ZOLOFT) 50 MG tablet; Take 1 tablet (50 mg) by mouth daily. Take with 100mg tablets  - Comprehensive metabolic panel (BMP + Alb, Alk Phos, ALT, AST, Total. Bili, TP)    Mild persistent asthma without complication  Stable on flovent and albuterol  - Comprehensive metabolic panel (BMP + Alb, Alk Phos, ALT, AST, Total. Bili, TP); Future  - Comprehensive metabolic panel (BMP + Alb, Alk Phos, ALT, AST, Total. Bili, TP)    Migraine without aura and without status migrainosus, not intractable  Stable  - Comprehensive metabolic panel (BMP + Alb, Alk Phos, ALT, AST, Total. Bili, TP); Future  - SUMAtriptan (IMITREX) 50 MG tablet; TAKE ONE TABLET AT ONSET OF HEADACHE. MAY REPEAT AFTER 2 HOURS. DO NOT EXCEED 200 MG IN 24 HOURS  - Comprehensive metabolic panel (BMP + Alb, Alk Phos, ALT, AST, Total. Bili, TP)    Hyperlipidemia LDL goal <130  The ASCVD Risk score (Dariusz DK, et al., 2019) failed to calculate for the following reasons:    The valid total cholesterol range is 130 to 320 mg/dL  Continue statin therapy without side effects.   - Lipid panel reflex to direct LDL Non-fasting; Future  - Comprehensive metabolic panel (BMP + Alb, Alk Phos, ALT, AST, Total. Bili, TP); Future  -  "rosuvastatin (CRESTOR) 5 MG tablet; Take 1 tablet (5 mg) by mouth every other day.  - Lipid panel reflex to direct LDL Non-fasting  - Comprehensive metabolic panel (BMP + Alb, Alk Phos, ALT, AST, Total. Bili, TP)    Vitamin B12 deficiency    - Comprehensive metabolic panel (BMP + Alb, Alk Phos, ALT, AST, Total. Bili, TP); Future  - Comprehensive metabolic panel (BMP + Alb, Alk Phos, ALT, AST, Total. Bili, TP)    Restless leg    - Comprehensive metabolic panel (BMP + Alb, Alk Phos, ALT, AST, Total. Bili, TP); Future  - pramipexole (MIRAPEX) 0.25 MG tablet; Take 3 tablets (0.75 mg) by mouth at bedtime.  - Comprehensive metabolic panel (BMP + Alb, Alk Phos, ALT, AST, Total. Bili, TP)    Adjustment reaction with anxiety and depression    - Comprehensive metabolic panel (BMP + Alb, Alk Phos, ALT, AST, Total. Bili, TP); Future  - Comprehensive metabolic panel (BMP + Alb, Alk Phos, ALT, AST, Total. Bili, TP)    Insomnia, unspecified type  Stable   Aware of serotonin monitoring with trazodone and zoloft.   - Comprehensive metabolic panel (BMP + Alb, Alk Phos, ALT, AST, Total. Bili, TP); Future  - traZODone (DESYREL) 100 MG tablet; TAKE ONE TABLET BY MOUTH AT BEDTIME  - Comprehensive metabolic panel (BMP + Alb, Alk Phos, ALT, AST, Total. Bili, TP)    Encounter for screening mammogram for breast cancer  Will schedule     Tendonitis of both wrists    - Orthopedic  Referral; Future    Left shoulder tendonitis  Discussed at home stretches and strengthening  Follow up with orthopedic   - Orthopedic  Referral; Future    Patient has been advised of split billing requirements and indicates understanding: Yes        BMI  Estimated body mass index is 26.34 kg/m  as calculated from the following:    Height as of this encounter: 1.591 m (5' 2.64\").    Weight as of this encounter: 66.7 kg (147 lb).   Weight management plan: Discussed healthy diet and exercise guidelines    Counseling  Appropriate preventive services " were addressed with this patient via screening, questionnaire, or discussion as appropriate for fall prevention, nutrition, physical activity, Tobacco-use cessation, social engagement, weight loss and cognition.  Checklist reviewing preventive services available has been given to the patient.  Reviewed patient's diet, addressing concerns and/or questions.   She is at risk for psychosocial distress and has been provided with information to reduce risk.   Discussed possible causes of fatigue.     See Patient Instructions    Pam Lubin is a 74 year old, presenting for the following:  Physical      Left shoulder-           10/9/2024    11:32 AM   Additional Questions   Roomed by Herminia HANCOCK   Accompanied by Self       Health Care Directive  Patient does not have a Health Care Directive or Living Will: Discussed advance care planning with patient; however, patient declined at this time.    HPI        10/9/2024   General Health   How would you rate your overall physical health? Good   Feel stress (tense, anxious, or unable to sleep) Only a little      (!) STRESS CONCERN      10/9/2024   Nutrition   Diet: Regular (no restrictions)            10/9/2024   Exercise   Days per week of moderate/strenous exercise 7 days   Average minutes spent exercising at this level 10 min            10/9/2024   Social Factors   Frequency of gathering with friends or relatives Patient declined   Worry food won't last until get money to buy more No   Food not last or not have enough money for food? No   Do you have housing? (Housing is defined as stable permanent housing and does not include staying ouside in a car, in a tent, in an abandoned building, in an overnight shelter, or couch-surfing.) Yes   Are you worried about losing your housing? No   Lack of transportation? No   Unable to get utilities (heat,electricity)? Yes   Want help with housing or utility concern? No      (!) FINANCIAL RESOURCE STRAIN CONCERN      10/9/2024   Fall  Risk   Fallen 2 or more times in the past year? No    No   Trouble with walking or balance? No    No       Multiple values from one day are sorted in reverse-chronological order          10/9/2024   Activities of Daily Living- Home Safety   Needs help with the following daily activites None of the above   Safety concerns in the home None of the above            10/9/2024   Dental   Dentist two times every year? Yes            10/9/2024   Hearing Screening   Hearing concerns? None of the above            10/9/2024   Driving Risk Screening   Patient/family members have concerns about driving No            10/9/2024   General Alertness/Fatigue Screening   Have you been more tired than usual lately? (!) YES            10/9/2024   Urinary Incontinence Screening   Bothered by leaking urine in past 6 months No            10/9/2024   TB Screening   Were you born outside of the [place]? No          Today's PHQ-9 Score:       10/9/2024    11:17 AM   PHQ-9 SCORE   PHQ-9 Total Score MyChart 3 (Minimal depression)   PHQ-9 Total Score 3         10/9/2024   Substance Use   Alcohol more than 3/day or more than 7/wk No   Do you have a current opioid prescription? No   How severe/bad is pain from 1 to 10? 2/10   Do you use any other substances recreationally? (!) PRESCRIPTION DRUGS        Social History     Tobacco Use    Smoking status: Former     Current packs/day: 0.00     Types: Cigarettes     Quit date: 1973     Years since quittin.7     Passive exposure: Never    Smokeless tobacco: Never    Tobacco comments:     Social smoker   Vaping Use    Vaping status: Never Used   Substance Use Topics    Alcohol use: Yes     Alcohol/week: 0.0 standard drinks of alcohol     Comment: one or two a day    Drug use: No         2023   LAST FHS-7 RESULTS   1st degree relative breast or ovarian cancer No   Any relative bilateral breast cancer No   Any male have breast cancer No   Any ONE woman have BOTH breast AND ovarian cancer No   Any  woman with breast cancer before 50yrs No   2 or more relatives with breast AND/OR ovarian cancer No   2 or more relatives with breast AND/OR bowel cancer No      Mammogram Screening - Mammogram every 1-2 years updated in Health Maintenance based on mutual decision making    ASCVD Risk   The ASCVD Risk score (Dariusz REYNOSO, et al., 2019) failed to calculate for the following reasons:    The valid total cholesterol range is 130 to 320 mg/dL      Reviewed and updated as needed this visit by Provider                    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    Allergic rhinitis     Allergies     asthma     mild intermittent    Chronic back pain     neck and low back    HA (headache)     muscle contraction     History of blood transfusion     In childhood    Hot flashes     Hyperlipidemia     Insomnia     Intermittent asthma 9/16/2011    Major depressive disorder, single episode, moderate (H)     Ocular migraine     Osteoarthritis of hand     Restless leg 9/16/2011     Current providers sharing in care for this patient include:  Patient Care Team:  Ary Adams APRN CNP as PCP - General (Family Medicine)  Ary Adams APRN CNP as Assigned PCP  Fam Workman MD as MD (Urology)  Marcelino Centeno MD as Physician (Endocrinology, Diabetes, and Metabolism)  Fam Workman MD as MD (Urology)  Marcelino Centeno MD as Assigned Endocrinology Provider  Gunner Wolf DPM as Assigned Surgical Provider  Alvin Norman DO as Assigned Neuroscience Provider    The following health maintenance items are reviewed in Epic and correct as of today:  Health Maintenance   Topic Date Due    ZOSTER IMMUNIZATION (1 of 2) Never done    RSV VACCINE (1 - Risk 60-74 years 1-dose series) Never done    INFLUENZA VACCINE (1) 09/01/2024    COVID-19 Vaccine (4 - 2024-25 season) 09/01/2024    LIPID  09/19/2024    ANNUAL REVIEW OF HM ORDERS  09/19/2024    MAMMO SCREENING   "08/16/2024    ASTHMA CONTROL TEST  04/09/2025    ASTHMA ACTION PLAN  04/12/2025    MEDICARE ANNUAL WELLNESS VISIT  10/09/2025    FALL RISK ASSESSMENT  10/09/2025    PHQ-9  10/09/2025    COLORECTAL CANCER SCREENING  12/08/2025    DTAP/TDAP/TD IMMUNIZATION (3 - Td or Tdap) 05/24/2026    GLUCOSE  01/11/2027    ADVANCE CARE PLANNING  09/19/2028    DEXA  05/21/2039    HEPATITIS C SCREENING  Completed    DEPRESSION ACTION PLAN  Completed    MIGRAINE ACTION PLAN  Completed    Pneumococcal Vaccine: 65+ Years  Completed    HPV IMMUNIZATION  Aged Out    MENINGITIS IMMUNIZATION  Aged Out    RSV MONOCLONAL ANTIBODY  Aged Out         Review of Systems  Constitutional, HEENT, cardiovascular, pulmonary, gi and gu systems are negative, except as otherwise noted.     Objective    Exam  /80   Pulse 77   Temp 96.9  F (36.1  C) (Temporal)   Resp 16   Ht 5' 2.64\" (1.591 m)   Wt 147 lb (66.7 kg)   SpO2 98%   BMI 26.34 kg/m     Estimated body mass index is 26.34 kg/m  as calculated from the following:    Height as of this encounter: 5' 2.64\" (1.591 m).    Weight as of this encounter: 147 lb (66.7 kg).    Physical Exam  Musculoskeletal:      Left shoulder: No swelling, effusion, tenderness or bony tenderness. Normal range of motion.      Comments: ROM- pain with frontal arm raise and above the head.   No pain with internal and external rotation.   No bone tenderness on palpitation.        GENERAL: alert and no distress  EYES: Eyes grossly normal to inspection, PERRL and conjunctivae and sclerae normal  HENT: ear canals and TM's normal, nose and mouth without ulcers or lesions  NECK: no adenopathy, no asymmetry, masses, or scars  RESP: lungs clear to auscultation - no rales, rhonchi or wheezes  CV: regular rate and rhythm, normal S1 S2, no S3 or S4, no murmur, click or rub, no peripheral edema  ABDOMEN: soft, nontender, no hepatosplenomegaly, no masses and bowel sounds normal  SKIN: no suspicious lesions or rashes  NEURO: " Normal strength and tone, mentation intact and speech normal  PSYCH: mentation appears normal, affect normal/bright        10/9/2024   Mini Cog   Clock Draw Score 2 Normal   3 Item Recall 3 objects recalled   Mini Cog Total Score 5               Signed Electronically by: ROBYN Bojorquez CNP    Answers submitted by the patient for this visit:  Patient Health Questionnaire (Submitted on 10/9/2024)  If you checked off any problems, how difficult have these problems made it for you to do your work, take care of things at home, or get along with other people?: Somewhat difficult  PHQ9 TOTAL SCORE: 3

## 2024-10-11 ENCOUNTER — MYC MEDICAL ADVICE (OUTPATIENT)
Dept: FAMILY MEDICINE | Facility: OTHER | Age: 74
End: 2024-10-11
Payer: COMMERCIAL

## 2024-10-17 ENCOUNTER — OFFICE VISIT (OUTPATIENT)
Dept: ORTHOPEDICS | Facility: CLINIC | Age: 74
End: 2024-10-17
Attending: NURSE PRACTITIONER
Payer: COMMERCIAL

## 2024-10-17 ENCOUNTER — ANCILLARY PROCEDURE (OUTPATIENT)
Dept: GENERAL RADIOLOGY | Facility: CLINIC | Age: 74
End: 2024-10-17
Attending: PEDIATRICS
Payer: COMMERCIAL

## 2024-10-17 DIAGNOSIS — M19.031 ARTHRITIS OF SCAPHOID-TRAPEZIUM-TRAPEZOID JOINT OF BOTH HANDS: ICD-10-CM

## 2024-10-17 DIAGNOSIS — M19.049 CMC ARTHRITIS: Primary | ICD-10-CM

## 2024-10-17 DIAGNOSIS — M19.032 ARTHRITIS OF SCAPHOID-TRAPEZIUM-TRAPEZOID JOINT OF BOTH HANDS: ICD-10-CM

## 2024-10-17 DIAGNOSIS — M19.012 ARTHRITIS OF LEFT SHOULDER REGION: ICD-10-CM

## 2024-10-17 DIAGNOSIS — M25.512 ACUTE PAIN OF LEFT SHOULDER: ICD-10-CM

## 2024-10-17 DIAGNOSIS — M77.8 TENDONITIS OF BOTH WRISTS: ICD-10-CM

## 2024-10-17 DIAGNOSIS — M77.8 LEFT SHOULDER TENDONITIS: ICD-10-CM

## 2024-10-17 PROCEDURE — 73030 X-RAY EXAM OF SHOULDER: CPT | Mod: TC | Performed by: RADIOLOGY

## 2024-10-17 PROCEDURE — 99204 OFFICE O/P NEW MOD 45 MIN: CPT | Performed by: PEDIATRICS

## 2024-10-17 NOTE — Clinical Note
Amee wasn't injections as discussed at your visit last year. I put in a follow up order for a procedure with your team, I hope she gets scheduled correctly, please review! Thanks, Mary

## 2024-10-17 NOTE — PROGRESS NOTES
ASSESSMENT & PLAN    Amee was seen today for pain, numbness, pain, numbness and pain.    Diagnoses and all orders for this visit:    CMC arthritis  -     Orthopedic  Referral  -     XR Shoulder Left G/E 3 Views; Future    Arthritis of xjzmxvhz-iluinxqjd-hdpzwmznc joint of both hands    Acute pain of left shoulder  -     Orthopedic  Referral  -     XR Shoulder Left G/E 3 Views; Future    Arthritis of left shoulder region    Other orders  -     Sports Med Adult Follow-Up Clinic Order (Blank); Future      This issue is acute on chronic and Worsening.      ICD-10-CM    1. CMC arthritis  M19.049 Orthopedic  Referral     XR Shoulder Left G/E 3 Views      2. Arthritis of tdcjypey-pbtrpheye-ezacmrxnk joint of both hands  M19.031     M19.032       3. Acute pain of left shoulder  M25.512 Orthopedic  Referral     XR Shoulder Left G/E 3 Views      4. Arthritis of left shoulder region  M19.012         Patient Instructions   Left Shoulder - likely flare of underlying mild arthritis, rotator cuff syndrome.  We discussed the following treatment options: symptom treatment, activity modification/rest, imaging, rehab and referral. Following discussion, plan: patient will start with Home Exercise Program, close follow up if not improving.    Desiring repeat injections bilateral wrists - follow up with Dr. Norman for procedure.    Plan:  - Today's Plan of Care:  Home Exercise Program for shoulder  Follow up order placed for procedure with Dr. Norman    Discussed activity considerations and other supportive care including Ice/Heat, OTC and other topical medications as needed.    -We also discussed other future treatment options:  Referral to Occupational Hand Therapy  Referral to Physical Therapy  Consideration of corticosteroid injection left shoulder  MRI if not improving  EMG for more numbness and tingling symptoms    Follow Up: as needed with me    Concerning signs and symptoms were reviewed  "and all questions were answered at this time.    Thanks for the opportunity to participate in the care of this patient, I will keep you updated on their progress.    CC: Ary Herring MD [facility]  Sports Medicine Physician  [facility]    -----  Chief Complaint   Patient presents with    Right Wrist - Pain, Numbness    Left Wrist - Pain, Numbness    Left Shoulder - Pain       SUBJECTIVE  Amee Crews is a/an 74 year old female who is seen in consultation at the request of Ary Adams C.N.P. for evaluation of AMA wrist pain . She has previously been seen by Dr. Norman and Kamille in the past.  She is also interested in evaluating her shoulder as well.     The patient is seen by themselves.  The patient is Right handed    AMA wrist pain, AMA thumb, CMC pain   Onset: couple years(s) ago. Reports insidious onset without acute precipitating event.  Location of Pain: bilateral hand/wrist pain; \"around the wrist\", CMC joint of the thumb   Worsened by: grabbing, pulling, twisting, using the hands, too much work, driving, sleeping   Better with: CSI   Treatments tried: ice, heat, Tylenol, ibuprofen, previous imaging (xray 8/1/23), corticosteroid injection by Dr. Simental , CMC of AMA thumbs (most recent date: 2021) that provided  3 years(s) of relief, and casting/splinting/bracing  Associated symptoms: numbness, tingling, and weakness of AMA hands  - pain is worse than numbness    2. Left Shoulder; \"whole shoulder\"  can be in the axillary as well, intermittent pain  - Started in July, overuse from laying floors  Worse by; reaching across the body, adduction, flexion,   Better with; unsure/nothing   Treatments tried; no treatments tried yet.   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   General: healthy, alert and in no distress  Skin: no suspicious lesions or rash.  CV: distal perfusion intact   Resp: normal respiratory effort without conversational dyspnea   Psych: normal mood and affect  Gait: NORMAL  Neuro: Normal light sensory exam of upper extremity    Bilateral Wrist and Hand exam    Inspection:       Arthritis deformities bilateral wrists    Tender:       CMC joints    Non Tender:       Remainder of the Wrist and Hand bilateral    ROM:       Full and symmetric active and passive range of motion of the forearm, wrist and digits bilateral    Strength:       5/5 strength in the muscles of the hand, wrist and forearm bilateral    Neurovascular:       2+ radial pulses bilaterally with brisk capillary refill and      normal sensation to light touch in the radial, median and ulnar nerve distributions    Bilateral Shoulder exam    Inspection and Posture:       normal    Skin:        no visible deformities    Tender:        none    Non Tender:       remainder of shoulder bilateral    ROM:        Full active and passive ROM with flexion, extension, abduction, internal and external rotation left       asymmetric scapular motion    Painful motions:       end range flexion and elevation left    Strength:        abduction 5/5 bilateral       flexion 5/5 bilateral       internal rotation 5/5 bilateral       external rotation 5/5 bilateral    Impingement testing:       neg (-) Neer left       neg (-) [place] left       neg (-) [place] left    Sensation:        normal sensation over shoulder and upper extremity      RADIOLOGY:  Final results and radiologist's interpretation, available in the [facility] health record.  Images were reviewed with the patient in the office today.  My personal interpretation of the performed imagin XR views of left shoulder reviewed: no acute bony abnormality, mild glenohumeral joint and AC joint degenerative  change  - will follow official read    Reviewed prior right wrist XR 8/1/2023 and bilateral wrist XRs from 12/13/2024 - no acute bony abnormality, severe CMC and STT joint degenerative changes      Narrative & Impression   XR WRIST RIGHT G/E 3 VIEWS  8/1/2023 9:34 AM      HISTORY: Pain of right hand  COMPARISON: 12/13/2021                                                                      IMPRESSION: No acute fracture or malalignment. Severe STT and first  CMC joint degenerative changes with bone-on-bone articulation and  subchondral sclerosis. Osteopenia.      GENE BULLOCK MD         Reviewed PCP notes  Review of the result(s) of each unique test - XRs

## 2024-10-17 NOTE — PATIENT INSTRUCTIONS
Left Shoulder - likely flare of underlying mild arthritis, rotator cuff syndrome.  We discussed the following treatment options: symptom treatment, activity modification/rest, imaging, rehab and referral. Following discussion, plan: patient will start with Home Exercise Program, close follow up if not improving.    Desiring repeat injections bilateral wrists - follow up with Dr. Norman for procedure.    Plan:  - Today's Plan of Care:  Home Exercise Program for shoulder  Follow up order placed for procedure with Dr. Norman    Discussed activity considerations and other supportive care including Ice/Heat, OTC and other topical medications as needed.    -We also discussed other future treatment options:  Referral to Occupational Hand Therapy  Referral to Physical Therapy  Consideration of corticosteroid injection left shoulder  MRI if not improving  EMG for more numbness and tingling symptoms    Follow Up: as needed with me    If you have any further questions for your physician or physician s care team you can call [phone].

## 2024-10-17 NOTE — LETTER
10/17/2024      Amee Crews  [address] [facility] [address]      Dear Colleague,    Thank you for referring your patient, Amee Crews, to the [facility] [place]. Please see a copy of my visit note below.    ASSESSMENT & PLAN    Amee was seen today for pain, numbness, pain, numbness and pain.    Diagnoses and all orders for this visit:    CMC arthritis  -     Orthopedic  Referral  -     XR Shoulder Left G/E 3 Views; Future    Arthritis of eyolfjyb-exdvbzbbt-aiyrinhto joint of both hands    Acute pain of left shoulder  -     Orthopedic  Referral  -     XR Shoulder Left G/E 3 Views; Future    Arthritis of left shoulder region    Other orders  -     Sports Med Adult Follow-Up Clinic Order (Blank); Future      This issue is acute on chronic and Worsening.      ICD-10-CM    1. CMC arthritis  M19.049 Orthopedic  Referral     XR Shoulder Left G/E 3 Views      2. Arthritis of zgyvhmql-qomrzxdqe-nffczurwl joint of both hands  M19.031     M19.032       3. Acute pain of left shoulder  M25.512 Orthopedic  Referral     XR Shoulder Left G/E 3 Views      4. Arthritis of left shoulder region  M19.012         Patient Instructions   Left Shoulder - likely flare of underlying mild arthritis, rotator cuff syndrome.  We discussed the following treatment options: symptom treatment, activity modification/rest, imaging, rehab and referral. Following discussion, plan: patient will start with Home Exercise Program, close follow up if not improving.    Desiring repeat injections bilateral wrists - follow up with Dr. Norman for procedure.    Plan:  - Today's Plan of Care:  Home Exercise Program for shoulder  Follow up order placed for procedure with Dr. Norman    Discussed activity considerations and other supportive care including Ice/Heat, OTC and other topical medications as needed.    -We also discussed other future treatment options:  Referral to  "Occupational Hand Therapy  Referral to Physical Therapy  Consideration of corticosteroid injection left shoulder  MRI if not improving  EMG for more numbness and tingling symptoms    Follow Up: as needed with me    Concerning signs and symptoms were reviewed and all questions were answered at this time.    Thanks for the opportunity to participate in the care of this patient, I will keep you updated on their progress.    CC: Ary Herring MD [facility]  Sports Medicine Physician  [facility]    -----  Chief Complaint   Patient presents with     Right Wrist - Pain, Numbness     Left Wrist - Pain, Numbness     Left Shoulder - Pain       SUBJECTIVE  Amee Crews is a/an 74 year old female who is seen in consultation at the request of Ary Adams C.N.P. for evaluation of AMA wrist pain . She has previously been seen by Dr. Norman and Kamille in the past.  She is also interested in evaluating her shoulder as well.     The patient is seen by themselves.  The patient is Right handed    AMA wrist pain, AMA thumb, CMC pain   Onset: couple years(s) ago. Reports insidious onset without acute precipitating event.  Location of Pain: bilateral hand/wrist pain; \"around the wrist\", CMC joint of the thumb   Worsened by: grabbing, pulling, twisting, using the hands, too much work, driving, sleeping   Better with: CSI   Treatments tried: ice, heat, Tylenol, ibuprofen, previous imaging (xray 8/1/23), corticosteroid injection by Dr. Simental , CMC of AMA thumbs (most recent date: 2021) that provided  3 years(s) of relief, and casting/splinting/bracing  Associated symptoms: numbness, tingling, and weakness of AMA hands  - pain is worse than numbness    2. Left Shoulder; \"whole shoulder\"  can be in the axillary as well, intermittent pain  - Started in July, overuse from laying floors  Worse by; reaching across the body, adduction, flexion,   Better with; unsure/nothing   Treatments tried; no " treatments tried yet.   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   General: healthy, alert and in no distress  Skin: no suspicious lesions or rash.  CV: distal perfusion intact   Resp: normal respiratory effort without conversational dyspnea   Psych: normal mood and affect  Gait: NORMAL  Neuro: Normal light sensory exam of upper extremity    Bilateral Wrist and Hand exam    Inspection:       Arthritis deformities bilateral wrists    Tender:       CMC joints    Non Tender:       Remainder of the Wrist and Hand bilateral    ROM:       Full and symmetric active and passive range of motion of the forearm, wrist and digits bilateral    Strength:       5/5 strength in the muscles of the hand, wrist and forearm bilateral    Neurovascular:       2+ radial pulses bilaterally with brisk capillary refill and      normal sensation to light touch in the radial, median and ulnar nerve distributions    Bilateral Shoulder exam    Inspection and Posture:       normal    Skin:        no visible deformities    Tender:        none    Non Tender:       remainder of shoulder bilateral    ROM:        Full active and passive ROM with flexion, extension, abduction, internal and external rotation left       asymmetric scapular motion    Painful motions:       end range flexion and elevation left    Strength:        abduction 5/5 bilateral       flexion 5/5 bilateral       internal rotation 5/5 bilateral       external rotation 5/5 bilateral    Impingement testing:       neg (-) Neer left       neg (-) Solano left       neg (-) O'kalyan left    Sensation:        normal sensation over shoulder and upper extremity      RADIOLOGY:  Final results and radiologist's interpretation, available in the  [facility] health record.  Images were reviewed with the patient in the office today.  My personal interpretation of the performed imagin XR views of left shoulder reviewed: no acute bony abnormality, mild glenohumeral joint and AC joint degenerative change  - will follow official read    Reviewed prior right wrist XR 2023 and bilateral wrist XRs from 2024 - no acute bony abnormality, severe CMC and STT joint degenerative changes      Narrative & Impression   XR WRIST RIGHT G/E 3 VIEWS  2023 9:34 AM      HISTORY: Pain of right hand  COMPARISON: 2021                                                                      IMPRESSION: No acute fracture or malalignment. Severe STT and first  CMC joint degenerative changes with bone-on-bone articulation and  subchondral sclerosis. Osteopenia.      GENE BULLOCK MD         Reviewed PCP notes  Review of the result(s) of each unique test - XRs             Again, thank you for allowing me to participate in the care of your patient.        Sincerely,        Mary Herring MD

## 2024-10-23 NOTE — PROGRESS NOTES
Amee Crews  :  1950  DOS: 10/29/2024  MRN: [id number]  PCP: Ary Adams    Sports Medicine Clinic Visit    Interim History - 2024  - Last seen on 2023 for bilateral CMC joint osteoarthritis and right carpal tunnel syndrome. Also noting left shoulder pain.     - Since the last visit, she notes continued pain with driving and resting in bed. Pain is located over the generalized wrist and also the base of the bilateral thumbs. Denies any pain relieving methods at this time for the wrists or shoulder.     - Hopeful for injections today.  Good relief from bilateral CMC joint injections with Dr. Simental in .      Initial Visit: 2023   HPI  Amee Crews is a 72 year old female who is seen in consultation at the request of  Ary Adams C.N.P. presenting with right hand pain and numbness.    - Mechanism of Injury:  No inciting injury.   - Prior evaluation: No formal evaluation recently. Chronic CMC joint pain noted in  with Dr. Sujata Simental, responded well to corticosteroid injections that gave approximately 1.5 years of relief.  Radiographs show degenerative changes of the first CMC and STT joints bilaterally.  May have used braces in the past.    - Pain Character:  Pain has been present for  3 weeks ago after doing a lot of painting .  Numbness is in the right hand in the median nerve distribution with radiation into the right forearm.  Positive flick sign.  - Endorses:  Sensation of hand falling asleep, thumb feels it the most, tingling sensation.  Little finger does not seem to be involved.  Mild swelling and grinding at the base of the thumb which has been chronic.  - Denies:  mechanical locking symptoms, instability, weakness.   - Alleviating factors:   no recent treatments , positive flick sign.  - Aggravating factors:  driving, pressure onto the hand, repetitive wrist motion activities.  - Treatments tried:  past corticosteroid injections in the CMC joints, and  has worn a thumb spica brace in the past. Nothing recently.    - Patient Goals:  discuss treatment options  - Social History: retired    - Pertinent PMH: Bilateral CMC OA, cLBP, osteoporosis      Review of Systems  Musculoskeletal: as above  Remainder of review of systems is negative including constitutional, CV, pulmonary, GI, Skin and Neurologic except as noted in HPI or medical history.    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    Allergic rhinitis     Allergies     asthma     mild intermittent    Chronic back pain     neck and low back    HA (headache)     muscle contraction     History of blood transfusion     In childhood    Hot flashes     Hyperlipidemia     Insomnia     Intermittent asthma 9/16/2011    Major depressive disorder, single episode, moderate (H)     Ocular migraine     Osteoarthritis of hand     Restless leg 9/16/2011     Past Surgical History:   Procedure Laterality Date    APPENDECTOMY      age 9     BIOPSY      took tissue from my uterus at least 15 years ago    COLONOSCOPY  14 years    COLONOSCOPY N/A 4/30/2015    Procedure: COMBINED COLONOSCOPY, SINGLE OR MULTIPLE BIOPSY/POLYPECTOMY BY BIOPSY;  Surgeon: Doni Carey MD;  Location: [facility] OR    COLONOSCOPY WITH CO2 INSUFFLATION N/A 4/30/2015    Procedure: COLONOSCOPY WITH CO2 INSUFFLATION;  Surgeon: Doni Carey MD;  Location: [facility] OR    COLONOSCOPY WITH CO2 INSUFFLATION N/A 12/8/2020    Procedure: COLONOSCOPY, WITH CO2 INSUFFLATION;  Surgeon: Kareem Littlejohn DO;  Location: [facility] OR    COMBINED ESOPHAGOSCOPY, GASTROSCOPY, DUODENOSCOPY (EGD) WITH CO2 INSUFFLATION N/A 8/24/2017    Procedure: COMBINED ESOPHAGOSCOPY, GASTROSCOPY, DUODENOSCOPY (EGD) WITH CO2 INSUFFLATION;  EGD, Gastroesophageal reflux disease, esophagitis presence not specified Abdominal pain, generalized, Ref Dahlheimer-Schroeder, 24.78 BMI;  Surgeon: Duane, William Charles, MD;  Location: [facility] OR    ENT  SURGERY      Tonsillectomy    ESOPHAGOSCOPY, GASTROSCOPY, DUODENOSCOPY (EGD), COMBINED N/A 8/24/2017    Procedure: COMBINED ESOPHAGOSCOPY, GASTROSCOPY, DUODENOSCOPY (EGD), BIOPSY SINGLE OR MULTIPLE;;  Surgeon: Duane, William Charles, MD;  Location: [facility] OR    INJECT EPIDURAL TRANSFORAMINAL Left 9/16/2022    Procedure: INJECTION, EPIDURAL, TRANSFORAMINAL APPROACH - L4-5;  Surgeon: Laya Bowman MD;  Location: [facility] OR    LAPAROSCOPY PROCEDURE UNLISTED      polyps found    MOUTH SURGERY  2016    SINUS SURGERY      x2     Family History   Problem Relation Age of Onset    Diabetes Mother         Old age    Hypertension Mother     Cancer Father         stomach     Cancer Maternal Grandfather         lung     Heart Disease Other          Objective  There were no vitals taken for this visit.    General: healthy, alert and in no acute distress.    HEENT: no scleral icterus or conjunctival erythema.   Skin: no suspicious lesions or rash. No jaundice.   CV: regular rhythm by palpation, 2+ distal pulses.  Resp: normal respiratory effort without conversational dyspnea.   Psych: normal mood and affect.    Gait: nonantalgic, appropriate coordination and balance.     Neuro:        - Sensation to light touch:    - Intact throughout the BUE including all peripheral nerve distributions.        - MSR:       RUE  LUE  - Biceps  2+ 2+  - Brachioradialis 2+ 2+  - Triceps  2+ 2+       - Special tests:   - Spurling's:  Neg    - Tinel's at the wrist:  Neg    - Tinel's at the elbow:  Neg    - Stressed Phalen's: Positive    MSK - Wrist/Hand:       - Inspection:    - No significant swelling, erythema, warmth, ecchymosis, lesion, or atrophy noted.        - ROM:    - Full AROM/PROM with pain during wrist flexion, wrist extension.       - Palpation:    - TTP at the first CMC joint bilaterally   - NTTP elsewhere.        - Strength:  (*antalgic)  RUE LUE  - Shoulder Abduction   5 5   - Shoulder Flexion   5 5   - Shoulder Internal Rotation  5 5   -  Shoulder External Rotation  5 5  - Elbow Flexion   5 5  - Elbow Extension   5 5  - Forearm Pronation   5 5  - Forearm Supination   5 5  - Wrist Extension   5* 5  - Wrist Flexion    5* 5  - FDI     5 5  - ADM     5 5  - FPL     5 5  - APB     5 5  - EIP     5 5  - EDC     5 5  - APL/EPB    5 5         - Special tests:        - CMC grind: Positive bilaterally   - Finkelstein's:  Neg    - TFCC compression:  Neg    - Capone's:  Neg       Radiology  I independently reviewed relevant imaging, with the following interpretation:  - XR R wrist 8/1/2023 shows advanced degenerative changes of the first CMC joint and STT joint, no acute fractures or dislocations.    Procedure  Small Joint Injection/Arthrocentesis: bilateral thumb CMC    Date/Time: 10/29/2024 11:06 AM    Performed by: Alvin Norman DO  Authorized by: Alvin Norman DO    Needle Size:  27 G  Guidance: ultrasound     Approach:  Radial  Location:  Thumb  Laterality:  Bilateral  Site:  Bilateral thumb CMC    Medications (Right):  20 mg triamcinolone 40 MG/ML; 0.5 mL lidocaine 1 %        Medications (Left):  20 mg triamcinolone 40 MG/ML; 0.5 mL lidocaine 1 %                Outcome:  Tolerated well, no immediate complications  Procedure discussed: discussed risks, benefits, and alternatives    Consent Given by:  Patient    Prep: patient was prepped and draped in usual sterile fashion       Ultrasound images of procedure were permanently stored.       Procedure:   Ultrasound-guided first CMC joint corticosteroid injection  Consent given, and signed on chart.  The area was prepped in typical sterile fashion with chlorhexidine solution.  First CMC joint was identified under ultrasound on long and short axis and marked. Ethyl chloride spray was used to provide local anesthesia to the injection site.  A 1.5 inch 27 gauge needle was placed into the first CMC joint under ultrasound guidance, taking care not to damage surrounding structures.  A mixture of 0.5ml 1%  lidocaine and 0.5 ml kenalog (40mg/ml) was injected without difficulty.  Ultrasound documentation of needle placement injection was achieved.  After removal of the needle, the area was cleaned, hemostasis achieved, and bandage applied. Patient tolerated the procedure well, no complications.       Assessment  1. Primary osteoarthritis of both first carpometacarpal joints          Plan  Amee Crews is a 72 year old female that presents for follow up of bilateral wrist pain.     Originally saw Amee on 8/1/23 for right wrist pain and paresthesias in the median nerve distribution distal to the wrist, that was exacerbated about 3 weeks prior from a lot of painting. History and physical exam appear most consistent with median mononeuropathy at the wrist (carpal tunnel syndrome), mild in severity.  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    She presents today for continued pain in the base of both thumbs and she is interested in corticosteroid injections into the first CMC joints, as discussed at our previous visit and she had good responses from these injections in 2021 with Dr. Simental.    She also would like to consider corticosteroid injection for the left shoulder, which she discussed with Dr. Herring 2 weeks ago.  Suspicion for mild arthritis and rotator cuff syndrome.    Discussed the nature of the condition and treatment options and mutually agreed upon the following plan:    - Imaging:          - Reviewed relevant imaging in the chart.  - Reviewed results and images with patient.   - Medications:          - Discussed pharmacologic options for pain relief.   - May use NSAIDs (Ibuprofen, Naproxen) or Acetaminophen (Tylenol) as needed for pain control.   - May also use topical medications such as lidocaine, IcyHot, BioFreeze, or Voltaren gel as needed for pain control.  May use Voltaren gel up to 4 times per day as needed over the painful area.  - Injections:           - Discussed possible injection options and alternatives.    - Performed a corticosteroid injection of the bilateral first CMC joints today in clinic. Patient tolerated the procedure well without complications.   - Post-procedure instructions:    - Keep the injection site clean and dry.   - Do not submerge the injection site for 24 hours (no baths, pools). Showers are ok.   - Rest the area for 24-48 hours before resuming normal activities. Avoid overexerting the area for the first few weeks.   - It may take 2-3 days to start noticing the effects of the injection and up to 3-4 weeks to feel significant benefits.   - In general, an injection in the same anatomical region can be repeated every 3 months as needed.  However, for the health of your tissues you will want to space out the injections as much as possible and request them only when symptoms are significantly bothersome and disrupting daily function and/or sleep.  - May consider an ultrasound-guided glenohumeral joint or subacromial bursa injection for the left shoulder at next visit.  - Therapy:          - Continue home exercise program as instructed.  - Modalities:          - May use ice, heat, massage or other modalities as needed.   - Bracing:          - Discussed bracing options and recommend using thumb spica bracing as needed during exacerbating conditions.  - Surgery:          - Discussed non-operative and operative treatment options for the patient's condition.  Goal is to continue with conservative care for as long as possible before surgery would need to be considered.  - Activity:          - Encouraged to remain active and participate in regular activities as symptoms allow.  Avoid or modify exacerbating activities as needed.  - Follow up:          - In 2 weeks for re-evaluation and update to treatment plan, or sooner for new/worsening symptoms.  - Patient has clinic contact information for questions or concerns.       Alvin Norman DO, JC  M  [facility] - Department of Orthopedic Surgery       Disclaimer:  This note was prepared and written using Dragon Medical dictation software. As a result, there may be errors in the script that have gone undetected. Please consider this when interpreting the information in this note.

## 2024-10-29 ENCOUNTER — OFFICE VISIT (OUTPATIENT)
Dept: ORTHOPEDICS | Facility: CLINIC | Age: 74
End: 2024-10-29
Payer: COMMERCIAL

## 2024-10-29 DIAGNOSIS — M18.0 PRIMARY OSTEOARTHRITIS OF BOTH FIRST CARPOMETACARPAL JOINTS: Primary | ICD-10-CM

## 2024-10-29 PROCEDURE — 99214 OFFICE O/P EST MOD 30 MIN: CPT | Mod: 25 | Performed by: STUDENT IN AN ORGANIZED HEALTH CARE EDUCATION/TRAINING PROGRAM

## 2024-10-29 PROCEDURE — 20604 DRAIN/INJ JOINT/BURSA W/US: CPT | Mod: 50 | Performed by: STUDENT IN AN ORGANIZED HEALTH CARE EDUCATION/TRAINING PROGRAM

## 2024-10-29 RX ADMIN — TRIAMCINOLONE ACETONIDE 20 MG: 40 INJECTION, SUSPENSION INTRA-ARTICULAR; INTRAMUSCULAR at 11:06

## 2024-10-29 RX ADMIN — LIDOCAINE HYDROCHLORIDE 0.5 ML: 10 INJECTION, SOLUTION INFILTRATION; PERINEURAL at 11:06

## 2024-10-29 NOTE — LETTER
10/29/2024      Amee Crews  [address] [facility] [address]      Dear Colleague,    Thank you for referring your patient, Amee Crews, to the [facility] SPORTS [facility]IC[facility]. Please see a copy of my visit note below.    Amee Crews  :  1950  DOS: 10/29/2024  MRN: [id number]  PCP: Ary Adams    Sports Medicine Clinic Visit    Interim History - 2024  - Last seen on 2023 for bilateral CMC joint osteoarthritis and right carpal tunnel syndrome. Also noting left shoulder pain.     - Since the last visit, she notes continued pain with driving and resting in bed. Pain is located over the generalized wrist and also the base of the bilateral thumbs. Denies any pain relieving methods at this time for the wrists or shoulder.     - Hopeful for injections today.  Good relief from bilateral CMC joint injections with Dr. Simental in .      Initial Visit: 2023   HPI  Amee Crews is a 72 year old female who is seen in consultation at the request of  Ary Adams C.N.P. presenting with right hand pain and numbness.    - Mechanism of Injury:  No inciting injury.   - Prior evaluation: No formal evaluation recently. Chronic CMC joint pain noted in  with Dr. Sujata Simental, responded well to corticosteroid injections that gave approximately 1.5 years of relief.  Radiographs show degenerative changes of the first CMC and STT joints bilaterally.  May have used braces in the past.    - Pain Character:  Pain has been present for  3 weeks ago after doing a lot of painting .  Numbness is in the right hand in the median nerve distribution with radiation into the right forearm.  Positive flick sign.  - Endorses:  Sensation of hand falling asleep, thumb feels it the most, tingling sensation.  Little finger does not seem to be involved.  Mild swelling and grinding at the base of the thumb which has been chronic.  - Denies:  mechanical locking symptoms, instability,  weakness.   - Alleviating factors:   no recent treatments , positive flick sign.  - Aggravating factors:  driving, pressure onto the hand, repetitive wrist motion activities.  - Treatments tried:  past corticosteroid injections in the CMC joints, and has worn a thumb spica brace in the past. Nothing recently.    - Patient Goals:  discuss treatment options  - Social History: retired    - Pertinent PMH: Bilateral CMC OA, cLBP, osteoporosis      Review of Systems  Musculoskeletal: as above  Remainder of review of systems is negative including constitutional, CV, pulmonary, GI, Skin and Neurologic except as noted in HPI or medical history.    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     Allergic rhinitis      Allergies      asthma     mild intermittent     Chronic back pain     neck and low back     HA (headache)     muscle contraction      History of blood transfusion     In childhood     Hot flashes      Hyperlipidemia      Insomnia      Intermittent asthma 9/16/2011     Major depressive disorder, single episode, moderate (H)      Ocular migraine      Osteoarthritis of hand      Restless leg 9/16/2011     Past Surgical History:   Procedure Laterality Date     APPENDECTOMY      age 9      BIOPSY      took tissue from my uterus at least 15 years ago     COLONOSCOPY  14 years     COLONOSCOPY N/A 4/30/2015    Procedure: COMBINED COLONOSCOPY, SINGLE OR MULTIPLE BIOPSY/POLYPECTOMY BY BIOPSY;  Surgeon: Doni Carey MD;  Location: [facility] OR     COLONOSCOPY WITH CO2 INSUFFLATION N/A 4/30/2015    Procedure: COLONOSCOPY WITH CO2 INSUFFLATION;  Surgeon: Doni Carey MD;  Location: [facility] OR     COLONOSCOPY WITH CO2 INSUFFLATION N/A 12/8/2020    Procedure: COLONOSCOPY, WITH CO2 INSUFFLATION;  Surgeon: Kareem Littlejohn DO;  Location: [facility] OR     COMBINED ESOPHAGOSCOPY, GASTROSCOPY, DUODENOSCOPY (EGD) WITH CO2 INSUFFLATION N/A 8/24/2017    Procedure:  COMBINED ESOPHAGOSCOPY, GASTROSCOPY, DUODENOSCOPY (EGD) WITH CO2 INSUFFLATION;  EGD, Gastroesophageal reflux disease, esophagitis presence not specified Abdominal pain, generalized, Ref Paramjit, 24.78 BMI;  Surgeon: Duane, William Charles, MD;  Location: [facility] OR     ENT SURGERY      Tonsillectomy     ESOPHAGOSCOPY, GASTROSCOPY, DUODENOSCOPY (EGD), COMBINED N/A 8/24/2017    Procedure: COMBINED ESOPHAGOSCOPY, GASTROSCOPY, DUODENOSCOPY (EGD), BIOPSY SINGLE OR MULTIPLE;;  Surgeon: Duane, William Charles, MD;  Location: [facility] OR     INJECT EPIDURAL TRANSFORAMINAL Left 9/16/2022    Procedure: INJECTION, EPIDURAL, TRANSFORAMINAL APPROACH - L4-5;  Surgeon: Laya Bowman MD;  Location: [facility] OR     LAPAROSCOPY PROCEDURE UNLISTED      polyps found     MOUTH SURGERY  2016     SINUS SURGERY      x2     Family History   Problem Relation Age of Onset     Diabetes Mother         Old age     Hypertension Mother      Cancer Father         stomach      Cancer Maternal Grandfather         lung      Heart Disease Other          Objective  There were no vitals taken for this visit.    General: healthy, alert and in no acute distress.    HEENT: no scleral icterus or conjunctival erythema.   Skin: no suspicious lesions or rash. No jaundice.   CV: regular rhythm by palpation, 2+ distal pulses.  Resp: normal respiratory effort without conversational dyspnea.   Psych: normal mood and affect.    Gait: nonantalgic, appropriate coordination and balance.     Neuro:        - Sensation to light touch:    - Intact throughout the BUE including all peripheral nerve distributions.        - MSR:       RUE  LUE  - Biceps  2+ 2+  - Brachioradialis 2+ 2+  - Triceps  2+ 2+       - Special tests:   - Spurling's:  Neg    - Tinel's at the wrist:  Neg    - Tinel's at the elbow:  Neg    - Stressed Phalen's: Positive    MSK - Wrist/Hand:       - Inspection:    - No significant swelling, erythema, warmth, ecchymosis, lesion, or atrophy noted.        - ROM:     - Full AROM/PROM with pain during wrist flexion, wrist extension.       - Palpation:    - TTP at the first CMC joint bilaterally   - NTTP elsewhere.        - Strength:  (*antalgic)  RUE LUE  - Shoulder Abduction   5 5   - Shoulder Flexion   5 5   - Shoulder Internal Rotation  5 5   - Shoulder External Rotation  5 5  - Elbow Flexion   5 5  - Elbow Extension   5 5  - Forearm Pronation   5 5  - Forearm Supination   5 5  - Wrist Extension   5* 5  - Wrist Flexion    5* 5  - FDI     5 5  - ADM     5 5  - FPL     5 5  - APB     5 5  - EIP     5 5  - EDC     5 5  - APL/EPB    5 5         - Special tests:        - CMC grind: Positive bilaterally   - Finkelstein's:  Neg    - TFCC compression:  Neg    - Capone's:  Neg       Radiology  I independently reviewed relevant imaging, with the following interpretation:  - XR R wrist 8/1/2023 shows advanced degenerative changes of the first CMC joint and STT joint, no acute fractures or dislocations.    Procedure  Small Joint Injection/Arthrocentesis: bilateral thumb CMC    Date/Time: 10/29/2024 11:06 AM    Performed by: Alvin Norman DO  Authorized by: Alvin Norman DO    Needle Size:  27 G  Guidance: ultrasound     Approach:  Radial  Location:  Thumb  Laterality:  Bilateral  Site:  Bilateral thumb CMC    Medications (Right):  20 mg triamcinolone 40 MG/ML; 0.5 mL lidocaine 1 %        Medications (Left):  20 mg triamcinolone 40 MG/ML; 0.5 mL lidocaine 1 %                Outcome:  Tolerated well, no immediate complications  Procedure discussed: discussed risks, benefits, and alternatives    Consent Given by:  Patient    Prep: patient was prepped and draped in usual sterile fashion       Ultrasound images of procedure were permanently stored.       Procedure:   Ultrasound-guided first CMC joint corticosteroid injection  Consent given, and signed on chart.  The area was prepped in typical sterile fashion with chlorhexidine solution.  First CMC joint was identified under ultrasound  on long and short axis and marked. Ethyl chloride spray was used to provide local anesthesia to the injection site.  A 1.5 inch 27 gauge needle was placed into the first CMC joint under ultrasound guidance, taking care not to damage surrounding structures.  A mixture of 0.5ml 1% lidocaine and 0.5 ml kenalog (40mg/ml) was injected without difficulty.  Ultrasound documentation of needle placement injection was achieved.  After removal of the needle, the area was cleaned, hemostasis achieved, and bandage applied. Patient tolerated the procedure well, no complications.       Assessment  1. Primary osteoarthritis of both first carpometacarpal joints          Plan  Amee Crews is a 72 year old female that presents for follow up of bilateral wrist pain.     Originally saw Amee on 8/1/23 for right wrist pain and paresthesias in the median nerve distribution distal to the wrist, that was exacerbated about 3 weeks prior from a lot of painting. History and physical exam appear most consistent with median mononeuropathy at the wrist (carpal tunnel syndrome), mild in severity.  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    She presents today for continued pain in the base of both thumbs and she is interested in corticosteroid injections into the first CMC joints, as discussed at our previous visit and she had good responses from these injections in 2021 with Dr. Simental.    She also would like to consider corticosteroid injection for the left shoulder, which she discussed with Dr. Herring 2 weeks ago.  Suspicion for mild arthritis and rotator cuff syndrome.    Discussed the nature of the condition and treatment options and mutually agreed upon the following plan:    - Imaging:          - Reviewed relevant imaging in the chart.  - Reviewed results and images with patient.   - Medications:          - Discussed pharmacologic options for pain relief.   - May use NSAIDs (Ibuprofen,  Naproxen) or Acetaminophen (Tylenol) as needed for pain control.   - May also use topical medications such as lidocaine, IcyHot, BioFreeze, or Voltaren gel as needed for pain control.  May use Voltaren gel up to 4 times per day as needed over the painful area.  - Injections:          - Discussed possible injection options and alternatives.    - Performed a corticosteroid injection of the bilateral first CMC joints today in clinic. Patient tolerated the procedure well without complications.   - Post-procedure instructions:    - Keep the injection site clean and dry.   - Do not submerge the injection site for 24 hours (no baths, pools). Showers are ok.   - Rest the area for 24-48 hours before resuming normal activities. Avoid overexerting the area for the first few weeks.   - It may take 2-3 days to start noticing the effects of the injection and up to 3-4 weeks to feel significant benefits.   - In general, an injection in the same anatomical region can be repeated every 3 months as needed.  However, for the health of your tissues you will want to space out the injections as much as possible and request them only when symptoms are significantly bothersome and disrupting daily function and/or sleep.  - May consider an ultrasound-guided glenohumeral joint or subacromial bursa injection for the left shoulder at next visit.  - Therapy:          - Continue home exercise program as instructed.  - Modalities:          - May use ice, heat, massage or other modalities as needed.   - Bracing:          - Discussed bracing options and recommend using thumb spica bracing as needed during exacerbating conditions.  - Surgery:          - Discussed non-operative and operative treatment options for the patient's condition.  Goal is to continue with conservative care for as long as possible before surgery would need to be considered.  - Activity:          - Encouraged to remain active and participate in regular activities as symptoms  allow.  Avoid or modify exacerbating activities as needed.  - Follow up:          - In 2 weeks for re-evaluation and update to treatment plan, or sooner for new/worsening symptoms.  - Patient has clinic contact information for questions or concerns.       Alvin Norman DO, CAQSM  [facility] Sports Medicine  [facility] - Department of Orthopedic Surgery       Disclaimer:  This note was prepared and written using Dragon Medical dictation software. As a result, there may be errors in the script that have gone undetected. Please consider this when interpreting the information in this note.       Again, thank you for allowing me to participate in the care of your patient.        Sincerely,        Alvin Norman DO

## 2024-11-04 RX ORDER — TRIAMCINOLONE ACETONIDE 40 MG/ML
20 INJECTION, SUSPENSION INTRA-ARTICULAR; INTRAMUSCULAR
Status: COMPLETED | OUTPATIENT
Start: 2024-10-29 | End: 2024-10-29

## 2024-11-04 RX ORDER — LIDOCAINE HYDROCHLORIDE 10 MG/ML
0.5 INJECTION, SOLUTION INFILTRATION; PERINEURAL
Status: COMPLETED | OUTPATIENT
Start: 2024-10-29 | End: 2024-10-29

## 2024-11-10 ENCOUNTER — HEALTH MAINTENANCE LETTER (OUTPATIENT)
Age: 74
End: 2024-11-10

## 2024-11-14 DIAGNOSIS — J32.9 CHRONIC SINUSITIS, UNSPECIFIED LOCATION: ICD-10-CM

## 2024-11-14 RX ORDER — FLUTICASONE PROPIONATE 50 MCG
SPRAY, SUSPENSION (ML) NASAL
Qty: 16 G | Refills: 0 | Status: SHIPPED | OUTPATIENT
Start: 2024-11-14

## 2024-12-11 ENCOUNTER — OFFICE VISIT (OUTPATIENT)
Dept: FAMILY MEDICINE | Facility: OTHER | Age: 74
End: 2024-12-11
Payer: COMMERCIAL

## 2024-12-11 VITALS
WEIGHT: 147 LBS | TEMPERATURE: 99.6 F | DIASTOLIC BLOOD PRESSURE: 74 MMHG | HEART RATE: 71 BPM | OXYGEN SATURATION: 98 % | RESPIRATION RATE: 18 BRPM | HEIGHT: 62 IN | SYSTOLIC BLOOD PRESSURE: 108 MMHG | BODY MASS INDEX: 27.05 KG/M2

## 2024-12-11 DIAGNOSIS — E55.9 VITAMIN D DEFICIENCY: ICD-10-CM

## 2024-12-11 DIAGNOSIS — H25.9 AGE-RELATED CATARACT OF BOTH EYES, UNSPECIFIED AGE-RELATED CATARACT TYPE: ICD-10-CM

## 2024-12-11 DIAGNOSIS — G25.81 RESTLESS LEG: ICD-10-CM

## 2024-12-11 DIAGNOSIS — K21.00 GASTROESOPHAGEAL REFLUX DISEASE WITH ESOPHAGITIS WITHOUT HEMORRHAGE: ICD-10-CM

## 2024-12-11 DIAGNOSIS — G47.00 INSOMNIA, UNSPECIFIED TYPE: ICD-10-CM

## 2024-12-11 DIAGNOSIS — F33.42 RECURRENT MAJOR DEPRESSIVE DISORDER, IN FULL REMISSION (H): ICD-10-CM

## 2024-12-11 DIAGNOSIS — J45.20 MILD INTERMITTENT ASTHMA WITHOUT COMPLICATION: ICD-10-CM

## 2024-12-11 DIAGNOSIS — Z01.818 PREOP GENERAL PHYSICAL EXAM: Primary | ICD-10-CM

## 2024-12-11 DIAGNOSIS — K21.00 GASTROESOPHAGEAL REFLUX DISEASE WITH ESOPHAGITIS, UNSPECIFIED WHETHER HEMORRHAGE: ICD-10-CM

## 2024-12-11 DIAGNOSIS — G43.009 MIGRAINE WITHOUT AURA AND WITHOUT STATUS MIGRAINOSUS, NOT INTRACTABLE: ICD-10-CM

## 2024-12-11 PROCEDURE — 99214 OFFICE O/P EST MOD 30 MIN: CPT | Performed by: STUDENT IN AN ORGANIZED HEALTH CARE EDUCATION/TRAINING PROGRAM

## 2024-12-11 RX ORDER — OMEPRAZOLE 40 MG/1
40 CAPSULE, DELAYED RELEASE ORAL DAILY
Qty: 90 CAPSULE | Refills: 0 | Status: SHIPPED | OUTPATIENT
Start: 2024-12-11

## 2024-12-11 RX ORDER — CHOLECALCIFEROL (VITAMIN D3) 50 MCG
1 TABLET ORAL DAILY
Qty: 90 TABLET | Refills: 0 | Status: SHIPPED | OUTPATIENT
Start: 2024-12-11

## 2024-12-11 RX ORDER — FAMOTIDINE 20 MG/1
20 TABLET, FILM COATED ORAL 2 TIMES DAILY PRN
Qty: 180 TABLET | Refills: 0 | Status: SHIPPED | OUTPATIENT
Start: 2024-12-11

## 2024-12-11 ASSESSMENT — PAIN SCALES - GENERAL: PAINLEVEL_OUTOF10: NO PAIN (0)

## 2024-12-11 NOTE — PATIENT INSTRUCTIONS
How to Take Your Medication Before Surgery      Patient Education   Preparing for Your Surgery  For Adults  Getting started  In most cases, a nurse will call to review your health history and instructions. They will give you an arrival time based on your scheduled surgery time. Please be ready to share:  Your doctor's clinic name and phone number  Your medical, surgical, and anesthesia history  A list of allergies and sensitivities  A list of medicines, including herbal treatments and over-the-counter drugs  Whether the patient has a legal guardian (ask how to send us the papers in advance)  Note: You may not receive a call if you were seen at our PAC (Preoperative Assessment Center).  Please tell us if you're pregnant--or if there's any chance you might be pregnant. Some surgeries may injure a fetus (unborn baby), so they require a pregnancy test. Surgeries that are safe for a fetus don't always need a test, and you can choose whether to have one.   Preparing for surgery  Within 10 to 30 days of surgery: Have a pre-op exam (sometimes called an H&P, or History and Physical). This can be done at a clinic or pre-operative center.  If you're having a , you may not need this exam. Talk to your care team.  At your pre-op exam, talk to your care team about all medicines you take. (This includes CBD oil and any drugs, such as THC, marijuana, and other forms of cannabis.) If you need to stop any medicine before surgery, ask when to start taking it again.  This is for your safety. Many medicines and drugs can make you bleed too much during surgery. Some change how well surgery (anesthesia) drugs work.  Call your insurance company to let them know you're having surgery. (If you don't have insurance, call [phone].)  Call your clinic if there's any change in your health. This includes a scrape or scratch near the surgery site, or any signs of a cold (sore throat, runny nose, cough, rash, fever).  Eating and  drinking guidelines  For your safety: Unless your surgeon tells you otherwise, follow the guidelines below.  Eat and drink as normal until 8 hours before you arrive for surgery. After that, no food or milk. You can spit out gum when you arrive.  Drink clear liquids until 2 hours before you arrive. These are liquids you can see through, like water, Gatorade, and Propel Water. They also include plain black coffee and tea (no cream or milk).  No alcohol for 24 hours before you arrive. The night before surgery, stop any drinks that contain THC.  If your care team tells you to take medicine on the morning of surgery, it's okay to take it with a sip of water. No other medicines or drugs are allowed (including CBD oil)--follow your care team's instructions.  If you have questions the day of surgery, call your hospital or surgery center.   Preventing infection  Shower or bathe the night before and the morning of surgery. Follow the instructions your clinic gave you. (If no instructions, use regular soap.)  Don't shave or clip hair near your surgery site. We'll remove the hair if needed.  Don't smoke or vape the morning of surgery. No chewing tobacco for 6 hours before you arrive. A nicotine patch is okay. You may spit out nicotine gum when you arrive.  For some surgeries, the surgeon will tell you to fully quit smoking and nicotine.  We will make every effort to keep you safe from infection. We will:  Clean our hands often with soap and water (or an alcohol-based hand rub).  Clean the skin at your surgery site with a special soap that kills germs.  Give you a special gown to keep you warm. (Cold raises the risk of infection.)  Wear hair covers, masks, gowns, and gloves during surgery.  Give antibiotic medicine, if prescribed. Not all surgeries need this medicine.  What to bring on the day of surgery  Photo ID and insurance card  Copy of your health care directive, if you have one  Glasses and hearing aids (bring cases)  You  can't wear contacts during surgery  Inhaler and eye drops, if you use them (tell us about these when you arrive)  CPAP machine or breathing device, if you use them  A few personal items, if spending the night  If you have . . .  A pacemaker, ICD (cardiac defibrillator), or other implant: Bring the ID card.  An implanted stimulator: Bring the remote control.  A legal guardian: Bring a copy of the certified (court-stamped) guardianship papers.  Please remove any jewelry, including body piercings. Leave jewelry and other valuables at home.  If you're going home the day of surgery  You must have a responsible adult drive you home. They should stay with you overnight as well.  If you don't have someone to stay with you, and you aren't safe to go home alone, we may keep you overnight. Insurance often won't pay for this.  After surgery  If it's hard to control your pain or you need more pain medicine, please call your surgeon's office.  Questions?   If you have any questions for your care team, list them here:   ____________________________________________________________________________________________________________________________________________________________________________________________________________________________________________________________  For informational purposes only. Not to replace the advice of your health care provider. Copyright   2003, 2019 [facility]. All rights reserved. Clinically reviewed by Nash Thomas MD. [employer] [id number] - REV 08/24.

## 2024-12-11 NOTE — PROGRESS NOTES
Preoperative Evaluation  [facility]  [address]  [facility] [address]  Phone: [phone]  Fax: [phone]  Primary Provider: ROBYN Bojorquez CNP  Pre-op Performing Provider: TIFFANY LINDO MD  Dec 11, 2024             12/11/2024   Surgical Information   What procedure is being done? cateracts    Facility or Hospital where procedure/surgery will be performed: [facility]    Who is doing the procedure / surgery? [facility]    Date of surgery / procedure: 699196    Time of surgery / procedure: 1145    Where do you plan to recover after surgery? at home with family        Patient-reported     Fax number for surgical facility: [phone]    Assessment & Plan     The proposed surgical procedure is considered LOW risk.    Preop general physical exam  Age-related cataract of both eyes, unspecified age-related cataract type  Optimized for planned procedure as above    Gastroesophageal reflux disease with esophagitis without hemorrhage  Stable on Pepcid.  Occasional Prilosec use  - famotidine (PEPCID) 20 MG tablet; Take 1 tablet (20 mg) by mouth 2 times daily as needed (acid reflux breakthrough).  - omeprazole (PRILOSEC) 40 MG DR capsule; Take 1 capsule (40 mg) by mouth daily.    Vitamin D deficiency  Requesting refills  - Vitamin D3 50 mcg (2000 units) tablet; Take 1 tablet (50 mcg) by mouth daily.    Insomnia, unspecified type  On trazodone    Migraine without aura and without status migrainosus, not intractable  Very rare and infrequent use of Imitrex    Recurrent major depressive disorder, in full remission (H)  On Zoloft    Restless leg  On Mirapex    Mild intermittent asthma without complication  On Flovent and albuterol as needed            - No identified additional risk factors other than previously addressed    Antiplatelet or Anticoagulation Medication Instructions   - Patient is on no antiplatelet or anticoagulation medications.    Additional  Medication Instructions  Take all scheduled medications on the day of surgery    Recommendation  Approval given to proceed with proposed procedure, without further diagnostic evaluation.    Pam Lubin is a 74 year old, presenting for the following:  Pre-Op Exam and Fatigue          12/11/2024     2:18 PM   Additional Questions   Roomed by jocelyne   Accompanied by self     Via the Health Maintenance questionnaire, the patient has reported the following services have been completed -Mammogram: [facility] 2023-08-18, this information has been sent to the abstraction team.  HPI related to upcoming procedure: Chronic senile cataracts with planned procedure as above.  Notable history of GERD, vitamin D deficiency, insomnia, migraines, depression, asthma.        12/11/2024   Pre-Op Questionnaire   Have you ever had a heart attack or stroke? No    Have you ever had surgery on your heart or blood vessels, such as a stent placement, a coronary artery bypass, or surgery on an artery in your head, neck, heart, or legs? No    Do you have chest pain with activity? No    Do you have a history of heart failure? No    Do you currently have a cold, bronchitis or symptoms of other infection? No    Do you have a cough, shortness of breath, or wheezing? No    Do you or anyone in your family have previous history of blood clots? No    Do you or does anyone in your family have a serious bleeding problem such as prolonged bleeding following surgeries or cuts? No    Have you ever had problems with anemia or been told to take iron pills? No    Have you had any abnormal blood loss such as black, tarry or bloody stools, or abnormal vaginal bleeding? No    Have you ever had a blood transfusion? (!) YES  - age of 9 for appendicitis    Have you ever had a transfusion reaction? No    Are you willing to have a blood transfusion if it is medically needed before, during, or after your surgery? Yes    Have you or any of your relatives ever  had problems with anesthesia? No    Do you have sleep apnea, excessive snoring or daytime drowsiness? No    Do you have any artifical heart valves or other implanted medical devices like a pacemaker, defibrillator, or continuous glucose monitor? No    Do you have artificial joints? No    Are you allergic to latex? No        Patient-reported     Health Care Directive  Patient does not have a Health Care Directive: Patient states has Advance Directive and will bring in a copy to clinic.    Preoperative Review of    reviewed - no record of controlled substances prescribed.          Patient Active Problem List    Diagnosis Date Noted    Age-related osteoporosis without current pathological fracture 06/06/2023     Priority: Medium    Hypertriglyceridemia 05/08/2023     Priority: Medium    Neural foraminal stenosis of lumbosacral spine 08/19/2022     Priority: Medium     Added automatically from request for surgery [id number]      Lumbar radiculitis 08/19/2022     Priority: Medium     Added automatically from request for surgery [id number]      Major depression in complete remission (H) 03/28/2022     Priority: Medium    Vitelliform macular dystrophy 11/29/2017     Priority: Medium    DDD (degenerative disc disease), lumbar 11/29/2017     Priority: Medium    Chronic pain of left knee 11/29/2017     Priority: Medium    Vitamin B12 deficiency 11/29/2017     Priority: Medium    Osteopenia, unspecified location 11/29/2017     Priority: Medium    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, moderatly severe,slowly worsening 08/31/2017     Priority: Medium    Pinguecula of both eyes 08/31/2017     Priority: Medium    Nuclear cataract of both eyes 08/31/2017     Priority: Medium    Myopia, bilateral 08/31/2017     Priority: Medium    Macular cyst, hole, or pseudohole, bilateral, moderatly severe 08/31/2017     Priority: Medium    Adult vitelliform macular degeneration 08/31/2017     Priority: Medium     Gastroesophageal reflux disease without esophagitis 08/31/2017     Priority: Medium    Dense breast tissue 08/23/2017     Priority: Medium    Osteopenia of multiple sites 08/23/2017     Priority: Medium    Atrophic vaginitis 08/23/2017     Priority: Medium    Primary osteoarthritis of both hands 08/23/2017     Priority: Medium    Major depression in complete remission (H) 04/24/2017     Priority: Medium    Chronic sinusitis, unspecified location 01/04/2017     Priority: Medium    Chronic rhinitis 01/04/2017     Priority: Medium    Migraine without aura and without status migrainosus, not intractable 01/27/2016     Priority: Medium    Adjustment reaction with anxiety and depression 01/27/2016     Priority: Medium    Fatty liver 01/23/2012     Priority: Medium    Nasal polyposis 11/18/2011     Priority: Medium    Mild persistent asthma without complication 09/16/2011     Priority: Medium     Rarely uses albuterol inhaler      Restless leg 09/16/2011     Priority: Medium     Ferritin normal in 2011      Vaginal atrophy 09/16/2011     Priority: Medium    Insomnia      Priority: Medium    Chronic back pain      Priority: Medium     neck and low back      Chondromalacia patellae 08/22/2011     Priority: Medium     Saw Dr. Ruby 2011      Anxiety 11/04/2009     Priority: Medium    Migraine headache 11/04/2009     Priority: Medium     Ocular migraines as well- diagnosed at [facility]  (Problem list name updated by automated process. Provider to review and confirm.)      Hot flashes 11/04/2009     Priority: Medium    Hypercholesterolemia 06/20/2005     Priority: Medium     Muscle aches with pravastatin, simvastatin, atrovastatin.  Gemfibrozil was tried and tolerated well.  No side effects.  Discontinued on January 21, 2015 due to lack of long term benefit in stroke/MI prevention.  Will reconsider if new data arrives that shows stroke/MI prevention.  Sent for Crestor 5 mg daily January 21, 2015        Allergic state 09/27/2004      Priority: Medium     (Problem list name updated by automated process. Provider to review and confirm.)    Formatting of this note might be different from the original.  Vendor Update to relapce retired dx codes and/or terms.      Depression 09/27/2004     Priority: Medium    Asthma 09/27/2004     Priority: Medium     Formatting of this note might be different from the original.  Asthma, Unspecified  Intermittent asthma Rarely uses albuterol inhaler        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    Allergic rhinitis     Allergies     asthma     mild intermittent    Chronic back pain     neck and low back    HA (headache)     muscle contraction     History of blood transfusion     In childhood    Hot flashes     Hyperlipidemia     Insomnia     Intermittent asthma 9/16/2011    Major depressive disorder, single episode, moderate (H)     Ocular migraine     Osteoarthritis of hand     Restless leg 9/16/2011     Past Surgical History:   Procedure Laterality Date    APPENDECTOMY      age 9     BIOPSY      took tissue from my uterus at least 15 years ago    COLONOSCOPY  14 years    COLONOSCOPY N/A 4/30/2015    Procedure: COMBINED COLONOSCOPY, SINGLE OR MULTIPLE BIOPSY/POLYPECTOMY BY BIOPSY;  Surgeon: Doni Carey MD;  Location: [facility] OR    COLONOSCOPY WITH CO2 INSUFFLATION N/A 4/30/2015    Procedure: COLONOSCOPY WITH CO2 INSUFFLATION;  Surgeon: Doni Carey MD;  Location: [facility]    COLONOSCOPY WITH CO2 INSUFFLATION N/A 12/8/2020    Procedure: COLONOSCOPY, WITH CO2 INSUFFLATION;  Surgeon: Kareem Littlejohn DO;  Location: [facility]    COMBINED ESOPHAGOSCOPY, GASTROSCOPY, DUODENOSCOPY (EGD) WITH CO2 INSUFFLATION N/A 8/24/2017    Procedure: COMBINED ESOPHAGOSCOPY, GASTROSCOPY, DUODENOSCOPY (EGD) WITH CO2 INSUFFLATION;  EGD, Gastroesophageal reflux disease, esophagitis presence not specified Abdominal pain, generalized, Ref Germaine-Alexandre, 24.78  BMI;  Surgeon: Duane, William Charles, MD;  Location: [facility] OR    ENT SURGERY      Tonsillectomy    ESOPHAGOSCOPY, GASTROSCOPY, DUODENOSCOPY (EGD), COMBINED N/A 8/24/2017    Procedure: COMBINED ESOPHAGOSCOPY, GASTROSCOPY, DUODENOSCOPY (EGD), BIOPSY SINGLE OR MULTIPLE;;  Surgeon: Duane, William Charles, MD;  Location: [facility]    INJECT EPIDURAL TRANSFORAMINAL Left 9/16/2022    Procedure: INJECTION, EPIDURAL, TRANSFORAMINAL APPROACH - L4-5;  Surgeon: Laya Bowman MD;  Location: [facility]    LAPAROSCOPY PROCEDURE UNLISTED      polyps found    MOUTH SURGERY  2016    SINUS SURGERY      x2     Current Outpatient Medications   Medication Sig Dispense Refill    cyanocobalamin (VITAMIN B-12) 1000 MCG tablet Take 1 tablet (1,000 mcg) by mouth daily 90 tablet 3    famotidine (PEPCID) 20 MG tablet Take 1 tablet (20 mg) by mouth 2 times daily as needed (acid reflux breakthrough) 60 tablet 3    fluticasone (FLONASE) 50 MCG/ACT nasal spray SPRAY TWO SPRAYS INTO BOTH NOSTRILS DAILY 16 g 0    magnesium 200 MG TABS Take 200 mg by mouth daily 1 tablet 0    pramipexole (MIRAPEX) 0.25 MG tablet Take 3 tablets (0.75 mg) by mouth at bedtime. 270 tablet 1    rosuvastatin (CRESTOR) 5 MG tablet Take 1 tablet (5 mg) by mouth every other day. 90 tablet 3    sertraline (ZOLOFT) 100 MG tablet Take 1 tablet (100 mg) by mouth daily. With 25mg tablet 90 tablet 3    traZODone (DESYREL) 100 MG tablet TAKE ONE TABLET BY MOUTH AT BEDTIME 90 tablet 2    Vitamin D3 50 mcg (2000 units) tablet TAKE ONE TABLET BY MOUTH ONCE DAILY 90 tablet 0    albuterol (PROAIR HFA/PROVENTIL HFA/VENTOLIN HFA) 108 (90 Base) MCG/ACT inhaler Inhale 2 puffs into the lungs every 4 hours as needed for shortness of breath. (Patient not taking: Reported on 12/11/2024) 18 g 5    APRODINE 2.5-60 MG TABS per tablet TAKE ONE TABLET BY MOUTH EVERY 6 HOURS AS NEEDED (Patient not taking: Reported on 12/11/2024) 96 tablet 0    azelastine (ASTELIN) 0.1 % nasal spray Spray 1 spray into both  "nostrils 2 times daily (Patient not taking: Reported on 2024) 1 Bottle 2    fluticasone (FLOVENT HFA) 220 MCG/ACT inhaler 1 puff twice daily, rinse mouth out after Medication (Patient not taking: Reported on 2024) 12 g 11    hydrocortisone, Perianal, (HYDROCORTISONE) 2.5 % cream Place rectally 2 times daily as needed for hemorrhoids (Patient not taking: Reported on 2024) 30 g 0    omeprazole (PRILOSEC) 40 MG DR capsule TAKE ONE CAPSULE BY MOUTH ONCE DAILY BEFORE BREAKFAST (Patient not taking: Reported on 2024) 90 capsule 2    sertraline (ZOLOFT) 25 MG tablet TAKE ONE TABLET BY MOUTH ONCE DAILY WITH 100MG TABLET 90 tablet 0    sertraline (ZOLOFT) 50 MG tablet Take 1 tablet (50 mg) by mouth daily. Take with 100mg tablets 90 tablet 3    SUMAtriptan (IMITREX) 50 MG tablet TAKE ONE TABLET AT ONSET OF HEADACHE. MAY REPEAT AFTER 2 HOURS. DO NOT EXCEED 200 MG IN 24 HOURS (Patient not taking: Reported on 2024) 18 tablet 1       Allergies   Allergen Reactions    Atorvastatin Cramps     Muscle pain    Crestor [Rosuvastatin] Muscle Pain (Myalgia)    Morphine Itching    Morphine Sulfate Itching        Social History     Tobacco Use    Smoking status: Former     Current packs/day: 0.00     Types: Cigarettes     Quit date: 1973     Years since quittin.9     Passive exposure: Never    Smokeless tobacco: Never   Substance Use Topics    Alcohol use: Yes     Alcohol/week: 0.0 standard drinks of alcohol     Comment: one or two a day                 Review of Systems  Constitutional, HEENT, cardiovascular, pulmonary, gi and gu systems are negative, except as otherwise noted.    Objective    /74   Pulse 71   Temp 99.6  F (37.6  C) (Temporal)   Resp 18   Ht 1.585 m (5' 2.4\")   Wt 66.7 kg (147 lb)   SpO2 98%   BMI 26.54 kg/m     Estimated body mass index is 26.54 kg/m  as calculated from the following:    Height as of this encounter: 1.585 m (5' 2.4\").    Weight as of this encounter: 66.7 " kg (147 lb).  Physical Exam  GENERAL: alert and no distress  EYES: Eyes grossly normal to inspection, PERRL and conjunctivae and sclerae normal  HENT: ear canals and TM's normal, nose and mouth without ulcers or lesions  NECK: no adenopathy, no asymmetry, masses, or scars  RESP: lungs clear to auscultation - no rales, rhonchi or wheezes  CV: regular rate and rhythm, normal S1 S2, no S3 or S4, no murmur, click or rub, no peripheral edema  ABDOMEN: soft, nontender, no hepatosplenomegaly, no masses and bowel sounds normal  MS: no gross musculoskeletal defects noted, no edema  SKIN: no suspicious lesions or rashes  NEURO: Normal strength and tone, mentation intact and speech normal  PSYCH: mentation appears normal, affect normal/bright    Recent Labs   Lab Test 10/09/24  1205 01/08/24  1443   HGB  --  13.5   PLT  --  256    137   POTASSIUM 4.6 4.0   CR 0.86 0.80        Diagnostics  No labs were ordered during this visit.   No EKG required for low risk surgery (cataract, skin procedure, breast biopsy, etc).    Revised Cardiac Risk Index (RCRI)  The patient has the following serious cardiovascular risks for perioperative complications:   - No serious cardiac risks = 0 points     RCRI Interpretation: 0 points: Class I (very low risk - 0.4% complication rate)         Signed Electronically by: TIFFANY LINDO MD  A copy of this evaluation report is provided to the requesting physician.

## 2024-12-16 ENCOUNTER — TELEPHONE (OUTPATIENT)
Dept: FAMILY MEDICINE | Facility: OTHER | Age: 74
End: 2024-12-16
Payer: COMMERCIAL

## 2024-12-16 DIAGNOSIS — K64.4 EXTERNAL HEMORRHOIDS: Primary | ICD-10-CM

## 2024-12-16 NOTE — TELEPHONE ENCOUNTER
Order/Referral Request    Who is requesting: patient    Orders being requested: Referral to general surgery for hemorrhoids. Patient had previous referral but they have .    Reason service is needed/diagnosis: Hemorrhoids    When are orders needed by:     Has this been discussed with Provider: Yes    Does patient have a preference on a Group/Provider/Facility? [facility]    Does patient have an appointment scheduled?: No    Where to send orders: Place orders within Epic    Could we send this information to you in [facility] or would you prefer to receive a phone call?:   Patient would prefer a phone call   Okay to leave a detailed message?: Yes at Home number on file [phone] (home)

## 2025-01-06 ENCOUNTER — TELEPHONE (OUTPATIENT)
Dept: ADMISSION | Facility: CLINIC | Age: 75
End: 2025-01-06

## 2025-01-06 ENCOUNTER — OFFICE VISIT (OUTPATIENT)
Dept: SURGERY | Facility: CLINIC | Age: 75
End: 2025-01-06
Attending: NURSE PRACTITIONER
Payer: COMMERCIAL

## 2025-01-06 VITALS
TEMPERATURE: 97.3 F | SYSTOLIC BLOOD PRESSURE: 120 MMHG | HEIGHT: 62 IN | WEIGHT: 149 LBS | BODY MASS INDEX: 27.42 KG/M2 | DIASTOLIC BLOOD PRESSURE: 80 MMHG

## 2025-01-06 DIAGNOSIS — K64.4 EXTERNAL HEMORRHOIDS: ICD-10-CM

## 2025-01-06 DIAGNOSIS — K64.8 INTERNAL HEMORRHOID: ICD-10-CM

## 2025-01-06 DIAGNOSIS — K64.4 RESIDUAL HEMORRHOID TAGS: Primary | ICD-10-CM

## 2025-01-06 PROCEDURE — 46600 DIAGNOSTIC ANOSCOPY SPX: CPT | Performed by: SPECIALIST

## 2025-01-06 PROCEDURE — 99203 OFFICE O/P NEW LOW 30 MIN: CPT | Mod: 25 | Performed by: SPECIALIST

## 2025-01-06 ASSESSMENT — PAIN SCALES - GENERAL: PAINLEVEL_OUTOF10: NO PAIN (0)

## 2025-01-06 NOTE — LETTER
1/6/2025      Amee Crews  [address] [facility] [address]      Dear Colleague,    Thank you for referring your patient, Amee Crews, to the [facility]. Please see a copy of my visit note below.    Consult requested by Ary Adams    Reason for consultation: External hemorrhoids    HPI:  Patient is a 74-year-old lady presenting with about a 1 year history of some leakage after having a bowel movement.  Happens 90% of the time.  She has to wipe frequently.  Denies any bleeding.  Reports a relatively normal colonoscopy a few years ago with several polyps.  Denies any diarrhea or constipation.  She saw her PCP who thought hemorrhoids might be a cause for her leakage.  She also reports having dental again the area clean.  She now presents for evaluation for her incontinence/external hemorrhoids.    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     Allergic rhinitis      Allergies      asthma     mild intermittent     Chronic back pain     neck and low back     HA (headache)     muscle contraction      History of blood transfusion     In childhood     Hot flashes      Hyperlipidemia      Insomnia      Intermittent asthma 9/16/2011     Major depressive disorder, single episode, moderate (H)      Ocular migraine      Osteoarthritis of hand      Restless leg 9/16/2011     Past Surgical History:   Procedure Laterality Date     APPENDECTOMY      age 9      BIOPSY      took tissue from my uterus at least 15 years ago     COLONOSCOPY  14 years     COLONOSCOPY N/A 4/30/2015    Procedure: COMBINED COLONOSCOPY, SINGLE OR MULTIPLE BIOPSY/POLYPECTOMY BY BIOPSY;  Surgeon: Doni Carey MD;  Location: [facility] OR     COLONOSCOPY WITH CO2 INSUFFLATION N/A 4/30/2015    Procedure: COLONOSCOPY WITH CO2 INSUFFLATION;  Surgeon: Doni Carey MD;  Location: [facility]     COLONOSCOPY WITH CO2 INSUFFLATION N/A 12/8/2020    Procedure: COLONOSCOPY, WITH  CO2 INSUFFLATION;  Surgeon: Kareem Littlejohn, DO;  Location: [facility]     COMBINED ESOPHAGOSCOPY, GASTROSCOPY, DUODENOSCOPY (EGD) WITH CO2 INSUFFLATION N/A 8/24/2017    Procedure: COMBINED ESOPHAGOSCOPY, GASTROSCOPY, DUODENOSCOPY (EGD) WITH CO2 INSUFFLATION;  EGD, Gastroesophageal reflux disease, esophagitis presence not specified Abdominal pain, generalized, Ref Dahlheimer-Schroeder, 24.78 BMI;  Surgeon: Duane, William Charles, MD;  Location: [facility]     ENT SURGERY      Tonsillectomy     ESOPHAGOSCOPY, GASTROSCOPY, DUODENOSCOPY (EGD), COMBINED N/A 8/24/2017    Procedure: COMBINED ESOPHAGOSCOPY, GASTROSCOPY, DUODENOSCOPY (EGD), BIOPSY SINGLE OR MULTIPLE;;  Surgeon: Duane, William Charles, MD;  Location: [facility]     INJECT EPIDURAL TRANSFORAMINAL Left 9/16/2022    Procedure: INJECTION, EPIDURAL, TRANSFORAMINAL APPROACH - L4-5;  Surgeon: Laya Bowman MD;  Location: [facility]     LAPAROSCOPY PROCEDURE UNLISTED      polyps found     MOUTH SURGERY  2016     SINUS SURGERY      x2     Current Outpatient Medications   Medication Sig Dispense Refill     cyanocobalamin (VITAMIN B-12) 1000 MCG tablet Take 1 tablet (1,000 mcg) by mouth daily 90 tablet 3     famotidine (PEPCID) 20 MG tablet Take 1 tablet (20 mg) by mouth 2 times daily as needed (acid reflux breakthrough). 180 tablet 0     fluticasone (FLONASE) 50 MCG/ACT nasal spray SPRAY TWO SPRAYS INTO BOTH NOSTRILS DAILY 48 g 0     magnesium 200 MG TABS Take 200 mg by mouth daily 1 tablet 0     omeprazole (PRILOSEC) 40 MG DR capsule Take 1 capsule (40 mg) by mouth daily. 90 capsule 0     pramipexole (MIRAPEX) 0.25 MG tablet Take 3 tablets (0.75 mg) by mouth at bedtime. 270 tablet 1     rosuvastatin (CRESTOR) 5 MG tablet Take 1 tablet (5 mg) by mouth every other day. 90 tablet 3     sertraline (ZOLOFT) 100 MG tablet Take 1 tablet (100 mg) by mouth daily. With 25mg tablet 90 tablet 3     traZODone (DESYREL) 100 MG tablet TAKE ONE TABLET BY MOUTH AT BEDTIME 90 tablet 2      Vitamin D3 50 mcg (2000 units) tablet Take 1 tablet (50 mcg) by mouth daily. 90 tablet 0     albuterol (PROAIR HFA/PROVENTIL HFA/VENTOLIN HFA) 108 (90 Base) MCG/ACT inhaler Inhale 2 puffs into the lungs every 4 hours as needed for shortness of breath. (Patient not taking: Reported on 2025) 18 g 5     APRODINE 2.5-60 MG TABS per tablet TAKE ONE TABLET BY MOUTH EVERY 6 HOURS AS NEEDED (Patient not taking: Reported on 2025) 96 tablet 0     fluticasone (FLOVENT HFA) 220 MCG/ACT inhaler 1 puff twice daily, rinse mouth out after Medication (Patient not taking: Reported on 2025) 12 g 11     hydrocortisone, Perianal, (HYDROCORTISONE) 2.5 % cream Place rectally 2 times daily as needed for hemorrhoids (Patient not taking: Reported on 2025) 30 g 0     SUMAtriptan (IMITREX) 50 MG tablet TAKE ONE TABLET AT ONSET OF HEADACHE. MAY REPEAT AFTER 2 HOURS. DO NOT EXCEED 200 MG IN 24 HOURS (Patient not taking: Reported on 2025) 18 tablet 1     Current Facility-Administered Medications   Medication Dose Route Frequency Provider Last Rate Last Admin     triamcinolone (KENALOG-40) injection 20 mg  20 mg   Yasemin Simental MD   20 mg at 21 1342     triamcinolone (KENALOG-40) injection 20 mg  20 mg   Yasemin Simental MD   20 mg at 21 1342        Allergies   Allergen Reactions     Atorvastatin Cramps     Muscle pain     Crestor [Rosuvastatin] Muscle Pain (Myalgia)     Morphine Itching     Morphine Sulfate Itching     Social History     Socioeconomic History     Marital status:      Spouse name: Not on file     Number of children: 2     Years of education: Not on file     Highest education level: Not on file   Occupational History     Employer: SELF   Tobacco Use     Smoking status: Former     Current packs/day: 0.00     Types: Cigarettes     Quit date: 1973     Years since quittin.0     Passive exposure: Never     Smokeless tobacco: Never   Vaping Use     Vaping status:  Never Used   Substance and Sexual Activity     Alcohol use: Yes     Alcohol/week: 0.0 standard drinks of alcohol     Comment: one or two a day     Drug use: No     Sexual activity: Yes     Partners: Male   Other Topics Concern     Parent/sibling w/ CABG, MI or angioplasty before 65F 55M? No   Social History Narrative     Not on file     Social Drivers of Health     Financial Resource Strain: High Risk (10/9/2024)    Financial Resource Strain      Within the past 12 months, have you or your family members you live with been unable to get utilities (heat, electricity) when it was really needed?: Yes   Food Insecurity: Low Risk  (10/9/2024)    Food Insecurity      Within the past 12 months, did you worry that your food would run out before you got money to buy more?: No      Within the past 12 months, did the food you bought just not last and you didn t have money to get more?: No   Transportation Needs: Low Risk  (10/9/2024)    Transportation Needs      Within the past 12 months, has lack of transportation kept you from medical appointments, getting your medicines, non-medical meetings or appointments, work, or from getting things that you need?: No   Physical Activity: Insufficiently Active (10/9/2024)    Exercise Vital Sign      Days of Exercise per Week: 7 days      Minutes of Exercise per Session: 10 min   Stress: No Stress Concern Present (10/9/2024)    [employer] [place] [employer] - Occupational Stress Questionnaire      Feeling of Stress : Only a little   Social Connections: Unknown (10/9/2024)    Social Connection and Isolation Panel [NHANES]      Frequency of Communication with Friends and Family: Not on file      Frequency of Social Gatherings with Friends and Family: Patient declined      Attends Yarsani Services: Not on file      Active Member of Clubs or Organizations: Not on file      Attends Club or Organization Meetings: Not on file      Marital Status: Not on file   Interpersonal Safety:  Low Risk  (10/9/2024)    Interpersonal Safety      Do you feel physically and emotionally safe where you currently live?: Yes      Within the past 12 months, have you been hit, slapped, kicked or otherwise physically hurt by someone?: No      Within the past 12 months, have you been humiliated or emotionally abused in other ways by your partner or ex-partner?: No   Housing Stability: Low Risk  (10/9/2024)    Housing Stability      Do you have housing? : Yes      Are you worried about losing your housing?: No     Family History   Problem Relation Age of Onset     Diabetes Mother         Old age     Hypertension Mother      Cancer Father         stomach      Cancer Maternal Grandfather         lung      Heart Disease Other       ROS: 10 point ROS neg other than the symptoms noted above in the HPI.    PE:  B/P: 120/80, T: 97.3, P: Data Unavailable, R: Data Unavailable  General: well developed, well nourished Wf who appears their stated age  HEENT: NC/AT, EOMI, (-)icterus, (-)injection  Neck: Supple, No JVD  Chest: CTA  Heart: S1, S2, (-)m/r/g  Abd: Soft, non tender, non distended, non tender, no masses  Rectal: No masses, large circumferential residual tags.  Some slight mucosal prolapse.  Anoscopy: Moderate internal hemorrhoids.  Ext; Warm, no edema  Psych: AAOx3  Neuro: No focal deficits      Impression/plan:  This is a 74-year-old lady with some anal leakage/inability most likely due to to some mildly prolapsed internal hemorrhoids as well as large residual tags.  I briefly discussed these findings with the patient as well as the role of excision.   The procedure, risks, benefits, and alternatives were discussed and the patient and she expressed understanding.  She would like to think how she wants to proceed.  She will call us when she decides.    Ricardo Giles MD, FACS            Again, thank you for allowing me to participate in the care of your patient.        Sincerely,        Ricardo Giles,  MD    Electronically signed

## 2025-01-06 NOTE — PROGRESS NOTES
Consult requested by Ary Adams    Reason for consultation: External hemorrhoids    HPI:  Patient is a 74-year-old lady presenting with about a 1 year history of some leakage after having a bowel movement.  Happens 90% of the time.  She has to wipe frequently.  Denies any bleeding.  Reports a relatively normal colonoscopy a few years ago with several polyps.  Denies any diarrhea or constipation.  She saw her PCP who thought hemorrhoids might be a cause for her leakage.  She also reports having dental again the area clean.  She now presents for evaluation for her incontinence/external hemorrhoids.    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    Allergic rhinitis     Allergies     asthma     mild intermittent    Chronic back pain     neck and low back    HA (headache)     muscle contraction     History of blood transfusion     In childhood    Hot flashes     Hyperlipidemia     Insomnia     Intermittent asthma 9/16/2011    Major depressive disorder, single episode, moderate (H)     Ocular migraine     Osteoarthritis of hand     Restless leg 9/16/2011     Past Surgical History:   Procedure Laterality Date    APPENDECTOMY      age 9     BIOPSY      took tissue from my uterus at least 15 years ago    COLONOSCOPY  14 years    COLONOSCOPY N/A 4/30/2015    Procedure: COMBINED COLONOSCOPY, SINGLE OR MULTIPLE BIOPSY/POLYPECTOMY BY BIOPSY;  Surgeon: Doni Carey MD;  Location: [facility]    COLONOSCOPY WITH CO2 INSUFFLATION N/A 4/30/2015    Procedure: COLONOSCOPY WITH CO2 INSUFFLATION;  Surgeon: Doni Carey MD;  Location: [facility]    COLONOSCOPY WITH CO2 INSUFFLATION N/A 12/8/2020    Procedure: COLONOSCOPY, WITH CO2 INSUFFLATION;  Surgeon: Kareem Littlejohn DO;  Location: [facility]    COMBINED ESOPHAGOSCOPY, GASTROSCOPY, DUODENOSCOPY (EGD) WITH CO2 INSUFFLATION N/A 8/24/2017    Procedure: COMBINED ESOPHAGOSCOPY, GASTROSCOPY, DUODENOSCOPY (EGD) WITH CO2  INSUFFLATION;  EGD, Gastroesophageal reflux disease, esophagitis presence not specified Abdominal pain, generalized, Ref Germaine-Alexandre, 24.78 BMI;  Surgeon: Duane, William Charles, MD;  Location: [facility]    ENT SURGERY      Tonsillectomy    ESOPHAGOSCOPY, GASTROSCOPY, DUODENOSCOPY (EGD), COMBINED N/A 8/24/2017    Procedure: COMBINED ESOPHAGOSCOPY, GASTROSCOPY, DUODENOSCOPY (EGD), BIOPSY SINGLE OR MULTIPLE;;  Surgeon: Duane, William Charles, MD;  Location: [facility]    INJECT EPIDURAL TRANSFORAMINAL Left 9/16/2022    Procedure: INJECTION, EPIDURAL, TRANSFORAMINAL APPROACH - L4-5;  Surgeon: Laya Bowman MD;  Location: [facility]    LAPAROSCOPY PROCEDURE UNLISTED      polyps found    MOUTH SURGERY  2016    SINUS SURGERY      x2     Current Outpatient Medications   Medication Sig Dispense Refill    cyanocobalamin (VITAMIN B-12) 1000 MCG tablet Take 1 tablet (1,000 mcg) by mouth daily 90 tablet 3    famotidine (PEPCID) 20 MG tablet Take 1 tablet (20 mg) by mouth 2 times daily as needed (acid reflux breakthrough). 180 tablet 0    fluticasone (FLONASE) 50 MCG/ACT nasal spray SPRAY TWO SPRAYS INTO BOTH NOSTRILS DAILY 48 g 0    magnesium 200 MG TABS Take 200 mg by mouth daily 1 tablet 0    omeprazole (PRILOSEC) 40 MG DR capsule Take 1 capsule (40 mg) by mouth daily. 90 capsule 0    pramipexole (MIRAPEX) 0.25 MG tablet Take 3 tablets (0.75 mg) by mouth at bedtime. 270 tablet 1    rosuvastatin (CRESTOR) 5 MG tablet Take 1 tablet (5 mg) by mouth every other day. 90 tablet 3    sertraline (ZOLOFT) 100 MG tablet Take 1 tablet (100 mg) by mouth daily. With 25mg tablet 90 tablet 3    traZODone (DESYREL) 100 MG tablet TAKE ONE TABLET BY MOUTH AT BEDTIME 90 tablet 2    Vitamin D3 50 mcg (2000 units) tablet Take 1 tablet (50 mcg) by mouth daily. 90 tablet 0    albuterol (PROAIR HFA/PROVENTIL HFA/VENTOLIN HFA) 108 (90 Base) MCG/ACT inhaler Inhale 2 puffs into the lungs every 4 hours as needed for shortness of breath. (Patient not taking:  Reported on 2025) 18 g 5    APRODINE 2.5-60 MG TABS per tablet TAKE ONE TABLET BY MOUTH EVERY 6 HOURS AS NEEDED (Patient not taking: Reported on 2025) 96 tablet 0    fluticasone (FLOVENT HFA) 220 MCG/ACT inhaler 1 puff twice daily, rinse mouth out after Medication (Patient not taking: Reported on 2025) 12 g 11    hydrocortisone, Perianal, (HYDROCORTISONE) 2.5 % cream Place rectally 2 times daily as needed for hemorrhoids (Patient not taking: Reported on 2025) 30 g 0    SUMAtriptan (IMITREX) 50 MG tablet TAKE ONE TABLET AT ONSET OF HEADACHE. MAY REPEAT AFTER 2 HOURS. DO NOT EXCEED 200 MG IN 24 HOURS (Patient not taking: Reported on 2025) 18 tablet 1     Current Facility-Administered Medications   Medication Dose Route Frequency Provider Last Rate Last Admin    triamcinolone (KENALOG-40) injection 20 mg  20 mg   Yasemin Simental MD   20 mg at 21 1342    triamcinolone (KENALOG-40) injection 20 mg  20 mg   Yasemin Simental MD   20 mg at 21 1342        Allergies   Allergen Reactions    Atorvastatin Cramps     Muscle pain    Crestor [Rosuvastatin] Muscle Pain (Myalgia)    Morphine Itching    Morphine Sulfate Itching     Social History     Socioeconomic History    Marital status:      Spouse name: Not on file    Number of children: 2    Years of education: Not on file    Highest education level: Not on file   Occupational History     Employer: SELF   Tobacco Use    Smoking status: Former     Current packs/day: 0.00     Types: Cigarettes     Quit date: 1973     Years since quittin.0     Passive exposure: Never    Smokeless tobacco: Never   Vaping Use    Vaping status: Never Used   Substance and Sexual Activity    Alcohol use: Yes     Alcohol/week: 0.0 standard drinks of alcohol     Comment: one or two a day    Drug use: No    Sexual activity: Yes     Partners: Male   Other Topics Concern    Parent/sibling w/ CABG, MI or angioplasty before 65F 55M? No    Social History Narrative    Not on file     Social Drivers of Health     Financial Resource Strain: High Risk (10/9/2024)    Financial Resource Strain     Within the past 12 months, have you or your family members you live with been unable to get utilities (heat, electricity) when it was really needed?: Yes   Food Insecurity: Low Risk  (10/9/2024)    Food Insecurity     Within the past 12 months, did you worry that your food would run out before you got money to buy more?: No     Within the past 12 months, did the food you bought just not last and you didn t have money to get more?: No   Transportation Needs: Low Risk  (10/9/2024)    Transportation Needs     Within the past 12 months, has lack of transportation kept you from medical appointments, getting your medicines, non-medical meetings or appointments, work, or from getting things that you need?: No   Physical Activity: Insufficiently Active (10/9/2024)    Exercise Vital Sign     Days of Exercise per Week: 7 days     Minutes of Exercise per Session: 10 min   Stress: No Stress Concern Present (10/9/2024)    [employer] [place] [employer] - Occupational Stress Questionnaire     Feeling of Stress : Only a little   Social Connections: Unknown (10/9/2024)    Social Connection and Isolation Panel [NHANES]     Frequency of Communication with Friends and Family: Not on file     Frequency of Social Gatherings with Friends and Family: Patient declined     Attends Nondenominational Services: Not on file     Active Member of Clubs or Organizations: Not on file     Attends Club or Organization Meetings: Not on file     Marital Status: Not on file   Interpersonal Safety: Low Risk  (10/9/2024)    Interpersonal Safety     Do you feel physically and emotionally safe where you currently live?: Yes     Within the past 12 months, have you been hit, slapped, kicked or otherwise physically hurt by someone?: No     Within the past 12 months, have you been humiliated or  emotionally abused in other ways by your partner or ex-partner?: No   Housing Stability: Low Risk  (10/9/2024)    Housing Stability     Do you have housing? : Yes     Are you worried about losing your housing?: No     Family History   Problem Relation Age of Onset    Diabetes Mother         Old age    Hypertension Mother     Cancer Father         stomach     Cancer Maternal Grandfather         lung     Heart Disease Other       ROS: 10 point ROS neg other than the symptoms noted above in the HPI.    PE:  B/P: 120/80, T: 97.3, P: Data Unavailable, R: Data Unavailable  General: well developed, well nourished Wf who appears their stated age  HEENT: NC/AT, EOMI, (-)icterus, (-)injection  Neck: Supple, No JVD  Chest: CTA  Heart: S1, S2, (-)m/r/g  Abd: Soft, non tender, non distended, non tender, no masses  Rectal: No masses, large circumferential residual tags.  Some slight mucosal prolapse.  Anoscopy: Moderate internal hemorrhoids.  Ext; Warm, no edema  Psych: AAOx3  Neuro: No focal deficits      Impression/plan:  This is a 74-year-old lady with some anal leakage/inability most likely due to to some mildly prolapsed internal hemorrhoids as well as large residual tags.  I briefly discussed these findings with the patient as well as the role of excision.   The procedure, risks, benefits, and alternatives were discussed and the patient and she expressed understanding.  She would like to think how she wants to proceed.  She will call us when she decides.    Ricardo Giles MD, FACS      Addendum:  Received call from patient she would like to proceed.  Case request placed.    Ricardo Giles MD, FACS

## 2025-01-06 NOTE — TELEPHONE ENCOUNTER
Reason for Call:  Other call back    Detailed comments: pt seen Dr Giles today and is calling back to let Dr. Giles know she would like to proceed with the procedure. Please place case request / have Ani call to schedule. Thank you!     Phone Number Patient can be reached at: Home number on file [phone] (home)    Best Time: any    Can we leave a detailed message on this number? YES    Call taken on 1/6/2025 at 4:22 PM by Arpita Torres

## 2025-01-07 NOTE — TELEPHONE ENCOUNTER
Type of surgery: HEMORRHOIDECTOMY, EXTERNAL, Excision of residual tags   Location of surgery: [facility]  Date and time of surgery: 2/12  Surgeon: Tisha  Pre-Op Appt Date: 2/4  Post-Op Appt Date: 2/20   Packet sent out: Yes cecilio martins per patient   Pre-cert/Authorization completed:  Not Applicable  Date: na

## 2025-01-09 ENCOUNTER — MYC MEDICAL ADVICE (OUTPATIENT)
Dept: FAMILY MEDICINE | Facility: OTHER | Age: 75
End: 2025-01-09
Payer: COMMERCIAL

## 2025-01-30 ENCOUNTER — TELEPHONE (OUTPATIENT)
Dept: FAMILY MEDICINE | Facility: OTHER | Age: 75
End: 2025-01-30
Payer: COMMERCIAL

## 2025-01-30 NOTE — TELEPHONE ENCOUNTER
Order/Referral Request    Who is requesting: pt    Orders being requested: labs    Reason service is needed/diagnosis: fatigue, energy level is way down, going up and down stairs is exhausting, and that's not typical for pt    When are orders needed by: has an appt for preop on 2/4 and would like labs drawn then    Has this been discussed with Provider: No    Does patient have a preference on a Group/Provider/Facility? [facility]    Does patient have an appointment scheduled?: Yes: 2/4 with pcp    Where to send orders: Place orders within Epic    Could we send this information to you in [employer] or would you prefer to receive a phone call?:   Patient would like to be contacted via [employer]

## 2025-02-04 ENCOUNTER — OFFICE VISIT (OUTPATIENT)
Dept: FAMILY MEDICINE | Facility: OTHER | Age: 75
End: 2025-02-04
Payer: COMMERCIAL

## 2025-02-04 VITALS
HEIGHT: 62 IN | DIASTOLIC BLOOD PRESSURE: 69 MMHG | SYSTOLIC BLOOD PRESSURE: 121 MMHG | BODY MASS INDEX: 28.16 KG/M2 | HEART RATE: 77 BPM | RESPIRATION RATE: 21 BRPM | OXYGEN SATURATION: 96 % | WEIGHT: 153 LBS | TEMPERATURE: 98.5 F

## 2025-02-04 DIAGNOSIS — Z01.818 PREOP GENERAL PHYSICAL EXAM: Primary | ICD-10-CM

## 2025-02-04 DIAGNOSIS — J30.1 CHRONIC SEASONAL ALLERGIC RHINITIS DUE TO POLLEN: ICD-10-CM

## 2025-02-04 DIAGNOSIS — J45.20 MILD INTERMITTENT ASTHMA WITHOUT COMPLICATION: ICD-10-CM

## 2025-02-04 DIAGNOSIS — G43.009 MIGRAINE WITHOUT AURA AND WITHOUT STATUS MIGRAINOSUS, NOT INTRACTABLE: ICD-10-CM

## 2025-02-04 DIAGNOSIS — K21.00 GASTROESOPHAGEAL REFLUX DISEASE WITH ESOPHAGITIS WITHOUT HEMORRHAGE: ICD-10-CM

## 2025-02-04 DIAGNOSIS — J32.9 CHRONIC SINUSITIS, UNSPECIFIED LOCATION: ICD-10-CM

## 2025-02-04 DIAGNOSIS — G25.81 RESTLESS LEG: ICD-10-CM

## 2025-02-04 DIAGNOSIS — E78.5 HYPERLIPIDEMIA LDL GOAL <130: ICD-10-CM

## 2025-02-04 DIAGNOSIS — K64.4 EXTERNAL HEMORRHOIDS: ICD-10-CM

## 2025-02-04 DIAGNOSIS — G47.00 INSOMNIA, UNSPECIFIED TYPE: ICD-10-CM

## 2025-02-04 DIAGNOSIS — E55.9 VITAMIN D DEFICIENCY: ICD-10-CM

## 2025-02-04 DIAGNOSIS — F33.42 RECURRENT MAJOR DEPRESSIVE DISORDER, IN FULL REMISSION: ICD-10-CM

## 2025-02-04 DIAGNOSIS — K21.00 GASTROESOPHAGEAL REFLUX DISEASE WITH ESOPHAGITIS, UNSPECIFIED WHETHER HEMORRHAGE: ICD-10-CM

## 2025-02-04 LAB
ERYTHROCYTE [DISTWIDTH] IN BLOOD BY AUTOMATED COUNT: 12.2 % (ref 10–15)
HCT VFR BLD AUTO: 38.1 % (ref 35–47)
HGB BLD-MCNC: 13.2 G/DL (ref 11.7–15.7)
MCH RBC QN AUTO: 33.4 PG (ref 26.5–33)
MCHC RBC AUTO-ENTMCNC: 34.6 G/DL (ref 31.5–36.5)
MCV RBC AUTO: 97 FL (ref 78–100)
PLATELET # BLD AUTO: 224 10E3/UL (ref 150–450)
RBC # BLD AUTO: 3.95 10E6/UL (ref 3.8–5.2)
WBC # BLD AUTO: 6.8 10E3/UL (ref 4–11)

## 2025-02-04 PROCEDURE — 93000 ELECTROCARDIOGRAM COMPLETE: CPT | Performed by: STUDENT IN AN ORGANIZED HEALTH CARE EDUCATION/TRAINING PROGRAM

## 2025-02-04 PROCEDURE — 85027 COMPLETE CBC AUTOMATED: CPT | Performed by: STUDENT IN AN ORGANIZED HEALTH CARE EDUCATION/TRAINING PROGRAM

## 2025-02-04 PROCEDURE — 99214 OFFICE O/P EST MOD 30 MIN: CPT | Performed by: STUDENT IN AN ORGANIZED HEALTH CARE EDUCATION/TRAINING PROGRAM

## 2025-02-04 PROCEDURE — 36415 COLL VENOUS BLD VENIPUNCTURE: CPT | Performed by: STUDENT IN AN ORGANIZED HEALTH CARE EDUCATION/TRAINING PROGRAM

## 2025-02-04 ASSESSMENT — PAIN SCALES - GENERAL: PAINLEVEL_OUTOF10: NO PAIN (0)

## 2025-02-04 NOTE — PATIENT INSTRUCTIONS
How to Take Your Medication Before Surgery  Preoperative Medication Instructions   Antiplatelet or Anticoagulation Medication Instructions   - Patient is on no antiplatelet or anticoagulation medications.    Additional Medication Instructions  -over the counter and vitamins - avoid until after procedure is complete    - Statins (atorvastatin, simvastatin, pravastatin) : Continue taking on the day of surgery.    - triptans, CGRP inhibitors, migraine abortives: DO NOT TAKE on day of surgery   - SSRIs, SNRIs, TCAs, Antipsychotics: Continue without modification.    - LABA, inhaled corticosteroid, long-acting anticholinergics: Continue without modification.   - pseudoephedrine, phenylephrine: DO NOT TAKE on day of surgery.   - rescue Inhaler: Continue PRN. Bring to hospital on the day of surgery.       Patient Education   Preparing for Your Surgery  For Adults  Getting started  In most cases, a nurse will call to review your health history and instructions. They will give you an arrival time based on your scheduled surgery time. Please be ready to share:  Your doctor's clinic name and phone number  Your medical, surgical, and anesthesia history  A list of allergies and sensitivities  A list of medicines, including herbal treatments and over-the-counter drugs  Whether the patient has a legal guardian (ask how to send us the papers in advance)  Note: You may not receive a call if you were seen at our PAC (Preoperative Assessment Center).  Please tell us if you're pregnant--or if there's any chance you might be pregnant. Some surgeries may injure a fetus (unborn baby), so they require a pregnancy test. Surgeries that are safe for a fetus don't always need a test, and you can choose whether to have one.   Preparing for surgery  Within 10 to 30 days of surgery: Have a pre-op exam (sometimes called an H&P, or History and Physical). This can be done at a clinic or pre-operative center.  If you're having a , you may not  need this exam. Talk to your care team.  At your pre-op exam, talk to your care team about all medicines you take. (This includes CBD oil and any drugs, such as THC, marijuana, and other forms of cannabis.) If you need to stop any medicine before surgery, ask when to start taking it again.  This is for your safety. Many medicines and drugs can make you bleed too much during surgery. Some change how well surgery (anesthesia) drugs work.  Call your insurance company to let them know you're having surgery. (If you don't have insurance, call [phone].)  Call your clinic if there's any change in your health. This includes a scrape or scratch near the surgery site, or any signs of a cold (sore throat, runny nose, cough, rash, fever).  Eating and drinking guidelines  For your safety: Unless your surgeon tells you otherwise, follow the guidelines below.  Eat and drink as normal until 8 hours before you arrive for surgery. After that, no food or milk. You can spit out gum when you arrive.  Drink clear liquids until 2 hours before you arrive. These are liquids you can see through, like water, Gatorade, and Propel Water. They also include plain black coffee and tea (no cream or milk).  No alcohol for 24 hours before you arrive. The night before surgery, stop any drinks that contain THC.  If your care team tells you to take medicine on the morning of surgery, it's okay to take it with a sip of water. No other medicines or drugs are allowed (including CBD oil)--follow your care team's instructions.  If you have questions the day of surgery, call your hospital or surgery center.   Preventing infection  Shower or bathe the night before and the morning of surgery. Follow the instructions your clinic gave you. (If no instructions, use regular soap.)  Don't shave or clip hair near your surgery site. We'll remove the hair if needed.  Don't smoke or vape the morning of surgery. No chewing tobacco for 6 hours before you arrive. A  nicotine patch is okay. You may spit out nicotine gum when you arrive.  For some surgeries, the surgeon will tell you to fully quit smoking and nicotine.  We will make every effort to keep you safe from infection. We will:  Clean our hands often with soap and water (or an alcohol-based hand rub).  Clean the skin at your surgery site with a special soap that kills germs.  Give you a special gown to keep you warm. (Cold raises the risk of infection.)  Wear hair covers, masks, gowns, and gloves during surgery.  Give antibiotic medicine, if prescribed. Not all surgeries need this medicine.  What to bring on the day of surgery  Photo ID and insurance card  Copy of your health care directive, if you have one  Glasses and hearing aids (bring cases)  You can't wear contacts during surgery  Inhaler and eye drops, if you use them (tell us about these when you arrive)  CPAP machine or breathing device, if you use them  A few personal items, if spending the night  If you have . . .  A pacemaker, ICD (cardiac defibrillator), or other implant: Bring the ID card.  An implanted stimulator: Bring the remote control.  A legal guardian: Bring a copy of the certified (court-stamped) guardianship papers.  Please remove any jewelry, including body piercings. Leave jewelry and other valuables at home.  If you're going home the day of surgery  You must have a responsible adult drive you home. They should stay with you overnight as well.  If you don't have someone to stay with you, and you aren't safe to go home alone, we may keep you overnight. Insurance often won't pay for this.  After surgery  If it's hard to control your pain or you need more pain medicine, please call your surgeon's office.  Questions?   If you have any questions for your care team, list them here:    ____________________________________________________________________________________________________________________________________________________________________________________________________________________________________________________________  For informational purposes only. Not to replace the advice of your health care provider. Copyright   2003, 2019 [facility]. All rights reserved. Clinically reviewed by Nash Thomas MD. [employer] [id number] - REV 08/24.

## 2025-02-04 NOTE — PROGRESS NOTES
Preoperative Evaluation  [facility]  [address]  [place] [address]  Phone: [phone]  Fax: [phone]  Primary Provider: TIFFANY LINDO MD  Pre-op Performing Provider: TIFFANY LINDO MD  Feb 4, 2025 1/30/2025   Surgical Information   What procedure is being done? Hemorrhoidectomy   Facility or Hospital where procedure/surgery will be performed: [facility]   Who is doing the procedure / surgery? Tisha   Date of surgery / procedure: 02/12/2025   Time of surgery / procedure: 8:15   Where do you plan to recover after surgery? at home with family     Fax number for surgical facility: Note does not need to be faxed, will be available electronically in Epic.     Assessment & Plan     The proposed surgical procedure is considered INTERMEDIATE risk.    Preop general physical exam  - CBC with platelets  - EKG 12-lead complete w/read - Clinics  External hemorrhoids  Optimized for planned procedure as above    Insomnia, unspecified type  Stable on trazodone nightly    Migraine without aura and without status migrainosus, not intractable  Utilizes Imitrex as needed, quite rare use    Recurrent major depressive disorder, in full remission  On Zoloft    Hyperlipidemia LDL goal <130  Stable on Crestor    Restless leg  Mirapex nightly    Gastroesophageal reflux disease with esophagitis, unspecified whether hemorrhage  Typically utilizes Pepcid daily or as needed, very rarely uses Prilosec    Mild intermittent asthma without complication  On Flovent and albuterol as needed, stable    Chronic sinusitis, unspecified location  On pseudoephedrine as needed, to hold prior to the procedure    Vitamin D deficiency  On supplementation    Chronic seasonal allergic rhinitis due to pollen  Flonase as needed            - No identified additional risk factors other than previously addressed    Antiplatelet or Anticoagulation Medication Instructions   - Patient  is on no antiplatelet or anticoagulation medications.    Additional Medication Instructions   - Statins (atorvastatin, simvastatin, pravastatin) : Continue taking on the day of surgery.    - triptans, CGRP inhibitors, migraine abortives: DO NOT TAKE on day of surgery   - SSRIs, SNRIs, TCAs, Antipsychotics: Continue without modification.    - LABA, inhaled corticosteroid, long-acting anticholinergics: Continue without modification.   - pseudoephedrine, phenylephrine: DO NOT TAKE on day of surgery.   - rescue Inhaler: Continue PRN. Bring to hospital on the day of surgery.    Recommendation  Approval given to proceed with proposed procedure, without further diagnostic evaluation.    Pam Lubin is a 74 year old, presenting for the following:  Pre-Op Exam          2/4/2025     9:44 AM   Additional Questions   Roomed by stewart   Accompanied by self     Via the [facility], the patient has reported the following services have been completed -Mammogram: [place] 2025-06-01, this information has been sent to the abstraction team.  HPI related to upcoming procedure: Reoccurring hemorrhoid discomfort with planned procedure as above.  Also notable history of insomnia, migraines, depression, restless legs, GERD, asthma, and chronic sinusitis.  All of which are currently stable        1/30/2025   Pre-Op Questionnaire   Have you ever had a heart attack or stroke? No   Have you ever had surgery on your heart or blood vessels, such as a stent placement, a coronary artery bypass, or surgery on an artery in your head, neck, heart, or legs? No   Do you have chest pain with activity? No   Do you have a history of heart failure? No   Do you currently have a cold, bronchitis or symptoms of other infection? No   Do you have a cough, shortness of breath, or wheezing? No   Do you or anyone in your family have previous history of blood clots? No   Do you or does anyone in your family have a serious bleeding  problem such as prolonged bleeding following surgeries or cuts? No   Have you ever had problems with anemia or been told to take iron pills? No   Have you had any abnormal blood loss such as black, tarry or bloody stools, or abnormal vaginal bleeding? No   Have you ever had a blood transfusion? (!) YES - at age 9 - appendicitis    Have you ever had a transfusion reaction? No   Are you willing to have a blood transfusion if it is medically needed before, during, or after your surgery? Yes   Have you or any of your relatives ever had problems with anesthesia? No   Do you have sleep apnea, excessive snoring or daytime drowsiness? No   Do you have any artifical heart valves or other implanted medical devices like a pacemaker, defibrillator, or continuous glucose monitor? No   Do you have artificial joints? No   Are you allergic to latex? No     Health Care Directive  Patient does not have a Health Care Directive: Discussed advance care planning with patient; information given to patient to review.    Preoperative Review of    reviewed - no record of controlled substances prescribed.          Patient Active Problem List    Diagnosis Date Noted    Age-related osteoporosis without current pathological fracture 06/06/2023     Priority: Medium    Hypertriglyceridemia 05/08/2023     Priority: Medium    Neural foraminal stenosis of lumbosacral spine 08/19/2022     Priority: Medium     Added automatically from request for surgery [id number]      Lumbar radiculitis 08/19/2022     Priority: Medium     Added automatically from request for surgery [id number]      Major depression in complete remission 03/28/2022     Priority: Medium    Vitelliform macular dystrophy 11/29/2017     Priority: Medium    DDD (degenerative disc disease), lumbar 11/29/2017     Priority: Medium    Chronic pain of left knee 11/29/2017     Priority: Medium    Vitamin B12 deficiency 11/29/2017     Priority: Medium    Osteopenia, unspecified location  11/29/2017     Priority: Medium    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, moderatly severe,slowly worsening 08/31/2017     Priority: Medium    Pinguecula of both eyes 08/31/2017     Priority: Medium    Nuclear cataract of both eyes 08/31/2017     Priority: Medium    Myopia, bilateral 08/31/2017     Priority: Medium    Macular cyst, hole, or pseudohole, bilateral, moderatly severe 08/31/2017     Priority: Medium    Adult vitelliform macular degeneration 08/31/2017     Priority: Medium    Gastroesophageal reflux disease without esophagitis 08/31/2017     Priority: Medium    Dense breast tissue 08/23/2017     Priority: Medium    Osteopenia of multiple sites 08/23/2017     Priority: Medium    Atrophic vaginitis 08/23/2017     Priority: Medium    Primary osteoarthritis of both hands 08/23/2017     Priority: Medium    Major depression in complete remission (H) 04/24/2017     Priority: Medium    Chronic sinusitis, unspecified location 01/04/2017     Priority: Medium    Chronic rhinitis 01/04/2017     Priority: Medium    Migraine without aura and without status migrainosus, not intractable 01/27/2016     Priority: Medium    Adjustment reaction with anxiety and depression 01/27/2016     Priority: Medium    Fatty liver 01/23/2012     Priority: Medium    Nasal polyposis 11/18/2011     Priority: Medium    Mild persistent asthma without complication 09/16/2011     Priority: Medium     Rarely uses albuterol inhaler      Restless leg 09/16/2011     Priority: Medium     Ferritin normal in 2011      Vaginal atrophy 09/16/2011     Priority: Medium    Insomnia      Priority: Medium    Chronic back pain      Priority: Medium     neck and low back      Chondromalacia patellae 08/22/2011     Priority: Medium     Saw Dr. Ruby 2011      Anxiety 11/04/2009     Priority: Medium    Migraine headache 11/04/2009     Priority: Medium     Ocular migraines as well- diagnosed at [facility]  (Problem list name updated by  automated process. Provider to review and confirm.)      Hot flashes 11/04/2009     Priority: Medium    Hypercholesterolemia 06/20/2005     Priority: Medium     Muscle aches with pravastatin, simvastatin, atrovastatin.  Gemfibrozil was tried and tolerated well.  No side effects.  Discontinued on January 21, 2015 due to lack of long term benefit in stroke/MI prevention.  Will reconsider if new data arrives that shows stroke/MI prevention.  Sent for Crestor 5 mg daily January 21, 2015        Allergic state 09/27/2004     Priority: Medium     (Problem list name updated by automated process. Provider to review and confirm.)    Formatting of this note might be different from the original.  Vendor Update to relapce retired dx codes and/or terms.      Depression 09/27/2004     Priority: Medium    Asthma 09/27/2004     Priority: Medium     Formatting of this note might be different from the original.  Asthma, Unspecified  Intermittent asthma Rarely uses albuterol inhaler        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    Allergic rhinitis     Allergies     asthma     mild intermittent    Chronic back pain     neck and low back    HA (headache)     muscle contraction     History of blood transfusion     In childhood    Hot flashes     Hyperlipidemia     Insomnia     Intermittent asthma 9/16/2011    Major depressive disorder, single episode, moderate (H)     Ocular migraine     Osteoarthritis of hand     Restless leg 9/16/2011     Past Surgical History:   Procedure Laterality Date    APPENDECTOMY      age 9     BIOPSY      took tissue from my uterus at least 15 years ago    COLONOSCOPY  14 years    COLONOSCOPY N/A 04/30/2015    Procedure: COMBINED COLONOSCOPY, SINGLE OR MULTIPLE BIOPSY/POLYPECTOMY BY BIOPSY;  Surgeon: Doni Carey MD;  Location: [facility] OR    COLONOSCOPY WITH CO2 INSUFFLATION N/A 04/30/2015    Procedure: COLONOSCOPY WITH CO2 INSUFFLATION;  Surgeon:  Doni Carey MD;  Location: [facility] OR    COLONOSCOPY WITH CO2 INSUFFLATION N/A 12/08/2020    Procedure: COLONOSCOPY, WITH CO2 INSUFFLATION;  Surgeon: Kareem Littlejohn DO;  Location: [facility] OR    COMBINED ESOPHAGOSCOPY, GASTROSCOPY, DUODENOSCOPY (EGD) WITH CO2 INSUFFLATION N/A 08/24/2017    Procedure: COMBINED ESOPHAGOSCOPY, GASTROSCOPY, DUODENOSCOPY (EGD) WITH CO2 INSUFFLATION;  EGD, Gastroesophageal reflux disease, esophagitis presence not specified Abdominal pain, generalized, Ref Germaine-Alexandre, 24.78 BMI;  Surgeon: Duane, William Charles, MD;  Location: [facility] OR    ENT SURGERY      Tonsillectomy    ESOPHAGOSCOPY, GASTROSCOPY, DUODENOSCOPY (EGD), COMBINED N/A 08/24/2017    Procedure: COMBINED ESOPHAGOSCOPY, GASTROSCOPY, DUODENOSCOPY (EGD), BIOPSY SINGLE OR MULTIPLE;;  Surgeon: Duane, William Charles, MD;  Location: [facility] OR    GI SURGERY      INJECT EPIDURAL TRANSFORAMINAL Left 09/16/2022    Procedure: INJECTION, EPIDURAL, TRANSFORAMINAL APPROACH - L4-5;  Surgeon: Laya Bowman MD;  Location: [facility] OR    LAPAROSCOPY PROCEDURE UNLISTED      polyps found    MOUTH SURGERY  2016    SINUS SURGERY      x2     Current Outpatient Medications   Medication Sig Dispense Refill    albuterol (PROAIR HFA/PROVENTIL HFA/VENTOLIN HFA) 108 (90 Base) MCG/ACT inhaler Inhale 2 puffs into the lungs every 4 hours as needed for shortness of breath. 18 g 5    APRODINE 2.5-60 MG TABS per tablet TAKE ONE TABLET BY MOUTH EVERY 6 HOURS AS NEEDED 96 tablet 0    cyanocobalamin (VITAMIN B-12) 1000 MCG tablet Take 1 tablet (1,000 mcg) by mouth daily 90 tablet 3    famotidine (PEPCID) 20 MG tablet Take 1 tablet (20 mg) by mouth 2 times daily as needed (acid reflux breakthrough). 180 tablet 0    fluticasone (FLONASE) 50 MCG/ACT nasal spray SPRAY TWO SPRAYS INTO BOTH NOSTRILS DAILY 48 g 0    fluticasone (FLOVENT HFA) 220 MCG/ACT inhaler 1 puff twice daily, rinse mouth out after Medication 12 g 11    magnesium 200 MG TABS Take 200 mg  "by mouth daily 1 tablet 0    omeprazole (PRILOSEC) 40 MG DR capsule Take 1 capsule (40 mg) by mouth daily. 90 capsule 0    pramipexole (MIRAPEX) 0.25 MG tablet Take 3 tablets (0.75 mg) by mouth at bedtime. 270 tablet 1    rosuvastatin (CRESTOR) 5 MG tablet Take 1 tablet (5 mg) by mouth every other day. 90 tablet 3    sertraline (ZOLOFT) 100 MG tablet Take 1 tablet (100 mg) by mouth daily. With 25mg tablet 90 tablet 3    SUMAtriptan (IMITREX) 50 MG tablet TAKE ONE TABLET AT ONSET OF HEADACHE. MAY REPEAT AFTER 2 HOURS. DO NOT EXCEED 200 MG IN 24 HOURS 18 tablet 1    traZODone (DESYREL) 100 MG tablet TAKE ONE TABLET BY MOUTH AT BEDTIME 90 tablet 2    Vitamin D3 50 mcg (2000 units) tablet Take 1 tablet (50 mcg) by mouth daily. 90 tablet 0    hydrocortisone, Perianal, (HYDROCORTISONE) 2.5 % cream Place rectally 2 times daily as needed for hemorrhoids (Patient not taking: Reported on 2024) 30 g 0       Allergies   Allergen Reactions    Atorvastatin Cramps     Muscle pain    Crestor [Rosuvastatin] Muscle Pain (Myalgia)    Morphine Itching    Morphine Sulfate Itching        Social History     Tobacco Use    Smoking status: Former     Current packs/day: 0.00     Types: Cigarettes     Quit date: 1973     Years since quittin.1     Passive exposure: Never    Smokeless tobacco: Never    Tobacco comments:     Social smoker   Substance Use Topics    Alcohol use: Yes     Comment: one or two a day             Review of Systems  Constitutional, HEENT, cardiovascular, pulmonary, gi and gu systems are negative, except as otherwise noted.    Objective    /69   Pulse 77   Temp 98.5  F (36.9  C) (Temporal)   Resp 21   Ht 1.585 m (5' 2.4\")   Wt 69.4 kg (153 lb)   SpO2 96%   BMI 27.62 kg/m     Estimated body mass index is 27.62 kg/m  as calculated from the following:    Height as of this encounter: 1.585 m (5' 2.4\").    Weight as of this encounter: 69.4 kg (153 lb).  Physical Exam  GENERAL: alert and no " distress  EYES: Eyes grossly normal to inspection, PERRL and conjunctivae and sclerae normal  HENT: ear canals and TM's normal, nose and mouth without ulcers or lesions  NECK: no adenopathy, no asymmetry, masses, or scars  RESP: lungs clear to auscultation - no rales, rhonchi or wheezes  CV: regular rate and rhythm, normal S1 S2, no S3 or S4, no murmur, click or rub, no peripheral edema  ABDOMEN: soft, nontender, no hepatosplenomegaly, no masses and bowel sounds normal  MS: no gross musculoskeletal defects noted, no edema  SKIN: no suspicious lesions or rashes  NEURO: Normal strength and tone, mentation intact and speech normal  PSYCH: mentation appears normal, affect normal/bright    Recent Labs   Lab Test 10/09/24  1205      POTASSIUM 4.6   CR 0.86        Diagnostics  Labs pending at this time.  Results will be reviewed when available.   EKG required for age and not completed in the last 90 days.     Revised Cardiac Risk Index (RCRI)  The patient has the following serious cardiovascular risks for perioperative complications:   - No serious cardiac risks = 0 points     RCRI Interpretation: 0 points: Class I (very low risk - 0.4% complication rate)         Signed Electronically by: TIFFANY LINDO MD  A copy of this evaluation report is provided to the requesting physician.

## 2025-02-04 NOTE — RESULT ENCOUNTER NOTE
Amee,    Your results are within normal limits/stable and nothing else needs to be done at this time.       If you have any questions feel free to call the clinic at [phone].      Thank you,    Grady Bautista MD

## 2025-02-11 ENCOUNTER — ANESTHESIA EVENT (OUTPATIENT)
Dept: SURGERY | Facility: CLINIC | Age: 75
End: 2025-02-11
Payer: COMMERCIAL

## 2025-02-11 ASSESSMENT — LIFESTYLE VARIABLES: TOBACCO_USE: 1

## 2025-02-12 ENCOUNTER — PATIENT OUTREACH (OUTPATIENT)
Dept: CARE COORDINATION | Facility: CLINIC | Age: 75
End: 2025-02-12

## 2025-02-12 ENCOUNTER — HOSPITAL ENCOUNTER (OUTPATIENT)
Facility: CLINIC | Age: 75
Discharge: HOME | End: 2025-02-12
Attending: SPECIALIST | Admitting: SPECIALIST
Payer: COMMERCIAL

## 2025-02-12 ENCOUNTER — ANESTHESIA (OUTPATIENT)
Dept: SURGERY | Facility: CLINIC | Age: 75
End: 2025-02-12
Payer: COMMERCIAL

## 2025-02-12 VITALS
WEIGHT: 153 LBS | DIASTOLIC BLOOD PRESSURE: 70 MMHG | OXYGEN SATURATION: 94 % | BODY MASS INDEX: 27.62 KG/M2 | TEMPERATURE: 97.2 F | HEART RATE: 74 BPM | RESPIRATION RATE: 10 BRPM | SYSTOLIC BLOOD PRESSURE: 118 MMHG

## 2025-02-12 DIAGNOSIS — K59.03 DRUG-INDUCED CONSTIPATION: ICD-10-CM

## 2025-02-12 DIAGNOSIS — G89.18 POST-OP PAIN: Primary | ICD-10-CM

## 2025-02-12 PROCEDURE — 250N000011 HC RX IP 250 OP 636: Performed by: NURSE ANESTHETIST, CERTIFIED REGISTERED

## 2025-02-12 PROCEDURE — 258N000003 HC RX IP 258 OP 636: Performed by: NURSE ANESTHETIST, CERTIFIED REGISTERED

## 2025-02-12 PROCEDURE — 250N000009 HC RX 250: Performed by: NURSE ANESTHETIST, CERTIFIED REGISTERED

## 2025-02-12 PROCEDURE — 250N000025 HC SEVOFLURANE, PER MIN: Performed by: SPECIALIST

## 2025-02-12 PROCEDURE — 46230 REMOVAL OF ANAL TAGS: CPT | Performed by: SPECIALIST

## 2025-02-12 PROCEDURE — 88304 TISSUE EXAM BY PATHOLOGIST: CPT | Mod: 26 | Performed by: PATHOLOGY

## 2025-02-12 PROCEDURE — 88304 TISSUE EXAM BY PATHOLOGIST: CPT | Mod: TC | Performed by: SPECIALIST

## 2025-02-12 PROCEDURE — 710N000010 HC RECOVERY PHASE 1, LEVEL 2, PER MIN: Performed by: SPECIALIST

## 2025-02-12 PROCEDURE — 250N000009 HC RX 250: Performed by: SPECIALIST

## 2025-02-12 PROCEDURE — 710N000012 HC RECOVERY PHASE 2, PER MINUTE: Performed by: SPECIALIST

## 2025-02-12 PROCEDURE — 360N000075 HC SURGERY LEVEL 2, PER MIN: Performed by: SPECIALIST

## 2025-02-12 PROCEDURE — 999N000141 HC STATISTIC PRE-PROCEDURE NURSING ASSESSMENT: Performed by: SPECIALIST

## 2025-02-12 PROCEDURE — 250N000013 HC RX MED GY IP 250 OP 250 PS 637: Performed by: SPECIALIST

## 2025-02-12 PROCEDURE — 370N000017 HC ANESTHESIA TECHNICAL FEE, PER MIN: Performed by: SPECIALIST

## 2025-02-12 PROCEDURE — 272N000001 HC OR GENERAL SUPPLY STERILE: Performed by: SPECIALIST

## 2025-02-12 PROCEDURE — 250N000011 HC RX IP 250 OP 636: Performed by: SPECIALIST

## 2025-02-12 RX ORDER — NALOXONE HYDROCHLORIDE 0.4 MG/ML
0.1 INJECTION, SOLUTION INTRAMUSCULAR; INTRAVENOUS; SUBCUTANEOUS
Status: DISCONTINUED | OUTPATIENT
Start: 2025-02-12 | End: 2025-02-12 | Stop reason: HOSPADM

## 2025-02-12 RX ORDER — FENTANYL CITRATE 50 UG/ML
50 INJECTION, SOLUTION INTRAMUSCULAR; INTRAVENOUS
Status: DISCONTINUED | OUTPATIENT
Start: 2025-02-12 | End: 2025-02-12 | Stop reason: HOSPADM

## 2025-02-12 RX ORDER — CEFAZOLIN SODIUM/WATER 2 G/20 ML
2 SYRINGE (ML) INTRAVENOUS SEE ADMIN INSTRUCTIONS
Status: DISCONTINUED | OUTPATIENT
Start: 2025-02-12 | End: 2025-02-12 | Stop reason: HOSPADM

## 2025-02-12 RX ORDER — SODIUM CHLORIDE, SODIUM LACTATE, POTASSIUM CHLORIDE, CALCIUM CHLORIDE 600; 310; 30; 20 MG/100ML; MG/100ML; MG/100ML; MG/100ML
INJECTION, SOLUTION INTRAVENOUS CONTINUOUS
Status: DISCONTINUED | OUTPATIENT
Start: 2025-02-12 | End: 2025-02-12 | Stop reason: HOSPADM

## 2025-02-12 RX ORDER — AMOXICILLIN 250 MG
1-2 CAPSULE ORAL 2 TIMES DAILY
Qty: 30 TABLET | Refills: 0 | Status: SHIPPED | OUTPATIENT
Start: 2025-02-12

## 2025-02-12 RX ORDER — LIDOCAINE HYDROCHLORIDE 10 MG/ML
INJECTION, SOLUTION INFILTRATION; PERINEURAL PRN
Status: DISCONTINUED | OUTPATIENT
Start: 2025-02-12 | End: 2025-02-12

## 2025-02-12 RX ORDER — SODIUM CHLORIDE, SODIUM LACTATE, POTASSIUM CHLORIDE, CALCIUM CHLORIDE 600; 310; 30; 20 MG/100ML; MG/100ML; MG/100ML; MG/100ML
INJECTION, SOLUTION INTRAVENOUS CONTINUOUS PRN
Status: DISCONTINUED | OUTPATIENT
Start: 2025-02-12 | End: 2025-02-12

## 2025-02-12 RX ORDER — FENTANYL CITRATE 50 UG/ML
25 INJECTION, SOLUTION INTRAMUSCULAR; INTRAVENOUS EVERY 5 MIN PRN
Status: DISCONTINUED | OUTPATIENT
Start: 2025-02-12 | End: 2025-02-12 | Stop reason: HOSPADM

## 2025-02-12 RX ORDER — BUPIVACAINE HYDROCHLORIDE AND EPINEPHRINE 2.5; 5 MG/ML; UG/ML
INJECTION, SOLUTION INFILTRATION; PERINEURAL PRN
Status: DISCONTINUED | OUTPATIENT
Start: 2025-02-12 | End: 2025-02-12 | Stop reason: HOSPADM

## 2025-02-12 RX ORDER — ONDANSETRON 2 MG/ML
4 INJECTION INTRAMUSCULAR; INTRAVENOUS EVERY 30 MIN PRN
Status: DISCONTINUED | OUTPATIENT
Start: 2025-02-12 | End: 2025-02-12 | Stop reason: HOSPADM

## 2025-02-12 RX ORDER — HYDROMORPHONE HYDROCHLORIDE 1 MG/ML
0.5 INJECTION, SOLUTION INTRAMUSCULAR; INTRAVENOUS; SUBCUTANEOUS EVERY 5 MIN PRN
Status: DISCONTINUED | OUTPATIENT
Start: 2025-02-12 | End: 2025-02-12 | Stop reason: HOSPADM

## 2025-02-12 RX ORDER — METOPROLOL TARTRATE 1 MG/ML
1-2 INJECTION, SOLUTION INTRAVENOUS EVERY 5 MIN PRN
Status: DISCONTINUED | OUTPATIENT
Start: 2025-02-12 | End: 2025-02-12 | Stop reason: HOSPADM

## 2025-02-12 RX ORDER — FENTANYL CITRATE 50 UG/ML
50 INJECTION, SOLUTION INTRAMUSCULAR; INTRAVENOUS EVERY 5 MIN PRN
Status: DISCONTINUED | OUTPATIENT
Start: 2025-02-12 | End: 2025-02-12 | Stop reason: HOSPADM

## 2025-02-12 RX ORDER — CEFAZOLIN SODIUM/WATER 2 G/20 ML
2 SYRINGE (ML) INTRAVENOUS
Status: COMPLETED | OUTPATIENT
Start: 2025-02-12 | End: 2025-02-12

## 2025-02-12 RX ORDER — ONDANSETRON 4 MG/1
4 TABLET, ORALLY DISINTEGRATING ORAL EVERY 30 MIN PRN
Status: DISCONTINUED | OUTPATIENT
Start: 2025-02-12 | End: 2025-02-12 | Stop reason: HOSPADM

## 2025-02-12 RX ORDER — OXYCODONE AND ACETAMINOPHEN 5; 325 MG/1; MG/1
2 TABLET ORAL
Status: COMPLETED | OUTPATIENT
Start: 2025-02-12 | End: 2025-02-12

## 2025-02-12 RX ORDER — PROPOFOL 10 MG/ML
INJECTION, EMULSION INTRAVENOUS CONTINUOUS PRN
Status: DISCONTINUED | OUTPATIENT
Start: 2025-02-12 | End: 2025-02-12

## 2025-02-12 RX ORDER — ONDANSETRON 2 MG/ML
INJECTION INTRAMUSCULAR; INTRAVENOUS PRN
Status: DISCONTINUED | OUTPATIENT
Start: 2025-02-12 | End: 2025-02-12

## 2025-02-12 RX ORDER — DEXAMETHASONE SODIUM PHOSPHATE 10 MG/ML
4 INJECTION, SOLUTION INTRAMUSCULAR; INTRAVENOUS
Status: DISCONTINUED | OUTPATIENT
Start: 2025-02-12 | End: 2025-02-12 | Stop reason: HOSPADM

## 2025-02-12 RX ORDER — HYDROMORPHONE HCL IN WATER/PF 6 MG/30 ML
0.2 PATIENT CONTROLLED ANALGESIA SYRINGE INTRAVENOUS EVERY 5 MIN PRN
Status: DISCONTINUED | OUTPATIENT
Start: 2025-02-12 | End: 2025-02-12 | Stop reason: HOSPADM

## 2025-02-12 RX ORDER — HYDRALAZINE HYDROCHLORIDE 20 MG/ML
2.5-5 INJECTION INTRAMUSCULAR; INTRAVENOUS EVERY 10 MIN PRN
Status: DISCONTINUED | OUTPATIENT
Start: 2025-02-12 | End: 2025-02-12 | Stop reason: HOSPADM

## 2025-02-12 RX ORDER — OXYCODONE HYDROCHLORIDE 5 MG/1
5-10 TABLET ORAL EVERY 6 HOURS PRN
Qty: 10 TABLET | Refills: 0 | Status: SHIPPED | OUTPATIENT
Start: 2025-02-12 | End: 2025-02-20

## 2025-02-12 RX ORDER — PROPOFOL 10 MG/ML
INJECTION, EMULSION INTRAVENOUS PRN
Status: DISCONTINUED | OUTPATIENT
Start: 2025-02-12 | End: 2025-02-12

## 2025-02-12 RX ORDER — MEPERIDINE HYDROCHLORIDE 25 MG/ML
12.5 INJECTION INTRAMUSCULAR; INTRAVENOUS; SUBCUTANEOUS EVERY 5 MIN PRN
Status: DISCONTINUED | OUTPATIENT
Start: 2025-02-12 | End: 2025-02-12 | Stop reason: HOSPADM

## 2025-02-12 RX ORDER — HYDROXYZINE HYDROCHLORIDE 10 MG/1
10 TABLET, FILM COATED ORAL EVERY 6 HOURS PRN
Status: DISCONTINUED | OUTPATIENT
Start: 2025-02-12 | End: 2025-02-12 | Stop reason: HOSPADM

## 2025-02-12 RX ORDER — DEXAMETHASONE SODIUM PHOSPHATE 10 MG/ML
INJECTION, SOLUTION INTRAMUSCULAR; INTRAVENOUS PRN
Status: DISCONTINUED | OUTPATIENT
Start: 2025-02-12 | End: 2025-02-12

## 2025-02-12 RX ORDER — FENTANYL CITRATE 50 UG/ML
INJECTION, SOLUTION INTRAMUSCULAR; INTRAVENOUS PRN
Status: DISCONTINUED | OUTPATIENT
Start: 2025-02-12 | End: 2025-02-12

## 2025-02-12 RX ORDER — KETOROLAC TROMETHAMINE 30 MG/ML
INJECTION, SOLUTION INTRAMUSCULAR; INTRAVENOUS PRN
Status: DISCONTINUED | OUTPATIENT
Start: 2025-02-12 | End: 2025-02-12

## 2025-02-12 RX ADMIN — SODIUM CHLORIDE, POTASSIUM CHLORIDE, SODIUM LACTATE AND CALCIUM CHLORIDE: 600; 310; 30; 20 INJECTION, SOLUTION INTRAVENOUS at 08:47

## 2025-02-12 RX ADMIN — LIDOCAINE HYDROCHLORIDE 0.1 ML: 10 INJECTION, SOLUTION EPIDURAL; INFILTRATION; INTRACAUDAL; PERINEURAL at 08:47

## 2025-02-12 RX ADMIN — KETOROLAC TROMETHAMINE 30 MG: 30 INJECTION, SOLUTION INTRAMUSCULAR at 09:50

## 2025-02-12 RX ADMIN — Medication 2 G: at 09:18

## 2025-02-12 RX ADMIN — SODIUM CHLORIDE, POTASSIUM CHLORIDE, SODIUM LACTATE AND CALCIUM CHLORIDE: 600; 310; 30; 20 INJECTION, SOLUTION INTRAVENOUS at 09:18

## 2025-02-12 RX ADMIN — FENTANYL CITRATE 50 MCG: 50 INJECTION INTRAMUSCULAR; INTRAVENOUS at 09:18

## 2025-02-12 RX ADMIN — OXYCODONE HYDROCHLORIDE AND ACETAMINOPHEN 2 TABLET: 5; 325 TABLET ORAL at 11:22

## 2025-02-12 RX ADMIN — MIDAZOLAM 2 MG: 1 INJECTION INTRAMUSCULAR; INTRAVENOUS at 09:16

## 2025-02-12 RX ADMIN — FENTANYL CITRATE 25 MCG: 50 INJECTION, SOLUTION INTRAMUSCULAR; INTRAVENOUS at 10:31

## 2025-02-12 RX ADMIN — DEXAMETHASONE SODIUM PHOSPHATE 5 MG: 10 INJECTION, SOLUTION INTRAMUSCULAR; INTRAVENOUS at 09:27

## 2025-02-12 RX ADMIN — FENTANYL CITRATE 25 MCG: 50 INJECTION, SOLUTION INTRAMUSCULAR; INTRAVENOUS at 10:36

## 2025-02-12 RX ADMIN — PROPOFOL 100 MCG/KG/MIN: 10 INJECTION, EMULSION INTRAVENOUS at 09:27

## 2025-02-12 RX ADMIN — FENTANYL CITRATE 50 MCG: 50 INJECTION INTRAMUSCULAR; INTRAVENOUS at 09:35

## 2025-02-12 RX ADMIN — LIDOCAINE HYDROCHLORIDE 50 MG: 10 INJECTION, SOLUTION INFILTRATION; PERINEURAL at 09:20

## 2025-02-12 RX ADMIN — PROPOFOL 150 MG: 10 INJECTION, EMULSION INTRAVENOUS at 09:20

## 2025-02-12 RX ADMIN — ONDANSETRON 4 MG: 2 INJECTION INTRAMUSCULAR; INTRAVENOUS at 09:34

## 2025-02-12 ASSESSMENT — ACTIVITIES OF DAILY LIVING (ADL)
ADLS_ACUITY_SCORE: 42
ADLS_ACUITY_SCORE: 41
ADLS_ACUITY_SCORE: 41
ADLS_ACUITY_SCORE: 42
ADLS_ACUITY_SCORE: 42

## 2025-02-12 NOTE — ANESTHESIA POSTPROCEDURE EVALUATION
Patient: Amee Crews    Procedure: Procedure(s):  HEMORRHOIDECTOMY, EXTERNAL, Excision of residual tags       Anesthesia Type:  General    Note:  Disposition: Outpatient   Postop Pain Control: Uneventful            Sign Out: Well controlled pain   PONV: No   Neuro/Psych: Uneventful            Sign Out: Acceptable/Baseline neuro status   Airway/Respiratory: Uneventful            Sign Out: Acceptable/Baseline resp. status   CV/Hemodynamics: Uneventful            Sign Out: Acceptable CV status   Other NRE: NONE   DID A NON-ROUTINE EVENT OCCUR? No    Event details/Postop Comments:  Pt was happy with anesthesia care.  No complications.  I will follow up with the pt if needed.       Last vitals:  Vitals Value Taken Time   /67 02/12/25 1050   Temp 97.2  F (36.2  C) 02/12/25 1056   Pulse 61 02/12/25 1055   Resp 10 02/12/25 1055   SpO2 96 % 02/12/25 1056       Electronically Signed By: ROBYN Quiroga CRNA  February 12, 2025  1:37 PM

## 2025-02-12 NOTE — ANESTHESIA PREPROCEDURE EVALUATION
Anesthesia Pre-Procedure Evaluation    Patient: Amee Crews   MRN: [id number] : 1950        Procedure : Procedure(s):  HEMORRHOIDECTOMY, EXTERNAL, Excision of residual tags          Past Medical History:   Diagnosis Date     Adult vitelliform macular degeneration 2017     Allergic rhinitis      Allergies      asthma     mild intermittent     Chronic back pain     neck and low back     HA (headache)     muscle contraction      History of blood transfusion     In childhood     Hot flashes      Hyperlipidemia      Insomnia      Intermittent asthma 2011     Major depressive disorder, single episode, moderate (H)      Ocular migraine      Osteoarthritis of hand      Restless leg 2011      Past Surgical History:   Procedure Laterality Date     APPENDECTOMY      age 9      BIOPSY      took tissue from my uterus at least 15 years ago     COLONOSCOPY  14 years     COLONOSCOPY N/A 2015    Procedure: COMBINED COLONOSCOPY, SINGLE OR MULTIPLE BIOPSY/POLYPECTOMY BY BIOPSY;  Surgeon: Doni Carey MD;  Location: [facility]     COLONOSCOPY WITH CO2 INSUFFLATION N/A 2015    Procedure: COLONOSCOPY WITH CO2 INSUFFLATION;  Surgeon: Doni Carey MD;  [facility]     COLONOSCOPY WITH CO2 INSUFFLATION N/A 2020    Procedure: COLONOSCOPY, WITH CO2 INSUFFLATION;  Surgeon: Kareem Littlejohn DO;  Location: [facility]     COMBINED ESOPHAGOSCOPY, GASTROSCOPY, DUODENOSCOPY (EGD) WITH CO2 INSUFFLATION N/A 2017    Procedure: COMBINED ESOPHAGOSCOPY, GASTROSCOPY, DUODENOSCOPY (EGD) WITH CO2 INSUFFLATION;  EGD, Gastroesophageal reflux disease, esophagitis presence not specified Abdominal pain, generalized, Ref Dahlheimer-Schroeder, 24.78 BMI;  Surgeon: Duane, William Charles, MD;  Location: [facility]     ENT SURGERY      Tonsillectomy     ESOPHAGOSCOPY, GASTROSCOPY, DUODENOSCOPY (EGD), COMBINED N/A 2017    Procedure: COMBINED ESOPHAGOSCOPY, GASTROSCOPY, DUODENOSCOPY (EGD),  BIOPSY SINGLE OR MULTIPLE;;  Surgeon: Duane, William Charles, MD;  Location: [facility]     GI SURGERY       INJECT EPIDURAL TRANSFORAMINAL Left 2022    Procedure: INJECTION, EPIDURAL, TRANSFORAMINAL APPROACH - L4-5;  Surgeon: Laya Bowman MD;  Location: [facility]     LAPAROSCOPY PROCEDURE UNLISTED      polyps found     MOUTH SURGERY  2016     SINUS SURGERY      x2      Allergies   Allergen Reactions     Atorvastatin Cramps     Muscle pain     Crestor [Rosuvastatin] Muscle Pain (Myalgia)     Morphine Itching     Morphine Sulfate Itching      Social History     Tobacco Use     Smoking status: Former     Current packs/day: 0.00     Types: Cigarettes     Quit date: 1973     Years since quittin.1     Passive exposure: Never     Smokeless tobacco: Never     Tobacco comments:     Social smoker   Substance Use Topics     Alcohol use: Yes     Comment: one or two a day      Wt Readings from Last 1 Encounters:   25 69.4 kg (153 lb)        Anesthesia Evaluation   Pt has had prior anesthetic.         ROS/MED HX  ENT/Pulmonary:     (+)                tobacco use, Current use,    Mild Persistent, asthma  Treatment: Inhaler prn,                 Neurologic:     (+)      migraines,                          Cardiovascular:     (+) Dyslipidemia - -   -  - -                                      METS/Exercise Tolerance:     Hematologic: Comments: Vitamin B12 deficiency      Musculoskeletal: Comment: Chondromalacia patellae    Chronic back pain    Neural foraminal stenosis of lumbosacral spine    Lumbar radiculitis    Osteoporosis     Restless leg syndrome   (+)  arthritis,             GI/Hepatic: Comment: Fatty liver disease     Hemorrhoids     (+) GERD,            liver disease,       Renal/Genitourinary:       Endo:       Psychiatric/Substance Use: Comment: Insomnia     (+) psychiatric history anxiety and depression       Infectious Disease:  - neg infectious disease ROS     Malignancy:  - neg malignancy ROS     Other:  "           Physical Exam    Airway  airway exam normal      Mallampati: II   TM distance: > 3 FB   Neck ROM: full   Mouth opening: > 3 cm    Respiratory Devices and Support         Dental  no notable dental history         Cardiovascular   cardiovascular exam normal       Rhythm and rate: regular and normal     Pulmonary   pulmonary exam normal        breath sounds clear to auscultation       OUTSIDE LABS:  CBC:   Lab Results   Component Value Date    WBC 6.8 02/04/2025    WBC 7.2 01/08/2024    HGB 13.2 02/04/2025    HGB 13.5 01/08/2024    HCT 38.1 02/04/2025    HCT 39.5 01/08/2024     02/04/2025     01/08/2024     BMP:   Lab Results   Component Value Date     10/09/2024     01/08/2024    POTASSIUM 4.6 10/09/2024    POTASSIUM 4.0 01/08/2024    CHLORIDE 103 10/09/2024    CHLORIDE 102 01/08/2024    CO2 22 10/09/2024    CO2 22 01/08/2024    BUN 10.1 10/09/2024    BUN 12.6 01/08/2024    CR 0.86 10/09/2024    CR 0.80 01/08/2024    GLC 95 10/09/2024    GLC 96 01/11/2024     COAGS:   Lab Results   Component Value Date    PTT 27 07/18/2005    INR 1.00 07/18/2005     POC: No results found for: \"BGM\", \"HCG\", \"HCGS\"  HEPATIC:   Lab Results   Component Value Date    ALBUMIN 4.6 10/09/2024    PROTTOTAL 7.2 10/09/2024    ALT 16 10/09/2024    AST 21 10/09/2024    ALKPHOS 86 10/09/2024    BILITOTAL 0.8 10/09/2024    BILIDIRECT 0.1 04/12/2013     OTHER:   Lab Results   Component Value Date    PH 6.0 03/30/2014    A1C 5.0 10/22/2020    ANGE 9.2 10/09/2024    MAG 2.3 09/16/2011    LIPASE 113 03/03/2017    TSH 0.94 04/25/2022       Anesthesia Plan    ASA Status:  3    NPO Status:  NPO Appropriate    Anesthesia Type: General.     - Airway: ETT   Induction: Intravenous, Propofol.   Maintenance: Balanced.        Consents    Anesthesia Plan(s) and associated risks, benefits, and realistic alternatives discussed. Questions answered and patient/representative(s) expressed understanding.     - Discussed: Risks, " "Benefits and Alternatives for the PROCEDURE were discussed     - Discussed with:  Patient      - Extended Intubation/Ventilatory Support Discussed: No.      - Patient is DNR/DNI Status: No     Use of blood products discussed: No .     Postoperative Care    Pain management: Oral pain medications, IV analgesics.   PONV prophylaxis: Ondansetron (or other 5HT-3), Dexamethasone or Solumedrol, Background Propofol Infusion     Comments:    Other Comments: The risks and benefits of anesthesia, and the alternatives where applicable, have been discussed with the patient, and they wish to proceed.            ROBYN Quiroga CRNA    I have reviewed the pertinent notes and labs in the chart from the past 30 days and (re)examined the patient.  Any updates or changes from those notes are reflected in this note.    Clinically Significant Risk Factors Present on Admission                             # Overweight: Estimated body mass index is 27.62 kg/m  as calculated from the following:    Height as of 2/4/25: 1.585 m (5' 2.4\").    Weight as of 2/4/25: 69.4 kg (153 lb).       # Asthma: noted on problem list          "

## 2025-02-12 NOTE — ANESTHESIA CARE TRANSFER NOTE
Patient: Amee Crews    Procedure: Procedure(s):  HEMORRHOIDECTOMY, EXTERNAL, Excision of residual tags       Diagnosis: Residual hemorrhoid tags [K64.4]  External hemorrhoids [K64.4]  Internal hemorrhoid [K64.8]  Diagnosis Additional Information: No value filed.    Anesthesia Type:   General     Note:    Oropharynx: oropharynx clear of all foreign objects and spontaneously breathing  Level of Consciousness: drowsy  Oxygen Supplementation: face mask    Independent Airway: airway patency satisfactory and stable  Dentition: dentition unchanged  Vital Signs Stable: post-procedure vital signs reviewed and stable  Report to RN Given: handoff report given  Patient transferred to: PACU    Handoff Report: Identifed the Patient, Identified the Reponsible Provider, Reviewed the pertinent medical history, Discussed the surgical course, Reviewed Intra-OP anesthesia mangement and issues during anesthesia, Set expectations for post-procedure period and Allowed opportunity for questions and acknowledgement of understanding  Vitals:  Vitals Value Taken Time   BP     Temp     Pulse     Resp     SpO2         Electronically Signed By: ROBYN Quiroga CRNA  February 12, 2025  10:04 AM

## 2025-02-12 NOTE — OP NOTE
[facility] Operative Note    Pre-operative diagnosis: Residual hemorrhoid tags [K64.4]  External hemorrhoids [K64.4]  Internal hemorrhoid [K64.8]   Post-operative diagnosis: Same   Procedure: Procedure(s):  HEMORRHOIDECTOMY, EXTERNAL, Excision of residual tags x 3   Surgeon: Ricardo Giles MD, FACS   Assistant(s): None   Anesthesia: LMA   Estimated blood loss: Less than 10 ml   Total IV fluids: (See anesthesia record)   Blood transfusion: No transfusion was given during surgery   Total urine output: (See anesthesia record)   Drains: None   Specimens: Residual hemorrhoidal tags   Implants: None   Findings: Residual tags at the 5 1 and 9:00 positions lithotomy   Complications: None   Condition: Stable   Comments: Indications for procedure: This is 72-year-old lady who presented with a complaint of hemorrhoids and tags.  She had difficulty keeping the area clean and reported itching.  She opted to have them excised.         Details procedure:  Patient was taken to the operating placed the table in supine position and after induction of general anesthesia the patient was placed in lithotomy position.  The perirectal area was prepped and draped in sterile fashion.  A timeout was performed confirm the identity of the patient as well as the procedure to be performed.  There are 3 obvious large tags at the 1 5 and 9 o'clock position lithotomy.  A digital rectal exam was carried out followed by insertion of a Hughes retractor.  There is no evidence of any strictures or masses.  We initially turned our attention to the 5:00 tag.  It was grasped using an Allis clamp and distracted.  A 3-0 Vicryl suture was placed at the base.  The tag was excised using cautery and the defect closed using a running locking 3-0 Vicryl.  In a similar fashion tags were excised at the 1 and 9 o'clock position.  These were closed with running locking 3-0 Vicryl suture.  Care was taken to perform a digital rectal exam between each excision  to ensure no stricture of the rectum.  Exparel was injected into the perirectal area.  Sterile dressings were applied.  Patient was then taken from the operative to recovery in stable condition to be sent home.    Ricardo Giles MD, FACS

## 2025-02-12 NOTE — ANESTHESIA PROCEDURE NOTES
Airway       Patient location during procedure: OR  Staff -        CRNA: Agnes Haas APRN CRNA       Performed By: CRNA  Consent for Airway        Urgency: elective  Indications and Patient Condition       Indications for airway management: jose d-procedural       Induction type:intravenous       Mask difficulty assessment: 1 - vent by mask    Final Airway Details       Final airway type: supraglottic airway    Supraglottic Airway Details        Type: LMA       Brand: LMA Unique       LMA size: 4    Post intubation assessment        Placement verified by: capnometry, equal breath sounds and chest rise        Number of attempts at approach: 1       Number of other approaches attempted: 0       Secured with: plastic tape       Ease of procedure: easy       Dentition: Intact and Unchanged

## 2025-02-16 NOTE — TELEPHONE ENCOUNTER
If she wants to wait she can, but otherwise virtual and then we can follow up in person on October 4th.       ROBYN Bojorquez CNP  Questions or concerns please feel free to send me a [facility] message or call me  Phone : [phone]    
RN replied via mobile mum.     Gini Ridley, CHRISTIN, RN     
Schedule a visit for next week for virtual. In the meantime, I would recommend she increase the trazodone to 150mg at night which would be 1.5 tablets. If this is not working at all we need to discuss other options at a visit. I would also consider going up on her Zoloft  as well and would like her to consider this to discuss at our visit.       ROBYN Bojorquez CNP  Questions or concerns please feel free to send me a [facility] message or call me  Phone : [phone]    
Would you like patient to schedule a visit to discuss this? Asking for something other than trazodone  
210

## 2025-02-17 ENCOUNTER — TELEPHONE (OUTPATIENT)
Dept: SURGERY | Facility: CLINIC | Age: 75
End: 2025-02-17
Payer: COMMERCIAL

## 2025-02-17 ENCOUNTER — NURSE TRIAGE (OUTPATIENT)
Dept: NURSING | Facility: CLINIC | Age: 75
End: 2025-02-17
Payer: COMMERCIAL

## 2025-02-17 LAB
PATH REPORT.COMMENTS IMP SPEC: NORMAL
PATH REPORT.COMMENTS IMP SPEC: NORMAL
PATH REPORT.FINAL DX SPEC: NORMAL
PATH REPORT.GROSS SPEC: NORMAL
PATH REPORT.MICROSCOPIC SPEC OTHER STN: NORMAL
PATH REPORT.RELEVANT HX SPEC: NORMAL
PHOTO IMAGE: NORMAL

## 2025-02-17 NOTE — TELEPHONE ENCOUNTER
Patient called reporting red-flag symptom: No BM after 2/12/25 and patient having hot flashes, etc.    Per the [facility] Red-Flag symptom list, patient was: warm transferred to Triage Nurse

## 2025-02-17 NOTE — TELEPHONE ENCOUNTER
"    Nurse Triage SBAR    Is this a 2nd Level Triage? YES, LICENSED PRACTITIONER REVIEW IS REQUIRED    Situation: Patient calling to report she still has not had full stool since surgery 2/12, Is having \"squirts\" very small amount x 48 hours, abdominal cramping, Tenderness above umbilicus, Feels slightly dizzy at times. Headaches coming and going.     Background:   02/12   HEMORRHOIDECTOMY, EXTERNAL, Excision of 3 residual tags       Assessment: Patient has not had a BM since before surgery 2/12, Is having \"squirts\"/\"leakage\" occurring in last 48 hours, Sat had severe pain/many episodes of the \"squirts\" for 12 hours. Is still having mild bleeding to rectum post surgery. Is taking Miralax 2x/day, Senna 2x/day, Eating and drinking lots of fluids. Cramps located below umbilicus, come and go. Has intermittent nausea but no vomiting. Also reports getting \"sweats\". Stool color is a dark orange color. Afebrile.     Protocol Recommended Disposition:   See in Office Today, See More Appropriate Protocol    Recommendation: Please call patient regarding work in appt.      Routed to provider    Does the patient meet one of the following criteria for ADS visit consideration? No    Ursula TAN RN  P Central Nursing/Red Flag Triage & Med Refill Team  Reason for Disposition   Leaking stool    Additional Information   Negative: Abdomen bloating or swelling are main symptoms   Negative: Abdomen pain is main symptom and male   Negative: Abdomen pain is main symptom and female   Negative: Rectal bleeding or blood in stool is main symptom   Negative: Vomiting bile (green color)   Negative: Patient sounds very sick or weak to the triager   Negative: Constant abdominal pain lasting > 2 hours   Negative: Vomiting and abdomen looks much more swollen than usual   Negative: Rectal pain or fullness from fecal impaction (rectum full of stool) and NOT better after SITZ bath, suppository or enema   Negative: Abdomen is more swollen than usual   " Negative: Last bowel movement (BM) > 4 days ago    Protocols used: Constipation-A-OH

## 2025-02-18 NOTE — TELEPHONE ENCOUNTER
Spoke to patient.  She is passing gas but feels pressure in the area.  She has had some small nuggets passed through and loose stool.  No pain fevers or chills.  I did explain that there is probably some edema causing some of her symptoms.  I recommend she continue her stool softener and start hydrocortisone as well as continue the sitz bath's.  She will go to the ER if symptoms worsen.  She will follow-up with me on Thursday as scheduled.

## 2025-02-18 NOTE — TELEPHONE ENCOUNTER
Please see nurse triage encounter for additional information.     Kerrie Hoffman RN   [facility]

## 2025-02-18 NOTE — TELEPHONE ENCOUNTER
[facility] Call [facility]    Phone Message    May a detailed message be left on voicemail: yes     Reason for Call: Other: Patient called, again, as she never received a call in regard to her symptoms.  Per previous note, billy doran [facility] Patient Care Specialty Pool.     Action Taken: Message routed to:  [facility] Patient Care Specialty Pool    Travel Screening: Not Applicable

## 2025-02-20 ENCOUNTER — OFFICE VISIT (OUTPATIENT)
Dept: SURGERY | Facility: CLINIC | Age: 75
End: 2025-02-20
Payer: COMMERCIAL

## 2025-02-20 VITALS
TEMPERATURE: 97 F | SYSTOLIC BLOOD PRESSURE: 110 MMHG | DIASTOLIC BLOOD PRESSURE: 68 MMHG | WEIGHT: 153 LBS | BODY MASS INDEX: 27.62 KG/M2

## 2025-02-20 DIAGNOSIS — Z09 POSTOP CHECK: Primary | ICD-10-CM

## 2025-02-20 ASSESSMENT — PAIN SCALES - GENERAL: PAINLEVEL_OUTOF10: SEVERE PAIN (7)

## 2025-02-20 NOTE — LETTER
2/20/2025      Amee Crews  [address] [address] [id number]      Dear Colleague,    Thank you for referring your patient, Amee Crews, to the [facility]. Please see a copy of my visit note below.    Follow-up for excision of residual hemorrhoidal tags.    Subjective:  Patient is feeling better after having a bowel movement yesterday.  Has been using hydrocortisone cream.  Overall feels steadily improving.      Objective:  B/P: 110/68, T: 97, P: Data Unavailable, R: Data Unavailable  Rectal: Some mild inflammation.  Suture lines intact.      Rectum, hemorrhoidectomy:  -- Squamous mucosa with submucosal vascular ectasia, consistent with hemorrhoidal tissue.       Assessment/plan:  Patient is status post excision of multiple residual tags.  Improving.  She will continue her sitz baths and cortisone cream.  If there is no further improvement.  She will follow-up with me in 2 to 3 weeks.  Otherwise as needed.    Ricardo Giles MD, FACS            Again, thank you for allowing me to participate in the care of your patient.        Sincerely,        Ricardo Giles MD    Electronically signed

## 2025-02-20 NOTE — PROGRESS NOTES
Follow-up for excision of residual hemorrhoidal tags.    Subjective:  Patient is feeling better after having a bowel movement yesterday.  Has been using hydrocortisone cream.  Overall feels steadily improving.      Objective:  B/P: 110/68, T: 97, P: Data Unavailable, R: Data Unavailable  Rectal: Some mild inflammation.  Suture lines intact.      Rectum, hemorrhoidectomy:  -- Squamous mucosa with submucosal vascular ectasia, consistent with hemorrhoidal tissue.       Assessment/plan:  Patient is status post excision of multiple residual tags.  Improving.  She will continue her sitz baths and cortisone cream.  If there is no further improvement.  She will follow-up with me in 2 to 3 weeks.  Otherwise as needed.    Ricardo Giles MD, FACS

## 2025-02-24 ENCOUNTER — MYC MEDICAL ADVICE (OUTPATIENT)
Dept: SURGERY | Facility: CLINIC | Age: 75
End: 2025-02-24
Payer: COMMERCIAL

## 2025-03-04 ENCOUNTER — MYC MEDICAL ADVICE (OUTPATIENT)
Dept: SURGERY | Facility: CLINIC | Age: 75
End: 2025-03-04
Payer: COMMERCIAL

## 2025-03-05 NOTE — TELEPHONE ENCOUNTER
Patient just had concerns was of continued pressure with BMs.  Some pain and initiating but has been slowly but steadily improving.  No signs of infection.  I suspect it is just the suture lines being stretched during bowel movements.  She expressed understanding.  I offered to have her be seen tomorrow.  She is not feeling that bad and she would like to follow-up with me next week.  She knows to call should her symptoms worsen    Ricardo Giles MD, FACS

## 2025-03-08 DIAGNOSIS — E55.9 VITAMIN D DEFICIENCY: ICD-10-CM

## 2025-03-08 DIAGNOSIS — K21.00 GASTROESOPHAGEAL REFLUX DISEASE WITH ESOPHAGITIS WITHOUT HEMORRHAGE: ICD-10-CM

## 2025-03-09 ENCOUNTER — E-VISIT (OUTPATIENT)
Dept: FAMILY MEDICINE | Facility: OTHER | Age: 75
End: 2025-03-09
Payer: COMMERCIAL

## 2025-03-09 DIAGNOSIS — R51.9 NONINTRACTABLE HEADACHE, UNSPECIFIED CHRONICITY PATTERN, UNSPECIFIED HEADACHE TYPE: Primary | ICD-10-CM

## 2025-03-09 PROCEDURE — 99207 PR NON-BILLABLE SERV PER CHARTING: CPT | Performed by: STUDENT IN AN ORGANIZED HEALTH CARE EDUCATION/TRAINING PROGRAM

## 2025-03-10 ENCOUNTER — VIRTUAL VISIT (OUTPATIENT)
Dept: FAMILY MEDICINE | Facility: OTHER | Age: 75
End: 2025-03-10
Payer: COMMERCIAL

## 2025-03-10 DIAGNOSIS — J01.10 ACUTE NON-RECURRENT FRONTAL SINUSITIS: Primary | ICD-10-CM

## 2025-03-10 PROCEDURE — 98013 SYNCH AUDIO-ONLY EST LOW 20: CPT | Performed by: PHYSICIAN ASSISTANT

## 2025-03-10 RX ORDER — CHOLECALCIFEROL (VITAMIN D3) 50 MCG
1 TABLET ORAL DAILY
Qty: 90 TABLET | Refills: 0 | Status: SHIPPED | OUTPATIENT
Start: 2025-03-10

## 2025-03-10 RX ORDER — DOXYCYCLINE 100 MG/1
100 CAPSULE ORAL 2 TIMES DAILY
Qty: 20 CAPSULE | Refills: 0 | Status: SHIPPED | OUTPATIENT
Start: 2025-03-10 | End: 2025-03-20

## 2025-03-10 RX ORDER — FAMOTIDINE 20 MG/1
20 TABLET, FILM COATED ORAL 2 TIMES DAILY PRN
Qty: 180 TABLET | Refills: 0 | Status: SHIPPED | OUTPATIENT
Start: 2025-03-10

## 2025-03-10 NOTE — PROGRESS NOTES
Amee is a 74 year old who is being evaluated via a billable telephone visit.    What phone number would you like to be contacted at? [phone]  How would you like to obtain your AVS? Oscarharjolie  Originating Location (pt. Location): Home    Distant Location (provider location):  Off-site  Telephone visit completed due to the patient did not consent to a video visit.    Assessment & Plan     Acute non-recurrent frontal sinusitis  Concern for acute bacterial sinusitis with her current symptoms and length of symptoms plus she has known chronic sinusitis issues. Encouraged to restart on nasal saline rinses 3 times per day and continue with Flonase daily.  Recommended Mucinex as well OTC to help symptoms.  Will start on Doxycycline as she has tolerated this in the past for sinusitis.    - doxycycline monohydrate (MONODOX) 100 MG capsule; Take 1 capsule (100 mg) by mouth 2 times daily for 10 days.      Follow-up in clinic if symptoms are not improving in the next 5-7 days, sooner if worse or new concerns.     Options for treatment and follow-up care were reviewed with the patient and/or guardian. Patient and/or guardian engaged in the decision making process and verbalized understanding of the options discussed and agreed with the final plan.      Subjective   Amee is a 74 year old, presenting for the following health issues:  Sinus Problem      3/10/2025    10:30 AM   Additional Questions   Roomed by Sherley     Sinus Problem     History of Present Illness       Reason for visit:  Sinus problem       See e-visit 3/9/25  Acute Illness  Acute illness concerns: Sinus symptoms. Started about 3 + weeks ago.   Symptoms:  Fever: No  Chills/Sweats: YES- Has hot flashes off and on.   Headache (location?): YES  Sinus Pressure: YES  Conjunctivitis:  No  Ear Pain: no  Rhinorrhea: YES  Congestion: YES  Sore Throat: No  Cough: no  Nausea: YES  Vomiting: No  Diarrhea: No    Sick/Strep Exposure: No  Therapies tried and outcome: OTC  decongestants orally, always uses Flonase.        Review of Systems  Constitutional, HEENT, cardiovascular, pulmonary, gi and gu systems are negative, except as otherwise noted.      Objective           Vitals:  No vitals were obtained today due to virtual visit.    Physical Exam   General: Alert and no distress //Respiratory: No audible wheeze, cough, or shortness of breath // Psychiatric:  Appropriate affect, tone, and pace of words            Phone call duration: 5 minutes  Signed Electronically by: Mary Martino PA-C

## 2025-03-10 NOTE — PATIENT INSTRUCTIONS
Thank you for choosing us for your care. I think an in-clinic or virtual visit would be the best next step based on your symptoms. Please schedule a clinic appointment; you won t be charged for this eVisit.  Even a virtual visit would be fine to get more information of what is going on    You can schedule an appointment by clicking here in [facility], or call [phone].

## 2025-03-14 ENCOUNTER — TELEPHONE (OUTPATIENT)
Dept: NEUROSURGERY | Facility: CLINIC | Age: 75
End: 2025-03-14
Payer: COMMERCIAL

## 2025-03-14 ENCOUNTER — MYC MEDICAL ADVICE (OUTPATIENT)
Dept: FAMILY MEDICINE | Facility: OTHER | Age: 75
End: 2025-03-14
Payer: COMMERCIAL

## 2025-03-14 NOTE — TELEPHONE ENCOUNTER
[facility] Call Center    Phone Message    May a detailed message be left on voicemail: yes     Reason for Call: Other: Patient is calling requesting new orders for infusions. Please call back with updates.      Action Taken: Message routed to:  Other: [facility]    Travel Screening: Not Applicable

## 2025-03-14 NOTE — TELEPHONE ENCOUNTER
Called and left message for patient to call back to discuss. Writer is unsure what infusions she is talking about as Dr. Tomas doesn't typically order infusions. Ok to teams/[facility] message if patient calls back.

## 2025-03-17 DIAGNOSIS — M81.0 AGE-RELATED OSTEOPOROSIS WITHOUT CURRENT PATHOLOGICAL FRACTURE: Primary | ICD-10-CM

## 2025-03-17 DIAGNOSIS — K21.9 GASTROESOPHAGEAL REFLUX DISEASE WITHOUT ESOPHAGITIS: ICD-10-CM

## 2025-03-17 RX ORDER — DIPHENHYDRAMINE HYDROCHLORIDE 50 MG/ML
50 INJECTION, SOLUTION INTRAMUSCULAR; INTRAVENOUS
Start: 2025-03-24

## 2025-03-17 RX ORDER — ZOLEDRONIC ACID 0.05 MG/ML
5 INJECTION, SOLUTION INTRAVENOUS ONCE
Start: 2025-03-24

## 2025-03-17 RX ORDER — DIPHENHYDRAMINE HYDROCHLORIDE 50 MG/ML
25 INJECTION, SOLUTION INTRAMUSCULAR; INTRAVENOUS
Start: 2025-03-24

## 2025-03-17 RX ORDER — MEPERIDINE HYDROCHLORIDE 25 MG/ML
25 INJECTION INTRAMUSCULAR; INTRAVENOUS; SUBCUTANEOUS
OUTPATIENT
Start: 2025-03-24

## 2025-03-17 RX ORDER — EPINEPHRINE 1 MG/ML
0.3 INJECTION, SOLUTION, CONCENTRATE INTRAVENOUS EVERY 5 MIN PRN
OUTPATIENT
Start: 2025-03-24

## 2025-03-17 RX ORDER — ACETAMINOPHEN 325 MG/1
650 TABLET ORAL
OUTPATIENT
Start: 2025-03-24

## 2025-03-17 RX ORDER — ALBUTEROL SULFATE 90 UG/1
1-2 INHALANT RESPIRATORY (INHALATION)
Start: 2025-03-24

## 2025-03-17 RX ORDER — ALBUTEROL SULFATE 0.83 MG/ML
2.5 SOLUTION RESPIRATORY (INHALATION)
OUTPATIENT
Start: 2025-03-24

## 2025-03-17 RX ORDER — HEPARIN SODIUM (PORCINE) LOCK FLUSH IV SOLN 100 UNIT/ML 100 UNIT/ML
5 SOLUTION INTRAVENOUS
OUTPATIENT
Start: 2025-03-24

## 2025-03-17 RX ORDER — METHYLPREDNISOLONE SODIUM SUCCINATE 40 MG/ML
40 INJECTION INTRAMUSCULAR; INTRAVENOUS
Start: 2025-03-24

## 2025-03-17 RX ORDER — HEPARIN SODIUM,PORCINE 10 UNIT/ML
5-20 VIAL (ML) INTRAVENOUS DAILY PRN
OUTPATIENT
Start: 2025-03-24

## 2025-03-17 NOTE — TELEPHONE ENCOUNTER
Called and spoke with Amee. She was hoping to get new orders for her reclast for osteoporosis. She asked if Dr. Tomas would order that and writer stated he would not as it is not in the diagnosis he treats. Let her know that Marcelino Centeno ordered it last time, and she should speak with him or her primary care about ordering it again. Patient stated understanding.

## 2025-03-17 NOTE — PROGRESS NOTES
Amee Crews  :  1950  DOS: 3/18/2025  MRN: [id number]  PCP: Ary Adams    Sports Medicine Clinic Visit    Interim History - 2025  - Last seen on 10/29/2024 for bilateral CMC joint osteoarthritis and corticosteroid injection. Also noted left shoulder pain at last visit, suspicion of shoulder impingement vs GH OA. Glenohumeral or subacromial injection noted as considerations at follow up visit.   - 10/17/2024 left shoulder xrays shows no fracture. Very mild degenerative changes at the glenohumeral joint.    NEW MSK Complaint  - She would like to be fully evaluated for her chronic left shoulder pain. She notes continued generalized left shoulder pain that only arises with abduction and reaching above her head. Denies pain at rest, antalgic weakness, neurogenic symptoms, instability. Denies any treatment strategies at this time.      Interim History - 2024  - Last seen on 2023 for bilateral CMC joint osteoarthritis and right carpal tunnel syndrome. Also noting left shoulder pain.     - Since the last visit, she notes continued pain with driving and resting in bed. Pain is located over the generalized wrist and also the base of the bilateral thumbs. Denies any pain relieving methods at this time for the wrists or shoulder.     - Hopeful for injections today.  Good relief from bilateral CMC joint injections with Dr. Simental in .      Initial Visit: 2023   HPI  Amee Crews is a 72 year old female who is seen in consultation at the request of  Ary Adams C.N.P. presenting with right hand pain and numbness.    - Mechanism of Injury:  No inciting injury.   - Prior evaluation: No formal evaluation recently. Chronic CMC joint pain noted in  with Dr. Sujata Simental, responded well to corticosteroid injections that gave approximately 1.5 years of relief.  Radiographs show degenerative changes of the first CMC and STT joints bilaterally.  May have used braces in the  past.    - Pain Character:  Pain has been present for  3 weeks ago after doing a lot of painting .  Numbness is in the right hand in the median nerve distribution with radiation into the right forearm.  Positive flick sign.  - Endorses:  Sensation of hand falling asleep, thumb feels it the most, tingling sensation.  Little finger does not seem to be involved.  Mild swelling and grinding at the base of the thumb which has been chronic.  - Denies:  mechanical locking symptoms, instability, weakness.   - Alleviating factors:   no recent treatments , positive flick sign.  - Aggravating factors:  driving, pressure onto the hand, repetitive wrist motion activities.  - Treatments tried:  past corticosteroid injections in the CMC joints, and has worn a thumb spica brace in the past. Nothing recently.    - Patient Goals:  discuss treatment options  - Social History: retired    - Pertinent PMH: Bilateral CMC OA, cLBP, osteoporosis      Review of Systems  Musculoskeletal: as above  Remainder of review of systems is negative including constitutional, CV, pulmonary, GI, Skin and Neurologic except as noted in HPI or medical history.    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    Allergic rhinitis     Allergies     asthma     mild intermittent    Chronic back pain     neck and low back    HA (headache)     muscle contraction     History of blood transfusion     In childhood    Hot flashes     Hyperlipidemia     Insomnia     Intermittent asthma 9/16/2011    Major depressive disorder, single episode, moderate (H)     Ocular migraine     Osteoarthritis of hand     Restless leg 9/16/2011     Past Surgical History:   Procedure Laterality Date    APPENDECTOMY      age 9     BIOPSY      took tissue from my uterus at least 15 years ago    COLONOSCOPY  14 years    COLONOSCOPY N/A 04/30/2015    Procedure: COMBINED COLONOSCOPY, SINGLE OR MULTIPLE BIOPSY/POLYPECTOMY BY BIOPSY;  Surgeon:  Doni Carey MD;  Location: [facility]    COLONOSCOPY WITH CO2 INSUFFLATION N/A 04/30/2015    Procedure: COLONOSCOPY WITH CO2 INSUFFLATION;  Surgeon: Doni Carey MD;  Location: [facility]    COLONOSCOPY WITH CO2 INSUFFLATION N/A 12/08/2020    Procedure: COLONOSCOPY, WITH CO2 INSUFFLATION;  Surgeon: Kareem Littlejohn DO;  Location: [facility]    COMBINED ESOPHAGOSCOPY, GASTROSCOPY, DUODENOSCOPY (EGD) WITH CO2 INSUFFLATION N/A 08/24/2017    Procedure: COMBINED ESOPHAGOSCOPY, GASTROSCOPY, DUODENOSCOPY (EGD) WITH CO2 INSUFFLATION;  EGD, Gastroesophageal reflux disease, esophagitis presence not specified Abdominal pain, generalized, Ref Dahlheimer-Schroeder, 24.78 BMI;  Surgeon: Duane, William Charles, MD;  Location: [facility]    ENT SURGERY      Tonsillectomy    ESOPHAGOSCOPY, GASTROSCOPY, DUODENOSCOPY (EGD), COMBINED N/A 08/24/2017    Procedure: COMBINED ESOPHAGOSCOPY, GASTROSCOPY, DUODENOSCOPY (EGD), BIOPSY SINGLE OR MULTIPLE;;  Surgeon: Duane, William Charles, MD;  Location: [facility]    GI SURGERY      HEMORRHOIDECTOMY EXTERNAL N/A 2/12/2025    Procedure: HEMORRHOIDECTOMY, EXTERNAL, Excision of 3 residual tags;  Surgeon: Ricardo Giles MD;  [facility]    INJECT EPIDURAL TRANSFORAMINAL Left 09/16/2022    Procedure: INJECTION, EPIDURAL, TRANSFORAMINAL APPROACH - L4-5;  Surgeon: Laya Bowman MD;  Location: [facility]    LAPAROSCOPY PROCEDURE UNLISTED      polyps found    MOUTH SURGERY  2016    SINUS SURGERY      x2     Family History   Problem Relation Age of Onset    Diabetes Mother         Old age    Hypertension Mother     Cancer Father         stomach     Cancer Maternal Grandfather         lung     Heart Disease Other          Objective  There were no vitals taken for this visit.    General: healthy, alert and in no acute distress.    HEENT: no scleral icterus or conjunctival erythema.   Skin: no suspicious lesions or rash. No jaundice.   CV: regular rhythm by palpation, 2+ distal pulses.  Resp:  normal respiratory effort without conversational dyspnea.   Psych: normal mood and affect.    Gait: nonantalgic, appropriate coordination and balance.     Neuro:        - Sensation to light touch:    - Intact throughout the BUE including all peripheral nerve distributions.        - MSR:       RUE  LUE  - Biceps  2+ 2+  - Brachioradialis 2+ 2+  - Triceps  2+ 2+       - Special tests:   - Spurling's:  Neg    MSK - Shoulder:       - Inspection:    - No significant swelling, erythema, warmth, ecchymosis, lesion, or atrophy noted.        - ROM:    - Full AROM/PROM with pain during shoulder abduction > flexion       - Palpation:    - NTTP throughout the shoulder       - Strength:  (*antalgic)  - Shoulder Abduction   5    - Shoulder Flexion   5    - Shoulder Internal Rotation  5    - Shoulder External Rotation  5             - Special tests:        - Solano: +Positive   - Neers: +Positive   - Empty can: Neg   - [place]:  Neg    - Scarf:  Neg    - Speeds:  Neg    - Yergason:  Neg    - Apprehension/Relocation:  Neg     Radiology  I independently reviewed relevant imaging, with the following interpretation:  - XR L Shoulder 10/17/2024 shows mild degenerative changes of the glenohumeral joint, subacromial spur with type III acromion, no acute fractures or dislocations.    Procedure  Large Joint Injection/Arthocentesis: L subacromial bursa    Date/Time: 3/18/2025 1:38 PM    Performed by: Alvin Norman DO  Authorized by: Alvin Norman DO    Indications:  Pain  Needle Size:  22 G  Guidance: landmark guided    Approach:  Anterolateral  Location:  Shoulder      Site:  L subacromial bursa  Medications:  40 mg triamcinolone 40 MG/ML; 2 mL lidocaine 1 %; 2 mL BUPivacaine 0.5 %  Outcome:  Tolerated well, no immediate complications  Procedure discussed: discussed risks, benefits, and alternatives    Consent Given by:  Patient  Prep: patient was prepped and draped in usual sterile fashion        PROCEDURE  Subacromial/Subdeltoid  Bursa Injection  The patient was informed of the risks and benefits of the procedure and alternatives were discussed. A written consent was signed by the patient. The injection site was prepped with chlorhexidine in sterile fashion. An injectate solution containing 2 mL of 1% lidocaine, 2 mL of 0.5% bupivacaine, and 1 mL of Kenalog (40 mg/mL) was drawn up into a 5 mL syringe. Injection was performed using sterile technique.  A 1.5-inch 22-gauge needle was used to enter the subacromial/subdeltoid bursa using a posterolateral approach, and injectate was injected successfully. After the injection, the area was cleaned and a bandage applied. The patient tolerated the procedure well without complications.         Assessment  1. Shoulder impingement syndrome, left        Plan  Amee Crews is a 72 year old female that presents for evaluation of chronic left shoulder pain.  Briefly discussed her pain at last visit for her wrists, presents today for formal evaluation and treatment options.  She describes pain throughout the left shoulder that hurts worse with abduction and flexion.  No significant pain at rest.  Denies neurogenic symptoms, weakness, injury.  On exam, she does have a positive Solano and Neer's test with negative empty can and full intact rotator cuff strength without pain.  Radiographs do confirm a type III acromion with a prominent subacromial spur in the subacromial space.  Her history, imaging, and physical exam appear most consistent with shoulder impingement syndrome of the left shoulder.    Discussed the nature of the condition and treatment options and mutually agreed upon the following plan:    - Imaging:          - Reviewed relevant imaging in the chart.  - Reviewed results and images with patient.   - Medications:          - Discussed pharmacologic options for pain relief.   - May use NSAIDs (Ibuprofen, Naproxen) or Acetaminophen (Tylenol) as needed for pain control.   - May also use topical  medications such as lidocaine, IcyHot, BioFreeze, or Voltaren gel as needed for pain control.  May use Voltaren gel up to 4 times per day as needed over the painful area.  - Injections:          - Discussed possible injection options and alternatives.    - Performed a corticosteroid injection of the left subacromial bursa today in clinic without complication, patient tolerated procedure well.  See procedure note above for details.  - Therapy:          - Continue home exercise program as instructed.  - Modalities:          - May use ice, heat, massage or other modalities as needed.  - Surgery:          - Discussed non-operative and operative treatment options for the patient's condition.  Goal is to continue with conservative care for as long as possible before surgery would need to be considered.  Could consider subacromial decompression if needed in the future for persistent symptoms.  - Activity:          - Encouraged to remain active and participate in regular activities as symptoms allow.  Avoid or modify exacerbating activities as needed.  - Follow up:          - As needed in the future for re-evaluation and update to treatment plan, or sooner for new/worsening symptoms.  - Patient has clinic contact information for questions or concerns.       Alvin Norman DO, JC  [facility] - Sports Medicine  [facility] - Department of Orthopedic Surgery       Disclaimer:  This note was prepared and written using Dragon Medical dictation software. As a result, there may be errors in the script that have gone undetected. Please consider this when interpreting the information in this note.

## 2025-03-17 NOTE — TELEPHONE ENCOUNTER
Patient due for repeat infusion, orders placed      Thank you,    Grady Bautista MD    [address] [address]   Ph: [phone]  Fax:[phone]

## 2025-03-18 ENCOUNTER — OFFICE VISIT (OUTPATIENT)
Dept: ORTHOPEDICS | Facility: CLINIC | Age: 75
End: 2025-03-18
Payer: COMMERCIAL

## 2025-03-18 DIAGNOSIS — M75.42 SHOULDER IMPINGEMENT SYNDROME, LEFT: Primary | ICD-10-CM

## 2025-03-18 PROCEDURE — 20610 DRAIN/INJ JOINT/BURSA W/O US: CPT | Mod: LT | Performed by: STUDENT IN AN ORGANIZED HEALTH CARE EDUCATION/TRAINING PROGRAM

## 2025-03-18 PROCEDURE — 99214 OFFICE O/P EST MOD 30 MIN: CPT | Mod: 25 | Performed by: STUDENT IN AN ORGANIZED HEALTH CARE EDUCATION/TRAINING PROGRAM

## 2025-03-18 RX ORDER — LIDOCAINE HYDROCHLORIDE 10 MG/ML
2 INJECTION, SOLUTION INFILTRATION; PERINEURAL
Status: COMPLETED | OUTPATIENT
Start: 2025-03-18 | End: 2025-03-18

## 2025-03-18 RX ORDER — TRIAMCINOLONE ACETONIDE 40 MG/ML
40 INJECTION, SUSPENSION INTRA-ARTICULAR; INTRAMUSCULAR
Status: COMPLETED | OUTPATIENT
Start: 2025-03-18 | End: 2025-03-18

## 2025-03-18 RX ORDER — BUPIVACAINE HYDROCHLORIDE 5 MG/ML
2 INJECTION, SOLUTION PERINEURAL
Status: COMPLETED | OUTPATIENT
Start: 2025-03-18 | End: 2025-03-18

## 2025-03-18 RX ADMIN — TRIAMCINOLONE ACETONIDE 40 MG: 40 INJECTION, SUSPENSION INTRA-ARTICULAR; INTRAMUSCULAR at 13:38

## 2025-03-18 RX ADMIN — LIDOCAINE HYDROCHLORIDE 2 ML: 10 INJECTION, SOLUTION INFILTRATION; PERINEURAL at 13:38

## 2025-03-18 RX ADMIN — BUPIVACAINE HYDROCHLORIDE 2 ML: 5 INJECTION, SOLUTION PERINEURAL at 13:38

## 2025-03-18 NOTE — LETTER
3/18/2025      Amee Crews  [address] [facility] [id number]      Dear Colleague,    Thank you for referring your patient, Amee Crews, to the [facility]. Please see a copy of my visit note below.    Amee Crews  :  1950  DOS: 3/18/2025  MRN: [id number]  PCP: Ary Adams    Sports Medicine Clinic Visit    Interim History - 2025  - Last seen on 10/29/2024 for bilateral CMC joint osteoarthritis and corticosteroid injection. Also noted left shoulder pain at last visit, suspicion of shoulder impingement vs GH OA. Glenohumeral or subacromial injection noted as considerations at follow up visit.   - 10/17/2024 left shoulder xrays shows no fracture. Very mild degenerative changes at the glenohumeral joint.    NEW MSK Complaint  - She would like to be fully evaluated for her chronic left shoulder pain. She notes continued generalized left shoulder pain that only arises with abduction and reaching above her head. Denies pain at rest, antalgic weakness, neurogenic symptoms, instability. Denies any treatment strategies at this time.      Interim History - 2024  - Last seen on 2023 for bilateral CMC joint osteoarthritis and right carpal tunnel syndrome. Also noting left shoulder pain.     - Since the last visit, she notes continued pain with driving and resting in bed. Pain is located over the generalized wrist and also the base of the bilateral thumbs. Denies any pain relieving methods at this time for the wrists or shoulder.     - Hopeful for injections today.  Good relief from bilateral CMC joint injections with Dr. Simental in .      Initial Visit: 2023   HPI  Amee Crews is a 72 year old female who is seen in consultation at the request of  Ary Adams C.N.P. presenting with right hand pain and numbness.    - Mechanism of Injury:  No inciting injury.   - Prior evaluation: No formal evaluation recently. Chronic CMC  joint pain noted in 2020 with Dr. Sujata Simental, responded well to corticosteroid injections that gave approximately 1.5 years of relief.  Radiographs show degenerative changes of the first CMC and STT joints bilaterally.  May have used braces in the past.    - Pain Character:  Pain has been present for  3 weeks ago after doing a lot of painting .  Numbness is in the right hand in the median nerve distribution with radiation into the right forearm.  Positive flick sign.  - Endorses:  Sensation of hand falling asleep, thumb feels it the most, tingling sensation.  Little finger does not seem to be involved.  Mild swelling and grinding at the base of the thumb which has been chronic.  - Denies:  mechanical locking symptoms, instability, weakness.   - Alleviating factors:   no recent treatments , positive flick sign.  - Aggravating factors:  driving, pressure onto the hand, repetitive wrist motion activities.  - Treatments tried:  past corticosteroid injections in the CMC joints, and has worn a thumb spica brace in the past. Nothing recently.    - Patient Goals:  discuss treatment options  - Social History: retired    - Pertinent PMH: Bilateral CMC OA, cLBP, osteoporosis      Review of Systems  Musculoskeletal: as above  Remainder of review of systems is negative including constitutional, CV, pulmonary, GI, Skin and Neurologic except as noted in HPI or medical history.    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     Allergic rhinitis      Allergies      asthma     mild intermittent     Chronic back pain     neck and low back     HA (headache)     muscle contraction      History of blood transfusion     In childhood     Hot flashes      Hyperlipidemia      Insomnia      Intermittent asthma 9/16/2011     Major depressive disorder, single episode, moderate (H)      Ocular migraine      Osteoarthritis of hand      Restless leg 9/16/2011     Past Surgical History:   Procedure  Laterality Date     APPENDECTOMY      age 9      BIOPSY      took tissue from my uterus at least 15 years ago     COLONOSCOPY  14 years     COLONOSCOPY N/A 04/30/2015    Procedure: COMBINED COLONOSCOPY, SINGLE OR MULTIPLE BIOPSY/POLYPECTOMY BY BIOPSY;  Surgeon: Doni Carey MD;  Location: [facility]     COLONOSCOPY WITH CO2 INSUFFLATION N/A 04/30/2015    Procedure: COLONOSCOPY WITH CO2 INSUFFLATION;  Surgeon: oDni Carey MD;  Location: [facility]     COLONOSCOPY WITH CO2 INSUFFLATION N/A 12/08/2020    Procedure: COLONOSCOPY, WITH CO2 INSUFFLATION;  Surgeon: Kareem Littlejohn DO;  Location: [facility]     COMBINED ESOPHAGOSCOPY, GASTROSCOPY, DUODENOSCOPY (EGD) WITH CO2 INSUFFLATION N/A 08/24/2017    Procedure: COMBINED ESOPHAGOSCOPY, GASTROSCOPY, DUODENOSCOPY (EGD) WITH CO2 INSUFFLATION;  EGD, Gastroesophageal reflux disease, esophagitis presence not specified Abdominal pain, generalized, Ref Dahlheimer-Schroeder, 24.78 BMI;  Surgeon: Duane, William Charles, MD;  Location: [facility]     ENT SURGERY      Tonsillectomy     ESOPHAGOSCOPY, GASTROSCOPY, DUODENOSCOPY (EGD), COMBINED N/A 08/24/2017    Procedure: COMBINED ESOPHAGOSCOPY, GASTROSCOPY, DUODENOSCOPY (EGD), BIOPSY SINGLE OR MULTIPLE;;  Surgeon: Duane, William Charles, MD;  Location: [facility]     GI SURGERY       HEMORRHOIDECTOMY EXTERNAL N/A 2/12/2025    Procedure: HEMORRHOIDECTOMY, EXTERNAL, Excision of 3 residual tags;  Surgeon: Ricardo Giles MD;  [facility]: [facility]     INJECT EPIDURAL TRANSFORAMINAL Left 09/16/2022    Procedure: INJECTION, EPIDURAL, TRANSFORAMINAL APPROACH - L4-5;  Surgeon: Laya Bowman MD;  Location: [facility]     LAPAROSCOPY PROCEDURE UNLISTED      polyps found     MOUTH SURGERY  2016     SINUS SURGERY      x2     Family History   Problem Relation Age of Onset     Diabetes Mother         Old age     Hypertension Mother      Cancer Father         stomach      Cancer Maternal Grandfather         lung      Heart Disease Other           Objective  There were no vitals taken for this visit.    General: healthy, alert and in no acute distress.    HEENT: no scleral icterus or conjunctival erythema.   Skin: no suspicious lesions or rash. No jaundice.   CV: regular rhythm by palpation, 2+ distal pulses.  Resp: normal respiratory effort without conversational dyspnea.   Psych: normal mood and affect.    Gait: nonantalgic, appropriate coordination and balance.     Neuro:        - Sensation to light touch:    - Intact throughout the BUE including all peripheral nerve distributions.        - MSR:       RUE  LUE  - Biceps  2+ 2+  - Brachioradialis 2+ 2+  - Triceps  2+ 2+       - Special tests:   - Spurling's:  Neg    MSK - Shoulder:       - Inspection:    - No significant swelling, erythema, warmth, ecchymosis, lesion, or atrophy noted.        - ROM:    - Full AROM/PROM with pain during shoulder abduction > flexion       - Palpation:    - NTTP throughout the shoulder       - Strength:  (*antalgic)  - Shoulder Abduction   5    - Shoulder Flexion   5    - Shoulder Internal Rotation  5    - Shoulder External Rotation  5             - Special tests:        - Solano: +Positive   - Neers: +Positive   - Empty can: Neg   - [place]:  Neg    - Scarf:  Neg    - Speeds:  Neg    - Yergason:  Neg    - Apprehension/Relocation:  Neg     Radiology  I independently reviewed relevant imaging, with the following interpretation:  - XR L Shoulder 10/17/2024 shows mild degenerative changes of the glenohumeral joint, subacromial spur with type III acromion, no acute fractures or dislocations.    Procedure  Large Joint Injection/Arthocentesis: L subacromial bursa    Date/Time: 3/18/2025 1:38 PM    Performed by: Alvin Norman DO  Authorized by: Alvin Norman DO    Indications:  Pain  Needle Size:  22 G  Guidance: landmark guided    Approach:  Anterolateral  Location:  Shoulder      Site:  L subacromial bursa  Medications:  40 mg triamcinolone 40 MG/ML; 2 mL lidocaine 1  %; 2 mL BUPivacaine 0.5 %  Outcome:  Tolerated well, no immediate complications  Procedure discussed: discussed risks, benefits, and alternatives    Consent Given by:  Patient  Prep: patient was prepped and draped in usual sterile fashion        PROCEDURE  Subacromial/Subdeltoid Bursa Injection  The patient was informed of the risks and benefits of the procedure and alternatives were discussed. A written consent was signed by the patient. The injection site was prepped with chlorhexidine in sterile fashion. An injectate solution containing 2 mL of 1% lidocaine, 2 mL of 0.5% bupivacaine, and 1 mL of Kenalog (40 mg/mL) was drawn up into a 5 mL syringe. Injection was performed using sterile technique.  A 1.5-inch 22-gauge needle was used to enter the subacromial/subdeltoid bursa using a posterolateral approach, and injectate was injected successfully. After the injection, the area was cleaned and a bandage applied. The patient tolerated the procedure well without complications.         Assessment  1. Shoulder impingement syndrome, left        Plan  Amee Crews is a 72 year old female that presents for evaluation of chronic left shoulder pain.  Briefly discussed her pain at last visit for her wrists, presents today for formal evaluation and treatment options.  She describes pain throughout the left shoulder that hurts worse with abduction and flexion.  No significant pain at rest.  Denies neurogenic symptoms, weakness, injury.  On exam, she does have a positive Solano and Neer's test with negative empty can and full intact rotator cuff strength without pain.  Radiographs do confirm a type III acromion with a prominent subacromial spur in the subacromial space.  Her history, imaging, and physical exam appear most consistent with shoulder impingement syndrome of the left shoulder.    Discussed the nature of the condition and treatment options and mutually agreed upon the following plan:    - Imaging:          -  Reviewed relevant imaging in the chart.  - Reviewed results and images with patient.   - Medications:          - Discussed pharmacologic options for pain relief.   - May use NSAIDs (Ibuprofen, Naproxen) or Acetaminophen (Tylenol) as needed for pain control.   - May also use topical medications such as lidocaine, IcyHot, BioFreeze, or Voltaren gel as needed for pain control.  May use Voltaren gel up to 4 times per day as needed over the painful area.  - Injections:          - Discussed possible injection options and alternatives.    - Performed a corticosteroid injection of the left subacromial bursa today in clinic without complication, patient tolerated procedure well.  See procedure note above for details.  - Therapy:          - Continue home exercise program as instructed.  - Modalities:          - May use ice, heat, massage or other modalities as needed.  - Surgery:          - Discussed non-operative and operative treatment options for the patient's condition.  Goal is to continue with conservative care for as long as possible before surgery would need to be considered.  Could consider subacromial decompression if needed in the future for persistent symptoms.  - Activity:          - Encouraged to remain active and participate in regular activities as symptoms allow.  Avoid or modify exacerbating activities as needed.  - Follow up:          - As needed in the future for re-evaluation and update to treatment plan, or sooner for new/worsening symptoms.  - Patient has clinic contact information for questions or concerns.       Alvin Norman DO, JC  [facility] - Sports Medicine  [facility] - Department of Orthopedic Surgery       Disclaimer:  This note was prepared and written using Dragon Medical dictation software. As a result, there may be errors in the script that have gone undetected. Please consider this when interpreting the information in this note.       Again, thank you  for allowing me to participate in the care of your patient.        Sincerely,        Alvin Norman, DO    Electronically signed

## 2025-04-01 DIAGNOSIS — J32.9 CHRONIC SINUSITIS, UNSPECIFIED LOCATION: ICD-10-CM

## 2025-04-02 DIAGNOSIS — J32.9 CHRONIC SINUSITIS, UNSPECIFIED LOCATION: ICD-10-CM

## 2025-04-02 DIAGNOSIS — G25.81 RESTLESS LEG: ICD-10-CM

## 2025-04-02 DIAGNOSIS — E55.9 VITAMIN D DEFICIENCY: ICD-10-CM

## 2025-04-02 RX ORDER — LANOLIN ALCOHOL/MO/W.PET/CERES
1000 CREAM (GRAM) TOPICAL DAILY
Qty: 90 TABLET | Refills: 0 | Status: SHIPPED | OUTPATIENT
Start: 2025-04-02

## 2025-04-02 RX ORDER — PRAMIPEXOLE DIHYDROCHLORIDE 0.25 MG/1
TABLET ORAL
Qty: 270 TABLET | Refills: 0 | Status: SHIPPED | OUTPATIENT
Start: 2025-04-02

## 2025-04-02 RX ORDER — FLUTICASONE PROPIONATE 50 MCG
SPRAY, SUSPENSION (ML) NASAL
Qty: 48 G | Refills: 1 | Status: SHIPPED | OUTPATIENT
Start: 2025-04-02

## 2025-04-08 ENCOUNTER — VIRTUAL VISIT (OUTPATIENT)
Dept: FAMILY MEDICINE | Facility: OTHER | Age: 75
End: 2025-04-08
Payer: COMMERCIAL

## 2025-04-08 DIAGNOSIS — K30 UPSET STOMACH: Primary | ICD-10-CM

## 2025-04-08 DIAGNOSIS — R19.5 ABNORMAL FECES: ICD-10-CM

## 2025-04-08 PROCEDURE — 98013 SYNCH AUDIO-ONLY EST LOW 20: CPT | Performed by: STUDENT IN AN ORGANIZED HEALTH CARE EDUCATION/TRAINING PROGRAM

## 2025-04-08 ASSESSMENT — ANXIETY QUESTIONNAIRES
IF YOU CHECKED OFF ANY PROBLEMS ON THIS QUESTIONNAIRE, HOW DIFFICULT HAVE THESE PROBLEMS MADE IT FOR YOU TO DO YOUR WORK, TAKE CARE OF THINGS AT HOME, OR GET ALONG WITH OTHER PEOPLE: NOT DIFFICULT AT ALL
3. WORRYING TOO MUCH ABOUT DIFFERENT THINGS: NOT AT ALL
GAD7 TOTAL SCORE: 0
8. IF YOU CHECKED OFF ANY PROBLEMS, HOW DIFFICULT HAVE THESE MADE IT FOR YOU TO DO YOUR WORK, TAKE CARE OF THINGS AT HOME, OR GET ALONG WITH OTHER PEOPLE?: NOT DIFFICULT AT ALL
7. FEELING AFRAID AS IF SOMETHING AWFUL MIGHT HAPPEN: NOT AT ALL
2. NOT BEING ABLE TO STOP OR CONTROL WORRYING: NOT AT ALL
GAD7 TOTAL SCORE: 0
GAD7 TOTAL SCORE: 0
7. FEELING AFRAID AS IF SOMETHING AWFUL MIGHT HAPPEN: NOT AT ALL
1. FEELING NERVOUS, ANXIOUS, OR ON EDGE: NOT AT ALL
6. BECOMING EASILY ANNOYED OR IRRITABLE: NOT AT ALL
4. TROUBLE RELAXING: NOT AT ALL
5. BEING SO RESTLESS THAT IT IS HARD TO SIT STILL: NOT AT ALL

## 2025-04-08 ASSESSMENT — ASTHMA QUESTIONNAIRES
QUESTION_1 LAST FOUR WEEKS HOW MUCH OF THE TIME DID YOUR ASTHMA KEEP YOU FROM GETTING AS MUCH DONE AT WORK, SCHOOL OR AT HOME: NONE OF THE TIME
QUESTION_3 LAST FOUR WEEKS HOW OFTEN DID YOUR ASTHMA SYMPTOMS (WHEEZING, COUGHING, SHORTNESS OF BREATH, CHEST TIGHTNESS OR PAIN) WAKE YOU UP AT NIGHT OR EARLIER THAN USUAL IN THE MORNING: NOT AT ALL
QUESTION_4 LAST FOUR WEEKS HOW OFTEN HAVE YOU USED YOUR RESCUE INHALER OR NEBULIZER MEDICATION (SUCH AS ALBUTEROL): NOT AT ALL
QUESTION_5 LAST FOUR WEEKS HOW WOULD YOU RATE YOUR ASTHMA CONTROL: COMPLETELY CONTROLLED
ACT_TOTALSCORE: 25
QUESTION_2 LAST FOUR WEEKS HOW OFTEN HAVE YOU HAD SHORTNESS OF BREATH: NOT AT ALL

## 2025-04-08 ASSESSMENT — PATIENT HEALTH QUESTIONNAIRE - PHQ9
SUM OF ALL RESPONSES TO PHQ QUESTIONS 1-9: 0
SUM OF ALL RESPONSES TO PHQ QUESTIONS 1-9: 0
10. IF YOU CHECKED OFF ANY PROBLEMS, HOW DIFFICULT HAVE THESE PROBLEMS MADE IT FOR YOU TO DO YOUR WORK, TAKE CARE OF THINGS AT HOME, OR GET ALONG WITH OTHER PEOPLE: NOT DIFFICULT AT ALL

## 2025-04-08 NOTE — PROGRESS NOTES
Amee is a 74 year old who is being evaluated via a billable telephone visit.    What phone number would you like to be contacted at? [phone]   How would you like to obtain your AVS? Fifi  Originating Location (pt. Location): Home    Distant Location (provider location):  Off-site  Telephone visit completed due to the patient did not consent to a video visit.    Assessment & Plan     Upset stomach  - CBC with platelets  - Comprehensive metabolic panel  - Lipase  - UA Macroscopic with reflex to Microscopic and Culture  - Adult GI  Referral - Procedure Only; Future  - Adult GI  Referral - Consult Only; Future  Abnormal feces  Due for colonoscopy  Patient states for many years she has had upset stomach, abnormal stools, discolored stools, even abdominal cramping at times.  She has done food diaries to see if this makes a difference to identify any potential triggers, this has never been consistent.  Has also trialed many over-the-counter medications without any significant relief.  Upon review of labs has had some mildly elevated LFTs in the past.  Will plan update labs as of now.  We did discuss about potential irritable bowel syndrome, can incorporate FODMAP diet to see if this makes a difference.  She is due for colonoscopy at the end of the year for routine screening purposes, but with her reoccurring epigastric discomfort and trials of over-the-counter medication, we will also get a EGD at the same time.  Worrisome signs and symptoms discussed, will follow-up once labs are finalized            Subjective   Amee is a 74 year old, presenting for the following health issues:  stomach issues and Bowel Problems      4/8/2025     2:10 PM   Additional Questions   Roomed by dieter   Accompanied by self     History of Present Illness       Reason for visit:  Upper Stomach issues, Green Stools   She is taking medications regularly.        Concern - upper stomach issue/ dark green/dark brown  stools  Onset: couple of years, recently in the last 6 months have more been more frequent  Description: dull pain, not every day, happens at random times, having more bowl movements more frequently, stools are dark green/dark brown, not all the time  Intensity: 3-4/10  Progression of Symptoms:  intermittent  Accompanying Signs & Symptoms: mild acid reflux  Previous history of similar problem: yes  Precipitating factors:        Worsened by: none  Alleviating factors:        Improved by: Pepto  Therapies tried and outcome: pepto  Medication Followup of Famotidine  Taking Medication as prescribed: NO  Side Effects:  None  Medication Helping Symptoms:  did not help symptoms so stopped taking it      Review of Systems  Constitutional, HEENT, cardiovascular, pulmonary, gi and gu systems are negative, except as otherwise noted.      Objective           Vitals:  No vitals were obtained today due to virtual visit.    Physical Exam   General: Alert and no distress //Respiratory: No audible wheeze, cough, or shortness of breath // Psychiatric:  Appropriate affect, tone, and pace of words            Phone call duration: 12 minutes  Signed Electronically by: TIFFANY LINDO MD     2.16

## 2025-04-14 DIAGNOSIS — J32.9 CHRONIC SINUSITIS, UNSPECIFIED LOCATION: ICD-10-CM

## 2025-04-14 RX ORDER — FLUTICASONE PROPIONATE 50 MCG
2 SPRAY, SUSPENSION (ML) NASAL DAILY
Qty: 48 G | Refills: 0 | Status: SHIPPED | OUTPATIENT
Start: 2025-04-14

## 2025-04-14 NOTE — TELEPHONE ENCOUNTER
Patient called regarding Flonase and that she spoke to the pharmacy and they said that they have not received any refill for it. It shows we sent it on 4/2, I called the pharmacy and they said they don't know what happened and that they don't see anything from us. Can this be resent?

## 2025-04-16 ENCOUNTER — TELEPHONE (OUTPATIENT)
Dept: GASTROENTEROLOGY | Facility: CLINIC | Age: 75
End: 2025-04-16
Payer: COMMERCIAL

## 2025-04-16 NOTE — TELEPHONE ENCOUNTER
"Endoscopy Scheduling Screen    Caller: patient    Have you had any respiratory illness or flu-like symptoms in the last 10 days?  No    What is your communication preference for Instructions and/or Bowel Prep?   Richt    What insurance is in the chart?  Other:  [facility] MEDICARE    Ordering/Referring Provider: TIFFANY LINDO   (If ordering provider performs procedure, schedule with ordering provider unless otherwise instructed. )    BMI: Estimated body mass index is 27.62 kg/m  as calculated from the following:    Height as of 2/4/25: 1.585 m (5' 2.4\").    Weight as of 2/20/25: 69.4 kg (153 lb).     Sedation Ordered  moderate sedation.   If patient BMI > 50 do not schedule in [facility].    If patient BMI > 45 do not schedule at [facility].    Are you taking methadone or Suboxone?  NO, No RN review required.    Have you been diagnosed and are being treated for severe PTSD or severe anxiety?  NO, No RN review required.    Are you taking any prescription medications for pain 3 or more times per week?   NO, No RN review required.    Do you have a history of malignant hyperthermia?  No    (Females) Are you currently pregnant?   No     Have you been diagnosed or told you have pulmonary hypertension?   No    Do you have an LVAD?  No    Have you been told you have moderate to severe sleep apnea?  No.    Have you been told you have COPD, asthma, or any other lung disease?  Yes     What breathing problems do you have?  Asthma     Do you use home oxygen?  No    Have your breathing problems required an ED visit or hospitalization in the last year?  No.    Has your doctor ordered any cardiac tests like echo, angiogram, stress test, ablation, or EKG, that you have not completed yet?  No    Do you  have a history of any heart conditions?  No     Have you ever had or are you waiting for an organ transplant?  No. Continue scheduling, no site restrictions.    Have you had a stroke or transient ischemic attack (TIA aka \"mini stroke\") in the " "last 2 years?   No.    Have you been diagnosed with or been told you have cirrhosis of the liver?   No.    Are you currently on dialysis?   No    Do you need assistance transferring?   No    BMI: Estimated body mass index is 27.62 kg/m  as calculated from the following:    Height as of 2/4/25: 1.585 m (5' 2.4\").    Weight as of 2/20/25: 69.4 kg (153 lb).     Is patients BMI > 40 and scheduling location UP?  No    Do you take an injectable or oral medication for weight loss or diabetes (excluding insulin)?  No    Do you take the medication Naltrexone?  No    Do you take blood thinners?  No       Prep   Are you currently on dialysis or do you have chronic kidney disease?  No    Do you have a diagnosis of diabetes?  No    Do you have a diagnosis of cystic fibrosis (CF)?  No    On a regular basis do you go 3 -5 days between bowel movements?  No    BMI > 40?  No    Preferred Pharmacy:    [id number] [address] [facility]  [address] [facility] [address]  Phone: [phone] Fax: [phone]    Final Scheduling Details     Procedure scheduled  Colonoscopy / Upper endoscopy (EGD)    Surgeon:  ANDRÉS     Date of procedure:  5/21/25     Pre-OP / PAC:   No - Not required for this site.    Location  [facility] - Per order.    Sedation   MAC/Deep Sedation  Per location.      Patient Reminders:   You will receive a call from a Nurse to review instructions and health history.  This assessment must be completed prior to your procedure.  Failure to complete the Nurse assessment may result in the procedure being cancelled.      On the day of your procedure, please designate an adult(s) who can drive you home stay with you for the next 24 hours. The medicines used in the exam will make you sleepy. You will not be able to drive.      You cannot take public transportation, ride share services, or non-medical taxi service without a responsible caregiver.  Medical transport services are allowed with the requirement that a " responsible caregiver will receive you at your destination.  We require that drivers and caregivers are confirmed prior to your procedure.

## 2025-04-20 NOTE — PROGRESS NOTES
[facility]      Consulting provider: ROBYN Cheek CNP  [address] [facility] [address]  [place] [address]      Reason for consultation: Osteoporosis      Assessment:  74-year-old woman with I am osteoporosis per T-score of -2.5 at the left femoral neck, s/p first Reclast infusion.  Risk factors include postmenopausal state, advancing age.  Will resume Reclast infusions, already scheduled in May 2025 for second infusion.  Calcium and vitamin D supplementations appear well.    Plan:   Reclast infusion in May 2025  Follow-up with me in 6 months      The  Following reports and notes were reviewed: recent DXA scan, labs    Yadira Cox MD  Endocrinology and Diabetes  Telephone contact:  [facility] [place] [phone]  [facility] [phone]      HPI:   Amee Crews is a 74 year old woman with ostepenia per Dxa scan, chronic back pain,     Most recent DXA scan 5/21/2024 lowest T-score -2.5 at the the left femoral neck  Prior fractures no  Height loss no  Prior osteoporosis medications Reclast no issue   Estrogen treatment no     Falls in the last year no    Dietary Calcium good balance  Dietary Protein intake good  Calcium supplements no  Vitamin D supplements daily  Alcohol consumption 2 a day beer or rum  Smoking no  Caffein consumption no    Exercise active    FH of osteoporosis or fractures no    High risk medications no    Gastric or bariatric surgery no  Stroke or MI in last 12 months no  Kidney stones no  History of radiation exposure no    Menopause 52y    Dental status no issues reg checks    Current Problem List:   Patient Active Problem List   Diagnosis    Anxiety    Migraine headache    Hot flashes    Hypercholesterolemia    Chondromalacia patellae    Insomnia    Allergic state    Chronic back pain    Mild persistent asthma without complication    Restless leg    Vaginal atrophy    Nasal polyposis     Fatty liver    Chronic sinusitis, unspecified location    Chronic rhinitis    Major depression in complete remission (H)    Dense breast tissue    Osteopenia of multiple sites    Atrophic vaginitis    Primary osteoarthritis of both hands    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, moderatly severe,slowly worsening    Pinguecula of both eyes    Nuclear cataract of both eyes    Myopia, bilateral    Macular cyst, hole, or pseudohole, bilateral, moderatly severe    Adult vitelliform macular degeneration    Gastroesophageal reflux disease without esophagitis    Vitelliform macular dystrophy    DDD (degenerative disc disease), lumbar    Chronic pain of left knee    Vitamin B12 deficiency    Osteopenia, unspecified location    Migraine without aura and without status migrainosus, not intractable    Depression    Asthma    Adjustment reaction with anxiety and depression    Major depression in complete remission    Neural foraminal stenosis of lumbosacral spine    Lumbar radiculitis    Hypertriglyceridemia    Age-related osteoporosis without current pathological fracture               Past Medical and Past Surgical History:  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    Allergic rhinitis     Allergies     asthma     mild intermittent    Chronic back pain     neck and low back    HA (headache)     muscle contraction     History of blood transfusion     In childhood    Hot flashes     Hyperlipidemia     Insomnia     Intermittent asthma 9/16/2011    Major depressive disorder, single episode, moderate (H)     Ocular migraine     Osteoarthritis of hand     Restless leg 9/16/2011       Past Surgical History:   Procedure Laterality Date    APPENDECTOMY      age 9     BIOPSY      took tissue from my uterus at least 15 years ago    COLONOSCOPY  14 years    COLONOSCOPY N/A 04/30/2015    Procedure: COMBINED COLONOSCOPY, SINGLE OR MULTIPLE BIOPSY/POLYPECTOMY BY  BIOPSY;  Surgeon: Doni Carey MD;  Location: [facility] OR    COLONOSCOPY WITH CO2 INSUFFLATION N/A 04/30/2015    Procedure: COLONOSCOPY WITH CO2 INSUFFLATION;  Surgeon: Doni Carey MD;  Location: [facility]    COLONOSCOPY WITH CO2 INSUFFLATION N/A 12/08/2020    Procedure: COLONOSCOPY, WITH CO2 INSUFFLATION;  Surgeon: Kareem Littlejohn DO;  Location: [facility]    COMBINED ESOPHAGOSCOPY, GASTROSCOPY, DUODENOSCOPY (EGD) WITH CO2 INSUFFLATION N/A 08/24/2017    Procedure: COMBINED ESOPHAGOSCOPY, GASTROSCOPY, DUODENOSCOPY (EGD) WITH CO2 INSUFFLATION;  EGD, Gastroesophageal reflux disease, esophagitis presence not specified Abdominal pain, generalized, Ref Daannierossi-Schroeder, 24.78 BMI;  Surgeon: Duane, William Charles, MD;  Location: [facility] OR    ENT SURGERY      Tonsillectomy    ESOPHAGOSCOPY, GASTROSCOPY, DUODENOSCOPY (EGD), COMBINED N/A 08/24/2017    Procedure: COMBINED ESOPHAGOSCOPY, GASTROSCOPY, DUODENOSCOPY (EGD), BIOPSY SINGLE OR MULTIPLE;;  Surgeon: Duane, William Charles, MD;  Location: [facility] OR    GI SURGERY      HEMORRHOIDECTOMY EXTERNAL N/A 2/12/2025    Procedure: HEMORRHOIDECTOMY, EXTERNAL, Excision of 3 residual tags;  Surgeon: Ricardo Giles MD;  Location: [facility] OR    INJECT EPIDURAL TRANSFORAMINAL Left 09/16/2022    Procedure: INJECTION, EPIDURAL, TRANSFORAMINAL APPROACH - L4-5;  Surgeon: Laya Bowman MD;  Location: [facility] OR    LAPAROSCOPY PROCEDURE UNLISTED      polyps found    MOUTH SURGERY  2016    SINUS SURGERY      x2       Medications:   Current Outpatient Medications   Medication Sig Dispense Refill    albuterol (PROAIR HFA/PROVENTIL HFA/VENTOLIN HFA) 108 (90 Base) MCG/ACT inhaler Inhale 2 puffs into the lungs every 4 hours as needed for shortness of breath. 18 g 5    APRODINE 2.5-60 MG TABS per tablet TAKE ONE TABLET BY MOUTH EVERY 6 HOURS AS NEEDED 96 tablet 0    cyanocobalamin (VITAMIN B-12) 1000 MCG tablet TAKE ONE TABLET BY MOUTH ONCE DAILY 90 tablet 0    famotidine (PEPCID) 20 MG  tablet TAKE ONE TABLET BY MOUTH TWICE A DAY AS NEEDED (Patient not taking: Reported on 2025) 180 tablet 0    fluticasone (FLONASE) 50 MCG/ACT nasal spray Spray 2 sprays into both nostrils daily. 48 g 0    fluticasone (FLOVENT HFA) 220 MCG/ACT inhaler 1 puff twice daily, rinse mouth out after Medication 12 g 11    magnesium 200 MG TABS Take 200 mg by mouth daily 1 tablet 0    omeprazole (PRILOSEC) 40 MG DR capsule Take 1 capsule (40 mg) by mouth daily. 90 capsule 0    pramipexole (MIRAPEX) 0.25 MG tablet TAKE THREE TABLETS BY MOUTH EVERY EVENING AT BEDTIME 270 tablet 0    rosuvastatin (CRESTOR) 5 MG tablet Take 1 tablet (5 mg) by mouth every other day. 90 tablet 3    senna-docusate (SENOKOT-S/PERICOLACE) 8.6-50 MG tablet Take 1-2 tablets by mouth 2 times daily. 30 tablet 0    sertraline (ZOLOFT) 100 MG tablet Take 1 tablet (100 mg) by mouth daily. With 25mg tablet 90 tablet 3    SUMAtriptan (IMITREX) 50 MG tablet TAKE ONE TABLET AT ONSET OF HEADACHE. MAY REPEAT AFTER 2 HOURS. DO NOT EXCEED 200 MG IN 24 HOURS 18 tablet 1    traZODone (DESYREL) 100 MG tablet TAKE ONE TABLET BY MOUTH AT BEDTIME 90 tablet 2    VITAMIN D3 50 MCG (2000 UT) tablet TAKE ONE TABLET BY MOUTH ONCE DAILY 90 tablet 0       Allergies:   Allergies   Allergen Reactions    Atorvastatin Cramps     Muscle pain    Crestor [Rosuvastatin] Muscle Pain (Myalgia)    Morphine Itching    Morphine Sulfate Itching       Social History     Tobacco Use    Smoking status: Former     Current packs/day: 0.00     Types: Cigarettes     Quit date: 1973     Years since quittin.3     Passive exposure: Never    Smokeless tobacco: Never    Tobacco comments:     Social smoker   Substance Use Topics    Alcohol use: Yes     Comment: one or two a day       Family History   Problem Relation Age of Onset    Diabetes Mother         Old age    Hypertension Mother     Cancer Father         stomach     Cancer Maternal Grandfather         lung     Heart Disease Other         Physical Examination:  not currently breastfeeding.  There is no height or weight on file to calculate BMI.    Wt Readings from Last 6 Encounters:   02/20/25 69.4 kg (153 lb)   02/12/25 69.4 kg (153 lb)   02/04/25 69.4 kg (153 lb)   01/06/25 67.6 kg (149 lb)   12/11/24 66.7 kg (147 lb)   10/09/24 66.7 kg (147 lb)        Reported vitals:  There were no vitals taken for this visit.   healthy, alert and no distress  PSYCH: Alert and oriented times 3; coherent speech, normal   rate and volume, able to articulate logical thoughts, able   to abstract reason, no tangential thoughts, no hallucinations   or delusions  Her affect is normal and pleasant  RESP: No cough, no audible wheezing, able to talk in full sentences  Remainder of exam unable to be completed due to telephone visits            Labs and Studies:   Lab Results   Component Value Date    ANGE 9.2 10/09/2024    ANGE 8.9 01/08/2024    ANGE 8.7 (L) 09/19/2023    ANGE 9.5 08/30/2023    ALBUMIN 4.6 10/09/2024    ALT 16 10/09/2024    AST 21 10/09/2024    BILITOTAL 0.8 10/09/2024    CR 0.86 10/09/2024    CR 0.80 01/08/2024    CR 0.86 09/19/2023     10/09/2024    TSH 0.94 04/25/2022    ALKPHOS 86 10/09/2024    VITDT 40 09/20/2021    HGB 13.2 02/04/2025     Results for orders placed in visit on 05/21/24    Dexa hip/pelvis/spine    Narrative  HISTORY: followup after reclast start; Age-related osteoporosis  without current pathological fracture    COMPARISON: 10/6/2022    FINDINGS:  Image quality: Adequate    Lumbar spine T-score in region of L1-L4 (L3) = -1.7  Percent change: 3%    HIPS:  Mean total hip T-score: -1.9  Mean total hip percent change: Not significant%    Left femoral neck T-score = -2.5  Right femoral neck T-score = -2    FRAX:  10 year probability of major osteoporotic fracture: 15.7%  10 year probability of hip fracture: 4.4%  The 10 year probability of fracture may be lower than reported if the  patient has received treatment. FRAX data should be  disregarded in  patient's taking bisphosphonates.    World Health Organization definition of osteoporosis and osteopenia  for  women:  Normal: T-score at or above -1.0  Low Bone Mass (Osteopenia): T-score between -1.0 and -2.5.  Osteoporosis: T-score at or below -2.5  T-scores are reported for postmenopausal women and men over 50 years  of age.    Impression  IMPRESSION:  Osteoporosis.    TERESA PICHARDO MD      SYSTEM ID:  [id number]

## 2025-04-21 ENCOUNTER — VIRTUAL VISIT (OUTPATIENT)
Dept: ENDOCRINOLOGY | Facility: CLINIC | Age: 75
End: 2025-04-21
Attending: NURSE PRACTITIONER
Payer: COMMERCIAL

## 2025-04-21 DIAGNOSIS — M81.0 AGE-RELATED OSTEOPOROSIS WITHOUT CURRENT PATHOLOGICAL FRACTURE: Primary | ICD-10-CM

## 2025-04-21 DIAGNOSIS — M85.89 OSTEOPENIA OF MULTIPLE SITES: ICD-10-CM

## 2025-04-21 PROCEDURE — 98006 SYNCH AUDIO-VIDEO EST MOD 30: CPT | Performed by: INTERNAL MEDICINE

## 2025-04-21 PROCEDURE — 1126F AMNT PAIN NOTED NONE PRSNT: CPT | Mod: 95 | Performed by: INTERNAL MEDICINE

## 2025-04-21 ASSESSMENT — PAIN SCALES - GENERAL: PAINLEVEL_OUTOF10: NO PAIN (0)

## 2025-04-21 NOTE — PROGRESS NOTES
Virtual Visit Details    Type of service:  Video Visit   Video Start Time:  3:45pm  Video End Time: 4:10pm    Originating Location (pt. Location): Home    Distant Location (provider location):  Off-site  Platform used for Video Visit: Andrew

## 2025-04-21 NOTE — NURSING NOTE
Current patient location: Patient declined to provide     Is the patient currently in the state of [place]? YES    Visit mode: VIDEO    If the visit is dropped, the patient can be reconnected by:VIDEO VISIT: Send to e-mail at: [email or web address]    Will anyone else be joining the visit? NO  (If patient encounters technical issues they should call [phone]    Are changes needed to the allergy or medication list? No    Are refills needed on medications prescribed by this physician? NO    Rooming Documentation:  Questionnaire(s) not done per department protocol    Reason for visit: Consult    Shelby Kocher VVF

## 2025-04-21 NOTE — LETTER
4/21/2025      Amee Crews  [address] [facility] [address]      Dear Colleague,    Thank you for referring your patient, Amee Crews, to the [facility]. Please see a copy of my visit note below.        [facility]      Consulting provider: ROBYN Cheek CNP  [address]  [address]  [facility] [place] [address]      Reason for consultation: Osteoporosis      Assessment:  74-year-old woman with I am osteoporosis per T-score of -2.5 at the left femoral neck, s/p first Reclast infusion.  Risk factors include postmenopausal state, advancing age.  Will resume Reclast infusions, already scheduled in May 2025 for second infusion.  Calcium and vitamin D supplementations appear well.    Plan:   Reclast infusion in May 2025  Follow-up with me in 6 months      The  Following reports and notes were reviewed: recent DXA scan, labs    Yadira Cox MD  Endocrinology and Diabetes  Telephone contact:  [facility] [phone]  [facility] [phone]      HPI:   Amee Crews is a 74 year old woman with ostepenia per Dxa scan, chronic back pain,     Most recent DXA scan 5/21/2024 lowest T-score -2.5 at the the left femoral neck  Prior fractures no  Height loss no  Prior osteoporosis medications Reclast no issue   Estrogen treatment no     Falls in the last year no    Dietary Calcium good balance  Dietary Protein intake good  Calcium supplements no  Vitamin D supplements daily  Alcohol consumption 2 a day beer or rum  Smoking no  Caffein consumption no    Exercise active    FH of osteoporosis or fractures no    High risk medications no    Gastric or bariatric surgery no  Stroke or MI in last 12 months no  Kidney stones no  History of radiation exposure no    Menopause 52y    Dental status no issues reg checks    Current Problem List:   Patient Active Problem List   Diagnosis    Anxiety     Migraine headache    Hot flashes    Hypercholesterolemia    Chondromalacia patellae    Insomnia    Allergic state    Chronic back pain    Mild persistent asthma without complication    Restless leg    Vaginal atrophy    Nasal polyposis    Fatty liver    Chronic sinusitis, unspecified location    Chronic rhinitis    Major depression in complete remission (H)    Dense breast tissue    Osteopenia of multiple sites    Atrophic vaginitis    Primary osteoarthritis of both hands    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, moderatly severe,slowly worsening    Pinguecula of both eyes    Nuclear cataract of both eyes    Myopia, bilateral    Macular cyst, hole, or pseudohole, bilateral, moderatly severe    Adult vitelliform macular degeneration    Gastroesophageal reflux disease without esophagitis    Vitelliform macular dystrophy    DDD (degenerative disc disease), lumbar    Chronic pain of left knee    Vitamin B12 deficiency    Osteopenia, unspecified location    Migraine without aura and without status migrainosus, not intractable    Depression    Asthma    Adjustment reaction with anxiety and depression    Major depression in complete remission    Neural foraminal stenosis of lumbosacral spine    Lumbar radiculitis    Hypertriglyceridemia    Age-related osteoporosis without current pathological fracture         Past Medical and Past Surgical History:  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    Allergic rhinitis     Allergies     asthma     mild intermittent    Chronic back pain     neck and low back    HA (headache)     muscle contraction     History of blood transfusion     In childhood    Hot flashes     Hyperlipidemia     Insomnia     Intermittent asthma 9/16/2011    Major depressive disorder, single episode, moderate (H)     Ocular migraine     Osteoarthritis of hand     Restless leg 9/16/2011       Past Surgical History:   Procedure  Laterality Date    APPENDECTOMY      age 9     BIOPSY      took tissue from my uterus at least 15 years ago    COLONOSCOPY  14 years    COLONOSCOPY N/A 04/30/2015    Procedure: COMBINED COLONOSCOPY, SINGLE OR MULTIPLE BIOPSY/POLYPECTOMY BY BIOPSY;  Surgeon: Doni Carey MD;  Location: [facility] OR    COLONOSCOPY WITH CO2 INSUFFLATION N/A 04/30/2015    Procedure: COLONOSCOPY WITH CO2 INSUFFLATION;  Surgeon: Doni Carey MD;  Location: [facility] OR    COLONOSCOPY WITH CO2 INSUFFLATION N/A 12/08/2020    Procedure: COLONOSCOPY, WITH CO2 INSUFFLATION;  Surgeon: Kareem Littlejohn DO;  Location: [facility] OR    COMBINED ESOPHAGOSCOPY, GASTROSCOPY, DUODENOSCOPY (EGD) WITH CO2 INSUFFLATION N/A 08/24/2017    Procedure: COMBINED ESOPHAGOSCOPY, GASTROSCOPY, DUODENOSCOPY (EGD) WITH CO2 INSUFFLATION;  EGD, Gastroesophageal reflux disease, esophagitis presence not specified Abdominal pain, generalized, Ref Damark-Schroeder, 24.78 BMI;  Surgeon: Duane, William Charles, MD;  Location: [facility] OR    ENT SURGERY      Tonsillectomy    ESOPHAGOSCOPY, GASTROSCOPY, DUODENOSCOPY (EGD), COMBINED N/A 08/24/2017    Procedure: COMBINED ESOPHAGOSCOPY, GASTROSCOPY, DUODENOSCOPY (EGD), BIOPSY SINGLE OR MULTIPLE;;  Surgeon: Duane, William Charles, MD;  Location: [facility] OR    GI SURGERY      HEMORRHOIDECTOMY EXTERNAL N/A 2/12/2025    Procedure: HEMORRHOIDECTOMY, EXTERNAL, Excision of 3 residual tags;  Surgeon: Ricardo Giles MD;  Location: [facility] OR    INJECT EPIDURAL TRANSFORAMINAL Left 09/16/2022    Procedure: INJECTION, EPIDURAL, TRANSFORAMINAL APPROACH - L4-5;  Surgeon: Laya Bowman MD;  Location: [facility] OR    LAPAROSCOPY PROCEDURE UNLISTED      polyps found    MOUTH SURGERY  2016    SINUS SURGERY      x2       Medications:   Current Outpatient Medications   Medication Sig Dispense Refill    albuterol (PROAIR HFA/PROVENTIL HFA/VENTOLIN HFA) 108 (90 Base) MCG/ACT inhaler Inhale 2 puffs into the lungs every 4 hours as needed for shortness of  breath. 18 g 5    APRODINE 2.5-60 MG TABS per tablet TAKE ONE TABLET BY MOUTH EVERY 6 HOURS AS NEEDED 96 tablet 0    cyanocobalamin (VITAMIN B-12) 1000 MCG tablet TAKE ONE TABLET BY MOUTH ONCE DAILY 90 tablet 0    famotidine (PEPCID) 20 MG tablet TAKE ONE TABLET BY MOUTH TWICE A DAY AS NEEDED (Patient not taking: Reported on 2025) 180 tablet 0    fluticasone (FLONASE) 50 MCG/ACT nasal spray Spray 2 sprays into both nostrils daily. 48 g 0    fluticasone (FLOVENT HFA) 220 MCG/ACT inhaler 1 puff twice daily, rinse mouth out after Medication 12 g 11    magnesium 200 MG TABS Take 200 mg by mouth daily 1 tablet 0    omeprazole (PRILOSEC) 40 MG DR capsule Take 1 capsule (40 mg) by mouth daily. 90 capsule 0    pramipexole (MIRAPEX) 0.25 MG tablet TAKE THREE TABLETS BY MOUTH EVERY EVENING AT BEDTIME 270 tablet 0    rosuvastatin (CRESTOR) 5 MG tablet Take 1 tablet (5 mg) by mouth every other day. 90 tablet 3    senna-docusate (SENOKOT-S/PERICOLACE) 8.6-50 MG tablet Take 1-2 tablets by mouth 2 times daily. 30 tablet 0    sertraline (ZOLOFT) 100 MG tablet Take 1 tablet (100 mg) by mouth daily. With 25mg tablet 90 tablet 3    SUMAtriptan (IMITREX) 50 MG tablet TAKE ONE TABLET AT ONSET OF HEADACHE. MAY REPEAT AFTER 2 HOURS. DO NOT EXCEED 200 MG IN 24 HOURS 18 tablet 1    traZODone (DESYREL) 100 MG tablet TAKE ONE TABLET BY MOUTH AT BEDTIME 90 tablet 2    VITAMIN D3 50 MCG (2000 UT) tablet TAKE ONE TABLET BY MOUTH ONCE DAILY 90 tablet 0       Allergies:   Allergies   Allergen Reactions    Atorvastatin Cramps     Muscle pain    Crestor [Rosuvastatin] Muscle Pain (Myalgia)    Morphine Itching    Morphine Sulfate Itching       Social History     Tobacco Use    Smoking status: Former     Current packs/day: 0.00     Types: Cigarettes     Quit date: 1973     Years since quittin.3     Passive exposure: Never    Smokeless tobacco: Never    Tobacco comments:     Social smoker   Substance Use Topics    Alcohol use: Yes      Comment: one or two a day       Family History   Problem Relation Age of Onset    Diabetes Mother         Old age    Hypertension Mother     Cancer Father         stomach     Cancer Maternal Grandfather         lung     Heart Disease Other      Physical Examination:  not currently breastfeeding.  There is no height or weight on file to calculate BMI.    Wt Readings from Last 6 Encounters:   02/20/25 69.4 kg (153 lb)   02/12/25 69.4 kg (153 lb)   02/04/25 69.4 kg (153 lb)   01/06/25 67.6 kg (149 lb)   12/11/24 66.7 kg (147 lb)   10/09/24 66.7 kg (147 lb)        Reported vitals:  There were no vitals taken for this visit.   healthy, alert and no distress  PSYCH: Alert and oriented times 3; coherent speech, normal   rate and volume, able to articulate logical thoughts, able   to abstract reason, no tangential thoughts, no hallucinations   or delusions  Her affect is normal and pleasant  RESP: No cough, no audible wheezing, able to talk in full sentences  Remainder of exam unable to be completed due to telephone visits          Labs and Studies:   Lab Results   Component Value Date    ANGE 9.2 10/09/2024    ANGE 8.9 01/08/2024    ANGE 8.7 (L) 09/19/2023    ANGE 9.5 08/30/2023    ALBUMIN 4.6 10/09/2024    ALT 16 10/09/2024    AST 21 10/09/2024    BILITOTAL 0.8 10/09/2024    CR 0.86 10/09/2024    CR 0.80 01/08/2024    CR 0.86 09/19/2023     10/09/2024    TSH 0.94 04/25/2022    ALKPHOS 86 10/09/2024    VITDT 40 09/20/2021    HGB 13.2 02/04/2025     Results for orders placed in visit on 05/21/24    Dexa hip/pelvis/spine    Narrative  HISTORY: followup after reclast start; Age-related osteoporosis  without current pathological fracture    COMPARISON: 10/6/2022    FINDINGS:  Image quality: Adequate    Lumbar spine T-score in region of L1-L4 (L3) = -1.7  Percent change: 3%    HIPS:  Mean total hip T-score: -1.9  Mean total hip percent change: Not significant%    Left femoral neck T-score = -2.5  Right femoral neck T-score =  -2    FRAX:  10 year probability of major osteoporotic fracture: 15.7%  10 year probability of hip fracture: 4.4%  The 10 year probability of fracture may be lower than reported if the  patient has received treatment. FRAX data should be disregarded in  patient's taking bisphosphonates.    World Health Organization definition of osteoporosis and osteopenia  for  women:  Normal: T-score at or above -1.0  Low Bone Mass (Osteopenia): T-score between -1.0 and -2.5.  Osteoporosis: T-score at or below -2.5  T-scores are reported for postmenopausal women and men over 50 years  of age.    Impression  IMPRESSION:  Osteoporosis.    TERESA PICHARDO MD      SYSTEM ID:  [id number]            Virtual Visit Details    Type of service:  Video Visit   Video Start Time:  3:45pm  Video End Time: 4:10pm    Originating Location (pt. Location): Home    Distant Location (provider location):  Off-site  Platform used for Video Visit: Andrew            Again, thank you for allowing me to participate in the care of your patient.        Sincerely,        Yadira Cox MD    Electronically signed

## 2025-04-22 NOTE — PATIENT INSTRUCTIONS
Please ensure to get Reclast infusion in May 2025 as scheduled    Continue vitamin D supplementation  Continue diet rich in calcium    Follow-up with me in about 6 months    Please do lab prior to the next visit in 6 months

## 2025-04-28 ENCOUNTER — VIRTUAL VISIT (OUTPATIENT)
Dept: GASTROENTEROLOGY | Facility: CLINIC | Age: 75
End: 2025-04-28
Attending: STUDENT IN AN ORGANIZED HEALTH CARE EDUCATION/TRAINING PROGRAM
Payer: COMMERCIAL

## 2025-04-28 DIAGNOSIS — R10.13 EPIGASTRIC PAIN: ICD-10-CM

## 2025-04-28 PROCEDURE — 1125F AMNT PAIN NOTED PAIN PRSNT: CPT | Mod: 95 | Performed by: PHYSICIAN ASSISTANT

## 2025-04-28 PROCEDURE — 98002 SYNCH AUDIO-VIDEO NEW MOD 45: CPT | Performed by: PHYSICIAN ASSISTANT

## 2025-04-28 NOTE — PROGRESS NOTES
GASTROENTEROLOGY NEW PATIENT VIDEO VISIT    CC/REFERRING MD:    Grady Bautista    REASON FOR CONSULTATION:   Grady Bautista for   Chief Complaint   Patient presents with    Consult       HISTORY OF PRESENT ILLNESS:    Amee Crews is a 74 year old female with a past medical history significant for migraines, asthma, chronic sinusitis, osteoporosis who is being evaluated via a billable video visit for recurrent epigastric pain.  Amee describes having an aching pain in the epigastrium intermittently, every few days she will feel it.  Not particularly after meals or anything specific that she can think of is a trigger.  Has some associated nausea at times, but no vomiting.  She can have a sensation of bloating and distention.  No significant heartburn, reflux, regurgitation.  No dysphagia, odynophagia, early satiety.  No unintentional weight loss.  Trialed omeprazole and other OTC antacids with minimal effect.  Currently scheduled for both EGD and colonoscopy about a month from now.  Did have a EGD in 2017 for similar symptoms and did not have any clearly abnormal findings.  Bili labs ordered by PCP but have not been completed yet.  She does have known fatty infiltration of the liver, seen on ultrasound in 2017.  No imaging since then.    Surgical history pertinent for appendectomy.  Family medical history significant for gastric cancer in father.  There is no tobacco or drug use, but reports 1-2 alcoholic drinks daily during the week and then more on weekends.      I have reviewed and updated the patient's Past Medical History, Social History, Family History and Medication List.    Exam:    General appearance:  Healthy appearing adult, in no acute distress  Eyes:  Sclera anicteric, Pupils round and reactive to light  Ears, nose, mouth and throat:  No obvious external lesions of ears and nose.  Hearing intact  Neck:  Symmetric, No obvious external lesions  Respiratory:  Normal  (ZYRTEC) 10 MG tablet Take 1 tablet by mouth daily as needed for Allergies Yes BETH Bowman CNP   albuterol sulfate HFA (VENTOLIN HFA) 108 (90 Base) MCG/ACT inhaler Inhale 2-4 puffs into the lungs every 4 hours as needed for Wheezing or Shortness of Breath (cough) Yes BETH Bowman CNP   albuterol (PROVENTIL) (2.5 MG/3ML) 0.083% nebulizer solution Nebulize 3 mL (2.5 mg total) with a nebulizer every 4-6 hours as needed for wheezing (or difficulty breathing). Yes BETH Bowman CNP   Spacer/Aero-Holding Chambers (AEROCHAMBER PLUS MARIAN-VU Elenita Terry) MISC Use with inhaler Yes BETH Bowman CNP   albuterol sulfate  (90 Base) MCG/ACT inhaler Take 4 Puffs by inhalation every 4 hours as needed for wheezing or cough. BETH Bowman CNP        Social History   Substance Use Topics    Smoking status: Passive Smoke Exposure - Never Smoker    Smokeless tobacco: Never Used      Comment: mom's friend who drives her to school when she misses the bus    Alcohol use No      Immunization History   Administered Date(s) Administered    DTaP 2008, 2008, 06/05/2009, 06/08/2010    DTaP/IPV (QUADRACEL;KINRIX) 06/19/2012    Hepatitis A 06/08/2010, 06/19/2012    Hepatitis B, unspecified formulation 2008, 2008, 2008, 06/05/2009    Hib, unspecified formulation 2008, 2008, 06/05/2009    IPV (Ipol) 2008, 2008, 06/05/2009    MMRV (ProQuad) 06/05/2009, 06/19/2012    Pneumococcal Vaccine, unspecified formulation 2008, 2008, 06/05/2009, 06/08/2010    Rotavirus Pentavalent (RotaTeq) 2008       Vitals:    12/03/18 1049   BP: 128/82   Pulse: 118   Resp: 18   Temp: 97.7 °F (36.5 °C)   SpO2: 98%   Weight: (!) 228 lb 9.6 oz (103.7 kg)   Height: (!) 5' 6.25\" (1.683 m)   Pain Score:   0 - No pain  Estimated body mass index is 36.62 kg/m² as calculated from the following:    Height as of this encounter: 5' 6.25\" (1.683 m). respiration, no use of accessory muscles   MSK:  No visual upper extremity, neck or facial muscle atrophy  ABD:  No visual abdominal distention, no audible borborygmi  Skin:  No rashes or jaundice   Psychiatric:  Oriented to person, place and time, Appropriate mood and affect.   Neurologic:  Peripheral muscle function and dexterity appear to be intact      PERTINENT STUDIES have been reviewed.    ASSESSMENT/PLAN:    Amee Crews is a 74 year old female who presents for evaluation of recurring epigastric pain of unclear etiology.  Reviewed potential etiologies, which would include distal reflux esophagitis, gastritis/peptic ulcer disease, H. pylori, biliary colic, chronic pancreatitis, functional dyspepsia, among others.  Agree with EGD, particularly with family history of gastric cancer.  I also encouraged her to complete labs ordered by PCP, as if there are elevations in liver enzymes or lipase, we would likely want to pursue imaging even sooner than the EGD.  She stated understanding of plan and will get labs done this week.  We will otherwise speak with her after her scopes and determine plan from there.    1. Epigastric pain  - Adult GI  Referral - Consult Only        Video-Visit Details    Video Visit Time: 11 minutes    Type of service:  Video Visit    Originating Location (pt. Location): Home    Distant Location (provider location):  Off-site    Platform used for Video Visit: Andrew Mendoza PA-C    Thank you for this consultation.  It was a pleasure to participate in the care of this patient; please contact us with any further questions.  A total of 25 minutes was spent with reviewing the chart, discussing with the patient, documentation and coordination of care on 4/28/2025.    This note was created with voice recognition software, and while reviewed for accuracy, typos may remain.     Kurt Mendoza PA-C  Division of Gastroenterology, Hepatology and Nutrition  [facility]  [facility]  [phone]

## 2025-04-28 NOTE — NURSING NOTE
Current patient location: [id number] [place] [id number]    Is the patient currently in the state of [place]? YES    Visit mode: VIDEO    If the visit is dropped, the patient can be reconnected by:VIDEO VISIT: Text to cell phone:   Telephone Information:   Mobile [phone]       Will anyone else be joining the visit? NO  (If patient encounters technical issues they should call [phone]    Are changes needed to the allergy or medication list? No    Are refills needed on medications prescribed by this physician? NO    Rooming Documentation:  Questionnaire(s) completed    Reason for visit: Consult    Marisa SMITH

## 2025-04-30 LAB
ALBUMIN SERPL BCG-MCNC: 4.7 G/DL (ref 3.5–5.2)
ALBUMIN UR-MCNC: 30 MG/DL
ALP SERPL-CCNC: 87 U/L (ref 40–150)
ALT SERPL W P-5'-P-CCNC: 20 U/L (ref 0–50)
ANION GAP SERPL CALCULATED.3IONS-SCNC: 13 MMOL/L (ref 7–15)
APPEARANCE UR: CLEAR
AST SERPL W P-5'-P-CCNC: 19 U/L (ref 0–45)
BACTERIA #/AREA URNS HPF: ABNORMAL /HPF
BILIRUB SERPL-MCNC: 0.7 MG/DL
BILIRUB UR QL STRIP: ABNORMAL
BUN SERPL-MCNC: 12.3 MG/DL (ref 8–23)
CALCIUM SERPL-MCNC: 9.5 MG/DL (ref 8.8–10.4)
CHLORIDE SERPL-SCNC: 103 MMOL/L (ref 98–107)
COLOR UR AUTO: YELLOW
CREAT SERPL-MCNC: 0.86 MG/DL (ref 0.51–0.95)
EGFRCR SERPLBLD CKD-EPI 2021: 71 ML/MIN/1.73M2
ERYTHROCYTE [DISTWIDTH] IN BLOOD BY AUTOMATED COUNT: 12.3 % (ref 10–15)
GLUCOSE SERPL-MCNC: 141 MG/DL (ref 70–99)
GLUCOSE UR STRIP-MCNC: NEGATIVE MG/DL
HCO3 SERPL-SCNC: 22 MMOL/L (ref 22–29)
HCT VFR BLD AUTO: 39.6 % (ref 35–47)
HGB BLD-MCNC: 13.6 G/DL (ref 11.7–15.7)
HGB UR QL STRIP: NEGATIVE
KETONES UR STRIP-MCNC: 15 MG/DL
LEUKOCYTE ESTERASE UR QL STRIP: ABNORMAL
LIPASE SERPL-CCNC: 24 U/L (ref 13–60)
MCH RBC QN AUTO: 32.9 PG (ref 26.5–33)
MCHC RBC AUTO-ENTMCNC: 34.3 G/DL (ref 31.5–36.5)
MCV RBC AUTO: 96 FL (ref 78–100)
MUCOUS THREADS #/AREA URNS LPF: PRESENT /LPF
NITRATE UR QL: NEGATIVE
PH UR STRIP: 5 [PH] (ref 5–7)
PLATELET # BLD AUTO: 233 10E3/UL (ref 150–450)
POTASSIUM SERPL-SCNC: 4 MMOL/L (ref 3.4–5.3)
PROT SERPL-MCNC: 7.3 G/DL (ref 6.4–8.3)
RBC # BLD AUTO: 4.14 10E6/UL (ref 3.8–5.2)
RBC #/AREA URNS AUTO: ABNORMAL /HPF
SODIUM SERPL-SCNC: 138 MMOL/L (ref 135–145)
SP GR UR STRIP: >=1.03 (ref 1–1.03)
SQUAMOUS #/AREA URNS AUTO: ABNORMAL /LPF
UROBILINOGEN UR STRIP-ACNC: 1 E.U./DL
WBC # BLD AUTO: 6.9 10E3/UL (ref 4–11)
WBC #/AREA URNS AUTO: ABNORMAL /HPF

## 2025-05-14 ENCOUNTER — LAB (OUTPATIENT)
Dept: INFUSION THERAPY | Facility: CLINIC | Age: 75
End: 2025-05-14
Attending: INTERNAL MEDICINE
Payer: COMMERCIAL

## 2025-05-14 ENCOUNTER — INFUSION THERAPY VISIT (OUTPATIENT)
Dept: INFUSION THERAPY | Facility: CLINIC | Age: 75
End: 2025-05-14
Attending: STUDENT IN AN ORGANIZED HEALTH CARE EDUCATION/TRAINING PROGRAM
Payer: COMMERCIAL

## 2025-05-14 VITALS
WEIGHT: 152.8 LBS | SYSTOLIC BLOOD PRESSURE: 144 MMHG | HEART RATE: 71 BPM | TEMPERATURE: 98.1 F | BODY MASS INDEX: 27.07 KG/M2 | OXYGEN SATURATION: 97 % | HEIGHT: 63 IN | DIASTOLIC BLOOD PRESSURE: 74 MMHG

## 2025-05-14 DIAGNOSIS — M81.0 AGE-RELATED OSTEOPOROSIS WITHOUT CURRENT PATHOLOGICAL FRACTURE: Primary | ICD-10-CM

## 2025-05-14 DIAGNOSIS — K21.9 GASTROESOPHAGEAL REFLUX DISEASE WITHOUT ESOPHAGITIS: Primary | ICD-10-CM

## 2025-05-14 DIAGNOSIS — K21.9 GASTROESOPHAGEAL REFLUX DISEASE WITHOUT ESOPHAGITIS: ICD-10-CM

## 2025-05-14 DIAGNOSIS — M81.0 AGE-RELATED OSTEOPOROSIS WITHOUT CURRENT PATHOLOGICAL FRACTURE: ICD-10-CM

## 2025-05-14 LAB
CALCIUM SERPL-MCNC: 9.3 MG/DL (ref 8.8–10.4)
CREAT SERPL-MCNC: 0.88 MG/DL (ref 0.51–0.95)
EGFRCR SERPLBLD CKD-EPI 2021: 69 ML/MIN/1.73M2

## 2025-05-14 PROCEDURE — 82310 ASSAY OF CALCIUM: CPT | Performed by: STUDENT IN AN ORGANIZED HEALTH CARE EDUCATION/TRAINING PROGRAM

## 2025-05-14 PROCEDURE — 250N000011 HC RX IP 250 OP 636: Mod: JZ | Performed by: STUDENT IN AN ORGANIZED HEALTH CARE EDUCATION/TRAINING PROGRAM

## 2025-05-14 PROCEDURE — 99207 PR NO CHARGE LOS: CPT

## 2025-05-14 PROCEDURE — 258N000003 HC RX IP 258 OP 636: Performed by: STUDENT IN AN ORGANIZED HEALTH CARE EDUCATION/TRAINING PROGRAM

## 2025-05-14 PROCEDURE — 82565 ASSAY OF CREATININE: CPT | Performed by: STUDENT IN AN ORGANIZED HEALTH CARE EDUCATION/TRAINING PROGRAM

## 2025-05-14 PROCEDURE — 36415 COLL VENOUS BLD VENIPUNCTURE: CPT | Performed by: STUDENT IN AN ORGANIZED HEALTH CARE EDUCATION/TRAINING PROGRAM

## 2025-05-14 PROCEDURE — 96365 THER/PROPH/DIAG IV INF INIT: CPT

## 2025-05-14 RX ORDER — ACETAMINOPHEN 325 MG/1
650 TABLET ORAL
OUTPATIENT
Start: 2026-05-14

## 2025-05-14 RX ORDER — EPINEPHRINE 1 MG/ML
0.3 INJECTION, SOLUTION INTRAMUSCULAR; SUBCUTANEOUS EVERY 5 MIN PRN
Status: CANCELLED | OUTPATIENT
Start: 2026-05-14

## 2025-05-14 RX ORDER — DIPHENHYDRAMINE HYDROCHLORIDE 50 MG/ML
25 INJECTION, SOLUTION INTRAMUSCULAR; INTRAVENOUS
Status: CANCELLED
Start: 2026-05-14

## 2025-05-14 RX ORDER — METHYLPREDNISOLONE SODIUM SUCCINATE 40 MG/ML
40 INJECTION INTRAMUSCULAR; INTRAVENOUS
Status: CANCELLED
Start: 2026-05-14

## 2025-05-14 RX ORDER — ZOLEDRONIC ACID 0.05 MG/ML
5 INJECTION, SOLUTION INTRAVENOUS ONCE
Status: COMPLETED | OUTPATIENT
Start: 2025-05-14 | End: 2025-05-14

## 2025-05-14 RX ORDER — ALBUTEROL SULFATE 90 UG/1
1-2 INHALANT RESPIRATORY (INHALATION)
Start: 2026-05-14

## 2025-05-14 RX ORDER — MEPERIDINE HYDROCHLORIDE 25 MG/ML
25 INJECTION INTRAMUSCULAR; INTRAVENOUS; SUBCUTANEOUS
OUTPATIENT
Start: 2026-05-14

## 2025-05-14 RX ORDER — ALBUTEROL SULFATE 90 UG/1
1-2 INHALANT RESPIRATORY (INHALATION)
Status: CANCELLED
Start: 2026-05-14

## 2025-05-14 RX ORDER — DIPHENHYDRAMINE HYDROCHLORIDE 50 MG/ML
50 INJECTION, SOLUTION INTRAMUSCULAR; INTRAVENOUS
Start: 2026-05-14

## 2025-05-14 RX ORDER — ACETAMINOPHEN 325 MG/1
650 TABLET ORAL
Status: CANCELLED | OUTPATIENT
Start: 2026-05-14

## 2025-05-14 RX ORDER — MEPERIDINE HYDROCHLORIDE 25 MG/ML
25 INJECTION INTRAMUSCULAR; INTRAVENOUS; SUBCUTANEOUS
Status: CANCELLED | OUTPATIENT
Start: 2026-05-14

## 2025-05-14 RX ORDER — DIPHENHYDRAMINE HYDROCHLORIDE 50 MG/ML
25 INJECTION, SOLUTION INTRAMUSCULAR; INTRAVENOUS
Start: 2026-05-14

## 2025-05-14 RX ORDER — ZOLEDRONIC ACID 0.05 MG/ML
5 INJECTION, SOLUTION INTRAVENOUS ONCE
Status: CANCELLED
Start: 2026-05-14

## 2025-05-14 RX ORDER — HEPARIN SODIUM (PORCINE) LOCK FLUSH IV SOLN 100 UNIT/ML 100 UNIT/ML
5 SOLUTION INTRAVENOUS
OUTPATIENT
Start: 2026-05-14

## 2025-05-14 RX ORDER — EPINEPHRINE 1 MG/ML
0.3 INJECTION, SOLUTION INTRAMUSCULAR; SUBCUTANEOUS EVERY 5 MIN PRN
OUTPATIENT
Start: 2026-05-14

## 2025-05-14 RX ORDER — HEPARIN SODIUM (PORCINE) LOCK FLUSH IV SOLN 100 UNIT/ML 100 UNIT/ML
5 SOLUTION INTRAVENOUS
Status: CANCELLED | OUTPATIENT
Start: 2026-05-14

## 2025-05-14 RX ORDER — ALBUTEROL SULFATE 0.83 MG/ML
2.5 SOLUTION RESPIRATORY (INHALATION)
Status: CANCELLED | OUTPATIENT
Start: 2026-05-14

## 2025-05-14 RX ORDER — ZOLEDRONIC ACID 0.05 MG/ML
5 INJECTION, SOLUTION INTRAVENOUS ONCE
Start: 2026-05-14

## 2025-05-14 RX ORDER — DIPHENHYDRAMINE HYDROCHLORIDE 50 MG/ML
50 INJECTION, SOLUTION INTRAMUSCULAR; INTRAVENOUS
Status: CANCELLED
Start: 2026-05-14

## 2025-05-14 RX ORDER — HEPARIN SODIUM,PORCINE 10 UNIT/ML
5-20 VIAL (ML) INTRAVENOUS DAILY PRN
Status: CANCELLED | OUTPATIENT
Start: 2026-05-14

## 2025-05-14 RX ORDER — METHYLPREDNISOLONE SODIUM SUCCINATE 40 MG/ML
40 INJECTION INTRAMUSCULAR; INTRAVENOUS
Start: 2026-05-14

## 2025-05-14 RX ORDER — HEPARIN SODIUM,PORCINE 10 UNIT/ML
5-20 VIAL (ML) INTRAVENOUS DAILY PRN
OUTPATIENT
Start: 2026-05-14

## 2025-05-14 RX ORDER — ALBUTEROL SULFATE 0.83 MG/ML
2.5 SOLUTION RESPIRATORY (INHALATION)
OUTPATIENT
Start: 2026-05-14

## 2025-05-14 RX ADMIN — SODIUM CHLORIDE 250 ML: 0.9 INJECTION, SOLUTION INTRAVENOUS at 12:06

## 2025-05-14 RX ADMIN — ZOLEDRONIC ACID 5 MG: 0.05 INJECTION, SOLUTION INTRAVENOUS at 12:07

## 2025-05-14 ASSESSMENT — PAIN SCALES - GENERAL: PAINLEVEL_OUTOF10: NO PAIN (0)

## 2025-05-14 NOTE — PROGRESS NOTES
Infusion Nursing Note:  Amee Crews presents today for Reclast.    Patient seen by provider today: No   present during visit today: Not Applicable.    Note: Pt new to [facility], oriented to call light and reviewed reclast information with pt. Pt has had reclast in the past and tolerated without complications. Pt denies jaw concerns at this time.   Pt denies any medical concerns, see flow sheet for assessment.    Intravenous Access:  Peripheral IV placed.    Treatment Conditions:  Cr  0.88  Ca 9.3      Post Infusion Assessment:  Patient tolerated infusion without incident.  Blood return noted pre and post infusion.  Site patent and intact, free from redness, edema or discomfort.  No evidence of extravasations.  Access discontinued per protocol.       Discharge Plan:   Patient discharged in stable condition accompanied by: self.  Departure Mode: Ambulatory.  Pt will RTC in one year for reclast.      Dragan Pfeiffer RN

## 2025-05-15 ENCOUNTER — RESULTS FOLLOW-UP (OUTPATIENT)
Dept: FAMILY MEDICINE | Facility: OTHER | Age: 75
End: 2025-05-15

## 2025-05-15 NOTE — RESULT ENCOUNTER NOTE
Amee,    Your results are within normal limits/negative and nothing else needs to be done at this time.       If you have any questions feel free to call the clinic at [phone].      Thank you,    Grady Bautista MD

## 2025-05-20 NOTE — H&P
[facility] Anesthesia Pre-op History and Physical    Amee Crews MRN# [id number]   Age: 74 year old YOB: 1950      Date of Surgery: 5/21/2025 Location [facility]      Date of Exam 5/21/2025 Facility (In hospital)       Home clinic: [facility]  Primary care provider: Grady Bautista         Chief Complaint and/or Reason for Procedure:   No chief complaint on file.  EGD. Dyspepsia, exam 2017 reactive gastropathy, no Hp. Intermittent sx, satiety. Family hx gastric cancer.  Colon. Exam 2020, hx polyps (adenoma 2015).       Active problem list:     Patient Active Problem List    Diagnosis Date Noted    Age-related osteoporosis without current pathological fracture 06/06/2023     Priority: Medium    Hypertriglyceridemia 05/08/2023     Priority: Medium    Neural foraminal stenosis of lumbosacral spine 08/19/2022     Priority: Medium     Added automatically from request for surgery [id number]      Lumbar radiculitis 08/19/2022     Priority: Medium     Added automatically from request for surgery [id number]      Major depression in complete remission 03/28/2022     Priority: Medium    Vitelliform macular dystrophy 11/29/2017     Priority: Medium    DDD (degenerative disc disease), lumbar 11/29/2017     Priority: Medium    Chronic pain of left knee 11/29/2017     Priority: Medium    Vitamin B12 deficiency 11/29/2017     Priority: Medium    Osteopenia, unspecified location 11/29/2017     Priority: Medium    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, moderatly severe,slowly worsening 08/31/2017     Priority: Medium    Pinguecula of both eyes 08/31/2017     Priority: Medium    Nuclear cataract of both eyes 08/31/2017     Priority: Medium    Myopia, bilateral 08/31/2017     Priority: Medium    Macular cyst, hole, or pseudohole, bilateral, moderatly severe 08/31/2017     Priority: Medium    Adult vitelliform macular degeneration  08/31/2017     Priority: Medium    Gastroesophageal reflux disease without esophagitis 08/31/2017     Priority: Medium    Dense breast tissue 08/23/2017     Priority: Medium    Osteopenia of multiple sites 08/23/2017     Priority: Medium    Atrophic vaginitis 08/23/2017     Priority: Medium    Primary osteoarthritis of both hands 08/23/2017     Priority: Medium    Major depression in complete remission (H) 04/24/2017     Priority: Medium    Chronic sinusitis, unspecified location 01/04/2017     Priority: Medium    Chronic rhinitis 01/04/2017     Priority: Medium    Migraine without aura and without status migrainosus, not intractable 01/27/2016     Priority: Medium    Adjustment reaction with anxiety and depression 01/27/2016     Priority: Medium    Fatty liver 01/23/2012     Priority: Medium    Nasal polyposis 11/18/2011     Priority: Medium    Mild persistent asthma without complication 09/16/2011     Priority: Medium     Rarely uses albuterol inhaler      Restless leg 09/16/2011     Priority: Medium     Ferritin normal in 2011      Vaginal atrophy 09/16/2011     Priority: Medium    Insomnia      Priority: Medium    Chronic back pain      Priority: Medium     neck and low back      Chondromalacia patellae 08/22/2011     Priority: Medium     Saw Dr. Ruby 2011      Anxiety 11/04/2009     Priority: Medium    Migraine headache 11/04/2009     Priority: Medium     Ocular migraines as well- diagnosed at [place]  (Problem list name updated by automated process. Provider to review and confirm.)      Hot flashes 11/04/2009     Priority: Medium    Hypercholesterolemia 06/20/2005     Priority: Medium     Muscle aches with pravastatin, simvastatin, atrovastatin.  Gemfibrozil was tried and tolerated well.  No side effects.  Discontinued on January 21, 2015 due to lack of long term benefit in stroke/MI prevention.  Will reconsider if new data arrives that shows stroke/MI prevention.  Sent for Crestor 5 mg daily January 21,  2015        Allergic state 09/27/2004     Priority: Medium     (Problem list name updated by automated process. Provider to review and confirm.)    Formatting of this note might be different from the original.  Vendor Update to relapce retired dx codes and/or terms.      Depression 09/27/2004     Priority: Medium    Asthma 09/27/2004     Priority: Medium     Formatting of this note might be different from the original.  Asthma, Unspecified  Intermittent asthma Rarely uses albuterol inhaler              Medications (include herbals and vitamins):   Any Plavix use in the last 7 days? No     No current facility-administered medications for this encounter.     Current Outpatient Medications   Medication Sig Dispense Refill    albuterol (PROAIR HFA/PROVENTIL HFA/VENTOLIN HFA) 108 (90 Base) MCG/ACT inhaler Inhale 2 puffs into the lungs every 4 hours as needed for shortness of breath. 18 g 5    APRODINE 2.5-60 MG TABS per tablet TAKE ONE TABLET BY MOUTH EVERY 6 HOURS AS NEEDED 96 tablet 0    bisacodyl (DULCOLAX) 5 MG EC tablet Take 2 tablets at 3 pm the day before your procedure. If your procedure is before 11 am, take 2 additional tablets at 11 pm. If your procedure is after 11 am, take 2 additional tablets at 6 am. For additional instructions refer to your colonoscopy prep instructions. 4 tablet 0    cyanocobalamin (VITAMIN B-12) 1000 MCG tablet TAKE ONE TABLET BY MOUTH ONCE DAILY 90 tablet 0    famotidine (PEPCID) 20 MG tablet TAKE ONE TABLET BY MOUTH TWICE A DAY AS NEEDED 180 tablet 0    fluticasone (FLONASE) 50 MCG/ACT nasal spray Spray 2 sprays into both nostrils daily. 48 g 0    fluticasone (FLOVENT HFA) 220 MCG/ACT inhaler 1 puff twice daily, rinse mouth out after Medication 12 g 11    magnesium 200 MG TABS Take 200 mg by mouth daily 1 tablet 0    omeprazole (PRILOSEC) 40 MG DR capsule Take 1 capsule (40 mg) by mouth daily. 90 capsule 0    polyethylene glycol (GOLYTELY) 236 g suspension The night before the exam at  6 pm drink an 8-ounce glass every 15 minutes until the jug is half empty. If you arrive before 11 AM: Drink the other half of the Golytely jug at 11 PM night before procedure. If you arrive after 11 AM: Drink the other half of the "2,10E+07"ly jug at 6 AM day of procedure. For additional instructions refer to your colonoscopy prep instructions. 4000 mL 0    pramipexole (MIRAPEX) 0.25 MG tablet TAKE THREE TABLETS BY MOUTH EVERY EVENING AT BEDTIME 270 tablet 0    rosuvastatin (CRESTOR) 5 MG tablet Take 1 tablet (5 mg) by mouth every other day. 90 tablet 3    senna-docusate (SENOKOT-S/PERICOLACE) 8.6-50 MG tablet Take 1-2 tablets by mouth 2 times daily. (Patient not taking: Reported on 5/14/2025) 30 tablet 0    sertraline (ZOLOFT) 100 MG tablet Take 1 tablet (100 mg) by mouth daily. With 25mg tablet 90 tablet 3    SUMAtriptan (IMITREX) 50 MG tablet TAKE ONE TABLET AT ONSET OF HEADACHE. MAY REPEAT AFTER 2 HOURS. DO NOT EXCEED 200 MG IN 24 HOURS 18 tablet 1    traZODone (DESYREL) 100 MG tablet TAKE ONE TABLET BY MOUTH AT BEDTIME 90 tablet 2    VITAMIN D3 50 MCG (2000 UT) tablet TAKE ONE TABLET BY MOUTH ONCE DAILY 90 tablet 0             Allergies:      Allergies   Allergen Reactions    Atorvastatin Cramps     Muscle pain    Crestor [Rosuvastatin] Muscle Pain (Myalgia)    Morphine Itching    Morphine Sulfate Itching     Allergy to Latex? No  Allergy to tape?   No  Intolerances:             Physical Exam:   All vitals have been reviewed  No data found.  No intake/output data recorded.  Lungs:   No increased work of breathing, good air exchange, clear to auscultation bilaterally, no crackles or wheezing     Cardiovascular:   Normal apical impulse, regular rate and rhythm, normal S1 and S2, no S3 or S4, and no murmur noted             Lab / Radiology Results:            Anesthetic risk and/or ASA classification:       Fam Camarena MD

## 2025-05-21 ENCOUNTER — HOSPITAL ENCOUNTER (OUTPATIENT)
Facility: CLINIC | Age: 75
Discharge: HOME | End: 2025-05-21
Attending: INTERNAL MEDICINE | Admitting: INTERNAL MEDICINE
Payer: COMMERCIAL

## 2025-05-21 ENCOUNTER — ANESTHESIA (OUTPATIENT)
Dept: GASTROENTEROLOGY | Facility: CLINIC | Age: 75
End: 2025-05-21
Payer: COMMERCIAL

## 2025-05-21 ENCOUNTER — ANESTHESIA EVENT (OUTPATIENT)
Dept: GASTROENTEROLOGY | Facility: CLINIC | Age: 75
End: 2025-05-21
Payer: COMMERCIAL

## 2025-05-21 VITALS
HEART RATE: 69 BPM | TEMPERATURE: 98.3 F | OXYGEN SATURATION: 96 % | BODY MASS INDEX: 26.57 KG/M2 | RESPIRATION RATE: 18 BRPM | WEIGHT: 150 LBS | DIASTOLIC BLOOD PRESSURE: 76 MMHG | SYSTOLIC BLOOD PRESSURE: 134 MMHG

## 2025-05-21 LAB
COLONOSCOPY: NORMAL
UPPER GI ENDOSCOPY: NORMAL

## 2025-05-21 PROCEDURE — 88305 TISSUE EXAM BY PATHOLOGIST: CPT | Mod: TC | Performed by: INTERNAL MEDICINE

## 2025-05-21 PROCEDURE — 45385 COLONOSCOPY W/LESION REMOVAL: CPT | Performed by: INTERNAL MEDICINE

## 2025-05-21 PROCEDURE — 45380 COLONOSCOPY AND BIOPSY: CPT | Performed by: INTERNAL MEDICINE

## 2025-05-21 PROCEDURE — 250N000009 HC RX 250: Performed by: NURSE ANESTHETIST, CERTIFIED REGISTERED

## 2025-05-21 PROCEDURE — 258N000003 HC RX IP 258 OP 636: Performed by: NURSE ANESTHETIST, CERTIFIED REGISTERED

## 2025-05-21 PROCEDURE — 88305 TISSUE EXAM BY PATHOLOGIST: CPT | Mod: 26 | Performed by: PATHOLOGY

## 2025-05-21 PROCEDURE — 250N000011 HC RX IP 250 OP 636: Performed by: NURSE ANESTHETIST, CERTIFIED REGISTERED

## 2025-05-21 PROCEDURE — 370N000017 HC ANESTHESIA TECHNICAL FEE, PER MIN: Performed by: INTERNAL MEDICINE

## 2025-05-21 PROCEDURE — 43239 EGD BIOPSY SINGLE/MULTIPLE: CPT | Performed by: INTERNAL MEDICINE

## 2025-05-21 RX ORDER — ONDANSETRON 4 MG/1
4 TABLET, ORALLY DISINTEGRATING ORAL EVERY 30 MIN PRN
Status: DISCONTINUED | OUTPATIENT
Start: 2025-05-21 | End: 2025-05-21 | Stop reason: HOSPADM

## 2025-05-21 RX ORDER — OXYCODONE HYDROCHLORIDE 5 MG/1
10 TABLET ORAL
Status: DISCONTINUED | OUTPATIENT
Start: 2025-05-21 | End: 2025-05-21 | Stop reason: HOSPADM

## 2025-05-21 RX ORDER — PROPOFOL 10 MG/ML
INJECTION, EMULSION INTRAVENOUS PRN
Status: DISCONTINUED | OUTPATIENT
Start: 2025-05-21 | End: 2025-05-21

## 2025-05-21 RX ORDER — NALOXONE HYDROCHLORIDE 0.4 MG/ML
0.1 INJECTION, SOLUTION INTRAMUSCULAR; INTRAVENOUS; SUBCUTANEOUS
Status: DISCONTINUED | OUTPATIENT
Start: 2025-05-21 | End: 2025-05-21 | Stop reason: HOSPADM

## 2025-05-21 RX ORDER — SODIUM CHLORIDE, SODIUM LACTATE, POTASSIUM CHLORIDE, CALCIUM CHLORIDE 600; 310; 30; 20 MG/100ML; MG/100ML; MG/100ML; MG/100ML
INJECTION, SOLUTION INTRAVENOUS CONTINUOUS
Status: DISCONTINUED | OUTPATIENT
Start: 2025-05-21 | End: 2025-05-21 | Stop reason: HOSPADM

## 2025-05-21 RX ORDER — DEXAMETHASONE SODIUM PHOSPHATE 10 MG/ML
4 INJECTION, SOLUTION INTRAMUSCULAR; INTRAVENOUS
Status: DISCONTINUED | OUTPATIENT
Start: 2025-05-21 | End: 2025-05-21 | Stop reason: HOSPADM

## 2025-05-21 RX ORDER — PROPOFOL 10 MG/ML
INJECTION, EMULSION INTRAVENOUS CONTINUOUS PRN
Status: DISCONTINUED | OUTPATIENT
Start: 2025-05-21 | End: 2025-05-21

## 2025-05-21 RX ORDER — OXYCODONE HYDROCHLORIDE 5 MG/1
5 TABLET ORAL
Status: DISCONTINUED | OUTPATIENT
Start: 2025-05-21 | End: 2025-05-21 | Stop reason: HOSPADM

## 2025-05-21 RX ORDER — ONDANSETRON 2 MG/ML
4 INJECTION INTRAMUSCULAR; INTRAVENOUS EVERY 30 MIN PRN
Status: DISCONTINUED | OUTPATIENT
Start: 2025-05-21 | End: 2025-05-21 | Stop reason: HOSPADM

## 2025-05-21 RX ORDER — LIDOCAINE HYDROCHLORIDE 20 MG/ML
INJECTION, SOLUTION INFILTRATION; PERINEURAL PRN
Status: DISCONTINUED | OUTPATIENT
Start: 2025-05-21 | End: 2025-05-21

## 2025-05-21 RX ADMIN — LIDOCAINE HYDROCHLORIDE 30 MG: 20 INJECTION, SOLUTION INFILTRATION; PERINEURAL at 13:20

## 2025-05-21 RX ADMIN — PROPOFOL 200 MCG/KG/MIN: 10 INJECTION, EMULSION INTRAVENOUS at 13:21

## 2025-05-21 RX ADMIN — PROPOFOL 100 MG: 10 INJECTION, EMULSION INTRAVENOUS at 13:21

## 2025-05-21 RX ADMIN — SODIUM CHLORIDE, SODIUM LACTATE, POTASSIUM CHLORIDE, AND CALCIUM CHLORIDE: .6; .31; .03; .02 INJECTION, SOLUTION INTRAVENOUS at 12:12

## 2025-05-21 ASSESSMENT — ACTIVITIES OF DAILY LIVING (ADL)
ADLS_ACUITY_SCORE: 41
ADLS_ACUITY_SCORE: 42
ADLS_ACUITY_SCORE: 41

## 2025-05-21 ASSESSMENT — LIFESTYLE VARIABLES: TOBACCO_USE: 1

## 2025-05-21 NOTE — ANESTHESIA POSTPROCEDURE EVALUATION
Patient: Amee Crews    Procedure: Procedure(s):  COLONOSCOPY, FLEXIBLE, WITH LESION REMOVAL USING SNARE  ESOPHAGOGASTRODUODENOSCOPY, WITH BIOPSY       Anesthesia Type:  No value filed.    Note:  Disposition: Outpatient   Postop Pain Control: Uneventful            Sign Out: Well controlled pain   PONV: No   Neuro/Psych: Uneventful            Sign Out: Acceptable/Baseline neuro status   Airway/Respiratory: Uneventful            Sign Out: Acceptable/Baseline resp. status   CV/Hemodynamics: Uneventful            Sign Out: Acceptable CV status; No obvious hypovolemia; No obvious fluid overload   Other NRE: NONE   DID A NON-ROUTINE EVENT OCCUR? No           Last vitals:  Vitals:    05/21/25 1148   BP: (!) 145/81   Pulse: 74   Resp: 16   Temp: 98.3  F (36.8  C)   SpO2: 96%       Electronically Signed By: ROBYN Campbell CRNA  May 21, 2025  2:01 PM

## 2025-05-21 NOTE — ANESTHESIA PREPROCEDURE EVALUATION
Anesthesia Pre-Procedure Evaluation    Patient: Amee Crews   MRN: [id number] : 1950          Procedure : Procedure(s):  Colonoscopy, Screening  Esophagoscopy, gastroscopy, duodenoscopy (EGD), combined         Past Medical History:   Diagnosis Date    Adult vitelliform macular degeneration 2017    Allergic rhinitis     Allergies     asthma     mild intermittent    Chronic back pain     neck and low back    HA (headache)     muscle contraction     History of blood transfusion     In childhood    Hot flashes     Hyperlipidemia     Insomnia     Intermittent asthma 2011    Major depressive disorder, single episode, moderate (H)     Ocular migraine     Osteoarthritis of hand     Restless leg 2011      Past Surgical History:   Procedure Laterality Date    APPENDECTOMY      age 9     BIOPSY      took tissue from my uterus at least 15 years ago    COLONOSCOPY  14 years    COLONOSCOPY N/A 2015    Procedure: COMBINED COLONOSCOPY, SINGLE OR MULTIPLE BIOPSY/POLYPECTOMY BY BIOPSY;  Surgeon: Doni Carey MD;  Location: [facility] OR    COLONOSCOPY WITH CO2 INSUFFLATION N/A 2015    Procedure: COLONOSCOPY WITH CO2 INSUFFLATION;  Surgeon: Doni Carey MD;  Location: [facility]    COLONOSCOPY WITH CO2 INSUFFLATION N/A 2020    Procedure: COLONOSCOPY, WITH CO2 INSUFFLATION;  Surgeon: Kareem Littlejohn DO;  Location: [facility]    COMBINED ESOPHAGOSCOPY, GASTROSCOPY, DUODENOSCOPY (EGD) WITH CO2 INSUFFLATION N/A 2017    Procedure: COMBINED ESOPHAGOSCOPY, GASTROSCOPY, DUODENOSCOPY (EGD) WITH CO2 INSUFFLATION;  EGD, Gastroesophageal reflux disease, esophagitis presence not specified Abdominal pain, generalized, Ref Dahlheimer-Schroeder, 24.78 BMI;  Surgeon: Duane, William Charles, MD;  Location: [facility] OR    ENT SURGERY      Tonsillectomy    ESOPHAGOSCOPY, GASTROSCOPY, DUODENOSCOPY (EGD), COMBINED N/A 2017    Procedure: COMBINED ESOPHAGOSCOPY, GASTROSCOPY, DUODENOSCOPY (EGD),  BIOPSY SINGLE OR MULTIPLE;;  Surgeon: Duane, William Charles, MD;  Location: [facility]    GI SURGERY      HEMORRHOIDECTOMY EXTERNAL N/A 2025    Procedure: HEMORRHOIDECTOMY, EXTERNAL, Excision of 3 residual tags;  Surgeon: Ricardo Giles MD;  Location: [facility]    INJECT EPIDURAL TRANSFORAMINAL Left 2022    Procedure: INJECTION, EPIDURAL, TRANSFORAMINAL APPROACH - L4-5;  Surgeon: Laya Bowman MD;  Location: [facility]    LAPAROSCOPY PROCEDURE UNLISTED      polyps found    MOUTH SURGERY  2016    SINUS SURGERY      x2      Allergies   Allergen Reactions    Atorvastatin Cramps     Muscle pain    Crestor [Rosuvastatin] Muscle Pain (Myalgia)    Morphine Itching    Morphine Sulfate Itching      Social History     Tobacco Use    Smoking status: Former     Current packs/day: 0.00     Types: Cigarettes     Quit date: 1973     Years since quittin.4     Passive exposure: Never    Smokeless tobacco: Never    Tobacco comments:     Social smoker   Substance Use Topics    Alcohol use: Yes     Comment: one or two a day during the week, on the weekend will have a little bit more      Wt Readings from Last 1 Encounters:   25 69.3 kg (152 lb 12.8 oz)        Anesthesia Evaluation   Pt has had prior anesthetic. Type: General.        ROS/MED HX  ENT/Pulmonary:     (+)                tobacco use, Current use,    Mild Persistent, asthma  Treatment: Inhaler prn,                 Neurologic:     (+)      migraines,                          Cardiovascular:     (+) Dyslipidemia - -   -  - -                                      METS/Exercise Tolerance:     Hematologic: Comments: Vitamin B12 deficiency      Musculoskeletal: Comment: Chondromalacia patellae    Chronic back pain    Neural foraminal stenosis of lumbosacral spine    Lumbar radiculitis    Osteoporosis     Restless leg syndrome   (+)  arthritis,             GI/Hepatic: Comment: Fatty liver disease     Hemorrhoids     (+) GERD,       bowel prep,     liver disease,     "   Renal/Genitourinary:       Endo:       Psychiatric/Substance Use: Comment: Insomnia     (+) psychiatric history anxiety and depression       Infectious Disease:  - neg infectious disease ROS     Malignancy:  - neg malignancy ROS     Other:              Physical Exam  Airway  Mallampati: II  TM distance: >3 FB  Neck ROM: full    Cardiovascular - normal exam   Dental   (+) Minor Abnormalities - some fillings, tiny chips      Pulmonary - normal exam      Neurological - normal exam  She appears awake, alert and oriented x3.    Other Findings       OUTSIDE LABS:  CBC:   Lab Results   Component Value Date    WBC 6.9 04/30/2025    WBC 6.8 02/04/2025    HGB 13.6 04/30/2025    HGB 13.2 02/04/2025    HCT 39.6 04/30/2025    HCT 38.1 02/04/2025     04/30/2025     02/04/2025     BMP:   Lab Results   Component Value Date     04/30/2025     10/09/2024    POTASSIUM 4.0 04/30/2025    POTASSIUM 4.6 10/09/2024    CHLORIDE 103 04/30/2025    CHLORIDE 103 10/09/2024    CO2 22 04/30/2025    CO2 22 10/09/2024    BUN 12.3 04/30/2025    BUN 10.1 10/09/2024    CR 0.88 05/14/2025    CR 0.86 04/30/2025     (H) 04/30/2025    GLC 95 10/09/2024     COAGS:   Lab Results   Component Value Date    PTT 27 07/18/2005    INR 1.00 07/18/2005     POC: No results found for: \"BGM\", \"HCG\", \"HCGS\"  HEPATIC:   Lab Results   Component Value Date    ALBUMIN 4.7 04/30/2025    PROTTOTAL 7.3 04/30/2025    ALT 20 04/30/2025    AST 19 04/30/2025    ALKPHOS 87 04/30/2025    BILITOTAL 0.7 04/30/2025    BILIDIRECT 0.1 04/12/2013     OTHER:   Lab Results   Component Value Date    PH 6.0 03/30/2014    A1C 5.0 10/22/2020    ANGE 9.3 05/14/2025    MAG 2.3 09/16/2011    LIPASE 24 04/30/2025    TSH 0.94 04/25/2022       Anesthesia Plan    ASA Status:  2      NPO Status: NPO Appropriate   Anesthesia Type:.  Airway: natural airway.   Techniques and Equipment:       - Monitoring Plan: standard ASA monitoring     Consents    Anesthesia Plan(s) " "and associated risks, benefits, and realistic alternatives discussed. Questions answered and patient/representative(s) expressed understanding.     - Discussed:     - Discussed with:  Patient        - Pt is DNR/DNI Status: no DNR          Postoperative Care         Comments:                   ROBYN Cohen CRNA    I have reviewed the pertinent notes and labs in the chart from the past 30 days and (re)examined the patient.  Any updates or changes from those notes are reflected in this note.    Clinically Significant Risk Factors Present on Admission                             # Overweight: Estimated body mass index is 27.07 kg/m  as calculated from the following:    Height as of 5/14/25: 1.6 m (5' 3\").    Weight as of 5/14/25: 69.3 kg (152 lb 12.8 oz).       # Asthma: noted on problem list              "

## 2025-05-21 NOTE — DISCHARGE INSTRUCTIONS
[facility]    Home Care Following Endoscopy          Activity:  You have just undergone an endoscopic procedure usually performed with conscious sedation.  Do not work or operate machinery (including a car) for at least 12 hours.    I encourage you to walk and attempt to pass this air as soon as possible.    Diet:  Return to the diet you were on before your procedure but eat lightly for the first 12-24 hours.  Drink plenty of water.  Resume any regular medications unless otherwise advised by your physician.  Please begin any new medication prescribed as a result of your procedure as directed by your physician.   If you had any biopsy or polyp removed please refrain from aspirin or aspirin products for 2 days.  If on Coumadin please restart as instructed by your physician.   Pain:  You may take Tylenol as needed for pain.  Expected Recovery:  You can expect some mild abdominal fullness and/or discomfort due to the air used to inflate your intestinal tract. It is also normal to have a mild sore throat after upper endoscopy.    Call Your Physician if You Have:  After Upper Endoscopy:  Shoulder, back or chest pain.  Difficulty breathing or swallowing.  Vomiting blood.  After Colonoscopy:  Worsening persisting abdominal pain which is worse with activity.  Fevers (>101 degrees F), chills or shakes.  Passage of continued blood with bowel movements.     Any questions or concerns about your recovery, please call [phone] or after hours [phone] ([phone]) Nurse Advice Line.    Follow-up Care:  You MAY have had polyps/biopsy tissue sample(s) removed.  The polyps/biopsy tissue sample(s) will be sent to pathology.    You should receive letter in your My Chart from Dr. Camarena with your results within 1-2 weeks. If you do not participate in My Chart a physical letter will come in the mail in 2-3 weeks.  Please call if you have not received a notification of your results.  If asked to return to clinic  please make an appointment 1 week after your procedure.  Call [phone].

## 2025-05-21 NOTE — ANESTHESIA CARE TRANSFER NOTE
Patient: Amee Crews    Procedure: Procedure(s):  COLONOSCOPY, FLEXIBLE, WITH LESION REMOVAL USING SNARE  ESOPHAGOGASTRODUODENOSCOPY, WITH BIOPSY       Diagnosis: Upset stomach [K30]  Diagnosis Additional Information: No value filed.    Anesthesia Type:   No value filed.     Note:    Oropharynx: oropharynx clear of all foreign objects  Level of Consciousness: awake  Oxygen Supplementation: room air    Independent Airway: airway patency satisfactory and stable  Dentition: dentition unchanged  Vital Signs Stable: post-procedure vital signs reviewed and stable  Report to RN Given: handoff report given  Patient transferred to: Phase II  Comments: To Phase II. Report to RN.  VSS Resp status stable.  Handoff Report: Identifed the Patient, Identified the Reponsible Provider, Reviewed the pertinent medical history, Discussed the surgical course, Reviewed Intra-OP anesthesia mangement and issues during anesthesia, Set expectations for post-procedure period and Allowed opportunity for questions and acknowledgement of understanding      Vitals:  Vitals Value Taken Time   BP     Temp     Pulse     Resp     SpO2 96 % 05/21/25 13:52   Vitals shown include unfiled device data.    Electronically Signed By: ROBYN Campbell CRNA  May 21, 2025  1:53 PM

## 2025-05-27 NOTE — TELEPHONE ENCOUNTER
Need to see   Referral placed.       ROBYN Bojorquez CNP  Questions or concerns please feel free to send me a [employer] message or call me  Phone : [phone]

## 2025-05-28 ENCOUNTER — RESULTS FOLLOW-UP (OUTPATIENT)
Dept: GASTROENTEROLOGY | Facility: CLINIC | Age: 75
End: 2025-05-28

## 2025-05-28 DIAGNOSIS — R10.13 EPIGASTRIC PAIN: Primary | ICD-10-CM

## 2025-05-29 DIAGNOSIS — Z83.49 FAMILY HISTORY OF HASHIMOTO THYROIDITIS: Primary | ICD-10-CM

## 2025-06-03 DIAGNOSIS — K21.00 GASTROESOPHAGEAL REFLUX DISEASE WITH ESOPHAGITIS WITHOUT HEMORRHAGE: ICD-10-CM

## 2025-06-03 RX ORDER — FAMOTIDINE 20 MG/1
20 TABLET, FILM COATED ORAL
Qty: 180 TABLET | Refills: 2 | Status: SHIPPED | OUTPATIENT
Start: 2025-06-03

## 2025-06-04 DIAGNOSIS — E55.9 VITAMIN D DEFICIENCY: ICD-10-CM

## 2025-06-04 RX ORDER — CHOLECALCIFEROL (VITAMIN D3) 50 MCG
1 TABLET ORAL DAILY
Qty: 90 TABLET | Refills: 2 | Status: SHIPPED | OUTPATIENT
Start: 2025-06-04

## 2025-06-11 NOTE — TELEPHONE ENCOUNTER
Prescription approved per [facility] Refill Protocol.  Perlita Michel RN    
Home

## 2025-06-17 ENCOUNTER — RESULTS FOLLOW-UP (OUTPATIENT)
Dept: GASTROENTEROLOGY | Facility: CLINIC | Age: 75
End: 2025-06-17

## 2025-06-29 DIAGNOSIS — G25.81 RESTLESS LEG: ICD-10-CM

## 2025-06-30 RX ORDER — PRAMIPEXOLE DIHYDROCHLORIDE 0.25 MG/1
TABLET ORAL
Qty: 270 TABLET | Refills: 0 | Status: SHIPPED | OUTPATIENT
Start: 2025-06-30

## 2025-08-05 ENCOUNTER — PATIENT OUTREACH (OUTPATIENT)
Dept: CARE COORDINATION | Facility: CLINIC | Age: 75
End: 2025-08-05
Payer: COMMERCIAL

## 2025-08-23 DIAGNOSIS — E55.9 VITAMIN D DEFICIENCY: ICD-10-CM

## 2025-08-23 DIAGNOSIS — J32.9 CHRONIC SINUSITIS, UNSPECIFIED LOCATION: ICD-10-CM

## 2025-08-25 RX ORDER — LANOLIN ALCOHOL/MO/W.PET/CERES
1000 CREAM (GRAM) TOPICAL DAILY
Qty: 90 TABLET | Refills: 0 | Status: SHIPPED | OUTPATIENT
Start: 2025-08-25

## 2025-08-26 DIAGNOSIS — J32.9 CHRONIC SINUSITIS, UNSPECIFIED LOCATION: ICD-10-CM

## 2025-08-27 RX ORDER — FLUTICASONE PROPIONATE 50 MCG
SPRAY, SUSPENSION (ML) NASAL SEE ADMIN INSTRUCTIONS
Qty: 16 G | Refills: 1 | Status: SHIPPED | OUTPATIENT
Start: 2025-08-27

## (undated) DEVICE — PREP POVIDONE IODINE SOLUTION 10% 120ML

## (undated) DEVICE — PREP CHLORAPREP W/ORANGE TINT 10.5ML 930715

## (undated) DEVICE — PREP SKIN SCRUB TRAY 4461A

## (undated) DEVICE — SOL WATER IRRIG 1000ML BOTTLE 07139-09

## (undated) DEVICE — DRAPE LAP W/ARMBOARD 29410

## (undated) DEVICE — TRAY PAIN INJECTION 97A 640

## (undated) DEVICE — PACK MINOR PROCEDURE CUSTOM

## (undated) DEVICE — SNARE CAPIVATOR ROUND COLD SNR BX10 M00561101

## (undated) DEVICE — GLOVE BIOGEL PI ULTRATOUCH G SZ 7.5 42175

## (undated) DEVICE — PAD CHUX UNDERPAD 23X24" 7136

## (undated) DEVICE — KIT ENDO TURNOVER/PROCEDURE CARRY-ON 101822

## (undated) DEVICE — GLOVE PROTEXIS W/NEU-THERA 7.0  2D73TE70

## (undated) DEVICE — SUCTION MANIFOLD NEPTUNE 2 SYS 4 PORT 0702-020-000

## (undated) DEVICE — TUBING SUCTION 6"X3/16" N56A

## (undated) DEVICE — SUCTION MANIFOLD NEPTUNE 2 SYS 1 PORT 702-025-000

## (undated) DEVICE — LUBRICATING JELLY 4.25OZ

## (undated) DEVICE — NDL SPINAL 22GA 3.5" QUINCKE 405181

## (undated) DEVICE — TUBING SUCTION 12"X1/4" N612

## (undated) DEVICE — PREP CHLORAPREP 26ML TINTED ORANGE  260815

## (undated) DEVICE — ESU GROUND PAD UNIVERSAL W/O CORD

## (undated) DEVICE — ENDO FORCEP ENDOJAW BIOPSY 2.8MMX230CM FB-220U

## (undated) DEVICE — SOLUTION WATER 1000ML BOTTLE R5000-01

## (undated) DEVICE — KIT ENDO FIRST STEP DISINFECTANT 200ML W/POUCH EP-4

## (undated) DEVICE — ENDO TRAP POLYP E-TRAP 00711099

## (undated) RX ORDER — FENTANYL CITRATE 50 UG/ML
INJECTION, SOLUTION INTRAMUSCULAR; INTRAVENOUS
Status: DISPENSED
Start: 2025-02-12

## (undated) RX ORDER — DEXAMETHASONE SODIUM PHOSPHATE 10 MG/ML
INJECTION, SOLUTION INTRAMUSCULAR; INTRAVENOUS
Status: DISPENSED
Start: 2025-02-12

## (undated) RX ORDER — LIDOCAINE HYDROCHLORIDE 10 MG/ML
INJECTION, SOLUTION EPIDURAL; INFILTRATION; INTRACAUDAL; PERINEURAL
Status: DISPENSED
Start: 2025-02-12

## (undated) RX ORDER — BUPIVACAINE HYDROCHLORIDE AND EPINEPHRINE 2.5; 5 MG/ML; UG/ML
INJECTION, SOLUTION EPIDURAL; INFILTRATION; INTRACAUDAL; PERINEURAL
Status: DISPENSED
Start: 2025-02-12

## (undated) RX ORDER — FENTANYL CITRATE 50 UG/ML
INJECTION, SOLUTION INTRAMUSCULAR; INTRAVENOUS
Status: DISPENSED
Start: 2020-12-08

## (undated) RX ORDER — ONDANSETRON 2 MG/ML
INJECTION INTRAMUSCULAR; INTRAVENOUS
Status: DISPENSED
Start: 2025-02-12

## (undated) RX ORDER — PROPOFOL 10 MG/ML
INJECTION, EMULSION INTRAVENOUS
Status: DISPENSED
Start: 2025-02-12

## (undated) RX ORDER — ONDANSETRON 2 MG/ML
INJECTION INTRAMUSCULAR; INTRAVENOUS
Status: DISPENSED
Start: 2020-12-08

## (undated) RX ORDER — DEXAMETHASONE SODIUM PHOSPHATE 10 MG/ML
INJECTION, SOLUTION INTRAMUSCULAR; INTRAVENOUS
Status: DISPENSED
Start: 2022-09-16

## (undated) RX ORDER — KETOROLAC TROMETHAMINE 30 MG/ML
INJECTION, SOLUTION INTRAMUSCULAR; INTRAVENOUS
Status: DISPENSED
Start: 2025-02-12